# Patient Record
Sex: MALE | Race: WHITE | NOT HISPANIC OR LATINO | Employment: OTHER | URBAN - NONMETROPOLITAN AREA
[De-identification: names, ages, dates, MRNs, and addresses within clinical notes are randomized per-mention and may not be internally consistent; named-entity substitution may affect disease eponyms.]

---

## 2019-06-19 ENCOUNTER — TRANSCRIBE ORDERS (OUTPATIENT)
Dept: PODIATRY | Facility: CLINIC | Age: 59
End: 2019-06-19

## 2019-06-19 DIAGNOSIS — G62.9 NEUROPATHY: ICD-10-CM

## 2019-06-19 DIAGNOSIS — E11.9 ENCOUNTER FOR DIABETIC FOOT EXAM (HCC): Primary | ICD-10-CM

## 2019-06-19 DIAGNOSIS — R73.9 HYPERGLYCEMIA: ICD-10-CM

## 2019-07-15 ENCOUNTER — OFFICE VISIT (OUTPATIENT)
Dept: PODIATRY | Facility: CLINIC | Age: 59
End: 2019-07-15

## 2019-07-15 VITALS — BODY MASS INDEX: 40.09 KG/M2 | OXYGEN SATURATION: 98 % | HEART RATE: 102 BPM | WEIGHT: 280 LBS | HEIGHT: 70 IN

## 2019-07-15 DIAGNOSIS — E11.42 DIABETIC POLYNEUROPATHY ASSOCIATED WITH TYPE 2 DIABETES MELLITUS (HCC): Primary | ICD-10-CM

## 2019-07-15 DIAGNOSIS — M20.41 HAMMER TOES OF BOTH FEET: ICD-10-CM

## 2019-07-15 DIAGNOSIS — M20.42 HAMMER TOES OF BOTH FEET: ICD-10-CM

## 2019-07-15 DIAGNOSIS — M79.672 PAIN IN BOTH FEET: ICD-10-CM

## 2019-07-15 DIAGNOSIS — M79.671 PAIN IN BOTH FEET: ICD-10-CM

## 2019-07-15 PROCEDURE — 99203 OFFICE O/P NEW LOW 30 MIN: CPT | Performed by: PODIATRIST

## 2019-07-15 RX ORDER — GLIPIZIDE 10 MG/1
10 TABLET ORAL
COMMUNITY

## 2019-07-15 RX ORDER — GABAPENTIN 300 MG/1
300 CAPSULE ORAL 3 TIMES DAILY
Qty: 90 CAPSULE | Refills: 0 | Status: SHIPPED | OUTPATIENT
Start: 2019-07-15

## 2019-07-15 RX ORDER — LISINOPRIL 10 MG/1
10 TABLET ORAL DAILY
COMMUNITY

## 2019-07-15 NOTE — PROGRESS NOTES
Tucker Knox  1960  59 y.o. male   BS: 186 per patient    Patient presents today for a routine diabetic nail exam.     07/15/2019  Chief Complaint   Patient presents with   • Left Foot - diabetic foot care   • Right Foot - diabetic foot care           History of Present Illness    Tucker Knox is a 59 y.o. male with history of diabetes who presents for bilateral foot pain.  He describes pain is chronic and worsening.  He describes the pain as tingling burning and stinging.  Pain is present independent of weightbearing status and activity.  He states that multiple years ago he did take gabapentin which seemed to help somewhat with his pain.  He admits to being a poorly controlled diabetic and does not currently have a family practitioner.  He states that he gets his medications for his diabetes through the urgent care.  Ysabel diabetic foot ulcerations or infections.        Past Medical History:   Diagnosis Date   • Hypertension          History reviewed. No pertinent surgical history.      History reviewed. No pertinent family history.      Social History     Socioeconomic History   • Marital status:      Spouse name: Not on file   • Number of children: Not on file   • Years of education: Not on file   • Highest education level: Not on file   Tobacco Use   • Smoking status: Current Every Day Smoker   • Smokeless tobacco: Current User   Substance and Sexual Activity   • Alcohol use: No     Frequency: Never   • Drug use: No   • Sexual activity: Defer         Current Outpatient Medications   Medication Sig Dispense Refill   • Dulaglutide (TRULICITY) 0.75 MG/0.5ML solution pen-injector Inject  under the skin into the appropriate area as directed.     • glipiZIDE (GLUCOTROL) 10 MG tablet Take 10 mg by mouth 2 (Two) Times a Day Before Meals.     • insulin detemir (LEVEMIR) 100 UNIT/ML injection Inject  under the skin into the appropriate area as directed Daily.     • lisinopril  "(PRINIVIL,ZESTRIL) 10 MG tablet Take 10 mg by mouth Daily.     • metFORMIN (GLUCOPHAGE) 1000 MG tablet Take 1,000 mg by mouth 2 (Two) Times a Day.  0   • gabapentin (NEURONTIN) 300 MG capsule Take 1 capsule by mouth 3 (Three) Times a Day. 90 capsule 0     No current facility-administered medications for this visit.          OBJECTIVE    Pulse 102   Ht 177.8 cm (70\")   Wt 127 kg (280 lb)   SpO2 98%   BMI 40.18 kg/m²       Review of Systems   Constitutional: Positive for fatigue.   Eyes: Positive for visual disturbance.   Respiratory: Positive for cough and shortness of breath.    Endocrine: Positive for heat intolerance.   Genitourinary: Positive for difficulty urinating.   Musculoskeletal: Positive for back pain.        Joint pain     Neurological: Positive for dizziness, numbness and headaches.   Psychiatric/Behavioral: Positive for confusion. The patient is nervous/anxious.         Depression          Physical Exam    Tucker had a diabetic foot exam performed today.   During the foot exam he had a monofilament test performed.         Constitutional: he appears well-developed and well-nourished.   HEENT: Normocephalic. Atraumatic  CV: No tenderness. RRR  Resp: Non-labored respiration. No wheezes.   Psychiatric: he has a normal mood and affect. his   behavior is normal.      Lower Extremity Exam:  Vascular: DP/PT pulses palpable 1+.   Negative hair growth.   Trace perimalleolar edema  Neuro: Protective sensation diminished to lesser toes, b/l.  DTRs intact  Integument: No open wounds or lesions.  Skin quality normal  No masses  Webspaces c/d/i  Musculoskeletal: LE muscle strength 5/5.   Gait Normal  Mild, flexible hammertoe deformity toes 2 through 5 bilateral.  Nails 1-5 b/l thickened  Ankle ROM is in normal limits, b/l              ASSESSMENT AND PLAN    Tucker was seen today for diabetic foot care and diabetic foot care.    Diagnoses and all orders for this visit:    Diabetic polyneuropathy associated with " type 2 diabetes mellitus (CMS/[facility])  -     Ambulatory Referral to Family Practice    Pain in both feet    Hammer toes of both feet    Other orders  -     gabapentin (NEURONTIN) 300 MG capsule; Take 1 capsule by mouth 3 (Three) Times a Day.      -Comprehensive DM foot exam performed. Patient educated on importance of tight glucose control and daily foot checks.   -Patient educated on padding techniques for hammertoes.  Proper extra depth diabetic shoe gear.  Limit barefoot walking.  -Patient's predominant pain seems to be neuropathic in origin.  Had at length discussion advising him to obtain a new family practice doctor for better control of his diabetes and management of his neuropathic pain.  -I did obtain a Manohar report which was appropriate we will trial him on 1 month of Neurontin in the meantime.  Advised him that he will need a family practitioner to manage this in future.  -Follow up 3 months PRN          This document has been electronically signed by Simone Clark DPM on July 20, 2019 1:10 PM     EMR Dragon/Transcription disclaimer:   Much of this encounter note is an electronic transcription/translation of spoken language to printed text. The electronic translation of spoken language may permit erroneous, or at times, nonsensical words or phrases to be inadvertently transcribed; Although I have reviewed the note for such errors, some may still exist.    Simone Clark DPM  7/20/2019  1:10 PM

## 2020-03-24 ENCOUNTER — TELEPHONE (OUTPATIENT)
Dept: PODIATRY | Facility: CLINIC | Age: 60
End: 2020-03-24

## 2020-03-24 RX ORDER — GABAPENTIN 300 MG/1
CAPSULE ORAL
Qty: 270 CAPSULE | OUTPATIENT
Start: 2020-03-24

## 2020-03-24 NOTE — TELEPHONE ENCOUNTER
Dr Clark said No  And I called patient and explained that we have not seen him since July 2019 and that he needed to establish care with a PCP

## 2020-03-24 NOTE — TELEPHONE ENCOUNTER
JANY:    PATIENT WAS LAST SEEN ON 7/15/2019 AND IS REQUESTING A REFILL ON   gabapentin (NEURONTIN) 300 MG capsule   DUE TO COVID19 DOES NOT WANT TO MAKE AN APPOINTMENT AND HE DID NOT WANT TO COME AROUND  A HOSPITAL.  PLEASE ADVISE PATIENT

## 2021-12-25 ENCOUNTER — HOSPITAL ENCOUNTER (INPATIENT)
Facility: HOSPITAL | Age: 61
LOS: 1 days | Discharge: HOME | End: 2021-12-26
Attending: EMERGENCY MEDICINE | Admitting: FAMILY MEDICINE

## 2021-12-25 ENCOUNTER — APPOINTMENT (OUTPATIENT)
Dept: CARDIOLOGY | Facility: HOSPITAL | Age: 61
End: 2021-12-25

## 2021-12-25 ENCOUNTER — APPOINTMENT (OUTPATIENT)
Dept: CT IMAGING | Facility: HOSPITAL | Age: 61
End: 2021-12-25

## 2021-12-25 ENCOUNTER — APPOINTMENT (OUTPATIENT)
Dept: GENERAL RADIOLOGY | Facility: HOSPITAL | Age: 61
End: 2021-12-25

## 2021-12-25 DIAGNOSIS — R07.89 CHEST TIGHTNESS: Primary | ICD-10-CM

## 2021-12-25 DIAGNOSIS — I21.4 NSTEMI (NON-ST ELEVATED MYOCARDIAL INFARCTION): ICD-10-CM

## 2021-12-25 LAB
ALBUMIN SERPL-MCNC: 3.8 G/DL (ref 3.5–5.2)
ALBUMIN/GLOB SERPL: 1 G/DL
ALP SERPL-CCNC: 109 U/L (ref 39–117)
ALT SERPL W P-5'-P-CCNC: 13 U/L (ref 1–41)
ANION GAP SERPL CALCULATED.3IONS-SCNC: 10 MMOL/L (ref 5–15)
ANION GAP SERPL CALCULATED.3IONS-SCNC: 11 MMOL/L (ref 5–15)
AST SERPL-CCNC: 10 U/L (ref 1–40)
BASOPHILS # BLD AUTO: 0.08 10*3/MM3 (ref 0–0.2)
BASOPHILS NFR BLD AUTO: 0.7 % (ref 0–1.5)
BH CV ECHO MEAS - AO MAX PG (FULL): 8.1 MMHG
BH CV ECHO MEAS - AO MAX PG: 11 MMHG
BH CV ECHO MEAS - AO MEAN PG (FULL): 1.5 MMHG
BH CV ECHO MEAS - AO MEAN PG: 3.5 MMHG
BH CV ECHO MEAS - AO ROOT AREA (BSA CORRECTED): 1.2
BH CV ECHO MEAS - AO ROOT AREA: 6.2 CM^2
BH CV ECHO MEAS - AO ROOT DIAM: 2.8 CM
BH CV ECHO MEAS - AO V2 MAX: 166 CM/SEC
BH CV ECHO MEAS - AO V2 MEAN: 76.3 CM/SEC
BH CV ECHO MEAS - AO V2 VTI: 21.2 CM
BH CV ECHO MEAS - AVA(I,A): 2.1 CM^2
BH CV ECHO MEAS - AVA(I,D): 2.1 CM^2
BH CV ECHO MEAS - AVA(V,A): 1.6 CM^2
BH CV ECHO MEAS - AVA(V,D): 1.6 CM^2
BH CV ECHO MEAS - BSA(HAYCOCK): 2.4 M^2
BH CV ECHO MEAS - BSA: 2.3 M^2
BH CV ECHO MEAS - BZI_BMI: 36.9 KILOGRAMS/M^2
BH CV ECHO MEAS - BZI_METRIC_HEIGHT: 175.3 CM
BH CV ECHO MEAS - BZI_METRIC_WEIGHT: 113.4 KG
BH CV ECHO MEAS - EDV(CUBED): 267.1 ML
BH CV ECHO MEAS - EDV(MOD-SP4): 233 ML
BH CV ECHO MEAS - EDV(TEICH): 211.5 ML
BH CV ECHO MEAS - EF(CUBED): 43.6 %
BH CV ECHO MEAS - EF(MOD-SP4): 38.6 %
BH CV ECHO MEAS - EF(TEICH): 35.4 %
BH CV ECHO MEAS - ESV(CUBED): 150.6 ML
BH CV ECHO MEAS - ESV(MOD-SP4): 143 ML
BH CV ECHO MEAS - ESV(TEICH): 136.5 ML
BH CV ECHO MEAS - FS: 17.4 %
BH CV ECHO MEAS - IVS/LVPW: 1
BH CV ECHO MEAS - IVSD: 1.3 CM
BH CV ECHO MEAS - LA DIMENSION: 4.5 CM
BH CV ECHO MEAS - LA/AO: 1.6
BH CV ECHO MEAS - LAT PEAK E' VEL: 6.7 CM/SEC
BH CV ECHO MEAS - LV DIASTOLIC VOL/BSA (35-75): 102.6 ML/M^2
BH CV ECHO MEAS - LV MASS(C)D: 403.3 GRAMS
BH CV ECHO MEAS - LV MASS(C)DI: 177.6 GRAMS/M^2
BH CV ECHO MEAS - LV MAX PG: 3 MMHG
BH CV ECHO MEAS - LV MEAN PG: 2 MMHG
BH CV ECHO MEAS - LV SYSTOLIC VOL/BSA (12-30): 63 ML/M^2
BH CV ECHO MEAS - LV V1 MAX: 86.2 CM/SEC
BH CV ECHO MEAS - LV V1 MEAN: 55.9 CM/SEC
BH CV ECHO MEAS - LV V1 VTI: 14.2 CM
BH CV ECHO MEAS - LVIDD: 6.4 CM
BH CV ECHO MEAS - LVIDS: 5.3 CM
BH CV ECHO MEAS - LVLD AP4: 10 CM
BH CV ECHO MEAS - LVLS AP4: 8.7 CM
BH CV ECHO MEAS - LVOT AREA (M): 3.1 CM^2
BH CV ECHO MEAS - LVOT AREA: 3.1 CM^2
BH CV ECHO MEAS - LVOT DIAM: 2 CM
BH CV ECHO MEAS - LVPWD: 1.3 CM
BH CV ECHO MEAS - MED PEAK E' VEL: 5.33 CM/SEC
BH CV ECHO MEAS - MR MAX PG: 129.8 MMHG
BH CV ECHO MEAS - MR MAX VEL: 568.7 CM/SEC
BH CV ECHO MEAS - MR MEAN PG: 92.5 MMHG
BH CV ECHO MEAS - MR MEAN VEL: 457 CM/SEC
BH CV ECHO MEAS - MR VTI: 173 CM
BH CV ECHO MEAS - MV A MAX VEL: 113 CM/SEC
BH CV ECHO MEAS - MV DEC SLOPE: 563 CM/SEC^2
BH CV ECHO MEAS - MV DEC TIME: 0.16 SEC
BH CV ECHO MEAS - MV E MAX VEL: 89 CM/SEC
BH CV ECHO MEAS - MV E/A: 0.79
BH CV ECHO MEAS - MV P1/2T MAX VEL: 107 CM/SEC
BH CV ECHO MEAS - MV P1/2T: 55.7 MSEC
BH CV ECHO MEAS - MVA P1/2T LCG: 2.1 CM^2
BH CV ECHO MEAS - MVA(P1/2T): 4 CM^2
BH CV ECHO MEAS - RAP SYSTOLE: 5 MMHG
BH CV ECHO MEAS - RVSP: 55.1 MMHG
BH CV ECHO MEAS - SI(AO): 57.3 ML/M^2
BH CV ECHO MEAS - SI(CUBED): 51.3 ML/M^2
BH CV ECHO MEAS - SI(LVOT): 19.6 ML/M^2
BH CV ECHO MEAS - SI(MOD-SP4): 39.6 ML/M^2
BH CV ECHO MEAS - SI(TEICH): 33 ML/M^2
BH CV ECHO MEAS - SV(AO): 130.2 ML
BH CV ECHO MEAS - SV(CUBED): 116.5 ML
BH CV ECHO MEAS - SV(LVOT): 44.6 ML
BH CV ECHO MEAS - SV(MOD-SP4): 90 ML
BH CV ECHO MEAS - SV(TEICH): 75 ML
BH CV ECHO MEAS - TR MAX VEL: 354 CM/SEC
BH CV ECHO MEASUREMENTS AVERAGE E/E' RATIO: 14.8
BILIRUB SERPL-MCNC: 0.5 MG/DL (ref 0–1.2)
BUN SERPL-MCNC: 13 MG/DL (ref 8–23)
BUN SERPL-MCNC: 14 MG/DL (ref 8–23)
BUN/CREAT SERPL: 13.5 (ref 7–25)
BUN/CREAT SERPL: 14.1 (ref 7–25)
CALCIUM SPEC-SCNC: 8.6 MG/DL (ref 8.6–10.5)
CALCIUM SPEC-SCNC: 9 MG/DL (ref 8.6–10.5)
CHLORIDE SERPL-SCNC: 98 MMOL/L (ref 98–107)
CHLORIDE SERPL-SCNC: 98 MMOL/L (ref 98–107)
CO2 SERPL-SCNC: 29 MMOL/L (ref 22–29)
CO2 SERPL-SCNC: 30 MMOL/L (ref 22–29)
CREAT SERPL-MCNC: 0.96 MG/DL (ref 0.76–1.27)
CREAT SERPL-MCNC: 0.99 MG/DL (ref 0.76–1.27)
DEPRECATED RDW RBC AUTO: 43.5 FL (ref 37–54)
EOSINOPHIL # BLD AUTO: 0.1 10*3/MM3 (ref 0–0.4)
EOSINOPHIL NFR BLD AUTO: 0.8 % (ref 0.3–6.2)
ERYTHROCYTE [DISTWIDTH] IN BLOOD BY AUTOMATED COUNT: 13.4 % (ref 12.3–15.4)
GFR SERPL CREATININE-BSD FRML MDRD: 77 ML/MIN/1.73
GFR SERPL CREATININE-BSD FRML MDRD: 80 ML/MIN/1.73
GLOBULIN UR ELPH-MCNC: 3.9 GM/DL
GLUCOSE BLDC GLUCOMTR-MCNC: 172 MG/DL (ref 70–130)
GLUCOSE BLDC GLUCOMTR-MCNC: 353 MG/DL (ref 70–130)
GLUCOSE SERPL-MCNC: 307 MG/DL (ref 65–99)
GLUCOSE SERPL-MCNC: 346 MG/DL (ref 65–99)
HBA1C MFR BLD: 9.7 % (ref 4.8–5.6)
HCT VFR BLD AUTO: 43 % (ref 37.5–51)
HGB BLD-MCNC: 13.5 G/DL (ref 13–17.7)
HOLD SPECIMEN: NORMAL
IMM GRANULOCYTES # BLD AUTO: 0.1 10*3/MM3 (ref 0–0.05)
IMM GRANULOCYTES NFR BLD AUTO: 0.8 % (ref 0–0.5)
LEFT ATRIUM VOLUME INDEX: 35.1 ML/M2
LEFT ATRIUM VOLUME: 79.6 CM3
LYMPHOCYTES # BLD AUTO: 2.02 10*3/MM3 (ref 0.7–3.1)
LYMPHOCYTES NFR BLD AUTO: 16.6 % (ref 19.6–45.3)
MAGNESIUM SERPL-MCNC: 1.9 MG/DL (ref 1.6–2.4)
MCH RBC QN AUTO: 28.4 PG (ref 26.6–33)
MCHC RBC AUTO-ENTMCNC: 31.4 G/DL (ref 31.5–35.7)
MCV RBC AUTO: 90.3 FL (ref 79–97)
MONOCYTES # BLD AUTO: 0.73 10*3/MM3 (ref 0.1–0.9)
MONOCYTES NFR BLD AUTO: 6 % (ref 5–12)
NEUTROPHILS NFR BLD AUTO: 75.1 % (ref 42.7–76)
NEUTROPHILS NFR BLD AUTO: 9.12 10*3/MM3 (ref 1.7–7)
NRBC BLD AUTO-RTO: 0 /100 WBC (ref 0–0.2)
NT-PROBNP SERPL-MCNC: 2056 PG/ML (ref 0–900)
PLATELET # BLD AUTO: 315 10*3/MM3 (ref 140–450)
PMV BLD AUTO: 9.9 FL (ref 6–12)
POTASSIUM SERPL-SCNC: 4.1 MMOL/L (ref 3.5–5.2)
POTASSIUM SERPL-SCNC: 4.4 MMOL/L (ref 3.5–5.2)
PROT SERPL-MCNC: 7.7 G/DL (ref 6–8.5)
RBC # BLD AUTO: 4.76 10*6/MM3 (ref 4.14–5.8)
SARS-COV-2 RNA PNL SPEC NAA+PROBE: NOT DETECTED
SODIUM SERPL-SCNC: 138 MMOL/L (ref 136–145)
SODIUM SERPL-SCNC: 138 MMOL/L (ref 136–145)
TROPONIN T SERPL-MCNC: 0.08 NG/ML (ref 0–0.03)
TROPONIN T SERPL-MCNC: 0.09 NG/ML (ref 0–0.03)
WBC NRBC COR # BLD: 12.15 10*3/MM3 (ref 3.4–10.8)

## 2021-12-25 PROCEDURE — 93010 ELECTROCARDIOGRAM REPORT: CPT | Performed by: INTERNAL MEDICINE

## 2021-12-25 PROCEDURE — 83880 ASSAY OF NATRIURETIC PEPTIDE: CPT | Performed by: EMERGENCY MEDICINE

## 2021-12-25 PROCEDURE — 93356 MYOCRD STRAIN IMG SPCKL TRCK: CPT

## 2021-12-25 PROCEDURE — 82962 GLUCOSE BLOOD TEST: CPT

## 2021-12-25 PROCEDURE — 80053 COMPREHEN METABOLIC PANEL: CPT | Performed by: FAMILY MEDICINE

## 2021-12-25 PROCEDURE — 93356 MYOCRD STRAIN IMG SPCKL TRCK: CPT | Performed by: INTERNAL MEDICINE

## 2021-12-25 PROCEDURE — 25010000002 ENOXAPARIN PER 10 MG: Performed by: FAMILY MEDICINE

## 2021-12-25 PROCEDURE — 94799 UNLISTED PULMONARY SVC/PX: CPT

## 2021-12-25 PROCEDURE — 87635 SARS-COV-2 COVID-19 AMP PRB: CPT | Performed by: EMERGENCY MEDICINE

## 2021-12-25 PROCEDURE — 93306 TTE W/DOPPLER COMPLETE: CPT

## 2021-12-25 PROCEDURE — 0 IOPAMIDOL PER 1 ML: Performed by: EMERGENCY MEDICINE

## 2021-12-25 PROCEDURE — 84484 ASSAY OF TROPONIN QUANT: CPT | Performed by: EMERGENCY MEDICINE

## 2021-12-25 PROCEDURE — 63710000001 INSULIN LISPRO (HUMAN) PER 5 UNITS: Performed by: FAMILY MEDICINE

## 2021-12-25 PROCEDURE — 83036 HEMOGLOBIN GLYCOSYLATED A1C: CPT | Performed by: FAMILY MEDICINE

## 2021-12-25 PROCEDURE — 93005 ELECTROCARDIOGRAM TRACING: CPT | Performed by: EMERGENCY MEDICINE

## 2021-12-25 PROCEDURE — 99285 EMERGENCY DEPT VISIT HI MDM: CPT

## 2021-12-25 PROCEDURE — 99222 1ST HOSP IP/OBS MODERATE 55: CPT | Performed by: INTERNAL MEDICINE

## 2021-12-25 PROCEDURE — 83735 ASSAY OF MAGNESIUM: CPT | Performed by: FAMILY MEDICINE

## 2021-12-25 PROCEDURE — 63710000001 INSULIN DETEMIR PER 5 UNITS: Performed by: FAMILY MEDICINE

## 2021-12-25 PROCEDURE — 25010000002 FUROSEMIDE PER 20 MG: Performed by: FAMILY MEDICINE

## 2021-12-25 PROCEDURE — 71275 CT ANGIOGRAPHY CHEST: CPT

## 2021-12-25 PROCEDURE — 85025 COMPLETE CBC W/AUTO DIFF WBC: CPT | Performed by: EMERGENCY MEDICINE

## 2021-12-25 PROCEDURE — 93306 TTE W/DOPPLER COMPLETE: CPT | Performed by: INTERNAL MEDICINE

## 2021-12-25 PROCEDURE — 71045 X-RAY EXAM CHEST 1 VIEW: CPT

## 2021-12-25 PROCEDURE — 36415 COLL VENOUS BLD VENIPUNCTURE: CPT

## 2021-12-25 RX ORDER — NITROGLYCERIN 20 MG/100ML
5-200 INJECTION INTRAVENOUS
Status: DISCONTINUED | OUTPATIENT
Start: 2021-12-25 | End: 2021-12-25

## 2021-12-25 RX ORDER — ACETAMINOPHEN 325 MG/1
650 TABLET ORAL EVERY 4 HOURS PRN
Status: DISCONTINUED | OUTPATIENT
Start: 2021-12-25 | End: 2021-12-26 | Stop reason: HOSPADM

## 2021-12-25 RX ORDER — POLYETHYLENE GLYCOL 3350 17 G/17G
17 POWDER, FOR SOLUTION ORAL DAILY PRN
Status: DISCONTINUED | OUTPATIENT
Start: 2021-12-25 | End: 2021-12-26 | Stop reason: HOSPADM

## 2021-12-25 RX ORDER — NICOTINE POLACRILEX 4 MG
15 LOZENGE BUCCAL
Status: DISCONTINUED | OUTPATIENT
Start: 2021-12-25 | End: 2021-12-26 | Stop reason: HOSPADM

## 2021-12-25 RX ORDER — SODIUM CHLORIDE 0.9 % (FLUSH) 0.9 %
10 SYRINGE (ML) INJECTION EVERY 12 HOURS SCHEDULED
Status: DISCONTINUED | OUTPATIENT
Start: 2021-12-25 | End: 2021-12-26 | Stop reason: HOSPADM

## 2021-12-25 RX ORDER — ASPIRIN 81 MG/1
324 TABLET, CHEWABLE ORAL ONCE
Status: DISCONTINUED | OUTPATIENT
Start: 2021-12-25 | End: 2021-12-26

## 2021-12-25 RX ORDER — DEXTROSE MONOHYDRATE 25 G/50ML
25 INJECTION, SOLUTION INTRAVENOUS
Status: DISCONTINUED | OUTPATIENT
Start: 2021-12-25 | End: 2021-12-26 | Stop reason: HOSPADM

## 2021-12-25 RX ORDER — ALUMINA, MAGNESIA, AND SIMETHICONE 2400; 2400; 240 MG/30ML; MG/30ML; MG/30ML
15 SUSPENSION ORAL EVERY 6 HOURS PRN
Status: DISCONTINUED | OUTPATIENT
Start: 2021-12-25 | End: 2021-12-26 | Stop reason: HOSPADM

## 2021-12-25 RX ORDER — LISINOPRIL 10 MG/1
10 TABLET ORAL DAILY
Status: DISCONTINUED | OUTPATIENT
Start: 2021-12-25 | End: 2021-12-26 | Stop reason: HOSPADM

## 2021-12-25 RX ORDER — FUROSEMIDE 10 MG/ML
40 INJECTION INTRAMUSCULAR; INTRAVENOUS
Status: DISCONTINUED | OUTPATIENT
Start: 2021-12-25 | End: 2021-12-26

## 2021-12-25 RX ORDER — BISACODYL 5 MG/1
5 TABLET, DELAYED RELEASE ORAL DAILY PRN
Status: DISCONTINUED | OUTPATIENT
Start: 2021-12-25 | End: 2021-12-26 | Stop reason: HOSPADM

## 2021-12-25 RX ORDER — SODIUM CHLORIDE 0.9 % (FLUSH) 0.9 %
10 SYRINGE (ML) INJECTION AS NEEDED
Status: DISCONTINUED | OUTPATIENT
Start: 2021-12-25 | End: 2021-12-26 | Stop reason: HOSPADM

## 2021-12-25 RX ORDER — ONDANSETRON 2 MG/ML
4 INJECTION INTRAMUSCULAR; INTRAVENOUS EVERY 6 HOURS PRN
Status: DISCONTINUED | OUTPATIENT
Start: 2021-12-25 | End: 2021-12-26 | Stop reason: HOSPADM

## 2021-12-25 RX ORDER — ASPIRIN 81 MG/1
81 TABLET ORAL DAILY
Status: DISCONTINUED | OUTPATIENT
Start: 2021-12-26 | End: 2021-12-26 | Stop reason: HOSPADM

## 2021-12-25 RX ORDER — AMOXICILLIN 250 MG
2 CAPSULE ORAL 2 TIMES DAILY PRN
Status: DISCONTINUED | OUTPATIENT
Start: 2021-12-25 | End: 2021-12-26 | Stop reason: HOSPADM

## 2021-12-25 RX ORDER — BISACODYL 10 MG
10 SUPPOSITORY, RECTAL RECTAL DAILY PRN
Status: DISCONTINUED | OUTPATIENT
Start: 2021-12-25 | End: 2021-12-26 | Stop reason: HOSPADM

## 2021-12-25 RX ORDER — FUROSEMIDE 10 MG/ML
40 INJECTION INTRAMUSCULAR; INTRAVENOUS
Status: DISCONTINUED | OUTPATIENT
Start: 2021-12-25 | End: 2021-12-25

## 2021-12-25 RX ADMIN — IOPAMIDOL 100 ML: 755 INJECTION, SOLUTION INTRAVENOUS at 12:43

## 2021-12-25 RX ADMIN — ENOXAPARIN SODIUM 110 MG: 120 INJECTION SUBCUTANEOUS at 17:08

## 2021-12-25 RX ADMIN — INSULIN DETEMIR 40 UNITS: 100 INJECTION, SOLUTION SUBCUTANEOUS at 22:02

## 2021-12-25 RX ADMIN — INSULIN LISPRO 12 UNITS: 100 INJECTION, SOLUTION INTRAVENOUS; SUBCUTANEOUS at 17:08

## 2021-12-25 RX ADMIN — NITROGLYCERIN 5 MCG/MIN: 20 INJECTION INTRAVENOUS at 11:04

## 2021-12-25 RX ADMIN — SODIUM CHLORIDE, PRESERVATIVE FREE 10 ML: 5 INJECTION INTRAVENOUS at 22:02

## 2021-12-25 RX ADMIN — LISINOPRIL 10 MG: 10 TABLET ORAL at 17:07

## 2021-12-25 RX ADMIN — FUROSEMIDE 40 MG: 10 INJECTION, SOLUTION INTRAVENOUS at 17:08

## 2021-12-26 ENCOUNTER — READMISSION MANAGEMENT (OUTPATIENT)
Dept: CALL CENTER | Facility: HOSPITAL | Age: 61
End: 2021-12-26

## 2021-12-26 VITALS
HEIGHT: 69 IN | HEART RATE: 90 BPM | WEIGHT: 262 LBS | BODY MASS INDEX: 38.8 KG/M2 | OXYGEN SATURATION: 94 % | TEMPERATURE: 97.6 F | DIASTOLIC BLOOD PRESSURE: 77 MMHG | SYSTOLIC BLOOD PRESSURE: 121 MMHG | RESPIRATION RATE: 18 BRPM

## 2021-12-26 PROBLEM — I50.21 SYSTOLIC CHF, ACUTE: Status: ACTIVE | Noted: 2021-12-26

## 2021-12-26 PROBLEM — I27.20 PULMONARY HTN (HCC): Status: ACTIVE | Noted: 2021-12-26

## 2021-12-26 LAB
ANION GAP SERPL CALCULATED.3IONS-SCNC: 8 MMOL/L (ref 5–15)
BASOPHILS # BLD AUTO: 0.04 10*3/MM3 (ref 0–0.2)
BASOPHILS NFR BLD AUTO: 0.4 % (ref 0–1.5)
BUN SERPL-MCNC: 15 MG/DL (ref 8–23)
BUN/CREAT SERPL: 15.8 (ref 7–25)
CALCIUM SPEC-SCNC: 8.5 MG/DL (ref 8.6–10.5)
CHLORIDE SERPL-SCNC: 99 MMOL/L (ref 98–107)
CHOLEST SERPL-MCNC: 174 MG/DL (ref 0–200)
CO2 SERPL-SCNC: 32 MMOL/L (ref 22–29)
CREAT SERPL-MCNC: 0.95 MG/DL (ref 0.76–1.27)
DEPRECATED RDW RBC AUTO: 43.7 FL (ref 37–54)
EOSINOPHIL # BLD AUTO: 0.1 10*3/MM3 (ref 0–0.4)
EOSINOPHIL NFR BLD AUTO: 0.9 % (ref 0.3–6.2)
ERYTHROCYTE [DISTWIDTH] IN BLOOD BY AUTOMATED COUNT: 13.3 % (ref 12.3–15.4)
GFR SERPL CREATININE-BSD FRML MDRD: 81 ML/MIN/1.73
GLUCOSE BLDC GLUCOMTR-MCNC: 199 MG/DL (ref 70–130)
GLUCOSE BLDC GLUCOMTR-MCNC: 338 MG/DL (ref 70–130)
GLUCOSE SERPL-MCNC: 261 MG/DL (ref 65–99)
HCT VFR BLD AUTO: 43.2 % (ref 37.5–51)
HDLC SERPL-MCNC: 36 MG/DL (ref 40–60)
HGB BLD-MCNC: 13.5 G/DL (ref 13–17.7)
IMM GRANULOCYTES # BLD AUTO: 0.06 10*3/MM3 (ref 0–0.05)
IMM GRANULOCYTES NFR BLD AUTO: 0.6 % (ref 0–0.5)
LDLC SERPL CALC-MCNC: 111 MG/DL (ref 0–100)
LDLC/HDLC SERPL: 2.98 {RATIO}
LYMPHOCYTES # BLD AUTO: 1.69 10*3/MM3 (ref 0.7–3.1)
LYMPHOCYTES NFR BLD AUTO: 15.6 % (ref 19.6–45.3)
MAGNESIUM SERPL-MCNC: 2 MG/DL (ref 1.6–2.4)
MCH RBC QN AUTO: 28.2 PG (ref 26.6–33)
MCHC RBC AUTO-ENTMCNC: 31.3 G/DL (ref 31.5–35.7)
MCV RBC AUTO: 90.4 FL (ref 79–97)
MONOCYTES # BLD AUTO: 0.58 10*3/MM3 (ref 0.1–0.9)
MONOCYTES NFR BLD AUTO: 5.4 % (ref 5–12)
NEUTROPHILS NFR BLD AUTO: 77.1 % (ref 42.7–76)
NEUTROPHILS NFR BLD AUTO: 8.37 10*3/MM3 (ref 1.7–7)
NRBC BLD AUTO-RTO: 0 /100 WBC (ref 0–0.2)
PLATELET # BLD AUTO: 322 10*3/MM3 (ref 140–450)
PMV BLD AUTO: 10.4 FL (ref 6–12)
POTASSIUM SERPL-SCNC: 4.2 MMOL/L (ref 3.5–5.2)
RBC # BLD AUTO: 4.78 10*6/MM3 (ref 4.14–5.8)
SODIUM SERPL-SCNC: 139 MMOL/L (ref 136–145)
TRIGL SERPL-MCNC: 153 MG/DL (ref 0–150)
TROPONIN T SERPL-MCNC: 0.09 NG/ML (ref 0–0.03)
VLDLC SERPL-MCNC: 27 MG/DL (ref 5–40)
WBC NRBC COR # BLD: 10.84 10*3/MM3 (ref 3.4–10.8)

## 2021-12-26 PROCEDURE — 25010000002 FUROSEMIDE PER 20 MG: Performed by: FAMILY MEDICINE

## 2021-12-26 PROCEDURE — 63710000001 INSULIN LISPRO (HUMAN) PER 5 UNITS: Performed by: FAMILY MEDICINE

## 2021-12-26 PROCEDURE — 83735 ASSAY OF MAGNESIUM: CPT | Performed by: FAMILY MEDICINE

## 2021-12-26 PROCEDURE — 85025 COMPLETE CBC W/AUTO DIFF WBC: CPT | Performed by: FAMILY MEDICINE

## 2021-12-26 PROCEDURE — 99232 SBSQ HOSP IP/OBS MODERATE 35: CPT | Performed by: INTERNAL MEDICINE

## 2021-12-26 PROCEDURE — 84484 ASSAY OF TROPONIN QUANT: CPT | Performed by: FAMILY MEDICINE

## 2021-12-26 PROCEDURE — 80061 LIPID PANEL: CPT | Performed by: INTERNAL MEDICINE

## 2021-12-26 PROCEDURE — 80048 BASIC METABOLIC PNL TOTAL CA: CPT | Performed by: FAMILY MEDICINE

## 2021-12-26 PROCEDURE — 82962 GLUCOSE BLOOD TEST: CPT

## 2021-12-26 PROCEDURE — 25010000002 ENOXAPARIN PER 10 MG: Performed by: FAMILY MEDICINE

## 2021-12-26 RX ORDER — ATORVASTATIN CALCIUM 10 MG/1
10 TABLET, FILM COATED ORAL NIGHTLY
Status: DISCONTINUED | OUTPATIENT
Start: 2021-12-26 | End: 2021-12-26 | Stop reason: HOSPADM

## 2021-12-26 RX ORDER — FUROSEMIDE 40 MG/1
40 TABLET ORAL DAILY
Status: DISCONTINUED | OUTPATIENT
Start: 2021-12-27 | End: 2021-12-26 | Stop reason: HOSPADM

## 2021-12-26 RX ORDER — CLOPIDOGREL BISULFATE 75 MG/1
300 TABLET ORAL ONCE
Status: COMPLETED | OUTPATIENT
Start: 2021-12-26 | End: 2021-12-26

## 2021-12-26 RX ORDER — CLOPIDOGREL BISULFATE 75 MG/1
75 TABLET ORAL DAILY
Status: DISCONTINUED | OUTPATIENT
Start: 2021-12-27 | End: 2021-12-26 | Stop reason: HOSPADM

## 2021-12-26 RX ORDER — ASPIRIN 81 MG/1
81 TABLET ORAL DAILY
Qty: 100 TABLET | Refills: 2 | Status: SHIPPED | OUTPATIENT
Start: 2021-12-27

## 2021-12-26 RX ORDER — FUROSEMIDE 40 MG/1
40 TABLET ORAL DAILY
Qty: 30 TABLET | Refills: 2 | Status: SHIPPED | OUTPATIENT
Start: 2021-12-27

## 2021-12-26 RX ORDER — CLOPIDOGREL BISULFATE 75 MG/1
75 TABLET ORAL DAILY
Qty: 30 TABLET | Refills: 2 | Status: ON HOLD | OUTPATIENT
Start: 2021-12-27 | End: 2022-04-14 | Stop reason: SDUPTHER

## 2021-12-26 RX ORDER — ATORVASTATIN CALCIUM 10 MG/1
10 TABLET, FILM COATED ORAL NIGHTLY
Qty: 30 TABLET | Refills: 2 | Status: SHIPPED | OUTPATIENT
Start: 2021-12-26

## 2021-12-26 RX ADMIN — ENOXAPARIN SODIUM 120 MG: 120 INJECTION SUBCUTANEOUS at 09:11

## 2021-12-26 RX ADMIN — FUROSEMIDE 40 MG: 10 INJECTION, SOLUTION INTRAVENOUS at 09:12

## 2021-12-26 RX ADMIN — ASPIRIN 81 MG: 81 TABLET, COATED ORAL at 09:12

## 2021-12-26 RX ADMIN — CLOPIDOGREL 300 MG: 75 TABLET, FILM COATED ORAL at 12:18

## 2021-12-26 RX ADMIN — SODIUM CHLORIDE, PRESERVATIVE FREE 10 ML: 5 INJECTION INTRAVENOUS at 09:12

## 2021-12-26 RX ADMIN — INSULIN LISPRO 10 UNITS: 100 INJECTION, SOLUTION INTRAVENOUS; SUBCUTANEOUS at 12:19

## 2021-12-26 RX ADMIN — ACETAMINOPHEN 650 MG: 325 TABLET ORAL at 02:27

## 2021-12-26 RX ADMIN — LISINOPRIL 10 MG: 10 TABLET ORAL at 09:12

## 2021-12-26 RX ADMIN — METOPROLOL TARTRATE 25 MG: 25 TABLET, FILM COATED ORAL at 12:18

## 2021-12-26 RX ADMIN — ACETAMINOPHEN 650 MG: 325 TABLET ORAL at 09:12

## 2021-12-26 RX ADMIN — INSULIN LISPRO 3 UNITS: 100 INJECTION, SOLUTION INTRAVENOUS; SUBCUTANEOUS at 09:11

## 2021-12-27 LAB
QT INTERVAL: 426 MS
QTC INTERVAL: 509 MS

## 2021-12-27 NOTE — OUTREACH NOTE
Prep Survey      Responses   [facility] patient discharged from? [place]   Is LACE score < 7 ? Yes   Emergency Room discharge w/ pulse ox? No   Eligibility Readm Mgmt   Discharge diagnosis Acute pulmonary edema, acute systolic CHF   Does the patient have one of the following disease processes/diagnoses(primary or secondary)? CHF   Does the patient have Home health ordered? No   Is there a DME ordered? No   Prep survey completed? Yes          Diana Valdez RN

## 2022-01-05 ENCOUNTER — READMISSION MANAGEMENT (OUTPATIENT)
Dept: CALL CENTER | Facility: HOSPITAL | Age: 62
End: 2022-01-05

## 2022-01-05 NOTE — OUTREACH NOTE
"CHF Week 2 Survey      Responses   [facility] patient discharged from? [place]   Does the patient have one of the following disease processes/diagnoses(primary or secondary)? CHF   Week 2 attempt successful? Yes   Call start time 1409   Call end time 1413   Discharge diagnosis Acute pulmonary edema, acute systolic CHF   Meds reviewed with patient/caregiver? Yes   Is the patient having any side effects they believe may be caused by any medication additions or changes? No   Does the patient have all medications ordered at discharge? Yes   Is the patient taking all medications as directed (includes completed medication regime)? Yes   Does the patient have a primary care provider?  Yes   Has the patient kept scheduled appointments due by today? Yes   Comments Saw PCP yesterday   Pulse Ox monitoring Intermittent   Pulse Ox device source Patient   O2 Sat comments 94% on room air    O2 Sat: education provided Sat levels,  When to seek care,  Monitoring frequency   Psychosocial issues? No   What is the patient's perception of their health status since discharge? Improving   Is the patient weighing daily? No   Does the patient have scales? No   Daily weight interventions Education provided on importance of daily weight   Is the patient able to teach back Heart Failure diet management? Yes   Is the patient able to teach back Heart Failure Zones? Yes   Is the patient able to teach back signs and symptoms of worsening condition? (i.e. weight gain, shortness of air, etc.) Yes   If the patient is a current smoker, are they able to teach back resources for cessation? [id number],  Smoking cessation medications   Is the patient/caregiver able to teach back the hierarchy of who to call/visit for symptoms/problems? PCP, Specialist, Home health nurse, Urgent Care, ED, 911 Yes   Additional teach back comments States he is doing \"alright\".  Has followed up with PCP and is going to make appt with cardiologist.     CHF Week 2 call " completed? Yes   Wrap up additional comments Denies quesitons or needs at this time          Krystal Christensen LPN

## 2022-01-13 ENCOUNTER — READMISSION MANAGEMENT (OUTPATIENT)
Dept: CALL CENTER | Facility: HOSPITAL | Age: 62
End: 2022-01-13

## 2022-01-13 NOTE — OUTREACH NOTE
CHF Week 3 Survey      Responses   [facility] patient discharged from? [place]   Does the patient have one of the following disease processes/diagnoses(primary or secondary)? CHF   Week 3 attempt successful? Yes   Call start time 1348   Call end time 1351   Discharge diagnosis Acute pulmonary edema, acute systolic CHF   Is the patient taking all medications as directed (includes completed medication regime)? Yes   Has the patient kept scheduled appointments due by today? Yes   Has home health visited the patient within 72 hours of discharge? N/A   Psychosocial issues? No   What is the patient's perception of their health status since discharge? Improving   Nursing interventions Nurse provided patient education   Is the patient able to teach back signs and symptoms of worsening condition? (i.e. weight gain, shortness of air, etc.) Yes   Is the patient/caregiver able to teach back the hierarchy of who to call/visit for symptoms/problems? PCP, Specialist, Home health nurse, Urgent Care, ED, 911 Yes   CHF Week 3 call completed? Yes   Revoked No further contact(revokes)-requires comment   Is the patient interested in additional calls from an ambulatory ?  NOTE:  applies to high risk patients requiring additional follow-up. No   Graduated/Revoked comments Doing good, no further calls needed.          Lenore Mondragon RN

## 2022-02-18 ENCOUNTER — APPOINTMENT (OUTPATIENT)
Dept: CT IMAGING | Facility: HOSPITAL | Age: 62
End: 2022-02-18

## 2022-02-18 ENCOUNTER — APPOINTMENT (OUTPATIENT)
Dept: GENERAL RADIOLOGY | Facility: HOSPITAL | Age: 62
End: 2022-02-18

## 2022-02-18 ENCOUNTER — HOSPITAL ENCOUNTER (OUTPATIENT)
Facility: HOSPITAL | Age: 62
Setting detail: OBSERVATION
Discharge: LEFT BEFORE DISCHARGE | End: 2022-02-18
Attending: EMERGENCY MEDICINE | Admitting: EMERGENCY MEDICINE

## 2022-02-18 VITALS
TEMPERATURE: 98.4 F | WEIGHT: 260 LBS | BODY MASS INDEX: 38.51 KG/M2 | SYSTOLIC BLOOD PRESSURE: 166 MMHG | RESPIRATION RATE: 22 BRPM | HEART RATE: 86 BPM | OXYGEN SATURATION: 95 % | DIASTOLIC BLOOD PRESSURE: 70 MMHG | HEIGHT: 69 IN

## 2022-02-18 DIAGNOSIS — R06.02 SHORTNESS OF BREATH: ICD-10-CM

## 2022-02-18 DIAGNOSIS — R07.2 PRECORDIAL PAIN: Primary | ICD-10-CM

## 2022-02-18 LAB
ALBUMIN SERPL-MCNC: 3.6 G/DL (ref 3.5–5.2)
ALBUMIN/GLOB SERPL: 1.1 G/DL
ALP SERPL-CCNC: 98 U/L (ref 39–117)
ALT SERPL W P-5'-P-CCNC: 11 U/L (ref 1–41)
AMPHET+METHAMPHET UR QL: POSITIVE
AMPHETAMINES UR QL: POSITIVE
ANION GAP SERPL CALCULATED.3IONS-SCNC: 11 MMOL/L (ref 5–15)
AST SERPL-CCNC: 9 U/L (ref 1–40)
BACTERIA UR QL AUTO: NORMAL /HPF
BARBITURATES UR QL SCN: NEGATIVE
BASOPHILS # BLD AUTO: 0.06 10*3/MM3 (ref 0–0.2)
BASOPHILS NFR BLD AUTO: 0.6 % (ref 0–1.5)
BENZODIAZ UR QL SCN: NEGATIVE
BILIRUB SERPL-MCNC: 0.5 MG/DL (ref 0–1.2)
BILIRUB UR QL STRIP: NEGATIVE
BUN SERPL-MCNC: 12 MG/DL (ref 8–23)
BUN/CREAT SERPL: 11.3 (ref 7–25)
BUPRENORPHINE SERPL-MCNC: NEGATIVE NG/ML
CALCIUM SPEC-SCNC: 8.3 MG/DL (ref 8.6–10.5)
CANNABINOIDS SERPL QL: POSITIVE
CHLORIDE SERPL-SCNC: 100 MMOL/L (ref 98–107)
CLARITY UR: CLEAR
CO2 SERPL-SCNC: 28 MMOL/L (ref 22–29)
COCAINE UR QL: NEGATIVE
COLOR UR: YELLOW
CREAT SERPL-MCNC: 1.06 MG/DL (ref 0.76–1.27)
D DIMER PPP FEU-MCNC: 0.58 MG/L (FEU) (ref 0–0.5)
DEPRECATED RDW RBC AUTO: 45.1 FL (ref 37–54)
EOSINOPHIL # BLD AUTO: 0.1 10*3/MM3 (ref 0–0.4)
EOSINOPHIL NFR BLD AUTO: 1 % (ref 0.3–6.2)
ERYTHROCYTE [DISTWIDTH] IN BLOOD BY AUTOMATED COUNT: 13.9 % (ref 12.3–15.4)
GFR SERPL CREATININE-BSD FRML MDRD: 71 ML/MIN/1.73
GLOBULIN UR ELPH-MCNC: 3.2 GM/DL
GLUCOSE SERPL-MCNC: 369 MG/DL (ref 65–99)
GLUCOSE UR STRIP-MCNC: ABNORMAL MG/DL
HCT VFR BLD AUTO: 40.2 % (ref 37.5–51)
HGB BLD-MCNC: 12.4 G/DL (ref 13–17.7)
HGB UR QL STRIP.AUTO: NEGATIVE
HYALINE CASTS UR QL AUTO: NORMAL /LPF
IMM GRANULOCYTES # BLD AUTO: 0.07 10*3/MM3 (ref 0–0.05)
IMM GRANULOCYTES NFR BLD AUTO: 0.7 % (ref 0–0.5)
KETONES UR QL STRIP: NEGATIVE
LEUKOCYTE ESTERASE UR QL STRIP.AUTO: NEGATIVE
LYMPHOCYTES # BLD AUTO: 1.94 10*3/MM3 (ref 0.7–3.1)
LYMPHOCYTES NFR BLD AUTO: 18.9 % (ref 19.6–45.3)
MCH RBC QN AUTO: 27.6 PG (ref 26.6–33)
MCHC RBC AUTO-ENTMCNC: 30.8 G/DL (ref 31.5–35.7)
MCV RBC AUTO: 89.5 FL (ref 79–97)
METHADONE UR QL SCN: NEGATIVE
MONOCYTES # BLD AUTO: 0.48 10*3/MM3 (ref 0.1–0.9)
MONOCYTES NFR BLD AUTO: 4.7 % (ref 5–12)
NEUTROPHILS NFR BLD AUTO: 7.59 10*3/MM3 (ref 1.7–7)
NEUTROPHILS NFR BLD AUTO: 74.1 % (ref 42.7–76)
NITRITE UR QL STRIP: NEGATIVE
NRBC BLD AUTO-RTO: 0 /100 WBC (ref 0–0.2)
NT-PROBNP SERPL-MCNC: 1401 PG/ML (ref 0–900)
OPIATES UR QL: NEGATIVE
OXYCODONE UR QL SCN: NEGATIVE
PCP UR QL SCN: NEGATIVE
PH UR STRIP.AUTO: 7 [PH] (ref 5–8)
PLATELET # BLD AUTO: 222 10*3/MM3 (ref 140–450)
PMV BLD AUTO: 11.1 FL (ref 6–12)
POTASSIUM SERPL-SCNC: 3.9 MMOL/L (ref 3.5–5.2)
PROPOXYPH UR QL: NEGATIVE
PROT SERPL-MCNC: 6.8 G/DL (ref 6–8.5)
PROT UR QL STRIP: ABNORMAL
RBC # BLD AUTO: 4.49 10*6/MM3 (ref 4.14–5.8)
RBC # UR STRIP: NORMAL /HPF
REF LAB TEST METHOD: NORMAL
SARS-COV-2 RNA PNL SPEC NAA+PROBE: NOT DETECTED
SODIUM SERPL-SCNC: 139 MMOL/L (ref 136–145)
SP GR UR STRIP: 1.01 (ref 1–1.03)
SQUAMOUS #/AREA URNS HPF: NORMAL /HPF
TRICYCLICS UR QL SCN: NEGATIVE
TROPONIN T SERPL-MCNC: 0.02 NG/ML (ref 0–0.03)
TROPONIN T SERPL-MCNC: 0.02 NG/ML (ref 0–0.03)
UROBILINOGEN UR QL STRIP: ABNORMAL
WBC # UR STRIP: NORMAL /HPF
WBC NRBC COR # BLD: 10.24 10*3/MM3 (ref 3.4–10.8)

## 2022-02-18 PROCEDURE — 93010 ELECTROCARDIOGRAM REPORT: CPT | Performed by: INTERNAL MEDICINE

## 2022-02-18 PROCEDURE — 93005 ELECTROCARDIOGRAM TRACING: CPT | Performed by: EMERGENCY MEDICINE

## 2022-02-18 PROCEDURE — 71275 CT ANGIOGRAPHY CHEST: CPT

## 2022-02-18 PROCEDURE — 85025 COMPLETE CBC W/AUTO DIFF WBC: CPT | Performed by: EMERGENCY MEDICINE

## 2022-02-18 PROCEDURE — 84484 ASSAY OF TROPONIN QUANT: CPT | Performed by: EMERGENCY MEDICINE

## 2022-02-18 PROCEDURE — 71045 X-RAY EXAM CHEST 1 VIEW: CPT

## 2022-02-18 PROCEDURE — G0378 HOSPITAL OBSERVATION PER HR: HCPCS

## 2022-02-18 PROCEDURE — 85379 FIBRIN DEGRADATION QUANT: CPT | Performed by: EMERGENCY MEDICINE

## 2022-02-18 PROCEDURE — 99283 EMERGENCY DEPT VISIT LOW MDM: CPT

## 2022-02-18 PROCEDURE — 87635 SARS-COV-2 COVID-19 AMP PRB: CPT | Performed by: EMERGENCY MEDICINE

## 2022-02-18 PROCEDURE — 83880 ASSAY OF NATRIURETIC PEPTIDE: CPT | Performed by: EMERGENCY MEDICINE

## 2022-02-18 PROCEDURE — 80306 DRUG TEST PRSMV INSTRMNT: CPT | Performed by: FAMILY MEDICINE

## 2022-02-18 PROCEDURE — 36415 COLL VENOUS BLD VENIPUNCTURE: CPT

## 2022-02-18 PROCEDURE — 25010000002 FUROSEMIDE PER 20 MG: Performed by: FAMILY MEDICINE

## 2022-02-18 PROCEDURE — 81001 URINALYSIS AUTO W/SCOPE: CPT | Performed by: FAMILY MEDICINE

## 2022-02-18 PROCEDURE — 0 IOPAMIDOL PER 1 ML: Performed by: EMERGENCY MEDICINE

## 2022-02-18 PROCEDURE — 96374 THER/PROPH/DIAG INJ IV PUSH: CPT

## 2022-02-18 PROCEDURE — 80053 COMPREHEN METABOLIC PANEL: CPT | Performed by: EMERGENCY MEDICINE

## 2022-02-18 RX ORDER — SODIUM CHLORIDE 0.9 % (FLUSH) 0.9 %
10 SYRINGE (ML) INJECTION AS NEEDED
Status: DISCONTINUED | OUTPATIENT
Start: 2022-02-18 | End: 2022-02-18 | Stop reason: HOSPADM

## 2022-02-18 RX ORDER — FUROSEMIDE 10 MG/ML
40 INJECTION INTRAMUSCULAR; INTRAVENOUS EVERY 12 HOURS
Status: DISCONTINUED | OUTPATIENT
Start: 2022-02-18 | End: 2022-02-18 | Stop reason: HOSPADM

## 2022-02-18 RX ORDER — LISINOPRIL 10 MG/1
10 TABLET ORAL DAILY
Status: DISCONTINUED | OUTPATIENT
Start: 2022-02-18 | End: 2022-02-18 | Stop reason: HOSPADM

## 2022-02-18 RX ORDER — ATORVASTATIN CALCIUM 10 MG/1
10 TABLET, FILM COATED ORAL NIGHTLY
Status: DISCONTINUED | OUTPATIENT
Start: 2022-02-18 | End: 2022-02-18 | Stop reason: HOSPADM

## 2022-02-18 RX ORDER — DEXTROSE MONOHYDRATE 25 G/50ML
25 INJECTION, SOLUTION INTRAVENOUS
Status: DISCONTINUED | OUTPATIENT
Start: 2022-02-18 | End: 2022-02-18 | Stop reason: HOSPADM

## 2022-02-18 RX ORDER — ASPIRIN 81 MG/1
324 TABLET, CHEWABLE ORAL ONCE
Status: COMPLETED | OUTPATIENT
Start: 2022-02-18 | End: 2022-02-18

## 2022-02-18 RX ORDER — NITROGLYCERIN 0.4 MG/1
0.4 TABLET SUBLINGUAL
Status: DISCONTINUED | OUTPATIENT
Start: 2022-02-18 | End: 2022-02-18 | Stop reason: HOSPADM

## 2022-02-18 RX ORDER — GABAPENTIN 300 MG/1
300 CAPSULE ORAL 3 TIMES DAILY
Status: DISCONTINUED | OUTPATIENT
Start: 2022-02-18 | End: 2022-02-18 | Stop reason: HOSPADM

## 2022-02-18 RX ORDER — FUROSEMIDE 10 MG/ML
40 INJECTION INTRAMUSCULAR; INTRAVENOUS ONCE
Status: COMPLETED | OUTPATIENT
Start: 2022-02-18 | End: 2022-02-18

## 2022-02-18 RX ORDER — CLOPIDOGREL BISULFATE 75 MG/1
75 TABLET ORAL DAILY
Status: DISCONTINUED | OUTPATIENT
Start: 2022-02-18 | End: 2022-02-18 | Stop reason: HOSPADM

## 2022-02-18 RX ORDER — FAMOTIDINE 20 MG/1
20 TABLET, FILM COATED ORAL
Status: DISCONTINUED | OUTPATIENT
Start: 2022-02-18 | End: 2022-02-18 | Stop reason: HOSPADM

## 2022-02-18 RX ORDER — SODIUM CHLORIDE 0.9 % (FLUSH) 0.9 %
10 SYRINGE (ML) INJECTION EVERY 12 HOURS SCHEDULED
Status: DISCONTINUED | OUTPATIENT
Start: 2022-02-18 | End: 2022-02-18 | Stop reason: HOSPADM

## 2022-02-18 RX ORDER — ONDANSETRON 2 MG/ML
4 INJECTION INTRAMUSCULAR; INTRAVENOUS EVERY 6 HOURS PRN
Status: DISCONTINUED | OUTPATIENT
Start: 2022-02-18 | End: 2022-02-18 | Stop reason: HOSPADM

## 2022-02-18 RX ORDER — GLIPIZIDE 10 MG/1
10 TABLET ORAL
Status: DISCONTINUED | OUTPATIENT
Start: 2022-02-18 | End: 2022-02-18 | Stop reason: HOSPADM

## 2022-02-18 RX ORDER — NICOTINE 21 MG/24HR
1 PATCH, TRANSDERMAL 24 HOURS TRANSDERMAL
Status: DISCONTINUED | OUTPATIENT
Start: 2022-02-18 | End: 2022-02-18 | Stop reason: HOSPADM

## 2022-02-18 RX ORDER — ASPIRIN 81 MG/1
81 TABLET ORAL DAILY
Status: DISCONTINUED | OUTPATIENT
Start: 2022-02-18 | End: 2022-02-18 | Stop reason: HOSPADM

## 2022-02-18 RX ORDER — NICOTINE POLACRILEX 4 MG
15 LOZENGE BUCCAL
Status: DISCONTINUED | OUTPATIENT
Start: 2022-02-18 | End: 2022-02-18 | Stop reason: HOSPADM

## 2022-02-18 RX ADMIN — NITROGLYCERIN 0.4 MG: 0.4 TABLET SUBLINGUAL at 14:49

## 2022-02-18 RX ADMIN — ASPIRIN 324 MG: 81 TABLET, CHEWABLE ORAL at 14:49

## 2022-02-18 RX ADMIN — NITROGLYCERIN 0.4 MG: 0.4 TABLET SUBLINGUAL at 14:55

## 2022-02-18 RX ADMIN — FUROSEMIDE 40 MG: 10 INJECTION, SOLUTION INTRAVENOUS at 17:12

## 2022-02-18 RX ADMIN — IOPAMIDOL 100 ML: 755 INJECTION, SOLUTION INTRAVENOUS at 15:50

## 2022-02-18 NOTE — H&P
[facility] Medicine Services  HISTORY AND PHYSICAL    Date of Admission: 2/18/2022  Primary Care Physician: System, Provider Not In    Subjective     Chief Complaint: Chest pain.    History of Present Illness  Patient is a 7-year  male presented ER with complaining of shortness of breath/chest pain. Patient main complaint is shortness of breath since [place], off and on, worse with ambulating and laying flat. She states chest pain is very mild, more like chest pressure. Patient shortness has been more constant since last night. Patient is currently not requiring any oxygen. Shortness of breath is worsening with walking and moving, much improved with rest.  Patient denies any radiation.  Patient having fever night sweats chills.  Patient IV nausea vomiting diarrhea.    Labs shows elevated BNP.    Review of Systems   Constitutional: Positive for activity change, appetite change and fatigue. Negative for chills and fever.   HENT: Negative for hearing loss, nosebleeds, tinnitus and trouble swallowing.    Eyes: Negative for visual disturbance.   Respiratory: Positive for cough and shortness of breath. Negative for chest tightness and wheezing.    Cardiovascular: Negative for chest pain, palpitations and leg swelling.   Gastrointestinal: Negative for abdominal distention, abdominal pain, blood in stool, constipation, diarrhea, nausea and vomiting.   Endocrine: Negative for cold intolerance, heat intolerance, polydipsia, polyphagia and polyuria.   Genitourinary: Negative for decreased urine volume, difficulty urinating, dysuria, flank pain, frequency and hematuria.   Musculoskeletal: Positive for arthralgias, gait problem and myalgias. Negative for joint swelling.   Skin: Negative for rash.   Allergic/Immunologic: Negative for immunocompromised state.   Neurological: Positive for weakness. Negative for dizziness, syncope, light-headedness and headaches.   Hematological:  Negative for adenopathy. Does not bruise/bleed easily.   Psychiatric/Behavioral: Negative for confusion and sleep disturbance. The patient is not nervous/anxious.         Otherwise complete ROS reviewed and negative except as mentioned in the HPI.      Past Medical History:   Past Medical History:   Diagnosis Date   • COPD (chronic obstructive pulmonary disease) (HCC)    • Diabetes mellitus (HCC)    • Hypertension        Past Surgical History:  Past Surgical History:   Procedure Laterality Date   • BACK SURGERY     • ELBOW PROCEDURE     • KNEE SURGERY     • LUMBAR DISC SURGERY         Family History: family history includes Alcohol abuse in his brother; COPD in his brother; Cancer in his mother; Heart disease in his father and mother; No Known Problems in his sister.    Social History:  reports that he has been smoking cigarettes. He has a 25.00 pack-year smoking history. He uses smokeless tobacco. He reports current drug use. Drug: Marijuana. He reports that he does not drink alcohol.    Allergies:  Allergies   Allergen Reactions   • Penicillins Swelling     Medications:  Prior to Admission medications    Medication Sig Start Date End Date Taking? Authorizing Provider   aspirin 81 MG EC tablet Take 1 tablet by mouth Daily. 12/27/21   David Cadena DO   atorvastatin (LIPITOR) 10 MG tablet Take 1 tablet by mouth Every Night. 12/26/21   David Cadena DO   clopidogrel (PLAVIX) 75 MG tablet Take 1 tablet by mouth Daily. 12/27/21   David Cadena DO   Dulaglutide (TRULICITY) 0.75 MG/0.5ML solution pen-injector Inject  under the skin into the appropriate area as directed.    Provider, MD Amelia   furosemide (LASIX) 40 MG tablet Take 1 tablet by mouth Daily. 12/27/21   Dvaid Cadena DO   gabapentin (NEURONTIN) 300 MG capsule Take 1 capsule by mouth 3 (Three) Times a Day. 7/15/19   Simone Clark DPM   glipiZIDE (GLUCOTROL) 10 MG tablet Take 10 mg by mouth 2 (Two) Times a Day Before  "Meals.    Amelia Pavon MD   insulin detemir (LEVEMIR) 100 UNIT/ML injection Inject 40 Units under the skin into the appropriate area as directed Daily.    Amelia Pavon MD   lisinopril (PRINIVIL,ZESTRIL) 10 MG tablet Take 10 mg by mouth Daily.    Amelia Pavon MD   metFORMIN (GLUCOPHAGE) 1000 MG tablet Take 1,000 mg by mouth 2 (Two) Times a Day. 6/14/19   Amelia Pavon MD   metoprolol tartrate (LOPRESSOR) 25 MG tablet Take 1 tablet by mouth Every 12 (Twelve) Hours. 12/26/21   David Cadena DO       I have utilized all available immediate resources to obtain, update, and review the patient's current medications.    Objective     Vital Signs: /89   Pulse 86   Temp 98.4 °F (36.9 °C)   Resp 24   Ht 175.3 cm (69\")   Wt 118 kg (260 lb)   SpO2 93%   BMI 38.40 kg/m²   Physical Exam  Vitals and nursing note reviewed.   Constitutional:       Appearance: He is well-developed.   HENT:      Head: Normocephalic and atraumatic.   Eyes:      Conjunctiva/sclera: Conjunctivae normal.      Pupils: Pupils are equal, round, and reactive to light.   Neck:      Vascular: No JVD.   Cardiovascular:      Rate and Rhythm: Normal rate and regular rhythm.      Heart sounds: Normal heart sounds. No murmur heard.  No friction rub. No gallop.    Pulmonary:      Effort: Pulmonary effort is normal. No respiratory distress.      Breath sounds: Normal breath sounds. No wheezing or rales.   Chest:      Chest wall: No tenderness.   Abdominal:      General: Bowel sounds are normal. There is no distension.      Palpations: Abdomen is soft.      Tenderness: There is no abdominal tenderness. There is no guarding or rebound.      Comments: Obesity. BMI 38 . moderate umbilicus hernia protrusion, reducible, nonstrangulated none herniated .   Musculoskeletal:         General: No tenderness or deformity. Normal range of motion.      Cervical back: Neck supple.   Skin:     General: Skin is warm and dry.      " Capillary Refill: Capillary refill takes 2 to 3 seconds.      Findings: No rash.   Neurological:      Mental Status: He is alert and oriented to person, place, and time.      Cranial Nerves: No cranial nerve deficit.      Motor: Weakness present. No abnormal muscle tone.      Deep Tendon Reflexes: Reflexes normal.   Psychiatric:         Behavior: Behavior normal.         Thought Content: Thought content normal.         Judgment: Judgment normal.             Results Reviewed:    Lab Results (last 24 hours)     Procedure Component Value Units Date/Time    COVID-19,[facility] IN-HOUSE,Nasal Swab No Transport Media 3-4 HR TAT - Swab, Nasal Cavity [[id number]]  (Normal) Collected: 02/18/22 1455    Specimen: Swab from Nasal Cavity Updated: 02/18/22 1601     COVID19 Not Detected    Narrative:      Fact sheet for providers: https://www.fda.gov/media/864029/download     Fact sheet for patients: https://www.fda.gov/media/390018/download    Test performed by PCR.    Consider negative results in combination with clinical observations, patient history, and epidemiological information.    Comprehensive Metabolic Panel [[id number]]  (Abnormal) Collected: 02/18/22 1444    Specimen: Blood Updated: 02/18/22 1523     Glucose 369 mg/dL      BUN 12 mg/dL      Creatinine 1.06 mg/dL      Sodium 139 mmol/L      Potassium 3.9 mmol/L      Chloride 100 mmol/L      CO2 28.0 mmol/L      Calcium 8.3 mg/dL      Total Protein 6.8 g/dL      Albumin 3.60 g/dL      ALT (SGPT) 11 U/L      AST (SGOT) 9 U/L      Alkaline Phosphatase 98 U/L      Total Bilirubin 0.5 mg/dL      eGFR Non African Amer 71 mL/min/1.73      Globulin 3.2 gm/dL      A/G Ratio 1.1 g/dL      BUN/Creatinine Ratio 11.3     Anion Gap 11.0 mmol/L     Narrative:      GFR Normal >60  Chronic Kidney Disease <60  Kidney Failure <15      Troponin [[id number]]  (Normal) Collected: 02/18/22 1444    Specimen: Blood Updated: 02/18/22 1521     Troponin T 0.017 ng/mL     Narrative:      Troponin T  Reference Range:  <= 0.03 ng/mL-   Negative for AMI  >0.03 ng/mL-     Abnormal for myocardial necrosis.  Clinicians would have to utilize clinical acumen, EKG, Troponin and serial changes to determine if it is an Acute Myocardial Infarction or myocardial injury due to an underlying chronic condition.       Results may be falsely decreased if patient taking Biotin.      BNP [[id number]]  (Abnormal) Collected: 02/18/22 1444    Specimen: Blood Updated: 02/18/22 1521     proBNP 1,401.0 pg/mL     Narrative:      Among patients with dyspnea, NT-proBNP is highly sensitive for the detection of acute congestive heart failure. In addition NT-proBNP of <300 pg/ml effectively rules out acute congestive heart failure with 99% negative predictive value.    Results may be falsely decreased if patient taking Biotin.      D-dimer, Quantitative [[id number]]  (Abnormal) Collected: 02/18/22 1444    Specimen: Blood Updated: 02/18/22 1503     D-Dimer, Quantitative 0.58 mg/L (FEU)     Narrative:      Reference Range is 0-0.50 mg/L FEU. However, results <0.50 mg/L FEU tends to rule out DVT or PE. Results >0.50 mg/L FEU are not useful in predicting absence or presence of DVT or PE.      CBC & Differential [[id number]]  (Abnormal) Collected: 02/18/22 1444    Specimen: Blood Updated: 02/18/22 1452    Narrative:      The following orders were created for panel order CBC & Differential.  Procedure                               Abnormality         Status                     ---------                               -----------         ------                     CBC Auto Differential[[id number]]        Abnormal            Final result                 Please view results for these tests on the individual orders.    CBC Auto Differential [[id number]]  (Abnormal) Collected: 02/18/22 1444    Specimen: Blood Updated: 02/18/22 1452     WBC 10.24 10*3/mm3      RBC 4.49 10*6/mm3      Hemoglobin 12.4 g/dL      Hematocrit 40.2 %      MCV 89.5 fL      MCH 27.6 pg       MCHC 30.8 g/dL      RDW 13.9 %      RDW-SD 45.1 fl      MPV 11.1 fL      Platelets 222 10*3/mm3      Neutrophil % 74.1 %      Lymphocyte % 18.9 %      Monocyte % 4.7 %      Eosinophil % 1.0 %      Basophil % 0.6 %      Immature Grans % 0.7 %      Neutrophils, Absolute 7.59 10*3/mm3      Lymphocytes, Absolute 1.94 10*3/mm3      Monocytes, Absolute 0.48 10*3/mm3      Eosinophils, Absolute 0.10 10*3/mm3      Basophils, Absolute 0.06 10*3/mm3      Immature Grans, Absolute 0.07 10*3/mm3      nRBC 0.0 /100 WBC            Radiology Data:    Imaging Results (Last 24 Hours)     Procedure Component Value Units Date/Time    CT Angiogram Chest [[id number]] Collected: 02/18/22 1558     Updated: 02/18/22 1605    Narrative:      CT ANGIOGRAM CHEST- 2/18/2022 3:40 PM CST     HISTORY: PE suspected, low/intermediate prob, positive D-dimer;  R07.2-Precordial pain; R06.02-Shortness of breath      COMPARISON: 12/25/2021     DOSE LENGTH PRODUCT: 707 mGy cm. Automated exposure control was also  utilized to decrease patient radiation dose.     TECHNIQUE: Axial images of the chest are obtained following IV contrast.  2-D and maximal intensity projection images are reconstructed and  reviewed     FINDINGS:  There are no pulmonary emboli. No thoracic aortic aneurysm or  dissection. Mild vascular calcification of the thoracic aorta as well as  the coronary arteries. No pericardial effusion. Small bilateral pleural  effusions. Stable borderline mediastinal and right hilar lymph nodes  measuring up to 12 mm. No increasing intrathoracic or axillary  lymphadenopathy compared to 12/25/2021. Mild symmetrical gynecomastia.     Images the upper abdomen demonstrate no adrenal nodules. Accessory  splenule anterior to the spleen. Decompressed stomach.     Bibasilar atelectasis. Smooth interlobular septal thickening within the  lung bases may indicate early edema. No suspicious pulmonary nodules. No  endobronchial lesion. No pneumothorax.     Mild  degenerative change of the thoracic spine.       Impression:      1. No pulmonary emboli.  2. Mild vascular calcification with no thoracic aortic aneurysm or  dissection.  3. Mild cardiomegaly. Small bilateral pleural effusions. Basilar  atelectasis with mild basilar edema.  4. No increasing intrathoracic or axillary lymphadenopathy.  This report was finalized on 02/18/2022 16:02 by Dr. Tayla Gambino MD.    XR Chest 1 View [[id number]] Collected: 02/18/22 1526     Updated: 02/18/22 1530    Narrative:      EXAM: XR CHEST 1 VW-     INDICATION: Chest pain; R07.2-Precordial pain; R06.02-Shortness of  breath     COMPARISON: 12/25/2021     FINDINGS:     Cardiac silhouette is within normal limits. No pleural effusion or  pneumothorax. No focal consolidation. No acute osseous finding.       Impression:         No acute findings.  This report was finalized on 02/18/2022 15:27 by Dr. Reece Richards MD.          I have personally reviewed and interpreted the radiology studies and ECG obtained at time of admission.     Assessment / Plan      Assessment & Plan  Active Hospital Problems    Diagnosis    • Precordial pain    • Systolic CHF, acute (HCC)    • Pulmonary HTN (HCC)    • COPD (chronic obstructive pulmonary disease) (HCC)    • Diabetes mellitus (HCC)    • Hypertension      Plan    Chest pain/shortness of breath/hypertension/hyperlipidemia/CHF.  BNP 1400.  Troponin is negative.  IV Lasix. Patient got regular aspirin in ER. Continue aspirin. Continue Lipitor. Continue Plavix. Continue lisinopril. Continue Lopressor.  Chest x-ray-no acute finding.  CTA of the chest-No pulmonary emboli, Mild vascular calcification with no thoracic aortic aneurysm or dissection, Mild cardiomegaly, Small bilateral pleural effusions, Basilar atelectasis with mild basilar edema, No increasing intrathoracic or axillary lymphadenopathy.  Echocardiogram 12/25/2021-ejection fraction 46 to 50%, mild concentric hypertrophy, diastolic dysfunction grade  1, left ventricle cavity mild to mildly dilated, left atrial volume mildly dilated, moderate pulmonary hypertension.  Stress echo in a.m.    Diabetes/hyperglycemia.  Glucose 369. Hemoglobin A1c. Hold Trulicity. Continue Glucotrol. Hold Metformin due to GI side effects. Continue Levemir. Sliding scale.    Chronic smoker. Also admitted to smoking marijuana on a regular basis. Last time patient smoked marijuana was 2 days ago. Nicotine patch. Discussed patient cutting back and stopping.  UDS.    Chronic pain/neuropathy. Neurontin.    Reflux. Pepcid. Zofran as needed.    Lovenox prophylaxis.    Nutrition. N.p.o. after midnight. Cardiac/consistent carb diet.    Covid-19-negative.    Code Status/Advanced Care Plan: Full code.    The patient's surrogate decision maker is patient's son, Aniceto.      I discussed the patient's findings and my recommendations with: Patient    Estimated length of stay: 1 to 3 days.    Electronically signed by Alhaji Hernandez MD, 02/18/22, 4:39 PM CST.

## 2022-02-18 NOTE — ED PROVIDER NOTES
Emergency Medicine Provider Note    Subjective:    HISTORY OF PRESENT ILLNESS     This is a very pleasant 61 y.o. male with a past medical history of COPD, diabetes, hypertension, chronic tobacco use who presents to the emergency department today with a chief complaint of chest pain and shortness of breath.  Gradual in onset last night.  Constant.  Severe.  Worse with walking and better with rest.  Describes his chest pain as a tightness that does not radiate.  Denies any fevers chills cough.  No nausea vomiting or diarrhea.          History is obtained from the patient.       Review of Systems: All other systems are reviewed and are negative other than noted in the HPI.    Past Medical History:  Past Medical History:   Diagnosis Date   • COPD (chronic obstructive pulmonary disease) (HCC)    • Diabetes mellitus (HCC)    • Hypertension        Allergies:  Allergies   Allergen Reactions   • Penicillins Swelling       Past Surgical History:  Past Surgical History:   Procedure Laterality Date   • BACK SURGERY     • ELBOW PROCEDURE     • KNEE SURGERY     • LUMBAR DISC SURGERY         Family History:  Family History   Problem Relation Age of Onset   • Cancer Mother    • Heart disease Mother    • Heart disease Father    • No Known Problems Sister    • COPD Brother    • Alcohol abuse Brother        Social History:  Social History     Socioeconomic History   • Marital status:    Tobacco Use   • Smoking status: Current Every Day Smoker     Packs/day: 1.00     Years: 25.00     Pack years: 25.00     Types: Cigarettes   • Smokeless tobacco: Current User   Substance and Sexual Activity   • Alcohol use: No   • Drug use: Yes     Types: Marijuana   • Sexual activity: Defer       Home Medications:  Prior to Admission medications    Medication Sig Start Date End Date Taking? Authorizing Provider   aspirin 81 MG EC tablet Take 1 tablet by mouth Daily. 12/27/21   David Cadena, DO   atorvastatin (LIPITOR) 10 MG tablet Take  1 tablet by mouth Every Night. 12/26/21   David Cadena DO   clopidogrel (PLAVIX) 75 MG tablet Take 1 tablet by mouth Daily. 12/27/21   David Cadena DO   Dulaglutide (TRULICITY) 0.75 MG/0.5ML solution pen-injector Inject  under the skin into the appropriate area as directed.    Amelia Pavon MD   furosemide (LASIX) 40 MG tablet Take 1 tablet by mouth Daily. 12/27/21   David Cadena DO   gabapentin (NEURONTIN) 300 MG capsule Take 1 capsule by mouth 3 (Three) Times a Day. 7/15/19   Simone Clark DPM   glipiZIDE (GLUCOTROL) 10 MG tablet Take 10 mg by mouth 2 (Two) Times a Day Before Meals.    Amelia Pavon MD   insulin detemir (LEVEMIR) 100 UNIT/ML injection Inject 40 Units under the skin into the appropriate area as directed Daily.    Amelia Pavon MD   lisinopril (PRINIVIL,ZESTRIL) 10 MG tablet Take 10 mg by mouth Daily.    Amelia Pavon MD   metFORMIN (GLUCOPHAGE) 1000 MG tablet Take 1,000 mg by mouth 2 (Two) Times a Day. 6/14/19   Amelia Pvaon MD   metoprolol tartrate (LOPRESSOR) 25 MG tablet Take 1 tablet by mouth Every 12 (Twelve) Hours. 12/26/21   David Cadena DO         Objective:    PHYSICAL EXAM     Vitals:   Vitals:    02/18/22 1701   BP:    Pulse: 86   Resp:    Temp:    SpO2: 95%     GENERAL: Well appearing, in no acute distress.   HEENT: Moist mucous membranes, oropharynx clear without lesions, exudates, thrush.   EYES: No scleral icterus, conjunctivae clear.   NECK: No cervical lymphadenopathy, no stiffness.  CARDIAC: Normal rate, regular rhythm, no murmurs, 2+ peripheral pulses in all four extremities, normal capillary refill.   PULMONARY: Normal work of breathing on room air, lungs are clear to auscultation bilaterally without wheezes, crackles, rhonchi.  ABDOMINAL: Normal bowel sounds, abdomen is soft, non-tender, non-distended, no hepatomegaly or splenomegaly.   MUSCULOSKELETAL: Normal range of motion, no lower extremity  edema.  NEUROLOGIC: Alert and oriented x 3, EOM grossly intact and moves all four extremities with normal strength.  SKIN: Warm and dry without rashes.   PSYCHIATRIC: Mood and affect are normal.     PROCEDURES     Procedures    LAB AND RADIOLOGY RESULTS     Lab Results (last 24 hours)     ** No results found for the last 24 hours. **          XR Chest 1 View    Result Date: 2/18/2022  Narrative: EXAM: XR CHEST 1 VW-  INDICATION: Chest pain; R07.2-Precordial pain; R06.02-Shortness of breath  COMPARISON: 12/25/2021  FINDINGS:  Cardiac silhouette is within normal limits. No pleural effusion or pneumothorax. No focal consolidation. No acute osseous finding.      Impression:  No acute findings. This report was finalized on 02/18/2022 15:27 by Dr. Reece Richards MD.    CT Angiogram Chest    Result Date: 2/18/2022  Narrative: CT ANGIOGRAM CHEST- 2/18/2022 3:40 PM CST  HISTORY: PE suspected, low/intermediate prob, positive D-dimer; R07.2-Precordial pain; R06.02-Shortness of breath  COMPARISON: 12/25/2021  DOSE LENGTH PRODUCT: 707 mGy cm. Automated exposure control was also utilized to decrease patient radiation dose.  TECHNIQUE: Axial images of the chest are obtained following IV contrast. 2-D and maximal intensity projection images are reconstructed and reviewed  FINDINGS:  There are no pulmonary emboli. No thoracic aortic aneurysm or dissection. Mild vascular calcification of the thoracic aorta as well as the coronary arteries. No pericardial effusion. Small bilateral pleural effusions. Stable borderline mediastinal and right hilar lymph nodes measuring up to 12 mm. No increasing intrathoracic or axillary lymphadenopathy compared to 12/25/2021. Mild symmetrical gynecomastia.  Images the upper abdomen demonstrate no adrenal nodules. Accessory splenule anterior to the spleen. Decompressed stomach.  Bibasilar atelectasis. Smooth interlobular septal thickening within the lung bases may indicate early edema. No suspicious  pulmonary nodules. No endobronchial lesion. No pneumothorax.  Mild degenerative change of the thoracic spine.      Impression: 1. No pulmonary emboli. 2. Mild vascular calcification with no thoracic aortic aneurysm or dissection. 3. Mild cardiomegaly. Small bilateral pleural effusions. Basilar atelectasis with mild basilar edema. 4. No increasing intrathoracic or axillary lymphadenopathy. This report was finalized on 02/18/2022 16:02 by Dr. Tayla Gambino MD.      ED course:    Medications   aspirin chewable tablet 324 mg (324 mg Oral Given 2/18/22 1449)   iopamidol (ISOVUE-370) 76 % injection 100 mL (100 mL Intravenous Given 2/18/22 1550)   furosemide (LASIX) injection 40 mg (40 mg Intravenous Given 2/18/22 1712)          Amount and/or complexity of data reviewed:    • Clinical lab tests ordered and reviewed.  • Tests in the radiology section ordered and reviewed.  • Independent visualization of imaging, tracing, or specimen is remarkable for EKG with no ST elevation or depression concerning for acute ischemia.  • Discuss the patient with another provider: Hospitalist      Risk of significant complications, morbidity, and/or mortality.    •  Presenting problem: high  •  Diagnostic procedures: moderate  •  Management options: high        MEDICAL DECISION MAKING     Patient presents with chest pain and shortness of breath. Upon arrival to the Emergency Department patient is in no acute distress vital signs are reassuring.  IV access is obtained and labs are sent.  Given aspirin and nitro.  Pain resolved with nitroglycerin.Labs show troponins within normal limits although not elevating from 0.017-0.019, Covid testing is negative, BNP elevated, D-dimer elevated, CMP with hyperglycemia, CT angiogram of the chest obtained and shows no pulmonary emboli but does show mild vascular calcification and mild cardiomegaly with small bilateral pleural effusions.  Due to multiple risk factors and concerning history I discussed  the case with the hospitalist.  Patient has been admitted in stable condition for further evaluation and management.        Diagnosis:    Final diagnoses:   Precordial pain   Shortness of breath         ED Disposition:     ED Disposition     ED Disposition Condition Comment    AMA  Level of Care: Telemetry [5]   Diagnosis: Precordial pain [786.51.ICD-9-CM]   Admitting Physician: AYAKA DYER [[id number]]   Attending Physician: AYAKA DYER [[id number]]            No follow-up provider specified.       Medication List      No changes were made to your prescriptions during this visit.              Macho Mckay MD  02/19/22 [id number]

## 2022-02-19 LAB
QT INTERVAL: 380 MS
QTC INTERVAL: 487 MS

## 2022-03-10 ENCOUNTER — TELEPHONE (OUTPATIENT)
Dept: CARDIOLOGY | Facility: CLINIC | Age: 62
End: 2022-03-10

## 2022-03-10 DIAGNOSIS — I21.4 NSTEMI, INITIAL EPISODE OF CARE: Primary | ICD-10-CM

## 2022-03-10 RX ORDER — SODIUM CHLORIDE 9 MG/ML
75 INJECTION, SOLUTION INTRAVENOUS CONTINUOUS
Status: CANCELLED | OUTPATIENT
Start: 2022-03-10

## 2022-03-11 PROBLEM — I21.4 NSTEMI, INITIAL EPISODE OF CARE (HCC): Status: ACTIVE | Noted: 2022-03-11

## 2022-04-13 ENCOUNTER — HOSPITAL ENCOUNTER (OUTPATIENT)
Facility: HOSPITAL | Age: 62
Discharge: HOME | End: 2022-04-14
Attending: INTERNAL MEDICINE | Admitting: INTERNAL MEDICINE

## 2022-04-13 DIAGNOSIS — I21.4 NSTEMI, INITIAL EPISODE OF CARE: ICD-10-CM

## 2022-04-13 LAB
GLUCOSE BLDC GLUCOMTR-MCNC: 187 MG/DL (ref 70–130)
SARS-COV-2 RNA PNL SPEC NAA+PROBE: NOT DETECTED

## 2022-04-13 PROCEDURE — 63710000001 ASPIRIN 81 MG TABLET DELAYED-RELEASE: Performed by: INTERNAL MEDICINE

## 2022-04-13 PROCEDURE — C1725 CATH, TRANSLUMIN NON-LASER: HCPCS | Performed by: INTERNAL MEDICINE

## 2022-04-13 PROCEDURE — 63710000001 CLOPIDOGREL 75 MG TABLET: Performed by: INTERNAL MEDICINE

## 2022-04-13 PROCEDURE — 99152 MOD SED SAME PHYS/QHP 5/>YRS: CPT | Performed by: INTERNAL MEDICINE

## 2022-04-13 PROCEDURE — 25010000002 HEPARIN (PORCINE) 1000-0.9 UT/500ML-% SOLUTION: Performed by: INTERNAL MEDICINE

## 2022-04-13 PROCEDURE — C1887 CATHETER, GUIDING: HCPCS | Performed by: INTERNAL MEDICINE

## 2022-04-13 PROCEDURE — 25010000002 HEPARIN (PORCINE) 2000-0.9 UNIT/L-% SOLUTION: Performed by: INTERNAL MEDICINE

## 2022-04-13 PROCEDURE — 87635 SARS-COV-2 COVID-19 AMP PRB: CPT | Performed by: INTERNAL MEDICINE

## 2022-04-13 PROCEDURE — A9270 NON-COVERED ITEM OR SERVICE: HCPCS | Performed by: INTERNAL MEDICINE

## 2022-04-13 PROCEDURE — 63710000001 INSULIN DETEMIR PER 5 UNITS: Performed by: INTERNAL MEDICINE

## 2022-04-13 PROCEDURE — C1769 GUIDE WIRE: HCPCS | Performed by: INTERNAL MEDICINE

## 2022-04-13 PROCEDURE — 63710000001 GABAPENTIN 300 MG CAPSULE: Performed by: INTERNAL MEDICINE

## 2022-04-13 PROCEDURE — 99153 MOD SED SAME PHYS/QHP EA: CPT | Performed by: INTERNAL MEDICINE

## 2022-04-13 PROCEDURE — C9803 HOPD COVID-19 SPEC COLLECT: HCPCS

## 2022-04-13 PROCEDURE — 25010000002 BIVALIRUDIN TRIFLUOROACETATE 250 MG RECONSTITUTED SOLUTION: Performed by: INTERNAL MEDICINE

## 2022-04-13 PROCEDURE — 93454 CORONARY ARTERY ANGIO S&I: CPT | Performed by: INTERNAL MEDICINE

## 2022-04-13 PROCEDURE — 63710000001 ATORVASTATIN 10 MG TABLET: Performed by: INTERNAL MEDICINE

## 2022-04-13 PROCEDURE — 25010000002 DIPHENHYDRAMINE PER 50 MG: Performed by: INTERNAL MEDICINE

## 2022-04-13 PROCEDURE — 63710000001 METOPROLOL TARTRATE 25 MG TABLET: Performed by: INTERNAL MEDICINE

## 2022-04-13 PROCEDURE — 25010000002 ENOXAPARIN PER 10 MG: Performed by: INTERNAL MEDICINE

## 2022-04-13 PROCEDURE — C1874 STENT, COATED/COV W/DEL SYS: HCPCS | Performed by: INTERNAL MEDICINE

## 2022-04-13 PROCEDURE — C1894 INTRO/SHEATH, NON-LASER: HCPCS | Performed by: INTERNAL MEDICINE

## 2022-04-13 PROCEDURE — 92928 PRQ TCAT PLMT NTRAC ST 1 LES: CPT | Performed by: INTERNAL MEDICINE

## 2022-04-13 PROCEDURE — 63710000001 GLIPIZIDE 10 MG TABLET: Performed by: INTERNAL MEDICINE

## 2022-04-13 PROCEDURE — S0260 H&P FOR SURGERY: HCPCS | Performed by: INTERNAL MEDICINE

## 2022-04-13 PROCEDURE — 82962 GLUCOSE BLOOD TEST: CPT

## 2022-04-13 PROCEDURE — 63710000001 FUROSEMIDE 40 MG TABLET: Performed by: INTERNAL MEDICINE

## 2022-04-13 PROCEDURE — C9600 PERC DRUG-EL COR STENT SING: HCPCS | Performed by: INTERNAL MEDICINE

## 2022-04-13 PROCEDURE — 25010000002 FENTANYL CITRATE (PF) 100 MCG/2ML SOLUTION: Performed by: INTERNAL MEDICINE

## 2022-04-13 PROCEDURE — 0 IOPAMIDOL PER 1 ML: Performed by: INTERNAL MEDICINE

## 2022-04-13 PROCEDURE — 25010000002 MIDAZOLAM PER 1 MG: Performed by: INTERNAL MEDICINE

## 2022-04-13 PROCEDURE — 63710000001 LISINOPRIL 10 MG TABLET: Performed by: INTERNAL MEDICINE

## 2022-04-13 DEVICE — XIENCE SKYPOINT™ EVEROLIMUS ELUTING CORONARY STENT SYSTEM 3.50 MM X 23 MM / RAPID-EXCHANGE
Type: IMPLANTABLE DEVICE | Site: CORONARY | Status: FUNCTIONAL
Brand: XIENCE SKYPOINT™

## 2022-04-13 RX ORDER — ALPRAZOLAM 0.5 MG/1
0.5 TABLET ORAL 3 TIMES DAILY PRN
Status: DISCONTINUED | OUTPATIENT
Start: 2022-04-13 | End: 2022-04-14 | Stop reason: HOSPADM

## 2022-04-13 RX ORDER — GABAPENTIN 300 MG/1
300 CAPSULE ORAL 3 TIMES DAILY
Status: DISCONTINUED | OUTPATIENT
Start: 2022-04-13 | End: 2022-04-14 | Stop reason: HOSPADM

## 2022-04-13 RX ORDER — FUROSEMIDE 40 MG/1
40 TABLET ORAL DAILY
Status: DISCONTINUED | OUTPATIENT
Start: 2022-04-13 | End: 2022-04-14 | Stop reason: HOSPADM

## 2022-04-13 RX ORDER — FENTANYL CITRATE 50 UG/ML
INJECTION, SOLUTION INTRAMUSCULAR; INTRAVENOUS AS NEEDED
Status: DISCONTINUED | OUTPATIENT
Start: 2022-04-13 | End: 2022-04-13 | Stop reason: HOSPADM

## 2022-04-13 RX ORDER — DIPHENHYDRAMINE HYDROCHLORIDE 50 MG/ML
INJECTION INTRAMUSCULAR; INTRAVENOUS AS NEEDED
Status: DISCONTINUED | OUTPATIENT
Start: 2022-04-13 | End: 2022-04-13 | Stop reason: HOSPADM

## 2022-04-13 RX ORDER — ASPIRIN 81 MG/1
81 TABLET ORAL DAILY
Status: DISCONTINUED | OUTPATIENT
Start: 2022-04-13 | End: 2022-04-14 | Stop reason: HOSPADM

## 2022-04-13 RX ORDER — LISINOPRIL 10 MG/1
10 TABLET ORAL DAILY
Status: DISCONTINUED | OUTPATIENT
Start: 2022-04-13 | End: 2022-04-14 | Stop reason: HOSPADM

## 2022-04-13 RX ORDER — ATORVASTATIN CALCIUM 10 MG/1
10 TABLET, FILM COATED ORAL NIGHTLY
Status: DISCONTINUED | OUTPATIENT
Start: 2022-04-13 | End: 2022-04-14 | Stop reason: HOSPADM

## 2022-04-13 RX ORDER — MIDAZOLAM HYDROCHLORIDE 1 MG/ML
INJECTION INTRAMUSCULAR; INTRAVENOUS AS NEEDED
Status: DISCONTINUED | OUTPATIENT
Start: 2022-04-13 | End: 2022-04-13 | Stop reason: HOSPADM

## 2022-04-13 RX ORDER — SODIUM CHLORIDE 9 MG/ML
75 INJECTION, SOLUTION INTRAVENOUS CONTINUOUS
Status: DISCONTINUED | OUTPATIENT
Start: 2022-04-13 | End: 2022-04-14 | Stop reason: HOSPADM

## 2022-04-13 RX ORDER — HEPARIN SODIUM 200 [USP'U]/100ML
INJECTION, SOLUTION INTRAVENOUS AS NEEDED
Status: DISCONTINUED | OUTPATIENT
Start: 2022-04-13 | End: 2022-04-13 | Stop reason: HOSPADM

## 2022-04-13 RX ORDER — GLIPIZIDE 10 MG/1
10 TABLET ORAL
Status: DISCONTINUED | OUTPATIENT
Start: 2022-04-13 | End: 2022-04-14 | Stop reason: HOSPADM

## 2022-04-13 RX ORDER — DIPHENHYDRAMINE HCL 25 MG
25 CAPSULE ORAL EVERY 6 HOURS PRN
Status: DISCONTINUED | OUTPATIENT
Start: 2022-04-13 | End: 2022-04-14 | Stop reason: HOSPADM

## 2022-04-13 RX ORDER — ONDANSETRON 4 MG/1
4 TABLET, FILM COATED ORAL EVERY 6 HOURS PRN
Status: DISCONTINUED | OUTPATIENT
Start: 2022-04-13 | End: 2022-04-14 | Stop reason: HOSPADM

## 2022-04-13 RX ORDER — CLOPIDOGREL BISULFATE 75 MG/1
75 TABLET ORAL DAILY
Status: DISCONTINUED | OUTPATIENT
Start: 2022-04-13 | End: 2022-04-14 | Stop reason: HOSPADM

## 2022-04-13 RX ORDER — CALCIUM CARBONATE 200(500)MG
2 TABLET,CHEWABLE ORAL 2 TIMES DAILY PRN
Status: DISCONTINUED | OUTPATIENT
Start: 2022-04-13 | End: 2022-04-14 | Stop reason: HOSPADM

## 2022-04-13 RX ORDER — CLOPIDOGREL BISULFATE 75 MG/1
TABLET ORAL AS NEEDED
Status: DISCONTINUED | OUTPATIENT
Start: 2022-04-13 | End: 2022-04-13 | Stop reason: HOSPADM

## 2022-04-13 RX ORDER — ONDANSETRON 2 MG/ML
4 INJECTION INTRAMUSCULAR; INTRAVENOUS EVERY 6 HOURS PRN
Status: DISCONTINUED | OUTPATIENT
Start: 2022-04-13 | End: 2022-04-14 | Stop reason: HOSPADM

## 2022-04-13 RX ORDER — ACETAMINOPHEN 325 MG/1
650 TABLET ORAL EVERY 4 HOURS PRN
Status: DISCONTINUED | OUTPATIENT
Start: 2022-04-13 | End: 2022-04-14 | Stop reason: HOSPADM

## 2022-04-13 RX ORDER — GABAPENTIN 300 MG/1
300 CAPSULE ORAL 3 TIMES DAILY
Status: DISCONTINUED | OUTPATIENT
Start: 2022-04-13 | End: 2022-04-13

## 2022-04-13 RX ORDER — TEMAZEPAM 7.5 MG/1
7.5 CAPSULE ORAL NIGHTLY PRN
Status: DISCONTINUED | OUTPATIENT
Start: 2022-04-13 | End: 2022-04-14 | Stop reason: HOSPADM

## 2022-04-13 RX ADMIN — INSULIN DETEMIR 40 UNITS: 100 INJECTION, SOLUTION SUBCUTANEOUS at 22:03

## 2022-04-13 RX ADMIN — GLIPIZIDE 10 MG: 10 TABLET ORAL at 17:36

## 2022-04-13 RX ADMIN — ATORVASTATIN CALCIUM 10 MG: 10 TABLET, FILM COATED ORAL at 22:02

## 2022-04-13 RX ADMIN — METOPROLOL TARTRATE 25 MG: 25 TABLET, FILM COATED ORAL at 22:03

## 2022-04-13 RX ADMIN — ENOXAPARIN SODIUM 40 MG: 100 INJECTION SUBCUTANEOUS at 17:36

## 2022-04-13 RX ADMIN — FUROSEMIDE 40 MG: 40 TABLET ORAL at 17:36

## 2022-04-13 RX ADMIN — SODIUM CHLORIDE 75 ML/HR: 9 INJECTION, SOLUTION INTRAVENOUS at 17:36

## 2022-04-13 RX ADMIN — GABAPENTIN 300 MG: 300 CAPSULE ORAL at 17:36

## 2022-04-13 RX ADMIN — LISINOPRIL 10 MG: 10 TABLET ORAL at 17:36

## 2022-04-13 RX ADMIN — ASPIRIN 81 MG: 81 TABLET ORAL at 17:36

## 2022-04-13 NOTE — PLAN OF CARE
Goal Outcome Evaluation:  Plan of Care Reviewed With: patient        Progress: no change  Outcome Evaluation: Admitted from COU s/p heart cath. Right radial C/D/I, soft, non-tender to palpation. PPP. IVF infusing. Denies pain. NSR on tele. Safety maintained. Will cont to monitor and call MD with any changes.

## 2022-04-13 NOTE — H&P
See my dictated notes from [facility]     LOS: 0 days   Patient Care Team:  Kt Alfredo MD as PCP - General (Family Medicine)    Chief Complaint:      Subjective    Tucker Knox is a 62 y.o. male who is being seen in cardiovascular observation unit  Patient has presented with history of substernal chest pain  Came to the emergency room  Troponin was elevated  He got tired of waiting  He subsequently left the ER  Was seen in [facility]  Has ongoing tobacco use  Shortness of breath on exertion  Has not had recent chest pain  Referred to [facility] cardiology clinic notes      Review of Systems   Constitutional: No chills   Has fatigue   No fever.   HENT: Negative.    Eyes: Negative.    Respiratory: Negative for cough,   No chest wall soreness,   Shortness of breath,   no wheezing, no stridor.    Cardiovascular: As above  Gastrointestinal: Negative for abdominal distention,  No abdominal pain,   No blood in stool,   No constipation,   No diarrhea,   No nausea   No vomiting.   Endocrine: Negative.    Genitourinary: Negative for difficulty urinating, dysuria, flank pain and hematuria.   Musculoskeletal: Negative.    Skin: Negative for rash and wound.   Allergic/Immunologic: Negative.    Neurological: Negative for dizziness, syncope, weakness,   No light-headedness  No  headaches.   Hematological: Does not bruise/bleed easily.   Psychiatric/Behavioral: Negative for agitation or behavioral problems,   No confusion,   the patient is  nervous/anxious.       History:   Past Medical History:   Diagnosis Date   • COPD (chronic obstructive pulmonary disease) (HCC)    • Diabetes mellitus (HCC)    • Hypertension      Past Surgical History:   Procedure Laterality Date   • BACK SURGERY     • ELBOW PROCEDURE     • KNEE SURGERY     • LUMBAR DISC SURGERY       Social History     Socioeconomic History   • Marital status:    Tobacco Use   • Smoking status: Current Every Day Smoker  "    Packs/day: 1.00     Years: 25.00     Pack years: 25.00     Types: Cigarettes   • Smokeless tobacco: Current User   Substance and Sexual Activity   • Alcohol use: No   • Drug use: Yes     Types: Marijuana     Comment: LAST SMOKED LAST NIGHT   • Sexual activity: Defer     Family History   Problem Relation Age of Onset   • Cancer Mother    • Heart disease Mother    • Heart disease Father    • No Known Problems Sister    • COPD Brother    • Alcohol abuse Brother        Labs:  WBC No results found for: WBC   HGB No results found for: HGB   HCT No results found for: HCT   Platelets No results found for: PLT   MCV No results found for: MCV         Invalid input(s): LABALBU, PROT  Lab Results   Component Value Date    TROPONINT 0.019 02/18/2022     PT/INR:  No results found for: PROTIME/No results found for: INR    Imaging Results (Last 72 Hours)     ** No results found for the last 72 hours. **          Objective     Allergies   Allergen Reactions   • Penicillins Swelling       Medication Review: Performed  No current facility-administered medications for this encounter.       Vital Sign Min/Max for last 24 hours  Temp  Min: 97.1 °F (36.2 °C)  Max: 97.1 °F (36.2 °C)   BP  Min: 102/54  Max: 102/54   Pulse  Min: 90  Max: 90   Resp  Min: 18  Max: 18   SpO2  Min: 97 %  Max: 97 %   No data recorded   Weight  Min: 115 kg (253 lb)  Max: 115 kg (253 lb)     Flowsheet Rows    Flowsheet Row First Filed Value   Admission Height 175.3 cm (69\") Documented at 04/13/2022 1433   Admission Weight 115 kg (253 lb) Documented at 04/13/2022 1433          Results for orders placed during the hospital encounter of 12/25/21    Adult Transthoracic Echo Complete W/ Cont if Necessary Per Protocol    Interpretation Summary  · Left ventricular ejection fraction appears to be 46 - 50%. Left ventricular systolic function is mildly decreased.  · Left ventricular wall thickness is consistent with mild concentric hypertrophy.  · Left ventricular " diastolic function is consistent with (grade I) impaired relaxation.  · Abnormal global longitudinal LV strain (GLS) = -11%.  · The left ventricular cavity is mild to moderately dilated.  · Left atrial volume is mildly increased.  · Moderate pulmonary hypertension is present.      Physical Exam:    General Appearance: Awake, alert, in no acute distress  Eyes: Pupils equal and reactive    Ears: Appear intact with no abnormalities noted  Nose: Nares normal, no drainage  Neck: supple, trachea midline, no carotid bruit and no JVD  Back: no kyphosis present,    Lungs: respirations regular, respirations even and respirations unlabored  Heart: normal S1, S2, no significant murmurs   No gallops or rubs  no rub and no click  Abdomen: normal bowel sounds, no tenderness   Skin: no bleeding, bruising or rash  Extremities: no cyanosis  Psychiatric/Behavioral: Negative for agitation, behavioral problems, confusion, the patient does  appear to be nervous/anxious.       Results Review:   I reviewed the patient's new clinical results.  I reviewed the patient's new imaging results and agree with the interpretation.  I reviewed the patient's other test results and agree with the interpretation  I personally viewed and interpreted the patient's EKG/Telemetry data    Discussed with patient  Updated patient regarding any new or relevant abnormalities on review of records or any new findings on physical exam.   Mentioned to patient about purpose of visit and desirable health short and long term goals and objectives.     Reviewed available prior notes, consults, prior visits, laboratory findings, radiology and cardiology relevant reports.   Updated chart as applicable.   I have reviewed the patient's medical history in detail and updated the computerized patient record as relevant.          Assessment/Plan       NSTEMI, initial episode of care (HCC)  Precordial chest pain  Tobacco use  Family history of premature coronary artery  disease    Plan    Recommend cardiac catheterization, selective coronary angiography, left ventriculography and percutaneous coronary intervention with application of arteriotomy hemostatic closure device.    I discussed cardiac catheterization, the procedure, risks (including bleeding, infection, vascular damage [including minor oozing, bruising, bleeding, and up to and including but not limited to the need for vascular surgery, emergency cardiothoracic surgery, contrast reaction, renal failure, respiratory failure, heart attack, stroke, arrhythmia and even death), benefits, and alternatives and the patient has voiced understanding and is willing to proceed.    Adequate pre-hydration and post cardiac catheterization hydration.  Premedications as required and indicated for cardiac catheterization.    No contraindication to drug eluting stent placement if required  Further recommendations pending results of cardiac catheterization        Todd Avendano MD  04/13/22  14:50 CDT    EMR Dragon/Transcription was used to dictate part of this note

## 2022-04-14 VITALS
HEIGHT: 69 IN | OXYGEN SATURATION: 92 % | DIASTOLIC BLOOD PRESSURE: 82 MMHG | WEIGHT: 249.3 LBS | RESPIRATION RATE: 16 BRPM | TEMPERATURE: 98.9 F | BODY MASS INDEX: 36.92 KG/M2 | HEART RATE: 86 BPM | SYSTOLIC BLOOD PRESSURE: 148 MMHG

## 2022-04-14 PROBLEM — I50.22 CHRONIC SYSTOLIC HEART FAILURE (HCC): Status: ACTIVE | Noted: 2022-04-14

## 2022-04-14 LAB
ANION GAP SERPL CALCULATED.3IONS-SCNC: 11 MMOL/L (ref 5–15)
BUN SERPL-MCNC: 20 MG/DL (ref 8–23)
BUN/CREAT SERPL: 16.1 (ref 7–25)
CALCIUM SPEC-SCNC: 8.9 MG/DL (ref 8.6–10.5)
CHLORIDE SERPL-SCNC: 100 MMOL/L (ref 98–107)
CHOLEST SERPL-MCNC: 172 MG/DL (ref 0–200)
CO2 SERPL-SCNC: 31 MMOL/L (ref 22–29)
CREAT SERPL-MCNC: 1.24 MG/DL (ref 0.76–1.27)
DEPRECATED RDW RBC AUTO: 44.6 FL (ref 37–54)
EGFRCR SERPLBLD CKD-EPI 2021: 66.1 ML/MIN/1.73
ERYTHROCYTE [DISTWIDTH] IN BLOOD BY AUTOMATED COUNT: 13.8 % (ref 12.3–15.4)
GLUCOSE SERPL-MCNC: 200 MG/DL (ref 65–99)
HBA1C MFR BLD: 10.4 % (ref 4.8–5.6)
HCT VFR BLD AUTO: 43 % (ref 37.5–51)
HDLC SERPL-MCNC: 35 MG/DL (ref 40–60)
HGB BLD-MCNC: 13.3 G/DL (ref 13–17.7)
LDLC SERPL CALC-MCNC: 102 MG/DL (ref 0–100)
LDLC/HDLC SERPL: 2.75 {RATIO}
MCH RBC QN AUTO: 27.5 PG (ref 26.6–33)
MCHC RBC AUTO-ENTMCNC: 30.9 G/DL (ref 31.5–35.7)
MCV RBC AUTO: 88.8 FL (ref 79–97)
PLATELET # BLD AUTO: 253 10*3/MM3 (ref 140–450)
PMV BLD AUTO: 10.2 FL (ref 6–12)
POTASSIUM SERPL-SCNC: 3.8 MMOL/L (ref 3.5–5.2)
RBC # BLD AUTO: 4.84 10*6/MM3 (ref 4.14–5.8)
SODIUM SERPL-SCNC: 142 MMOL/L (ref 136–145)
TRIGL SERPL-MCNC: 204 MG/DL (ref 0–150)
VLDLC SERPL-MCNC: 35 MG/DL (ref 5–40)
WBC NRBC COR # BLD: 8.38 10*3/MM3 (ref 3.4–10.8)

## 2022-04-14 PROCEDURE — 63710000001 GLIPIZIDE 10 MG TABLET: Performed by: INTERNAL MEDICINE

## 2022-04-14 PROCEDURE — 80048 BASIC METABOLIC PNL TOTAL CA: CPT | Performed by: INTERNAL MEDICINE

## 2022-04-14 PROCEDURE — 63710000001 FUROSEMIDE 40 MG TABLET: Performed by: INTERNAL MEDICINE

## 2022-04-14 PROCEDURE — 63710000001 ASPIRIN 81 MG TABLET DELAYED-RELEASE: Performed by: INTERNAL MEDICINE

## 2022-04-14 PROCEDURE — A9270 NON-COVERED ITEM OR SERVICE: HCPCS | Performed by: INTERNAL MEDICINE

## 2022-04-14 PROCEDURE — 80061 LIPID PANEL: CPT | Performed by: INTERNAL MEDICINE

## 2022-04-14 PROCEDURE — 93005 ELECTROCARDIOGRAM TRACING: CPT | Performed by: INTERNAL MEDICINE

## 2022-04-14 PROCEDURE — 93010 ELECTROCARDIOGRAM REPORT: CPT | Performed by: EMERGENCY MEDICINE

## 2022-04-14 PROCEDURE — 83036 HEMOGLOBIN GLYCOSYLATED A1C: CPT | Performed by: INTERNAL MEDICINE

## 2022-04-14 PROCEDURE — 85027 COMPLETE CBC AUTOMATED: CPT | Performed by: INTERNAL MEDICINE

## 2022-04-14 PROCEDURE — 63710000001 METOPROLOL TARTRATE 25 MG TABLET: Performed by: INTERNAL MEDICINE

## 2022-04-14 PROCEDURE — 63710000001 INSULIN DETEMIR PER 5 UNITS: Performed by: INTERNAL MEDICINE

## 2022-04-14 PROCEDURE — 63710000001 LISINOPRIL 10 MG TABLET: Performed by: INTERNAL MEDICINE

## 2022-04-14 PROCEDURE — 63710000001 GABAPENTIN 300 MG CAPSULE: Performed by: INTERNAL MEDICINE

## 2022-04-14 RX ORDER — NITROGLYCERIN 0.4 MG/1
TABLET SUBLINGUAL
Qty: 25 TABLET | Refills: 3 | Status: SHIPPED | OUTPATIENT
Start: 2022-04-14

## 2022-04-14 RX ORDER — CLOPIDOGREL BISULFATE 75 MG/1
75 TABLET ORAL DAILY
Qty: 90 TABLET | Refills: 3 | Status: SHIPPED | OUTPATIENT
Start: 2022-04-14

## 2022-04-14 RX ADMIN — METOPROLOL TARTRATE 25 MG: 25 TABLET, FILM COATED ORAL at 09:41

## 2022-04-14 RX ADMIN — ASPIRIN 81 MG: 81 TABLET ORAL at 09:41

## 2022-04-14 RX ADMIN — GLIPIZIDE 10 MG: 10 TABLET ORAL at 09:41

## 2022-04-14 RX ADMIN — GABAPENTIN 300 MG: 300 CAPSULE ORAL at 09:41

## 2022-04-14 RX ADMIN — FUROSEMIDE 40 MG: 40 TABLET ORAL at 09:41

## 2022-04-14 RX ADMIN — LISINOPRIL 10 MG: 10 TABLET ORAL at 09:41

## 2022-04-14 RX ADMIN — INSULIN DETEMIR 40 UNITS: 100 INJECTION, SOLUTION SUBCUTANEOUS at 09:41

## 2022-04-14 NOTE — PLAN OF CARE
Goal Outcome Evaluation:         Patient rested peacefully through evening. No complications with radial access site. Maintained NSR. IVF still infusing. No complaints of chest pain.

## 2022-04-15 LAB
QT INTERVAL: 454 MS
QTC INTERVAL: 517 MS

## 2023-08-28 ENCOUNTER — OFFICE VISIT (OUTPATIENT)
Dept: GASTROENTEROLOGY | Facility: CLINIC | Age: 63
End: 2023-08-28
Payer: MEDICARE

## 2023-08-28 VITALS
TEMPERATURE: 97.7 F | BODY MASS INDEX: 35.25 KG/M2 | SYSTOLIC BLOOD PRESSURE: 142 MMHG | DIASTOLIC BLOOD PRESSURE: 78 MMHG | HEART RATE: 80 BPM | WEIGHT: 238 LBS | HEIGHT: 69 IN | OXYGEN SATURATION: 96 %

## 2023-08-28 DIAGNOSIS — K62.5 RECTAL BLEEDING: Primary | ICD-10-CM

## 2023-08-28 DIAGNOSIS — G47.30 SLEEP APNEA, UNSPECIFIED TYPE: ICD-10-CM

## 2023-08-28 DIAGNOSIS — Z80.0 FH: COLON CANCER: ICD-10-CM

## 2023-08-28 PROCEDURE — 3077F SYST BP >= 140 MM HG: CPT | Performed by: NURSE PRACTITIONER

## 2023-08-28 PROCEDURE — 3078F DIAST BP <80 MM HG: CPT | Performed by: NURSE PRACTITIONER

## 2023-08-28 PROCEDURE — 1159F MED LIST DOCD IN RCRD: CPT | Performed by: NURSE PRACTITIONER

## 2023-08-28 PROCEDURE — 1160F RVW MEDS BY RX/DR IN RCRD: CPT | Performed by: NURSE PRACTITIONER

## 2023-08-28 PROCEDURE — 99204 OFFICE O/P NEW MOD 45 MIN: CPT | Performed by: NURSE PRACTITIONER

## 2023-08-28 RX ORDER — LINAGLIPTIN 5 MG/1
1 TABLET, FILM COATED ORAL DAILY
COMMUNITY
Start: 2023-08-15

## 2023-08-28 RX ORDER — SODIUM, POTASSIUM,MAG SULFATES 17.5-3.13G
1 SOLUTION, RECONSTITUTED, ORAL ORAL EVERY 12 HOURS
Qty: 354 ML | Refills: 0 | Status: SHIPPED | OUTPATIENT
Start: 2023-08-28 | End: 2023-08-28

## 2023-08-28 RX ORDER — PRAVASTATIN SODIUM 40 MG
40 TABLET ORAL NIGHTLY
COMMUNITY
Start: 2023-08-10

## 2023-08-28 RX ORDER — DULOXETIN HYDROCHLORIDE 30 MG/1
1 CAPSULE, DELAYED RELEASE ORAL DAILY
COMMUNITY
Start: 2023-08-15

## 2023-08-28 NOTE — PROGRESS NOTES
"Primary Physician: Kt Alfredo MD    Chief Complaint   Patient presents with    Rectal Bleeding     Pt c/o issues passing dark red blood in his stool for the last couple of weeks-states it happens with pretty much every BM-also states \"it just comes out\" when he urinates as well; Pt has never had a colonoscopy        Subjective     Tucker Knox is a 63 y.o. male.    HPI  Rectal Bleeding  Pt complaints of passing dark bloody stools.  He is seeing the bleeding with most BM's.  No rectal pain. No abd pain.  No constipation or bleeding.  He has not had any colonoscopy evaluations.      Family Hx Colon Cancer  Father had colon cancer. Specifics and age unknown but he thinks he was in his 70's.    Past Medical History:   Diagnosis Date    COPD (chronic obstructive pulmonary disease)     Family history of colon cancer     Hyperlipidemia     Hypertension     Type 2 diabetes mellitus        Past Surgical History:   Procedure Laterality Date    BACK SURGERY      CARDIAC CATHETERIZATION Left 4/13/2022    Procedure: Cardiac Catheterization/Vascular Study;  Surgeon: Todd Avendano MD;  Location:  [facility] CATH INVASIVE LOCATION;  Service: Cardiology;  Laterality: Left;    ELBOW PROCEDURE      KNEE SURGERY      LUMBAR DISC SURGERY          Current Outpatient Medications:     aspirin 81 MG EC tablet, Take 1 tablet by mouth Daily., Disp: 100 tablet, Rfl: 2    DULoxetine (CYMBALTA) 30 MG capsule, Take 1 capsule by mouth Daily., Disp: , Rfl:     furosemide (LASIX) 40 MG tablet, Take 1 tablet by mouth Daily., Disp: 30 tablet, Rfl: 2    gabapentin (NEURONTIN) 300 MG capsule, Take 1 capsule by mouth 3 (Three) Times a Day., Disp: 90 capsule, Rfl: 0    glipiZIDE (GLUCOTROL) 10 MG tablet, Take 1 tablet by mouth 2 (Two) Times a Day Before Meals., Disp: , Rfl:     insulin detemir (LEVEMIR) 100 UNIT/ML injection, Inject 40 Units under the skin into the appropriate area as directed Daily., Disp: , Rfl:     metFORMIN " Patient request refill on Hydrocodone 7.5-325mg. She is completely out of medication.     PO 12/18/19    [facility] [id number] "(GLUCOPHAGE) 1000 MG tablet, Take 1 tablet by mouth 2 (Two) Times a Day., Disp: , Rfl: 0    nitroglycerin (NITROSTAT) 0.4 MG SL tablet, 1 under the tongue as needed for angina, may repeat q5mins for up three doses, Disp: 25 tablet, Rfl: 3    pravastatin (PRAVACHOL) 40 MG tablet, Take 1 tablet by mouth Every Night., Disp: , Rfl:     Tradjenta 5 MG tablet tablet, Take 1 tablet by mouth Daily., Disp: , Rfl:     lisinopril (PRINIVIL,ZESTRIL) 10 MG tablet, Take 1 tablet by mouth Daily., Disp: , Rfl:     polyethylene glycol (GoLYTELY) 236 g solution, Take as directed split dose, Disp: 4000 mL, Rfl: 0    Allergies   Allergen Reactions    Penicillins Swelling       Social History     Socioeconomic History    Marital status:    Tobacco Use    Smoking status: Every Day     Packs/day: 1.00     Years: 25.00     Pack years: 25.00     Types: Cigarettes    Smokeless tobacco: Never   Vaping Use    Vaping Use: Some days    Substances: Nicotine   Substance and Sexual Activity    Alcohol use: Not Currently    Drug use: Yes     Types: Marijuana     Comment: LAST SMOKED LAST NIGHT    Sexual activity: Defer       Family History   Problem Relation Age of Onset    Esophageal cancer Mother     Heart disease Mother     Colon cancer Father 80    Heart disease Father     No Known Problems Sister     COPD Brother     Alcohol abuse Brother     Colon polyps Neg Hx     Liver cancer Neg Hx     Rectal cancer Neg Hx     Stomach cancer Neg Hx     Liver disease Neg Hx        Review of Systems   Constitutional:  Negative for unexpected weight change.   Respiratory:  Positive for shortness of breath.         Chronic SOB,   Cardiovascular:  Negative for chest pain.     Objective     /78 (BP Location: Left arm, Patient Position: Sitting, Cuff Size: Adult)   Pulse 80   Temp 97.7 øF (36.5 øC) (Infrared)   Ht 175.3 cm (69\")   Wt 108 kg (238 lb)   SpO2 96%   BMI 35.15 kg/mý     Physical Exam  Vitals reviewed.   Constitutional:       " Appearance: Normal appearance.   Cardiovascular:      Rate and Rhythm: Normal rate and regular rhythm.      Heart sounds: Normal heart sounds.   Pulmonary:      Effort: Pulmonary effort is normal.      Breath sounds: Normal breath sounds.   Abdominal:      General: Abdomen is flat.      Tenderness: There is no abdominal tenderness.      Comments: Hernia noted, soft, reducible   Neurological:      Mental Status: He is alert.       Lab Results - Last 18 Months   Lab Units 04/14/22  0422   GLUCOSE mg/dL 200*   BUN mg/dL 20   CREATININE mg/dL 1.24   SODIUM mmol/L 142   POTASSIUM mmol/L 3.8   CHLORIDE mmol/L 100   CO2 mmol/L 31.0*       Lab Results - Last 18 Months   Lab Units 04/14/22  0422   HEMOGLOBIN g/dL 13.3   HEMATOCRIT % 43.0   MCV fL 88.8   WBC 10*3/mm3 8.38   RDW % 13.8   MPV fL 10.2   PLATELETS 10*3/mm3 253         IMPRESSION/PLAN:    Assessment & Plan      Problem List Items Addressed This Visit          Family History    FH: colon cancer    Overview     Father had colon cancer > 60            Gastrointestinal Abdominal     Rectal bleeding - Primary    Overview     Dark bleeding         Relevant Medications    polyethylene glycol (GoLYTELY) 236 g solution    Other Relevant Orders    Case Request (Completed)       Sleep    Sleep apnea     Colonoscopy per Dr Alphonso Ratliff Prep             ..The risks, benefits, and alternatives of colonoscopy were reviewed with the patient today.  Risks including perforation of the colon possibly requiring surgery or colostomy.  Additional risks include risk of bleeding from biopsies or removal of colon tissue.  There is also the risk of a drug reaction or problems with anesthesia.  This will be discussed with the further by the anesthesia team on the day of the procedure.  Lastly there is a possibility of missing a colon polyp or cancer.  The benefits include the diagnosis and management of disease of the colon and rectum.  Alternatives to colonoscopy include barium enema,  laboratory testing, radiographic evaluation, or no intervention.  The patient verbalizes understanding and agrees.    In accordance with requirements under the Affordable Care Act, [facility] has provided pricing for all hospital services and items on each of its websites. However, a patient's actual cost may differ based on the services the patient receives to meet individual healthcare needs and based on the benefits provided under the patient's insurance coverage.        Donna Garcia, VIV  08/28/23  11:01 CDT    Part of this note may be an electronic transcription/translation of spoken language to printed text.

## 2023-10-09 ENCOUNTER — ANESTHESIA (OUTPATIENT)
Dept: GASTROENTEROLOGY | Facility: HOSPITAL | Age: 63
End: 2023-10-09
Payer: MEDICARE

## 2023-10-09 ENCOUNTER — ANESTHESIA EVENT (OUTPATIENT)
Dept: GASTROENTEROLOGY | Facility: HOSPITAL | Age: 63
End: 2023-10-09
Payer: MEDICARE

## 2023-10-09 ENCOUNTER — HOSPITAL ENCOUNTER (OUTPATIENT)
Facility: HOSPITAL | Age: 63
Setting detail: HOSPITAL OUTPATIENT SURGERY
Discharge: HOME | End: 2023-10-09
Attending: INTERNAL MEDICINE | Admitting: INTERNAL MEDICINE
Payer: MEDICARE

## 2023-10-09 VITALS
SYSTOLIC BLOOD PRESSURE: 137 MMHG | HEIGHT: 69 IN | TEMPERATURE: 96 F | WEIGHT: 246 LBS | RESPIRATION RATE: 20 BRPM | DIASTOLIC BLOOD PRESSURE: 73 MMHG | OXYGEN SATURATION: 94 % | HEART RATE: 83 BPM | BODY MASS INDEX: 36.43 KG/M2

## 2023-10-09 DIAGNOSIS — K62.5 RECTAL BLEEDING: ICD-10-CM

## 2023-10-09 LAB — GLUCOSE BLDC GLUCOMTR-MCNC: 209 MG/DL (ref 70–130)

## 2023-10-09 PROCEDURE — 45381 COLONOSCOPY SUBMUCOUS NJX: CPT | Performed by: INTERNAL MEDICINE

## 2023-10-09 PROCEDURE — 25810000003 SODIUM CHLORIDE 0.9 % SOLUTION: Performed by: ANESTHESIOLOGY

## 2023-10-09 PROCEDURE — 25010000002 PROPOFOL 10 MG/ML EMULSION

## 2023-10-09 PROCEDURE — 82948 REAGENT STRIP/BLOOD GLUCOSE: CPT

## 2023-10-09 PROCEDURE — 88305 TISSUE EXAM BY PATHOLOGIST: CPT | Performed by: INTERNAL MEDICINE

## 2023-10-09 PROCEDURE — 45385 COLONOSCOPY W/LESION REMOVAL: CPT | Performed by: INTERNAL MEDICINE

## 2023-10-09 PROCEDURE — 45380 COLONOSCOPY AND BIOPSY: CPT | Performed by: INTERNAL MEDICINE

## 2023-10-09 DEVICE — DEV CLIP ENDO RESOLUTION360 CONTRL ROT 235CM: Type: IMPLANTABLE DEVICE | Site: COLON | Status: FUNCTIONAL

## 2023-10-09 RX ORDER — LIDOCAINE HYDROCHLORIDE 10 MG/ML
0.5 INJECTION, SOLUTION EPIDURAL; INFILTRATION; INTRACAUDAL; PERINEURAL ONCE AS NEEDED
Status: CANCELLED | OUTPATIENT
Start: 2023-10-09

## 2023-10-09 RX ORDER — LIDOCAINE HYDROCHLORIDE 20 MG/ML
INJECTION, SOLUTION EPIDURAL; INFILTRATION; INTRACAUDAL; PERINEURAL AS NEEDED
Status: DISCONTINUED | OUTPATIENT
Start: 2023-10-09 | End: 2023-10-09 | Stop reason: SURG

## 2023-10-09 RX ORDER — PROPOFOL 10 MG/ML
VIAL (ML) INTRAVENOUS AS NEEDED
Status: DISCONTINUED | OUTPATIENT
Start: 2023-10-09 | End: 2023-10-09 | Stop reason: SURG

## 2023-10-09 RX ORDER — SODIUM CHLORIDE 0.9 % (FLUSH) 0.9 %
10 SYRINGE (ML) INJECTION AS NEEDED
Status: DISCONTINUED | OUTPATIENT
Start: 2023-10-09 | End: 2023-10-09 | Stop reason: HOSPADM

## 2023-10-09 RX ORDER — SODIUM CHLORIDE 9 MG/ML
500 INJECTION, SOLUTION INTRAVENOUS CONTINUOUS PRN
Status: DISCONTINUED | OUTPATIENT
Start: 2023-10-09 | End: 2023-10-09 | Stop reason: HOSPADM

## 2023-10-09 RX ADMIN — PROPOFOL INJECTABLE EMULSION 600 MG: 10 INJECTION, EMULSION INTRAVENOUS at 14:02

## 2023-10-09 RX ADMIN — LIDOCAINE HYDROCHLORIDE 50 MG: 20 INJECTION, SOLUTION EPIDURAL; INFILTRATION; INTRACAUDAL; PERINEURAL at 14:02

## 2023-10-09 RX ADMIN — SODIUM CHLORIDE 500 ML: 9 INJECTION, SOLUTION INTRAVENOUS at 12:48

## 2023-10-09 NOTE — ANESTHESIA PREPROCEDURE EVALUATION
Anesthesia Evaluation     Patient summary reviewed   no history of anesthetic complications:   NPO Solid Status: > 8 hours             Airway   Mallampati: III  TM distance: >3 FB  Dental      Pulmonary    (+) a smoker Current, COPD,sleep apnea  Cardiovascular     (+) hypertension, past MI , cardiac stents (04/22) , CHF , hyperlipidemia  (-) angina    ROS comment: Cardiac cath:  Coronary angiography     No significant disease of left main coronary artery  Proximal left anterior descending coronary artery has 40 to 50% stenosis  Distal left anterior descending coronary artery has 50 to 60% stenosis in a vessel that is 1 mm in diameter  First diagonal branch has approximately 40 to 50% stenosis and hemodynamically not significant  No high-grade disease of the left circumflex coronary artery  Right coronary artery is large and dominant with a 90% stenosis of the mid segment which was intervened upon and likely the culprit vessel causing non-STEMI in the recent past and chest pain.        Neuro/Psych  (-) seizures, TIA, CVA  GI/Hepatic/Renal/Endo    (+) diabetes mellitus using insulin  (-) liver disease, no renal disease    Musculoskeletal     Abdominal    Substance History      OB/GYN          Other                      Anesthesia Plan    ASA 3     MAC       Anesthetic plan, risks, benefits, and alternatives have been provided, discussed and informed consent has been obtained with: patient.    CODE STATUS:

## 2023-10-09 NOTE — H&P
Chief Complaint:   Rectal bleeding    Subjective     HPI:   He has been having intermittent rectal bleeding.  Father had colon cancer over the age of 60.  No prior colorectal evaluations.    Past Medical History:   Past Medical History:   Diagnosis Date    CHF (congestive heart failure)     COPD (chronic obstructive pulmonary disease)     Coronary artery disease     stent x 1    Family history of colon cancer     Hyperlipidemia     Hypertension     Sleep apnea     does not wear machine    Type 2 diabetes mellitus        Past Surgical History:  Past Surgical History:   Procedure Laterality Date    BACK SURGERY      CARDIAC CATHETERIZATION Left 4/13/2022    Procedure: Cardiac Catheterization/Vascular Study;  Surgeon: Todd Avendano MD;  Location:  [facility] CATH INVASIVE LOCATION;  Service: Cardiology;  Laterality: Left;    ELBOW PROCEDURE      KNEE SURGERY      LUMBAR DISC SURGERY          Family History:  Family History   Problem Relation Age of Onset    Esophageal cancer Mother     Heart disease Mother     Colon cancer Father 80    Heart disease Father     No Known Problems Sister     COPD Brother     Alcohol abuse Brother     Colon polyps Neg Hx     Liver cancer Neg Hx     Rectal cancer Neg Hx     Stomach cancer Neg Hx     Liver disease Neg Hx        Social History:   reports that he has been smoking cigarettes. He has a 25.00 pack-year smoking history. He has never used smokeless tobacco. He reports that he does not currently use alcohol. He reports current drug use. Drug: Marijuana.    Medications:   Medications Prior to Admission   Medication Sig Dispense Refill Last Dose    aspirin 81 MG EC tablet Take 1 tablet by mouth Daily. 100 tablet 2 10/8/2023    DULoxetine (CYMBALTA) 30 MG capsule Take 1 capsule by mouth Daily.   10/8/2023    furosemide (LASIX) 40 MG tablet Take 1 tablet by mouth Daily. 30 tablet 2 10/8/2023    gabapentin (NEURONTIN) 300 MG capsule Take 1 capsule by mouth 3 (Three) Times a Day. 90  "capsule 0 10/8/2023    glipiZIDE (GLUCOTROL) 10 MG tablet Take 1 tablet by mouth 2 (Two) Times a Day Before Meals.   10/8/2023    insulin detemir (LEVEMIR) 100 UNIT/ML injection Inject 40 Units under the skin into the appropriate area as directed Daily.   10/8/2023    metFORMIN (GLUCOPHAGE) 1000 MG tablet Take 1 tablet by mouth 2 (Two) Times a Day.  0 10/8/2023    polyethylene glycol (GoLYTELY) 236 g solution Take as directed split dose 4000 mL 0 10/9/2023    pravastatin (PRAVACHOL) 40 MG tablet Take 1 tablet by mouth Every Night.   10/8/2023    Tradjenta 5 MG tablet tablet Take 1 tablet by mouth Daily.   10/8/2023    nitroglycerin (NITROSTAT) 0.4 MG SL tablet 1 under the tongue as needed for angina, may repeat q5mins for up three doses 25 tablet 3 More than a month       Allergies:  Penicillins    ROS:    Resp: No SOA  Cardiovascular: No CP      Objective     /81 (Patient Position: Sitting)   Pulse 92   Temp 96 øF (35.6 øC) (Temporal)   Resp 20   Ht 175.3 cm (69\")   Wt 112 kg (246 lb)   SpO2 95%   BMI 36.33 kg/mý     Physical Exam   Constitutional: Patient is oriented to person, place, and in no distress.  Pulmonary/Chest: No distress.  No audible wheezes  Psychiatric: Mood, memory, affect and judgment appear normal.     Assessment & Plan     Diagnosis:  Rectal bleeding    Anticipated Surgical Procedure:  Colonoscopy    The risks, benefits, and alternatives of colonoscopy were reviewed with the patient today.  Risks including perforation of the colon possibly requiring surgery or colostomy.  Additional risks include risk of bleeding from biopsies or removal of colon tissue.  There is also the risk of a drug reaction or problems with anesthesia.  This will be discussed with the patient further by the anesthesia team on the day of the procedure.  Lastly there is a possibility of missing a colon polyp or cancer.  The benefits include the diagnosis and management of disease of the colon and rectum.  " Alternatives to colonoscopy include barium enema, laboratory testing, radiographic evaluation, or no intervention.  The patient verbalizes understanding and agrees.        Please note that portions of this note were completed with a voice recognition program.

## 2023-10-09 NOTE — ANESTHESIA POSTPROCEDURE EVALUATION
"Patient: Tucker Knox    Procedure Summary       Date: 10/09/23 Room / Location: [facility] ENDOSCOPY 5 / [facility]    Anesthesia Start: 1403 Anesthesia Stop: 1504    Procedure: COLONOSCOPY WITH ANESTHESIA Diagnosis:       Rectal bleeding      (Rectal bleeding [K62.5])    Surgeons: Neda Werner MD Provider: Reece Mao CRNA    Anesthesia Type: MAC ASA Status: 3            Anesthesia Type: MAC    Vitals  Vitals Value Taken Time   BP     Temp     Pulse 81 10/09/23 1504   Resp     SpO2 90 % 10/09/23 1504   Vitals shown include unfiled device data.        Post Anesthesia Care and Evaluation    Patient location during evaluation: PHASE II  Patient participation: complete - patient participated  Level of consciousness: awake and alert  Pain management: adequate    Airway patency: patent  Anesthetic complications: No anesthetic complications  PONV Status: none  Cardiovascular status: acceptable  Respiratory status: acceptable  Hydration status: acceptable    Comments: Blood pressure 169/81, pulse 92, temperature 96 øF (35.6 øC), temperature source Temporal, resp. rate 20, height 175.3 cm (69\"), weight 112 kg (246 lb), SpO2 95%.    No anesthesia care post op    "

## 2023-10-10 LAB
LAB AP CASE REPORT: NORMAL
Lab: NORMAL
PATH REPORT.FINAL DX SPEC: NORMAL
PATH REPORT.GROSS SPEC: NORMAL

## 2023-10-12 ENCOUNTER — OFFICE VISIT (OUTPATIENT)
Dept: GASTROENTEROLOGY | Facility: CLINIC | Age: 63
End: 2023-10-12
Payer: MEDICARE

## 2023-10-12 VITALS
DIASTOLIC BLOOD PRESSURE: 72 MMHG | TEMPERATURE: 96.8 F | OXYGEN SATURATION: 94 % | HEART RATE: 110 BPM | BODY MASS INDEX: 36.29 KG/M2 | SYSTOLIC BLOOD PRESSURE: 142 MMHG | WEIGHT: 245 LBS | HEIGHT: 69 IN

## 2023-10-12 DIAGNOSIS — C18.7 MALIGNANT NEOPLASM OF SIGMOID COLON: Primary | ICD-10-CM

## 2023-10-12 DIAGNOSIS — Z80.0 FH: COLON CANCER: ICD-10-CM

## 2023-10-12 DIAGNOSIS — Z86.010 HISTORY OF ADENOMATOUS POLYP OF COLON: ICD-10-CM

## 2023-10-12 PROBLEM — Z86.0101 HISTORY OF ADENOMATOUS POLYP OF COLON: Status: ACTIVE | Noted: 2023-10-12

## 2023-10-12 PROCEDURE — 1159F MED LIST DOCD IN RCRD: CPT | Performed by: INTERNAL MEDICINE

## 2023-10-12 PROCEDURE — 1160F RVW MEDS BY RX/DR IN RCRD: CPT | Performed by: INTERNAL MEDICINE

## 2023-10-12 PROCEDURE — 3077F SYST BP >= 140 MM HG: CPT | Performed by: INTERNAL MEDICINE

## 2023-10-12 PROCEDURE — 99214 OFFICE O/P EST MOD 30 MIN: CPT | Performed by: INTERNAL MEDICINE

## 2023-10-12 PROCEDURE — 3078F DIAST BP <80 MM HG: CPT | Performed by: INTERNAL MEDICINE

## 2023-10-12 NOTE — PROGRESS NOTES
Primary Physician: Kt Alfredo MD    Chief Complaint   Patient presents with    Follow-up     Pt presents today for path follow up-had colon 10/9/2023 and is here to discuss results       Subjective     Tucker Knox is a 63 y.o. male.    HPI  Sigmoid colon cancer/multiple adenomas-new problem to discuss today.  Circumferential nearly obstructing colon cancer confirmed in sigmoid colon 10/2023 to assess rectal bleeding.  Distal margin marked with ink.  Multiple other adenomas removed.  Will need surgical referral.  Check CEA and CT TAP.  Repeat colonoscopy 6 months after recovery from colon surgery due to piecemeal removal of large adenoma.  He had labs done by Nasima jon at [facility].  They are filed under media.  On 8/17/2023, hemoglobin was 13 and liver function tests were normal.     Family Hx Colon Cancer  Father had colon cancer. Specifics and age unknown but he thinks he was in his 70's.      Past Medical History:   Diagnosis Date    CHF (congestive heart failure)     COPD (chronic obstructive pulmonary disease)     Coronary artery disease     stent x 1    Family history of colon cancer     Hyperlipidemia     Hypertension     Sleep apnea     does not wear machine    Type 2 diabetes mellitus        Past Surgical History:   Procedure Laterality Date    BACK SURGERY      CARDIAC CATHETERIZATION Left 04/13/2022    Procedure: Cardiac Catheterization/Vascular Study;  Surgeon: Todd Avendano MD;  Location:  [facility];  Service: Cardiology;  Laterality: Left;    COLONOSCOPY N/A 10/09/2023    Diverticulosis; One 5mm polyp in ascending colon; One 5mm polyp in transverse colon; One 10mm polyp at 65cm proximal to anus; One 20mm polyp at 65cm proximal to anus-Tattooed; One 20mm polyp at 42cm proximal to anus-Clip (MR conditional) placed-Tattooed; Rule out malignancy-Partially obstructing tumor in recto-sigmoid colon-biopsied-Tattooed; One 10mm polyp in rectum    ELBOW  PROCEDURE      KNEE SURGERY      LUMBAR DISC SURGERY          Current Outpatient Medications:     aspirin 81 MG EC tablet, Take 1 tablet by mouth Daily., Disp: 100 tablet, Rfl: 2    DULoxetine (CYMBALTA) 30 MG capsule, Take 1 capsule by mouth Daily., Disp: , Rfl:     furosemide (LASIX) 40 MG tablet, Take 1 tablet by mouth Daily., Disp: 30 tablet, Rfl: 2    gabapentin (NEURONTIN) 300 MG capsule, Take 1 capsule by mouth 3 (Three) Times a Day., Disp: 90 capsule, Rfl: 0    glipiZIDE (GLUCOTROL) 10 MG tablet, Take 1 tablet by mouth 2 (Two) Times a Day Before Meals., Disp: , Rfl:     insulin detemir (LEVEMIR) 100 UNIT/ML injection, Inject 40 Units under the skin into the appropriate area as directed Daily., Disp: , Rfl:     metFORMIN (GLUCOPHAGE) 1000 MG tablet, Take 1 tablet by mouth 2 (Two) Times a Day., Disp: , Rfl: 0    nitroglycerin (NITROSTAT) 0.4 MG SL tablet, 1 under the tongue as needed for angina, may repeat q5mins for up three doses, Disp: 25 tablet, Rfl: 3    pravastatin (PRAVACHOL) 40 MG tablet, Take 1 tablet by mouth Every Night., Disp: , Rfl:     Tradjenta 5 MG tablet tablet, Take 1 tablet by mouth Daily., Disp: , Rfl:     Allergies   Allergen Reactions    Penicillins Swelling       Social History     Socioeconomic History    Marital status:    Tobacco Use    Smoking status: Every Day     Packs/day: 1.00     Years: 25.00     Additional pack years: 0.00     Total pack years: 25.00     Types: Cigarettes    Smokeless tobacco: Never   Vaping Use    Vaping Use: Some days    Substances: Nicotine   Substance and Sexual Activity    Alcohol use: Not Currently    Drug use: Yes     Types: Marijuana    Sexual activity: Defer       Family History   Problem Relation Age of Onset    Esophageal cancer Mother     Heart disease Mother     Colon cancer Father 80    Heart disease Father     No Known Problems Sister     COPD Brother     Alcohol abuse Brother     Colon polyps Neg Hx     Liver cancer Neg Hx     Rectal  "cancer Neg Hx     Stomach cancer Neg Hx     Liver disease Neg Hx        Review of Systems   Respiratory:  Negative for shortness of breath.    Cardiovascular:  Negative for chest pain.       Objective     /72 (BP Location: Left arm, Patient Position: Sitting, Cuff Size: Adult)   Pulse 110   Temp 96.8 øF (36 øC) (Infrared)   Ht 175.3 cm (69\")   Wt 111 kg (245 lb)   SpO2 94%   BMI 36.18 kg/mý     Physical Exam  Constitutional:       Appearance: He is well-developed.   Pulmonary:      Effort: Pulmonary effort is normal.   Musculoskeletal:         General: Normal range of motion.   Skin:     General: Skin is warm.   Neurological:      Mental Status: He is alert and oriented to person, place, and time.   Psychiatric:         Behavior: Behavior normal.         Lab Results - Last 18 Months   Lab Units 04/14/22  0422   GLUCOSE mg/dL 200*   BUN mg/dL 20   CREATININE mg/dL 1.24   SODIUM mmol/L 142   POTASSIUM mmol/L 3.8   CHLORIDE mmol/L 100   CO2 mmol/L 31.0*       Lab Results - Last 18 Months   Lab Units 04/14/22  0422   HEMOGLOBIN g/dL 13.3   HEMATOCRIT % 43.0   MCV fL 88.8   WBC 10*3/mm3 8.38   RDW % 13.8   MPV fL 10.2   PLATELETS 10*3/mm3 253     Final Diagnosis   Large intestine designated \"ascending colon polyp\":  -Tubular adenoma (2 fragments).  -No evidence of high-grade dysplasia.     2.  Large intestine designated \"transverse colon polyp\":  -Tubular adenoma (2 fragments).  -No evidence of high-grade dysplasia.     3.   Large intestine, designated \"polyp at 65 cm\":  -Tubulovillous and tubular adenoma fragments.  -No evidence of high-grade dysplasia.     4.   Large intestine designated \"polyp at 42 cm\":  -Tubulovillous adenoma, 1.7 cm.  -No evidence of high-grade dysplasia.     5.  Large intestine designated \"rectal sigmoid\", biopsy:  -Invasive adenocarcinoma, moderately differentiated.     6.  Large intestine designated \"rectal polyp\":  -Hyperplastic polyp.      Electronically signed by Kehr, Elizabeth " MD PILO on 10/10/2023 at 1431         EXAM: XR CHEST 1 VW-     INDICATION: Chest pain; R07.2-Precordial pain; R06.02-Shortness of  breath     COMPARISON: 12/25/2021     FINDINGS:     Cardiac silhouette is within normal limits. No pleural effusion or  pneumothorax. No focal consolidation. No acute osseous finding.     IMPRESSION:     No acute findings.  This report was finalized on 02/18/2022 15:27 by Dr. Reece Richards MD.      IMPRESSION/PLAN:    Assessment & Plan      Problem List Items Addressed This Visit          Family History    FH: colon cancer    Overview     Father had colon cancer > 60            Gastrointestinal Abdominal     History of adenomatous polyp of colon    Overview     Multiple large adenomas removed 10/2023.  Colon cancer also found in the sigmoid colon same time.  Will plan repeat colonoscopy 6 months after surgery due to piecemeal removal of polyps.            Hematology and Neoplasia    Malignant neoplasm of sigmoid colon - Primary    Overview     Circumferential nearly obstructing colon cancer confirmed in sigmoid colon 10/2023 to assess rectal bleeding.  Distal margin marked with ink.  Multiple other adenomas removed.  Will need surgical referral.  Check CEA and CT TAP.  Repeat colonoscopy 6 months after recovery from colon surgery due to piecemeal removal of large adenoma.  Normal LFTs/Hb 8/2023--filed under media.         Relevant Orders    CT Abdomen Pelvis With Contrast    CT Chest With Contrast Diagnostic    Ambulatory Referral to General Surgery    CEA       MEDICAL COMPLEXITY must have 2 out of 3   Moderate Complexity Level 4                                                                                                              1 of the following medical problems:                                                                                               []One chronic illness with mild exacerbation                                                                                 []Two or more stable chronic illness                                                                                              [x]One new problem  []One acute illness with systemic symptoms     Complexity of Data  Reviewed (1 out of the 3 following categories)                                             Category 1 tests, documents, historian (must have 3 points)                                                      []Review of prior external records  []Review of results of unique tests  [x]Ordering unique tests   []Assessment requires an independent historian   Category 2 Interpretation of tests     []Independent interpretation of test read by another doc   Category 3 Discuss Management/tests  []Discussion with external physician     Risk of complications and/or morbidity                                                                                           []Prescription Drug Management     [x]Decision for elective endoscopic procedure--referral for elective surgery                  Neda Werner MD  10/12/23  14:27 CDT    Part of this note may be an electronic transcription/translation of spoken language to printed text.

## 2023-10-12 NOTE — LETTER
October 12, 2023       No Recipients    Patient: Tucker Knox   YOB: 1960   Date of Visit: 10/12/2023     Dear Kt Alfredo MD:       Thank you for referring Tucker Knox to me for evaluation. Below are the relevant portions of my assessment and plan of care.    If you have questions, please do not hesitate to call me. I look forward to following Tucker along with you.         Sincerely,        Neda Werner MD        CC:   No Recipients    Neda Werner MD  10/12/23 1430  Sign when Signing Visit  Primary Physician: Kt Alfredo MD    Chief Complaint   Patient presents with    Follow-up     Pt presents today for path follow up-had colon 10/9/2023 and is here to discuss results       Subjective    Tucker Knox is a 63 y.o. male.    HPI  Sigmoid colon cancer/multiple adenomas-new problem to discuss today.  Circumferential nearly obstructing colon cancer confirmed in sigmoid colon 10/2023 to assess rectal bleeding.  Distal margin marked with ink.  Multiple other adenomas removed.  Will need surgical referral.  Check CEA and CT TAP.  Repeat colonoscopy 6 months after recovery from colon surgery due to piecemeal removal of large adenoma.  He had labs done by Nasima webb at [facility].  They are filed under media.  On 8/17/2023, hemoglobin was 13 and liver function tests were normal.     Family Hx Colon Cancer  Father had colon cancer. Specifics and age unknown but he thinks he was in his 70's.      Past Medical History:   Diagnosis Date    CHF (congestive heart failure)     COPD (chronic obstructive pulmonary disease)     Coronary artery disease     stent x 1    Family history of colon cancer     Hyperlipidemia     Hypertension     Sleep apnea     does not wear machine    Type 2 diabetes mellitus        Past Surgical History:   Procedure Laterality Date    BACK SURGERY      CARDIAC CATHETERIZATION Left 04/13/2022    Procedure: Cardiac  Catheterization/Vascular Study;  Surgeon: Todd Avnedano MD;  Location:  [facility];  Service: Cardiology;  Laterality: Left;    COLONOSCOPY N/A 10/09/2023    Diverticulosis; One 5mm polyp in ascending colon; One 5mm polyp in transverse colon; One 10mm polyp at 65cm proximal to anus; One 20mm polyp at 65cm proximal to anus-Tattooed; One 20mm polyp at 42cm proximal to anus-Clip (MR conditional) placed-Tattooed; Rule out malignancy-Partially obstructing tumor in recto-sigmoid colon-biopsied-Tattooed; One 10mm polyp in rectum    ELBOW PROCEDURE      KNEE SURGERY      LUMBAR DISC SURGERY          Current Outpatient Medications:     aspirin 81 MG EC tablet, Take 1 tablet by mouth Daily., Disp: 100 tablet, Rfl: 2    DULoxetine (CYMBALTA) 30 MG capsule, Take 1 capsule by mouth Daily., Disp: , Rfl:     furosemide (LASIX) 40 MG tablet, Take 1 tablet by mouth Daily., Disp: 30 tablet, Rfl: 2    gabapentin (NEURONTIN) 300 MG capsule, Take 1 capsule by mouth 3 (Three) Times a Day., Disp: 90 capsule, Rfl: 0    glipiZIDE (GLUCOTROL) 10 MG tablet, Take 1 tablet by mouth 2 (Two) Times a Day Before Meals., Disp: , Rfl:     insulin detemir (LEVEMIR) 100 UNIT/ML injection, Inject 40 Units under the skin into the appropriate area as directed Daily., Disp: , Rfl:     metFORMIN (GLUCOPHAGE) 1000 MG tablet, Take 1 tablet by mouth 2 (Two) Times a Day., Disp: , Rfl: 0    nitroglycerin (NITROSTAT) 0.4 MG SL tablet, 1 under the tongue as needed for angina, may repeat q5mins for up three doses, Disp: 25 tablet, Rfl: 3    pravastatin (PRAVACHOL) 40 MG tablet, Take 1 tablet by mouth Every Night., Disp: , Rfl:     Tradjenta 5 MG tablet tablet, Take 1 tablet by mouth Daily., Disp: , Rfl:     Allergies   Allergen Reactions    Penicillins Swelling       Social History     Socioeconomic History    Marital status:    Tobacco Use    Smoking status: Every Day     Packs/day: 1.00     Years: 25.00     Additional  "pack years: 0.00     Total pack years: 25.00     Types: Cigarettes    Smokeless tobacco: Never   Vaping Use    Vaping Use: Some days    Substances: Nicotine   Substance and Sexual Activity    Alcohol use: Not Currently    Drug use: Yes     Types: Marijuana    Sexual activity: Defer       Family History   Problem Relation Age of Onset    Esophageal cancer Mother     Heart disease Mother     Colon cancer Father 80    Heart disease Father     No Known Problems Sister     COPD Brother     Alcohol abuse Brother     Colon polyps Neg Hx     Liver cancer Neg Hx     Rectal cancer Neg Hx     Stomach cancer Neg Hx     Liver disease Neg Hx        Review of Systems   Respiratory:  Negative for shortness of breath.    Cardiovascular:  Negative for chest pain.       Objective    /72 (BP Location: Left arm, Patient Position: Sitting, Cuff Size: Adult)   Pulse 110   Temp 96.8 øF (36 øC) (Infrared)   Ht 175.3 cm (69\")   Wt 111 kg (245 lb)   SpO2 94%   BMI 36.18 kg/mý     Physical Exam  Constitutional:       Appearance: He is well-developed.   Pulmonary:      Effort: Pulmonary effort is normal.   Musculoskeletal:         General: Normal range of motion.   Skin:     General: Skin is warm.   Neurological:      Mental Status: He is alert and oriented to person, place, and time.   Psychiatric:         Behavior: Behavior normal.         Lab Results - Last 18 Months   Lab Units 04/14/22  0422   GLUCOSE mg/dL 200*   BUN mg/dL 20   CREATININE mg/dL 1.24   SODIUM mmol/L 142   POTASSIUM mmol/L 3.8   CHLORIDE mmol/L 100   CO2 mmol/L 31.0*       Lab Results - Last 18 Months   Lab Units 04/14/22  0422   HEMOGLOBIN g/dL 13.3   HEMATOCRIT % 43.0   MCV fL 88.8   WBC 10*3/mm3 8.38   RDW % 13.8   MPV fL 10.2   PLATELETS 10*3/mm3 253     Final Diagnosis   Large intestine designated \"ascending colon polyp\":  -Tubular adenoma (2 fragments).  -No evidence of high-grade dysplasia.     2.  Large intestine designated \"transverse " "colon polyp\":  -Tubular adenoma (2 fragments).  -No evidence of high-grade dysplasia.     3.   Large intestine, designated \"polyp at 65 cm\":  -Tubulovillous and tubular adenoma fragments.  -No evidence of high-grade dysplasia.     4.   Large intestine designated \"polyp at 42 cm\":  -Tubulovillous adenoma, 1.7 cm.  -No evidence of high-grade dysplasia.     5.  Large intestine designated \"rectal sigmoid\", biopsy:  -Invasive adenocarcinoma, moderately differentiated.     6.  Large intestine designated \"rectal polyp\":  -Hyperplastic polyp.      Electronically signed by Kehr, Elizabeth L, MD on 10/10/2023 at 1431         EXAM: XR CHEST 1 VW-     INDICATION: Chest pain; R07.2-Precordial pain; R06.02-Shortness of  breath     COMPARISON: 12/25/2021     FINDINGS:     Cardiac silhouette is within normal limits. No pleural effusion or  pneumothorax. No focal consolidation. No acute osseous finding.     IMPRESSION:     No acute findings.  This report was finalized on 02/18/2022 15:27 by Dr. Reece Richards MD.      IMPRESSION/PLAN:    Assessment & Plan     Problem List Items Addressed This Visit          Family History    FH: colon cancer    Overview     Father had colon cancer > 60            Gastrointestinal Abdominal     History of adenomatous polyp of colon    Overview     Multiple large adenomas removed 10/2023.  Colon cancer also found in the sigmoid colon same time.  Will plan repeat colonoscopy 6 months after surgery due to piecemeal removal of polyps.            Hematology and Neoplasia    Malignant neoplasm of sigmoid colon - Primary    Overview     Circumferential nearly obstructing colon cancer confirmed in sigmoid colon 10/2023 to assess rectal bleeding.  Distal margin marked with ink.  Multiple other adenomas removed.  Will need surgical referral.  Check CEA and CT TAP.  Repeat colonoscopy 6 months after recovery from colon surgery due to piecemeal removal of large adenoma.  Normal LFTs/Hb 8/2023--filed under " media.         Relevant Orders    CT Abdomen Pelvis With Contrast    CT Chest With Contrast Diagnostic    Ambulatory Referral to General Surgery    CEA       MEDICAL COMPLEXITY must have 2 out of 3   Moderate Complexity Level 4                                                                                                              1 of the following medical problems:                                                                                               []One chronic illness with mild exacerbation                                                                                []Two or more stable chronic illness                                                                                              [x]One new problem  []One acute illness with systemic symptoms     Complexity of Data  Reviewed (1 out of the 3 following categories)                                             Category 1 tests, documents, historian (must have 3 points)                                                      []Review of prior external records  []Review of results of unique tests  [x]Ordering unique tests   []Assessment requires an independent historian   Category 2 Interpretation of tests     []Independent interpretation of test read by another doc   Category 3 Discuss Management/tests  []Discussion with external physician     Risk of complications and/or morbidity                                                                                           []Prescription Drug Management     [x]Decision for elective endoscopic procedure--referral for elective surgery                  Neda Werner MD  10/12/23  14:27 CDT    Part of this note may be an electronic transcription/translation of spoken language to printed text.

## 2023-10-17 DIAGNOSIS — C18.7 MALIGNANT NEOPLASM OF SIGMOID COLON: ICD-10-CM

## 2023-10-25 ENCOUNTER — HOSPITAL ENCOUNTER (OUTPATIENT)
Dept: CT IMAGING | Facility: HOSPITAL | Age: 63
Discharge: HOME | End: 2023-10-25
Admitting: INTERNAL MEDICINE
Payer: MEDICARE

## 2023-10-25 ENCOUNTER — OFFICE VISIT (OUTPATIENT)
Dept: SURGERY | Facility: CLINIC | Age: 63
End: 2023-10-25
Payer: MEDICARE

## 2023-10-25 VITALS
OXYGEN SATURATION: 93 % | DIASTOLIC BLOOD PRESSURE: 92 MMHG | SYSTOLIC BLOOD PRESSURE: 157 MMHG | HEIGHT: 69 IN | RESPIRATION RATE: 12 BRPM | BODY MASS INDEX: 35.7 KG/M2 | HEART RATE: 90 BPM | WEIGHT: 241 LBS

## 2023-10-25 DIAGNOSIS — Z80.0 FAMILY HISTORY OF COLON CANCER: ICD-10-CM

## 2023-10-25 DIAGNOSIS — F17.210 NICOTINE DEPENDENCE, CIGARETTES, UNCOMPLICATED: ICD-10-CM

## 2023-10-25 DIAGNOSIS — C18.7 MALIGNANT NEOPLASM OF SIGMOID COLON: ICD-10-CM

## 2023-10-25 DIAGNOSIS — C18.9 COLON ADENOCARCINOMA: Primary | ICD-10-CM

## 2023-10-25 DIAGNOSIS — E66.09 CLASS 2 OBESITY DUE TO EXCESS CALORIES WITH BODY MASS INDEX (BMI) OF 35.0 TO 35.9 IN ADULT, UNSPECIFIED WHETHER SERIOUS COMORBIDITY PRESENT: ICD-10-CM

## 2023-10-25 PROCEDURE — 82565 ASSAY OF CREATININE: CPT

## 2023-10-25 PROCEDURE — 74177 CT ABD & PELVIS W/CONTRAST: CPT

## 2023-10-25 PROCEDURE — 71260 CT THORAX DX C+: CPT

## 2023-10-25 PROCEDURE — 25510000001 IOPAMIDOL 61 % SOLUTION: Performed by: INTERNAL MEDICINE

## 2023-10-25 RX ADMIN — IOPAMIDOL 100 ML: 612 INJECTION, SOLUTION INTRAVENOUS at 15:51

## 2023-10-25 NOTE — PROGRESS NOTES
Office New Patient History and Physical:     Referring Provider: Neda Werner MD    Chief Complaint   Patient presents with    colon mass     Mr. Knox is here for a consult for a colon mass.        Subjective .     History of present illness:  Tucker Knox is a 63 y.o. male who presented with blood per rectum x 1 month and underwent a screening colonoscopy. 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 No abdominal pain, nausea, nor vomiting. Normal bowel movements, and he denies constipation. Family history of a father with colon cancer at age 70.     He has never had abdominal surgery. He is a current every day smoker at 1 PPD. He is on 81 mg ASA. His BMI is 35.     Review of Systems   Constitutional:  Negative for activity change, appetite change, fatigue and fever.   HENT:  Negative for dental problem, ear discharge and ear pain.    Eyes:  Negative for discharge.   Respiratory:  Negative for apnea, shortness of breath and wheezing.    Cardiovascular:  Negative for chest pain and palpitations.   Gastrointestinal:  Positive for anal bleeding and blood in stool. Negative for abdominal distention and abdominal pain.   Endocrine: Negative for cold intolerance and heat intolerance.   Genitourinary:  Negative for difficulty urinating.   Musculoskeletal:  Negative for arthralgias, back pain and gait problem.   Neurological:  Negative for dizziness, tremors and seizures.   Hematological:  Negative for adenopathy.   Psychiatric/Behavioral:  Negative for agitation, behavioral problems and confusion.          History  Past Medical History:   Diagnosis Date    CHF (congestive heart failure)     COPD (chronic obstructive pulmonary disease)     Coronary artery disease     stent x 1    Family history of colon cancer     Hyperlipidemia     Hypertension     Sleep apnea     does not wear machine    Type 2 diabetes mellitus    ,   Past Surgical History:   Procedure Laterality Date    BACK SURGERY       CARDIAC CATHETERIZATION Left 04/13/2022    Procedure: Cardiac Catheterization/Vascular Study;  Surgeon: Todd Avendano MD;  Location:  [facility];  Service: Cardiology;  Laterality: Left;    COLONOSCOPY N/A 10/09/2023    Diverticulosis; One 5mm polyp in ascending colon; One 5mm polyp in transverse colon; One 10mm polyp at 65cm proximal to anus; One 20mm polyp at 65cm proximal to anus-Tattooed; One 20mm polyp at 42cm proximal to anus-Clip (MR conditional) placed-Tattooed; Rule out malignancy-Partially obstructing tumor in recto-sigmoid colon-biopsied-Tattooed; One 10mm polyp in rectum    ELBOW PROCEDURE      KNEE SURGERY      LUMBAR DISC SURGERY     ,   Family History   Problem Relation Age of Onset    Esophageal cancer Mother     Heart disease Mother     Colon cancer Father 80    Heart disease Father     No Known Problems Sister     COPD Brother     Alcohol abuse Brother     Colon polyps Neg Hx     Liver cancer Neg Hx     Rectal cancer Neg Hx     Stomach cancer Neg Hx     Liver disease Neg Hx    ,   Social History     Tobacco Use    Smoking status: Every Day     Packs/day: 1.00     Years: 25.00     Additional pack years: 0.00     Total pack years: 25.00     Types: Cigarettes    Smokeless tobacco: Never   Vaping Use    Vaping Use: Some days    Substances: Nicotine   Substance Use Topics    Alcohol use: Not Currently    Drug use: Yes     Types: Marijuana   , (Not in a hospital admission)   and Allergies:  Penicillins    Current Outpatient Medications:     aspirin 81 MG EC tablet, Take 1 tablet by mouth Daily., Disp: 100 tablet, Rfl: 2    DULoxetine (CYMBALTA) 30 MG capsule, Take 1 capsule by mouth Daily., Disp: , Rfl:     furosemide (LASIX) 40 MG tablet, Take 1 tablet by mouth Daily., Disp: 30 tablet, Rfl: 2    gabapentin (NEURONTIN) 300 MG capsule, Take 1 capsule by mouth 3 (Three) Times a Day., Disp: 90 capsule, Rfl: 0    glipiZIDE (GLUCOTROL) 10 MG tablet, Take 1 tablet by mouth 2 (Two) Times  "a Day Before Meals., Disp: , Rfl:     insulin detemir (LEVEMIR) 100 UNIT/ML injection, Inject 40 Units under the skin into the appropriate area as directed Daily., Disp: , Rfl:     metFORMIN (GLUCOPHAGE) 1000 MG tablet, Take 1 tablet by mouth 2 (Two) Times a Day., Disp: , Rfl: 0    nitroglycerin (NITROSTAT) 0.4 MG SL tablet, 1 under the tongue as needed for angina, may repeat q5mins for up three doses, Disp: 25 tablet, Rfl: 3    pravastatin (PRAVACHOL) 40 MG tablet, Take 1 tablet by mouth Every Night., Disp: , Rfl:     Tradjenta 5 MG tablet tablet, Take 1 tablet by mouth Daily., Disp: , Rfl:     Objective     Vital Signs   /92 (BP Location: Left arm, Patient Position: Sitting, Cuff Size: Adult)   Pulse 90   Resp 12   Ht 175.3 cm (69\")   Wt 109 kg (241 lb)   SpO2 93%   BMI 35.59 kg/m²      Physical Exam:  General appearance - alert, well appearing, and in no distress  Mental status - alert, oriented to person, place, and time  Eyes - pupils equal and reactive, extraocular eye movements intact  Neck - supple, no significant adenopathy  Chest - clear to auscultation, no wheezes, rales or rhonchi, symmetric air entry  Heart - normal rate and regular rhythm  Abdomen - soft, nontender, nondistended, no masses or organomegaly  Neurological - alert, oriented, normal speech, no focal findings or movement disorder noted  Musculoskeletal - no joint tenderness, deformity or swelling    Physical Exam  Abdominal:               Results Review:     The following data was reviewed by: Oma Velasquez MD on 10/25/2023:    Tissue Pathology Exam (10/09/2023 14:09)   Large intestine designated \"ascending colon polyp\":  -Tubular adenoma (2 fragments).  -No evidence of high-grade dysplasia.     2.  Large intestine designated \"transverse colon polyp\":  -Tubular adenoma (2 fragments).  -No evidence of high-grade dysplasia.     3.   Large intestine, designated \"polyp at 65 cm\":  -Tubulovillous and tubular adenoma " "fragments.  -No evidence of high-grade dysplasia.     4.   Large intestine designated \"polyp at 42 cm\":  -Tubulovillous adenoma, 1.7 cm.  -No evidence of high-grade dysplasia.     5.  Large intestine designated \"rectal sigmoid\", biopsy:  -Invasive adenocarcinoma, moderately differentiated.     6.  Large intestine designated \"rectal polyp\":  -Hyperplastic polyp.  CEA (10/14/2023 00:00)   Minor elevation   COLONOSCOPY (10/09/2023 14:05)   An infiltrative partially obstructing large mass was found in the recto-sigmoid colon. The mass was  circumferential. The mass measured seven cm in length. No bleeding was present. This was  biopsied with a cold forceps for histology. Area was tattooed with an injection of Spot (carbon black).    Assessment & Plan       Diagnoses and all orders for this visit:    1. Colon adenocarcinoma (Primary)  -     Ambulatory Referral to Genetic Counseling/Testing  -     Case Request; Standing  -     XR Chest 1 View; Future  -     ECG 12 Lead; Future  -     sodium chloride 0.9 % flush 10 mL  -     sodium chloride 0.9 % flush 10 mL  -     sodium chloride 0.9 % infusion 40 mL  -     ceFAZolin (ANCEF) 2 g in sodium chloride 0.9 % 100 mL IVPB  -     metroNIDAZOLE (FLAGYL) IVPB 500 mg  -     acetaminophen (TYLENOL) tablet 650 mg  -     Gabapentin (NEURONTIN) 50 mg/mL solution 250 mg  -     heparin (porcine) 5000 UNIT/ML injection 5,000 Units  -     Comprehensive Metabolic Panel; Future  -     CBC & Differential; Future  -     Case Request    2. Family history of colon cancer    3. Class 2 obesity due to excess calories with body mass index (BMI) of 35.0 to 35.9 in adult, unspecified whether serious comorbidity present    4. Nicotine dependence, cigarettes, uncomplicated    Other orders  -     Inpatient Admission; Standing  -     Follow Anesthesia Guidelines / Protocol; Future  -     Follow Anesthesia Guidelines / Protocol; Standing  -     Verify / Perform Chlorhexidine Skin Prep; Standing  -     " Verify / Perform Chlorhexidine Skin Prep if Indicated (If Not Already Completed); Standing  -     Obtain Informed Consent; Future  -     Provide NPO Instructions to Patient; Future  -     Chlorhexidine Skin Prep; Future  -     Notify Physician - Standard; Standing  -     Instructions on Coughing, Deep Breathing & Incentive Spirometry; Standing  -     Insert Peripheral IV x2; Standing  -     Saline Lock & Maintain IV Access; Standing         Tucker Knox is a 63 y.o. male with biopsy proven rectosigmoid adenocarcinoma.  I have recommended laparoscopic robotic-assisted sigmoid colectomy with primary anastomosis, flexible sigmoidoscopy and possible splenic flexure mobilization. We had a long discussion on the risks and benefits of surgery including (1) bleeding (2) infection including wound infection, abscess, and most significantly, leak. The patient understands that a leak could mean antibiotics and bowel rest or that a leak could necessitate a take back to the OR with ostomy due to possible sepsis. The patient understands that the risk of leak is anywhere from 1-10%. We discussed the ways we decrease the risk of leak including bowel prep, no tension in the OR and ensuring adequate blood supply in the OR with ICG dye. (3) damage to the surrounding structures including ureters, bladder, small intestine, and nerves. (4) pulmonary complications (5) cardiac complications. CT scans later today for metastatic work up. The patient is scheduled for a laparoscopic robotic-assisted sigmoid colectomy with intra-operative flexible sigmoidoscopy, possible splenic flexure mobilization, possible ostomy if there is a leak on the leak test. I have ordered pre-op work up to include CBC, CMP, CXR and EKG. We will call to schedule after we see the CT results. He does have a family and personal history of colon cancer and I have referred him to genetics.     This is a life threatening problem.I have reviewed the colonoscopy,  pathology report, and CEA. I have referred him to genetics and ordered CBC, CMP, CXR and EKG. He is at increased risk of perioperative complications 2/2 his elevated BMI and current nicotine dependence as well as his active cancer status.     Class 2 Severe Obesity (BMI >=35 and <=39.9). Obesity-related health conditions include the following: hypertension, diabetes mellitus, and dyslipidemias. Obesity is unchanged. BMI is is above average; BMI management plan is completed. We discussed portion control and increasing exercise.      Oma Velasquez MD  10/26/23  20:09 CDT

## 2023-10-26 PROBLEM — C18.9 COLON ADENOCARCINOMA: Status: ACTIVE | Noted: 2023-10-26

## 2023-10-26 LAB — CREAT BLDA-MCNC: NORMAL MG/DL

## 2023-10-26 RX ORDER — DOCUSATE SODIUM 100 MG/1
400 CAPSULE, LIQUID FILLED ORAL ONCE
Qty: 4 CAPSULE | Refills: 0 | Status: SHIPPED | OUTPATIENT
Start: 2023-10-26 | End: 2023-10-26

## 2023-10-26 RX ORDER — SODIUM CHLORIDE 0.9 % (FLUSH) 0.9 %
10 SYRINGE (ML) INJECTION EVERY 12 HOURS SCHEDULED
OUTPATIENT
Start: 2023-10-26

## 2023-10-26 RX ORDER — ERYTHROMYCIN 500 MG/1
500 TABLET, COATED ORAL 2 TIMES DAILY
Qty: 2 TABLET | Refills: 0 | Status: SHIPPED | OUTPATIENT
Start: 2023-10-26 | End: 2023-10-27

## 2023-10-26 RX ORDER — HEPARIN SODIUM 5000 [USP'U]/ML
5000 INJECTION, SOLUTION INTRAVENOUS; SUBCUTANEOUS ONCE
OUTPATIENT
Start: 2023-10-26 | End: 2023-10-26

## 2023-10-26 RX ORDER — GABAPENTIN 250 MG/5ML
250 SOLUTION ORAL ONCE
OUTPATIENT
Start: 2023-10-26 | End: 2023-10-26

## 2023-10-26 RX ORDER — SODIUM CHLORIDE 0.9 % (FLUSH) 0.9 %
10 SYRINGE (ML) INJECTION AS NEEDED
OUTPATIENT
Start: 2023-10-26

## 2023-10-26 RX ORDER — ACETAMINOPHEN 325 MG/1
650 TABLET ORAL ONCE
OUTPATIENT
Start: 2023-10-26 | End: 2023-10-26

## 2023-10-26 RX ORDER — POLYETHYLENE GLYCOL 3350 17 G/17G
116 POWDER, FOR SOLUTION ORAL ONCE
Qty: 116 G | Refills: 0 | Status: SHIPPED | OUTPATIENT
Start: 2023-10-26 | End: 2023-10-26

## 2023-10-26 RX ORDER — METRONIDAZOLE 500 MG/100ML
500 INJECTION, SOLUTION INTRAVENOUS ONCE
OUTPATIENT
Start: 2023-10-26 | End: 2023-10-26

## 2023-10-26 RX ORDER — METRONIDAZOLE 500 MG/1
500 TABLET ORAL SEE ADMIN INSTRUCTIONS
Qty: 4 TABLET | Refills: 0 | Status: SHIPPED | OUTPATIENT
Start: 2023-10-26 | End: 2023-10-27

## 2023-10-26 RX ORDER — SODIUM CHLORIDE 9 MG/ML
40 INJECTION, SOLUTION INTRAVENOUS AS NEEDED
OUTPATIENT
Start: 2023-10-26

## 2023-10-27 NOTE — PATIENT INSTRUCTIONS
"BMI for Adults  What is BMI?  Body mass index (BMI) is a number that is calculated from a person's weight and height. BMI can help estimate how much of a person's weight is composed of fat. BMI does not measure body fat directly. Rather, it is an alternative to procedures that directly measure body fat, which can be difficult and expensive.  BMI can help identify people who may be at higher risk for certain medical problems.  What are BMI measurements used for?  BMI is used as a screening tool to identify possible weight problems. It helps determine whether a person is obese, overweight, a healthy weight, or underweight.  BMI is useful for:  Identifying a weight problem that may be related to a medical condition or may increase the risk for medical problems.  Promoting changes, such as changes in diet and exercise, to help reach a healthy weight. BMI screening can be repeated to see if these changes are working.  How is BMI calculated?  BMI involves measuring your weight in relation to your height. Both height and weight are measured, and the BMI is calculated from those numbers. This can be done either in English ([place] or metric measurements. Note that charts and online BMI calculators are available to help you find your BMI quickly and easily without having to do these calculations yourself.  To calculate your BMI in English ([place] measurements:    Measure your weight in pounds (lb).  Multiply the number of pounds by 703.  For example, for a person who weighs 180 lb, multiply that number by 703, which equals 126,540.  Measure your height in inches. Then multiply that number by itself to get a measurement called \"inches squared.\"  For example, for a person who is 70 inches tall, the \"inches squared\" measurement is 70 inches x 70 inches, which equals 4,900 inches squared.  Divide the total from step 2 (number of lb x 703) by the total from step 3 (inches squared): 126,540 ÷ 4,900 = 25.8. This is your BMI.    To " "calculate your BMI in metric measurements:  Measure your weight in kilograms (kg).  Measure your height in meters (m). Then multiply that number by itself to get a measurement called \"meters squared.\"  For example, for a person who is 1.75 m tall, the \"meters squared\" measurement is 1.75 m x 1.75 m, which is equal to 3.1 meters squared.  Divide the number of kilograms (your weight) by the meters squared number. In this example: 70 ÷ 3.1 = 22.6. This is your BMI.  What do the results mean?  BMI charts are used to identify whether you are underweight, normal weight, overweight, or obese. The following guidelines will be used:  Underweight: BMI less than 18.5.  Normal weight: BMI between 18.5 and 24.9.  Overweight: BMI between 25 and 29.9.  Obese: BMI of 30 or above.  Keep these notes in mind:  Weight includes both fat and muscle, so someone with a muscular build, such as an athlete, may have a BMI that is higher than 24.9. In cases like these, BMI is not an accurate measure of body fat.  To determine if excess body fat is the cause of a BMI of 25 or higher, further assessments may need to be done by a health care provider.  BMI is usually interpreted in the same way for men and women.  Where to find more information  For more information about BMI, including tools to quickly calculate your BMI, go to these websites:  Centers for Disease Control and Prevention: www.cdc.gov  American Heart Association: www.heart.org  National Heart, Lung, and Blood [place]: www.nhlbi.nih.gov  Summary  Body mass index (BMI) is a number that is calculated from a person's weight and height.  BMI may help estimate how much of a person's weight is composed of fat. BMI can help identify those who may be at higher risk for certain medical problems.  BMI can be measured using English measurements or metric measurements.  BMI charts are used to identify whether you are underweight, normal weight, overweight, or obese.  This information is not " intended to replace advice given to you by your health care provider. Make sure you discuss any questions you have with your health care provider.  Document Revised: 09/09/2020 Document Reviewed: 07/17/2020  [employer] Patient Education © 2021 [employer] Smoking Tobacco Information, Adult  Smoking tobacco can be harmful to your health. Tobacco contains a poisonous (toxic), colorless chemical called nicotine. Nicotine is addictive. It changes the brain and can make it hard to stop smoking. Tobacco also has other toxic chemicals that can hurt your body and raise your risk of many cancers.  How can smoking tobacco affect me?  Smoking tobacco puts you at risk for:  Cancer. Smoking is most commonly associated with lung cancer, but can also lead to cancer in other parts of the body.  Chronic obstructive pulmonary disease (COPD). This is a long-term lung condition that makes it hard to breathe. It also gets worse over time.  High blood pressure (hypertension), heart disease, stroke, or heart attack.  Lung infections, such as pneumonia.  Cataracts. This is when the lenses in the eyes become clouded.  Digestive problems. This may include peptic ulcers, heartburn, and gastroesophageal reflux disease (GERD).  Oral health problems, such as gum disease and tooth loss.  Loss of taste and smell.  Smoking can affect your appearance by causing:  Wrinkles.  Yellow or stained teeth, fingers, and fingernails.  Smoking tobacco can also affect your social life, because:  It may be challenging to find places to smoke when away from home. Many workplaces, restaurants, hotels, and public places are tobacco-free.  Smoking is expensive. This is due to the cost of tobacco and the long-term costs of treating health problems from smoking.  Secondhand smoke may affect those around you. Secondhand smoke can cause lung cancer, breathing problems, and heart disease. Children of smokers have a higher risk for:  Sudden infant death syndrome  (SIDS).  Ear infections.  Lung infections.  If you currently smoke tobacco, quitting now can help you:  Lead a longer and healthier life.  Look, smell, breathe, and feel better over time.  Save money.  Protect others from the harms of secondhand smoke.  What actions can I take to prevent health problems?  Quit smoking    Do not start smoking. Quit if you already do.  Make a plan to quit smoking and commit to it. Look for programs to help you and ask your health care provider for recommendations and ideas.  Set a date and write down all the reasons you want to quit.  Let your friends and family know you are quitting so they can help and support you. Consider finding friends who also want to quit. It can be easier to quit with someone else, so that you can support each other.  Talk with your health care provider about using nicotine replacement medicines to help you quit, such as gum, lozenges, patches, sprays, or pills.  Do not replace cigarette smoking with electronic cigarettes, which are commonly called e-cigarettes. The safety of e-cigarettes is not known, and some may contain harmful chemicals.  If you try to quit but return to smoking, stay positive. It is common to slip up when you first quit, so take it one day at a time.  Be prepared for cravings. When you feel the urge to smoke, chew gum or suck on hard candy.    Lifestyle  Stay busy and take care of your body.  Drink enough fluid to keep your urine pale yellow.  Get plenty of exercise and eat a healthy diet. This can help prevent weight gain after quitting.  Monitor your eating habits. Quitting smoking can cause you to have a larger appetite than when you smoke.  Find ways to relax. Go out with friends or family to a movie or a restaurant where people do not smoke.  Ask your health care provider about having regular tests (screenings) to check for cancer. This may include blood tests, imaging tests, and other tests.  Find ways to manage your stress, such  as meditation, yoga, or exercise.  Where to find support  To get support to quit smoking, consider:  Asking your health care provider for more information and resources.  Taking classes to learn more about quitting smoking.  Looking for local organizations that offer resources about quitting smoking.  Joining a support group for people who want to quit smoking in your local community.  Calling the smokefree.gov counselor helpline: 1-800-Quit-Now (1-727.738.8527)  Where to find more information  You may find more information about quitting smoking from:  HelpGuide.org: www.helpguide.org  Smokefree.gov: smokefree.gov  American Lung Association: www.lung.org  Contact a health care provider if you:  Have problems breathing.  Notice that your lips, nose, or fingers turn blue.  Have chest pain.  Are coughing up blood.  Feel faint or you pass out.  Have other health changes that cause you to worry.  Summary  Smoking tobacco can negatively affect your health, the health of those around you, your finances, and your social life.  Do not start smoking. Quit if you already do. If you need help quitting, ask your health care provider.  Think about joining a support group for people who want to quit smoking in your local community. There are many effective programs that will help you to quit this behavior.  This information is not intended to replace advice given to you by your health care provider. Make sure you discuss any questions you have with your health care provider.  Document Revised: 09/11/2020 Document Reviewed: 01/02/2018  [employer] Patient Education © 2021 [employer]

## 2023-11-06 ENCOUNTER — TELEPHONE (OUTPATIENT)
Dept: GENETICS | Facility: HOSPITAL | Age: 63
End: 2023-11-06
Payer: MEDICARE

## 2023-11-07 ENCOUNTER — CLINICAL SUPPORT (OUTPATIENT)
Dept: GENETICS | Facility: HOSPITAL | Age: 63
End: 2023-11-07

## 2023-11-07 DIAGNOSIS — Z80.3 FAMILY HISTORY OF BREAST CANCER: ICD-10-CM

## 2023-11-07 DIAGNOSIS — C18.7 MALIGNANT NEOPLASM OF SIGMOID COLON: ICD-10-CM

## 2023-11-07 DIAGNOSIS — Z13.79 GENETIC TESTING: Primary | ICD-10-CM

## 2023-11-07 NOTE — PROGRESS NOTES
Tucker Knox a 63-year-old male, was seen for genetic counseling due to a personal history of colon cancer. Genetic counseling was performed via telephone. Mr. Knox confirmed his full name, date of birth, and that he was physically located in the [facility] at the time of the appointment. Mr. Knox was recently diagnosed with colon cancer of the sigmoid colon after a colonoscopy in October of 2023. Multiple large adenomas were also found on colonoscopy and a repeat colonoscopy is planned 6 months after surgery to remove more polyps. He is planning surgery with Dr. Velasquez to remove the colon cancer. He is currently waiting on a CT scan for a metastatic work up. There is no record of MMR/IHC testing in the chart. Mr. Knox was interested in discussing his risk of a hereditary cancer syndrome. Mr. Knox declined genetic testing. He will     FAMILY HISTORY:   Father:   Prostate cancer, 60s  Mother:   Esophageal cancer, 40s-50s     Breast cancer, 40s    We do not have medical records regarding the diagnoses in Mr. Knox's family.  RISK ASSESSMENT:  Mr. Knox's personal history of colon cancer and family history of cancer raised the question of a hereditary cancer syndrome. NCCN criteria for genetic testing for BRCA1/2 states that any individual with a close blood relative diagnosed with breast cancer diagnosed below the age of 50 may consider genetic testing. With Mr. Knox's mother's diagnosis in her 40s, he would clearly meet this criteria. Mr. Knox was interested in discussing genetic testing. We discussed the standard approach to genetic testing is through comprehensive multigene panel testing that would evaluate a greater number of genes associated with hereditary cancer. These risk assessments are based on the family history information provided at the time of the appointment and could change in the future should new information be obtained.    GENETIC COUNSELING: We reviewed the  family history information in detail.  Cases of cancer follow three general patterns: sporadic, familial, and hereditary.  While most cancer is sporadic, some cases appear to occur in family clusters.  These cases are said to be familial and account for 10-20% of cancer cases. Familial cases may be due to a combination of shared genes and environmental factors among family members. In even fewer families, the cancer is said to be inherited, and the genes responsible for the cancer are known.      The pedigree patterns observed in sporadic versus hereditary cancer families were reviewed.  Family histories typical of hereditary cancer syndromes usually include multiple first- and second-degree relatives diagnosed with cancer types that define a syndrome.  These cases tend to be diagnosed at younger-than-expected ages and can be bilateral or multifocal.  The cancer in these families follows an autosomal dominant inheritance pattern, which indicates the likely presence of a mutation in a cancer susceptibility gene.  Children and siblings of an individual believed to carry this mutation have a 50% chance of inheriting that mutation, thereby inheriting the increased risk to develop cancer. These mutations can be passed down from the maternal or the paternal lineage.    We discussed hereditary breast cancer. Hereditary breast cancer accounts for 5-10% of all cases of breast cancer.  A significant proportion of hereditary breast cancer can be attributed to mutations in the BRCA1 and BRCA2 genes.  Mutations in these genes confer an increased risk for breast cancer, ovarian cancer, male breast cancer, prostate cancer, and pancreatic cancer.  Women with a BRCA1 or BRCA2 mutation have up to an 87% lifetime risk of breast cancer and up to a 60% risk of ovarian cancer.  There are other clinically significant breast cancer related genes in addition to BRCA1/2.      Hereditary colon cancer accounts for 5-10% of all cases of colon  cancer. The most common hereditary condition associated with an increased risk for colon cancer is Lisa syndrome.  The lifetime risk for colon cancer for individuals with Lisa syndrome is up to 80% if there is no intervention (i.e. removal of polyps detected on colonoscopy).  Routine screening colonoscopy has been shown to reduce the incidence and mortality of colon cancer in Lisa syndrome families by as much as 65%.  Other risks include cancer of the endometrium/uterus, ovary, stomach, urinary tract, small intestines, biliary tract, and brain.  MLH1 and MSH2 carry these higher risks, while MSH6 and PMS2 carry significantly lower risks.     There are also other, less common, hereditary cancer syndromes. Some of these conditions have well defined cancer risks and established management guidelines.  Other genes that can be tested for have been more recently described, and there may be less data regarding the risks and therefore may not have established management guidelines. We discussed these limitations at length. Genetic testing is typically performed through a multigene panel, evaluating the Lisa syndrome genes as well as other genes associated with hereditary risk for colon cancer and other cancers.    GENETIC TESTING:  The risks, benefits, and limitations of genetic testing and implications for clinical management following testing were reviewed.  DNA test results can influence decisions regarding screening, prevention, and surgical management.  Genetic testing can have significant psychological implications for both individuals and families.  Also discussed was the possibility of employment and insurance discrimination based on genetic test results and the laws in place to prevent this ([place]).      We discussed panel testing, which would involve testing 36 genes associated with increased cancer risk. The implications of a positive or negative test result were discussed. We discussed the possibility that, in  some cases, genetic test results may be ambiguous due to the identification of a genetic variant of uncertain significance (VUS). These variants may or may not be associated with an increased cancer risk. With multigene panel testing, it is not uncommon for a VUS to be identified. ?If a VUS is identified, testing family members is typically not recommended and screening recommendations are made based on the family history. ?The laboratories that perform genetic testing work to reclassify the VUS and send out an amended report if and when a VUS is reclassified. ?The majority of variant findings are ultimately reclassified to a negative result. Given Mr. Knox's personal history, a negative test result does not eliminate all cancer risk to relatives, although the risk may not be as high as it would with positive genetic testing.      PLAN: Mr. Knox declined genetic testing.?If he has any questions or decides he would like to pursue genetic testing, he is welcome to call me at [phone].   ?   Bonita Watts, MS, [facility] [place]    Licensed Certified Genetic Counselor     Cc: aJi Velasquez MD

## 2023-11-14 ENCOUNTER — HOSPITAL ENCOUNTER (OUTPATIENT)
Dept: GENERAL RADIOLOGY | Facility: HOSPITAL | Age: 63
Discharge: HOME | End: 2023-11-14
Payer: MEDICARE

## 2023-11-14 ENCOUNTER — PRE-ADMISSION TESTING (OUTPATIENT)
Dept: PREADMISSION TESTING | Facility: HOSPITAL | Age: 63
End: 2023-11-14
Payer: MEDICARE

## 2023-11-14 VITALS
OXYGEN SATURATION: 96 % | WEIGHT: 240.3 LBS | HEIGHT: 70 IN | RESPIRATION RATE: 20 BRPM | BODY MASS INDEX: 34.4 KG/M2 | HEART RATE: 102 BPM | SYSTOLIC BLOOD PRESSURE: 157 MMHG | DIASTOLIC BLOOD PRESSURE: 76 MMHG

## 2023-11-14 DIAGNOSIS — C18.9 COLON ADENOCARCINOMA: ICD-10-CM

## 2023-11-14 LAB
ALBUMIN SERPL-MCNC: 3.9 G/DL (ref 3.5–5.2)
ALBUMIN/GLOB SERPL: 1.3 G/DL
ALP SERPL-CCNC: 110 U/L (ref 39–117)
ALT SERPL W P-5'-P-CCNC: 8 U/L (ref 1–41)
ANION GAP SERPL CALCULATED.3IONS-SCNC: 9 MMOL/L (ref 5–15)
AST SERPL-CCNC: 9 U/L (ref 1–40)
BASOPHILS # BLD AUTO: 0.07 10*3/MM3 (ref 0–0.2)
BASOPHILS NFR BLD AUTO: 0.6 % (ref 0–1.5)
BILIRUB SERPL-MCNC: <0.2 MG/DL (ref 0–1.2)
BUN SERPL-MCNC: 18 MG/DL (ref 8–23)
BUN/CREAT SERPL: 13.4 (ref 7–25)
CALCIUM SPEC-SCNC: 8.7 MG/DL (ref 8.6–10.5)
CHLORIDE SERPL-SCNC: 102 MMOL/L (ref 98–107)
CO2 SERPL-SCNC: 30 MMOL/L (ref 22–29)
CREAT SERPL-MCNC: 1.34 MG/DL (ref 0.76–1.27)
DEPRECATED RDW RBC AUTO: 44.5 FL (ref 37–54)
EGFRCR SERPLBLD CKD-EPI 2021: 59.9 ML/MIN/1.73
EOSINOPHIL # BLD AUTO: 0.21 10*3/MM3 (ref 0–0.4)
EOSINOPHIL NFR BLD AUTO: 1.9 % (ref 0.3–6.2)
ERYTHROCYTE [DISTWIDTH] IN BLOOD BY AUTOMATED COUNT: 14.1 % (ref 12.3–15.4)
GLOBULIN UR ELPH-MCNC: 3 GM/DL
GLUCOSE SERPL-MCNC: 255 MG/DL (ref 65–99)
HCT VFR BLD AUTO: 40.1 % (ref 37.5–51)
HGB BLD-MCNC: 12 G/DL (ref 13–17.7)
IMM GRANULOCYTES # BLD AUTO: 0.05 10*3/MM3 (ref 0–0.05)
IMM GRANULOCYTES NFR BLD AUTO: 0.5 % (ref 0–0.5)
LYMPHOCYTES # BLD AUTO: 2.55 10*3/MM3 (ref 0.7–3.1)
LYMPHOCYTES NFR BLD AUTO: 23.5 % (ref 19.6–45.3)
MCH RBC QN AUTO: 26.1 PG (ref 26.6–33)
MCHC RBC AUTO-ENTMCNC: 29.9 G/DL (ref 31.5–35.7)
MCV RBC AUTO: 87.2 FL (ref 79–97)
MONOCYTES # BLD AUTO: 0.67 10*3/MM3 (ref 0.1–0.9)
MONOCYTES NFR BLD AUTO: 6.2 % (ref 5–12)
NEUTROPHILS NFR BLD AUTO: 67.3 % (ref 42.7–76)
NEUTROPHILS NFR BLD AUTO: 7.32 10*3/MM3 (ref 1.7–7)
NRBC BLD AUTO-RTO: 0 /100 WBC (ref 0–0.2)
PLATELET # BLD AUTO: 295 10*3/MM3 (ref 140–450)
PMV BLD AUTO: 10.2 FL (ref 6–12)
POTASSIUM SERPL-SCNC: 4.1 MMOL/L (ref 3.5–5.2)
PROT SERPL-MCNC: 6.9 G/DL (ref 6–8.5)
RBC # BLD AUTO: 4.6 10*6/MM3 (ref 4.14–5.8)
SODIUM SERPL-SCNC: 141 MMOL/L (ref 136–145)
WBC NRBC COR # BLD: 10.87 10*3/MM3 (ref 3.4–10.8)

## 2023-11-14 PROCEDURE — 71045 X-RAY EXAM CHEST 1 VIEW: CPT

## 2023-11-14 PROCEDURE — 80053 COMPREHEN METABOLIC PANEL: CPT

## 2023-11-14 PROCEDURE — 36415 COLL VENOUS BLD VENIPUNCTURE: CPT

## 2023-11-14 PROCEDURE — 93005 ELECTROCARDIOGRAM TRACING: CPT

## 2023-11-14 PROCEDURE — 85025 COMPLETE CBC W/AUTO DIFF WBC: CPT

## 2023-11-14 NOTE — DISCHARGE INSTRUCTIONS
Before you come to the hospital        Arrival time: AS DIRECTED BY OFFICE     YOU MAY TAKE THE FOLLOWING MEDICATION(S) THE MORNING OF SURGERY WITH A SIP OF WATER: Gabapentin.  The day prior to surgery please take the following at the specific times per Dr Velasquez: Colace 10mg - 4 tablets at 1pm,  Flagyl 500mg 2 tablets at 7pm and 11pm, E-Mycin 500mg at 1pm and 7pm and Miralax 116grams at 3pm.           ALL OTHER HOME MEDICATION CHECK WITH YOUR PHYSICIAN (especially if   you are taking diabetes medicines or blood thinners)    Do not take any Erectile Dysfunction medications (EX: CIALIS, VIAGRA) 24 hours prior to surgery.      If you were given and instructed to use a germ- killing soap, use as directed the night before surgery and again the morning of surgery or as directed by your surgeon. (Use one-half of the bottle with each shower.)   See attached information for How to Use Chlorhexidine for Bathing if applicable.            Eating and drinking restrictions prior to scheduled arrival time    2 Hours before arrival time STOP   Drinking Clear liquids (water, black coffee-NO CREAM,  apple juice-no pulp)    Clear Liquids    Water and flavored water                                                                      Clear Fruit juices, such as cranberry juice and apple juice.  Black coffee (NO cream of any kind, including powdered).  Plain tea  Clear bouillon or broth.  Flavored gelatin.  Soda.  Gatorade or Powerade.    8 Hours before arrival time STOP   All food, full liquids, and dairy products  Full liquid examples  Juices that have pulp.  Frozen ice pops that contain fruit pieces.  Coffee with creamer  Milk.  Yogurt.    (It is extremely important that you follow these guidelines to prevent delay or cancelation of your procedure)                       MANAGING PAIN AFTER SURGERY    We know you are probably wondering what your pain will be like after surgery.  Following surgery it is unrealistic to expect you  will not have pain.   Pain is how our bodies let us know that something is wrong or cautions us to be careful.  That said, our goal is to make your pain tolerable.    Methods we may use to treat your pain include (oral or IV medications, PCAs, epidurals, nerve blocks, etc.)   While some procedures require IV pain medications for a short time after surgery, transitioning to pain medications by mouth allows for better management of pain.   Your nurse will encourage you to take oral pain medications whenever possible.  IV medications work almost immediately, but only last a short while.  Taking medications by mouth allows for a more constant level of medication in your blood stream for a longer period of time.      Once your pain is out of control it is harder to get back under control.  It is important you are aware when your next dose of pain medication is due.  If you are admitted, your nurse may write the time of your next dose on the white board in your room to help you remember.      We are interested in your pain and encourage you to inform us about aggravating factors during your visit.   Many times a simple repositioning every few hours can make a big difference.    If your physician says it is okay, do not let your pain prevent you from getting out of bed. Be sure to call your nurse for assistance prior to getting up so you do not fall.      Before surgery, please decide your tolerable pain goal.  These faces help describe the pain ratings we use on a 0-10 scale.   Be prepared to tell us your goal and whether or not you take pain or anxiety medications at home.          Preparing for Surgery  Preparing for surgery is an important part of your care. It can make things go more smoothly and help you avoid complications. The steps leading up to surgery may vary among hospitals. Follow all instructions given to you by your health care providers. Ask questions if you do not understand something. Talk about any  concerns that you have.  Here are some questions to consider asking before your surgery:  If my surgery is not an emergency (is elective), when would be the best time to have the surgery?  What arrangements do I need to make for work, home, or school?  What will my recovery be like? How long will it be before I can return to normal activities?  Will I need to prepare my home? Will I need to arrange care for me or my children?  Should I expect to have pain after surgery? What are my pain management options? Are there nonmedical options that I can try for pain?  Tell a health care provider about:  Any allergies you have.  All medicines you are taking, including vitamins, herbs, eye drops, creams, and over-the-counter medicines.  Any problems you or family members have had with anesthetic medicines.  Any blood disorders you have.  Any surgeries you have had.  Any medical conditions you have.  Whether you are pregnant or may be pregnant.  What are the risks?  The risks and complications of surgery depend on the specific procedure that you have. Discuss all the risks with your health care providers before your surgery. Ask about common surgical complications, which may include:  Infection.  Bleeding or a need for blood replacement (transfusion).  Allergic reactions to medicines.  Damage to surrounding nerves, tissues, or structures.  A blood clot.  Scarring.  Failure of the surgery to correct the problem.  Follow these instructions before the procedure:  Several days or weeks before your procedure  You may have a physical exam by your primary health care provider to make sure it is safe for you to have surgery.  You may have testing. This may include a chest X-ray, blood and urine tests, electrocardiogram (ECG), or other testing.  Ask your health care provider about:  Changing or stopping your regular medicines. This is especially important if you are taking diabetes medicines or blood thinners.  Taking medicines such as  aspirin and ibuprofen. These medicines can thin your blood. Do not take these medicines unless your health care provider tells you to take them.  Taking over-the-counter medicines, vitamins, herbs, and supplements.  Do not use any products that contain nicotine or tobacco, such as cigarettes and e-cigarettes. If you need help quitting, ask your health care provider.  Avoid alcohol.  Ask your health care provider if there are exercises you can do to prepare for surgery.  Eat a healthy diet.   Plan to have someone 18 years of age or older to take you home from the hospital. We will need to verify your ride on the morning of surgery if you are being discharged home on the same day. Tell your ride to be expecting a call from the hospital prior to your procedure.   Plan to have a responsible adult care for you for at least 24 hours after you leave the hospital or clinic. This is important.  The day before your procedure  You may be given antibiotic medicine to take by mouth to help prevent infection. Take it as told by your health care provider.  You may be asked to shower with a germ-killing soap.  Follow instructions from your health care provider about eating and drinking restrictions. This includes gum, mints and hard candy.  Pack comfortable clothes according to your procedure.   The day of your procedure  You may need to take another shower with a germ-killing soap before you leave home in the morning.  With a small sip of water, take only the medicines that you are told to take.  Remove all jewelry including rings.   Leave anything you consider valuable at home except hearing aids if needed.  You do not need to bring your home medications into the hospital.   Do not wear any makeup, nail polish, powder, deodorant, lotion, hair accessories, or anything on your skin or body except your clothes.  If you will be staying in the hospital, bring a case to hold your glasses, contacts, or dentures. You may also want to  bring your robe and non-skid footwear.       (Do not use denture adhesives since you will be asked to remove them during  surgery).   If you wear oxygen at home, bring it with you the day of surgery.  If instructed by your health care provider, bring your sleep apnea device with you on the day of your surgery (if this applies to you).  You may want to leave your suitcase and sleep apnea device in the car until after surgery.   Arrive at the hospital as scheduled.  Bring a friend or family member with you who can help to answer questions and be present while you meet with your health care provider.  At the hospital  When you arrive at the hospital:  Go to registration located at the main entrance of the hospital. You will be registered and given a beeper and a sticker sheet. Take the stickers to the Outpatient nurses desk and place in the black tray. This is to notify staff that you have arrived. Then return to the lobby to wait.   When your beeper lights up and vibrates proceed through the double doors, under the stairs, and a member of the Outpatient Surgery staff will escort you to your preoperative room.  You may have to wear compression sleeves. These help to prevent blood clots and reduce swelling in your legs.  An IV may be inserted into one of your veins.              In the operating room, you may be given one or more of the following:        A medicine to help you relax (sedative).        A medicine to numb the area (local anesthetic).        A medicine to make you fall asleep (general anesthetic).        A medicine that is injected into an area of your body to numb everything below the                      injection site (regional anesthetic).  You may be given an antibiotic through your IV to help prevent infection.  Your surgical site will be marked or identified.    Contact a health care provider if you:  Develop a fever of more than 100.4°F (38°C) or other feelings of illness during the 48 hours before  your surgery.  Have symptoms that get worse.  Have questions or concerns about your surgery.  Summary  Preparing for surgery can make the procedure go more smoothly and lower your risk of complications.  Before surgery, make a list of questions and concerns to discuss with your surgeon. Ask about the risks and possible complications.  In the days or weeks before your surgery, follow all instructions from your health care provider. You may need to stop smoking, avoid alcohol, follow eating restrictions, and change or stop your regular medicines.  Contact your surgeon if you develop a fever or other signs of illness during the few days before your surgery.  This information is not intended to replace advice given to you by your health care provider. Make sure you discuss any questions you have with your health care provider.  Document Revised: 12/21/2018 Document Reviewed: 10/23/2018  [employer] Patient Education © 2021 [employer]         How to Use Chlorhexidine Before Surgery  Chlorhexidine gluconate (CHG) is a germ-killing (antiseptic) solution that is used to clean the skin. It can get rid of the bacteria that normally live on the skin and can keep them away for about 24 hours. To clean your skin with CHG, you may be given:  A CHG solution to use in the shower or as part of a sponge bath.  A prepackaged cloth that contains CHG.  Cleaning your skin with CHG may help lower the risk for infection:  While you are staying in the intensive care unit of the hospital.  If you have a vascular access, such as a central line, to provide short-term or long-term access to your veins.  If you have a catheter to drain urine from your bladder.  If you are on a ventilator. A ventilator is a machine that helps you breathe by moving air in and out of your lungs.  After surgery.  What are the risks?  Risks of using CHG include:  A skin reaction.  Hearing loss, if CHG gets in your ears and you have a perforated eardrum.  Eye injury,  if CHG gets in your eyes and is not rinsed out.  The CHG product catching fire.  Make sure that you avoid smoking and flames after applying CHG to your skin.  Do not use CHG:  If you have a chlorhexidine allergy or have previously reacted to chlorhexidine.  On babies younger than 2 months of age.  How to use CHG solution  Use CHG only as told by your health care provider, and follow the instructions on the label.  Use the full amount of CHG as directed. Usually, this is one bottle.  During a shower    Follow these steps when using CHG solution during a shower (unless your health care provider gives you different instructions):  Start the shower.  Use your normal soap and shampoo to wash your face and hair.  Turn off the shower or move out of the shower stream.  Pour the CHG onto a clean washcloth. Do not use any type of brush or rough-edged sponge.  Starting at your neck, lather your body down to your toes. Make sure you follow these instructions:  If you will be having surgery, pay special attention to the part of your body where you will be having surgery. Scrub this area for at least 1 minute.  Do not use CHG on your head or face. If the solution gets into your ears or eyes, rinse them well with water.  Avoid your genital area.  Avoid any areas of skin that have broken skin, cuts, or scrapes.  Scrub your back and under your arms. Make sure to wash skin folds.  Let the lather sit on your skin for 1-2 minutes or as long as told by your health care provider.  Thoroughly rinse your entire body in the shower. Make sure that all body creases and crevices are rinsed well.  Dry off with a clean towel. Do not put any substances on your body afterward--such as powder, lotion, or perfume--unless you are told to do so by your health care provider. Only use lotions that are recommended by the .  Put on clean clothes or pajamas.  If it is the night before your surgery, sleep in clean sheets.     During a sponge  bath  Follow these steps when using CHG solution during a sponge bath (unless your health care provider gives you different instructions):  Use your normal soap and shampoo to wash your face and hair.  Pour the CHG onto a clean washcloth.  Starting at your neck, lather your body down to your toes. Make sure you follow these instructions:  If you will be having surgery, pay special attention to the part of your body where you will be having surgery. Scrub this area for at least 1 minute.  Do not use CHG on your head or face. If the solution gets into your ears or eyes, rinse them well with water.  Avoid your genital area.  Avoid any areas of skin that have broken skin, cuts, or scrapes.  Scrub your back and under your arms. Make sure to wash skin folds.  Let the lather sit on your skin for 1-2 minutes or as long as told by your health care provider.  Using a different clean, wet washcloth, thoroughly rinse your entire body. Make sure that all body creases and crevices are rinsed well.  Dry off with a clean towel. Do not put any substances on your body afterward--such as powder, lotion, or perfume--unless you are told to do so by your health care provider. Only use lotions that are recommended by the .  Put on clean clothes or pajamas.  If it is the night before your surgery, sleep in clean sheets.  How to use CHG prepackaged cloths  Only use CHG cloths as told by your health care provider, and follow the instructions on the label.  Use the CHG cloth on clean, dry skin.  Do not use the CHG cloth on your head or face unless your health care provider tells you to.  When washing with the CHG cloth:  Avoid your genital area.  Avoid any areas of skin that have broken skin, cuts, or scrapes.  Before surgery    Follow these steps when using a CHG cloth to clean before surgery (unless your health care provider gives you different instructions):  Using the CHG cloth, vigorously scrub the part of your body where you  will be having surgery. Scrub using a back-and-forth motion for 3 minutes. The area on your body should be completely wet with CHG when you are done scrubbing.  Do not rinse. Discard the cloth and let the area air-dry. Do not put any substances on the area afterward, such as powder, lotion, or perfume.  Put on clean clothes or pajamas.  If it is the night before your surgery, sleep in clean sheets.     For general bathing  Follow these steps when using CHG cloths for general bathing (unless your health care provider gives you different instructions).  Use a separate CHG cloth for each area of your body. Make sure you wash between any folds of skin and between your fingers and toes. Wash your body in the following order, switching to a new cloth after each step:  The front of your neck, shoulders, and chest.  Both of your arms, under your arms, and your hands.  Your stomach and groin area, avoiding the genitals.  Your right leg and foot.  Your left leg and foot.  The back of your neck, your back, and your buttocks.  Do not rinse. Discard the cloth and let the area air-dry. Do not put any substances on your body afterward--such as powder, lotion, or perfume--unless you are told to do so by your health care provider. Only use lotions that are recommended by the .  Put on clean clothes or pajamas.  Contact a health care provider if:  Your skin gets irritated after scrubbing.  You have questions about using your solution or cloth.  You swallow any chlorhexidine. Call your local poison control center (1-660.434.5346 in the [place]  Get help right away if:  Your eyes itch badly, or they become very red or swollen.  Your skin itches badly and is red or swollen.  Your hearing changes.  You have trouble seeing.  You have swelling or tingling in your mouth or throat.  You have trouble breathing.  These symptoms may represent a serious problem that is an emergency. Do not wait to see if the symptoms will go away. Get  medical help right away. Call your local emergency services (911 in the [place] Do not drive yourself to the hospital.  Summary  Chlorhexidine gluconate (CHG) is a germ-killing (antiseptic) solution that is used to clean the skin. Cleaning your skin with CHG may help to lower your risk for infection.  You may be given CHG to use for bathing. It may be in a bottle or in a prepackaged cloth to use on your skin. Carefully follow your health care provider's instructions and the instructions on the product label.  Do not use CHG if you have a chlorhexidine allergy.  Contact your health care provider if your skin gets irritated after scrubbing.  This information is not intended to replace advice given to you by your health care provider. Make sure you discuss any questions you have with your health care provider.  Document Revised: 04/17/2023 Document Reviewed: 02/28/2022  [employer] Patient Education © 2023 [employer]

## 2023-11-20 LAB
QT INTERVAL: 448 MS
QTC INTERVAL: 539 MS

## 2023-11-28 ENCOUNTER — TELEPHONE (OUTPATIENT)
Dept: SURGERY | Facility: CLINIC | Age: 63
End: 2023-11-28
Payer: MEDICARE

## 2023-11-28 NOTE — TELEPHONE ENCOUNTER
Caller: FARHANA NORTON    Relationship to patient: SELF    Best call back number: [phone] (home)       Patient is needing: A CALL FROM  NURSE. I HAVE QUESTIONS ABOUT UPCOMING SURGERY 12.5.23

## 2023-11-29 ENCOUNTER — TELEPHONE (OUTPATIENT)
Dept: SURGERY | Facility: CLINIC | Age: 63
End: 2023-11-29
Payer: MEDICARE

## 2023-11-29 NOTE — TELEPHONE ENCOUNTER
Pt returned call, PLEASE CALL PT [phone]      Attempted to Warm Transfer but, unable to reach the Practice:

## 2023-11-30 ENCOUNTER — TELEPHONE (OUTPATIENT)
Dept: SURGERY | Facility: CLINIC | Age: 63
End: 2023-11-30
Payer: MEDICARE

## 2023-11-30 NOTE — TELEPHONE ENCOUNTER
"Patient called and addressed questions he was having. Patient states that he is very nervous about the surgery, especially about the bleeding which he states he is still experiencing. I explained to him that the bleeding is more than likely from the cancer in his colon and as Dr. Velasquez will be removing this, the bleeding should improve significantly. I did explain that he could have some bleeding post-op, but that again, the bleeding like he is experiencing now should improve.   He also was wanting to know when he was having the surgery and details of that, but he states he got that information already from other staff, when he called previously.   He did also wonder if he would have to do any sort of \"rehab\" and I explained to him that he would have post-op appointments with us in office as well as some restrictions at home, but there would be no physical therapy or other type of therapy that we require.    He also was concerned about having some sort of patch or something to help with nicotine, as he is a current smoker. I let him know that I would let Dr. Velasquez know this information and that we will take care of his needs while he is in the hospital.   "

## 2023-12-05 ENCOUNTER — ANESTHESIA EVENT (OUTPATIENT)
Dept: PERIOP | Facility: HOSPITAL | Age: 63
End: 2023-12-05
Payer: MEDICARE

## 2023-12-05 ENCOUNTER — ANESTHESIA (OUTPATIENT)
Dept: PERIOP | Facility: HOSPITAL | Age: 63
End: 2023-12-05
Payer: MEDICARE

## 2023-12-05 ENCOUNTER — HOSPITAL ENCOUNTER (INPATIENT)
Facility: HOSPITAL | Age: 63
LOS: 5 days | Discharge: HOME | DRG: 329 | End: 2023-12-10
Attending: STUDENT IN AN ORGANIZED HEALTH CARE EDUCATION/TRAINING PROGRAM | Admitting: STUDENT IN AN ORGANIZED HEALTH CARE EDUCATION/TRAINING PROGRAM
Payer: MEDICARE

## 2023-12-05 DIAGNOSIS — I50.21 SYSTOLIC CHF, ACUTE: ICD-10-CM

## 2023-12-05 DIAGNOSIS — I10 PRIMARY HYPERTENSION: ICD-10-CM

## 2023-12-05 DIAGNOSIS — I27.20 PULMONARY HTN: ICD-10-CM

## 2023-12-05 DIAGNOSIS — C18.9 COLON ADENOCARCINOMA: ICD-10-CM

## 2023-12-05 DIAGNOSIS — C18.7 MALIGNANT NEOPLASM OF SIGMOID COLON: ICD-10-CM

## 2023-12-05 DIAGNOSIS — G47.30 SLEEP APNEA, UNSPECIFIED TYPE: Primary | ICD-10-CM

## 2023-12-05 DIAGNOSIS — R07.89 CHEST DISCOMFORT: ICD-10-CM

## 2023-12-05 DIAGNOSIS — I50.42 CHRONIC COMBINED SYSTOLIC (CONGESTIVE) AND DIASTOLIC (CONGESTIVE) HEART FAILURE: ICD-10-CM

## 2023-12-05 DIAGNOSIS — E11.69 TYPE 2 DIABETES MELLITUS WITH OTHER SPECIFIED COMPLICATION, UNSPECIFIED WHETHER LONG TERM INSULIN USE: ICD-10-CM

## 2023-12-05 DIAGNOSIS — I21.4 NSTEMI, INITIAL EPISODE OF CARE: ICD-10-CM

## 2023-12-05 DIAGNOSIS — J44.9 CHRONIC OBSTRUCTIVE PULMONARY DISEASE, UNSPECIFIED COPD TYPE: ICD-10-CM

## 2023-12-05 LAB
GLUCOSE BLDC GLUCOMTR-MCNC: 199 MG/DL (ref 70–130)
GLUCOSE BLDC GLUCOMTR-MCNC: 294 MG/DL (ref 70–130)
GLUCOSE BLDC GLUCOMTR-MCNC: 376 MG/DL (ref 70–130)

## 2023-12-05 PROCEDURE — 88305 TISSUE EXAM BY PATHOLOGIST: CPT | Performed by: STUDENT IN AN ORGANIZED HEALTH CARE EDUCATION/TRAINING PROGRAM

## 2023-12-05 PROCEDURE — 25010000002 FENTANYL CITRATE (PF) 100 MCG/2ML SOLUTION: Performed by: NURSE ANESTHETIST, CERTIFIED REGISTERED

## 2023-12-05 PROCEDURE — P9041 ALBUMIN (HUMAN),5%, 50ML: HCPCS | Performed by: NURSE ANESTHETIST, CERTIFIED REGISTERED

## 2023-12-05 PROCEDURE — 25810000003 LACTATED RINGERS PER 1000 ML: Performed by: STUDENT IN AN ORGANIZED HEALTH CARE EDUCATION/TRAINING PROGRAM

## 2023-12-05 PROCEDURE — 0WQF0ZZ REPAIR ABDOMINAL WALL, OPEN APPROACH: ICD-10-PCS | Performed by: STUDENT IN AN ORGANIZED HEALTH CARE EDUCATION/TRAINING PROGRAM

## 2023-12-05 PROCEDURE — 25010000002 PROPOFOL 10 MG/ML EMULSION: Performed by: NURSE ANESTHETIST, CERTIFIED REGISTERED

## 2023-12-05 PROCEDURE — 25010000002 MORPHINE SULFATE (PF) 2 MG/ML SOLUTION 1 ML CARTRIDGE: Performed by: STUDENT IN AN ORGANIZED HEALTH CARE EDUCATION/TRAINING PROGRAM

## 2023-12-05 PROCEDURE — 25810000003 SODIUM CHLORIDE 0.9 % SOLUTION 250 ML FLEX CONT: Performed by: NURSE ANESTHETIST, CERTIFIED REGISTERED

## 2023-12-05 PROCEDURE — 88307 TISSUE EXAM BY PATHOLOGIST: CPT | Performed by: STUDENT IN AN ORGANIZED HEALTH CARE EDUCATION/TRAINING PROGRAM

## 2023-12-05 PROCEDURE — 0HB7XZZ EXCISION OF ABDOMEN SKIN, EXTERNAL APPROACH: ICD-10-PCS | Performed by: STUDENT IN AN ORGANIZED HEALTH CARE EDUCATION/TRAINING PROGRAM

## 2023-12-05 PROCEDURE — 0DBN4ZZ EXCISION OF SIGMOID COLON, PERCUTANEOUS ENDOSCOPIC APPROACH: ICD-10-PCS | Performed by: STUDENT IN AN ORGANIZED HEALTH CARE EDUCATION/TRAINING PROGRAM

## 2023-12-05 PROCEDURE — 25010000002 LIDOCAINE 1 % SOLUTION 20 ML VIAL: Performed by: STUDENT IN AN ORGANIZED HEALTH CARE EDUCATION/TRAINING PROGRAM

## 2023-12-05 PROCEDURE — 25010000002 DEXAMETHASONE PER 1 MG: Performed by: NURSE ANESTHETIST, CERTIFIED REGISTERED

## 2023-12-05 PROCEDURE — 25010000002 PHENYLEPHRINE 10 MG/ML SOLUTION 1 ML VIAL: Performed by: NURSE ANESTHETIST, CERTIFIED REGISTERED

## 2023-12-05 PROCEDURE — 44207 L COLECTOMY/COLOPROCTOSTOMY: CPT | Performed by: STUDENT IN AN ORGANIZED HEALTH CARE EDUCATION/TRAINING PROGRAM

## 2023-12-05 PROCEDURE — S0260 H&P FOR SURGERY: HCPCS | Performed by: STUDENT IN AN ORGANIZED HEALTH CARE EDUCATION/TRAINING PROGRAM

## 2023-12-05 PROCEDURE — 25010000002 CEFAZOLIN PER 500 MG: Performed by: NURSE ANESTHETIST, CERTIFIED REGISTERED

## 2023-12-05 PROCEDURE — 88331 PATH CONSLTJ SURG 1 BLK 1SPC: CPT | Performed by: PATHOLOGY

## 2023-12-05 PROCEDURE — 25010000002 DEXAMETHASONE PER 1 MG: Performed by: STUDENT IN AN ORGANIZED HEALTH CARE EDUCATION/TRAINING PROGRAM

## 2023-12-05 PROCEDURE — 25010000002 VASOPRESSIN 20 UNIT/ML SOLUTION: Performed by: NURSE ANESTHETIST, CERTIFIED REGISTERED

## 2023-12-05 PROCEDURE — 25010000002 FUROSEMIDE PER 20 MG

## 2023-12-05 PROCEDURE — 25010000002 BUPIVACAINE 0.5 % SOLUTION 50 ML VIAL: Performed by: STUDENT IN AN ORGANIZED HEALTH CARE EDUCATION/TRAINING PROGRAM

## 2023-12-05 PROCEDURE — 63710000001 INSULIN LISPRO (HUMAN) PER 5 UNITS: Performed by: STUDENT IN AN ORGANIZED HEALTH CARE EDUCATION/TRAINING PROGRAM

## 2023-12-05 PROCEDURE — 25010000002 ALBUMIN HUMAN 5% PER 50 ML: Performed by: NURSE ANESTHETIST, CERTIFIED REGISTERED

## 2023-12-05 PROCEDURE — 25010000002 HEPARIN (PORCINE) PER 1000 UNITS: Performed by: STUDENT IN AN ORGANIZED HEALTH CARE EDUCATION/TRAINING PROGRAM

## 2023-12-05 PROCEDURE — 25010000002 METRONIDAZOLE 500 MG/100ML SOLUTION: Performed by: STUDENT IN AN ORGANIZED HEALTH CARE EDUCATION/TRAINING PROGRAM

## 2023-12-05 PROCEDURE — 82948 REAGENT STRIP/BLOOD GLUCOSE: CPT

## 2023-12-05 PROCEDURE — 88309 TISSUE EXAM BY PATHOLOGIST: CPT | Performed by: STUDENT IN AN ORGANIZED HEALTH CARE EDUCATION/TRAINING PROGRAM

## 2023-12-05 PROCEDURE — 25010000002 INDOCYANINE GREEN 25 MG RECONSTITUTED SOLUTION

## 2023-12-05 PROCEDURE — 8E0W4CZ ROBOTIC ASSISTED PROCEDURE OF TRUNK REGION, PERCUTANEOUS ENDOSCOPIC APPROACH: ICD-10-PCS | Performed by: STUDENT IN AN ORGANIZED HEALTH CARE EDUCATION/TRAINING PROGRAM

## 2023-12-05 PROCEDURE — 0DBP4ZZ EXCISION OF RECTUM, PERCUTANEOUS ENDOSCOPIC APPROACH: ICD-10-PCS | Performed by: STUDENT IN AN ORGANIZED HEALTH CARE EDUCATION/TRAINING PROGRAM

## 2023-12-05 PROCEDURE — 25010000002 BUPIVACAINE (PF) 0.25 % SOLUTION 30 ML VIAL: Performed by: STUDENT IN AN ORGANIZED HEALTH CARE EDUCATION/TRAINING PROGRAM

## 2023-12-05 PROCEDURE — 0DJD8ZZ INSPECTION OF LOWER INTESTINAL TRACT, VIA NATURAL OR ARTIFICIAL OPENING ENDOSCOPIC: ICD-10-PCS | Performed by: STUDENT IN AN ORGANIZED HEALTH CARE EDUCATION/TRAINING PROGRAM

## 2023-12-05 DEVICE — SUREFORM 45 RELOAD BLUE
Type: IMPLANTABLE DEVICE | Site: ABDOMEN | Status: FUNCTIONAL
Brand: SUREFORM

## 2023-12-05 DEVICE — LARGE LIGATION CLIPS 6 CLIPS/CART
Type: IMPLANTABLE DEVICE | Site: ABDOMEN | Status: FUNCTIONAL
Brand: VAS-Q-CLIP

## 2023-12-05 DEVICE — CIRCULAR MECH XL SEAL 25MM: Type: IMPLANTABLE DEVICE | Site: ABDOMEN | Status: FUNCTIONAL

## 2023-12-05 DEVICE — ABSORBABLE WOUND CLOSURE DEVICE
Type: IMPLANTABLE DEVICE | Site: ABDOMEN | Status: FUNCTIONAL
Brand: V-LOC 90

## 2023-12-05 RX ORDER — GABAPENTIN 250 MG/5ML
250 SOLUTION ORAL ONCE
Status: DISCONTINUED | OUTPATIENT
Start: 2023-12-05 | End: 2023-12-05 | Stop reason: HOSPADM

## 2023-12-05 RX ORDER — ACETAMINOPHEN 500 MG
1000 TABLET ORAL EVERY 8 HOURS
Status: DISCONTINUED | OUTPATIENT
Start: 2023-12-05 | End: 2023-12-05 | Stop reason: SDUPTHER

## 2023-12-05 RX ORDER — SODIUM CHLORIDE 0.9 % (FLUSH) 0.9 %
10 SYRINGE (ML) INJECTION AS NEEDED
Status: DISCONTINUED | OUTPATIENT
Start: 2023-12-05 | End: 2023-12-05 | Stop reason: HOSPADM

## 2023-12-05 RX ORDER — ACETAMINOPHEN 325 MG/1
650 TABLET ORAL ONCE
Status: COMPLETED | OUTPATIENT
Start: 2023-12-05 | End: 2023-12-05

## 2023-12-05 RX ORDER — SODIUM CHLORIDE, SODIUM LACTATE, POTASSIUM CHLORIDE, CALCIUM CHLORIDE 600; 310; 30; 20 MG/100ML; MG/100ML; MG/100ML; MG/100ML
1000 INJECTION, SOLUTION INTRAVENOUS CONTINUOUS
Status: DISCONTINUED | OUTPATIENT
Start: 2023-12-05 | End: 2023-12-05

## 2023-12-05 RX ORDER — LIDOCAINE HYDROCHLORIDE 10 MG/ML
0.5 INJECTION, SOLUTION EPIDURAL; INFILTRATION; INTRACAUDAL; PERINEURAL ONCE AS NEEDED
Status: DISCONTINUED | OUTPATIENT
Start: 2023-12-05 | End: 2023-12-05 | Stop reason: HOSPADM

## 2023-12-05 RX ORDER — SODIUM CHLORIDE 9 MG/ML
40 INJECTION, SOLUTION INTRAVENOUS AS NEEDED
Status: DISCONTINUED | OUTPATIENT
Start: 2023-12-05 | End: 2023-12-05 | Stop reason: HOSPADM

## 2023-12-05 RX ORDER — DEXAMETHASONE SODIUM PHOSPHATE 4 MG/ML
INJECTION, SOLUTION INTRA-ARTICULAR; INTRALESIONAL; INTRAMUSCULAR; INTRAVENOUS; SOFT TISSUE AS NEEDED
Status: DISCONTINUED | OUTPATIENT
Start: 2023-12-05 | End: 2023-12-05 | Stop reason: SURG

## 2023-12-05 RX ORDER — CEFAZOLIN SODIUM 1 G/3ML
INJECTION, POWDER, FOR SOLUTION INTRAMUSCULAR; INTRAVENOUS AS NEEDED
Status: DISCONTINUED | OUTPATIENT
Start: 2023-12-05 | End: 2023-12-05 | Stop reason: SURG

## 2023-12-05 RX ORDER — NICOTINE POLACRILEX 4 MG
15 LOZENGE BUCCAL
Status: DISCONTINUED | OUTPATIENT
Start: 2023-12-05 | End: 2023-12-10 | Stop reason: HOSPADM

## 2023-12-05 RX ORDER — HYDROMORPHONE HYDROCHLORIDE 1 MG/ML
0.5 INJECTION, SOLUTION INTRAMUSCULAR; INTRAVENOUS; SUBCUTANEOUS
Status: DISCONTINUED | OUTPATIENT
Start: 2023-12-05 | End: 2023-12-05 | Stop reason: HOSPADM

## 2023-12-05 RX ORDER — SODIUM CHLORIDE 0.9 % (FLUSH) 0.9 %
10 SYRINGE (ML) INJECTION EVERY 12 HOURS SCHEDULED
Status: DISCONTINUED | OUTPATIENT
Start: 2023-12-05 | End: 2023-12-05 | Stop reason: HOSPADM

## 2023-12-05 RX ORDER — INDOCYANINE GREEN AND WATER 25 MG
KIT INJECTION AS NEEDED
Status: DISCONTINUED | OUTPATIENT
Start: 2023-12-05 | End: 2023-12-05 | Stop reason: SURG

## 2023-12-05 RX ORDER — OXYCODONE HYDROCHLORIDE 5 MG/1
5 TABLET ORAL EVERY 6 HOURS PRN
Status: DISCONTINUED | OUTPATIENT
Start: 2023-12-05 | End: 2023-12-10 | Stop reason: HOSPADM

## 2023-12-05 RX ORDER — DROPERIDOL 2.5 MG/ML
0.62 INJECTION, SOLUTION INTRAMUSCULAR; INTRAVENOUS ONCE AS NEEDED
Status: DISCONTINUED | OUTPATIENT
Start: 2023-12-05 | End: 2023-12-05 | Stop reason: HOSPADM

## 2023-12-05 RX ORDER — SODIUM CHLORIDE 0.9 % (FLUSH) 0.9 %
3-10 SYRINGE (ML) INJECTION AS NEEDED
Status: DISCONTINUED | OUTPATIENT
Start: 2023-12-05 | End: 2023-12-05 | Stop reason: HOSPADM

## 2023-12-05 RX ORDER — DULOXETIN HYDROCHLORIDE 30 MG/1
30 CAPSULE, DELAYED RELEASE ORAL DAILY
Status: DISCONTINUED | OUTPATIENT
Start: 2023-12-06 | End: 2023-12-10 | Stop reason: HOSPADM

## 2023-12-05 RX ORDER — HYDROMORPHONE HYDROCHLORIDE 1 MG/ML
0.5 INJECTION, SOLUTION INTRAMUSCULAR; INTRAVENOUS; SUBCUTANEOUS
Status: DISCONTINUED | OUTPATIENT
Start: 2023-12-05 | End: 2023-12-08

## 2023-12-05 RX ORDER — VECURONIUM BROMIDE 1 MG/ML
INJECTION, POWDER, LYOPHILIZED, FOR SOLUTION INTRAVENOUS AS NEEDED
Status: DISCONTINUED | OUTPATIENT
Start: 2023-12-05 | End: 2023-12-05 | Stop reason: SURG

## 2023-12-05 RX ORDER — GABAPENTIN 400 MG/1
800 CAPSULE ORAL 2 TIMES DAILY
Status: DISCONTINUED | OUTPATIENT
Start: 2023-12-05 | End: 2023-12-10 | Stop reason: HOSPADM

## 2023-12-05 RX ORDER — ONDANSETRON 4 MG/1
4 TABLET, FILM COATED ORAL EVERY 6 HOURS PRN
Status: DISCONTINUED | OUTPATIENT
Start: 2023-12-05 | End: 2023-12-10 | Stop reason: HOSPADM

## 2023-12-05 RX ORDER — SUCCINYLCHOLINE/SOD CL,ISO/PF 200MG/10ML
SYRINGE (ML) INTRAVENOUS AS NEEDED
Status: DISCONTINUED | OUTPATIENT
Start: 2023-12-05 | End: 2023-12-05 | Stop reason: SURG

## 2023-12-05 RX ORDER — SODIUM CHLORIDE 0.9 % (FLUSH) 0.9 %
3 SYRINGE (ML) INJECTION AS NEEDED
Status: DISCONTINUED | OUTPATIENT
Start: 2023-12-05 | End: 2023-12-05 | Stop reason: HOSPADM

## 2023-12-05 RX ORDER — ONDANSETRON 2 MG/ML
4 INJECTION INTRAMUSCULAR; INTRAVENOUS
Status: DISCONTINUED | OUTPATIENT
Start: 2023-12-05 | End: 2023-12-05 | Stop reason: HOSPADM

## 2023-12-05 RX ORDER — INSULIN LISPRO 100 [IU]/ML
3-14 INJECTION, SOLUTION INTRAVENOUS; SUBCUTANEOUS
Status: DISCONTINUED | OUTPATIENT
Start: 2023-12-05 | End: 2023-12-10 | Stop reason: HOSPADM

## 2023-12-05 RX ORDER — METRONIDAZOLE 500 MG/100ML
500 INJECTION, SOLUTION INTRAVENOUS ONCE
Status: COMPLETED | OUTPATIENT
Start: 2023-12-05 | End: 2023-12-05

## 2023-12-05 RX ORDER — HEPARIN SODIUM 5000 [USP'U]/ML
5000 INJECTION, SOLUTION INTRAVENOUS; SUBCUTANEOUS EVERY 8 HOURS SCHEDULED
Status: DISCONTINUED | OUTPATIENT
Start: 2023-12-06 | End: 2023-12-10 | Stop reason: HOSPADM

## 2023-12-05 RX ORDER — FUROSEMIDE 10 MG/ML
INJECTION INTRAMUSCULAR; INTRAVENOUS AS NEEDED
Status: DISCONTINUED | OUTPATIENT
Start: 2023-12-05 | End: 2023-12-05 | Stop reason: SURG

## 2023-12-05 RX ORDER — MAGNESIUM HYDROXIDE 1200 MG/15ML
LIQUID ORAL AS NEEDED
Status: DISCONTINUED | OUTPATIENT
Start: 2023-12-05 | End: 2023-12-05 | Stop reason: HOSPADM

## 2023-12-05 RX ORDER — HEPARIN SODIUM 5000 [USP'U]/ML
5000 INJECTION, SOLUTION INTRAVENOUS; SUBCUTANEOUS ONCE
Status: COMPLETED | OUTPATIENT
Start: 2023-12-05 | End: 2023-12-05

## 2023-12-05 RX ORDER — OXYCODONE AND ACETAMINOPHEN 10; 325 MG/1; MG/1
1 TABLET ORAL ONCE AS NEEDED
Status: DISCONTINUED | OUTPATIENT
Start: 2023-12-05 | End: 2023-12-05 | Stop reason: HOSPADM

## 2023-12-05 RX ORDER — MIDAZOLAM HYDROCHLORIDE 1 MG/ML
1 INJECTION INTRAMUSCULAR; INTRAVENOUS
Status: DISCONTINUED | OUTPATIENT
Start: 2023-12-05 | End: 2023-12-05 | Stop reason: HOSPADM

## 2023-12-05 RX ORDER — IBUPROFEN 600 MG/1
600 TABLET ORAL ONCE AS NEEDED
Status: DISCONTINUED | OUTPATIENT
Start: 2023-12-05 | End: 2023-12-05 | Stop reason: HOSPADM

## 2023-12-05 RX ORDER — ONDANSETRON 2 MG/ML
4 INJECTION INTRAMUSCULAR; INTRAVENOUS EVERY 6 HOURS PRN
Status: DISCONTINUED | OUTPATIENT
Start: 2023-12-05 | End: 2023-12-10 | Stop reason: HOSPADM

## 2023-12-05 RX ORDER — LABETALOL HYDROCHLORIDE 5 MG/ML
5 INJECTION, SOLUTION INTRAVENOUS
Status: DISCONTINUED | OUTPATIENT
Start: 2023-12-05 | End: 2023-12-05 | Stop reason: HOSPADM

## 2023-12-05 RX ORDER — LIDOCAINE HYDROCHLORIDE 20 MG/ML
INJECTION, SOLUTION EPIDURAL; INFILTRATION; INTRACAUDAL; PERINEURAL AS NEEDED
Status: DISCONTINUED | OUTPATIENT
Start: 2023-12-05 | End: 2023-12-05 | Stop reason: SURG

## 2023-12-05 RX ORDER — PROPOFOL 10 MG/ML
VIAL (ML) INTRAVENOUS AS NEEDED
Status: DISCONTINUED | OUTPATIENT
Start: 2023-12-05 | End: 2023-12-05 | Stop reason: SURG

## 2023-12-05 RX ORDER — LIDOCAINE 50 MG/G
2 PATCH TOPICAL
Status: DISCONTINUED | OUTPATIENT
Start: 2023-12-06 | End: 2023-12-10 | Stop reason: HOSPADM

## 2023-12-05 RX ORDER — FAMOTIDINE 20 MG/1
20 TABLET, FILM COATED ORAL 2 TIMES DAILY
Status: DISCONTINUED | OUTPATIENT
Start: 2023-12-05 | End: 2023-12-10 | Stop reason: HOSPADM

## 2023-12-05 RX ORDER — ACETAMINOPHEN 500 MG
1000 TABLET ORAL ONCE
Status: DISCONTINUED | OUTPATIENT
Start: 2023-12-05 | End: 2023-12-05 | Stop reason: HOSPADM

## 2023-12-05 RX ORDER — SODIUM CHLORIDE, SODIUM LACTATE, POTASSIUM CHLORIDE, CALCIUM CHLORIDE 600; 310; 30; 20 MG/100ML; MG/100ML; MG/100ML; MG/100ML
100 INJECTION, SOLUTION INTRAVENOUS CONTINUOUS
Status: DISCONTINUED | OUTPATIENT
Start: 2023-12-05 | End: 2023-12-05

## 2023-12-05 RX ORDER — ALBUMIN, HUMAN INJ 5% 5 %
SOLUTION INTRAVENOUS CONTINUOUS PRN
Status: DISCONTINUED | OUTPATIENT
Start: 2023-12-05 | End: 2023-12-05 | Stop reason: SURG

## 2023-12-05 RX ORDER — FENTANYL CITRATE 50 UG/ML
25 INJECTION, SOLUTION INTRAMUSCULAR; INTRAVENOUS
Status: DISCONTINUED | OUTPATIENT
Start: 2023-12-05 | End: 2023-12-05 | Stop reason: HOSPADM

## 2023-12-05 RX ORDER — SODIUM CHLORIDE 0.9 % (FLUSH) 0.9 %
3 SYRINGE (ML) INJECTION EVERY 12 HOURS SCHEDULED
Status: DISCONTINUED | OUTPATIENT
Start: 2023-12-05 | End: 2023-12-05 | Stop reason: HOSPADM

## 2023-12-05 RX ORDER — NALOXONE HCL 0.4 MG/ML
0.4 VIAL (ML) INJECTION
Status: DISCONTINUED | OUTPATIENT
Start: 2023-12-05 | End: 2023-12-10 | Stop reason: HOSPADM

## 2023-12-05 RX ORDER — FENTANYL CITRATE 50 UG/ML
INJECTION, SOLUTION INTRAMUSCULAR; INTRAVENOUS AS NEEDED
Status: DISCONTINUED | OUTPATIENT
Start: 2023-12-05 | End: 2023-12-05 | Stop reason: SURG

## 2023-12-05 RX ORDER — SODIUM CHLORIDE, SODIUM LACTATE, POTASSIUM CHLORIDE, CALCIUM CHLORIDE 600; 310; 30; 20 MG/100ML; MG/100ML; MG/100ML; MG/100ML
20 INJECTION, SOLUTION INTRAVENOUS CONTINUOUS
Status: DISCONTINUED | OUTPATIENT
Start: 2023-12-05 | End: 2023-12-08

## 2023-12-05 RX ORDER — FLUMAZENIL 0.1 MG/ML
0.2 INJECTION INTRAVENOUS AS NEEDED
Status: DISCONTINUED | OUTPATIENT
Start: 2023-12-05 | End: 2023-12-05 | Stop reason: HOSPADM

## 2023-12-05 RX ORDER — DEXTROSE MONOHYDRATE 25 G/50ML
25 INJECTION, SOLUTION INTRAVENOUS
Status: DISCONTINUED | OUTPATIENT
Start: 2023-12-05 | End: 2023-12-10 | Stop reason: HOSPADM

## 2023-12-05 RX ORDER — ACETAMINOPHEN 500 MG
1000 TABLET ORAL EVERY 6 HOURS
Status: DISPENSED | OUTPATIENT
Start: 2023-12-05 | End: 2023-12-07

## 2023-12-05 RX ORDER — BUPIVACAINE HCL/0.9 % NACL/PF 0.125 %
PLASTIC BAG, INJECTION (ML) EPIDURAL AS NEEDED
Status: DISCONTINUED | OUTPATIENT
Start: 2023-12-05 | End: 2023-12-05 | Stop reason: SURG

## 2023-12-05 RX ORDER — TRAMADOL HYDROCHLORIDE 50 MG/1
50 TABLET ORAL EVERY 6 HOURS PRN
Status: DISCONTINUED | OUTPATIENT
Start: 2023-12-05 | End: 2023-12-10 | Stop reason: HOSPADM

## 2023-12-05 RX ORDER — NALOXONE HCL 0.4 MG/ML
0.04 VIAL (ML) INJECTION AS NEEDED
Status: DISCONTINUED | OUTPATIENT
Start: 2023-12-05 | End: 2023-12-05 | Stop reason: HOSPADM

## 2023-12-05 RX ORDER — IBUPROFEN 600 MG/1
1 TABLET ORAL
Status: DISCONTINUED | OUTPATIENT
Start: 2023-12-05 | End: 2023-12-10 | Stop reason: HOSPADM

## 2023-12-05 RX ORDER — ALBUTEROL SULFATE 90 UG/1
AEROSOL, METERED RESPIRATORY (INHALATION) AS NEEDED
Status: DISCONTINUED | OUTPATIENT
Start: 2023-12-05 | End: 2023-12-05 | Stop reason: SURG

## 2023-12-05 RX ADMIN — CEFAZOLIN 2 G: 330 INJECTION, POWDER, FOR SOLUTION INTRAMUSCULAR; INTRAVENOUS at 16:11

## 2023-12-05 RX ADMIN — VECURONIUM BROMIDE 2 MG: 1 INJECTION, POWDER, LYOPHILIZED, FOR SOLUTION INTRAVENOUS at 14:29

## 2023-12-05 RX ADMIN — PROPOFOL 200 MG: 10 INJECTION, EMULSION INTRAVENOUS at 11:55

## 2023-12-05 RX ADMIN — FENTANYL CITRATE 50 MCG: 50 INJECTION, SOLUTION INTRAMUSCULAR; INTRAVENOUS at 16:02

## 2023-12-05 RX ADMIN — INSULIN LISPRO 12 UNITS: 100 INJECTION, SOLUTION INTRAVENOUS; SUBCUTANEOUS at 21:45

## 2023-12-05 RX ADMIN — FENTANYL CITRATE 100 MCG: 50 INJECTION, SOLUTION INTRAMUSCULAR; INTRAVENOUS at 13:15

## 2023-12-05 RX ADMIN — SODIUM CHLORIDE, POTASSIUM CHLORIDE, SODIUM LACTATE AND CALCIUM CHLORIDE: 600; 310; 30; 20 INJECTION, SOLUTION INTRAVENOUS at 15:01

## 2023-12-05 RX ADMIN — FAMOTIDINE 20 MG: 20 TABLET, FILM COATED ORAL at 21:32

## 2023-12-05 RX ADMIN — VECURONIUM BROMIDE 2 MG: 1 INJECTION, POWDER, LYOPHILIZED, FOR SOLUTION INTRAVENOUS at 15:17

## 2023-12-05 RX ADMIN — LIDOCAINE HYDROCHLORIDE 100 MG: 20 INJECTION, SOLUTION EPIDURAL; INFILTRATION; INTRACAUDAL; PERINEURAL at 11:54

## 2023-12-05 RX ADMIN — ALBUMIN HUMAN: 0.05 INJECTION, SOLUTION INTRAVENOUS at 11:58

## 2023-12-05 RX ADMIN — FENTANYL CITRATE 100 MCG: 50 INJECTION, SOLUTION INTRAMUSCULAR; INTRAVENOUS at 11:54

## 2023-12-05 RX ADMIN — FUROSEMIDE 20 MG: 10 INJECTION, SOLUTION INTRAVENOUS at 16:53

## 2023-12-05 RX ADMIN — Medication 150 MCG: at 12:05

## 2023-12-05 RX ADMIN — VECURONIUM BROMIDE 2 MG: 1 INJECTION, POWDER, LYOPHILIZED, FOR SOLUTION INTRAVENOUS at 13:16

## 2023-12-05 RX ADMIN — METRONIDAZOLE 500 MG: 500 INJECTION, SOLUTION INTRAVENOUS at 11:15

## 2023-12-05 RX ADMIN — Medication 150 MCG: at 12:09

## 2023-12-05 RX ADMIN — ACETAMINOPHEN 650 MG: 325 TABLET, FILM COATED ORAL at 09:10

## 2023-12-05 RX ADMIN — HEPARIN SODIUM 5000 UNITS: 5000 INJECTION INTRAVENOUS; SUBCUTANEOUS at 10:55

## 2023-12-05 RX ADMIN — Medication 100 MCG: at 16:23

## 2023-12-05 RX ADMIN — ALBUTEROL SULFATE 6 PUFF: 108 INHALANT RESPIRATORY (INHALATION) at 13:37

## 2023-12-05 RX ADMIN — INDOCYANINE GREEN 3.75 MG: KIT INTRAVENOUS at 15:51

## 2023-12-05 RX ADMIN — GABAPENTIN 800 MG: 400 CAPSULE ORAL at 21:32

## 2023-12-05 RX ADMIN — VECURONIUM BROMIDE 1 MG: 1 INJECTION, POWDER, LYOPHILIZED, FOR SOLUTION INTRAVENOUS at 11:55

## 2023-12-05 RX ADMIN — SODIUM CHLORIDE, POTASSIUM CHLORIDE, SODIUM LACTATE AND CALCIUM CHLORIDE 1000 ML: 600; 310; 30; 20 INJECTION, SOLUTION INTRAVENOUS at 08:48

## 2023-12-05 RX ADMIN — DEXAMETHASONE SODIUM PHOSPHATE 4 MG: 4 INJECTION, SOLUTION INTRA-ARTICULAR; INTRALESIONAL; INTRAMUSCULAR; INTRAVENOUS; SOFT TISSUE at 12:11

## 2023-12-05 RX ADMIN — VECURONIUM BROMIDE 2 MG: 1 INJECTION, POWDER, LYOPHILIZED, FOR SOLUTION INTRAVENOUS at 13:53

## 2023-12-05 RX ADMIN — VECURONIUM BROMIDE 2 MG: 1 INJECTION, POWDER, LYOPHILIZED, FOR SOLUTION INTRAVENOUS at 16:33

## 2023-12-05 RX ADMIN — SODIUM CHLORIDE, POTASSIUM CHLORIDE, SODIUM LACTATE AND CALCIUM CHLORIDE 100 ML/HR: 600; 310; 30; 20 INJECTION, SOLUTION INTRAVENOUS at 21:32

## 2023-12-05 RX ADMIN — Medication 120 MG: at 11:56

## 2023-12-05 RX ADMIN — VECURONIUM BROMIDE 9 MG: 1 INJECTION, POWDER, LYOPHILIZED, FOR SOLUTION INTRAVENOUS at 12:03

## 2023-12-05 RX ADMIN — FENTANYL CITRATE 50 MCG: 50 INJECTION, SOLUTION INTRAMUSCULAR; INTRAVENOUS at 16:33

## 2023-12-05 RX ADMIN — ACETAMINOPHEN 1000 MG: 500 TABLET, FILM COATED ORAL at 21:32

## 2023-12-05 RX ADMIN — CEFAZOLIN 2 G: 330 INJECTION, POWDER, FOR SOLUTION INTRAMUSCULAR; INTRAVENOUS at 12:12

## 2023-12-05 RX ADMIN — PHENYLEPHRINE HYDROCHLORIDE 1 MCG/KG/MIN: 10 INJECTION INTRAVENOUS at 12:06

## 2023-12-05 RX ADMIN — ALBUMIN HUMAN: 0.05 INJECTION, SOLUTION INTRAVENOUS at 12:17

## 2023-12-05 RX ADMIN — SODIUM CHLORIDE, POTASSIUM CHLORIDE, SODIUM LACTATE AND CALCIUM CHLORIDE: 600; 310; 30; 20 INJECTION, SOLUTION INTRAVENOUS at 13:20

## 2023-12-05 NOTE — OP NOTE
Laparoscopic Robotic-Assisted Low Anterior Resection with Intraoperative flexible sigmoidoscopy:     Patient: Tucker Knox  MRN: [id number]    YOB: 1960  Age: 63 y.o.  Sex: male  Unit: [address] Room/Bed: 373/1 Location: [facility]    Admitting Physician: RANI VELASQUEZ    Primary Care Physician: Kt Alfredo MD             INDICATIONS: Tucker Knox is a 63 y.o. male with biopsy proven rectosigmoid adenocarcinoma.  I have recommended laparoscopic robotic-assisted sigmoid colectomy with primary anastomosis, flexible sigmoidoscopy and possible splenic flexure mobilization. We had a long discussion on the risks and benefits of surgery including (1) bleeding (2) infection including wound infection, abscess, and most significantly, leak. The patient understands that a leak could mean antibiotics and bowel rest or that a leak could necessitate a take back to the OR with ostomy due to possible sepsis. The patient understands that the risk of leak is anywhere from 1-10%. We discussed the ways we decrease the risk of leak including bowel prep, no tension in the OR and ensuring adequate blood supply in the OR with ICG dye. (3) damage to the surrounding structures including ureters, bladder, small intestine, and nerves. (4) pulmonary complications (5) cardiac complications. CT scans later today for metastatic work up. The patient is scheduled for a laparoscopic robotic-assisted sigmoid colectomy with intra-operative flexible sigmoidoscopy, possible splenic flexure mobilization, possible ostomy if there is a leak on the leak test.      DATE OF OPERATION: 12/5/2023     Surgeon(s) and Role:     * Rani Velasquez MD - Primary    ANESTHESIA: General     PREOPERATIVE DIAGNOSIS: Colon adenocarcinoma [C18.9]    POSTOPERATIVE DIAGNOSIS: Same    PROCEDURES PERFORMED:    (1) Laparoscopic, robotic-assisted low anterior resection with colorectal anastomosis   (2) Intraoperative flexible  sigmoidoscopy   (3) Primary umbilical hernia repair with skin excision     PROCEDURE DETAILS:     After informed consent was obtained, the patient was brought to the operating room. The patient was given a dose of Ancef and Flagyl for preoperative prophylaxis. The patient was also given a dose of subcutaneous heparin for DVT prophylaxis. Once under general endotracheal anesthesia, the abdomen was prepped with ChloraPrep and sterilely draped. A time-out was performed per hospital policy to confirm the correct patient, location and procedure.          A scalpel was used to make an 8 mm incision in the left upper quadrant and a Veress was inserted. The abdomen was insufflated to 15 mmHg. An airseal trocar was then inserted into the peritoneal cavity. A camera was introduced. Three 8 mm robotic trocars were placed in an oblique line from the upper mildine to the right lower quadrant, avoiding placing a trocar in the midline under direct visualization. In the right lower quadrant, a 3.5 cm incision was made and the gel port mini was placed. A 12 mm robotic trocar was placed through the gel port mini. I explored the abdomen and found no evidence of metastatic disease. There was a pice of fat attacked to the colonic epiploica that appeared different and this was removed and sent for frozen; it was benign. The tumor was noted to be at the rectosigmoid junction requiring an LAR to get margins. The sigmoid colon was retracted anteriorly and the vascular pedicle was identified. The peritoneum was incised at the level of the sacral promontory and dissection progressed to divided the mesentery of the sigmoid form the retroperitoneum. The left ureter and the gonadal vessels were identified and protected along their course. I carried this dissection laterally until the white line of Toldt was reached. The SIOMARA was identified and isolated. It was clipped with a large hemolock clip and divided with the vessel sealer. I completed the  posterior dissection fo the rectum in the presacral space with the vessel sealer until the levator ani muscles. I then performed the lateral dissection - the lateral attachments were taken down. The peritoneal reflection anteriorly was opened and dissection was carried distally. The mesentery of the rectum was divided with the vessel sealer to 2 cm inferior to the tumor. The rectum was divided with a 45 mm blue load. I then divided the sigmoid with another blue load. The robot was undocked. I went to bedside and removed the specimen from the gel port. I took the specimen down to pathology myself. The specimen was opened and there was noted to be 2 cm of a distal gross margin from the tumor to the staple line. There was a 4.5 cm proximal margin grossly. I returned to the operating room and elected to remove another 4 cm of sigmoid for a better margin. This was removed - the mesentery divided with the vessel sealer and the colon divided with the cut setting of the vessel sealer. This was sent for permanent pathology - with the staple line being the distal marker. I then had anesthesia administer 1.5 mL of ICG followed by a 10 cc flush. The rectum had a more than adequate blood supply. The distal sigmoid did not have a good vascular supply. An additional 2 cm of colon was removed with the vessel sealer on cut mode. Spy was turned back on and the distal serosa and mucosa was well perfused. The specimens were removed and sent for permanent pathology. The anvil was placed through the gelport.  I then placed the anvil in the distal open colon and sewed it into place with a 3-0 V-Loc.  At this point, I went to bedside and used the EEA sizers, passing them sequentially into the rectum up to 29 mm. The 28 mm EEA was then inserted and I performed an end-to-end anastomosis. Prior to firing the stapler, the anvil was secured to the pin, and the mesentery was rechecked and noted to be straight with no twisting. The anastomosis  was noted to be tension free. I then performed an intraoperative flexible sigmoidoscopy which showed an intact staple line, no bleeding from the staple line, and there were no bubbles on the leak test (negative leak test). The anastomosis was > 8 cm from the anal verge with a negative leak test so I elected to not perform a diverting ostomy. All robotic instruments were removed from the abdomen.  The abdomen was noted to be hemostatic, and it was then desufflated. All trocars were removed.      There was a large umbilical hernia sac that I was concerned could cause a bowel obstruction post op. I therefore proceeded with a primary umbilical hernia repair. There was a significant amount of excess skin. I made an elliptical incision at the inferior aspect of the umbilicus and excised the excess skin. I dissected the fascia free on all sides. The defect was closed with 0-ethibond interrupted sutures. I then tacked the umbilicus down with 2-0 vicryl. The incision was closed with interrupted 3-0 vicryl dermal sutures followed by a running 4-0 monocryl subcuticular. It was dressed with skin glue and a pressure dressing.     The peritoneum of the extraction site was closed with an 0-Vicryl. The fascia was then closed with an 0-PDS.  The incisions were closed with 4-0 Monocryl subcuticular sutures. All the counts were correct x2. Skin glue was placed on the port sites. The patient tolerated the procedure well, was extubated, and was transferred to PACU in good condition.          Findings: rectosigmoid mass - 2 cm distal margin confirmed with pathologist and > 5 cm proximal margin. ICG with good blood flow. Leak test negative.   Estimated Blood Loss: 50 mL   Complications: none apparent             Specimens: Sigmoid Colon     Colon Resection  Operation performed with curative intent Yes   Tumor Location (select all that apply) Rectosigmoid junction   Extent of colon and vascular resection  (select all that apply) Sigmoid  resection - inferior mesenteric             Disposition: PACU - hemodynamically stable.           Condition: stable    Oma Velasquez MD  11/01/2023

## 2023-12-05 NOTE — H&P
Oma Velasquez MD - General Surgery History and Physical     Referring Provider: Oma Velasquez MD    Patient Care Team:  Kt Alfredo MD as PCP - General (Family Medicine)  Oma Velasquez MD as Consulting Physician (General Surgery)    Chief complaint colon resection     Subjective .     History of present illness:  Tucker Knox is a 63 y.o. male who presented with blood per rectum x 1 month and underwent a screening colonoscopy. 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 No abdominal pain, nausea, nor vomiting. Normal bowel movements, and he denies constipation. Family history of a father with colon cancer at age 70.      He has never had abdominal surgery. He is a current every day smoker at 1 PPD. He is on 81 mg ASA. His BMI is 35.     Review of Systems    Review of Systems   Constitutional:  Negative for activity change, appetite change, fatigue and fever.   HENT:  Negative for dental problem, ear discharge and ear pain.    Eyes:  Negative for discharge.   Respiratory:  Negative for apnea, shortness of breath and wheezing.    Cardiovascular:  Negative for chest pain and palpitations.   Gastrointestinal:  Positive for anal bleeding and blood in stool. Negative for abdominal distention and abdominal pain.   Endocrine: Negative for cold intolerance and heat intolerance.   Genitourinary:  Negative for difficulty urinating.   Musculoskeletal:  Negative for arthralgias, back pain and gait problem.   Neurological:  Negative for dizziness, tremors and seizures.   Hematological:  Negative for adenopathy.   Psychiatric/Behavioral:  Negative for agitation, behavioral problems and confusion.      History  Past Medical History:   Diagnosis Date    Cancer     CHF (congestive heart failure)     COPD (chronic obstructive pulmonary disease)     Coronary artery disease     stent x 1    Family history of colon cancer     Hyperlipidemia     Hypertension     Sleep apnea      does not wear machine, supposed to wear cpap with oxygen and does not wear it    Type 2 diabetes mellitus    ,   Past Surgical History:   Procedure Laterality Date    BACK SURGERY      CARDIAC CATHETERIZATION Left 04/13/2022    Procedure: Cardiac Catheterization/Vascular Study;  Surgeon: Todd Avendano MD;  Location:  [facility];  Service: Cardiology;  Laterality: Left;    CARDIAC CATHETERIZATION      with stent x1    COLONOSCOPY N/A 10/09/2023    Diverticulosis; One 5mm polyp in ascending colon; One 5mm polyp in transverse colon; One 10mm polyp at 65cm proximal to anus; One 20mm polyp at 65cm proximal to anus-Tattooed; One 20mm polyp at 42cm proximal to anus-Clip (MR conditional) placed-Tattooed; Rule out malignancy-Partially obstructing tumor in recto-sigmoid colon-biopsied-Tattooed; One 10mm polyp in rectum    ELBOW PROCEDURE      KNEE SURGERY      LUMBAR DISC SURGERY     ,   Family History   Problem Relation Age of Onset    Esophageal cancer Mother     Heart disease Mother     Colon cancer Father 80    Heart disease Father     No Known Problems Sister     COPD Brother     Alcohol abuse Brother     Colon polyps Neg Hx     Liver cancer Neg Hx     Rectal cancer Neg Hx     Stomach cancer Neg Hx     Liver disease Neg Hx    ,   Social History     Tobacco Use    Smoking status: Every Day     Packs/day: 1.00     Years: 25.00     Additional pack years: 0.00     Total pack years: 25.00     Types: Cigarettes    Smokeless tobacco: Never   Vaping Use    Vaping Use: Some days    Substances: Nicotine, Flavoring    Devices: Disposable   Substance Use Topics    Alcohol use: Not Currently    Drug use: Yes     Types: Marijuana   ,   Medications Prior to Admission   Medication Sig Dispense Refill Last Dose    DULoxetine (CYMBALTA) 30 MG capsule Take 1 capsule by mouth Daily.   12/4/2023 at 0800    furosemide (LASIX) 40 MG tablet Take 1 tablet by mouth Daily. 30 tablet 2 12/4/2023 at 0800    gabapentin  (NEURONTIN) 300 MG capsule Take 1 capsule by mouth 3 (Three) Times a Day. (Patient taking differently: Take 800 mg by mouth 2 (Two) Times a Day.) 90 capsule 0 12/5/2023 at 0500    glipiZIDE (GLUCOTROL) 10 MG tablet Take 1 tablet by mouth 2 (Two) Times a Day Before Meals.   12/2/2023 at 1700    insulin detemir (LEVEMIR) 100 UNIT/ML injection Inject 40 Units under the skin into the appropriate area as directed Daily.   12/2/2023 at 1700    metFORMIN (GLUCOPHAGE) 1000 MG tablet Take 1 tablet by mouth 2 (Two) Times a Day.  0 12/2/2023 at 1700    nitroglycerin (NITROSTAT) 0.4 MG SL tablet 1 under the tongue as needed for angina, may repeat q5mins for up three doses 25 tablet 3     pravastatin (PRAVACHOL) 40 MG tablet Take 1 tablet by mouth Every Night.   12/2/2023 at 1700    aspirin 81 MG EC tablet Take 1 tablet by mouth Daily. 100 tablet 2 12/3/2023 at 1700    Tradjenta 5 MG tablet tablet Take 1 tablet by mouth Daily.   12/2/2023 at 1700    and Allergies:  Penicillins    Current Facility-Administered Medications:     acetaminophen (TYLENOL) tablet 1,000 mg, 1,000 mg, Oral, Once, Alberto Perez MD    ceFAZolin 2000 mg IVPB in 100 mL NS (MBP), 2 g, Intravenous, Once **AND** metroNIDAZOLE (FLAGYL) IVPB 500 mg, 500 mg, Intravenous, Once, Oma Velasquez MD    gabapentin (NEURONTIN) 50 mg/mL solution 250 mg, 250 mg, Oral, Once, Oma Velasquez MD    lactated ringers infusion 1,000 mL, 1,000 mL, Intravenous, Continuous, Oma Velasquez MD, Last Rate: 25 mL/hr at 12/05/23 0848, 1,000 mL at 12/05/23 0848    lactated ringers infusion 1,000 mL, 1,000 mL, Intravenous, Continuous, Oma Velasquez MD, Last Rate: 25 mL/hr at 12/05/23 0848, 1,000 mL at 12/05/23 0848    lactated ringers infusion, 100 mL/hr, Intravenous, Continuous, Alberto Perez MD    lidocaine PF 1% (XYLOCAINE) injection 0.5 mL, 0.5 mL, Intradermal, Once PRN, Oma Velasquez MD    midazolam (VERSED) injection 1 mg, 1 mg, Intravenous, Q10  Min Ana VELOZ Jeremy, MD    sodium chloride 0.9 % flush 10 mL, 10 mL, Intravenous, Q12H, Oma Velasquez MD    sodium chloride 0.9 % flush 10 mL, 10 mL, Intravenous, PRN, Oma Velasquez MD    sodium chloride 0.9 % flush 10 mL, 10 mL, Intravenous, Ron VELOZ Kristen N, MD    sodium chloride 0.9 % flush 3 mL, 3 mL, Intravenous, Ron VELOZ Kristen N, MD    sodium chloride 0.9 % flush 3 mL, 3 mL, Intravenous, Q12H, Alberto Perez MD    sodium chloride 0.9 % flush 3-10 mL, 3-10 mL, Intravenous, Ana VELOZ Jeremy, MD    sodium chloride 0.9 % infusion 40 mL, 40 mL, Intravenous, Ron VELOZ Kristen N, MD    sodium chloride 0.9 % infusion 40 mL, 40 mL, Intravenous, Ana VELOZ Jeremy, MD    Objective     Vital Signs   Temp:  [96.6 °F (35.9 °C)] 96.6 °F (35.9 °C)  Heart Rate:  [81-91] 81  Resp:  [18-20] 18  BP: (157)/(80) 157/80    Physical Exam:  General appearance - alert, well appearing, and in no distress  Mental status - alert, oriented to person, place, and time  Eyes - pupils equal and reactive, extraocular eye movements intact  Neck - supple, no significant adenopathy  Chest - clear to auscultation, no wheezes, rales or rhonchi, symmetric air entry  Heart - normal rate and regular rhythm  Abdomen - soft, nontender, nondistended, no masses or organomegaly  Neurological - alert, oriented, normal speech, no focal findings or movement disorder noted  Musculoskeletal - no joint tenderness, deformity or swelling    Results Review:     Lab Results (last 24 hours)       Procedure Component Value Units Date/Time    POC Glucose Once [[id number]]  (Abnormal) Collected: 12/05/23 0830    Specimen: Blood Updated: 12/05/23 0841     Glucose 199 mg/dL      Comment: : [id number] Ron LaurenMeter ID: [id number]             Imaging Results (Last 24 Hours)       ** No results found for the last 24 hours. **              Assessment & Plan       Tucker Kendall Abilio is a 63 y.o. male with biopsy proven  rectosigmoid adenocarcinoma.  I have recommended laparoscopic robotic-assisted sigmoid colectomy with primary anastomosis, flexible sigmoidoscopy and possible splenic flexure mobilization. We had a long discussion on the risks and benefits of surgery including (1) bleeding (2) infection including wound infection, abscess, and most significantly, leak. The patient understands that a leak could mean antibiotics and bowel rest or that a leak could necessitate a take back to the OR with ostomy due to possible sepsis. The patient understands that the risk of leak is anywhere from 1-10%. We discussed the ways we decrease the risk of leak including bowel prep, no tension in the OR and ensuring adequate blood supply in the OR with ICG dye. (3) damage to the surrounding structures including ureters, bladder, small intestine, and nerves. (4) pulmonary complications (5) cardiac complications. CT scans later today for metastatic work up. The patient is scheduled for a laparoscopic robotic-assisted sigmoid colectomy with intra-operative flexible sigmoidoscopy, possible splenic flexure mobilization, possible ostomy if there is a leak on the leak test.       Oma Velasquez MD  12/05/23  11:13 CST

## 2023-12-05 NOTE — ANESTHESIA PREPROCEDURE EVALUATION
Anesthesia Evaluation     Patient summary reviewed   no history of anesthetic complications:   NPO Solid Status: > 8 hours             Airway   Mallampati: III  TM distance: >3 FB  Dental    (+) edentulous    Pulmonary    (+) a smoker Current, COPD,sleep apnea  Cardiovascular   Exercise tolerance: good (4-7 METS)    (+) hypertension, past MI , CAD, cardiac stents (04/22) , CHF , hyperlipidemia  (-) angina    ROS comment: Cardiac cath:  Coronary angiography     No significant disease of left main coronary artery  Proximal left anterior descending coronary artery has 40 to 50% stenosis  Distal left anterior descending coronary artery has 50 to 60% stenosis in a vessel that is 1 mm in diameter  First diagonal branch has approximately 40 to 50% stenosis and hemodynamically not significant  No high-grade disease of the left circumflex coronary artery  Right coronary artery is large and dominant with a 90% stenosis of the mid segment which was intervened upon and likely the culprit vessel causing non-STEMI in the recent past and chest pain.        Neuro/Psych  (-) seizures, TIA, CVA  GI/Hepatic/Renal/Endo    (+) diabetes mellitus using insulin  (-) liver disease, no renal disease    Musculoskeletal     Abdominal   (+) obese   Substance History      OB/GYN          Other                          Anesthesia Plan    ASA 3     general     intravenous induction     Anesthetic plan, risks, benefits, and alternatives have been provided, discussed and informed consent has been obtained with: patient.      CODE STATUS:

## 2023-12-05 NOTE — ANESTHESIA POSTPROCEDURE EVALUATION
"Patient: Tucker Knox    Procedure Summary       Date: 12/05/23 Room / Location:  [facility] OR 06 /  [facility]    Anesthesia Start: 1150 Anesthesia Stop: 1728    Procedure: COLON RESECTION LAPAROSCOPIC SIGMOID WITH DAVINCI ROBOT, INTRA-OPERATIVE FLEXIBLE SIGMOIDOSCOPY, POSSIBLE SPLENIC FLEXURE MOBILIZATION, OPEN UMBILICAL HERNIA REPAIR (Abdomen) Diagnosis:       Colon adenocarcinoma      (Colon adenocarcinoma [C18.9])    Surgeons: Oma Velasquez MD Provider: Simone Santoyo CRNA    Anesthesia Type: general ASA Status: 3            Anesthesia Type: general    Vitals  Vitals Value Taken Time   /67 12/05/23 1727   Temp     Pulse 79 12/05/23 1728   Resp     SpO2 98 % 12/05/23 1728   Vitals shown include unfiled device data.        Post Anesthesia Care and Evaluation    Patient location during evaluation: PACU  Patient participation: complete - patient participated  Level of consciousness: awake and alert  Pain management: adequate    Airway patency: patent  Anesthetic complications: No anesthetic complications    Cardiovascular status: acceptable  Respiratory status: acceptable  Hydration status: acceptable    Comments: Blood pressure 157/80, pulse 81, temperature 96.6 °F (35.9 °C), temperature source Temporal, resp. rate 18, height 179 cm (70.47\"), weight 110 kg (242 lb 15.2 oz), SpO2 91%.    Pt discharged from PACU based on jono score >8    "

## 2023-12-05 NOTE — BRIEF OP NOTE
COLON RESECTION LAPAROSCOPIC SIGMOID WITH DAVINCI ROBOT  Progress Note    Tucker Knox  12/5/2023    Pre-op Diagnosis:   Colon adenocarcinoma [C18.9]       Post-Op Diagnosis Codes:     * Colon adenocarcinoma [C18.9]    Procedure/CPT® Codes:        Procedure(s):  COLON RESECTION LAPAROSCOPIC SIGMOID WITH DAVINCI ROBOT, INTRA-OPERATIVE FLEXIBLE SIGMOIDOSCOPY, POSSIBLE SPLENIC FLEXURE MOBILIZATION, OPEN UMBILICAL HERNIA REPAIR              Surgeon(s):  Oma Velasquez MD    Anesthesia: General    Staff:   Circulator: Nikki Chung RN; Lawrence Marquez RN; Tiffany Nolen RN  Scrub Person: Pricilla Costa; Wen Albright; Jeny Gann; Cristian Saenz  Assistant: Candy Mason CST; Rohan Garcia; Millie Dey PA-C  Assistant: Candy Mason, CST; Rohan Garcia; Millie Dey PA-C      Estimated Blood Loss:  50 mL     Urine Voided: 205 mL    Specimens:                Specimens       ID Source Type Tests Collected By Collected At Frozen?    A Mesentery Tissue TISSUE PATHOLOGY EXAM   Oma Velasquez MD 12/5/23 1251 Yes    Description: Mesenteric implant    B Large Intestine, Sigmoid Colon Tissue TISSUE PATHOLOGY EXAM   Oma Velasquez MD 12/5/23 1516     Description: retrosigmoid    Comment: Dr Velasquez took specimen to pathology    C Large Intestine, Sigmoid Colon Tissue TISSUE PATHOLOGY EXAM   Oma Velasquez MD 12/5/23 1540     Description: additional proximal margin, staple lines marked distal    This specimen was not marked as sent.    D Large Intestine, Sigmoid Colon Tissue TISSUE PATHOLOGY EXAM   Oma Velasquez MD 12/5/23 1636     Description: anastomotic donuts    This specimen was not marked as sent.                  Drains:   Urethral Catheter Double-lumen 16 Fr. (Active)       Findings: True rectosigmoid lesion - specimen evaluated with pathology > 5 cm proximal margin, > 2 cm distal margin   2.2 cm incarcerated umbilical hernia  with skin removed and primary repair     Complications: none apparent     Assistant: Candy Mason, CST; Rohan Garcia; Millie Dey PA-C  was responsible for performing the following activities: Retraction, Suction, and Irrigation and their skilled assistance was necessary for the success of this case.    Oma Velasquez MD     Date: 12/5/2023  Time: 17:21 CST

## 2023-12-05 NOTE — ANESTHESIA PROCEDURE NOTES
Airway  Urgency: elective    Date/Time: 12/5/2023 11:57 AM    General Information and Staff    Patient location during procedure: OR  CRNA/CAA: Simone Santoyo CRNA  SRNA: Artie Ramos SRNA  Indications and Patient Condition  Indications for airway management: airway protection    Preoxygenated: yes  MILS maintained throughout  Mask difficulty assessment: 1 - vent by mask    Final Airway Details  Final airway type: endotracheal airway      Successful airway: ETT  Cuffed: yes   Successful intubation technique: direct laryngoscopy  Facilitating devices/methods: intubating stylet and cricoid pressure  Endotracheal tube insertion site: oral  Blade: Lianet  Blade size: 4  ETT size (mm): 7.5  Cormack-Lehane Classification: grade IIa - partial view of glottis  Placement verified by: chest auscultation and capnometry   Cuff volume (mL): 5  Measured from: lips  ETT/EBT  to lips (cm): 22  Number of attempts at approach: 1  Assessment: lips, teeth, and gum same as pre-op and atraumatic intubation

## 2023-12-05 NOTE — NURSING NOTE
Pt states had no results from bowel prep, Dr Velasquez notified and orders received for enema x1, enema x1 given.

## 2023-12-06 LAB
ALBUMIN SERPL-MCNC: 3.7 G/DL (ref 3.5–5.2)
ALBUMIN/GLOB SERPL: 1.3 G/DL
ALP SERPL-CCNC: 81 U/L (ref 39–117)
ALT SERPL W P-5'-P-CCNC: 6 U/L (ref 1–41)
ANION GAP SERPL CALCULATED.3IONS-SCNC: 10 MMOL/L (ref 5–15)
AST SERPL-CCNC: 9 U/L (ref 1–40)
BASOPHILS # BLD AUTO: 0.03 10*3/MM3 (ref 0–0.2)
BASOPHILS NFR BLD AUTO: 0.2 % (ref 0–1.5)
BILIRUB SERPL-MCNC: 0.2 MG/DL (ref 0–1.2)
BUN SERPL-MCNC: 21 MG/DL (ref 8–23)
BUN/CREAT SERPL: 14.6 (ref 7–25)
CALCIUM SPEC-SCNC: 8.2 MG/DL (ref 8.6–10.5)
CHLORIDE SERPL-SCNC: 100 MMOL/L (ref 98–107)
CO2 SERPL-SCNC: 28 MMOL/L (ref 22–29)
CREAT SERPL-MCNC: 1.44 MG/DL (ref 0.76–1.27)
DEPRECATED RDW RBC AUTO: 45.1 FL (ref 37–54)
EGFRCR SERPLBLD CKD-EPI 2021: 54.9 ML/MIN/1.73
EOSINOPHIL # BLD AUTO: 0 10*3/MM3 (ref 0–0.4)
EOSINOPHIL NFR BLD AUTO: 0 % (ref 0.3–6.2)
ERYTHROCYTE [DISTWIDTH] IN BLOOD BY AUTOMATED COUNT: 14 % (ref 12.3–15.4)
GLOBULIN UR ELPH-MCNC: 2.9 GM/DL
GLUCOSE BLDC GLUCOMTR-MCNC: 250 MG/DL (ref 70–130)
GLUCOSE BLDC GLUCOMTR-MCNC: 254 MG/DL (ref 70–130)
GLUCOSE BLDC GLUCOMTR-MCNC: 255 MG/DL (ref 70–130)
GLUCOSE BLDC GLUCOMTR-MCNC: 309 MG/DL (ref 70–130)
GLUCOSE SERPL-MCNC: 347 MG/DL (ref 65–99)
HCT VFR BLD AUTO: 38.3 % (ref 37.5–51)
HGB BLD-MCNC: 11 G/DL (ref 13–17.7)
IMM GRANULOCYTES # BLD AUTO: 0.06 10*3/MM3 (ref 0–0.05)
IMM GRANULOCYTES NFR BLD AUTO: 0.4 % (ref 0–0.5)
LYMPHOCYTES # BLD AUTO: 0.44 10*3/MM3 (ref 0.7–3.1)
LYMPHOCYTES NFR BLD AUTO: 3 % (ref 19.6–45.3)
MAGNESIUM SERPL-MCNC: 1.9 MG/DL (ref 1.6–2.4)
MCH RBC QN AUTO: 25.5 PG (ref 26.6–33)
MCHC RBC AUTO-ENTMCNC: 28.7 G/DL (ref 31.5–35.7)
MCV RBC AUTO: 88.7 FL (ref 79–97)
MONOCYTES # BLD AUTO: 0.63 10*3/MM3 (ref 0.1–0.9)
MONOCYTES NFR BLD AUTO: 4.4 % (ref 5–12)
NEUTROPHILS NFR BLD AUTO: 13.3 10*3/MM3 (ref 1.7–7)
NEUTROPHILS NFR BLD AUTO: 92 % (ref 42.7–76)
NRBC BLD AUTO-RTO: 0 /100 WBC (ref 0–0.2)
PHOSPHATE SERPL-MCNC: 4.8 MG/DL (ref 2.5–4.5)
PLATELET # BLD AUTO: 249 10*3/MM3 (ref 140–450)
PMV BLD AUTO: 10.5 FL (ref 6–12)
POTASSIUM SERPL-SCNC: 4.7 MMOL/L (ref 3.5–5.2)
PROT SERPL-MCNC: 6.6 G/DL (ref 6–8.5)
RBC # BLD AUTO: 4.32 10*6/MM3 (ref 4.14–5.8)
SODIUM SERPL-SCNC: 138 MMOL/L (ref 136–145)
WBC NRBC COR # BLD AUTO: 14.46 10*3/MM3 (ref 3.4–10.8)

## 2023-12-06 PROCEDURE — 83735 ASSAY OF MAGNESIUM: CPT | Performed by: STUDENT IN AN ORGANIZED HEALTH CARE EDUCATION/TRAINING PROGRAM

## 2023-12-06 PROCEDURE — 82948 REAGENT STRIP/BLOOD GLUCOSE: CPT

## 2023-12-06 PROCEDURE — 84100 ASSAY OF PHOSPHORUS: CPT | Performed by: STUDENT IN AN ORGANIZED HEALTH CARE EDUCATION/TRAINING PROGRAM

## 2023-12-06 PROCEDURE — 25010000002 HEPARIN (PORCINE) PER 1000 UNITS: Performed by: STUDENT IN AN ORGANIZED HEALTH CARE EDUCATION/TRAINING PROGRAM

## 2023-12-06 PROCEDURE — 99024 POSTOP FOLLOW-UP VISIT: CPT

## 2023-12-06 PROCEDURE — 85025 COMPLETE CBC W/AUTO DIFF WBC: CPT | Performed by: STUDENT IN AN ORGANIZED HEALTH CARE EDUCATION/TRAINING PROGRAM

## 2023-12-06 PROCEDURE — 80053 COMPREHEN METABOLIC PANEL: CPT | Performed by: STUDENT IN AN ORGANIZED HEALTH CARE EDUCATION/TRAINING PROGRAM

## 2023-12-06 PROCEDURE — 63710000001 INSULIN LISPRO (HUMAN) PER 5 UNITS: Performed by: STUDENT IN AN ORGANIZED HEALTH CARE EDUCATION/TRAINING PROGRAM

## 2023-12-06 PROCEDURE — 25810000003 LACTATED RINGERS PER 1000 ML: Performed by: STUDENT IN AN ORGANIZED HEALTH CARE EDUCATION/TRAINING PROGRAM

## 2023-12-06 RX ORDER — NICOTINE 21 MG/24HR
1 PATCH, TRANSDERMAL 24 HOURS TRANSDERMAL
Status: DISCONTINUED | OUTPATIENT
Start: 2023-12-06 | End: 2023-12-06

## 2023-12-06 RX ORDER — DOCUSATE SODIUM 100 MG/1
100 CAPSULE, LIQUID FILLED ORAL 2 TIMES DAILY
Status: DISCONTINUED | OUTPATIENT
Start: 2023-12-06 | End: 2023-12-10 | Stop reason: HOSPADM

## 2023-12-06 RX ORDER — POLYETHYLENE GLYCOL 3350 17 G/17G
17 POWDER, FOR SOLUTION ORAL DAILY
Status: DISCONTINUED | OUTPATIENT
Start: 2023-12-06 | End: 2023-12-10 | Stop reason: HOSPADM

## 2023-12-06 RX ORDER — POLYETHYLENE GLYCOL 3350 17 G/17G
17 POWDER, FOR SOLUTION ORAL DAILY
COMMUNITY

## 2023-12-06 RX ORDER — ECHINACEA PURPUREA EXTRACT 125 MG
2 TABLET ORAL AS NEEDED
Status: DISCONTINUED | OUTPATIENT
Start: 2023-12-06 | End: 2023-12-10 | Stop reason: HOSPADM

## 2023-12-06 RX ORDER — GABAPENTIN 600 MG/1
600 TABLET ORAL 2 TIMES DAILY
COMMUNITY

## 2023-12-06 RX ORDER — NICOTINE 21 MG/24HR
1 PATCH, TRANSDERMAL 24 HOURS TRANSDERMAL
Status: DISCONTINUED | OUTPATIENT
Start: 2023-12-06 | End: 2023-12-10 | Stop reason: HOSPADM

## 2023-12-06 RX ADMIN — ACETAMINOPHEN 1000 MG: 500 TABLET, FILM COATED ORAL at 22:57

## 2023-12-06 RX ADMIN — GABAPENTIN 800 MG: 400 CAPSULE ORAL at 08:14

## 2023-12-06 RX ADMIN — OXYCODONE HYDROCHLORIDE 5 MG: 5 TABLET ORAL at 14:24

## 2023-12-06 RX ADMIN — ACETAMINOPHEN 1000 MG: 500 TABLET, FILM COATED ORAL at 08:14

## 2023-12-06 RX ADMIN — NICOTINE 1 PATCH: 21 PATCH, EXTENDED RELEASE TRANSDERMAL at 15:51

## 2023-12-06 RX ADMIN — ACETAMINOPHEN 1000 MG: 500 TABLET, FILM COATED ORAL at 03:40

## 2023-12-06 RX ADMIN — POLYETHYLENE GLYCOL 3350 17 G: 17 POWDER, FOR SOLUTION ORAL at 11:19

## 2023-12-06 RX ADMIN — ACETAMINOPHEN 1000 MG: 500 TABLET, FILM COATED ORAL at 14:20

## 2023-12-06 RX ADMIN — HEPARIN SODIUM 5000 UNITS: 5000 INJECTION INTRAVENOUS; SUBCUTANEOUS at 05:27

## 2023-12-06 RX ADMIN — INSULIN LISPRO 10 UNITS: 100 INJECTION, SOLUTION INTRAVENOUS; SUBCUTANEOUS at 08:14

## 2023-12-06 RX ADMIN — NICOTINE 1 PATCH: 14 PATCH, EXTENDED RELEASE TRANSDERMAL at 09:27

## 2023-12-06 RX ADMIN — FAMOTIDINE 20 MG: 20 TABLET, FILM COATED ORAL at 22:57

## 2023-12-06 RX ADMIN — DOCUSATE SODIUM 100 MG: 100 CAPSULE, LIQUID FILLED ORAL at 11:19

## 2023-12-06 RX ADMIN — LIDOCAINE 2 PATCH: 700 PATCH TOPICAL at 08:15

## 2023-12-06 RX ADMIN — INSULIN LISPRO 8 UNITS: 100 INJECTION, SOLUTION INTRAVENOUS; SUBCUTANEOUS at 22:57

## 2023-12-06 RX ADMIN — HEPARIN SODIUM 5000 UNITS: 5000 INJECTION INTRAVENOUS; SUBCUTANEOUS at 14:20

## 2023-12-06 RX ADMIN — DOCUSATE SODIUM 100 MG: 100 CAPSULE, LIQUID FILLED ORAL at 22:59

## 2023-12-06 RX ADMIN — OXYCODONE HYDROCHLORIDE 5 MG: 5 TABLET ORAL at 05:27

## 2023-12-06 RX ADMIN — INSULIN LISPRO 8 UNITS: 100 INJECTION, SOLUTION INTRAVENOUS; SUBCUTANEOUS at 11:19

## 2023-12-06 RX ADMIN — OXYCODONE HYDROCHLORIDE 5 MG: 5 TABLET ORAL at 23:22

## 2023-12-06 RX ADMIN — INSULIN LISPRO 8 UNITS: 100 INJECTION, SOLUTION INTRAVENOUS; SUBCUTANEOUS at 17:50

## 2023-12-06 RX ADMIN — GABAPENTIN 800 MG: 400 CAPSULE ORAL at 22:57

## 2023-12-06 RX ADMIN — HEPARIN SODIUM 5000 UNITS: 5000 INJECTION INTRAVENOUS; SUBCUTANEOUS at 22:57

## 2023-12-06 RX ADMIN — SODIUM CHLORIDE, POTASSIUM CHLORIDE, SODIUM LACTATE AND CALCIUM CHLORIDE 100 ML/HR: 600; 310; 30; 20 INJECTION, SOLUTION INTRAVENOUS at 08:14

## 2023-12-06 RX ADMIN — DULOXETINE HYDROCHLORIDE 30 MG: 30 CAPSULE, DELAYED RELEASE ORAL at 08:14

## 2023-12-06 RX ADMIN — FAMOTIDINE 20 MG: 20 TABLET, FILM COATED ORAL at 08:14

## 2023-12-06 NOTE — PLAN OF CARE
Goal Outcome Evaluation:           Progress: no change  Outcome Evaluation: Admit from PACU after colon resection; VSS

## 2023-12-06 NOTE — PLAN OF CARE
Goal Outcome Evaluation:  Plan of Care Reviewed With: patient        Progress: improving  Outcome Evaluation: A&OX4, VSS. Lap x4 CDI, 2x2 incision CDI, ABD binder in place. Liquid diet to be increased for supper to GI soft/low irritant. Virgen removed, voiding normally. On room air and , SOB with exertion. IVF, prn pain med given with relief. Assist x1 to ambulate, ambulated x2 in hallway. Bowel regimen started, bowel sounds hypoactive. SCD's and Heparin for VTE prevention. Call light in reach, safety maintained and continue to monitor.

## 2023-12-06 NOTE — PLAN OF CARE
Problem: Adult Inpatient Plan of Care  Goal: Plan of Care Review  Outcome: Ongoing, Progressing  Flowsheets (Taken 12/6/2023 5299)  Progress: improving  Plan of Care Reviewed With: patient  Outcome Evaluation: Pt admit from PACU this shift post- colon resection. C/o pain x1, prn roxicodone given. Ambulated in tolliver x2 assist. 3L O2. Virgen in place w/ good output through the night. Resting well. Bowel sounds hypoactive. Abd binder in place w/ umbilical dressing c/d/i. SCDs and heparin for VTE. Scheduled tylenol. Safety maintained and VSS.

## 2023-12-06 NOTE — CASE MANAGEMENT/SOCIAL WORK
Continued Stay Note   Josiah     Patient Name: Tucker Knox  MRN: [id number]  Today's Date: 12/6/2023    Admit Date: 12/5/2023        Discharge Plan       Row Name 12/06/23 1527       Plan    Plan Comments Attempted to screen pt but was unable. Will try again tomorrow.                   Discharge Codes    No documentation.                       ABNER White

## 2023-12-06 NOTE — PROGRESS NOTES
General Surgery  Progress Note     LOS: 1 day   Patient Care Team:  Kt Alfredo MD as PCP - General (Family Medicine)  Oma Velasquez MD as Consulting Physician (General Surgery)      Subjective     Interval History:     Postop sigmoid colon resection day 1  Labs look good  Tolerating clear diet without nausea or vomiting  Up and walking early this a.m.  No flatus or bowel movement    Objective     Vital Signs  Temp:  [97 °F (36.1 °C)-98.1 °F (36.7 °C)] 98.1 °F (36.7 °C)  Heart Rate:  [78-97] 85  Resp:  [14-16] 16  BP: (120-154)/(64-95) 132/64    Physical Exam:  General appearance - alert, well appearing, and in no distress and oriented to person, place, and time  Mental status - alert, oriented to person, place, and time, normal mood, behavior, speech, dress, motor activity, and thought processes  Eyes - sclera anicteric  Neck - supple, no significant adenopathy  Chest - no tachypnea, retractions or cyanosis  Heart - normal rate and regular rhythm  Abdomen - soft, mild tenderness with palpation, slightly distended, no masses or organomegaly; abdominal binder in place  Neurological - alert, oriented, normal speech, no focal findings or movement disorder noted  Skin - normal coloration and turgor, no rashes, no suspicious skin lesions noted      Results Review:    Lab Results (last 24 hours)       Procedure Component Value Units Date/Time    Tissue Pathology Exam [[id number]] Collected: 12/05/23 1251    Specimen: Tissue from Mesentery; Tissue from Large Intestine, Sigmoid Colon; Tissue from Large Intestine, Sigmoid Colon; Tissue from Large Intestine, Sigmoid Colon Updated: 12/06/23 1119     Case Report --     Surgical Pathology Report                         Case: [id number]                                  Authorizing Provider:  Oma Velasquez MD    Collected:           12/05/2023 12:51 PM          Ordering Location:     [facility] OR  Received:            12/05/2023 12:58 PM       "    Pathologist:           Radha Rowley MD                                                        Intraop:               Radha Rowley MD                                                        Specimen:    Mesentery, Mesenteric implant                                                               Synoptic Checklist --     Intraoperative Consultation --       Specimen: \"Mesenteric implant\"  FROZEN SECTION DIAGNOSIS: Consistent with a nodular area of fat necrosis with numerous macrophages.  Reported to \"Kwabena\" in OR 6 on 12/5/2023 at 13:11 CST by Radha Rowley MD.      POC Glucose Once [[id number]]  (Abnormal) Collected: 12/06/23 1056    Specimen: Blood Updated: 12/06/23 1107     Glucose 255 mg/dL      Comment: : [id number] Ron PeerlystyleeMeter ID: [id number]       POC Glucose Once [[id number]]  (Abnormal) Collected: 12/06/23 0728    Specimen: Blood Updated: 12/06/23 0740     Glucose 309 mg/dL      Comment: : [id number] The One World Doll ProjectyleeMeter ID: [id number]       Comprehensive Metabolic Panel [[id number]]  (Abnormal) Collected: 12/06/23 0440    Specimen: Blood Updated: 12/06/23 0534     Glucose 347 mg/dL      BUN 21 mg/dL      Creatinine 1.44 mg/dL      Sodium 138 mmol/L      Potassium 4.7 mmol/L      Chloride 100 mmol/L      CO2 28.0 mmol/L      Calcium 8.2 mg/dL      Total Protein 6.6 g/dL      Albumin 3.7 g/dL      ALT (SGPT) 6 U/L      AST (SGOT) 9 U/L      Alkaline Phosphatase 81 U/L      Total Bilirubin 0.2 mg/dL      Globulin 2.9 gm/dL      A/G Ratio 1.3 g/dL      BUN/Creatinine Ratio 14.6     Anion Gap 10.0 mmol/L      eGFR 54.9 mL/min/1.73     Narrative:      GFR Normal >60  Chronic Kidney Disease <60  Kidney Failure <15      Phosphorus [[id number]]  (Abnormal) Collected: 12/06/23 0440    Specimen: Blood Updated: 12/06/23 0534     Phosphorus 4.8 mg/dL     Magnesium [[id number]]  (Normal) Collected: 12/06/23 0440    Specimen: Blood Updated: 12/06/23 0534     Magnesium 1.9 mg/dL     CBC & " Differential [[id number]]  (Abnormal) Collected: 12/06/23 0440    Specimen: Blood Updated: 12/06/23 0510    Narrative:      The following orders were created for panel order CBC & Differential.  Procedure                               Abnormality         Status                     ---------                               -----------         ------                     CBC Auto Differential[[id number]]        Abnormal            Final result                 Please view results for these tests on the individual orders.    CBC Auto Differential [[id number]]  (Abnormal) Collected: 12/06/23 0440    Specimen: Blood Updated: 12/06/23 0510     WBC 14.46 10*3/mm3      RBC 4.32 10*6/mm3      Hemoglobin 11.0 g/dL      Hematocrit 38.3 %      MCV 88.7 fL      MCH 25.5 pg      MCHC 28.7 g/dL      RDW 14.0 %      RDW-SD 45.1 fl      MPV 10.5 fL      Platelets 249 10*3/mm3      Neutrophil % 92.0 %      Lymphocyte % 3.0 %      Monocyte % 4.4 %      Eosinophil % 0.0 %      Basophil % 0.2 %      Immature Grans % 0.4 %      Neutrophils, Absolute 13.30 10*3/mm3      Lymphocytes, Absolute 0.44 10*3/mm3      Monocytes, Absolute 0.63 10*3/mm3      Eosinophils, Absolute 0.00 10*3/mm3      Basophils, Absolute 0.03 10*3/mm3      Immature Grans, Absolute 0.06 10*3/mm3      nRBC 0.0 /100 WBC     POC Glucose Once [[id number]]  (Abnormal) Collected: 12/05/23 2140    Specimen: Blood Updated: 12/05/23 2201     Glucose 376 mg/dL      Comment: : [id number] Luther GermanDiallo ID: [id number]       POC Glucose Once [[id number]]  (Abnormal) Collected: 12/05/23 1729    Specimen: Blood Updated: 12/05/23 1741     Glucose 294 mg/dL      Comment: : [id number] Deatamiko ChakrabortyDiallo ID: [id number]             Imaging Results (Last 24 Hours)       ** No results found for the last 24 hours. **              Assessment & Plan       Mr. Knox is postop day 1 post sigmoid colon resection.  He reports feeling somewhat bloated but with little pain.  I have placed orders  to discontinue his Virgen.  He is tolerating a clear liquid diet without nausea or vomiting.  Orders have been placed to advance to GI soft diet for dinner as long as patient is not experiencing any nausea or vomiting.  Orders have also been placed to start patient on bowel regimen of Colace twice daily and MiraLAX daily.  I have also instructed the patient to continue to get up and walk today, asking him to make 5-6 trips minimum up and down the tolliver.       Millie Dey PA-C  12/06/23  15:15 CST

## 2023-12-07 ENCOUNTER — APPOINTMENT (OUTPATIENT)
Dept: GENERAL RADIOLOGY | Facility: HOSPITAL | Age: 63
DRG: 329 | End: 2023-12-07
Payer: MEDICARE

## 2023-12-07 ENCOUNTER — APPOINTMENT (OUTPATIENT)
Dept: CARDIOLOGY | Facility: HOSPITAL | Age: 63
DRG: 329 | End: 2023-12-07
Payer: MEDICARE

## 2023-12-07 PROBLEM — I50.42 CHRONIC COMBINED SYSTOLIC (CONGESTIVE) AND DIASTOLIC (CONGESTIVE) HEART FAILURE: Status: ACTIVE | Noted: 2022-04-14

## 2023-12-07 LAB
ALBUMIN SERPL-MCNC: 3.6 G/DL (ref 3.5–5.2)
ALBUMIN/GLOB SERPL: 1.2 G/DL
ALP SERPL-CCNC: 76 U/L (ref 39–117)
ALT SERPL W P-5'-P-CCNC: 5 U/L (ref 1–41)
ANION GAP SERPL CALCULATED.3IONS-SCNC: 6 MMOL/L (ref 5–15)
ANION GAP SERPL CALCULATED.3IONS-SCNC: 9 MMOL/L (ref 5–15)
AST SERPL-CCNC: 9 U/L (ref 1–40)
BASOPHILS # BLD AUTO: 0.04 10*3/MM3 (ref 0–0.2)
BASOPHILS # BLD AUTO: 0.05 10*3/MM3 (ref 0–0.2)
BASOPHILS NFR BLD AUTO: 0.2 % (ref 0–1.5)
BASOPHILS NFR BLD AUTO: 0.3 % (ref 0–1.5)
BH CV ECHO MEAS - AO MAX PG: 40.4 MMHG
BH CV ECHO MEAS - AO MEAN PG: 13.5 MMHG
BH CV ECHO MEAS - AO ROOT DIAM: 3.3 CM
BH CV ECHO MEAS - AO V2 MAX: 318 CM/SEC
BH CV ECHO MEAS - AO V2 VTI: 45 CM
BH CV ECHO MEAS - AVA(I,D): 0.87 CM2
BH CV ECHO MEAS - EDV(CUBED): 456.5 ML
BH CV ECHO MEAS - EDV(MOD-SP4): 175 ML
BH CV ECHO MEAS - EF(MOD-SP4): 53.1 %
BH CV ECHO MEAS - ESV(CUBED): 218.2 ML
BH CV ECHO MEAS - ESV(MOD-SP4): 82 ML
BH CV ECHO MEAS - FS: 21.8 %
BH CV ECHO MEAS - IVS/LVPW: 1.07 CM
BH CV ECHO MEAS - IVSD: 1.01 CM
BH CV ECHO MEAS - LA DIMENSION: 4.1 CM
BH CV ECHO MEAS - LAT PEAK E' VEL: 7.9 CM/SEC
BH CV ECHO MEAS - LV MASS(C)D: 366.9 GRAMS
BH CV ECHO MEAS - LV MAX PG: 1.33 MMHG
BH CV ECHO MEAS - LV MEAN PG: 1 MMHG
BH CV ECHO MEAS - LV V1 MAX: 57.6 CM/SEC
BH CV ECHO MEAS - LV V1 VTI: 12.5 CM
BH CV ECHO MEAS - LVIDD: 7.7 CM
BH CV ECHO MEAS - LVIDS: 6 CM
BH CV ECHO MEAS - LVOT AREA: 3.1 CM2
BH CV ECHO MEAS - LVOT DIAM: 2 CM
BH CV ECHO MEAS - LVPWD: 0.94 CM
BH CV ECHO MEAS - MED PEAK E' VEL: 5.9 CM/SEC
BH CV ECHO MEAS - MR MAX PG: 131.3 MMHG
BH CV ECHO MEAS - MR MAX VEL: 573 CM/SEC
BH CV ECHO MEAS - MR MEAN PG: 94 MMHG
BH CV ECHO MEAS - MR MEAN VEL: 460 CM/SEC
BH CV ECHO MEAS - MR VTI: 188 CM
BH CV ECHO MEAS - MV A MAX VEL: 95.6 CM/SEC
BH CV ECHO MEAS - MV DEC SLOPE: 762.5 CM/SEC2
BH CV ECHO MEAS - MV DEC TIME: 0.13 SEC
BH CV ECHO MEAS - MV E MAX VEL: 112 CM/SEC
BH CV ECHO MEAS - MV E/A: 1.17
BH CV ECHO MEAS - MV P1/2T: 52.2 MSEC
BH CV ECHO MEAS - MVA(P1/2T): 4.2 CM2
BH CV ECHO MEAS - PA V2 MAX: 121.1 CM/SEC
BH CV ECHO MEAS - RAP SYSTOLE: 5 MMHG
BH CV ECHO MEAS - RVSP: 70.6 MMHG
BH CV ECHO MEAS - SV(LVOT): 39.3 ML
BH CV ECHO MEAS - SV(MOD-SP4): 93 ML
BH CV ECHO MEAS - TR MAX PG: 65.6 MMHG
BH CV ECHO MEAS - TR MAX VEL: 405 CM/SEC
BH CV ECHO MEASUREMENTS AVERAGE E/E' RATIO: 16.23
BH CV XLRA - TDI S': 9.4 CM/SEC
BILIRUB SERPL-MCNC: 0.2 MG/DL (ref 0–1.2)
BUN SERPL-MCNC: 24 MG/DL (ref 8–23)
BUN SERPL-MCNC: 24 MG/DL (ref 8–23)
BUN/CREAT SERPL: 18.6 (ref 7–25)
BUN/CREAT SERPL: 21.4 (ref 7–25)
CALCIUM SPEC-SCNC: 8.5 MG/DL (ref 8.6–10.5)
CALCIUM SPEC-SCNC: 8.7 MG/DL (ref 8.6–10.5)
CHLORIDE SERPL-SCNC: 100 MMOL/L (ref 98–107)
CHLORIDE SERPL-SCNC: 100 MMOL/L (ref 98–107)
CO2 SERPL-SCNC: 33 MMOL/L (ref 22–29)
CO2 SERPL-SCNC: 33 MMOL/L (ref 22–29)
CREAT SERPL-MCNC: 1.12 MG/DL (ref 0.76–1.27)
CREAT SERPL-MCNC: 1.29 MG/DL (ref 0.76–1.27)
DEPRECATED RDW RBC AUTO: 46.2 FL (ref 37–54)
DEPRECATED RDW RBC AUTO: 47.3 FL (ref 37–54)
EGFRCR SERPLBLD CKD-EPI 2021: 62.7 ML/MIN/1.73
EGFRCR SERPLBLD CKD-EPI 2021: 74.3 ML/MIN/1.73
EOSINOPHIL # BLD AUTO: 0.02 10*3/MM3 (ref 0–0.4)
EOSINOPHIL # BLD AUTO: 0.03 10*3/MM3 (ref 0–0.4)
EOSINOPHIL NFR BLD AUTO: 0.1 % (ref 0.3–6.2)
EOSINOPHIL NFR BLD AUTO: 0.2 % (ref 0.3–6.2)
ERYTHROCYTE [DISTWIDTH] IN BLOOD BY AUTOMATED COUNT: 14.2 % (ref 12.3–15.4)
ERYTHROCYTE [DISTWIDTH] IN BLOOD BY AUTOMATED COUNT: 14.4 % (ref 12.3–15.4)
GEN 5 2HR TROPONIN T REFLEX: 63 NG/L
GLOBULIN UR ELPH-MCNC: 3.1 GM/DL
GLUCOSE BLDC GLUCOMTR-MCNC: 160 MG/DL (ref 70–130)
GLUCOSE BLDC GLUCOMTR-MCNC: 186 MG/DL (ref 70–130)
GLUCOSE BLDC GLUCOMTR-MCNC: 187 MG/DL (ref 70–130)
GLUCOSE BLDC GLUCOMTR-MCNC: 187 MG/DL (ref 70–130)
GLUCOSE BLDC GLUCOMTR-MCNC: 221 MG/DL (ref 70–130)
GLUCOSE SERPL-MCNC: 203 MG/DL (ref 65–99)
GLUCOSE SERPL-MCNC: 230 MG/DL (ref 65–99)
HCT VFR BLD AUTO: 38.6 % (ref 37.5–51)
HCT VFR BLD AUTO: 38.9 % (ref 37.5–51)
HGB BLD-MCNC: 11.1 G/DL (ref 13–17.7)
HGB BLD-MCNC: 11.2 G/DL (ref 13–17.7)
IMM GRANULOCYTES # BLD AUTO: 0.1 10*3/MM3 (ref 0–0.05)
IMM GRANULOCYTES # BLD AUTO: 0.11 10*3/MM3 (ref 0–0.05)
IMM GRANULOCYTES NFR BLD AUTO: 0.6 % (ref 0–0.5)
IMM GRANULOCYTES NFR BLD AUTO: 0.7 % (ref 0–0.5)
LEFT ATRIUM VOLUME INDEX: 53.5 ML/M2
LYMPHOCYTES # BLD AUTO: 0.99 10*3/MM3 (ref 0.7–3.1)
LYMPHOCYTES # BLD AUTO: 1.49 10*3/MM3 (ref 0.7–3.1)
LYMPHOCYTES NFR BLD AUTO: 5.9 % (ref 19.6–45.3)
LYMPHOCYTES NFR BLD AUTO: 9.1 % (ref 19.6–45.3)
MAGNESIUM SERPL-MCNC: 2 MG/DL (ref 1.6–2.4)
MCH RBC QN AUTO: 25.9 PG (ref 26.6–33)
MCH RBC QN AUTO: 26.1 PG (ref 26.6–33)
MCHC RBC AUTO-ENTMCNC: 28.5 G/DL (ref 31.5–35.7)
MCHC RBC AUTO-ENTMCNC: 29 G/DL (ref 31.5–35.7)
MCV RBC AUTO: 89.4 FL (ref 79–97)
MCV RBC AUTO: 91.5 FL (ref 79–97)
MONOCYTES # BLD AUTO: 0.97 10*3/MM3 (ref 0.1–0.9)
MONOCYTES # BLD AUTO: 1.16 10*3/MM3 (ref 0.1–0.9)
MONOCYTES NFR BLD AUTO: 5.9 % (ref 5–12)
MONOCYTES NFR BLD AUTO: 6.9 % (ref 5–12)
NEUTROPHILS NFR BLD AUTO: 13.74 10*3/MM3 (ref 1.7–7)
NEUTROPHILS NFR BLD AUTO: 14.45 10*3/MM3 (ref 1.7–7)
NEUTROPHILS NFR BLD AUTO: 83.8 % (ref 42.7–76)
NEUTROPHILS NFR BLD AUTO: 86.3 % (ref 42.7–76)
NRBC BLD AUTO-RTO: 0 /100 WBC (ref 0–0.2)
NRBC BLD AUTO-RTO: 0 /100 WBC (ref 0–0.2)
NT-PROBNP SERPL-MCNC: 1037 PG/ML (ref 0–900)
PHOSPHATE SERPL-MCNC: 3.3 MG/DL (ref 2.5–4.5)
PLATELET # BLD AUTO: 243 10*3/MM3 (ref 140–450)
PLATELET # BLD AUTO: 257 10*3/MM3 (ref 140–450)
PMV BLD AUTO: 10.3 FL (ref 6–12)
PMV BLD AUTO: 9.9 FL (ref 6–12)
POTASSIUM SERPL-SCNC: 4.2 MMOL/L (ref 3.5–5.2)
POTASSIUM SERPL-SCNC: 4.2 MMOL/L (ref 3.5–5.2)
PROT SERPL-MCNC: 6.7 G/DL (ref 6–8.5)
QT INTERVAL: 408 MS
QTC INTERVAL: 504 MS
RBC # BLD AUTO: 4.25 10*6/MM3 (ref 4.14–5.8)
RBC # BLD AUTO: 4.32 10*6/MM3 (ref 4.14–5.8)
SODIUM SERPL-SCNC: 139 MMOL/L (ref 136–145)
SODIUM SERPL-SCNC: 142 MMOL/L (ref 136–145)
TROPONIN T DELTA: 3 NG/L
TROPONIN T SERPL HS-MCNC: 60 NG/L
WBC NRBC COR # BLD AUTO: 16.39 10*3/MM3 (ref 3.4–10.8)
WBC NRBC COR # BLD AUTO: 16.76 10*3/MM3 (ref 3.4–10.8)

## 2023-12-07 PROCEDURE — 74018 RADEX ABDOMEN 1 VIEW: CPT

## 2023-12-07 PROCEDURE — 93005 ELECTROCARDIOGRAM TRACING: CPT | Performed by: STUDENT IN AN ORGANIZED HEALTH CARE EDUCATION/TRAINING PROGRAM

## 2023-12-07 PROCEDURE — 25010000002 FUROSEMIDE PER 20 MG: Performed by: INTERNAL MEDICINE

## 2023-12-07 PROCEDURE — 63710000001 INSULIN LISPRO (HUMAN) PER 5 UNITS: Performed by: STUDENT IN AN ORGANIZED HEALTH CARE EDUCATION/TRAINING PROGRAM

## 2023-12-07 PROCEDURE — 84484 ASSAY OF TROPONIN QUANT: CPT | Performed by: STUDENT IN AN ORGANIZED HEALTH CARE EDUCATION/TRAINING PROGRAM

## 2023-12-07 PROCEDURE — 85025 COMPLETE CBC W/AUTO DIFF WBC: CPT | Performed by: STUDENT IN AN ORGANIZED HEALTH CARE EDUCATION/TRAINING PROGRAM

## 2023-12-07 PROCEDURE — 93306 TTE W/DOPPLER COMPLETE: CPT | Performed by: INTERNAL MEDICINE

## 2023-12-07 PROCEDURE — 93306 TTE W/DOPPLER COMPLETE: CPT

## 2023-12-07 PROCEDURE — 99222 1ST HOSP IP/OBS MODERATE 55: CPT | Performed by: INTERNAL MEDICINE

## 2023-12-07 PROCEDURE — 71045 X-RAY EXAM CHEST 1 VIEW: CPT

## 2023-12-07 PROCEDURE — 83735 ASSAY OF MAGNESIUM: CPT | Performed by: STUDENT IN AN ORGANIZED HEALTH CARE EDUCATION/TRAINING PROGRAM

## 2023-12-07 PROCEDURE — 99024 POSTOP FOLLOW-UP VISIT: CPT | Performed by: STUDENT IN AN ORGANIZED HEALTH CARE EDUCATION/TRAINING PROGRAM

## 2023-12-07 PROCEDURE — 83880 ASSAY OF NATRIURETIC PEPTIDE: CPT | Performed by: INTERNAL MEDICINE

## 2023-12-07 PROCEDURE — 25010000002 ONDANSETRON PER 1 MG: Performed by: STUDENT IN AN ORGANIZED HEALTH CARE EDUCATION/TRAINING PROGRAM

## 2023-12-07 PROCEDURE — 25010000002 HEPARIN (PORCINE) PER 1000 UNITS: Performed by: STUDENT IN AN ORGANIZED HEALTH CARE EDUCATION/TRAINING PROGRAM

## 2023-12-07 PROCEDURE — 25510000001 PERFLUTREN PROTEIN A MICROSPH SUSPENSION: Performed by: STUDENT IN AN ORGANIZED HEALTH CARE EDUCATION/TRAINING PROGRAM

## 2023-12-07 PROCEDURE — 25010000002 HYDROMORPHONE PER 4 MG: Performed by: STUDENT IN AN ORGANIZED HEALTH CARE EDUCATION/TRAINING PROGRAM

## 2023-12-07 PROCEDURE — 93010 ELECTROCARDIOGRAM REPORT: CPT | Performed by: INTERNAL MEDICINE

## 2023-12-07 PROCEDURE — 80053 COMPREHEN METABOLIC PANEL: CPT | Performed by: STUDENT IN AN ORGANIZED HEALTH CARE EDUCATION/TRAINING PROGRAM

## 2023-12-07 PROCEDURE — 82948 REAGENT STRIP/BLOOD GLUCOSE: CPT

## 2023-12-07 PROCEDURE — 84100 ASSAY OF PHOSPHORUS: CPT | Performed by: STUDENT IN AN ORGANIZED HEALTH CARE EDUCATION/TRAINING PROGRAM

## 2023-12-07 PROCEDURE — 25810000003 LACTATED RINGERS PER 1000 ML: Performed by: STUDENT IN AN ORGANIZED HEALTH CARE EDUCATION/TRAINING PROGRAM

## 2023-12-07 RX ORDER — FUROSEMIDE 40 MG/1
40 TABLET ORAL DAILY
Status: DISCONTINUED | OUTPATIENT
Start: 2023-12-08 | End: 2023-12-10 | Stop reason: HOSPADM

## 2023-12-07 RX ORDER — FUROSEMIDE 10 MG/ML
40 INJECTION INTRAMUSCULAR; INTRAVENOUS ONCE
Status: COMPLETED | OUTPATIENT
Start: 2023-12-07 | End: 2023-12-07

## 2023-12-07 RX ORDER — FUROSEMIDE 10 MG/ML
40 INJECTION INTRAMUSCULAR; INTRAVENOUS ONCE
Status: DISCONTINUED | OUTPATIENT
Start: 2023-12-07 | End: 2023-12-07

## 2023-12-07 RX ADMIN — SODIUM CHLORIDE, POTASSIUM CHLORIDE, SODIUM LACTATE AND CALCIUM CHLORIDE 100 ML/HR: 600; 310; 30; 20 INJECTION, SOLUTION INTRAVENOUS at 07:09

## 2023-12-07 RX ADMIN — ONDANSETRON 4 MG: 2 INJECTION INTRAMUSCULAR; INTRAVENOUS at 22:22

## 2023-12-07 RX ADMIN — HEPARIN SODIUM 5000 UNITS: 5000 INJECTION INTRAVENOUS; SUBCUTANEOUS at 15:25

## 2023-12-07 RX ADMIN — INSULIN LISPRO 3 UNITS: 100 INJECTION, SOLUTION INTRAVENOUS; SUBCUTANEOUS at 13:07

## 2023-12-07 RX ADMIN — HYDROMORPHONE HYDROCHLORIDE 0.5 MG: 1 INJECTION, SOLUTION INTRAMUSCULAR; INTRAVENOUS; SUBCUTANEOUS at 22:53

## 2023-12-07 RX ADMIN — INSULIN LISPRO 3 UNITS: 100 INJECTION, SOLUTION INTRAVENOUS; SUBCUTANEOUS at 22:10

## 2023-12-07 RX ADMIN — HUMAN ALBUMIN MICROSPHERES AND PERFLUTREN 0.44 MG: 10; .22 INJECTION, SOLUTION INTRAVENOUS at 08:38

## 2023-12-07 RX ADMIN — INSULIN LISPRO 3 UNITS: 100 INJECTION, SOLUTION INTRAVENOUS; SUBCUTANEOUS at 07:19

## 2023-12-07 RX ADMIN — HEPARIN SODIUM 5000 UNITS: 5000 INJECTION INTRAVENOUS; SUBCUTANEOUS at 07:09

## 2023-12-07 RX ADMIN — OXYCODONE HYDROCHLORIDE 5 MG: 5 TABLET ORAL at 15:32

## 2023-12-07 RX ADMIN — LIDOCAINE 2 PATCH: 700 PATCH TOPICAL at 08:50

## 2023-12-07 RX ADMIN — FUROSEMIDE 40 MG: 10 INJECTION, SOLUTION INTRAVENOUS at 09:23

## 2023-12-07 RX ADMIN — HEPARIN SODIUM 5000 UNITS: 5000 INJECTION INTRAVENOUS; SUBCUTANEOUS at 22:10

## 2023-12-07 RX ADMIN — INSULIN LISPRO 5 UNITS: 100 INJECTION, SOLUTION INTRAVENOUS; SUBCUTANEOUS at 17:41

## 2023-12-07 RX ADMIN — NICOTINE 1 PATCH: 21 PATCH, EXTENDED RELEASE TRANSDERMAL at 08:52

## 2023-12-07 NOTE — PLAN OF CARE
Goal Outcome Evaluation:  Plan of Care Reviewed With: patient        Progress: no change  Outcome Evaluation: IVF were stopped. NG placed and to LIWS. Pain medication given once. Chest pain and abdominal pain is better per pt. Pt is wanting the NG tube out today and I explained that the MD makes that decision. NG teaching was reinforced. Safety maintained.

## 2023-12-07 NOTE — CONSULTS
Referring Provider: Oma Velasquez MD    Reason for Consultation: Elevated troponin    No chief complaint on file.      Subjective .     History of present illness:  Tucker Knox is a 63 y.o. yo male with history of adenocarcinoma of the recto sigmoid junction, status post laparoscopic robotic assisted colon resection and splenic flexure mobilization and open umbilical herniorrhaphy yesterday, 12/5/2023 by Dr. Oma Velasquez who presents today for an elevated troponin level of 60.  The patient is known to have coronary artery disease, having undergone placement of a drug-eluting stent to the right coronary artery on 4/13/2022 by Dr. Todd Avendano.  At that time it appears that he had rather diffuse coronary artery disease with mild to moderate lesions in the proximal LAD and diagonal branch as well.  A previous echocardiogram in 2021 revealed a reduced left ventricular ejection fraction of 46 to 50% and evidence of moderate pulmonary artery hypertension.    This morning, the patient noted some vague chest discomfort and dyspnea.  It is rather difficult to obtain a complete description of his symptoms.  It is noted however that his dyspnea persists but his chest pain has almost completely resolved.  Electrocardiogram this morning shows no acute changes.  He does have right bundle branch block and left anterior fascicular block which has been present on previous EKGs dating back to 12/25/202       No chief complaint on file.  .    History  Past Medical History:   Diagnosis Date    Cancer     CHF (congestive heart failure)     COPD (chronic obstructive pulmonary disease)     Coronary artery disease     stent x 1    Family history of colon cancer     Hyperlipidemia     Hypertension     Sleep apnea     does not wear machine, supposed to wear cpap with oxygen and does not wear it    Type 2 diabetes mellitus    ,   Past Surgical History:   Procedure Laterality Date    BACK SURGERY      CARDIAC CATHETERIZATION  Left 04/13/2022    Procedure: Cardiac Catheterization/Vascular Study;  Surgeon: Todd Avendano MD;  Location:  [facility] CATH INVASIVE LOCATION;  Service: Cardiology;  Laterality: Left;    CARDIAC CATHETERIZATION      with stent x1    COLON RESECTION N/A 12/5/2023    Procedure: COLON RESECTION LAPAROSCOPIC SIGMOID WITH DAVINCI ROBOT, INTRA-OPERATIVE FLEXIBLE SIGMOIDOSCOPY, SPLENIC FLEXURE MOBILIZATION, OPEN UMBILICAL HERNIA REPAIR;  Surgeon: Oma Velasquez MD;  Location:  [facility] OR;  Service: Robotics - DaVinci;  Laterality: N/A;    COLONOSCOPY N/A 10/09/2023    Diverticulosis; One 5mm polyp in ascending colon; One 5mm polyp in transverse colon; One 10mm polyp at 65cm proximal to anus; One 20mm polyp at 65cm proximal to anus-Tattooed; One 20mm polyp at 42cm proximal to anus-Clip (MR conditional) placed-Tattooed; Rule out malignancy-Partially obstructing tumor in recto-sigmoid colon-biopsied-Tattooed; One 10mm polyp in rectum    ELBOW PROCEDURE      KNEE SURGERY      LUMBAR DISC SURGERY     ,   Family History   Problem Relation Age of Onset    Esophageal cancer Mother     Heart disease Mother     Colon cancer Father 80    Heart disease Father     No Known Problems Sister     COPD Brother     Alcohol abuse Brother     Colon polyps Neg Hx     Liver cancer Neg Hx     Rectal cancer Neg Hx     Stomach cancer Neg Hx     Liver disease Neg Hx    ,   Social History     Tobacco Use    Smoking status: Every Day     Packs/day: 1.00     Years: 25.00     Additional pack years: 0.00     Total pack years: 25.00     Types: Cigarettes    Smokeless tobacco: Never   Vaping Use    Vaping Use: Some days    Substances: Nicotine, Flavoring    Devices: Disposable   Substance Use Topics    Alcohol use: Not Currently    Drug use: Yes     Types: Marijuana   ,     Medications  Current Facility-Administered Medications   Medication Dose Route Frequency Provider Last Rate Last Admin    acetaminophen (TYLENOL) tablet 1,000 mg  1,000 mg Oral Q6H  Oma Velasquez MD   1,000 mg at 12/06/23 2257    dextrose (D50W) (25 g/50 mL) IV injection 25 g  25 g Intravenous Q15 Min PRN Oma Velasquez MD        dextrose (GLUTOSE) oral gel 15 g  15 g Oral Q15 Min PRN Oma Velasquez MD        docusate sodium (COLACE) capsule 100 mg  100 mg Oral BID Millie Dey PA-C   100 mg at 12/06/23 2259    DULoxetine (CYMBALTA) DR capsule 30 mg  30 mg Oral Daily Oma Velasquez MD   30 mg at 12/06/23 0814    famotidine (PEPCID) tablet 20 mg  20 mg Oral BID Oma Velasquez MD   20 mg at 12/06/23 2257    gabapentin (NEURONTIN) capsule 800 mg  800 mg Oral BID Oma Velasquez MD   800 mg at 12/06/23 2257    glucagon (GLUCAGEN) injection 1 mg  1 mg Intramuscular Q15 Min PRN Oma Velasquez MD        heparin (porcine) 5000 UNIT/ML injection 5,000 Units  5,000 Units Subcutaneous Q8H Oma Velasquez MD   5,000 Units at 12/07/23 0709    HYDROmorphone (DILAUDID) injection 0.5 mg  0.5 mg Intravenous Q2H PRN Oma Velasquez MD        And    naloxone (NARCAN) injection 0.4 mg  0.4 mg Intravenous Q5 Min PRN Oma Velasquez MD        Insulin Lispro (humaLOG) injection 3-14 Units  3-14 Units Subcutaneous 4x Daily AC & at Bedtime Oma Velasquez MD   3 Units at 12/07/23 0719    lactated ringers infusion  100 mL/hr Intravenous Continuous Oma Velasquez  mL/hr at 12/07/23 0709 100 mL/hr at 12/07/23 0709    lidocaine (LIDODERM) 5 % 2 patch  2 patch Transdermal Q24H Oma Velasquez MD   2 patch at 12/06/23 0815    nicotine (NICODERM CQ) 21 MG/24HR patch 1 patch  1 patch Transdermal Q24H Oma Velasquez MD   1 patch at 12/06/23 1551    ondansetron (ZOFRAN) tablet 4 mg  4 mg Oral Q6H PRN Oma Velasquez MD        Or    ondansetron (ZOFRAN) injection 4 mg  4 mg Intravenous Q6H PRN Oma Velasquez MD        oxyCODONE (ROXICODONE) immediate release tablet 5 mg  5 mg Oral Q6H PRN Oma Velasquez MD   5 mg at 12/06/23  2322    polyethylene glycol (MIRALAX) packet 17 g  17 g Oral Daily Millie Dey PA-C   17 g at 12/06/23 1119    sodium chloride nasal spray 2 spray  2 spray Each Nare PRN Oma Velasquez MD        traMADol (ULTRAM) tablet 50 mg  50 mg Oral Q6H PRN Oma Velasquez MD           Allergies:  Penicillins    Review of Systems  CONSTITUTIONAL: Negative  HEENT: Negative  RESPIRATORY: See HPI  CARDIOVASCULAR: See HPI  GASTROINTESTINAL: Patient presented with rectal bleeding  GENITOURINARY: No dysuria or urinary hesitancy  MUSCULOSKELETAL: No unusual arthralgias  NEUROLOGICAL: Essentially negative  PSYCHIATRIC: Negative  SKIN: No rash  ENDOCRINE: No heat or cold intolerance  HEMATOLOGIC: No easy bruisability or adenopathy  LYMPHATIC: No adenopathy  ALLERGIC AND IMMUNOLOGIC: Negative    Objective     Physical Exam:  Patient Vitals for the past 24 hrs:   BP Temp Temp src Pulse Resp SpO2   12/07/23 0806 141/66 98.6 °F (37 °C) -- 95 16 94 %   12/07/23 0413 132/65 97.8 °F (36.6 °C) Oral 90 16 91 %   12/06/23 2300 160/75 97.6 °F (36.4 °C) Oral 88 16 92 %   12/06/23 1959 143/80 98.3 °F (36.8 °C) Oral 93 16 93 %   12/06/23 1227 132/64 98.1 °F (36.7 °C) Oral 85 16 95 %   12/06/23 0927 -- -- -- -- -- 95 %     General -a 63-year-old male who has a BMI of over 34 who is somewhat groggy but is able to respond to 2 questions.  Eyes -no scleral icterus  Oropharynx -grossly negative  Lymphatics -grossly negative  Respiratory -coarse breath sounds with left basilar rales  Cardiac -regular rhythm with normal S1 and S2.  No audible murmurs or gallops sounds.  Abdomen -postoperative  Extremities -trace pretibial edema.  Pedal pulses intact  Musculoskeletal -grossly negative  Skin-no obvious rash  Neurological examination -no obvious focal abnormalities.    Results Review:   I reviewed the patient's new clinical results.  Lab Results (last 24 hours)       Procedure Component Value Units Date/Time    Phosphorus [[id number]]  (Normal)  Collected: 12/07/23 0653    Specimen: Blood Updated: 12/07/23 0723     Phosphorus 3.3 mg/dL     Comprehensive Metabolic Panel [[id number]]  (Abnormal) Collected: 12/07/23 0653    Specimen: Blood Updated: 12/07/23 0723     Glucose 203 mg/dL      BUN 24 mg/dL      Creatinine 1.29 mg/dL      Sodium 139 mmol/L      Potassium 4.2 mmol/L      Chloride 100 mmol/L      CO2 33.0 mmol/L      Calcium 8.5 mg/dL      Total Protein 6.7 g/dL      Albumin 3.6 g/dL      ALT (SGPT) 5 U/L      AST (SGOT) 9 U/L      Alkaline Phosphatase 76 U/L      Total Bilirubin 0.2 mg/dL      Globulin 3.1 gm/dL      A/G Ratio 1.2 g/dL      BUN/Creatinine Ratio 18.6     Anion Gap 6.0 mmol/L      eGFR 62.7 mL/min/1.73     Narrative:      GFR Normal >60  Chronic Kidney Disease <60  Kidney Failure <15      Magnesium [[id number]]  (Normal) Collected: 12/07/23 0653    Specimen: Blood Updated: 12/07/23 0723     Magnesium 2.0 mg/dL     POC Glucose Once [[id number]]  (Abnormal) Collected: 12/07/23 0711    Specimen: Blood Updated: 12/07/23 0723     Glucose 187 mg/dL      Comment: : [id number] Micheal (Lewis) JessicaMeter ID: [id number]       High Sensitivity Troponin T [[id number]]  (Abnormal) Collected: 12/07/23 0653    Specimen: Blood Updated: 12/07/23 0723     HS Troponin T 60 ng/L     Narrative:      High Sensitive Troponin T Reference Range:  <14.0 ng/L- Negative Female for AMI  <22.0 ng/L- Negative Male for AMI  >=14 - Abnormal Female indicating possible myocardial injury.  >=22 - Abnormal Male indicating possible myocardial injury.   Clinicians would have to utilize clinical acumen, EKG, Troponin, and serial changes to determine if it is an Acute Myocardial Infarction or myocardial injury due to an underlying chronic condition.         CBC & Differential [[id number]]  (Abnormal) Collected: 12/07/23 0653    Specimen: Blood Updated: 12/07/23 0704    Narrative:      The following orders were created for panel order CBC & Differential.  Procedure                                Abnormality         Status                     ---------                               -----------         ------                     CBC Auto Differential[[id number]]        Abnormal            Final result                 Please view results for these tests on the individual orders.    CBC Auto Differential [[id number]]  (Abnormal) Collected: 12/07/23 0653    Specimen: Blood Updated: 12/07/23 0704     WBC 16.39 10*3/mm3      RBC 4.25 10*6/mm3      Hemoglobin 11.1 g/dL      Hematocrit 38.9 %      MCV 91.5 fL      MCH 26.1 pg      MCHC 28.5 g/dL      RDW 14.4 %      RDW-SD 47.3 fl      MPV 10.3 fL      Platelets 257 10*3/mm3      Neutrophil % 83.8 %      Lymphocyte % 9.1 %      Monocyte % 5.9 %      Eosinophil % 0.2 %      Basophil % 0.3 %      Immature Grans % 0.7 %      Neutrophils, Absolute 13.74 10*3/mm3      Lymphocytes, Absolute 1.49 10*3/mm3      Monocytes, Absolute 0.97 10*3/mm3      Eosinophils, Absolute 0.03 10*3/mm3      Basophils, Absolute 0.05 10*3/mm3      Immature Grans, Absolute 0.11 10*3/mm3      nRBC 0.0 /100 WBC     POC Glucose Once [[id number]]  (Abnormal) Collected: 12/07/23 0614    Specimen: Blood Updated: 12/07/23 0626     Glucose 160 mg/dL      Comment: : [id number] Yusef EthanMeter ID: [id number]       POC Glucose Once [[id number]]  (Abnormal) Collected: 12/06/23 2104    Specimen: Blood Updated: 12/06/23 2115     Glucose 254 mg/dL      Comment: : [id number] Yusef EthanMeter ID: [id number]       POC Glucose Once [[id number]]  (Abnormal) Collected: 12/06/23 1710    Specimen: Blood Updated: 12/06/23 1721     Glucose 250 mg/dL      Comment: : [id number] Prashant BurlesonghMeter ID: [id number]       Tissue Pathology Exam [[id number]] Collected: 12/05/23 1251    Specimen: Tissue from Mesentery; Tissue from Large Intestine, Sigmoid Colon; Tissue from Large Intestine, Sigmoid Colon; Tissue from Large Intestine, Sigmoid Colon Updated: 12/06/23 1119     Case Report --     Surgical  "Pathology Report                         Case: [id number]                                  Authorizing Provider:  Oma Velasquez MD    Collected:           12/05/2023 12:51 PM          Ordering Location:     [facility] OR  Received:            12/05/2023 12:58 PM          Pathologist:           Radha Rowley MD                                                        Intraop:               Radha Rowley MD                                                        Specimen:    Mesentery, Mesenteric implant                                                               Synoptic Checklist --     Intraoperative Consultation --       Specimen: \"Mesenteric implant\"  FROZEN SECTION DIAGNOSIS: Consistent with a nodular area of fat necrosis with numerous macrophages.  Reported to \"Kwabena\" in OR 6 on 12/5/2023 at 13:11 CST by Radha Rowley MD.      POC Glucose Once [[id number]]  (Abnormal) Collected: 12/06/23 1056    Specimen: Blood Updated: 12/06/23 1107     Glucose 255 mg/dL      Comment: : [id number] Ron TenishaeMeter ID: [id number]             Imaging Results (Last 24 Hours)       Procedure Component Value Units Date/Time    XR Abdomen KUB [[id number]] Collected: 12/07/23 0719     Updated: 12/07/23 0724    Narrative:      EXAM: XR ABDOMEN KUB-      DATE: 12/7/2023 6:45 AM     HISTORY: abdominal tightness; C18.9-Malignant neoplasm of colon,  unspecified status post recent colon resection, bloating, tolerating  clear liquids according to surgical progress note 12/6/2023.     COMPARISON: 10/25/2023.     TECHNIQUE:  Supine view of the abdomen. 1 images.     FINDINGS:    Limited evaluation for free air on supine imaging. Distended loops of  small bowel and colon.     Pelvic phlebolith. Degenerative changes of the spine. No acute bony  finding.          Impression:      Distended loops of small bowel and colon, which in the  appropriate clinical setting would be supportive of ileus.     This report " "was signed and finalized on 12/7/2023 7:21 AM by Dr Olga Ford MD.       XR Chest 1 View [[id number]] Collected: 12/07/23 0710     Updated: 12/07/23 0716    Narrative:      EXAM: XR CHEST 1 VW- 12/7/2023 6:50 AM     HISTORY: chest tightness; C18.9-Malignant neoplasm of colon, unspecified        COMPARISON: 11/14/2023.     TECHNIQUE: Single frontal radiograph of the chest was obtained.     FINDINGS:      Support Devices: None.     Cardiac and Mediastinal Silhouettes: Normal.     Lungs/Pleura: Diffusely increased bilateral interstitial opacities with  curly B lines in the right lung base. No sizable pleural effusion. No  visible pneumothorax.     Osseous structures: No acute osseous finding.     Other: None.       Impression:         Diffusely increased bilateral interstitial opacities, favor pulmonary  edema.           This report was signed and finalized on 12/7/2023 7:13 AM by Agustin Bravo.             No results found for: \"ECHOEFEST\"      Colon adenocarcinoma      ASSESSMENT/PLAN:    1.  Elevated troponin.  It is felt likely that the patient has had a non-STEMI in the perioperative period.  It is likely to be a type II MI due to the stress of having undergone a surgical procedure with general anesthesia in the face of having known coronary artery disease.  Await serial troponins to see how things are trending.  It is likely that the patient will require an ischemic study or invasive cardiac evaluation fairly soon.    As the patient has no acute EKG changes and his bifascicular block is chronic, I believe we can take a course of continued close observation at present.  I am hesitant to anticoagulate this patient so soon after surgery so we will hold off on this for now.  If the patient has an upward trending troponin of significance and ongoing symptoms, it may be prudent to move him to critical care.    2.  Dyspnea.  The patient is receiving lactated Ringer's at 100 cc an hour.  He has laboratory evidence " of prerenal azotemia with a BUN of 24 and a creatinine of approximately 1.3.  He is probably third spacing some fluid in the face of recent surgery and it is difficult to tell if he is dyspnea is due to atelectasis or some degree of volume overload in the face of left ventricular systolic dysfunction.  I have ordered a BNP level to help us sort things out.    3.  Known left ventricular systolic dysfunction and previous evidence of elevated pulmonary artery pressures.  A repeat echocardiogram has been ordered and the patient is currently undergoing the study.  Will review this as soon as it is available.    Thank you for asking us to see this patient.      Jose Quiñonez MD  12/07/23  08:28 CST

## 2023-12-07 NOTE — CASE MANAGEMENT/SOCIAL WORK
Discharge Planning Assessment  [facility]     Patient Name: Tucker Knox  MRN: [id number]  Today's Date: 12/7/2023    Admit Date: 12/5/2023        Discharge Needs Assessment       Row Name 12/07/23 0924       Living Environment    People in Home alone    Current Living Arrangements home    Potentially Unsafe Housing Conditions none    In the past 12 months has the electric, gas, oil, or water company threatened to shut off services in your home? No    Primary Care Provided by self    Provides Primary Care For no one    Family Caregiver if Needed friend(s)    Family Caregiver Names Raul Mondragon - next door neighbor/friend    Quality of Family Relationships helpful;involved    Able to Return to Prior Arrangements yes       Resource/Environmental Concerns    Resource/Environmental Concerns none    Transportation Concerns none       Food Insecurity    Within the past 12 months, you worried that your food would run out before you got the money to buy more. Never true    Within the past 12 months, the food you bought just didn't last and you didn't have money to get more. Never true       Transition Planning    Patient/Family Anticipates Transition to home    Transportation Anticipated family or friend will provide;car, drives self       Discharge Needs Assessment    Readmission Within the Last 30 Days no previous admission in last 30 days    Equipment Currently Used at Home cane, straight;cpap    Concerns to be Addressed discharge planning;denies needs/concerns at this time    Anticipated Changes Related to Illness none    Current Discharge Risk lives alone    Discharge Coordination/Progress Pt lives alone, states he is independent and drives.  His friend (Raul) lives next door in a camper and helps him as needed.  Says he has a PCP and RX coverage and is anticipating discharging home when better.  Currently denies needs. He does have a son listed in emergency contacts but did not mention anything about him  during questioning.                   Discharge Plan    No documentation.                 Continued Care and Services - Admitted Since 12/5/2023    Coordination has not been started for this encounter.          Demographic Summary    No documentation.                  Functional Status    No documentation.                  Psychosocial    No documentation.                  Abuse/Neglect    No documentation.                  Legal    No documentation.                  Substance Abuse    No documentation.                  Patient Forms    No documentation.                     Vernell Wyatt RN

## 2023-12-07 NOTE — PROGRESS NOTES
[facility] Medicine Consult  Consults    Date of Admission: 12/5/2023  Date of Consult: 12/07/23    Primary Care Physician: Kt Alfredo MD  Referring Physician: Dr. Oma Velasquez  Chief Complaint/Reason for Consultation: Elevated troponin    Subjective   History of Present Illness  This 63-year-old male was admitted by Dr. Velasquez and underwent a colon resection secondary to rectal adenocarcinoma.  The patient complained of abdominal distention and fullness in his lower chest with nausea.  He denies pain radiation he also denies diaphoresis.  Distention of his abdomen created mild shortness of breath.  This was reported to Dr. Velasquez who ordered an EKG and cardiac markers.  EKG revealed right bundle branch block with left anterior fascicular block with no acute ischemic changes noted.  Initial troponin was elevated at 60.  BNP was 1037.  Repeat BMP 1 hour after the initial value was flat at 63.  White blood cell count today 16,400.  Creatinine is at baseline at 1.3.  Glucose 203.  Echocardiogram was ordered this a.m. and has been completed.  Echo shows ejection fraction 41-45% with markedly elevated RV pressures at greater than 55 mmHg.  This is consistent with the patient's previous history of systolic heart failure, COPD and severe pulm hypertension.  Elevated troponin likely represents myocardial injury rather than infarction.  Cardiology has seen and evaluated the patient and has restarted Lasix.    Review of Systems   Constitutional: Negative.    HENT: Negative.     Eyes: Negative.    Respiratory:  Positive for chest tightness and shortness of breath.    Cardiovascular:  Negative for chest pain.   Gastrointestinal:  Positive for abdominal distention.   Endocrine: Negative.    Genitourinary: Negative.    Musculoskeletal: Negative.    Skin:  Positive for wound (postop).   Allergic/Immunologic: Negative.    Neurological: Negative.    Hematological:  Negative.    Psychiatric/Behavioral: Negative.        Otherwise complete ROS is negative except as mentioned above.    Past Medical History:   Past Medical History:   Diagnosis Date    Cancer     CHF (congestive heart failure)     COPD (chronic obstructive pulmonary disease)     Coronary artery disease     stent x 1    Family history of colon cancer     Hyperlipidemia     Hypertension     Sleep apnea     does not wear machine, supposed to wear cpap with oxygen and does not wear it    Type 2 diabetes mellitus      Past Surgical History:  Past Surgical History:   Procedure Laterality Date    BACK SURGERY      CARDIAC CATHETERIZATION Left 04/13/2022    Procedure: Cardiac Catheterization/Vascular Study;  Surgeon: Todd Avendano MD;  Location:  [facility] CATH INVASIVE LOCATION;  Service: Cardiology;  Laterality: Left;    CARDIAC CATHETERIZATION      with stent x1    COLON RESECTION N/A 12/5/2023    Procedure: COLON RESECTION LAPAROSCOPIC SIGMOID WITH DAVINCI ROBOT, INTRA-OPERATIVE FLEXIBLE SIGMOIDOSCOPY, SPLENIC FLEXURE MOBILIZATION, OPEN UMBILICAL HERNIA REPAIR;  Surgeon: Oma Velasquez MD;  Location:  [facility] OR;  Service: Robotics - DaVinci;  Laterality: N/A;    COLONOSCOPY N/A 10/09/2023    Diverticulosis; One 5mm polyp in ascending colon; One 5mm polyp in transverse colon; One 10mm polyp at 65cm proximal to anus; One 20mm polyp at 65cm proximal to anus-Tattooed; One 20mm polyp at 42cm proximal to anus-Clip (MR conditional) placed-Tattooed; Rule out malignancy-Partially obstructing tumor in recto-sigmoid colon-biopsied-Tattooed; One 10mm polyp in rectum    ELBOW PROCEDURE      KNEE SURGERY      LUMBAR DISC SURGERY       Social History:  reports that he has been smoking cigarettes. He has a 25.00 pack-year smoking history. He has never used smokeless tobacco. He reports that he does not currently use alcohol. He reports current drug use. Drug: Marijuana.    Family History: family history includes Alcohol abuse in his  brother; COPD in his brother; Colon cancer (age of onset: 80) in his father; Esophageal cancer in his mother; Heart disease in his father and mother; No Known Problems in his sister.     Allergies:   Allergies   Allergen Reactions    Penicillins Unknown - Low Severity     Pt states happened when child does not know      Medications: Scheduled Meds:acetaminophen, 1,000 mg, Oral, Q6H  docusate sodium, 100 mg, Oral, BID  DULoxetine, 30 mg, Oral, Daily  famotidine, 20 mg, Oral, BID  [START ON 12/8/2023] furosemide, 40 mg, Oral, Daily  gabapentin, 800 mg, Oral, BID  heparin (porcine), 5,000 Units, Subcutaneous, Q8H  insulin lispro, 3-14 Units, Subcutaneous, 4x Daily AC & at Bedtime  lidocaine, 2 patch, Transdermal, Q24H  nicotine, 1 patch, Transdermal, Q24H  polyethylene glycol, 17 g, Oral, Daily      Continuous Infusions:lactated ringers, 20 mL/hr, Last Rate: Stopped (12/07/23 0902)      PRN Meds:.  dextrose    dextrose    glucagon (human recombinant)    HYDROmorphone **AND** naloxone    ondansetron **OR** ondansetron    oxyCODONE    sodium chloride    traMADol    I have utilized all available immediate resources to obtain, update, or review the patient's current medications (including all prescriptions, over-the-counter products, herbals, cannabis/cannabidiol products, and vitamin/mineral/dietary (nutritional) supplements).     Objective   Objective    Physical Exam:   Temp:  [97.6 °F (36.4 °C)-98.6 °F (37 °C)] 98.6 °F (37 °C)  Heart Rate:  [] 100  Resp:  [16] 16  BP: (132-160)/(63-80) 140/63  Physical Exam  Constitutional:       General: He is not in acute distress.     Appearance: Normal appearance. He is normal weight.   HENT:      Head: Normocephalic and atraumatic.      Right Ear: External ear normal.      Left Ear: External ear normal.      Nose: Nose normal.      Comments: NG tube in place and secured appropriately.     Mouth/Throat:      Mouth: Mucous membranes are moist.      Pharynx: Oropharynx is  clear.   Eyes:      General: No scleral icterus.     Extraocular Movements: Extraocular movements intact.      Conjunctiva/sclera: Conjunctivae normal.      Pupils: Pupils are equal, round, and reactive to light.   Cardiovascular:      Rate and Rhythm: Regular rhythm. Tachycardia present.      Pulses: Normal pulses.      Heart sounds: Normal heart sounds. No murmur heard.  Pulmonary:      Effort: Pulmonary effort is normal. No respiratory distress.      Breath sounds: Normal breath sounds.   Abdominal:      General: Abdomen is protuberant. Bowel sounds are absent.      Palpations: Abdomen is soft.      Tenderness: There is abdominal tenderness (Incisional).   Musculoskeletal:         General: Normal range of motion.      Right lower leg: No edema.      Left lower leg: No edema.   Skin:     General: Skin is warm and dry.      Coloration: Skin is not pale.   Neurological:      General: No focal deficit present.      Mental Status: He is alert and oriented to person, place, and time. Mental status is at baseline.      Cranial Nerves: No cranial nerve deficit.   Psychiatric:         Mood and Affect: Mood normal.         Judgment: Judgment normal.       Results Reviewed:  I have personally reviewed current lab, radiology, and data and agree with results.  Lab Results (last 24 hours)       Procedure Component Value Units Date/Time    High Sensitivity Troponin T 2Hr [[id number]]  (Abnormal) Collected: 12/07/23 0847    Specimen: Blood Updated: 12/07/23 0943     HS Troponin T 63 ng/L      Troponin T Delta 3 ng/L     Narrative:      High Sensitive Troponin T Reference Range:  <14.0 ng/L- Negative Female for AMI  <22.0 ng/L- Negative Male for AMI  >=14 - Abnormal Female indicating possible myocardial injury.  >=22 - Abnormal Male indicating possible myocardial injury.   Clinicians would have to utilize clinical acumen, EKG, Troponin, and serial changes to determine if it is an Acute Myocardial Infarction or myocardial injury  due to an underlying chronic condition.         BNP [[id number]]  (Abnormal) Collected: 12/07/23 0653    Specimen: Blood Updated: 12/07/23 0849     proBNP 1,037.0 pg/mL     Narrative:      This assay is used as an aid in the diagnosis of individuals suspected of having heart failure. It can be used as an aid in the diagnosis of acute decompensated heart failure (ADHF) in patients presenting with signs and symptoms of ADHF to the emergency department (ED). In addition, NT-proBNP of <300 pg/mL indicates ADHF is not likely.    Age Range Result Interpretation  NT-proBNP Concentration (pg/mL:      <50             Positive            >450                   Gray                 300-450                    Negative             <300    50-75           Positive            >900                  Gray                300-900                  Negative            <300      >75             Positive            >1800                  Gray                300-1800                  Negative            <300    Phosphorus [[id number]]  (Normal) Collected: 12/07/23 0653    Specimen: Blood Updated: 12/07/23 0723     Phosphorus 3.3 mg/dL     Comprehensive Metabolic Panel [[id number]]  (Abnormal) Collected: 12/07/23 0653    Specimen: Blood Updated: 12/07/23 0723     Glucose 203 mg/dL      BUN 24 mg/dL      Creatinine 1.29 mg/dL      Sodium 139 mmol/L      Potassium 4.2 mmol/L      Chloride 100 mmol/L      CO2 33.0 mmol/L      Calcium 8.5 mg/dL      Total Protein 6.7 g/dL      Albumin 3.6 g/dL      ALT (SGPT) 5 U/L      AST (SGOT) 9 U/L      Alkaline Phosphatase 76 U/L      Total Bilirubin 0.2 mg/dL      Globulin 3.1 gm/dL      A/G Ratio 1.2 g/dL      BUN/Creatinine Ratio 18.6     Anion Gap 6.0 mmol/L      eGFR 62.7 mL/min/1.73     Narrative:      GFR Normal >60  Chronic Kidney Disease <60  Kidney Failure <15      Magnesium [[id number]]  (Normal) Collected: 12/07/23 0653    Specimen: Blood Updated: 12/07/23 0723     Magnesium 2.0 mg/dL     POC  Glucose Once [[id number]]  (Abnormal) Collected: 12/07/23 0711    Specimen: Blood Updated: 12/07/23 0723     Glucose 187 mg/dL      Comment: : [id number] Linares) JessicaMeter ID: [id number]       High Sensitivity Troponin T [[id number]]  (Abnormal) Collected: 12/07/23 0653    Specimen: Blood Updated: 12/07/23 0723     HS Troponin T 60 ng/L     Narrative:      High Sensitive Troponin T Reference Range:  <14.0 ng/L- Negative Female for AMI  <22.0 ng/L- Negative Male for AMI  >=14 - Abnormal Female indicating possible myocardial injury.  >=22 - Abnormal Male indicating possible myocardial injury.   Clinicians would have to utilize clinical acumen, EKG, Troponin, and serial changes to determine if it is an Acute Myocardial Infarction or myocardial injury due to an underlying chronic condition.         CBC & Differential [[id number]]  (Abnormal) Collected: 12/07/23 0653    Specimen: Blood Updated: 12/07/23 0704    Narrative:      The following orders were created for panel order CBC & Differential.  Procedure                               Abnormality         Status                     ---------                               -----------         ------                     CBC Auto Differential[[id number]]        Abnormal            Final result                 Please view results for these tests on the individual orders.    CBC Auto Differential [[id number]]  (Abnormal) Collected: 12/07/23 0653    Specimen: Blood Updated: 12/07/23 0704     WBC 16.39 10*3/mm3      RBC 4.25 10*6/mm3      Hemoglobin 11.1 g/dL      Hematocrit 38.9 %      MCV 91.5 fL      MCH 26.1 pg      MCHC 28.5 g/dL      RDW 14.4 %      RDW-SD 47.3 fl      MPV 10.3 fL      Platelets 257 10*3/mm3      Neutrophil % 83.8 %      Lymphocyte % 9.1 %      Monocyte % 5.9 %      Eosinophil % 0.2 %      Basophil % 0.3 %      Immature Grans % 0.7 %      Neutrophils, Absolute 13.74 10*3/mm3      Lymphocytes, Absolute 1.49 10*3/mm3      Monocytes, Absolute 0.97  10*3/mm3      Eosinophils, Absolute 0.03 10*3/mm3      Basophils, Absolute 0.05 10*3/mm3      Immature Grans, Absolute 0.11 10*3/mm3      nRBC 0.0 /100 WBC     POC Glucose Once [[id number]]  (Abnormal) Collected: 12/07/23 0614    Specimen: Blood Updated: 12/07/23 0626     Glucose 160 mg/dL      Comment: : [id number] Stone EthanMeter ID: [id number]       POC Glucose Once [[id number]]  (Abnormal) Collected: 12/06/23 2104    Specimen: Blood Updated: 12/06/23 2115     Glucose 254 mg/dL      Comment: : [id number] Stone EthanMeter ID: [id number]       POC Glucose Once [[id number]]  (Abnormal) Collected: 12/06/23 1710    Specimen: Blood Updated: 12/06/23 1721     Glucose 250 mg/dL      Comment: : [id number] Prashant HaleighMeter ID: [id number]             Imaging Results (Last 24 Hours)       Procedure Component Value Units Date/Time    XR Abdomen KUB [[id number]] Collected: 12/07/23 0719     Updated: 12/07/23 0724    Narrative:      EXAM: XR ABDOMEN KUB-      DATE: 12/7/2023 6:45 AM     HISTORY: abdominal tightness; C18.9-Malignant neoplasm of colon,  unspecified status post recent colon resection, bloating, tolerating  clear liquids according to surgical progress note 12/6/2023.     COMPARISON: 10/25/2023.     TECHNIQUE:  Supine view of the abdomen. 1 images.     FINDINGS:    Limited evaluation for free air on supine imaging. Distended loops of  small bowel and colon.     Pelvic phlebolith. Degenerative changes of the spine. No acute bony  finding.          Impression:      Distended loops of small bowel and colon, which in the  appropriate clinical setting would be supportive of ileus.     This report was signed and finalized on 12/7/2023 7:21 AM by Dr Olga Ford MD.       XR Chest 1 View [[id number]] Collected: 12/07/23 0710     Updated: 12/07/23 0716    Narrative:      EXAM: XR CHEST 1 VW- 12/7/2023 6:50 AM     HISTORY: chest tightness; C18.9-Malignant neoplasm of colon, unspecified        COMPARISON:  11/14/2023.     TECHNIQUE: Single frontal radiograph of the chest was obtained.     FINDINGS:      Support Devices: None.     Cardiac and Mediastinal Silhouettes: Normal.     Lungs/Pleura: Diffusely increased bilateral interstitial opacities with  curly B lines in the right lung base. No sizable pleural effusion. No  visible pneumothorax.     Osseous structures: No acute osseous finding.     Other: None.       Impression:         Diffusely increased bilateral interstitial opacities, favor pulmonary  edema.           This report was signed and finalized on 12/7/2023 7:13 AM by Agustin Bravo.               Assessment / Plan   Assessment:   Active Hospital Problems    Diagnosis     **Colon adenocarcinoma     Sleep apnea     Chronic combined systolic (congestive) and diastolic (congestive) heart failure     Pulmonary HTN     COPD (chronic obstructive pulmonary disease)     Diabetes mellitus         Treatment Plan  Repeat cardiac markers 3 hours after initial markers  Agree with sliding scale insulin  Agree with restarting diuretics  Defer resumption of aspirin to surgery    Medical Decision Making  Number and Complexity of problems:   1) adenocarcinoma of the colon status postresection, acute, high complexity  2) pulm hypertension, chronic, high complexity  3) COPD, chronic, moderate complexity  4) diabetes mellitus, chronic, moderate complexity  5) chronic systolic and diastolic heart failure, chronic, moderate complexity    Differential Diagnosis: Pulmonary edema    Conditions and Status        Condition is unchanged.     [facility] Data  External documents reviewed: Care Everywhere documentation  Cardiac tracing (EKG, telemetry) interpretation: See HPI  Radiology interpretation: See HPI  Labs reviewed: See HPI  Any tests that were considered but not ordered: None     Decision rules/scores evaluated (example [id number], Wells, etc): None     Discussed with: The patient     Care Planning  Shared decision making: The  patient, general surgery and cardiology  Code status and discussions: Full code    Disposition  Social Determinants of Health that impact treatment or disposition: None noted  I expect the patient to be discharged by the primary service.     I confirmed that the patient's Advance Care Plan is present, code status is documented, or surrogate decision maker is listed in the patient's medical record.     The patient's surrogate decision maker is his son, Aniceto.     Patient seen and examined by me on 12/7/2023 at 1100.    Electronically signed by David Cadena DO, 12/07/23, 12:02 CST.

## 2023-12-07 NOTE — PLAN OF CARE
Goal Outcome Evaluation:   Patient resting overnight. This AM. Patient removed O2 and O2 sat dropped to 80's. I restarted O2 and patient did not recover quickly. Charge nurse and Dr. Velasquez notified. Stat EKG, Troponin, NGT, CXR ABD XR, Echo ordered. Report given to oncoming nurse. Safety maintained.

## 2023-12-07 NOTE — PROGRESS NOTES
Oma Velasquez MD - General Surgery  Progress Note     LOS: 2 days   Patient Care Team:  Kt Alfredo MD as PCP - General (Family Medicine)  Oma Velasquez MD as Consulting Physician (General Surgery)      Subjective     Interval History:     Patient was doing well overnight. Passing flatus and BM. However this AM he developed a headache, abdominal tightness and chest tightness. His O2 requirement increased to 5L NC. I evaluated him. Mild abdominal distention. Comfortable on exam. No fevers. Not tachycardic. Blood pressure stable.      Objective     Vital Signs  Temp:  [97.6 °F (36.4 °C)-98.6 °F (37 °C)] 98.6 °F (37 °C)  Heart Rate:  [85-95] 95  Resp:  [16] 16  BP: (132-160)/(64-80) 141/66    Physical Exam:  General appearance - alert, well appearing, and in no distress  Mental status - alert, oriented to person, place, and time  Eyes - pupils equal and reactive, extraocular eye movements intact  Neck - supple, no significant adenopathy  Chest - no tachypnea, retractions or cyanosis, on 5L NC   Heart - normal rate and regular rhythm  Abdomen - soft, nontender, mild distention, no masses or organomegaly  Neurological - alert, oriented, normal speech, no focal findings or movement disorder noted  Musculoskeletal - no joint tenderness, deformity       Results Review:    Lab Results (last 24 hours)       Procedure Component Value Units Date/Time    BNP [[id number]] Collected: 12/07/23 0653    Specimen: Blood Updated: 12/07/23 0834    Phosphorus [[id number]]  (Normal) Collected: 12/07/23 0653    Specimen: Blood Updated: 12/07/23 0723     Phosphorus 3.3 mg/dL     Comprehensive Metabolic Panel [[id number]]  (Abnormal) Collected: 12/07/23 0653    Specimen: Blood Updated: 12/07/23 0723     Glucose 203 mg/dL      BUN 24 mg/dL      Creatinine 1.29 mg/dL      Sodium 139 mmol/L      Potassium 4.2 mmol/L      Chloride 100 mmol/L      CO2 33.0 mmol/L      Calcium 8.5 mg/dL      Total Protein 6.7 g/dL       Albumin 3.6 g/dL      ALT (SGPT) 5 U/L      AST (SGOT) 9 U/L      Alkaline Phosphatase 76 U/L      Total Bilirubin 0.2 mg/dL      Globulin 3.1 gm/dL      A/G Ratio 1.2 g/dL      BUN/Creatinine Ratio 18.6     Anion Gap 6.0 mmol/L      eGFR 62.7 mL/min/1.73     Narrative:      GFR Normal >60  Chronic Kidney Disease <60  Kidney Failure <15      Magnesium [[id number]]  (Normal) Collected: 12/07/23 0653    Specimen: Blood Updated: 12/07/23 0723     Magnesium 2.0 mg/dL     POC Glucose Once [[id number]]  (Abnormal) Collected: 12/07/23 0711    Specimen: Blood Updated: 12/07/23 0723     Glucose 187 mg/dL      Comment: : [id number] Burton (Lewis) JessicaMeter ID: [id number]       High Sensitivity Troponin T [[id number]]  (Abnormal) Collected: 12/07/23 0653    Specimen: Blood Updated: 12/07/23 0723     HS Troponin T 60 ng/L     Narrative:      High Sensitive Troponin T Reference Range:  <14.0 ng/L- Negative Female for AMI  <22.0 ng/L- Negative Male for AMI  >=14 - Abnormal Female indicating possible myocardial injury.  >=22 - Abnormal Male indicating possible myocardial injury.   Clinicians would have to utilize clinical acumen, EKG, Troponin, and serial changes to determine if it is an Acute Myocardial Infarction or myocardial injury due to an underlying chronic condition.         CBC & Differential [[id number]]  (Abnormal) Collected: 12/07/23 0653    Specimen: Blood Updated: 12/07/23 0704    Narrative:      The following orders were created for panel order CBC & Differential.  Procedure                               Abnormality         Status                     ---------                               -----------         ------                     CBC Auto Differential[[id number]]        Abnormal            Final result                 Please view results for these tests on the individual orders.    CBC Auto Differential [[id number]]  (Abnormal) Collected: 12/07/23 0653    Specimen: Blood Updated: 12/07/23 0704     WBC 16.39  10*3/mm3      RBC 4.25 10*6/mm3      Hemoglobin 11.1 g/dL      Hematocrit 38.9 %      MCV 91.5 fL      MCH 26.1 pg      MCHC 28.5 g/dL      RDW 14.4 %      RDW-SD 47.3 fl      MPV 10.3 fL      Platelets 257 10*3/mm3      Neutrophil % 83.8 %      Lymphocyte % 9.1 %      Monocyte % 5.9 %      Eosinophil % 0.2 %      Basophil % 0.3 %      Immature Grans % 0.7 %      Neutrophils, Absolute 13.74 10*3/mm3      Lymphocytes, Absolute 1.49 10*3/mm3      Monocytes, Absolute 0.97 10*3/mm3      Eosinophils, Absolute 0.03 10*3/mm3      Basophils, Absolute 0.05 10*3/mm3      Immature Grans, Absolute 0.11 10*3/mm3      nRBC 0.0 /100 WBC     POC Glucose Once [[id number]]  (Abnormal) Collected: 12/07/23 0614    Specimen: Blood Updated: 12/07/23 0626     Glucose 160 mg/dL      Comment: : [id number] [employer] EthanMeter ID: [id number]       POC Glucose Once [[id number]]  (Abnormal) Collected: 12/06/23 2104    Specimen: Blood Updated: 12/06/23 2115     Glucose 254 mg/dL      Comment: : [id number] Stone EthanMeter ID: [id number]       POC Glucose Once [[id number]]  (Abnormal) Collected: 12/06/23 1710    Specimen: Blood Updated: 12/06/23 1721     Glucose 250 mg/dL      Comment: : [id number] Prashant eCircleeighMeter ID: [id number]       Tissue Pathology Exam [[id number]] Collected: 12/05/23 1251    Specimen: Tissue from Mesentery; Tissue from Large Intestine, Sigmoid Colon; Tissue from Large Intestine, Sigmoid Colon; Tissue from Large Intestine, Sigmoid Colon Updated: 12/06/23 1119     Case Report --     Surgical Pathology Report                         Case: [id number]                                  Authorizing Provider:  Oma Velasquez MD    Collected:           12/05/2023 12:51 PM          Ordering Location:     [facility] OR  Received:            12/05/2023 12:58 PM          Pathologist:           Radha Rowley MD                                                        Intraop:               Radha Rowley  "MD                                                        Specimen:    Mesentery, Mesenteric implant                                                               Synoptic Checklist --     Intraoperative Consultation --       Specimen: \"Mesenteric implant\"  FROZEN SECTION DIAGNOSIS: Consistent with a nodular area of fat necrosis with numerous macrophages.  Reported to \"Kwabena\" in OR 6 on 12/5/2023 at 13:11 CST by Radha Rowley MD.      POC Glucose Once [[id number]]  (Abnormal) Collected: 12/06/23 1056    Specimen: Blood Updated: 12/06/23 1107     Glucose 255 mg/dL      Comment: : [id number] Mcbride ID: [id number]             Imaging Results (Last 24 Hours)       Procedure Component Value Units Date/Time    XR Abdomen KUB [[id number]] Collected: 12/07/23 0719     Updated: 12/07/23 0724    Narrative:      EXAM: XR ABDOMEN KUB-      DATE: 12/7/2023 6:45 AM     HISTORY: abdominal tightness; C18.9-Malignant neoplasm of colon,  unspecified status post recent colon resection, bloating, tolerating  clear liquids according to surgical progress note 12/6/2023.     COMPARISON: 10/25/2023.     TECHNIQUE:  Supine view of the abdomen. 1 images.     FINDINGS:    Limited evaluation for free air on supine imaging. Distended loops of  small bowel and colon.     Pelvic phlebolith. Degenerative changes of the spine. No acute bony  finding.          Impression:      Distended loops of small bowel and colon, which in the  appropriate clinical setting would be supportive of ileus.     This report was signed and finalized on 12/7/2023 7:21 AM by Dr Olga Ford MD.       XR Chest 1 View [[id number]] Collected: 12/07/23 0710     Updated: 12/07/23 0716    Narrative:      EXAM: XR CHEST 1 VW- 12/7/2023 6:50 AM     HISTORY: chest tightness; C18.9-Malignant neoplasm of colon, unspecified        COMPARISON: 11/14/2023.     TECHNIQUE: Single frontal radiograph of the chest was obtained.     FINDINGS:      Support Devices: " None.     Cardiac and Mediastinal Silhouettes: Normal.     Lungs/Pleura: Diffusely increased bilateral interstitial opacities with  curly B lines in the right lung base. No sizable pleural effusion. No  visible pneumothorax.     Osseous structures: No acute osseous finding.     Other: None.       Impression:         Diffusely increased bilateral interstitial opacities, favor pulmonary  edema.           This report was signed and finalized on 12/7/2023 7:13 AM by Agustin Bravo.                 Assessment & Plan     Rectosigmoid adenocarcinoma   Obesity BMI 34   COPD   Nicotine dependence, cigarettes     Mr. Knox is a 63 year old male POD 2 s/p low anterior resection. His O2 requirement increased this AM. EKG with new PVCs. Troponin elevated. Cardiology and Hospitalist consulted. CXR with vascular congestion - IVF stopped. AXR with ileus - NGT placed to prevent aspiration. WBC count 16. Will monitor as afebrile. Will closely watch today. Stat echo ordered.       Oma Velasquez MD  12/07/23  08:37 CST

## 2023-12-07 NOTE — CONSULTS
Patient Care Team:  Kt Alfredo MD as PCP - General (Family Medicine)  Oma Velasquez MD as Consulting Physician (General Surgery)  Oma Velasquez MD  REASON FOR REFERRAL: elevated troponin   Chief complaint: chest and abdominal fullness     Subjective     Patient is a 63 y.o. male underwent colon resection for adenocarcinoma per Dr. Velasquez on 12/5/2023.    Cardiology is consulted for an elevated troponin.  The patient states around 6 AM this morning he noticed an abdominal and chest fullness.  He denies any radiation of the discomfort, diaphoresis.  He does have a little bit of nausea.  He does report associated shortness of breath, coughing and wheezing.  He tells me he takes aspirin 81 mg daily and Lasix 40 mg daily at home but has not had these in approximately 4 days.  The patient states his symptoms started this morning after eating a popsicle.  Symptoms remain constant but are now mild and improved with placement of an NG tube.  He does report his symptoms are somewhat similar to his previous non-STEMI but also somewhat different.    Troponin was drawn and elevated at 60.  Repeat troponin is pending.  EKG reveals normal sinus rhythm with PVCs and a right bundle branch block.  I do not appreciate new ischemic changes.  Overall he is normotensive.  He is oxygenating well on 1.5 L nasal cannula.  Chest x-ray suggests pulmonary edema.  KUB suggests possible ileus.  Echo has been ordered and is pending.    The patient follows with Dr. Avendano in [facility] for his coronary artery disease and chronic systolic CHF.  He had an echocardiogram in December 2021 revealing an LVEF of 46 to 50%.  He had a non-STEMI in April 2022 and received drug-eluting stent placement to the RCA for this.    On telemetry he is in normal sinus rhythm with heart rates in 70s to 90s.    Review of Systems   Review of Systems   Constitutional:  Negative for diaphoresis, fatigue, fever and unexpected weight  change.   HENT:  Negative for nosebleeds.    Respiratory:  Positive for shortness of breath and wheezing. Negative for apnea, cough and chest tightness.    Cardiovascular:  Positive for chest pain. Negative for palpitations and leg swelling.   Gastrointestinal:  Positive for abdominal distention. Negative for nausea and vomiting.   Genitourinary:  Negative for hematuria.   Musculoskeletal:  Negative for gait problem.   Skin:  Negative for color change.   Neurological:  Positive for headaches. Negative for dizziness, syncope, weakness and light-headedness.       History  Past Medical History:   Diagnosis Date    Cancer     CHF (congestive heart failure)     COPD (chronic obstructive pulmonary disease)     Coronary artery disease     stent x 1    Family history of colon cancer     Hyperlipidemia     Hypertension     Sleep apnea     does not wear machine, supposed to wear cpap with oxygen and does not wear it    Type 2 diabetes mellitus      Past Surgical History:   Procedure Laterality Date    BACK SURGERY      CARDIAC CATHETERIZATION Left 04/13/2022    Procedure: Cardiac Catheterization/Vascular Study;  Surgeon: Todd Avendano MD;  Location:  [facility] CATH INVASIVE LOCATION;  Service: Cardiology;  Laterality: Left;    CARDIAC CATHETERIZATION      with stent x1    COLON RESECTION N/A 12/5/2023    Procedure: COLON RESECTION LAPAROSCOPIC SIGMOID WITH DAVINCI ROBOT, INTRA-OPERATIVE FLEXIBLE SIGMOIDOSCOPY, SPLENIC FLEXURE MOBILIZATION, OPEN UMBILICAL HERNIA REPAIR;  Surgeon: Oma Velasquez MD;  Location:  [facility] OR;  Service: Robotics - DaVinci;  Laterality: N/A;    COLONOSCOPY N/A 10/09/2023    Diverticulosis; One 5mm polyp in ascending colon; One 5mm polyp in transverse colon; One 10mm polyp at 65cm proximal to anus; One 20mm polyp at 65cm proximal to anus-Tattooed; One 20mm polyp at 42cm proximal to anus-Clip (MR conditional) placed-Tattooed; Rule out malignancy-Partially obstructing tumor in recto-sigmoid  colon-biopsied-Tattooed; One 10mm polyp in rectum    ELBOW PROCEDURE      KNEE SURGERY      LUMBAR DISC SURGERY       Family History   Problem Relation Age of Onset    Esophageal cancer Mother     Heart disease Mother     Colon cancer Father 80    Heart disease Father     No Known Problems Sister     COPD Brother     Alcohol abuse Brother     Colon polyps Neg Hx     Liver cancer Neg Hx     Rectal cancer Neg Hx     Stomach cancer Neg Hx     Liver disease Neg Hx      Social History     Tobacco Use    Smoking status: Every Day     Packs/day: 1.00     Years: 25.00     Additional pack years: 0.00     Total pack years: 25.00     Types: Cigarettes    Smokeless tobacco: Never   Vaping Use    Vaping Use: Some days    Substances: Nicotine, Flavoring    Devices: Disposable   Substance Use Topics    Alcohol use: Not Currently    Drug use: Yes     Types: Marijuana     Medications Prior to Admission   Medication Sig Dispense Refill Last Dose    aspirin 81 MG EC tablet Take 1 tablet by mouth Daily. 100 tablet 2     DULoxetine (CYMBALTA) 30 MG capsule Take 1 capsule by mouth Daily.   12/4/2023 at 0800    furosemide (LASIX) 40 MG tablet Take 1 tablet by mouth Daily. 30 tablet 2 12/4/2023 at 0800    gabapentin (NEURONTIN) 600 MG tablet Take 1 tablet by mouth 2 (Two) Times a Day. Patient states he takes 800 mg bid   Patient Taking Differently    glipiZIDE (GLUCOTROL) 10 MG tablet Take 1 tablet by mouth 2 (Two) Times a Day Before Meals.   12/2/2023 at 1700    insulin detemir (LEVEMIR) 100 UNIT/ML injection Inject 20 Units under the skin into the appropriate area as directed 2 (Two) Times a Day. Pt states he uses 40 units bid   Patient Taking Differently at 1700    metFORMIN (GLUCOPHAGE) 1000 MG tablet Take 1 tablet by mouth 2 (Two) Times a Day.  0 12/2/2023 at 1700    nitroglycerin (NITROSTAT) 0.4 MG SL tablet 1 under the tongue as needed for angina, may repeat q5mins for up three doses 25 tablet 3     polyethylene glycol (MIRALAX) 17  GM/SCOOP powder Take 17 g by mouth Daily.       pravastatin (PRAVACHOL) 40 MG tablet Take 1 tablet by mouth Every Night.   12/2/2023 at 1700    Tradjenta 5 MG tablet tablet Take 1 tablet by mouth Daily.          Current Facility-Administered Medications:     acetaminophen (TYLENOL) tablet 1,000 mg, 1,000 mg, Oral, Q6H, Oma Velasquez MD, 1,000 mg at 12/06/23 2257    dextrose (D50W) (25 g/50 mL) IV injection 25 g, 25 g, Intravenous, Q15 Min PRN, Oma Velasquez MD    dextrose (GLUTOSE) oral gel 15 g, 15 g, Oral, Q15 Min PRN, Oma Velasquez MD    docusate sodium (COLACE) capsule 100 mg, 100 mg, Oral, BID, Millie Dey PA-C, 100 mg at 12/06/23 2259    DULoxetine (CYMBALTA) DR capsule 30 mg, 30 mg, Oral, Daily, Oma Velasquez MD, 30 mg at 12/06/23 0814    famotidine (PEPCID) tablet 20 mg, 20 mg, Oral, BID, Oma Velasquez MD, 20 mg at 12/06/23 2257    furosemide (LASIX) injection 40 mg, 40 mg, Intravenous, Once, Shantel Carpenter APRN    [START ON 12/8/2023] furosemide (LASIX) tablet 40 mg, 40 mg, Oral, Daily, Shantel Carpenter APRN    gabapentin (NEURONTIN) capsule 800 mg, 800 mg, Oral, BID, Oma Velasquez MD, 800 mg at 12/06/23 2257    glucagon (GLUCAGEN) injection 1 mg, 1 mg, Intramuscular, Q15 Min PRN, Oma Velasquez MD    heparin (porcine) 5000 UNIT/ML injection 5,000 Units, 5,000 Units, Subcutaneous, Q8H, Oma Velasquez MD, 5,000 Units at 12/07/23 0709    HYDROmorphone (DILAUDID) injection 0.5 mg, 0.5 mg, Intravenous, Q2H PRN **AND** naloxone (NARCAN) injection 0.4 mg, 0.4 mg, Intravenous, Q5 Min PRN, Oma Velasquez MD    Insulin Lispro (humaLOG) injection 3-14 Units, 3-14 Units, Subcutaneous, 4x Daily AC & at Bedtime, Oma Velasquez MD, 3 Units at 12/07/23 0719    lactated ringers infusion, 100 mL/hr, Intravenous, Continuous, Oma Velasquez MD, Last Rate: 100 mL/hr at 12/07/23 0709, 100 mL/hr at 12/07/23 0709    lidocaine (LIDODERM) 5 % 2 patch, 2  patch, Transdermal, Q24H, Oma Velasquez MD, 2 patch at 12/07/23 0850    nicotine (NICODERM CQ) 21 MG/24HR patch 1 patch, 1 patch, Transdermal, Q24H, Oma Velasquez MD, 1 patch at 12/06/23 1551    ondansetron (ZOFRAN) tablet 4 mg, 4 mg, Oral, Q6H PRN **OR** ondansetron (ZOFRAN) injection 4 mg, 4 mg, Intravenous, Q6H PRN, Oma Velasquez MD    oxyCODONE (ROXICODONE) immediate release tablet 5 mg, 5 mg, Oral, Q6H PRN, Oma Velasquez MD, 5 mg at 12/06/23 2322    polyethylene glycol (MIRALAX) packet 17 g, 17 g, Oral, Daily, Yissel, Millie, PA-C, 17 g at 12/06/23 1119    sodium chloride nasal spray 2 spray, 2 spray, Each Nare, PRN, Oma Velasquez MD    traMADol (ULTRAM) tablet 50 mg, 50 mg, Oral, Q6H PRN, Oma Velasquez MD  Allergies:  Penicillins    Objective     Vital Signs  Temp:  [97.6 °F (36.4 °C)-98.6 °F (37 °C)] 98.6 °F (37 °C)  Heart Rate:  [85-95] 95  Resp:  [16] 16  BP: (132-160)/(64-80) 141/66    Physical Exam:   Vitals and nursing note reviewed.   Constitutional:       General: Not in acute distress.     Appearance: Well-developed and not in distress. Acutely ill-appearing. Not diaphoretic.   Neck:      Vascular: No JVD.   Pulmonary:      Effort: Pulmonary effort is normal. No respiratory distress.      Breath sounds: Rhonchi present. Rales present.   Cardiovascular:      Normal rate. Regular rhythm.      Murmurs: There is no murmur.   Edema:     Peripheral edema absent.   Abdominal:      Comments: Abd binder and NG tube in place   Skin:     General: Skin is warm and dry.   Neurological:      Mental Status: Alert and oriented to person, place, and time.       Results Review:     Lab Results (last 72 hours)       Procedure Component Value Units Date/Time    BNP [[id number]]  (Abnormal) Collected: 12/07/23 0653    Specimen: Blood Updated: 12/07/23 0849     proBNP 1,037.0 pg/mL     Narrative:      This assay is used as an aid in the diagnosis of individuals suspected of having  heart failure. It can be used as an aid in the diagnosis of acute decompensated heart failure (ADHF) in patients presenting with signs and symptoms of ADHF to the emergency department (ED). In addition, NT-proBNP of <300 pg/mL indicates ADHF is not likely.    Age Range Result Interpretation  NT-proBNP Concentration (pg/mL:      <50             Positive            >450                   Gray                 300-450                    Negative             <300    50-75           Positive            >900                  Gray                300-900                  Negative            <300      >75             Positive            >1800                  Gray                300-1800                  Negative            <300    Phosphorus [[id number]]  (Normal) Collected: 12/07/23 0653    Specimen: Blood Updated: 12/07/23 0723     Phosphorus 3.3 mg/dL     Comprehensive Metabolic Panel [[id number]]  (Abnormal) Collected: 12/07/23 0653    Specimen: Blood Updated: 12/07/23 0723     Glucose 203 mg/dL      BUN 24 mg/dL      Creatinine 1.29 mg/dL      Sodium 139 mmol/L      Potassium 4.2 mmol/L      Chloride 100 mmol/L      CO2 33.0 mmol/L      Calcium 8.5 mg/dL      Total Protein 6.7 g/dL      Albumin 3.6 g/dL      ALT (SGPT) 5 U/L      AST (SGOT) 9 U/L      Alkaline Phosphatase 76 U/L      Total Bilirubin 0.2 mg/dL      Globulin 3.1 gm/dL      A/G Ratio 1.2 g/dL      BUN/Creatinine Ratio 18.6     Anion Gap 6.0 mmol/L      eGFR 62.7 mL/min/1.73     Narrative:      GFR Normal >60  Chronic Kidney Disease <60  Kidney Failure <15      Magnesium [[id number]]  (Normal) Collected: 12/07/23 0653    Specimen: Blood Updated: 12/07/23 0723     Magnesium 2.0 mg/dL     POC Glucose Once [[id number]]  (Abnormal) Collected: 12/07/23 0711    Specimen: Blood Updated: 12/07/23 0723     Glucose 187 mg/dL      Comment: : [id number] Micheal Linares) JessicaMeter ID: [id number]       High Sensitivity Troponin T [[id number]]  (Abnormal) Collected:  12/07/23 0653    Specimen: Blood Updated: 12/07/23 0723     HS Troponin T 60 ng/L     Narrative:      High Sensitive Troponin T Reference Range:  <14.0 ng/L- Negative Female for AMI  <22.0 ng/L- Negative Male for AMI  >=14 - Abnormal Female indicating possible myocardial injury.  >=22 - Abnormal Male indicating possible myocardial injury.   Clinicians would have to utilize clinical acumen, EKG, Troponin, and serial changes to determine if it is an Acute Myocardial Infarction or myocardial injury due to an underlying chronic condition.         CBC & Differential [[id number]]  (Abnormal) Collected: 12/07/23 0653    Specimen: Blood Updated: 12/07/23 0704    Narrative:      The following orders were created for panel order CBC & Differential.  Procedure                               Abnormality         Status                     ---------                               -----------         ------                     CBC Auto Differential[[id number]]        Abnormal            Final result                 Please view results for these tests on the individual orders.    CBC Auto Differential [[id number]]  (Abnormal) Collected: 12/07/23 0653    Specimen: Blood Updated: 12/07/23 0704     WBC 16.39 10*3/mm3      RBC 4.25 10*6/mm3      Hemoglobin 11.1 g/dL      Hematocrit 38.9 %      MCV 91.5 fL      MCH 26.1 pg      MCHC 28.5 g/dL      RDW 14.4 %      RDW-SD 47.3 fl      MPV 10.3 fL      Platelets 257 10*3/mm3      Neutrophil % 83.8 %      Lymphocyte % 9.1 %      Monocyte % 5.9 %      Eosinophil % 0.2 %      Basophil % 0.3 %      Immature Grans % 0.7 %      Neutrophils, Absolute 13.74 10*3/mm3      Lymphocytes, Absolute 1.49 10*3/mm3      Monocytes, Absolute 0.97 10*3/mm3      Eosinophils, Absolute 0.03 10*3/mm3      Basophils, Absolute 0.05 10*3/mm3      Immature Grans, Absolute 0.11 10*3/mm3      nRBC 0.0 /100 WBC     POC Glucose Once [[id number]]  (Abnormal) Collected: 12/07/23 0614    Specimen: Blood Updated: 12/07/23 0626      "Glucose 160 mg/dL      Comment: : [id number] Yusef EthanMeter ID: [id number]       POC Glucose Once [[id number]]  (Abnormal) Collected: 12/06/23 2104    Specimen: Blood Updated: 12/06/23 2115     Glucose 254 mg/dL      Comment: : [id number] Yusef EthanMeter ID: [id number]       POC Glucose Once [[id number]]  (Abnormal) Collected: 12/06/23 1710    Specimen: Blood Updated: 12/06/23 1721     Glucose 250 mg/dL      Comment: : [id number] Prashant BurlesonghMeter ID: [id number]       Tissue Pathology Exam [[id number]] Collected: 12/05/23 1251    Specimen: Tissue from Mesentery; Tissue from Large Intestine, Sigmoid Colon; Tissue from Large Intestine, Sigmoid Colon; Tissue from Large Intestine, Sigmoid Colon Updated: 12/06/23 1119     Case Report --     Surgical Pathology Report                         Case: [id number]                                  Authorizing Provider:  Oma Velasquez MD    Collected:           12/05/2023 12:51 PM          Ordering Location:     [facility] OR  Received:            12/05/2023 12:58 PM          Pathologist:           Radha Rowley MD                                                        Intraop:               Radha Rowley MD                                                        Specimen:    Mesentery, Mesenteric implant                                                               Synoptic Checklist --     Intraoperative Consultation --       Specimen: \"Mesenteric implant\"  FROZEN SECTION DIAGNOSIS: Consistent with a nodular area of fat necrosis with numerous macrophages.  Reported to \"K[place]ena\" in OR 6 on 12/5/2023 at 13:11 CST by Radha Rowley MD.      POC Glucose Once [[id number]]  (Abnormal) Collected: 12/06/23 1056    Specimen: Blood Updated: 12/06/23 1107     Glucose 255 mg/dL      Comment: : [id number] Ron SteveneMeter ID: [id number]       POC Glucose Once [[id number]]  (Abnormal) Collected: 12/06/23 0728    Specimen: Blood Updated: " 12/06/23 0740     Glucose 309 mg/dL      Comment: : [id number] Ron Mcbride ID: [id number]       Comprehensive Metabolic Panel [[id number]]  (Abnormal) Collected: 12/06/23 0440    Specimen: Blood Updated: 12/06/23 0534     Glucose 347 mg/dL      BUN 21 mg/dL      Creatinine 1.44 mg/dL      Sodium 138 mmol/L      Potassium 4.7 mmol/L      Chloride 100 mmol/L      CO2 28.0 mmol/L      Calcium 8.2 mg/dL      Total Protein 6.6 g/dL      Albumin 3.7 g/dL      ALT (SGPT) 6 U/L      AST (SGOT) 9 U/L      Alkaline Phosphatase 81 U/L      Total Bilirubin 0.2 mg/dL      Globulin 2.9 gm/dL      A/G Ratio 1.3 g/dL      BUN/Creatinine Ratio 14.6     Anion Gap 10.0 mmol/L      eGFR 54.9 mL/min/1.73     Narrative:      GFR Normal >60  Chronic Kidney Disease <60  Kidney Failure <15      Phosphorus [[id number]]  (Abnormal) Collected: 12/06/23 0440    Specimen: Blood Updated: 12/06/23 0534     Phosphorus 4.8 mg/dL     Magnesium [[id number]]  (Normal) Collected: 12/06/23 0440    Specimen: Blood Updated: 12/06/23 0534     Magnesium 1.9 mg/dL     CBC & Differential [[id number]]  (Abnormal) Collected: 12/06/23 0440    Specimen: Blood Updated: 12/06/23 0510    Narrative:      The following orders were created for panel order CBC & Differential.  Procedure                               Abnormality         Status                     ---------                               -----------         ------                     CBC Auto Differential[[id number]]        Abnormal            Final result                 Please view results for these tests on the individual orders.    CBC Auto Differential [[id number]]  (Abnormal) Collected: 12/06/23 0440    Specimen: Blood Updated: 12/06/23 0510     WBC 14.46 10*3/mm3      RBC 4.32 10*6/mm3      Hemoglobin 11.0 g/dL      Hematocrit 38.3 %      MCV 88.7 fL      MCH 25.5 pg      MCHC 28.7 g/dL      RDW 14.0 %      RDW-SD 45.1 fl      MPV 10.5 fL      Platelets 249 10*3/mm3      Neutrophil % 92.0 %       Lymphocyte % 3.0 %      Monocyte % 4.4 %      Eosinophil % 0.0 %      Basophil % 0.2 %      Immature Grans % 0.4 %      Neutrophils, Absolute 13.30 10*3/mm3      Lymphocytes, Absolute 0.44 10*3/mm3      Monocytes, Absolute 0.63 10*3/mm3      Eosinophils, Absolute 0.00 10*3/mm3      Basophils, Absolute 0.03 10*3/mm3      Immature Grans, Absolute 0.06 10*3/mm3      nRBC 0.0 /100 WBC     POC Glucose Once [[id number]]  (Abnormal) Collected: 12/05/23 2140    Specimen: Blood Updated: 12/05/23 2201     Glucose 376 mg/dL      Comment: : [id number] Luther GermanMeter ID: [id number]       POC Glucose Once [[id number]]  (Abnormal) Collected: 12/05/23 1729    Specimen: Blood Updated: 12/05/23 1741     Glucose 294 mg/dL      Comment: : [id number] Dea ChakrabortyMeter ID: [id number]       POC Glucose Once [[id number]]  (Abnormal) Collected: 12/05/23 0830    Specimen: Blood Updated: 12/05/23 0841     Glucose 199 mg/dL      Comment: : [id number] Ron AlfreditoenMeter ID: [id number]               Assessment & Plan     Colon adenocarcinoma status post sigmoid colon resection per Dr. Velasquez on 12/5/2023      2.  Chest discomfort, shortness of breath, abdominal fullness: Based on assessment and workup thus far I suspect this is related to combination of his distended bowel loops (?possible ileus) and pulmonary edema.  He takes Lasix 40 mg daily at home and states he has been without this since Saturday.      -Resume Lasix.  I will give Lasix 40 mg IV x 1 dose today, reassess renal function and response to diuresis in a.m., with plans to resume his usual Lasix 40 mg p.o. daily tomorrow  -Strict intake and output measurement and documentation, daily weights  -NG tube in place.  Postop treatment per Dr. Velasquez    3.  Elevated troponin: Thus far we have 1 high-sensitivity troponin elevated at 60.  I do not appreciate new ischemic EKG changes.  He describes his chest discomfort as a constant fullness since approximately 6 AM.   He states this was worsened by eating a popsicle and improved by placement of NG tube.  At this point I suspect this is representative of myocardial injury following sigmoid colon resection and not acute coronary syndrome.  We are awaiting his second troponin and echocardiogram as ordered by Dr. Velasquez     -Given his history of drug-eluting stent placement to the RCA for non-STEMI in April 2022, would recommend resuming aspirin 81 mg once daily as soon as possible.  He states he has been without this since Saturday.    4.  Coronary artery disease: As noted above, thus far his elevated troponin is not suspected to be representative of acute coronary syndrome.  Resume aspirin soon as possible.  Resume statin at discharge.      5.  Acute on chronic systolic congestive heart failure: LVEF was 46 to 50% by December 2021 echo.  Resuming Lasix as outlined above; repeat echo pending     6.  Tobacco abuse  7.  Diabetes mellitus type 2  8.  COPD: may also be contributing to dyspnea   9.  Sleep apnea      I discussed the patient's findings and my recommendations with patient and nursing staff.     Electronically signed by VIV Salguero, 12/07/23, 8:52 AM [place].

## 2023-12-08 ENCOUNTER — APPOINTMENT (OUTPATIENT)
Dept: GENERAL RADIOLOGY | Facility: HOSPITAL | Age: 63
DRG: 329 | End: 2023-12-08
Payer: MEDICARE

## 2023-12-08 LAB
ALBUMIN SERPL-MCNC: 3.8 G/DL (ref 3.5–5.2)
ALBUMIN/GLOB SERPL: 1.1 G/DL
ALP SERPL-CCNC: 77 U/L (ref 39–117)
ALT SERPL W P-5'-P-CCNC: 5 U/L (ref 1–41)
ANION GAP SERPL CALCULATED.3IONS-SCNC: 10 MMOL/L (ref 5–15)
AST SERPL-CCNC: 9 U/L (ref 1–40)
BASOPHILS # BLD AUTO: 0.05 10*3/MM3 (ref 0–0.2)
BASOPHILS NFR BLD AUTO: 0.4 % (ref 0–1.5)
BILIRUB SERPL-MCNC: 0.4 MG/DL (ref 0–1.2)
BUN SERPL-MCNC: 25 MG/DL (ref 8–23)
BUN/CREAT SERPL: 21.4 (ref 7–25)
CALCIUM SPEC-SCNC: 8.8 MG/DL (ref 8.6–10.5)
CHLORIDE SERPL-SCNC: 97 MMOL/L (ref 98–107)
CO2 SERPL-SCNC: 36 MMOL/L (ref 22–29)
CREAT SERPL-MCNC: 1.17 MG/DL (ref 0.76–1.27)
CYTO UR: NORMAL
DEPRECATED RDW RBC AUTO: 46 FL (ref 37–54)
EGFRCR SERPLBLD CKD-EPI 2021: 70.5 ML/MIN/1.73
EOSINOPHIL # BLD AUTO: 0.02 10*3/MM3 (ref 0–0.4)
EOSINOPHIL NFR BLD AUTO: 0.2 % (ref 0.3–6.2)
ERYTHROCYTE [DISTWIDTH] IN BLOOD BY AUTOMATED COUNT: 14.3 % (ref 12.3–15.4)
GLOBULIN UR ELPH-MCNC: 3.5 GM/DL
GLUCOSE SERPL-MCNC: 213 MG/DL (ref 65–99)
HCT VFR BLD AUTO: 38.6 % (ref 37.5–51)
HGB BLD-MCNC: 11.3 G/DL (ref 13–17.7)
IMM GRANULOCYTES # BLD AUTO: 0.07 10*3/MM3 (ref 0–0.05)
IMM GRANULOCYTES NFR BLD AUTO: 0.6 % (ref 0–0.5)
LAB AP CASE REPORT: NORMAL
LAB AP SYNOPTIC CHECKLIST: NORMAL
LYMPHOCYTES # BLD AUTO: 1.22 10*3/MM3 (ref 0.7–3.1)
LYMPHOCYTES NFR BLD AUTO: 10.8 % (ref 19.6–45.3)
Lab: NORMAL
Lab: NORMAL
MAGNESIUM SERPL-MCNC: 2 MG/DL (ref 1.6–2.4)
MCH RBC QN AUTO: 26.1 PG (ref 26.6–33)
MCHC RBC AUTO-ENTMCNC: 29.3 G/DL (ref 31.5–35.7)
MCV RBC AUTO: 89.1 FL (ref 79–97)
MONOCYTES # BLD AUTO: 0.74 10*3/MM3 (ref 0.1–0.9)
MONOCYTES NFR BLD AUTO: 6.5 % (ref 5–12)
NEUTROPHILS NFR BLD AUTO: 81.5 % (ref 42.7–76)
NEUTROPHILS NFR BLD AUTO: 9.22 10*3/MM3 (ref 1.7–7)
NRBC BLD AUTO-RTO: 0 /100 WBC (ref 0–0.2)
PATH REPORT.FINAL DX SPEC: NORMAL
PATH REPORT.GROSS SPEC: NORMAL
PHOSPHATE SERPL-MCNC: 2.4 MG/DL (ref 2.5–4.5)
PLATELET # BLD AUTO: 264 10*3/MM3 (ref 140–450)
PMV BLD AUTO: 10.5 FL (ref 6–12)
POTASSIUM SERPL-SCNC: 4.2 MMOL/L (ref 3.5–5.2)
PROT SERPL-MCNC: 7.3 G/DL (ref 6–8.5)
RBC # BLD AUTO: 4.33 10*6/MM3 (ref 4.14–5.8)
SODIUM SERPL-SCNC: 143 MMOL/L (ref 136–145)
WBC NRBC COR # BLD AUTO: 11.32 10*3/MM3 (ref 3.4–10.8)

## 2023-12-08 PROCEDURE — 99024 POSTOP FOLLOW-UP VISIT: CPT | Performed by: STUDENT IN AN ORGANIZED HEALTH CARE EDUCATION/TRAINING PROGRAM

## 2023-12-08 PROCEDURE — 74018 RADEX ABDOMEN 1 VIEW: CPT

## 2023-12-08 PROCEDURE — 25010000002 METOCLOPRAMIDE PER 10 MG: Performed by: STUDENT IN AN ORGANIZED HEALTH CARE EDUCATION/TRAINING PROGRAM

## 2023-12-08 PROCEDURE — 84100 ASSAY OF PHOSPHORUS: CPT | Performed by: STUDENT IN AN ORGANIZED HEALTH CARE EDUCATION/TRAINING PROGRAM

## 2023-12-08 PROCEDURE — 93005 ELECTROCARDIOGRAM TRACING: CPT | Performed by: INTERNAL MEDICINE

## 2023-12-08 PROCEDURE — 83735 ASSAY OF MAGNESIUM: CPT | Performed by: STUDENT IN AN ORGANIZED HEALTH CARE EDUCATION/TRAINING PROGRAM

## 2023-12-08 PROCEDURE — 25010000002 HEPARIN (PORCINE) PER 1000 UNITS: Performed by: STUDENT IN AN ORGANIZED HEALTH CARE EDUCATION/TRAINING PROGRAM

## 2023-12-08 PROCEDURE — 85025 COMPLETE CBC W/AUTO DIFF WBC: CPT | Performed by: STUDENT IN AN ORGANIZED HEALTH CARE EDUCATION/TRAINING PROGRAM

## 2023-12-08 PROCEDURE — 99232 SBSQ HOSP IP/OBS MODERATE 35: CPT | Performed by: NURSE PRACTITIONER

## 2023-12-08 PROCEDURE — 93010 ELECTROCARDIOGRAM REPORT: CPT | Performed by: HOSPITALIST

## 2023-12-08 PROCEDURE — 80053 COMPREHEN METABOLIC PANEL: CPT | Performed by: STUDENT IN AN ORGANIZED HEALTH CARE EDUCATION/TRAINING PROGRAM

## 2023-12-08 RX ORDER — ASPIRIN 81 MG/1
81 TABLET ORAL DAILY
Status: DISCONTINUED | OUTPATIENT
Start: 2023-12-08 | End: 2023-12-10 | Stop reason: HOSPADM

## 2023-12-08 RX ORDER — ATORVASTATIN CALCIUM 40 MG/1
40 TABLET, FILM COATED ORAL NIGHTLY
Status: DISCONTINUED | OUTPATIENT
Start: 2023-12-08 | End: 2023-12-10 | Stop reason: HOSPADM

## 2023-12-08 RX ORDER — METOPROLOL SUCCINATE 25 MG/1
25 TABLET, EXTENDED RELEASE ORAL
Status: DISCONTINUED | OUTPATIENT
Start: 2023-12-08 | End: 2023-12-10 | Stop reason: HOSPADM

## 2023-12-08 RX ORDER — METOCLOPRAMIDE HYDROCHLORIDE 5 MG/ML
10 INJECTION INTRAMUSCULAR; INTRAVENOUS EVERY 6 HOURS
Status: DISCONTINUED | OUTPATIENT
Start: 2023-12-08 | End: 2023-12-09

## 2023-12-08 RX ORDER — LOSARTAN POTASSIUM 50 MG/1
25 TABLET ORAL
Status: DISCONTINUED | OUTPATIENT
Start: 2023-12-08 | End: 2023-12-10 | Stop reason: HOSPADM

## 2023-12-08 RX ADMIN — METOPROLOL SUCCINATE 25 MG: 25 TABLET, EXTENDED RELEASE ORAL at 14:38

## 2023-12-08 RX ADMIN — GABAPENTIN 800 MG: 400 CAPSULE ORAL at 09:13

## 2023-12-08 RX ADMIN — LOSARTAN POTASSIUM 25 MG: 50 TABLET, FILM COATED ORAL at 14:39

## 2023-12-08 RX ADMIN — LIDOCAINE 2 PATCH: 700 PATCH TOPICAL at 09:14

## 2023-12-08 RX ADMIN — HEPARIN SODIUM 5000 UNITS: 5000 INJECTION INTRAVENOUS; SUBCUTANEOUS at 20:50

## 2023-12-08 RX ADMIN — GABAPENTIN 800 MG: 400 CAPSULE ORAL at 20:50

## 2023-12-08 RX ADMIN — DOCUSATE SODIUM 100 MG: 100 CAPSULE, LIQUID FILLED ORAL at 09:14

## 2023-12-08 RX ADMIN — DOCUSATE SODIUM 100 MG: 100 CAPSULE, LIQUID FILLED ORAL at 20:50

## 2023-12-08 RX ADMIN — FUROSEMIDE 40 MG: 40 TABLET ORAL at 09:14

## 2023-12-08 RX ADMIN — METOCLOPRAMIDE HYDROCHLORIDE 10 MG: 5 INJECTION INTRAMUSCULAR; INTRAVENOUS at 09:14

## 2023-12-08 RX ADMIN — METOCLOPRAMIDE HYDROCHLORIDE 10 MG: 5 INJECTION INTRAMUSCULAR; INTRAVENOUS at 14:39

## 2023-12-08 RX ADMIN — FAMOTIDINE 20 MG: 20 TABLET, FILM COATED ORAL at 20:50

## 2023-12-08 RX ADMIN — NICOTINE 1 PATCH: 21 PATCH, EXTENDED RELEASE TRANSDERMAL at 09:14

## 2023-12-08 RX ADMIN — FAMOTIDINE 20 MG: 20 TABLET, FILM COATED ORAL at 09:14

## 2023-12-08 RX ADMIN — DULOXETINE HYDROCHLORIDE 30 MG: 30 CAPSULE, DELAYED RELEASE ORAL at 09:14

## 2023-12-08 RX ADMIN — ATORVASTATIN CALCIUM 40 MG: 40 TABLET ORAL at 20:50

## 2023-12-08 RX ADMIN — METOCLOPRAMIDE HYDROCHLORIDE 10 MG: 5 INJECTION INTRAMUSCULAR; INTRAVENOUS at 20:50

## 2023-12-08 NOTE — PROGRESS NOTES
[facility] -  Progress Note     LOS: 3 days   Patient Care Team:  Kt Alfredo MD as PCP - General (Family Medicine)  Oma Velasquez MD as Consulting Physician (General Surgery)    Chief Complaint: elevated troponin    Subjective     Interval History:   2D echo completed yesterday revealed a mildly reduced EF of 41-45%. Troponins ultimately flat trended at 60 and 63. NG tube has been removed and he is taking oral medications today. He denies chest pain and dyspnea.         Review of Systems:   Review of Systems   Constitutional:  Negative for chills, diaphoresis, fatigue and unexpected weight change.   HENT:  Negative for nosebleeds.    Respiratory:  Negative for cough, chest tightness, shortness of breath and wheezing.    Cardiovascular:  Negative for chest pain, palpitations and leg swelling.   Gastrointestinal:  Negative for anal bleeding, blood in stool, diarrhea and nausea.   Neurological:  Negative for dizziness, syncope and light-headedness.   All other systems reviewed and are negative.      Objective     Vital Sign Min/Max for last 24 hours  Temp  Min: 97.5 °F (36.4 °C)  Max: 98.6 °F (37 °C)   BP  Min: 109/52  Max: 151/76   Pulse  Min: 90  Max: 105   Resp  Min: 16  Max: 18   SpO2  Min: 87 %  Max: 98 %   Flow (L/min)  Min: 1.5  Max: 4   Weight  Min: 113 kg (248 lb 14.4 oz)  Max: 113 kg (248 lb 14.4 oz)         12/08/23  0308   Weight: 113 kg (248 lb 14.4 oz)         Intake/Output Summary (Last 24 hours) at 12/8/2023 1302  Last data filed at 12/8/2023 1108  Gross per 24 hour   Intake 780 ml   Output 1925 ml   Net -1145 ml         Physical Exam:  Vitals reviewed.   Constitutional:       General: Not in acute distress.     Appearance: Healthy appearance. Well-developed. Not diaphoretic.   Eyes:      General: No scleral icterus.     Conjunctiva/sclera: Conjunctivae normal.      Pupils: Pupils are equal, round, and reactive to light.   HENT:      Head: Normocephalic.     Mouth/Throat:      Pharynx: No oropharyngeal exudate.   Neck:      Vascular: No JVR.   Pulmonary:      Effort: Pulmonary effort is normal. No respiratory distress.      Breath sounds: Normal breath sounds. No wheezing. No rhonchi. No rales.   Chest:      Chest wall: Not tender to palpatation.   Cardiovascular:      Normal rate. Regular rhythm.   Pulses:     Intact distal pulses.   Edema:     Peripheral edema absent.   Abdominal:      General: Bowel sounds are normal. There is no distension.      Palpations: Abdomen is soft.      Tenderness: There is no abdominal tenderness.   Musculoskeletal: Normal range of motion.      Cervical back: Normal range of motion and neck supple. Skin:     General: Skin is warm and dry.      Coloration: Skin is not pale.      Findings: No erythema or rash.   Neurological:      Mental Status: Alert, oriented to person, place, and time and oriented to person, place and time.      Deep Tendon Reflexes: Reflexes are normal and symmetric.   Psychiatric:         Behavior: Behavior normal.          Results Review:   Lab Results (last 72 hours)       Procedure Component Value Units Date/Time    Phosphorus [[id number]]  (Abnormal) Collected: 12/08/23 0526    Specimen: Blood Updated: 12/08/23 0634     Phosphorus 2.4 mg/dL     Comprehensive Metabolic Panel [[id number]]  (Abnormal) Collected: 12/08/23 0526    Specimen: Blood Updated: 12/08/23 0629     Glucose 213 mg/dL      BUN 25 mg/dL      Creatinine 1.17 mg/dL      Sodium 143 mmol/L      Potassium 4.2 mmol/L      Chloride 97 mmol/L      CO2 36.0 mmol/L      Calcium 8.8 mg/dL      Total Protein 7.3 g/dL      Albumin 3.8 g/dL      ALT (SGPT) 5 U/L      AST (SGOT) 9 U/L      Alkaline Phosphatase 77 U/L      Total Bilirubin 0.4 mg/dL      Globulin 3.5 gm/dL      A/G Ratio 1.1 g/dL      BUN/Creatinine Ratio 21.4     Anion Gap 10.0 mmol/L      eGFR 70.5 mL/min/1.73     Narrative:      GFR Normal >60  Chronic Kidney Disease <60  Kidney Failure <15       Magnesium [[id number]]  (Normal) Collected: 12/08/23 0526    Specimen: Blood Updated: 12/08/23 0629     Magnesium 2.0 mg/dL     CBC & Differential [[id number]]  (Abnormal) Collected: 12/08/23 0526    Specimen: Blood Updated: 12/08/23 0605    Narrative:      The following orders were created for panel order CBC & Differential.  Procedure                               Abnormality         Status                     ---------                               -----------         ------                     CBC Auto Differential[[id number]]        Abnormal            Final result                 Please view results for these tests on the individual orders.    CBC Auto Differential [[id number]]  (Abnormal) Collected: 12/08/23 0526    Specimen: Blood Updated: 12/08/23 0605     WBC 11.32 10*3/mm3      RBC 4.33 10*6/mm3      Hemoglobin 11.3 g/dL      Hematocrit 38.6 %      MCV 89.1 fL      MCH 26.1 pg      MCHC 29.3 g/dL      RDW 14.3 %      RDW-SD 46.0 fl      MPV 10.5 fL      Platelets 264 10*3/mm3      Neutrophil % 81.5 %      Lymphocyte % 10.8 %      Monocyte % 6.5 %      Eosinophil % 0.2 %      Basophil % 0.4 %      Immature Grans % 0.6 %      Neutrophils, Absolute 9.22 10*3/mm3      Lymphocytes, Absolute 1.22 10*3/mm3      Monocytes, Absolute 0.74 10*3/mm3      Eosinophils, Absolute 0.02 10*3/mm3      Basophils, Absolute 0.05 10*3/mm3      Immature Grans, Absolute 0.07 10*3/mm3      nRBC 0.0 /100 WBC     POC Glucose Once [[id number]]  (Abnormal) Collected: 12/07/23 2148    Specimen: Blood Updated: 12/07/23 2159     Glucose 187 mg/dL      Comment: : [id number] Tripp BishopahMeter ID: [id number]       POC Glucose Once [[id number]]  (Abnormal) Collected: 12/07/23 1726    Specimen: Blood Updated: 12/07/23 1736     Glucose 221 mg/dL      Comment: : [id number] Gibson LawlerraMeter ID: [id number]       Basic Metabolic Panel [[id number]]  (Abnormal) Collected: 12/07/23 1414    Specimen: Blood Updated: 12/07/23 1436     Glucose 230  mg/dL      BUN 24 mg/dL      Creatinine 1.12 mg/dL      Sodium 142 mmol/L      Potassium 4.2 mmol/L      Chloride 100 mmol/L      CO2 33.0 mmol/L      Calcium 8.7 mg/dL      BUN/Creatinine Ratio 21.4     Anion Gap 9.0 mmol/L      eGFR 74.3 mL/min/1.73     Narrative:      GFR Normal >60  Chronic Kidney Disease <60  Kidney Failure <15      CBC & Differential [[id number]]  (Abnormal) Collected: 12/07/23 1414    Specimen: Blood Updated: 12/07/23 1436    Narrative:      The following orders were created for panel order CBC & Differential.  Procedure                               Abnormality         Status                     ---------                               -----------         ------                     CBC Auto Differential[[id number]]        Abnormal            Final result                 Please view results for these tests on the individual orders.    CBC Auto Differential [[id number]]  (Abnormal) Collected: 12/07/23 1414    Specimen: Blood Updated: 12/07/23 1436     WBC 16.76 10*3/mm3      RBC 4.32 10*6/mm3      Hemoglobin 11.2 g/dL      Hematocrit 38.6 %      MCV 89.4 fL      MCH 25.9 pg      MCHC 29.0 g/dL      RDW 14.2 %      RDW-SD 46.2 fl      MPV 9.9 fL      Platelets 243 10*3/mm3      Neutrophil % 86.3 %      Lymphocyte % 5.9 %      Monocyte % 6.9 %      Eosinophil % 0.1 %      Basophil % 0.2 %      Immature Grans % 0.6 %      Neutrophils, Absolute 14.45 10*3/mm3      Lymphocytes, Absolute 0.99 10*3/mm3      Monocytes, Absolute 1.16 10*3/mm3      Eosinophils, Absolute 0.02 10*3/mm3      Basophils, Absolute 0.04 10*3/mm3      Immature Grans, Absolute 0.10 10*3/mm3      nRBC 0.0 /100 WBC     POC Glucose Once [[id number]]  (Abnormal) Collected: 12/07/23 1216    Specimen: Blood Updated: 12/07/23 1227     Glucose 186 mg/dL      Comment: : [id number] Prashant AdanngaanneMeter ID: [id number]       High Sensitivity Troponin T 2Hr [[id number]]  (Abnormal) Collected: 12/07/23 0847    Specimen: Blood Updated: 12/07/23  0943     HS Troponin T 63 ng/L      Troponin T Delta 3 ng/L     Narrative:      High Sensitive Troponin T Reference Range:  <14.0 ng/L- Negative Female for AMI  <22.0 ng/L- Negative Male for AMI  >=14 - Abnormal Female indicating possible myocardial injury.  >=22 - Abnormal Male indicating possible myocardial injury.   Clinicians would have to utilize clinical acumen, EKG, Troponin, and serial changes to determine if it is an Acute Myocardial Infarction or myocardial injury due to an underlying chronic condition.         BNP [[id number]]  (Abnormal) Collected: 12/07/23 0653    Specimen: Blood Updated: 12/07/23 0849     proBNP 1,037.0 pg/mL     Narrative:      This assay is used as an aid in the diagnosis of individuals suspected of having heart failure. It can be used as an aid in the diagnosis of acute decompensated heart failure (ADHF) in patients presenting with signs and symptoms of ADHF to the emergency department (ED). In addition, NT-proBNP of <300 pg/mL indicates ADHF is not likely.    Age Range Result Interpretation  NT-proBNP Concentration (pg/mL:      <50             Positive            >450                   Gray                 300-450                    Negative             <300    50-75           Positive            >900                  Gray                300-900                  Negative            <300      >75             Positive            >1800                  Gray                300-1800                  Negative            <300    Phosphorus [[id number]]  (Normal) Collected: 12/07/23 0653    Specimen: Blood Updated: 12/07/23 0723     Phosphorus 3.3 mg/dL     Comprehensive Metabolic Panel [[id number]]  (Abnormal) Collected: 12/07/23 0653    Specimen: Blood Updated: 12/07/23 0723     Glucose 203 mg/dL      BUN 24 mg/dL      Creatinine 1.29 mg/dL      Sodium 139 mmol/L      Potassium 4.2 mmol/L      Chloride 100 mmol/L      CO2 33.0 mmol/L      Calcium 8.5 mg/dL      Total Protein 6.7 g/dL       Albumin 3.6 g/dL      ALT (SGPT) 5 U/L      AST (SGOT) 9 U/L      Alkaline Phosphatase 76 U/L      Total Bilirubin 0.2 mg/dL      Globulin 3.1 gm/dL      A/G Ratio 1.2 g/dL      BUN/Creatinine Ratio 18.6     Anion Gap 6.0 mmol/L      eGFR 62.7 mL/min/1.73     Narrative:      GFR Normal >60  Chronic Kidney Disease <60  Kidney Failure <15      Magnesium [[id number]]  (Normal) Collected: 12/07/23 0653    Specimen: Blood Updated: 12/07/23 0723     Magnesium 2.0 mg/dL     POC Glucose Once [[id number]]  (Abnormal) Collected: 12/07/23 0711    Specimen: Blood Updated: 12/07/23 0723     Glucose 187 mg/dL      Comment: : [id number] Burton (Lewis) JessicaMeter ID: [id number]       High Sensitivity Troponin T [[id number]]  (Abnormal) Collected: 12/07/23 0653    Specimen: Blood Updated: 12/07/23 0723     HS Troponin T 60 ng/L     Narrative:      High Sensitive Troponin T Reference Range:  <14.0 ng/L- Negative Female for AMI  <22.0 ng/L- Negative Male for AMI  >=14 - Abnormal Female indicating possible myocardial injury.  >=22 - Abnormal Male indicating possible myocardial injury.   Clinicians would have to utilize clinical acumen, EKG, Troponin, and serial changes to determine if it is an Acute Myocardial Infarction or myocardial injury due to an underlying chronic condition.         CBC & Differential [[id number]]  (Abnormal) Collected: 12/07/23 0653    Specimen: Blood Updated: 12/07/23 0704    Narrative:      The following orders were created for panel order CBC & Differential.  Procedure                               Abnormality         Status                     ---------                               -----------         ------                     CBC Auto Differential[[id number]]        Abnormal            Final result                 Please view results for these tests on the individual orders.    CBC Auto Differential [[id number]]  (Abnormal) Collected: 12/07/23 0653    Specimen: Blood Updated: 12/07/23 0704     WBC 16.39  10*3/mm3      RBC 4.25 10*6/mm3      Hemoglobin 11.1 g/dL      Hematocrit 38.9 %      MCV 91.5 fL      MCH 26.1 pg      MCHC 28.5 g/dL      RDW 14.4 %      RDW-SD 47.3 fl      MPV 10.3 fL      Platelets 257 10*3/mm3      Neutrophil % 83.8 %      Lymphocyte % 9.1 %      Monocyte % 5.9 %      Eosinophil % 0.2 %      Basophil % 0.3 %      Immature Grans % 0.7 %      Neutrophils, Absolute 13.74 10*3/mm3      Lymphocytes, Absolute 1.49 10*3/mm3      Monocytes, Absolute 0.97 10*3/mm3      Eosinophils, Absolute 0.03 10*3/mm3      Basophils, Absolute 0.05 10*3/mm3      Immature Grans, Absolute 0.11 10*3/mm3      nRBC 0.0 /100 WBC     POC Glucose Once [[id number]]  (Abnormal) Collected: 12/07/23 0614    Specimen: Blood Updated: 12/07/23 0626     Glucose 160 mg/dL      Comment: : [id number] [employer] EthanMeter ID: [id number]       POC Glucose Once [[id number]]  (Abnormal) Collected: 12/06/23 2104    Specimen: Blood Updated: 12/06/23 2115     Glucose 254 mg/dL      Comment: : [id number] Stone EthanMeter ID: [id number]       POC Glucose Once [[id number]]  (Abnormal) Collected: 12/06/23 1710    Specimen: Blood Updated: 12/06/23 1721     Glucose 250 mg/dL      Comment: : [id number] Prashant LÃ¡nzanoseighMeter ID: [id number]       Tissue Pathology Exam [[id number]] Collected: 12/05/23 1251    Specimen: Tissue from Mesentery; Tissue from Large Intestine, Sigmoid Colon; Tissue from Large Intestine, Sigmoid Colon; Tissue from Large Intestine, Sigmoid Colon Updated: 12/06/23 1119     Case Report --     Surgical Pathology Report                         Case: [id number]                                  Authorizing Provider:  Oma Velasquez MD    Collected:           12/05/2023 12:51 PM          Ordering Location:     [facility] OR  Received:            12/05/2023 12:58 PM          Pathologist:           Radha Rowley MD                                                        Intraop:               Radha Rowley  "MD                                                        Specimen:    Mesentery, Mesenteric implant                                                               Synoptic Checklist --     Intraoperative Consultation --       Specimen: \"Mesenteric implant\"  FROZEN SECTION DIAGNOSIS: Consistent with a nodular area of fat necrosis with numerous macrophages.  Reported to \"Kwabena\" in OR 6 on 12/5/2023 at 13:11 CST by Radha Rowley MD.      POC Glucose Once [[id number]]  (Abnormal) Collected: 12/06/23 1056    Specimen: Blood Updated: 12/06/23 1107     Glucose 255 mg/dL      Comment: : [id number] Ron Personal Web SystemsyleeMeter ID: [id number]       POC Glucose Once [[id number]]  (Abnormal) Collected: 12/06/23 0728    Specimen: Blood Updated: 12/06/23 0740     Glucose 309 mg/dL      Comment: : [id number] Ron Personal Web SystemsyleeMeter ID: [id number]       Comprehensive Metabolic Panel [[id number]]  (Abnormal) Collected: 12/06/23 0440    Specimen: Blood Updated: 12/06/23 0534     Glucose 347 mg/dL      BUN 21 mg/dL      Creatinine 1.44 mg/dL      Sodium 138 mmol/L      Potassium 4.7 mmol/L      Chloride 100 mmol/L      CO2 28.0 mmol/L      Calcium 8.2 mg/dL      Total Protein 6.6 g/dL      Albumin 3.7 g/dL      ALT (SGPT) 6 U/L      AST (SGOT) 9 U/L      Alkaline Phosphatase 81 U/L      Total Bilirubin 0.2 mg/dL      Globulin 2.9 gm/dL      A/G Ratio 1.3 g/dL      BUN/Creatinine Ratio 14.6     Anion Gap 10.0 mmol/L      eGFR 54.9 mL/min/1.73     Narrative:      GFR Normal >60  Chronic Kidney Disease <60  Kidney Failure <15      Phosphorus [[id number]]  (Abnormal) Collected: 12/06/23 0440    Specimen: Blood Updated: 12/06/23 0534     Phosphorus 4.8 mg/dL     Magnesium [[id number]]  (Normal) Collected: 12/06/23 0440    Specimen: Blood Updated: 12/06/23 0534     Magnesium 1.9 mg/dL     CBC & Differential [[id number]]  (Abnormal) Collected: 12/06/23 0440    Specimen: Blood Updated: 12/06/23 0510    Narrative:      The following orders were " created for panel order CBC & Differential.  Procedure                               Abnormality         Status                     ---------                               -----------         ------                     CBC Auto Differential[[id number]]        Abnormal            Final result                 Please view results for these tests on the individual orders.    CBC Auto Differential [[id number]]  (Abnormal) Collected: 12/06/23 0440    Specimen: Blood Updated: 12/06/23 0510     WBC 14.46 10*3/mm3      RBC 4.32 10*6/mm3      Hemoglobin 11.0 g/dL      Hematocrit 38.3 %      MCV 88.7 fL      MCH 25.5 pg      MCHC 28.7 g/dL      RDW 14.0 %      RDW-SD 45.1 fl      MPV 10.5 fL      Platelets 249 10*3/mm3      Neutrophil % 92.0 %      Lymphocyte % 3.0 %      Monocyte % 4.4 %      Eosinophil % 0.0 %      Basophil % 0.2 %      Immature Grans % 0.4 %      Neutrophils, Absolute 13.30 10*3/mm3      Lymphocytes, Absolute 0.44 10*3/mm3      Monocytes, Absolute 0.63 10*3/mm3      Eosinophils, Absolute 0.00 10*3/mm3      Basophils, Absolute 0.03 10*3/mm3      Immature Grans, Absolute 0.06 10*3/mm3      nRBC 0.0 /100 WBC     POC Glucose Once [[id number]]  (Abnormal) Collected: 12/05/23 2140    Specimen: Blood Updated: 12/05/23 2201     Glucose 376 mg/dL      Comment: : [id number] Luther GermanMeter ID: [id number]       POC Glucose Once [[id number]]  (Abnormal) Collected: 12/05/23 1729    Specimen: Blood Updated: 12/05/23 1741     Glucose 294 mg/dL      Comment: : [id number] Dea ChakrabortyMeter ID: [id number]                Results for orders placed during the hospital encounter of 12/05/23    Adult Transthoracic Echo Complete W/ Cont if Necessary Per Protocol    Interpretation Summary    Left ventricular ejection fraction appears to be 41 - 45%.    The left ventricular cavity is moderately dilated.    Left ventricular wall thickness is consistent with mild concentric hypertrophy.    The following left ventricular wall  segments are hypokinetic: mid anterolateral, apical lateral, basal inferolateral, mid inferolateral, apical inferior, mid inferior and apex hypokinetic.    Left ventricular diastolic function is consistent with (grade II w/high LAP) pseudonormalization.    The right ventricular cavity is borderline dilated.    The right atrial cavity is moderately  dilated.    Mild aortic valve stenosis is present.    Moderate tricuspid valve regurgitation is present.    Estimated right ventricular systolic pressure from tricuspid regurgitation is markedly elevated (>55 mmHg).          Medication Review: yes  Current Facility-Administered Medications   Medication Dose Route Frequency Provider Last Rate Last Admin    dextrose (D50W) (25 g/50 mL) IV injection 25 g  25 g Intravenous Q15 Min PRN Oma Velasquez MD        dextrose (GLUTOSE) oral gel 15 g  15 g Oral Q15 Min PRN Oma Velasquez MD        docusate sodium (COLACE) capsule 100 mg  100 mg Oral BID Millie Dey PA-C   100 mg at 12/08/23 0914    DULoxetine (CYMBALTA) DR capsule 30 mg  30 mg Oral Daily Oma Velasquez MD   30 mg at 12/08/23 0914    famotidine (PEPCID) tablet 20 mg  20 mg Oral BID Oma Velasquez MD   20 mg at 12/08/23 0914    furosemide (LASIX) tablet 40 mg  40 mg Oral Daily Shantel Carpenter APRN   40 mg at 12/08/23 0914    gabapentin (NEURONTIN) capsule 800 mg  800 mg Oral BID Oma Velasquez MD   800 mg at 12/08/23 0913    glucagon (GLUCAGEN) injection 1 mg  1 mg Intramuscular Q15 Min PRN Oma Velasquez MD        heparin (porcine) 5000 UNIT/ML injection 5,000 Units  5,000 Units Subcutaneous Q8H Oma Velasquez MD   5,000 Units at 12/07/23 2210    HYDROmorphone (DILAUDID) injection 0.5 mg  0.5 mg Intravenous Q2H PRN Oma Velasquez MD   0.5 mg at 12/07/23 2253    And    naloxone (NARCAN) injection 0.4 mg  0.4 mg Intravenous Q5 Min PRN Oam Velasquez MD        Insulin Lispro (humaLOG) injection 3-14 Units  3-14  Units Subcutaneous 4x Daily AC & at Bedtime Oma Velasquez MD   3 Units at 12/07/23 2210    lactated ringers infusion  20 mL/hr Intravenous Continuous Hogancamp, Jose Eason MD   Stopped at 12/07/23 0902    lidocaine (LIDODERM) 5 % 2 patch  2 patch Transdermal Q24H Oma Velasquez MD   2 patch at 12/08/23 0914    metoclopramide (REGLAN) injection 10 mg  10 mg Intravenous Q6H Oma Velasquez MD   10 mg at 12/08/23 0914    nicotine (NICODERM CQ) 21 MG/24HR patch 1 patch  1 patch Transdermal Q24H Oma Velasquez MD   1 patch at 12/08/23 0914    ondansetron (ZOFRAN) tablet 4 mg  4 mg Oral Q6H PRN Oma Velasquez MD        Or    ondansetron (ZOFRAN) injection 4 mg  4 mg Intravenous Q6H PRN Oma Velasquez MD   4 mg at 12/07/23 2222    oxyCODONE (ROXICODONE) immediate release tablet 5 mg  5 mg Oral Q6H PRN Oma Velasquez MD   5 mg at 12/07/23 1532    polyethylene glycol (MIRALAX) packet 17 g  17 g Oral Daily Millie Dey PA-C   17 g at 12/06/23 1119    sodium chloride nasal spray 2 spray  2 spray Each Nare PRN Oma Velasquez MD        traMADol (ULTRAM) tablet 50 mg  50 mg Oral Q6H PRN Oma Velasquez MD             Assessment & Plan       Colon adenocarcinoma    Diabetes mellitus    Pulmonary HTN    COPD (chronic obstructive pulmonary disease)    Chronic combined systolic (congestive) and diastolic (congestive) heart failure    Sleep apnea    Plan:   Elevated troponin: likely secondary to colon resection. Denies ischemic symptoms and no evidence of ischemia per EKG. He has known CAD and his EF has slightly dropped from previous, currently 41-45% from 46-50% in 12/2021 therefore would recommend outpatient ischemic evaluation.     CAD: previous stenting to the distal RCA in 4/2022. Recommend resuming antiplatelet therapy, either ASA or plavix, ASAP to prevent stent thrombosis. Will restart statin therapy as well.     Chronic combined CHF: diuresed well with IV lasix  yesterday. Will start guideline directed medical therapy for patient's with reduced EF- start Toprol 25mg daily and losartan 25mg daily. Continue Lasix 40mg daily.     Colon adenocarcinoma: s/p colon resection on 12/5.     Cardiology will sign off at this time. He will nee a follow up with Dr. Avendano at his clinic in [facility] in 4 weeks after discharge. Further recommendations per Dr. Quiñonez.     Electronically signed by VIV Leon, 12/08/23, 1:02 PM CST.    Time spent: 45 minutes

## 2023-12-08 NOTE — CASE MANAGEMENT/SOCIAL WORK
Continued Stay Note  BRUNO Rahman     Patient Name: Tucker Knox  MRN: [id number]  Today's Date: 12/8/2023    Admit Date: 12/5/2023    Plan: Home   Discharge Plan       Row Name 12/08/23 1356       Plan    Plan Home    Patient/Family in Agreement with Plan yes    Plan Comments Pt has been up independently and has declined therapy. He will go home at d/c. He has denied needs.    Final Discharge Disposition Code 01 - home or self-care                   Discharge Codes    No documentation.                       ABNER White

## 2023-12-08 NOTE — PROGRESS NOTES
Oma Velasquez MD - General Surgery  Progress Note     LOS: 3 days   Patient Care Team:  Kt Alfredo MD as PCP - General (Family Medicine)  Oma Velasquez MD as Consulting Physician (General Surgery)      Subjective     Interval History:     Patient pulled out his NGT overnight. AXR stable. He denies nausea and vomiting. He was spitting up some clear fluid. He is hungry. Passing flatus. Had a BM yesterday.     Objective     Vital Signs  Temp:  [97.5 °F (36.4 °C)-98.6 °F (37 °C)] 97.5 °F (36.4 °C)  Heart Rate:  [] 90  Resp:  [16-18] 16  BP: (109-151)/(47-94) 151/76    Physical Exam:  General appearance - alert, well appearing, and in no distress  Mental status - alert, oriented to person, place, and time  Eyes - pupils equal and reactive, extraocular eye movements intact  Neck - supple, no significant adenopathy  Chest - no tachypnea, retractions or cyanosis, on 3L NC   Heart - normal rate and regular rhythm  Abdomen - soft, nontender, mild distention, no masses or organomegaly  Neurological - alert, oriented, normal speech, no focal findings or movement disorder noted  Musculoskeletal - no joint tenderness, deformity       Results Review:    Lab Results (last 24 hours)       Procedure Component Value Units Date/Time    Phosphorus [[id number]]  (Abnormal) Collected: 12/08/23 0526    Specimen: Blood Updated: 12/08/23 0634     Phosphorus 2.4 mg/dL     Comprehensive Metabolic Panel [[id number]]  (Abnormal) Collected: 12/08/23 0526    Specimen: Blood Updated: 12/08/23 0629     Glucose 213 mg/dL      BUN 25 mg/dL      Creatinine 1.17 mg/dL      Sodium 143 mmol/L      Potassium 4.2 mmol/L      Chloride 97 mmol/L      CO2 36.0 mmol/L      Calcium 8.8 mg/dL      Total Protein 7.3 g/dL      Albumin 3.8 g/dL      ALT (SGPT) 5 U/L      AST (SGOT) 9 U/L      Alkaline Phosphatase 77 U/L      Total Bilirubin 0.4 mg/dL      Globulin 3.5 gm/dL      A/G Ratio 1.1 g/dL      BUN/Creatinine Ratio 21.4      Anion Gap 10.0 mmol/L      eGFR 70.5 mL/min/1.73     Narrative:      GFR Normal >60  Chronic Kidney Disease <60  Kidney Failure <15      Magnesium [[id number]]  (Normal) Collected: 12/08/23 0526    Specimen: Blood Updated: 12/08/23 0629     Magnesium 2.0 mg/dL     CBC & Differential [[id number]]  (Abnormal) Collected: 12/08/23 0526    Specimen: Blood Updated: 12/08/23 0605    Narrative:      The following orders were created for panel order CBC & Differential.  Procedure                               Abnormality         Status                     ---------                               -----------         ------                     CBC Auto Differential[[id number]]        Abnormal            Final result                 Please view results for these tests on the individual orders.    CBC Auto Differential [[id number]]  (Abnormal) Collected: 12/08/23 0526    Specimen: Blood Updated: 12/08/23 0605     WBC 11.32 10*3/mm3      RBC 4.33 10*6/mm3      Hemoglobin 11.3 g/dL      Hematocrit 38.6 %      MCV 89.1 fL      MCH 26.1 pg      MCHC 29.3 g/dL      RDW 14.3 %      RDW-SD 46.0 fl      MPV 10.5 fL      Platelets 264 10*3/mm3      Neutrophil % 81.5 %      Lymphocyte % 10.8 %      Monocyte % 6.5 %      Eosinophil % 0.2 %      Basophil % 0.4 %      Immature Grans % 0.6 %      Neutrophils, Absolute 9.22 10*3/mm3      Lymphocytes, Absolute 1.22 10*3/mm3      Monocytes, Absolute 0.74 10*3/mm3      Eosinophils, Absolute 0.02 10*3/mm3      Basophils, Absolute 0.05 10*3/mm3      Immature Grans, Absolute 0.07 10*3/mm3      nRBC 0.0 /100 WBC     POC Glucose Once [[id number]]  (Abnormal) Collected: 12/07/23 2148    Specimen: Blood Updated: 12/07/23 2159     Glucose 187 mg/dL      Comment: : [id number] Tripp Cowan ID: [id number]       POC Glucose Once [[id number]]  (Abnormal) Collected: 12/07/23 1726    Specimen: Blood Updated: 12/07/23 1736     Glucose 221 mg/dL      Comment: : [id number] Gibson Kaiser ID:  [id number]       Basic Metabolic Panel [[id number]]  (Abnormal) Collected: 12/07/23 1414    Specimen: Blood Updated: 12/07/23 1436     Glucose 230 mg/dL      BUN 24 mg/dL      Creatinine 1.12 mg/dL      Sodium 142 mmol/L      Potassium 4.2 mmol/L      Chloride 100 mmol/L      CO2 33.0 mmol/L      Calcium 8.7 mg/dL      BUN/Creatinine Ratio 21.4     Anion Gap 9.0 mmol/L      eGFR 74.3 mL/min/1.73     Narrative:      GFR Normal >60  Chronic Kidney Disease <60  Kidney Failure <15      CBC & Differential [[id number]]  (Abnormal) Collected: 12/07/23 1414    Specimen: Blood Updated: 12/07/23 1436    Narrative:      The following orders were created for panel order CBC & Differential.  Procedure                               Abnormality         Status                     ---------                               -----------         ------                     CBC Auto Differential[[id number]]        Abnormal            Final result                 Please view results for these tests on the individual orders.    CBC Auto Differential [[id number]]  (Abnormal) Collected: 12/07/23 1414    Specimen: Blood Updated: 12/07/23 1436     WBC 16.76 10*3/mm3      RBC 4.32 10*6/mm3      Hemoglobin 11.2 g/dL      Hematocrit 38.6 %      MCV 89.4 fL      MCH 25.9 pg      MCHC 29.0 g/dL      RDW 14.2 %      RDW-SD 46.2 fl      MPV 9.9 fL      Platelets 243 10*3/mm3      Neutrophil % 86.3 %      Lymphocyte % 5.9 %      Monocyte % 6.9 %      Eosinophil % 0.1 %      Basophil % 0.2 %      Immature Grans % 0.6 %      Neutrophils, Absolute 14.45 10*3/mm3      Lymphocytes, Absolute 0.99 10*3/mm3      Monocytes, Absolute 1.16 10*3/mm3      Eosinophils, Absolute 0.02 10*3/mm3      Basophils, Absolute 0.04 10*3/mm3      Immature Grans, Absolute 0.10 10*3/mm3      nRBC 0.0 /100 WBC     POC Glucose Once [[id number]]  (Abnormal) Collected: 12/07/23 1216    Specimen: Blood Updated: 12/07/23 1227     Glucose 186 mg/dL      Comment: : [id number] Prashant  [employer] ID: [id number]             Imaging Results (Last 24 Hours)       Procedure Component Value Units Date/Time    XR Abdomen KUB [[id number]] Collected: 12/08/23 0640     Updated: 12/08/23 0645    Narrative:      XR ABDOMEN KUB- 12/8/2023 3:25 AM     HISTORY: recheck abdominal bloating; C18.9-Malignant neoplasm of colon,  unspecified       TECHNIQUE:Single AP view of the abdomen.     COMPARISON: 12/7/2023     FINDINGS:     Limited exam due to exclusion of portions of the abdomen.     Enteric tube is no longer visualized.     Persistent distended loops of small and large bowel, likely similar to  the prior study.     No visualized free air.     No acute osseous abnormality.       Impression:         Similar distention of small and large bowel loops.     Enteric tube no longer visualized.                 This report was signed and finalized on 12/8/2023 6:42 AM by Agustin Bravo.       XR Abdomen KUB [[id number]] Collected: 12/07/23 1528     Updated: 12/07/23 1532    Narrative:      EXAMINATION: XR ABDOMEN KUB-  12/7/2023 3:28 PM     HISTORY: NG tube placement.     FINDINGS: KB radiograph is compared to prior exam of earlier the same  day. An NG tube has been successfully advanced into the proximal  stomach. The proximal sideport is at the gastroesophageal juncture and  further advancement would be recommended. There is moderate gaseous  distention of the colon.       Impression:      1.. NG tube is successful advanced into the proximal stomach. Further  advancement will be suggested with the proximal sideport at the GE  junction.     This report was signed and finalized on 12/7/2023 3:29 PM by Dr. Antonio Prather MD.                 Assessment & Plan     Rectosigmoid adenocarcinoma   Obesity BMI 34   COPD   Nicotine dependence, cigarettes     Mr. Knox is a 63 year old male POD 3 s/p low anterior resection. Wean supplemental O2 to a saturation of 88-90% due to his chronic lung disease. AXR stable. Exam  improved. Advance to clears. Encourage Ambulating. IVF off. Aggressive IS.     Oma Velasquez MD  12/08/23  12:24 CST

## 2023-12-08 NOTE — PLAN OF CARE
"Goal Outcome Evaluation:  Plan of Care Reviewed With: patient        Progress: declining  Outcome Evaluation: Patient remains alert and oriented x 4. I had phone call discussions with patient family, son - Aniceto, and sister- Oxana, per patient request. Patient was educated about the importance of his NG to his treatment plan multiple times by myself and other nurses, also one time with pt sister on speaker phone. Patient called son exclaiming that he was dying, thus son called the unit. I spoke with son to explain the situation that led to NG placement and purpose of NG. Patient son was agreeable and understanding of education. By 0015 patient had pulled NG tube. Patient continues to demand ice chips despite education about being NPO. Patient also educated on the importance of ambulation. Patient stated \"I made it to the bathroom, ain't that enough? I will walk when I want to\". Vitals are stable at this time. Awaiting for am Xray.       UPDATE:    Patient was agreeable to morning walk. Patient was upset that staff did not know what time the surgeons would be rounding and began to curse while stating that he has already had his surgery, he shouldn't have to wait because we are just wasting his time. Patient then began to request water and again, patient was educated on Strict NPO orders. Patient then again became frustrated with staff. Patient ambulated with standby assistance but by the time we returned to his room, patient began dry heaving. PRN medication given.  "

## 2023-12-08 NOTE — PLAN OF CARE
Goal Outcome Evaluation:      Physical Therapy: Patient reports he is up ambulating on his own and declined need for PT services, will sign off.

## 2023-12-08 NOTE — NURSING NOTE
After walking this morning patient started to dry heave and vomit. I went to get zofran for patient upon returning to room patient was demanding water. I educated patient about his NPO status and why it is ordered. Patient then became belligerent and kicked the trash can. With this, I left the room and had security called to bedside to speak with the patient.

## 2023-12-08 NOTE — PROGRESS NOTES
[facility]  INPATIENT PROGRESS NOTE    Patient Name: Tucker Knox  Date of Admission: 12/5/2023  Today's Date: 12/08/23  Length of Stay: 3  Primary Care Physician: Kt Alfredo MD    Subjective   Chief Complaint: Consultation regarding elevated troponin  HPI     The patient is doing well today.  He denies chest discomfort.  He denies shortness of breath.  Cardiology has seen and evaluated the patient.  Troponin trend has been flat consistent with myocardial injury likely secondary to to surgical stress.  Echocardiogram reveals an ejection fraction of 41-45% which is slightly lower than December 2021.  Outpatient ischemic evaluation has been recommended.  GDMT for heart failure has been initiated by cardiology with Toprol, losartan and Lasix.  The patient has had reasonable diuresis with 1400 cc out since admission with 5 urinations unmeasured.  The patient should follow-up with cardiology and approximately 1 month after discharge for further medication adjustment and possible medication additions.  Diet has been advanced to clears per general surgery.    Review of Systems   All pertinent negatives and positives are as above. All other systems have been reviewed and are negative unless otherwise stated.     Objective    Temp:  [97.5 °F (36.4 °C)-98.6 °F (37 °C)] 97.5 °F (36.4 °C)  Heart Rate:  [] 90  Resp:  [16-18] 16  BP: (109-151)/(47-94) 151/76  Physical Exam  Constitutional:       General: He is not in acute distress.     Appearance: Normal appearance. He is normal weight.   HENT:      Head: Normocephalic and atraumatic.      Right Ear: External ear normal.      Left Ear: External ear normal.      Nose: Nose normal.      Mouth: Mucous membranes are moist.   Eyes:      General: No scleral icterus.     Conjunctiva/sclera: Conjunctivae normal.   Cardiovascular:      Rate and Rhythm: Regular rhythm.      Pulses: Normal pulses.      Heart  "sounds: Normal heart sounds. No murmur heard.  Pulmonary:      Effort: Pulmonary effort is normal. No respiratory distress.      Breath sounds: Normal breath sounds.   Abdominal:      General: Abdomen is protuberant. Bowel sounds are present.      Palpations: Abdomen is soft.      Tenderness: There is abdominal tenderness (Incisional).   Musculoskeletal:         General: Normal range of motion.      Right lower leg: No edema.      Left lower leg: No edema.   Skin:     General: Skin is warm and dry.      Coloration: Skin is not pale.   Neurological:      General: No focal deficit present.   Psychiatric:         Mood and Affect: Mood normal.         Judgment: Judgment normal.     Results Review:  I have reviewed the labs, radiology results, and diagnostic studies.    Laboratory Data:   Results from last 7 days   Lab Units 12/08/23  0526 12/07/23  1414 12/07/23  0653   WBC 10*3/mm3 11.32* 16.76* 16.39*   HEMOGLOBIN g/dL 11.3* 11.2* 11.1*   HEMATOCRIT % 38.6 38.6 38.9   PLATELETS 10*3/mm3 264 243 257        Results from last 7 days   Lab Units 12/08/23  0526 12/07/23  1414 12/07/23  0653 12/06/23  0440   SODIUM mmol/L 143 142 139 138   POTASSIUM mmol/L 4.2 4.2 4.2 4.7   CHLORIDE mmol/L 97* 100 100 100   CO2 mmol/L 36.0* 33.0* 33.0* 28.0   BUN mg/dL 25* 24* 24* 21   CREATININE mg/dL 1.17 1.12 1.29* 1.44*   CALCIUM mg/dL 8.8 8.7 8.5* 8.2*   BILIRUBIN mg/dL 0.4  --  0.2 0.2   ALK PHOS U/L 77  --  76 81   ALT (SGPT) U/L 5  --  5 6   AST (SGOT) U/L 9  --  9 9   GLUCOSE mg/dL 213* 230* 203* 347*       Culture Data:   No results found for: \"BLOODCX\", \"URINECX\", \"WOUNDCX\", \"MRSACX\", \"RESPCX\", \"STOOLCX\"    Radiology Data:   Imaging Results (Last 24 Hours)       Procedure Component Value Units Date/Time    XR Abdomen KUB [[id number]] Collected: 12/08/23 0640     Updated: 12/08/23 0645    Narrative:      XR ABDOMEN KUB- 12/8/2023 3:25 AM     HISTORY: recheck abdominal bloating; C18.9-Malignant neoplasm of colon,  unspecified     "   TECHNIQUE:Single AP view of the abdomen.     COMPARISON: 12/7/2023     FINDINGS:     Limited exam due to exclusion of portions of the abdomen.     Enteric tube is no longer visualized.     Persistent distended loops of small and large bowel, likely similar to  the prior study.     No visualized free air.     No acute osseous abnormality.       Impression:         Similar distention of small and large bowel loops.     Enteric tube no longer visualized.                 This report was signed and finalized on 12/8/2023 6:42 AM by Agustin Bravo.       XR Abdomen KUB [[id number]] Collected: 12/07/23 1528     Updated: 12/07/23 1532    Narrative:      EXAMINATION: XR ABDOMEN KUB-  12/7/2023 3:28 PM     HISTORY: NG tube placement.     FINDINGS: KB radiograph is compared to prior exam of earlier the same  day. An NG tube has been successfully advanced into the proximal  stomach. The proximal sideport is at the gastroesophageal juncture and  further advancement would be recommended. There is moderate gaseous  distention of the colon.       Impression:      1.. NG tube is successful advanced into the proximal stomach. Further  advancement will be suggested with the proximal sideport at the GE  junction.     This report was signed and finalized on 12/7/2023 3:29 PM by Dr. Antonio Prather MD.               I have reviewed the patient's current medications.     Assessment/Plan   Assessment  Active Hospital Problems    Diagnosis     **Colon adenocarcinoma     Sleep apnea     Chronic combined systolic (congestive) and diastolic (congestive) heart failure     Pulmonary HTN     COPD (chronic obstructive pulmonary disease)     Diabetes mellitus        Treatment Plan  Agree with initiation of GDMT  Excellent diuresis achieved  Hospitalist service will sign off    Medical Decision Making  Number and Complexity of problems:   1) adenocarcinoma of the colon status postresection, acute, high complexity  2) pulm hypertension, chronic,  high complexity  3) COPD, chronic, moderate complexity  4) diabetes mellitus, chronic, moderate complexity  5) chronic systolic and diastolic heart failure, chronic, moderate complexity     Differential Diagnosis: Pulmonary edema     Conditions and Status        Condition is unchanged.     [facility] Data  External documents reviewed: Care Everywhere documentation  Cardiac tracing (EKG, telemetry) interpretation: See HPI  Radiology interpretation: See HPI  Labs reviewed: See HPI  Any tests that were considered but not ordered: None     Decision rules/scores evaluated (example [id number], [place], etc): None     Discussed with: The patient     Care Planning  Shared decision making: The patient, general surgery and cardiology  Code status and discussions: Full code     Disposition  Social Determinants of Health that impact treatment or disposition: None noted  I expect the patient to be discharged by the primary service.     Electronically signed by David Cadena DO, 12/08/23, 15:15 CST.

## 2023-12-09 LAB
ALBUMIN SERPL-MCNC: 3.3 G/DL (ref 3.5–5.2)
ALBUMIN/GLOB SERPL: 1.1 G/DL
ALP SERPL-CCNC: 66 U/L (ref 39–117)
ALT SERPL W P-5'-P-CCNC: 5 U/L (ref 1–41)
ANION GAP SERPL CALCULATED.3IONS-SCNC: 6 MMOL/L (ref 5–15)
AST SERPL-CCNC: 6 U/L (ref 1–40)
BASOPHILS # BLD AUTO: 0.04 10*3/MM3 (ref 0–0.2)
BASOPHILS NFR BLD AUTO: 0.4 % (ref 0–1.5)
BILIRUB SERPL-MCNC: 0.3 MG/DL (ref 0–1.2)
BUN SERPL-MCNC: 20 MG/DL (ref 8–23)
BUN/CREAT SERPL: 19.6 (ref 7–25)
CALCIUM SPEC-SCNC: 8.6 MG/DL (ref 8.6–10.5)
CHLORIDE SERPL-SCNC: 96 MMOL/L (ref 98–107)
CO2 SERPL-SCNC: 36 MMOL/L (ref 22–29)
CREAT SERPL-MCNC: 1.02 MG/DL (ref 0.76–1.27)
DEPRECATED RDW RBC AUTO: 45.7 FL (ref 37–54)
EGFRCR SERPLBLD CKD-EPI 2021: 83.1 ML/MIN/1.73
EOSINOPHIL # BLD AUTO: 0.12 10*3/MM3 (ref 0–0.4)
EOSINOPHIL NFR BLD AUTO: 1.3 % (ref 0.3–6.2)
ERYTHROCYTE [DISTWIDTH] IN BLOOD BY AUTOMATED COUNT: 14.1 % (ref 12.3–15.4)
GLOBULIN UR ELPH-MCNC: 3 GM/DL
GLUCOSE BLDC GLUCOMTR-MCNC: 268 MG/DL (ref 70–130)
GLUCOSE SERPL-MCNC: 288 MG/DL (ref 65–99)
HCT VFR BLD AUTO: 35.5 % (ref 37.5–51)
HGB BLD-MCNC: 10.4 G/DL (ref 13–17.7)
IMM GRANULOCYTES # BLD AUTO: 0.05 10*3/MM3 (ref 0–0.05)
IMM GRANULOCYTES NFR BLD AUTO: 0.5 % (ref 0–0.5)
LYMPHOCYTES # BLD AUTO: 1.46 10*3/MM3 (ref 0.7–3.1)
LYMPHOCYTES NFR BLD AUTO: 15.6 % (ref 19.6–45.3)
MAGNESIUM SERPL-MCNC: 2 MG/DL (ref 1.6–2.4)
MCH RBC QN AUTO: 26.1 PG (ref 26.6–33)
MCHC RBC AUTO-ENTMCNC: 29.3 G/DL (ref 31.5–35.7)
MCV RBC AUTO: 89.2 FL (ref 79–97)
MONOCYTES # BLD AUTO: 0.82 10*3/MM3 (ref 0.1–0.9)
MONOCYTES NFR BLD AUTO: 8.8 % (ref 5–12)
NEUTROPHILS NFR BLD AUTO: 6.84 10*3/MM3 (ref 1.7–7)
NEUTROPHILS NFR BLD AUTO: 73.4 % (ref 42.7–76)
NRBC BLD AUTO-RTO: 0 /100 WBC (ref 0–0.2)
PHOSPHATE SERPL-MCNC: 2.9 MG/DL (ref 2.5–4.5)
PLATELET # BLD AUTO: 250 10*3/MM3 (ref 140–450)
PMV BLD AUTO: 10.1 FL (ref 6–12)
POTASSIUM SERPL-SCNC: 4.1 MMOL/L (ref 3.5–5.2)
PROT SERPL-MCNC: 6.3 G/DL (ref 6–8.5)
QT INTERVAL: 430 MS
QTC INTERVAL: 528 MS
RBC # BLD AUTO: 3.98 10*6/MM3 (ref 4.14–5.8)
SODIUM SERPL-SCNC: 138 MMOL/L (ref 136–145)
WBC NRBC COR # BLD AUTO: 9.33 10*3/MM3 (ref 3.4–10.8)

## 2023-12-09 PROCEDURE — 84100 ASSAY OF PHOSPHORUS: CPT | Performed by: STUDENT IN AN ORGANIZED HEALTH CARE EDUCATION/TRAINING PROGRAM

## 2023-12-09 PROCEDURE — 85025 COMPLETE CBC W/AUTO DIFF WBC: CPT | Performed by: STUDENT IN AN ORGANIZED HEALTH CARE EDUCATION/TRAINING PROGRAM

## 2023-12-09 PROCEDURE — 63710000001 INSULIN LISPRO (HUMAN) PER 5 UNITS: Performed by: STUDENT IN AN ORGANIZED HEALTH CARE EDUCATION/TRAINING PROGRAM

## 2023-12-09 PROCEDURE — 94660 CPAP INITIATION&MGMT: CPT

## 2023-12-09 PROCEDURE — 83735 ASSAY OF MAGNESIUM: CPT | Performed by: STUDENT IN AN ORGANIZED HEALTH CARE EDUCATION/TRAINING PROGRAM

## 2023-12-09 PROCEDURE — 25010000002 HEPARIN (PORCINE) PER 1000 UNITS: Performed by: STUDENT IN AN ORGANIZED HEALTH CARE EDUCATION/TRAINING PROGRAM

## 2023-12-09 PROCEDURE — 25010000002 METOCLOPRAMIDE PER 10 MG: Performed by: INTERNAL MEDICINE

## 2023-12-09 PROCEDURE — 80053 COMPREHEN METABOLIC PANEL: CPT | Performed by: STUDENT IN AN ORGANIZED HEALTH CARE EDUCATION/TRAINING PROGRAM

## 2023-12-09 PROCEDURE — 94799 UNLISTED PULMONARY SVC/PX: CPT

## 2023-12-09 PROCEDURE — 82948 REAGENT STRIP/BLOOD GLUCOSE: CPT

## 2023-12-09 RX ORDER — METOCLOPRAMIDE HYDROCHLORIDE 5 MG/ML
10 INJECTION INTRAMUSCULAR; INTRAVENOUS EVERY 6 HOURS
Status: DISCONTINUED | OUTPATIENT
Start: 2023-12-09 | End: 2023-12-10 | Stop reason: HOSPADM

## 2023-12-09 RX ADMIN — INSULIN LISPRO 8 UNITS: 100 INJECTION, SOLUTION INTRAVENOUS; SUBCUTANEOUS at 20:14

## 2023-12-09 RX ADMIN — ASPIRIN 81 MG: 81 TABLET, COATED ORAL at 08:15

## 2023-12-09 RX ADMIN — METOCLOPRAMIDE HYDROCHLORIDE 10 MG: 5 INJECTION INTRAMUSCULAR; INTRAVENOUS at 04:56

## 2023-12-09 RX ADMIN — ATORVASTATIN CALCIUM 40 MG: 40 TABLET ORAL at 20:04

## 2023-12-09 RX ADMIN — FUROSEMIDE 40 MG: 40 TABLET ORAL at 08:15

## 2023-12-09 RX ADMIN — GABAPENTIN 800 MG: 400 CAPSULE ORAL at 20:05

## 2023-12-09 RX ADMIN — METOCLOPRAMIDE HYDROCHLORIDE 10 MG: 5 INJECTION INTRAMUSCULAR; INTRAVENOUS at 20:05

## 2023-12-09 RX ADMIN — METOPROLOL SUCCINATE 25 MG: 25 TABLET, EXTENDED RELEASE ORAL at 08:15

## 2023-12-09 RX ADMIN — FAMOTIDINE 20 MG: 20 TABLET, FILM COATED ORAL at 20:04

## 2023-12-09 RX ADMIN — DOCUSATE SODIUM 100 MG: 100 CAPSULE, LIQUID FILLED ORAL at 08:15

## 2023-12-09 RX ADMIN — FAMOTIDINE 20 MG: 20 TABLET, FILM COATED ORAL at 08:15

## 2023-12-09 RX ADMIN — DOCUSATE SODIUM 100 MG: 100 CAPSULE, LIQUID FILLED ORAL at 20:04

## 2023-12-09 RX ADMIN — GABAPENTIN 800 MG: 400 CAPSULE ORAL at 08:15

## 2023-12-09 RX ADMIN — DULOXETINE HYDROCHLORIDE 30 MG: 30 CAPSULE, DELAYED RELEASE ORAL at 08:15

## 2023-12-09 RX ADMIN — NICOTINE 1 PATCH: 21 PATCH, EXTENDED RELEASE TRANSDERMAL at 08:16

## 2023-12-09 RX ADMIN — HEPARIN SODIUM 5000 UNITS: 5000 INJECTION INTRAVENOUS; SUBCUTANEOUS at 04:56

## 2023-12-09 RX ADMIN — HEPARIN SODIUM 5000 UNITS: 5000 INJECTION INTRAVENOUS; SUBCUTANEOUS at 20:05

## 2023-12-09 RX ADMIN — LOSARTAN POTASSIUM 25 MG: 50 TABLET, FILM COATED ORAL at 08:15

## 2023-12-09 NOTE — PROGRESS NOTES
"Patient Name:  Tucker Knox  YOB: 1960  [id number]    Surgery Progress Note    Date of visit: 12/9/2023    Subjective   Subjective:   Overnight, patient noted to desat 60 to 80% when he sleeps.  He is refusing to wear CPAP despite having 1 at home [does not wear it at home].  Satting very well in the upper 90s on 5 L nasal cannula.  Otherwise he is passing flatus and tolerating diet without issues.  Admits to having adequate abdominal pain controlled.       Objective     Objective:     /80 (BP Location: Right arm, Patient Position: Lying)   Pulse 120   Temp 98.6 °F (37 °C)   Resp 20   Ht 179 cm (70.47\")   Wt 110 kg (242 lb 4.8 oz)   SpO2 92%   BMI 34.30 kg/m²     Intake/Output Summary (Last 24 hours) at 12/9/2023 0824  Last data filed at 12/9/2023 0816  Gross per 24 hour   Intake 1980 ml   Output 2775 ml   Net -795 ml       CV:  Rhythm regular and rate regular   Pulm: Symmetrical chest rise without respiratory distress, nasal cannula oxygen to 1 L/min  Abd:  Soft, appropriately tender to palpation, incisions clean dry and intact, obese abdomen  Ext:  No cyanosis, clubbing, edema    Recent labs that are back at this time have been reviewed.   Lab Results (last 48 hours)       Procedure Component Value Units Date/Time    Comprehensive Metabolic Panel [[id number]]  (Abnormal) Collected: 12/09/23 0321    Specimen: Blood Updated: 12/09/23 0353     Glucose 288 mg/dL      BUN 20 mg/dL      Creatinine 1.02 mg/dL      Sodium 138 mmol/L      Potassium 4.1 mmol/L      Chloride 96 mmol/L      CO2 36.0 mmol/L      Calcium 8.6 mg/dL      Total Protein 6.3 g/dL      Albumin 3.3 g/dL      ALT (SGPT) 5 U/L      AST (SGOT) 6 U/L      Alkaline Phosphatase 66 U/L      Total Bilirubin 0.3 mg/dL      Globulin 3.0 gm/dL      A/G Ratio 1.1 g/dL      BUN/Creatinine Ratio 19.6     Anion Gap 6.0 mmol/L      eGFR 83.1 mL/min/1.73     Narrative:      GFR Normal >60  Chronic Kidney Disease <60  Kidney Failure " <15      Phosphorus [[id number]]  (Normal) Collected: 12/09/23 0321    Specimen: Blood Updated: 12/09/23 0353     Phosphorus 2.9 mg/dL     Magnesium [[id number]]  (Normal) Collected: 12/09/23 0321    Specimen: Blood Updated: 12/09/23 0353     Magnesium 2.0 mg/dL     CBC & Differential [[id number]]  (Abnormal) Collected: 12/09/23 0321    Specimen: Blood Updated: 12/09/23 0335    Narrative:      The following orders were created for panel order CBC & Differential.  Procedure                               Abnormality         Status                     ---------                               -----------         ------                     CBC Auto Differential[[id number]]        Abnormal            Final result                 Please view results for these tests on the individual orders.    CBC Auto Differential [[id number]]  (Abnormal) Collected: 12/09/23 0321    Specimen: Blood Updated: 12/09/23 0335     WBC 9.33 10*3/mm3      RBC 3.98 10*6/mm3      Hemoglobin 10.4 g/dL      Hematocrit 35.5 %      MCV 89.2 fL      MCH 26.1 pg      MCHC 29.3 g/dL      RDW 14.1 %      RDW-SD 45.7 fl      MPV 10.1 fL      Platelets 250 10*3/mm3      Neutrophil % 73.4 %      Lymphocyte % 15.6 %      Monocyte % 8.8 %      Eosinophil % 1.3 %      Basophil % 0.4 %      Immature Grans % 0.5 %      Neutrophils, Absolute 6.84 10*3/mm3      Lymphocytes, Absolute 1.46 10*3/mm3      Monocytes, Absolute 0.82 10*3/mm3      Eosinophils, Absolute 0.12 10*3/mm3      Basophils, Absolute 0.04 10*3/mm3      Immature Grans, Absolute 0.05 10*3/mm3      nRBC 0.0 /100 WBC     Tissue Pathology Exam [[id number]] Collected: 12/05/23 1251    Specimen: Tissue from Mesentery; Tissue from Large Intestine, Sigmoid Colon; Tissue from Large Intestine, Sigmoid Colon; Tissue from Large Intestine, Sigmoid Colon Updated: 12/08/23 1841     Note to Patients --     This report may contain a detailed description of human tissue sent by a health care provider to the laboratory for  "pathologic evaluation. The content of this report is essential for diagnosis and may provide important critical findings. This information may be unfamiliar to patients to review without a medical professional present. It is advised that the patient review this report in the presence of a health care provider who can answer questions and explain the results.       Case Report --     Surgical Pathology Report                         Case: [id number]                                  Authorizing Provider:  Oma Velasquez MD    Collected:           12/05/2023 12:51 PM          Ordering Location:     [facility] OR  Received:            12/05/2023 12:58 PM          Pathologist:           Radha Rowley MD                                                        Intraop:               Radha Rowley MD                                                        Specimens:   1) - Mesentery, Mesenteric implant                                                                  2) - Large Intestine, Sigmoid Colon, retrosigmoid                                                   3) - Large Intestine, Sigmoid Colon, additional proximal margin, staple lines marked                distal                                                                                              4) - Large Intestine, Sigmoid Colon, anastomotic donuts                                     Final Diagnosis --     1.  \"Mesenteric implant\" (frozen section control):  A.  Nodular focus of fat necrosis.  B.  No histologic evidence of malignancy.    2.  Large intestine, rectosigmoid colon, low anterior resection:  A.  Moderately differentiated adenocarcinoma with mucinous features (7.5 cm).  B.  The tumor invades through the muscularis propria into the subjacent fibroadipose tissue.  C.  The proximal, distal and radial mesorectal margins are viable and are negative for malignancy.  D.  Hyperplastic polyp (0.5 cm).  E.  One of 26 pericolic lymph " "nodes is positive for metastatic carcinoma.  F.  One of 26 pericolic lymph nodes is positive for isolated tumor cells.  G.  No extranodal extension of tumor identified.    3.  \"Additional proximal margin, sigmoid colon\":  A.  Segments (2) of viable large intestinal tissue.  B.  Diverticulosis.  C.  Focus of encapsulated fat necrosis.  D.  One of 1 pericolic lymph node is negative for metastatic carcinoma.  D.  No histologic evidence of malignancy.  E.  The proximal and distal surgical margins are negative for malignancy.    4.  \"Anastomotic donuts\":  A.  Viable large intestinal tissue.  B.  No histologic evidence of malignancy.    AJCC stage: pT3 pN1a       Synoptic Checklist --     COLON AND RECTUM: Resection, Including Transanal Disk Excision of Rectal Neoplasms  COLON AND RECTUM: RESECTION, INCLUDING TRANSANAL DISK EXCISION OF RECTAL NEOPLASMS - All Specimens  8th Edition - Protocol posted: 6/22/2022    SPECIMEN     Procedure:    Low anterior resection      Macroscopic Evaluation of Mesorectum:    Complete     TUMOR     Tumor Site:    Rectosigmoid      Histologic Type:    Adenocarcinoma        Histologic Type Comment:    Also has mucinous differentiation accounting for approximately 20% of the tumor      Histologic Grade:    G2, moderately differentiated      Tumor Size:    Greatest dimension (Centimeters): 7.5 cm     Tumor Extent:    Invades through muscularis propria into the pericolonic or perirectal tissue      Macroscopic Tumor Perforation:    Not identified      Lymphovascular Invasion:    Not identified      Perineural Invasion:    Not identified      Treatment Effect:    No known presurgical therapy     MARGINS     Margin Status for Invasive Carcinoma:    All margins negative for invasive carcinoma        Closest Margin(s) to Invasive Carcinoma:    Distal        Distance from Invasive Carcinoma to Closest Margin:    2.2 mm       Distance from Invasive Carcinoma to Radial (Circumferential) Margin:    2.6 " "mm     Margin Status for Non-Invasive Tumor:    All margins negative for high-grade dysplasia / intramucosal carcinoma and low-grade dysplasia     REGIONAL LYMPH NODES     Regional Lymph Node Status:           :    Tumor present in regional lymph node(s)          Number of Lymph Nodes with Tumor:    1        Number of Lymph Nodes Examined:    27      Tumor Deposits:    Not identified     PATHOLOGIC STAGE CLASSIFICATION (pTNM, AJCC 8th Edition)     Reporting of pT, pN, and (when applicable) pM categories is based on information available to the pathologist at the time the report is issued. As per the AJCC (Chapter 1, 8th Ed.) it is the managing physician’s responsibility to establish the final pathologic stage based upon all pertinent information, including but potentially not limited to this pathology report.     pT Category:    pT3      pN Category:    pN1a     ADDITIONAL FINDINGS     Additional Findings:    Hyperplastic polyp        Comment(s):    Approximately 20% of the tumor exhibits mucinous differentiation.        Intraoperative Consultation --       Specimen: \"Mesenteric implant\"  FROZEN SECTION DIAGNOSIS: Consistent with a nodular area of fat necrosis with numerous macrophages.  Reported to \"[place]\" in OR 6 on 12/5/2023 at 13:11 CST by Radha Rowley MD.       Gross Description --     1. Mesentery.  Received fresh for frozen section labeled with the patient name, date of birth, and designated mesenteric implant.  The specimen consists of a well-circumscribed, intact yellow-brown nodule measuring 0.6 x 0.5 x 0.5 cm.  The external surface is smooth with loosely adherent lobulated fatty tissue.  The cut surface shows fat necrosis and is otherwise unremarkable.  The nodule is totally submitted for frozen section.  The frozen section remnant is submitted in block 1A.    2. Large Intestine, Sigmoid Colon.    3. Large Intestine, Sigmoid Colon.     4. Large Intestine, Sigmoid Colon.           Microscopic " Description --     Microscopic examination was performed on all specimens.      Phosphorus [[id number]]  (Abnormal) Collected: 12/08/23 0526    Specimen: Blood Updated: 12/08/23 0634     Phosphorus 2.4 mg/dL     Comprehensive Metabolic Panel [[id number]]  (Abnormal) Collected: 12/08/23 0526    Specimen: Blood Updated: 12/08/23 0629     Glucose 213 mg/dL      BUN 25 mg/dL      Creatinine 1.17 mg/dL      Sodium 143 mmol/L      Potassium 4.2 mmol/L      Chloride 97 mmol/L      CO2 36.0 mmol/L      Calcium 8.8 mg/dL      Total Protein 7.3 g/dL      Albumin 3.8 g/dL      ALT (SGPT) 5 U/L      AST (SGOT) 9 U/L      Alkaline Phosphatase 77 U/L      Total Bilirubin 0.4 mg/dL      Globulin 3.5 gm/dL      A/G Ratio 1.1 g/dL      BUN/Creatinine Ratio 21.4     Anion Gap 10.0 mmol/L      eGFR 70.5 mL/min/1.73     Narrative:      GFR Normal >60  Chronic Kidney Disease <60  Kidney Failure <15      Magnesium [[id number]]  (Normal) Collected: 12/08/23 0526    Specimen: Blood Updated: 12/08/23 0629     Magnesium 2.0 mg/dL     CBC & Differential [[id number]]  (Abnormal) Collected: 12/08/23 0526    Specimen: Blood Updated: 12/08/23 0605    Narrative:      The following orders were created for panel order CBC & Differential.  Procedure                               Abnormality         Status                     ---------                               -----------         ------                     CBC Auto Differential[[id number]]        Abnormal            Final result                 Please view results for these tests on the individual orders.    CBC Auto Differential [[id number]]  (Abnormal) Collected: 12/08/23 0526    Specimen: Blood Updated: 12/08/23 0605     WBC 11.32 10*3/mm3      RBC 4.33 10*6/mm3      Hemoglobin 11.3 g/dL      Hematocrit 38.6 %      MCV 89.1 fL      MCH 26.1 pg      MCHC 29.3 g/dL      RDW 14.3 %      RDW-SD 46.0 fl      MPV 10.5 fL      Platelets 264 10*3/mm3      Neutrophil % 81.5 %      Lymphocyte % 10.8 %       Monocyte % 6.5 %      Eosinophil % 0.2 %      Basophil % 0.4 %      Immature Grans % 0.6 %      Neutrophils, Absolute 9.22 10*3/mm3      Lymphocytes, Absolute 1.22 10*3/mm3      Monocytes, Absolute 0.74 10*3/mm3      Eosinophils, Absolute 0.02 10*3/mm3      Basophils, Absolute 0.05 10*3/mm3      Immature Grans, Absolute 0.07 10*3/mm3      nRBC 0.0 /100 WBC     POC Glucose Once [[id number]]  (Abnormal) Collected: 12/07/23 2148    Specimen: Blood Updated: 12/07/23 2159     Glucose 187 mg/dL      Comment: : [id number] Almaguer SarahMeter ID: [id number]       POC Glucose Once [[id number]]  (Abnormal) Collected: 12/07/23 1726    Specimen: Blood Updated: 12/07/23 1736     Glucose 221 mg/dL      Comment: : [id number] Arreaga CasandraMeter ID: [id number]       Basic Metabolic Panel [[id number]]  (Abnormal) Collected: 12/07/23 1414    Specimen: Blood Updated: 12/07/23 1436     Glucose 230 mg/dL      BUN 24 mg/dL      Creatinine 1.12 mg/dL      Sodium 142 mmol/L      Potassium 4.2 mmol/L      Chloride 100 mmol/L      CO2 33.0 mmol/L      Calcium 8.7 mg/dL      BUN/Creatinine Ratio 21.4     Anion Gap 9.0 mmol/L      eGFR 74.3 mL/min/1.73     Narrative:      GFR Normal >60  Chronic Kidney Disease <60  Kidney Failure <15      CBC & Differential [[id number]]  (Abnormal) Collected: 12/07/23 1414    Specimen: Blood Updated: 12/07/23 1436    Narrative:      The following orders were created for panel order CBC & Differential.  Procedure                               Abnormality         Status                     ---------                               -----------         ------                     CBC Auto Differential[[id number]]        Abnormal            Final result                 Please view results for these tests on the individual orders.    CBC Auto Differential [[id number]]  (Abnormal) Collected: 12/07/23 1414    Specimen: Blood Updated: 12/07/23 1436     WBC 16.76 10*3/mm3      RBC 4.32 10*6/mm3      Hemoglobin 11.2 g/dL       Hematocrit 38.6 %      MCV 89.4 fL      MCH 25.9 pg      MCHC 29.0 g/dL      RDW 14.2 %      RDW-SD 46.2 fl      MPV 9.9 fL      Platelets 243 10*3/mm3      Neutrophil % 86.3 %      Lymphocyte % 5.9 %      Monocyte % 6.9 %      Eosinophil % 0.1 %      Basophil % 0.2 %      Immature Grans % 0.6 %      Neutrophils, Absolute 14.45 10*3/mm3      Lymphocytes, Absolute 0.99 10*3/mm3      Monocytes, Absolute 1.16 10*3/mm3      Eosinophils, Absolute 0.02 10*3/mm3      Basophils, Absolute 0.04 10*3/mm3      Immature Grans, Absolute 0.10 10*3/mm3      nRBC 0.0 /100 WBC     POC Glucose Once [[id number]]  (Abnormal) Collected: 12/07/23 1216    Specimen: Blood Updated: 12/07/23 1227     Glucose 186 mg/dL      Comment: : [id number] Prashant HaleighMeter ID: [id number]       High Sensitivity Troponin T 2Hr [[id number]]  (Abnormal) Collected: 12/07/23 0847    Specimen: Blood Updated: 12/07/23 0943     HS Troponin T 63 ng/L      Troponin T Delta 3 ng/L     Narrative:      High Sensitive Troponin T Reference Range:  <14.0 ng/L- Negative Female for AMI  <22.0 ng/L- Negative Male for AMI  >=14 - Abnormal Female indicating possible myocardial injury.  >=22 - Abnormal Male indicating possible myocardial injury.   Clinicians would have to utilize clinical acumen, EKG, Troponin, and serial changes to determine if it is an Acute Myocardial Infarction or myocardial injury due to an underlying chronic condition.         BNP [[id number]]  (Abnormal) Collected: 12/07/23 0653    Specimen: Blood Updated: 12/07/23 0849     proBNP 1,037.0 pg/mL     Narrative:      This assay is used as an aid in the diagnosis of individuals suspected of having heart failure. It can be used as an aid in the diagnosis of acute decompensated heart failure (ADHF) in patients presenting with signs and symptoms of ADHF to the emergency department (ED). In addition, NT-proBNP of <300 pg/mL indicates ADHF is not likely.    Age Range Result Interpretation  NT-proBNP  Concentration (pg/mL:      <50             Positive            >450                   Gray                 300-450                    Negative             <300    50-75           Positive            >900                  Gray                300-900                  Negative            <300      >75             Positive            >1800                  Gray                300-1800                  Negative            <300                 Assessment & Plan     Assessment/ Plan:    Problem List Items Addressed This Visit          Hematology and Neoplasia    * (Principal) Colon adenocarcinoma    Relevant Orders    Tissue Pathology Exam (Completed)        Active Hospital Problems    Diagnosis  POA    **Colon adenocarcinoma [C18.9]  Yes    Sleep apnea [G47.30]  Yes    Chronic combined systolic (congestive) and diastolic (congestive) heart failure [I50.42]  Yes    Pulmonary HTN [I27.20]  Yes    COPD (chronic obstructive pulmonary disease) [J44.9]  Yes    Diabetes mellitus [E11.9]  Yes      Resolved Hospital Problems   No resolved problems to display.      61-year-old man status post robotic low anterior resection for rectosigmoid junction adenocarcinoma on 2023, clinically stable, satting when he sleeps due to his heavy smoking history and subsequent chronic lung disease as well as chronic noncompliance with wearing a CPAP.  He denies having any shortness of air or chest pain.    Advancing diet to GI soft  Discussed with the patient that he must be compliant with wearing the CPAP otherwise he is delaying his discharge due to his oxygen requirement from a respiratory standpoint.    Patient noted his understanding and stated that he will be compliant with wearing his CPAP tonight  Wean oxygen to room air during the day and CPAP at night  Continue ambulation and aggressive I-S  Discharge plannin/10/2023 versus 2023 pending his ability to be off oxygen during the day and using CPAP at night    Lilian STOKES  Milton Yates MD  12/9/2023  08:24 CST

## 2023-12-09 NOTE — PLAN OF CARE
Goal Outcome Evaluation:  Plan of Care Reviewed With: patient        Progress: improving  Outcome Evaluation: Voiding without difficulty. Patient reports having a BM this shift. O2 @ 4L/NC, desats to 60-80% with sleep. Patient has a CPAP at home but states he does not wear it. Hospital CPAP applied however patient is refusing to wear it. O2 saturation 95-97% on 4L when awake. Denies need for PRN pain meds. Denies nausea. Reglan IV scheduled. This RN found a sandwich bag on the patients bedside table with only the crust of the sandwich remaining in the bag.

## 2023-12-10 ENCOUNTER — READMISSION MANAGEMENT (OUTPATIENT)
Dept: CALL CENTER | Facility: HOSPITAL | Age: 63
End: 2023-12-10
Payer: MEDICARE

## 2023-12-10 VITALS
RESPIRATION RATE: 16 BRPM | OXYGEN SATURATION: 94 % | BODY MASS INDEX: 34.69 KG/M2 | HEIGHT: 70 IN | SYSTOLIC BLOOD PRESSURE: 139 MMHG | WEIGHT: 242.3 LBS | DIASTOLIC BLOOD PRESSURE: 80 MMHG | HEART RATE: 75 BPM | TEMPERATURE: 98.2 F

## 2023-12-10 LAB
ALBUMIN SERPL-MCNC: 3.4 G/DL (ref 3.5–5.2)
ALBUMIN/GLOB SERPL: 1.1 G/DL
ALP SERPL-CCNC: 75 U/L (ref 39–117)
ALT SERPL W P-5'-P-CCNC: 5 U/L (ref 1–41)
ANION GAP SERPL CALCULATED.3IONS-SCNC: 7 MMOL/L (ref 5–15)
AST SERPL-CCNC: 8 U/L (ref 1–40)
BASOPHILS # BLD AUTO: 0.06 10*3/MM3 (ref 0–0.2)
BASOPHILS NFR BLD AUTO: 0.6 % (ref 0–1.5)
BILIRUB SERPL-MCNC: 0.3 MG/DL (ref 0–1.2)
BUN SERPL-MCNC: 21 MG/DL (ref 8–23)
BUN/CREAT SERPL: 19.1 (ref 7–25)
CALCIUM SPEC-SCNC: 8.7 MG/DL (ref 8.6–10.5)
CHLORIDE SERPL-SCNC: 96 MMOL/L (ref 98–107)
CO2 SERPL-SCNC: 37 MMOL/L (ref 22–29)
CREAT SERPL-MCNC: 1.1 MG/DL (ref 0.76–1.27)
DEPRECATED RDW RBC AUTO: 46.3 FL (ref 37–54)
EGFRCR SERPLBLD CKD-EPI 2021: 75.9 ML/MIN/1.73
EOSINOPHIL # BLD AUTO: 0.21 10*3/MM3 (ref 0–0.4)
EOSINOPHIL NFR BLD AUTO: 2 % (ref 0.3–6.2)
ERYTHROCYTE [DISTWIDTH] IN BLOOD BY AUTOMATED COUNT: 14 % (ref 12.3–15.4)
GLOBULIN UR ELPH-MCNC: 3.1 GM/DL
GLUCOSE BLDC GLUCOMTR-MCNC: 261 MG/DL (ref 70–130)
GLUCOSE SERPL-MCNC: 271 MG/DL (ref 65–99)
HCT VFR BLD AUTO: 37.9 % (ref 37.5–51)
HGB BLD-MCNC: 11 G/DL (ref 13–17.7)
IMM GRANULOCYTES # BLD AUTO: 0.1 10*3/MM3 (ref 0–0.05)
IMM GRANULOCYTES NFR BLD AUTO: 1 % (ref 0–0.5)
LYMPHOCYTES # BLD AUTO: 1.64 10*3/MM3 (ref 0.7–3.1)
LYMPHOCYTES NFR BLD AUTO: 15.8 % (ref 19.6–45.3)
MAGNESIUM SERPL-MCNC: 1.9 MG/DL (ref 1.6–2.4)
MCH RBC QN AUTO: 26.3 PG (ref 26.6–33)
MCHC RBC AUTO-ENTMCNC: 29 G/DL (ref 31.5–35.7)
MCV RBC AUTO: 90.5 FL (ref 79–97)
MONOCYTES # BLD AUTO: 0.77 10*3/MM3 (ref 0.1–0.9)
MONOCYTES NFR BLD AUTO: 7.4 % (ref 5–12)
NEUTROPHILS NFR BLD AUTO: 7.57 10*3/MM3 (ref 1.7–7)
NEUTROPHILS NFR BLD AUTO: 73.2 % (ref 42.7–76)
NRBC BLD AUTO-RTO: 0 /100 WBC (ref 0–0.2)
PHOSPHATE SERPL-MCNC: 3.7 MG/DL (ref 2.5–4.5)
PLATELET # BLD AUTO: 274 10*3/MM3 (ref 140–450)
PMV BLD AUTO: 10 FL (ref 6–12)
POTASSIUM SERPL-SCNC: 4.3 MMOL/L (ref 3.5–5.2)
PROT SERPL-MCNC: 6.5 G/DL (ref 6–8.5)
RBC # BLD AUTO: 4.19 10*6/MM3 (ref 4.14–5.8)
SODIUM SERPL-SCNC: 140 MMOL/L (ref 136–145)
WBC NRBC COR # BLD AUTO: 10.35 10*3/MM3 (ref 3.4–10.8)

## 2023-12-10 PROCEDURE — 84100 ASSAY OF PHOSPHORUS: CPT | Performed by: STUDENT IN AN ORGANIZED HEALTH CARE EDUCATION/TRAINING PROGRAM

## 2023-12-10 PROCEDURE — 63710000001 INSULIN LISPRO (HUMAN) PER 5 UNITS: Performed by: STUDENT IN AN ORGANIZED HEALTH CARE EDUCATION/TRAINING PROGRAM

## 2023-12-10 PROCEDURE — 94799 UNLISTED PULMONARY SVC/PX: CPT

## 2023-12-10 PROCEDURE — 83735 ASSAY OF MAGNESIUM: CPT | Performed by: STUDENT IN AN ORGANIZED HEALTH CARE EDUCATION/TRAINING PROGRAM

## 2023-12-10 PROCEDURE — 82948 REAGENT STRIP/BLOOD GLUCOSE: CPT

## 2023-12-10 PROCEDURE — 25010000002 HEPARIN (PORCINE) PER 1000 UNITS: Performed by: STUDENT IN AN ORGANIZED HEALTH CARE EDUCATION/TRAINING PROGRAM

## 2023-12-10 PROCEDURE — 80053 COMPREHEN METABOLIC PANEL: CPT | Performed by: STUDENT IN AN ORGANIZED HEALTH CARE EDUCATION/TRAINING PROGRAM

## 2023-12-10 PROCEDURE — 85025 COMPLETE CBC W/AUTO DIFF WBC: CPT | Performed by: STUDENT IN AN ORGANIZED HEALTH CARE EDUCATION/TRAINING PROGRAM

## 2023-12-10 PROCEDURE — 25010000002 METOCLOPRAMIDE PER 10 MG: Performed by: INTERNAL MEDICINE

## 2023-12-10 PROCEDURE — 94660 CPAP INITIATION&MGMT: CPT

## 2023-12-10 RX ORDER — OXYCODONE HYDROCHLORIDE 5 MG/1
5 TABLET ORAL EVERY 6 HOURS PRN
Qty: 12 TABLET | Refills: 0 | Status: SHIPPED | OUTPATIENT
Start: 2023-12-10 | End: 2023-12-13

## 2023-12-10 RX ORDER — METOPROLOL SUCCINATE 25 MG/1
25 TABLET, EXTENDED RELEASE ORAL
Qty: 30 TABLET | Refills: 0 | Status: SHIPPED | OUTPATIENT
Start: 2023-12-10

## 2023-12-10 RX ORDER — LOSARTAN POTASSIUM 25 MG/1
25 TABLET ORAL
Qty: 30 TABLET | Refills: 0 | Status: SHIPPED | OUTPATIENT
Start: 2023-12-10

## 2023-12-10 RX ORDER — ENOXAPARIN SODIUM 100 MG/ML
40 INJECTION SUBCUTANEOUS
Qty: 10 ML | Refills: 0 | Status: SHIPPED | OUTPATIENT
Start: 2023-12-10

## 2023-12-10 RX ADMIN — GABAPENTIN 800 MG: 400 CAPSULE ORAL at 09:04

## 2023-12-10 RX ADMIN — DULOXETINE HYDROCHLORIDE 30 MG: 30 CAPSULE, DELAYED RELEASE ORAL at 09:04

## 2023-12-10 RX ADMIN — METOCLOPRAMIDE HYDROCHLORIDE 10 MG: 5 INJECTION INTRAMUSCULAR; INTRAVENOUS at 05:22

## 2023-12-10 RX ADMIN — INSULIN LISPRO 8 UNITS: 100 INJECTION, SOLUTION INTRAVENOUS; SUBCUTANEOUS at 09:03

## 2023-12-10 RX ADMIN — LOSARTAN POTASSIUM 25 MG: 50 TABLET, FILM COATED ORAL at 09:04

## 2023-12-10 RX ADMIN — FUROSEMIDE 40 MG: 40 TABLET ORAL at 09:04

## 2023-12-10 RX ADMIN — DOCUSATE SODIUM 100 MG: 100 CAPSULE, LIQUID FILLED ORAL at 09:04

## 2023-12-10 RX ADMIN — LIDOCAINE 2 PATCH: 700 PATCH TOPICAL at 09:05

## 2023-12-10 RX ADMIN — METOPROLOL SUCCINATE 25 MG: 25 TABLET, EXTENDED RELEASE ORAL at 09:04

## 2023-12-10 RX ADMIN — HEPARIN SODIUM 5000 UNITS: 5000 INJECTION INTRAVENOUS; SUBCUTANEOUS at 05:22

## 2023-12-10 RX ADMIN — FAMOTIDINE 20 MG: 20 TABLET, FILM COATED ORAL at 09:04

## 2023-12-10 RX ADMIN — ASPIRIN 81 MG: 81 TABLET, COATED ORAL at 09:04

## 2023-12-10 RX ADMIN — NICOTINE 1 PATCH: 21 PATCH, EXTENDED RELEASE TRANSDERMAL at 09:04

## 2023-12-10 RX ADMIN — METOCLOPRAMIDE HYDROCHLORIDE 10 MG: 5 INJECTION INTRAMUSCULAR; INTRAVENOUS at 09:03

## 2023-12-10 NOTE — OUTREACH NOTE
Prep Survey      Flowsheet Row Responses   [facility] patient discharged from? [place]   Is LACE score < 7 ? No   Eligibility Readm Mgmt   Discharge diagnosis COLON RESECTION   Does the patient have one of the following disease processes/diagnoses(primary or secondary)? General Surgery   Does the patient have Home health ordered? No   Is there a DME ordered? No   Prep survey completed? Yes            WINSTON LEON - Registered Nurse

## 2023-12-10 NOTE — DISCHARGE SUMMARY
Discharge Summary    Patient Name:  Tucker Knox  YOB: 1960  [id number]    DATE OF ADMISSION: 12/5/2023    DATE OF DISCHARGE: 12/10/2023     FINAL DIAGNOSES:   Patient Active Problem List   Diagnosis    Diabetes mellitus    Hypertension    Chest discomfort    Systolic CHF, acute    Pulmonary HTN    COPD (chronic obstructive pulmonary disease)    Precordial pain    NSTEMI, initial episode of care    Chronic combined systolic (congestive) and diastolic (congestive) heart failure    Malignant neoplasm of sigmoid colon    FH: colon cancer    Sleep apnea    History of adenomatous polyp of colon    Colon adenocarcinoma       CONSULTING SERVICES:   Cardiology  Hospitalist     PROCEDURES PERFORMED:   1.  12/5/2023: Laparoscopic robotic assisted low anterior resection    HISTORY:  63 y.o. male who presented with blood per rectum x 1 month and underwent a screening colonoscopy. 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 No abdominal pain, nausea, nor vomiting. Normal bowel movements, and he denies constipation. Family history of a father with colon cancer at age 70.      He has never had abdominal surgery. He is a current every day smoker at 1 PPD. He is on 81 mg ASA. His BMI is 35.     HOSPITAL COURSE: On 12/5/2023, he underwent a robotic low anterior resection for biopsy-proven adenocarcinoma of the rectosigmoid junction.  He tolerated procedure well and was admitted to the floor.  On postop day 2, he complained of worsening shortness of air and chest pain.  Cardiology and hospital medicine was called for assistance with workup.      Cardiac workup was negative.  He was deemed to have an NSTEMI type II due to demand ischemia but had resolved with time.  Cardiac medications were started to help assist in setting of his known history of CHF.  Diet was advanced as tolerated as he had return of bowel function.  He was weaned off of oxygen during the day but steadfastly refused to  take the CPAP at night.  He was deemed to be stable for discharge home and benefited from his hospital stay.     PHYSICAL EXAM:  GEN: Sitting in bed in NAD  Pulm: Symmetrical chest rise w/o respiratory distress  CV: RRR  GI: Incisions C/D/I, appropriately TTP    DISCHARGE MEDICATIONS:   1. All previous home medications.   2. OTC Tylenol 325mg PO q6hr PRN and OTC Ibuprofen 200mg PO q6hr PRN [with meals]  3. Oxycodone 5mg PO  Q6h  PRN pain  4.  Metoprolol succinate 25 mg every 24 hours  5.  Cozaar 25 mg p.o. daily      DISCHARGE INSTRUCTIONS:  Okay to shower  Avoid lifting greater than 10 pounds for 4 weeks to avoid the risk developing incisional hernia  Call if you have fever of 101.5 Fahrenheit, worsening pain despite current pain regimen  Please wear your CPAP at night  Diet as tolerated  No driving while taking opioids  Pain control: Over-the-counter Tylenol 3 25 mg p.o. every 6 hours as needed, over-the-counter ibuprofen 200 mg p.o. every 6 hours as needed with meals, prescription for oxycodone 5 mg p.o. every 6 hours as needed breakthrough pain  Follow-up with your cardiologist within a week  Follow-up in clinic in 2 weeks    Lilian Rose Jr, MD  12/10/2023  08:36 CST

## 2023-12-10 NOTE — PLAN OF CARE
"Goal Outcome Evaluation:           Progress: no change  Outcome Evaluation: Patient oriented x4 with VSS. Notified by monitor room patient had \"9 PVC's in a row.\" Patient was resting comfortably at that time. Encouraged patient to wear cpap but patient politely refused.         "

## 2023-12-10 NOTE — DISCHARGE INSTRUCTIONS
Okay to shower  Avoid lifting greater than 10 pounds for 4 weeks to avoid the risk developing incisional hernia  Call if you have fever of 101.5 Fahrenheit, worsening pain despite current pain regimen  Please wear your CPAP at night  Diet as tolerated  No driving while taking opioids  Pain control: Over-the-counter Tylenol 3 25 mg p.o. every 6 hours as needed, over-the-counter ibuprofen 200 mg p.o. every 6 hours as needed with meals, prescription for oxycodone 5 mg p.o. every 6 hours as needed breakthrough pain

## 2023-12-11 NOTE — PAYOR COMM NOTE
"REF:  [id number]     [facility]  FAX  [phone]      Farhana Norton (63 y.o. Male)       Date of Birth   1960    Social Security Number       Address   [address] [place] [id number]    Home Phone   [phone]    MRN   [id number]       Anglican   Other    Marital Status                               Admission Date   12/5/23    Admission Type   Elective    Admitting Provider   Oma Velasquez MD    Attending Provider       Department, Room/Bed   [facility] 3C, 373/1       Discharge Date   12/10/2023    Discharge Disposition   Home or Self Care    Discharge Destination                                 Attending Provider: (none)   Allergies: Penicillins    Isolation: None   Infection: None   Code Status: Prior    Ht: 179 cm (70.47\")   Wt: 110 kg (242 lb 4.8 oz)    Admission Cmt: None   Principal Problem: Colon adenocarcinoma [C18.9]                   Active Insurance as of 12/5/2023       Primary Coverage       Payor Plan Insurance Group Employer/Plan Group    [facility] MEDICARE REPLACEMENT [employer] MED ADV SNP HMO [id number]       Payor Plan Address Payor Plan Phone Number Payor Plan Fax Number Effective Dates    [address] [address]   8/1/2023 - None Entered    [facility] [id number]         Subscriber Name Subscriber Birth Date Member ID       FARHANA NORTON 1960 [id number]                     Emergency Contacts        (Rel.) Home Phone Work Phone Mobile Phone    alycia norton (Son) [phone] -- --    jose deng (Sister) [phone] -- --                 Discharge Summary        Lilian Rose Jr., MD at 12/10/23 0836          Discharge Summary    Patient Name:  Farhana Norton  YOB: 1960  [id number]    DATE OF ADMISSION: 12/5/2023    DATE OF DISCHARGE: 12/10/2023     FINAL DIAGNOSES:   Patient Active Problem List   Diagnosis    Diabetes mellitus    Hypertension    Chest discomfort    " Systolic CHF, acute    Pulmonary HTN    COPD (chronic obstructive pulmonary disease)    Precordial pain    NSTEMI, initial episode of care    Chronic combined systolic (congestive) and diastolic (congestive) heart failure    Malignant neoplasm of sigmoid colon    FH: colon cancer    Sleep apnea    History of adenomatous polyp of colon    Colon adenocarcinoma       CONSULTING SERVICES:   Cardiology  Hospitalist     PROCEDURES PERFORMED:   1.  12/5/2023: Laparoscopic robotic assisted low anterior resection    HISTORY:  63 y.o. male who presented with blood per rectum x 1 month and underwent a screening colonoscopy. 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 No abdominal pain, nausea, nor vomiting. Normal bowel movements, and he denies constipation. Family history of a father with colon cancer at age 70.      He has never had abdominal surgery. He is a current every day smoker at 1 PPD. He is on 81 mg ASA. His BMI is 35.     HOSPITAL COURSE: On 12/5/2023, he underwent a robotic low anterior resection for biopsy-proven adenocarcinoma of the rectosigmoid junction.  He tolerated procedure well and was admitted to the floor.  On postop day 2, he complained of worsening shortness of air and chest pain.  Cardiology and hospital medicine was called for assistance with workup.      Cardiac workup was negative.  He was deemed to have an NSTEMI type II due to demand ischemia but had resolved with time.  Cardiac medications were started to help assist in setting of his known history of CHF.  Diet was advanced as tolerated as he had return of bowel function.  He was weaned off of oxygen during the day but steadfastly refused to take the CPAP at night.  He was deemed to be stable for discharge home and benefited from his hospital stay.     PHYSICAL EXAM:  GEN: Sitting in bed in NAD  Pulm: Symmetrical chest rise w/o respiratory distress  CV: RRR  GI: Incisions C/D/I, appropriately TTP    DISCHARGE  MEDICATIONS:   1. All previous home medications.   2. OTC Tylenol 325mg PO q6hr PRN and OTC Ibuprofen 200mg PO q6hr PRN [with meals]  3. Oxycodone 5mg PO  Q6h  PRN pain  4.  Metoprolol succinate 25 mg every 24 hours  5.  Cozaar 25 mg p.o. daily      DISCHARGE INSTRUCTIONS:  Okay to shower  Avoid lifting greater than 10 pounds for 4 weeks to avoid the risk developing incisional hernia  Call if you have fever of 101.5 Fahrenheit, worsening pain despite current pain regimen  Please wear your CPAP at night  Diet as tolerated  No driving while taking opioids  Pain control: Over-the-counter Tylenol 3 25 mg p.o. every 6 hours as needed, over-the-counter ibuprofen 200 mg p.o. every 6 hours as needed with meals, prescription for oxycodone 5 mg p.o. every 6 hours as needed breakthrough pain  Follow-up with your cardiologist within a week  Follow-up in clinic in 2 weeks    Rosendo Rose Jr, MD  12/10/2023  08:36 CST      Electronically signed by Rosendo Rose Jr., MD at 12/10/23 0839       Discharge Order (From admission, onward)       Start     Ordered    12/10/23 0827  Discharge patient  Once        Expected Discharge Date: 12/10/23   Expected Discharge Time: Midday   Discharge Disposition: Home or Self Care   Physician of Record for Attribution - Please select from Treatment Team: ROSENDO ROSE JR [[id number]]   Review needed by CMO to determine Physician of Record: No      Question Answer Comment   Physician of Record for Attribution - Please select from Treatment Team ROSENDO ROSE JR    Review needed by CMO to determine Physician of Record No        12/10/23 0832

## 2023-12-14 ENCOUNTER — READMISSION MANAGEMENT (OUTPATIENT)
Dept: CALL CENTER | Facility: HOSPITAL | Age: 63
End: 2023-12-14
Payer: MEDICARE

## 2023-12-14 NOTE — OUTREACH NOTE
General Surgery Week 1 Survey      Flowsheet Row Responses   [facility] patient discharged from? [facility]   Does the patient have one of the following disease processes/diagnoses(primary or secondary)? General Surgery   Week 1 attempt successful? Yes   Call start time 1019   Call end time 1020   Discharge diagnosis COLON RESECTION   Meds reviewed with patient/caregiver? Yes   Is the patient having any side effects they believe may be caused by any medication additions or changes? No   Does the patient have all medications related to this admission filled (includes all antibiotics, pain medications, etc.) Yes   Is the patient taking all medications as directed (includes completed medication regime)? Yes   Does the patient have a follow up appointment scheduled with their surgeon? Yes  [12/18/23]   Has the patient kept scheduled appointments due by today? N/A   Has home health visited the patient within 72 hours of discharge? N/A   Psychosocial issues? No   Did the patient receive a copy of their discharge instructions? Yes   Nursing interventions Reviewed instructions with patient   What is the patient's perception of their health status since discharge? Improving   Nursing interventions Nurse provided patient education   Is the patient /caregiver able to teach back basic post-op care? Take showers only when approved by MD-sponge bathe until then, No tub bath, swimming, or hot tub until instructed by MD, Drive as instructed by MD in discharge instructions, Keep incision areas clean,dry and protected, Lifting as instructed by MD in discharge instructions, Continue use of incentive spirometry at least 1 week post discharge   Is the patient/caregiver able to teach back signs and symptoms of incisional infection? Increased redness, swelling or pain at the incisonal site, Increased drainage or bleeding, Incisional warmth, Pus or odor from incision, Fever   Is the patient/caregiver able to teach back steps to recovery  at home? Set small, achievable goals for return to baseline health, Rest and rebuild strength, gradually increase activity, Make a list of questions for surgeon's appointment, Eat a well-balance diet, Practice good oral hygiene   If the patient is a current smoker, are they able to teach back resources for cessation? [id number]   Is the patient/caregiver able to teach back the hierarchy of who to call/visit for symptoms/problems? PCP, Specialist, Home health nurse, Urgent Care, ED, 911 Yes   Week 1 call completed? Yes   Call end time 1020            Kamla H - Registered Nurse

## 2023-12-21 ENCOUNTER — OFFICE VISIT (OUTPATIENT)
Dept: SURGERY | Facility: CLINIC | Age: 63
End: 2023-12-21
Payer: MEDICARE

## 2023-12-21 VITALS
BODY MASS INDEX: 34.8 KG/M2 | OXYGEN SATURATION: 92 % | WEIGHT: 235 LBS | DIASTOLIC BLOOD PRESSURE: 83 MMHG | HEIGHT: 69 IN | SYSTOLIC BLOOD PRESSURE: 152 MMHG | HEART RATE: 88 BPM

## 2023-12-21 DIAGNOSIS — Z90.49 S/P PARTIAL RESECTION OF COLON: ICD-10-CM

## 2023-12-21 DIAGNOSIS — C18.9 COLON ADENOCARCINOMA: Primary | ICD-10-CM

## 2023-12-21 PROCEDURE — 3077F SYST BP >= 140 MM HG: CPT

## 2023-12-21 PROCEDURE — 3079F DIAST BP 80-89 MM HG: CPT

## 2023-12-21 PROCEDURE — 1160F RVW MEDS BY RX/DR IN RCRD: CPT

## 2023-12-21 PROCEDURE — 99024 POSTOP FOLLOW-UP VISIT: CPT

## 2023-12-21 PROCEDURE — 1159F MED LIST DOCD IN RCRD: CPT

## 2023-12-21 NOTE — PROGRESS NOTES
"Patient: Tucker Knox    YOB: 1960    Date: 12/21/2023    Primary Care Provider: Kt Alfredo MD    Vital Signs:   Vitals:    12/21/23 1255   BP: 152/83   BP Location: Right arm   Patient Position: Sitting   Cuff Size: Adult   Pulse: 88   SpO2: 92%   Weight: 107 kg (235 lb)   Height: 175.3 cm (69\")       Overall doing well s/p sigmoid colon resection on 12/5/23 by Dr. Velasquez. No fevers. Tolerating diet. Energy improving. Pain improving. Bowels moving ok. Wounds healing well. No significant abdominal tenderness on exam.    Results Review:   Pathology and operative findings reviewed with patient.    Tissue Pathology Exam (12/05/2023 12:51)   Final Diagnosis   1.  \"Mesenteric implant\" (frozen section control):  A.  Nodular focus of fat necrosis.  B.  No histologic evidence of malignancy.     2.  Large intestine, rectosigmoid colon, low anterior resection:  A.  Moderately differentiated adenocarcinoma with mucinous features (7.5 cm).  B.  The tumor invades through the muscularis propria into the subjacent fibroadipose tissue.  C.  The proximal, distal and radial mesorectal margins are viable and are negative for malignancy.  D.  Hyperplastic polyp (0.5 cm).  E.  One of 26 pericolic lymph nodes is positive for metastatic carcinoma.  F.  One of 26 pericolic lymph nodes is positive for isolated tumor cells.  G.  No extranodal extension of tumor identified.     3.  \"Additional proximal margin, sigmoid colon\":  A.  Segments (2) of viable large intestinal tissue.  B.  Diverticulosis.  C.  Focus of encapsulated fat necrosis.  D.  One of 1 pericolic lymph node is negative for metastatic carcinoma.  D.  No histologic evidence of malignancy.  E.  The proximal and distal surgical margins are negative for malignancy.     4.  \"Anastomotic donuts\":  A.  Viable large intestinal tissue.  B.  No histologic evidence of malignancy.     Assessment / Plan:    Diagnoses and all orders for this " visit:    1. Colon adenocarcinoma (Primary)  -     Ambulatory Referral to Hematology / Oncology    2. S/P partial resection of colon    Mr. Knox is a 62 y/o male who is 3 weeks s/p sigmoid colon resection. He is overall doing well. He still reports some soreness. I reviewed his pathology with him which does show one of his lymph nodes being positive. I have referred him to oncology for this. He will follow up with Dr. Velasquez in January for his 1 month post op appointment. I instructed him that he is still to not lift >25 lbs until he is 1 month post op. He is understanding and agreeable to the plan.     FOLLOW UP:     Return in about 2 weeks (around 1/4/2024) for 1 month post-op colon .      Electronically signed by Millie Dey PA-C  12/21/23  15:10 CST

## 2023-12-28 PROBLEM — C18.7 ADENOCARCINOMA OF SIGMOID COLON (HCC): Status: ACTIVE | Noted: 2023-12-28

## 2023-12-28 PROBLEM — C18.9 ADENOCARCINOMA OF COLON (HCC): Status: ACTIVE | Noted: 2023-12-28

## 2024-01-01 ENCOUNTER — HOSPITAL ENCOUNTER (OUTPATIENT)
Facility: HOSPITAL | Age: 64
Setting detail: OBSERVATION
End: 2024-10-19
Attending: EMERGENCY MEDICINE | Admitting: FAMILY MEDICINE
Payer: MEDICARE

## 2024-01-01 ENCOUNTER — APPOINTMENT (OUTPATIENT)
Dept: GENERAL RADIOLOGY | Facility: HOSPITAL | Age: 64
End: 2024-01-01
Payer: MEDICARE

## 2024-01-01 ENCOUNTER — HOSPITAL ENCOUNTER (INPATIENT)
Facility: HOSPITAL | Age: 64
LOS: 4 days | Discharge: NURSING, REHAB OR LONG-TERM CARE | End: 2024-10-16
Attending: FAMILY MEDICINE | Admitting: FAMILY MEDICINE
Payer: MEDICARE

## 2024-01-01 ENCOUNTER — APPOINTMENT (OUTPATIENT)
Dept: ULTRASOUND IMAGING | Facility: HOSPITAL | Age: 64
End: 2024-01-01
Payer: MEDICARE

## 2024-01-01 ENCOUNTER — APPOINTMENT (OUTPATIENT)
Dept: CT IMAGING | Facility: HOSPITAL | Age: 64
End: 2024-01-01
Payer: MEDICARE

## 2024-01-01 ENCOUNTER — TELEPHONE (OUTPATIENT)
Dept: CARDIAC SURGERY | Facility: CLINIC | Age: 64
End: 2024-01-01
Payer: MEDICARE

## 2024-01-01 VITALS
TEMPERATURE: 93 F | WEIGHT: 300.9 LBS | OXYGEN SATURATION: 93 % | HEIGHT: 69 IN | BODY MASS INDEX: 44.57 KG/M2 | DIASTOLIC BLOOD PRESSURE: 73 MMHG | RESPIRATION RATE: 15 BRPM | SYSTOLIC BLOOD PRESSURE: 102 MMHG | HEART RATE: 94 BPM

## 2024-01-01 VITALS
OXYGEN SATURATION: 93 % | HEART RATE: 84 BPM | DIASTOLIC BLOOD PRESSURE: 80 MMHG | HEIGHT: 69 IN | TEMPERATURE: 97.7 F | WEIGHT: 291.67 LBS | SYSTOLIC BLOOD PRESSURE: 110 MMHG | RESPIRATION RATE: 18 BRPM | BODY MASS INDEX: 43.2 KG/M2

## 2024-01-01 DIAGNOSIS — J90 PLEURAL EFFUSION: ICD-10-CM

## 2024-01-01 DIAGNOSIS — J90 RECURRENT RIGHT PLEURAL EFFUSION: ICD-10-CM

## 2024-01-01 DIAGNOSIS — R07.9 CHEST PAIN, UNSPECIFIED TYPE: ICD-10-CM

## 2024-01-01 DIAGNOSIS — J96.22 ACUTE ON CHRONIC RESPIRATORY FAILURE WITH HYPOXIA AND HYPERCAPNIA: ICD-10-CM

## 2024-01-01 DIAGNOSIS — I50.9 ACUTE ON CHRONIC CONGESTIVE HEART FAILURE, UNSPECIFIED HEART FAILURE TYPE: ICD-10-CM

## 2024-01-01 DIAGNOSIS — J96.21 ACUTE ON CHRONIC RESPIRATORY FAILURE WITH HYPOXIA AND HYPERCAPNIA: ICD-10-CM

## 2024-01-01 DIAGNOSIS — J96.91 RESPIRATORY FAILURE WITH HYPOXIA AND HYPERCAPNIA, UNSPECIFIED CHRONICITY: Primary | ICD-10-CM

## 2024-01-01 DIAGNOSIS — J44.1 COPD EXACERBATION: ICD-10-CM

## 2024-01-01 DIAGNOSIS — N18.9 CHRONIC KIDNEY DISEASE, UNSPECIFIED CKD STAGE: ICD-10-CM

## 2024-01-01 DIAGNOSIS — J96.92 RESPIRATORY FAILURE WITH HYPOXIA AND HYPERCAPNIA, UNSPECIFIED CHRONICITY: Primary | ICD-10-CM

## 2024-01-01 DIAGNOSIS — I50.9 CONGESTIVE HEART FAILURE, UNSPECIFIED HF CHRONICITY, UNSPECIFIED HEART FAILURE TYPE: Primary | ICD-10-CM

## 2024-01-01 DIAGNOSIS — Z74.09 IMPAIRED MOBILITY: ICD-10-CM

## 2024-01-01 LAB
1,25(OH)2D SERPL-MCNC: 15.7 PG/ML (ref 24.8–81.5)
25(OH)D3 SERPL-MCNC: <6 NG/ML (ref 30–100)
ABSOLUTE LUNG FLUID CONTENT: 36 % (ref 20–35)
ALBUMIN SERPL-MCNC: 3.3 G/DL (ref 3.5–5.2)
ALBUMIN SERPL-MCNC: 3.4 G/DL (ref 3.5–5.2)
ALBUMIN SERPL-MCNC: 4 G/DL (ref 3.5–5.2)
ALBUMIN/GLOB SERPL: 1.4 G/DL
ALP SERPL-CCNC: 103 U/L (ref 39–117)
ALP SERPL-CCNC: 80 U/L (ref 39–117)
ALP SERPL-CCNC: 90 U/L (ref 39–117)
ALT SERPL W P-5'-P-CCNC: 12 U/L (ref 1–41)
ALT SERPL W P-5'-P-CCNC: 13 U/L (ref 1–41)
ALT SERPL W P-5'-P-CCNC: 22 U/L (ref 1–41)
ANION GAP SERPL CALCULATED.3IONS-SCNC: 6 MMOL/L (ref 5–15)
ANION GAP SERPL CALCULATED.3IONS-SCNC: 6 MMOL/L (ref 5–15)
ANION GAP SERPL CALCULATED.3IONS-SCNC: 8 MMOL/L (ref 5–15)
ANION GAP SERPL CALCULATED.3IONS-SCNC: 9 MMOL/L (ref 5–15)
ARTERIAL PATENCY WRIST A: POSITIVE
AST SERPL-CCNC: 14 U/L (ref 1–40)
AST SERPL-CCNC: 15 U/L (ref 1–40)
AST SERPL-CCNC: 8 U/L (ref 1–40)
ATMOSPHERIC PRESS: 750 MMHG
ATMOSPHERIC PRESS: 751 MMHG
ATMOSPHERIC PRESS: 755 MMHG
ATMOSPHERIC PRESS: 756 MMHG
ATMOSPHERIC PRESS: 756 MMHG
ATMOSPHERIC PRESS: 765 MMHG
B PARAPERT DNA SPEC QL NAA+PROBE: NOT DETECTED
B PERT DNA SPEC QL NAA+PROBE: NOT DETECTED
BACTERIA UR QL AUTO: NORMAL /HPF
BASE EXCESS BLDA CALC-SCNC: 12.3 MMOL/L (ref 0–2)
BASE EXCESS BLDA CALC-SCNC: 5.7 MMOL/L (ref 0–2)
BASE EXCESS BLDA CALC-SCNC: 6.3 MMOL/L (ref 0–2)
BASE EXCESS BLDA CALC-SCNC: 6.5 MMOL/L (ref 0–2)
BASE EXCESS BLDA CALC-SCNC: 6.7 MMOL/L (ref 0–2)
BASE EXCESS BLDA CALC-SCNC: 6.9 MMOL/L (ref 0–2)
BASOPHILS # BLD AUTO: 0.04 10*3/MM3 (ref 0–0.2)
BASOPHILS # BLD AUTO: 0.06 10*3/MM3 (ref 0–0.2)
BASOPHILS NFR BLD AUTO: 0.5 % (ref 0–1.5)
BASOPHILS NFR BLD AUTO: 0.6 % (ref 0–1.5)
BDY SITE: ABNORMAL
BILIRUB SERPL-MCNC: 0.3 MG/DL (ref 0–1.2)
BILIRUB SERPL-MCNC: 0.5 MG/DL (ref 0–1.2)
BILIRUB SERPL-MCNC: 0.6 MG/DL (ref 0–1.2)
BILIRUB UR QL STRIP: NEGATIVE
BODY TEMPERATURE: 37
BUN SERPL-MCNC: 45 MG/DL (ref 8–23)
BUN SERPL-MCNC: 51 MG/DL (ref 8–23)
BUN SERPL-MCNC: 61 MG/DL (ref 8–23)
BUN SERPL-MCNC: 61 MG/DL (ref 8–23)
BUN SERPL-MCNC: 69 MG/DL (ref 8–23)
BUN SERPL-MCNC: 73 MG/DL (ref 8–23)
BUN/CREAT SERPL: 25.2 (ref 7–25)
BUN/CREAT SERPL: 27.1 (ref 7–25)
BUN/CREAT SERPL: 28.2 (ref 7–25)
BUN/CREAT SERPL: 28.8 (ref 7–25)
BUN/CREAT SERPL: 30 (ref 7–25)
BUN/CREAT SERPL: 39.2 (ref 7–25)
C PNEUM DNA NPH QL NAA+NON-PROBE: NOT DETECTED
CALCIUM SPEC-SCNC: 8.3 MG/DL (ref 8.6–10.5)
CALCIUM SPEC-SCNC: 8.3 MG/DL (ref 8.6–10.5)
CALCIUM SPEC-SCNC: 8.4 MG/DL (ref 8.6–10.5)
CALCIUM SPEC-SCNC: 8.5 MG/DL (ref 8.6–10.5)
CALCIUM SPEC-SCNC: 8.7 MG/DL (ref 8.6–10.5)
CALCIUM SPEC-SCNC: 9 MG/DL (ref 8.6–10.5)
CHLORIDE SERPL-SCNC: 94 MMOL/L (ref 98–107)
CHLORIDE SERPL-SCNC: 94 MMOL/L (ref 98–107)
CHLORIDE SERPL-SCNC: 95 MMOL/L (ref 98–107)
CHLORIDE SERPL-SCNC: 95 MMOL/L (ref 98–107)
CHLORIDE SERPL-SCNC: 96 MMOL/L (ref 98–107)
CHLORIDE SERPL-SCNC: 98 MMOL/L (ref 98–107)
CLARITY UR: CLEAR
CO2 SERPL-SCNC: 30 MMOL/L (ref 22–29)
CO2 SERPL-SCNC: 32 MMOL/L (ref 22–29)
CO2 SERPL-SCNC: 34 MMOL/L (ref 22–29)
CO2 SERPL-SCNC: 35 MMOL/L (ref 22–29)
CO2 SERPL-SCNC: 35 MMOL/L (ref 22–29)
CO2 SERPL-SCNC: 37 MMOL/L (ref 22–29)
COHGB MFR BLD: 1.3 % (ref 0–5)
COHGB MFR BLD: 1.3 % (ref 0–5)
COHGB MFR BLD: 1.8 % (ref 0–5)
COLOR UR: ABNORMAL
CREAT SERPL-MCNC: 1.3 MG/DL (ref 0.76–1.27)
CREAT SERPL-MCNC: 1.56 MG/DL (ref 0.76–1.27)
CREAT SERPL-MCNC: 2.16 MG/DL (ref 0.76–1.27)
CREAT SERPL-MCNC: 2.42 MG/DL (ref 0.76–1.27)
CREAT SERPL-MCNC: 2.43 MG/DL (ref 0.76–1.27)
CREAT SERPL-MCNC: 2.55 MG/DL (ref 0.76–1.27)
CREAT UR-MCNC: 59.5 MG/DL
D DIMER PPP FEU-MCNC: 1.03 MCGFEU/ML (ref 0–0.63)
D-LACTATE SERPL-SCNC: 1.3 MMOL/L (ref 0.5–2)
DEPRECATED RDW RBC AUTO: 53.1 FL (ref 37–54)
DEPRECATED RDW RBC AUTO: 53.2 FL (ref 37–54)
DEPRECATED RDW RBC AUTO: 54.7 FL (ref 37–54)
DEPRECATED RDW RBC AUTO: 55.4 FL (ref 37–54)
EGFRCR SERPLBLD CKD-EPI 2021: 27.5 ML/MIN/1.73
EGFRCR SERPLBLD CKD-EPI 2021: 29.1 ML/MIN/1.73
EGFRCR SERPLBLD CKD-EPI 2021: 29.3 ML/MIN/1.73
EGFRCR SERPLBLD CKD-EPI 2021: 33.6 ML/MIN/1.73
EGFRCR SERPLBLD CKD-EPI 2021: 49.6 ML/MIN/1.73
EGFRCR SERPLBLD CKD-EPI 2021: 61.7 ML/MIN/1.73
EOSINOPHIL # BLD AUTO: 0.06 10*3/MM3 (ref 0–0.4)
EOSINOPHIL # BLD AUTO: 0.1 10*3/MM3 (ref 0–0.4)
EOSINOPHIL NFR BLD AUTO: 0.7 % (ref 0.3–6.2)
EOSINOPHIL NFR BLD AUTO: 1.1 % (ref 0.3–6.2)
EPAP: 7
EPAP: 8
EPAP: 8
ERYTHROCYTE [DISTWIDTH] IN BLOOD BY AUTOMATED COUNT: 15.2 % (ref 12.3–15.4)
ERYTHROCYTE [DISTWIDTH] IN BLOOD BY AUTOMATED COUNT: 15.4 % (ref 12.3–15.4)
ERYTHROCYTE [DISTWIDTH] IN BLOOD BY AUTOMATED COUNT: 15.4 % (ref 12.3–15.4)
ERYTHROCYTE [DISTWIDTH] IN BLOOD BY AUTOMATED COUNT: 15.9 % (ref 12.3–15.4)
FLUAV SUBTYP SPEC NAA+PROBE: NOT DETECTED
FLUBV RNA ISLT QL NAA+PROBE: NOT DETECTED
GAS FLOW AIRWAY: 3 LPM
GAS FLOW AIRWAY: 4 LPM
GAS FLOW AIRWAY: 6 LPM
GEN 5 2HR TROPONIN T REFLEX: 130 NG/L
GEN 5 2HR TROPONIN T REFLEX: 91 NG/L
GLOBULIN UR ELPH-MCNC: 2.4 GM/DL
GLOBULIN UR ELPH-MCNC: 2.4 GM/DL
GLOBULIN UR ELPH-MCNC: 2.8 GM/DL
GLUCOSE BLDC GLUCOMTR-MCNC: 119 MG/DL (ref 70–130)
GLUCOSE BLDC GLUCOMTR-MCNC: 126 MG/DL (ref 70–130)
GLUCOSE BLDC GLUCOMTR-MCNC: 131 MG/DL (ref 70–130)
GLUCOSE BLDC GLUCOMTR-MCNC: 160 MG/DL (ref 70–130)
GLUCOSE BLDC GLUCOMTR-MCNC: 164 MG/DL (ref 70–130)
GLUCOSE BLDC GLUCOMTR-MCNC: 168 MG/DL (ref 70–130)
GLUCOSE BLDC GLUCOMTR-MCNC: 194 MG/DL (ref 70–130)
GLUCOSE BLDC GLUCOMTR-MCNC: 197 MG/DL (ref 70–130)
GLUCOSE BLDC GLUCOMTR-MCNC: 197 MG/DL (ref 70–130)
GLUCOSE BLDC GLUCOMTR-MCNC: 202 MG/DL (ref 70–130)
GLUCOSE BLDC GLUCOMTR-MCNC: 250 MG/DL (ref 70–130)
GLUCOSE BLDC GLUCOMTR-MCNC: 337 MG/DL (ref 70–130)
GLUCOSE BLDC GLUCOMTR-MCNC: 358 MG/DL (ref 70–130)
GLUCOSE BLDC GLUCOMTR-MCNC: 366 MG/DL (ref 70–130)
GLUCOSE BLDC GLUCOMTR-MCNC: 400 MG/DL (ref 70–130)
GLUCOSE BLDC GLUCOMTR-MCNC: 61 MG/DL (ref 70–130)
GLUCOSE BLDC GLUCOMTR-MCNC: 62 MG/DL (ref 70–130)
GLUCOSE BLDC GLUCOMTR-MCNC: 69 MG/DL (ref 70–130)
GLUCOSE BLDC GLUCOMTR-MCNC: 74 MG/DL (ref 70–130)
GLUCOSE BLDC GLUCOMTR-MCNC: 74 MG/DL (ref 70–130)
GLUCOSE SERPL-MCNC: 118 MG/DL (ref 65–99)
GLUCOSE SERPL-MCNC: 202 MG/DL (ref 65–99)
GLUCOSE SERPL-MCNC: 206 MG/DL (ref 65–99)
GLUCOSE SERPL-MCNC: 257 MG/DL (ref 65–99)
GLUCOSE SERPL-MCNC: 57 MG/DL (ref 65–99)
GLUCOSE SERPL-MCNC: 99 MG/DL (ref 65–99)
GLUCOSE UR STRIP-MCNC: ABNORMAL MG/DL
HADV DNA SPEC NAA+PROBE: NOT DETECTED
HCO3 BLDA-SCNC: 33.9 MMOL/L (ref 20–26)
HCO3 BLDA-SCNC: 34.5 MMOL/L (ref 20–26)
HCO3 BLDA-SCNC: 34.9 MMOL/L (ref 20–26)
HCO3 BLDA-SCNC: 35.7 MMOL/L (ref 20–26)
HCO3 BLDA-SCNC: 36 MMOL/L (ref 20–26)
HCO3 BLDA-SCNC: 38.6 MMOL/L (ref 20–26)
HCOV 229E RNA SPEC QL NAA+PROBE: NOT DETECTED
HCOV HKU1 RNA SPEC QL NAA+PROBE: NOT DETECTED
HCOV NL63 RNA SPEC QL NAA+PROBE: NOT DETECTED
HCOV OC43 RNA SPEC QL NAA+PROBE: NOT DETECTED
HCT VFR BLD AUTO: 35.9 % (ref 37.5–51)
HCT VFR BLD AUTO: 36.7 % (ref 37.5–51)
HCT VFR BLD AUTO: 38.5 % (ref 37.5–51)
HCT VFR BLD AUTO: 41 % (ref 37.5–51)
HCT VFR BLD CALC: 32.7 % (ref 38–51)
HCT VFR BLD CALC: 35.3 % (ref 38–51)
HCT VFR BLD CALC: 35.5 % (ref 38–51)
HGB BLD-MCNC: 10.3 G/DL (ref 13–17.7)
HGB BLD-MCNC: 10.3 G/DL (ref 13–17.7)
HGB BLD-MCNC: 11.1 G/DL (ref 13–17.7)
HGB BLD-MCNC: 11.5 G/DL (ref 13–17.7)
HGB BLDA-MCNC: 10.7 G/DL (ref 14–18)
HGB BLDA-MCNC: 11.5 G/DL (ref 14–18)
HGB BLDA-MCNC: 11.6 G/DL (ref 14–18)
HGB UR QL STRIP.AUTO: NEGATIVE
HMPV RNA NPH QL NAA+NON-PROBE: NOT DETECTED
HOLD SPECIMEN: NORMAL
HPIV1 RNA ISLT QL NAA+PROBE: NOT DETECTED
HPIV2 RNA SPEC QL NAA+PROBE: NOT DETECTED
HPIV3 RNA NPH QL NAA+PROBE: NOT DETECTED
HPIV4 P GENE NPH QL NAA+PROBE: NOT DETECTED
HYALINE CASTS UR QL AUTO: NORMAL /LPF
IMM GRANULOCYTES # BLD AUTO: 0.06 10*3/MM3 (ref 0–0.05)
IMM GRANULOCYTES # BLD AUTO: 0.06 10*3/MM3 (ref 0–0.05)
IMM GRANULOCYTES NFR BLD AUTO: 0.6 % (ref 0–0.5)
IMM GRANULOCYTES NFR BLD AUTO: 0.7 % (ref 0–0.5)
INHALED O2 CONCENTRATION: 40 %
INHALED O2 CONCENTRATION: 44 %
INHALED O2 CONCENTRATION: 50 %
INHALED O2 CONCENTRATION: 70 %
IPAP: 14
IPAP: 20
IPAP: 20
KETONES UR QL STRIP: ABNORMAL
LEUKOCYTE ESTERASE UR QL STRIP.AUTO: ABNORMAL
LYMPHOCYTES # BLD AUTO: 0.56 10*3/MM3 (ref 0.7–3.1)
LYMPHOCYTES # BLD AUTO: 0.95 10*3/MM3 (ref 0.7–3.1)
LYMPHOCYTES NFR BLD AUTO: 10.2 % (ref 19.6–45.3)
LYMPHOCYTES NFR BLD AUTO: 6.7 % (ref 19.6–45.3)
Lab: ABNORMAL
M PNEUMO IGG SER IA-ACNC: NOT DETECTED
MAGNESIUM SERPL-MCNC: 2.4 MG/DL (ref 1.6–2.4)
MAGNESIUM SERPL-MCNC: 2.6 MG/DL (ref 1.6–2.4)
MCH RBC QN AUTO: 27 PG (ref 26.6–33)
MCH RBC QN AUTO: 27.2 PG (ref 26.6–33)
MCH RBC QN AUTO: 27.3 PG (ref 26.6–33)
MCH RBC QN AUTO: 27.5 PG (ref 26.6–33)
MCHC RBC AUTO-ENTMCNC: 28 G/DL (ref 31.5–35.7)
MCHC RBC AUTO-ENTMCNC: 28.1 G/DL (ref 31.5–35.7)
MCHC RBC AUTO-ENTMCNC: 28.7 G/DL (ref 31.5–35.7)
MCHC RBC AUTO-ENTMCNC: 28.8 G/DL (ref 31.5–35.7)
MCV RBC AUTO: 94.8 FL (ref 79–97)
MCV RBC AUTO: 95 FL (ref 79–97)
MCV RBC AUTO: 96.3 FL (ref 79–97)
MCV RBC AUTO: 98.1 FL (ref 79–97)
METHGB BLD QL: 0 % (ref 0–3)
METHGB BLD QL: 0.4 % (ref 0–3)
METHGB BLD QL: 0.6 % (ref 0–3)
MODALITY: ABNORMAL
MONOCYTES # BLD AUTO: 0.67 10*3/MM3 (ref 0.1–0.9)
MONOCYTES # BLD AUTO: 0.8 10*3/MM3 (ref 0.1–0.9)
MONOCYTES NFR BLD AUTO: 8 % (ref 5–12)
MONOCYTES NFR BLD AUTO: 8.6 % (ref 5–12)
NEUTROPHILS NFR BLD AUTO: 6.97 10*3/MM3 (ref 1.7–7)
NEUTROPHILS NFR BLD AUTO: 7.32 10*3/MM3 (ref 1.7–7)
NEUTROPHILS NFR BLD AUTO: 78.9 % (ref 42.7–76)
NEUTROPHILS NFR BLD AUTO: 83.4 % (ref 42.7–76)
NITRITE UR QL STRIP: NEGATIVE
NOTIFIED BY: ABNORMAL
NOTIFIED WHO: ABNORMAL
NOTIFIED WHO: ABNORMAL
NRBC BLD AUTO-RTO: 0 /100 WBC (ref 0–0.2)
NRBC BLD AUTO-RTO: 0 /100 WBC (ref 0–0.2)
NT-PROBNP SERPL-MCNC: 9439 PG/ML (ref 0–900)
NT-PROBNP SERPL-MCNC: ABNORMAL PG/ML (ref 0–900)
OXYHGB MFR BLDV: 89.7 % (ref 94–99)
OXYHGB MFR BLDV: 91.4 % (ref 94–99)
OXYHGB MFR BLDV: 95 % (ref 94–99)
PCO2 BLDA: 58.6 MM HG (ref 35–45)
PCO2 BLDA: 59.7 MM HG (ref 35–45)
PCO2 BLDA: 69 MM HG (ref 35–45)
PCO2 BLDA: 77 MM HG (ref 35–45)
PCO2 BLDA: 78.1 MM HG (ref 35–45)
PCO2 BLDA: 79.4 MM HG (ref 35–45)
PCO2 TEMP ADJ BLD: 58.6 MM HG (ref 35–45)
PCO2 TEMP ADJ BLD: 59.7 MM HG (ref 35–45)
PCO2 TEMP ADJ BLD: 69 MM HG (ref 35–45)
PCO2 TEMP ADJ BLD: 77 MM HG (ref 35–45)
PCO2 TEMP ADJ BLD: 78.1 MM HG (ref 35–45)
PCO2 TEMP ADJ BLD: 79.4 MM HG (ref 35–45)
PH BLDA: 7.26 PH UNITS (ref 7.35–7.45)
PH BLDA: 7.26 PH UNITS (ref 7.35–7.45)
PH BLDA: 7.27 PH UNITS (ref 7.35–7.45)
PH BLDA: 7.31 PH UNITS (ref 7.35–7.45)
PH BLDA: 7.36 PH UNITS (ref 7.35–7.45)
PH BLDA: 7.43 PH UNITS (ref 7.35–7.45)
PH UR STRIP.AUTO: <=5 [PH] (ref 5–8)
PH, TEMP CORRECTED: 7.26 PH UNITS (ref 7.35–7.45)
PH, TEMP CORRECTED: 7.26 PH UNITS (ref 7.35–7.45)
PH, TEMP CORRECTED: 7.27 PH UNITS (ref 7.35–7.45)
PH, TEMP CORRECTED: 7.31 PH UNITS (ref 7.35–7.45)
PH, TEMP CORRECTED: 7.36 PH UNITS (ref 7.35–7.45)
PH, TEMP CORRECTED: 7.43 PH UNITS (ref 7.35–7.45)
PHOSPHATE SERPL-MCNC: 6.4 MG/DL (ref 2.5–4.5)
PLATELET # BLD AUTO: 204 10*3/MM3 (ref 140–450)
PLATELET # BLD AUTO: 239 10*3/MM3 (ref 140–450)
PLATELET # BLD AUTO: 251 10*3/MM3 (ref 140–450)
PLATELET # BLD AUTO: 286 10*3/MM3 (ref 140–450)
PMV BLD AUTO: 10.3 FL (ref 6–12)
PMV BLD AUTO: 10.6 FL (ref 6–12)
PMV BLD AUTO: 10.6 FL (ref 6–12)
PMV BLD AUTO: 10.8 FL (ref 6–12)
PO2 BLD: 176 MM[HG] (ref 0–500)
PO2 BLD: 222 MM[HG] (ref 0–500)
PO2 BLDA: 111 MM HG (ref 83–108)
PO2 BLDA: 60.6 MM HG (ref 83–108)
PO2 BLDA: 70.2 MM HG (ref 83–108)
PO2 BLDA: 70.2 MM HG (ref 83–108)
PO2 BLDA: 83.5 MM HG (ref 83–108)
PO2 BLDA: 93.7 MM HG (ref 83–108)
PO2 TEMP ADJ BLD: 111 MM HG (ref 83–108)
PO2 TEMP ADJ BLD: 60.6 MM HG (ref 83–108)
PO2 TEMP ADJ BLD: 70.2 MM HG (ref 83–108)
PO2 TEMP ADJ BLD: 70.2 MM HG (ref 83–108)
PO2 TEMP ADJ BLD: 83.5 MM HG (ref 83–108)
PO2 TEMP ADJ BLD: 93.7 MM HG (ref 83–108)
POTASSIUM BLDA-SCNC: 4.9 MMOL/L (ref 3.5–5.2)
POTASSIUM BLDA-SCNC: 5.2 MMOL/L (ref 3.5–5.2)
POTASSIUM BLDA-SCNC: 5.5 MMOL/L (ref 3.5–5.2)
POTASSIUM SERPL-SCNC: 5.2 MMOL/L (ref 3.5–5.2)
POTASSIUM SERPL-SCNC: 5.2 MMOL/L (ref 3.5–5.2)
POTASSIUM SERPL-SCNC: 5.4 MMOL/L (ref 3.5–5.2)
POTASSIUM SERPL-SCNC: 5.6 MMOL/L (ref 3.5–5.2)
POTASSIUM SERPL-SCNC: 5.6 MMOL/L (ref 3.5–5.2)
POTASSIUM SERPL-SCNC: 5.9 MMOL/L (ref 3.5–5.2)
PROCALCITONIN SERPL-MCNC: 0.21 NG/ML (ref 0–0.25)
PROT ?TM UR-MCNC: 8.8 MG/DL
PROT SERPL-MCNC: 5.7 G/DL (ref 6–8.5)
PROT SERPL-MCNC: 5.8 G/DL (ref 6–8.5)
PROT SERPL-MCNC: 6.8 G/DL (ref 6–8.5)
PROT UR QL STRIP: ABNORMAL
PTH-INTACT SERPL-MCNC: 106.2 PG/ML (ref 15–65)
QT INTERVAL: 376 MS
QT INTERVAL: 416 MS
QT INTERVAL: 426 MS
QT INTERVAL: 448 MS
QTC INTERVAL: 485 MS
QTC INTERVAL: 497 MS
QTC INTERVAL: 506 MS
QTC INTERVAL: 509 MS
RBC # BLD AUTO: 3.78 10*6/MM3 (ref 4.14–5.8)
RBC # BLD AUTO: 3.81 10*6/MM3 (ref 4.14–5.8)
RBC # BLD AUTO: 4.06 10*6/MM3 (ref 4.14–5.8)
RBC # BLD AUTO: 4.18 10*6/MM3 (ref 4.14–5.8)
RBC # UR STRIP: NORMAL /HPF
REF LAB TEST METHOD: NORMAL
RHINOVIRUS RNA SPEC NAA+PROBE: NOT DETECTED
RSV RNA NPH QL NAA+NON-PROBE: NOT DETECTED
SAO2 % BLDCOA: 91.7 % (ref 94–99)
SAO2 % BLDCOA: 92 % (ref 94–99)
SAO2 % BLDCOA: 94.5 % (ref 94–99)
SAO2 % BLDCOA: 95.5 % (ref 94–99)
SAO2 % BLDCOA: 96.8 % (ref 94–99)
SAO2 % BLDCOA: 97.9 % (ref 94–99)
SARS-COV-2 RNA NPH QL NAA+NON-PROBE: NOT DETECTED
SET MECH RESP RATE: 10
SET MECH RESP RATE: 12
SET MECH RESP RATE: 22
SODIUM BLDA-SCNC: 140 MMOL/L (ref 136–145)
SODIUM BLDA-SCNC: 141 MMOL/L (ref 136–145)
SODIUM BLDA-SCNC: 141 MMOL/L (ref 136–145)
SODIUM SERPL-SCNC: 134 MMOL/L (ref 136–145)
SODIUM SERPL-SCNC: 135 MMOL/L (ref 136–145)
SODIUM SERPL-SCNC: 135 MMOL/L (ref 136–145)
SODIUM SERPL-SCNC: 137 MMOL/L (ref 136–145)
SODIUM SERPL-SCNC: 139 MMOL/L (ref 136–145)
SODIUM SERPL-SCNC: 142 MMOL/L (ref 136–145)
SODIUM UR-SCNC: 52 MMOL/L
SP GR UR STRIP: 1.02 (ref 1–1.03)
SQUAMOUS #/AREA URNS HPF: NORMAL /HPF
TROPONIN T DELTA: -13 NG/L
TROPONIN T DELTA: 9 NG/L
TROPONIN T SERPL HS-MCNC: 143 NG/L
TROPONIN T SERPL HS-MCNC: 82 NG/L
UROBILINOGEN UR QL STRIP: ABNORMAL
UUN 24H UR-MCNC: 452 MG/DL
VENTILATOR MODE: ABNORMAL
WBC # UR STRIP: NORMAL /HPF
WBC NRBC COR # BLD AUTO: 6.68 10*3/MM3 (ref 3.4–10.8)
WBC NRBC COR # BLD AUTO: 8.04 10*3/MM3 (ref 3.4–10.8)
WBC NRBC COR # BLD AUTO: 8.36 10*3/MM3 (ref 3.4–10.8)
WBC NRBC COR # BLD AUTO: 9.29 10*3/MM3 (ref 3.4–10.8)
WHOLE BLOOD HOLD COAG: NORMAL
WHOLE BLOOD HOLD COAG: NORMAL
WHOLE BLOOD HOLD SPECIMEN: NORMAL
WHOLE BLOOD HOLD SPECIMEN: NORMAL

## 2024-01-01 PROCEDURE — 80048 BASIC METABOLIC PNL TOTAL CA: CPT

## 2024-01-01 PROCEDURE — 94799 UNLISTED PULMONARY SVC/PX: CPT

## 2024-01-01 PROCEDURE — 84100 ASSAY OF PHOSPHORUS: CPT | Performed by: INTERNAL MEDICINE

## 2024-01-01 PROCEDURE — 80053 COMPREHEN METABOLIC PANEL: CPT | Performed by: NURSE PRACTITIONER

## 2024-01-01 PROCEDURE — 99285 EMERGENCY DEPT VISIT HI MDM: CPT

## 2024-01-01 PROCEDURE — 82570 ASSAY OF URINE CREATININE: CPT | Performed by: INTERNAL MEDICINE

## 2024-01-01 PROCEDURE — 93010 ELECTROCARDIOGRAM REPORT: CPT | Performed by: INTERNAL MEDICINE

## 2024-01-01 PROCEDURE — 63710000001 INSULIN LISPRO (HUMAN) PER 5 UNITS

## 2024-01-01 PROCEDURE — 93005 ELECTROCARDIOGRAM TRACING: CPT

## 2024-01-01 PROCEDURE — 94640 AIRWAY INHALATION TREATMENT: CPT

## 2024-01-01 PROCEDURE — 25010000002 ENOXAPARIN PER 10 MG

## 2024-01-01 PROCEDURE — 96376 TX/PRO/DX INJ SAME DRUG ADON: CPT

## 2024-01-01 PROCEDURE — 36600 WITHDRAWAL OF ARTERIAL BLOOD: CPT

## 2024-01-01 PROCEDURE — 97162 PT EVAL MOD COMPLEX 30 MIN: CPT | Performed by: PHYSICAL THERAPIST

## 2024-01-01 PROCEDURE — 25010000002 BUMETANIDE PER 0.5 MG

## 2024-01-01 PROCEDURE — 25810000003 LACTATED RINGERS SOLUTION: Performed by: INTERNAL MEDICINE

## 2024-01-01 PROCEDURE — 82948 REAGENT STRIP/BLOOD GLUCOSE: CPT

## 2024-01-01 PROCEDURE — 99221 1ST HOSP IP/OBS SF/LOW 40: CPT

## 2024-01-01 PROCEDURE — 63710000001 INSULIN GLARGINE PER 5 UNITS

## 2024-01-01 PROCEDURE — 25010000002 MORPHINE PER 10 MG: Performed by: FAMILY MEDICINE

## 2024-01-01 PROCEDURE — 83880 ASSAY OF NATRIURETIC PEPTIDE: CPT | Performed by: FAMILY MEDICINE

## 2024-01-01 PROCEDURE — 80053 COMPREHEN METABOLIC PANEL: CPT | Performed by: FAMILY MEDICINE

## 2024-01-01 PROCEDURE — 94726 PLETHYSMOGRAPHY LUNG VOLUMES: CPT | Performed by: HOSPITALIST

## 2024-01-01 PROCEDURE — 84484 ASSAY OF TROPONIN QUANT: CPT | Performed by: EMERGENCY MEDICINE

## 2024-01-01 PROCEDURE — 82375 ASSAY CARBOXYHB QUANT: CPT

## 2024-01-01 PROCEDURE — 85027 COMPLETE CBC AUTOMATED: CPT

## 2024-01-01 PROCEDURE — 99232 SBSQ HOSP IP/OBS MODERATE 35: CPT

## 2024-01-01 PROCEDURE — 83880 ASSAY OF NATRIURETIC PEPTIDE: CPT | Performed by: EMERGENCY MEDICINE

## 2024-01-01 PROCEDURE — 99223 1ST HOSP IP/OBS HIGH 75: CPT

## 2024-01-01 PROCEDURE — 94660 CPAP INITIATION&MGMT: CPT

## 2024-01-01 PROCEDURE — 85025 COMPLETE CBC W/AUTO DIFF WBC: CPT | Performed by: EMERGENCY MEDICINE

## 2024-01-01 PROCEDURE — 83735 ASSAY OF MAGNESIUM: CPT | Performed by: FAMILY MEDICINE

## 2024-01-01 PROCEDURE — 96374 THER/PROPH/DIAG INJ IV PUSH: CPT

## 2024-01-01 PROCEDURE — 71045 X-RAY EXAM CHEST 1 VIEW: CPT

## 2024-01-01 PROCEDURE — 99233 SBSQ HOSP IP/OBS HIGH 50: CPT

## 2024-01-01 PROCEDURE — 0202U NFCT DS 22 TRGT SARS-COV-2: CPT | Performed by: EMERGENCY MEDICINE

## 2024-01-01 PROCEDURE — G0378 HOSPITAL OBSERVATION PER HR: HCPCS

## 2024-01-01 PROCEDURE — 80053 COMPREHEN METABOLIC PANEL: CPT | Performed by: EMERGENCY MEDICINE

## 2024-01-01 PROCEDURE — 82652 VIT D 1 25-DIHYDROXY: CPT | Performed by: FAMILY MEDICINE

## 2024-01-01 PROCEDURE — 93005 ELECTROCARDIOGRAM TRACING: CPT | Performed by: EMERGENCY MEDICINE

## 2024-01-01 PROCEDURE — 96375 TX/PRO/DX INJ NEW DRUG ADDON: CPT

## 2024-01-01 PROCEDURE — 83050 HGB METHEMOGLOBIN QUAN: CPT

## 2024-01-01 PROCEDURE — 97166 OT EVAL MOD COMPLEX 45 MIN: CPT

## 2024-01-01 PROCEDURE — 85379 FIBRIN DEGRADATION QUANT: CPT | Performed by: EMERGENCY MEDICINE

## 2024-01-01 PROCEDURE — 25010000002 METHYLPREDNISOLONE PER 125 MG: Performed by: EMERGENCY MEDICINE

## 2024-01-01 PROCEDURE — 82803 BLOOD GASES ANY COMBINATION: CPT

## 2024-01-01 PROCEDURE — 94761 N-INVAS EAR/PLS OXIMETRY MLT: CPT

## 2024-01-01 PROCEDURE — 82805 BLOOD GASES W/O2 SATURATION: CPT

## 2024-01-01 PROCEDURE — 25010000002 FUROSEMIDE PER 20 MG: Performed by: FAMILY MEDICINE

## 2024-01-01 PROCEDURE — 84145 PROCALCITONIN (PCT): CPT | Performed by: EMERGENCY MEDICINE

## 2024-01-01 PROCEDURE — 83605 ASSAY OF LACTIC ACID: CPT | Performed by: EMERGENCY MEDICINE

## 2024-01-01 PROCEDURE — 80048 BASIC METABOLIC PNL TOTAL CA: CPT | Performed by: INTERNAL MEDICINE

## 2024-01-01 PROCEDURE — 81001 URINALYSIS AUTO W/SCOPE: CPT | Performed by: INTERNAL MEDICINE

## 2024-01-01 PROCEDURE — 93005 ELECTROCARDIOGRAM TRACING: CPT | Performed by: FAMILY MEDICINE

## 2024-01-01 PROCEDURE — 94664 DEMO&/EVAL PT USE INHALER: CPT

## 2024-01-01 PROCEDURE — 84156 ASSAY OF PROTEIN URINE: CPT | Performed by: INTERNAL MEDICINE

## 2024-01-01 PROCEDURE — 84484 ASSAY OF TROPONIN QUANT: CPT | Performed by: FAMILY MEDICINE

## 2024-01-01 PROCEDURE — 85027 COMPLETE CBC AUTOMATED: CPT | Performed by: INTERNAL MEDICINE

## 2024-01-01 PROCEDURE — 85025 COMPLETE CBC W/AUTO DIFF WBC: CPT | Performed by: FAMILY MEDICINE

## 2024-01-01 PROCEDURE — 84300 ASSAY OF URINE SODIUM: CPT | Performed by: INTERNAL MEDICINE

## 2024-01-01 PROCEDURE — 25010000002 FUROSEMIDE PER 20 MG: Performed by: EMERGENCY MEDICINE

## 2024-01-01 PROCEDURE — 84540 ASSAY OF URINE/UREA-N: CPT | Performed by: INTERNAL MEDICINE

## 2024-01-01 PROCEDURE — 63710000001 INSULIN GLARGINE PER 5 UNITS: Performed by: FAMILY MEDICINE

## 2024-01-01 PROCEDURE — 82306 VITAMIN D 25 HYDROXY: CPT | Performed by: INTERNAL MEDICINE

## 2024-01-01 PROCEDURE — 25010000002 LORAZEPAM PER 2 MG: Performed by: FAMILY MEDICINE

## 2024-01-01 PROCEDURE — 36415 COLL VENOUS BLD VENIPUNCTURE: CPT

## 2024-01-01 PROCEDURE — 83970 ASSAY OF PARATHORMONE: CPT | Performed by: INTERNAL MEDICINE

## 2024-01-01 PROCEDURE — 76775 US EXAM ABDO BACK WALL LIM: CPT

## 2024-01-01 PROCEDURE — 83735 ASSAY OF MAGNESIUM: CPT | Performed by: INTERNAL MEDICINE

## 2024-01-01 PROCEDURE — 87040 BLOOD CULTURE FOR BACTERIA: CPT | Performed by: EMERGENCY MEDICINE

## 2024-01-01 RX ORDER — LORAZEPAM 2 MG/ML
1 INJECTION INTRAMUSCULAR
Status: DISCONTINUED | OUTPATIENT
Start: 2024-01-01 | End: 2024-01-01 | Stop reason: HOSPADM

## 2024-01-01 RX ORDER — IPRATROPIUM BROMIDE AND ALBUTEROL SULFATE 2.5; .5 MG/3ML; MG/3ML
3 SOLUTION RESPIRATORY (INHALATION)
Status: DISCONTINUED | OUTPATIENT
Start: 2024-01-01 | End: 2024-01-01 | Stop reason: HOSPADM

## 2024-01-01 RX ORDER — IPRATROPIUM BROMIDE AND ALBUTEROL SULFATE 2.5; .5 MG/3ML; MG/3ML
3 SOLUTION RESPIRATORY (INHALATION) EVERY 4 HOURS PRN
Status: DISCONTINUED | OUTPATIENT
Start: 2024-01-01 | End: 2024-01-01 | Stop reason: HOSPADM

## 2024-01-01 RX ORDER — ALBUTEROL SULFATE 0.83 MG/ML
2.5 SOLUTION RESPIRATORY (INHALATION) EVERY 6 HOURS PRN
COMMUNITY

## 2024-01-01 RX ORDER — LORAZEPAM 0.5 MG/1
0.5 TABLET ORAL
Qty: 6 TABLET | Refills: 0 | Status: SHIPPED | OUTPATIENT
Start: 2024-01-01 | End: 2024-10-22

## 2024-01-01 RX ORDER — FUROSEMIDE 10 MG/ML
20 INJECTION INTRAMUSCULAR; INTRAVENOUS EVERY 6 HOURS PRN
Status: DISCONTINUED | OUTPATIENT
Start: 2024-01-01 | End: 2024-01-01 | Stop reason: HOSPADM

## 2024-01-01 RX ORDER — CALCIUM CARBONATE 500 MG/1
2 TABLET, CHEWABLE ORAL 3 TIMES DAILY PRN
Status: DISCONTINUED | OUTPATIENT
Start: 2024-01-01 | End: 2024-01-01 | Stop reason: HOSPADM

## 2024-01-01 RX ORDER — FUROSEMIDE 10 MG/ML
40 INJECTION INTRAMUSCULAR; INTRAVENOUS ONCE
Status: COMPLETED | OUTPATIENT
Start: 2024-01-01 | End: 2024-01-01

## 2024-01-01 RX ORDER — LORAZEPAM 2 MG/ML
0.5 INJECTION INTRAMUSCULAR
Status: DISCONTINUED | OUTPATIENT
Start: 2024-01-01 | End: 2024-01-01 | Stop reason: HOSPADM

## 2024-01-01 RX ORDER — ACETAMINOPHEN 650 MG/1
650 SUPPOSITORY RECTAL EVERY 4 HOURS PRN
Status: DISCONTINUED | OUTPATIENT
Start: 2024-01-01 | End: 2024-01-01 | Stop reason: HOSPADM

## 2024-01-01 RX ORDER — GABAPENTIN 600 MG/1
600 TABLET ORAL 2 TIMES DAILY
COMMUNITY

## 2024-01-01 RX ORDER — DULOXETIN HYDROCHLORIDE 60 MG/1
60 CAPSULE, DELAYED RELEASE ORAL DAILY
COMMUNITY

## 2024-01-01 RX ORDER — HYDROCODONE BITARTRATE AND ACETAMINOPHEN 7.5; 325 MG/1; MG/1
1 TABLET ORAL EVERY 4 HOURS PRN
Qty: 12 TABLET | Refills: 0 | Status: SHIPPED | OUTPATIENT
Start: 2024-01-01 | End: 2024-10-20

## 2024-01-01 RX ORDER — HYDROCODONE BITARTRATE AND ACETAMINOPHEN 5; 325 MG/1; MG/1
1 TABLET ORAL EVERY 4 HOURS PRN
Status: DISCONTINUED | OUTPATIENT
Start: 2024-01-01 | End: 2024-01-01 | Stop reason: HOSPADM

## 2024-01-01 RX ORDER — LORAZEPAM 2 MG/ML
2 INJECTION INTRAMUSCULAR
Status: DISCONTINUED | OUTPATIENT
Start: 2024-01-01 | End: 2024-01-01 | Stop reason: HOSPADM

## 2024-01-01 RX ORDER — SCOLOPAMINE TRANSDERMAL SYSTEM 1 MG/1
1 PATCH, EXTENDED RELEASE TRANSDERMAL
Status: DISCONTINUED | OUTPATIENT
Start: 2024-01-01 | End: 2024-01-01 | Stop reason: HOSPADM

## 2024-01-01 RX ORDER — IPRATROPIUM BROMIDE AND ALBUTEROL SULFATE 2.5; .5 MG/3ML; MG/3ML
3 SOLUTION RESPIRATORY (INHALATION) ONCE
Status: COMPLETED | OUTPATIENT
Start: 2024-01-01 | End: 2024-01-01

## 2024-01-01 RX ORDER — AMOXICILLIN 250 MG
2 CAPSULE ORAL 2 TIMES DAILY PRN
Status: DISCONTINUED | OUTPATIENT
Start: 2024-01-01 | End: 2024-01-01 | Stop reason: HOSPADM

## 2024-01-01 RX ORDER — ACETAMINOPHEN 160 MG/5ML
650 SOLUTION ORAL EVERY 4 HOURS PRN
Status: DISCONTINUED | OUTPATIENT
Start: 2024-01-01 | End: 2024-01-01 | Stop reason: HOSPADM

## 2024-01-01 RX ORDER — ALBUTEROL SULFATE 1.25 MG/3ML
1.25 SOLUTION RESPIRATORY (INHALATION) EVERY 6 HOURS PRN
Status: DISCONTINUED | OUTPATIENT
Start: 2024-01-01 | End: 2024-01-01

## 2024-01-01 RX ORDER — BISACODYL 10 MG
10 SUPPOSITORY, RECTAL RECTAL DAILY PRN
Status: DISCONTINUED | OUTPATIENT
Start: 2024-01-01 | End: 2024-01-01 | Stop reason: HOSPADM

## 2024-01-01 RX ORDER — DIPHENOXYLATE HCL/ATROPINE 2.5-.025MG
1 TABLET ORAL
Status: DISCONTINUED | OUTPATIENT
Start: 2024-01-01 | End: 2024-01-01 | Stop reason: HOSPADM

## 2024-01-01 RX ORDER — LORAZEPAM 2 MG/ML
2 CONCENTRATE ORAL
Status: DISCONTINUED | OUTPATIENT
Start: 2024-01-01 | End: 2024-01-01 | Stop reason: HOSPADM

## 2024-01-01 RX ORDER — METHYLPREDNISOLONE SODIUM SUCCINATE 125 MG/2ML
125 INJECTION, POWDER, LYOPHILIZED, FOR SOLUTION INTRAMUSCULAR; INTRAVENOUS ONCE
Status: COMPLETED | OUTPATIENT
Start: 2024-01-01 | End: 2024-01-01

## 2024-01-01 RX ORDER — LORAZEPAM 0.5 MG/1
0.5 TABLET ORAL
Status: DISCONTINUED | OUTPATIENT
Start: 2024-01-01 | End: 2024-01-01 | Stop reason: HOSPADM

## 2024-01-01 RX ORDER — POLYETHYLENE GLYCOL 3350 17 G/17G
17 POWDER, FOR SOLUTION ORAL DAILY PRN
Status: DISCONTINUED | OUTPATIENT
Start: 2024-01-01 | End: 2024-01-01 | Stop reason: HOSPADM

## 2024-01-01 RX ORDER — PRAVASTATIN SODIUM 20 MG
40 TABLET ORAL NIGHTLY
Status: DISCONTINUED | OUTPATIENT
Start: 2024-01-01 | End: 2024-01-01 | Stop reason: HOSPADM

## 2024-01-01 RX ORDER — BUMETANIDE 0.25 MG/ML
1 INJECTION INTRAMUSCULAR; INTRAVENOUS EVERY 12 HOURS
Status: DISCONTINUED | OUTPATIENT
Start: 2024-01-01 | End: 2024-01-01 | Stop reason: HOSPADM

## 2024-01-01 RX ORDER — IPRATROPIUM BROMIDE AND ALBUTEROL SULFATE 2.5; .5 MG/3ML; MG/3ML
3 SOLUTION RESPIRATORY (INHALATION)
Start: 2024-01-01

## 2024-01-01 RX ORDER — LORAZEPAM 2 MG/ML
1 CONCENTRATE ORAL
Status: DISCONTINUED | OUTPATIENT
Start: 2024-01-01 | End: 2024-01-01 | Stop reason: HOSPADM

## 2024-01-01 RX ORDER — LORAZEPAM 2 MG/ML
0.5 CONCENTRATE ORAL
Status: DISCONTINUED | OUTPATIENT
Start: 2024-01-01 | End: 2024-01-01 | Stop reason: HOSPADM

## 2024-01-01 RX ORDER — SCOLOPAMINE TRANSDERMAL SYSTEM 1 MG/1
1 PATCH, EXTENDED RELEASE TRANSDERMAL
Start: 2024-01-01

## 2024-01-01 RX ORDER — LORAZEPAM 1 MG/1
2 TABLET ORAL
Status: DISCONTINUED | OUTPATIENT
Start: 2024-01-01 | End: 2024-01-01 | Stop reason: HOSPADM

## 2024-01-01 RX ORDER — INSULIN LISPRO 100 [IU]/ML
2-7 INJECTION, SOLUTION INTRAVENOUS; SUBCUTANEOUS
Status: DISCONTINUED | OUTPATIENT
Start: 2024-01-01 | End: 2024-01-01 | Stop reason: HOSPADM

## 2024-01-01 RX ORDER — SODIUM CHLORIDE 0.9 % (FLUSH) 0.9 %
10 SYRINGE (ML) INJECTION AS NEEDED
Status: DISCONTINUED | OUTPATIENT
Start: 2024-01-01 | End: 2024-01-01 | Stop reason: HOSPADM

## 2024-01-01 RX ORDER — HYDROCODONE BITARTRATE AND ACETAMINOPHEN 10; 325 MG/1; MG/1
1 TABLET ORAL EVERY 4 HOURS PRN
Status: DISCONTINUED | OUTPATIENT
Start: 2024-01-01 | End: 2024-01-01 | Stop reason: HOSPADM

## 2024-01-01 RX ORDER — QUETIAPINE FUMARATE 25 MG/1
12.5 TABLET, FILM COATED ORAL NIGHTLY PRN
Status: DISCONTINUED | OUTPATIENT
Start: 2024-01-01 | End: 2024-01-01 | Stop reason: HOSPADM

## 2024-01-01 RX ORDER — ENOXAPARIN SODIUM 100 MG/ML
40 INJECTION SUBCUTANEOUS EVERY 12 HOURS SCHEDULED
Status: DISCONTINUED | OUTPATIENT
Start: 2024-01-01 | End: 2024-01-01

## 2024-01-01 RX ORDER — ACETAMINOPHEN 325 MG/1
650 TABLET ORAL EVERY 4 HOURS PRN
Status: DISCONTINUED | OUTPATIENT
Start: 2024-01-01 | End: 2024-01-01 | Stop reason: HOSPADM

## 2024-01-01 RX ORDER — BISACODYL 5 MG/1
5 TABLET, DELAYED RELEASE ORAL DAILY PRN
Status: DISCONTINUED | OUTPATIENT
Start: 2024-01-01 | End: 2024-01-01 | Stop reason: HOSPADM

## 2024-01-01 RX ORDER — ATROPINE SULFATE 10 MG/ML
2 SOLUTION/ DROPS OPHTHALMIC 2 TIMES DAILY PRN
Status: DISCONTINUED | OUTPATIENT
Start: 2024-01-01 | End: 2024-01-01 | Stop reason: HOSPADM

## 2024-01-01 RX ORDER — MORPHINE SULFATE 20 MG/ML
10 SOLUTION ORAL
Status: DISCONTINUED | OUTPATIENT
Start: 2024-01-01 | End: 2024-01-01 | Stop reason: HOSPADM

## 2024-01-01 RX ORDER — ALBUTEROL SULFATE 0.83 MG/ML
2.5 SOLUTION RESPIRATORY (INHALATION)
Status: COMPLETED | OUTPATIENT
Start: 2024-01-01 | End: 2024-01-01

## 2024-01-01 RX ORDER — HYDROCODONE BITARTRATE AND ACETAMINOPHEN 7.5; 325 MG/1; MG/1
1 TABLET ORAL EVERY 4 HOURS PRN
Status: DISCONTINUED | OUTPATIENT
Start: 2024-01-01 | End: 2024-01-01 | Stop reason: HOSPADM

## 2024-01-01 RX ORDER — CALCIUM CARBONATE 500 MG/1
2 TABLET, CHEWABLE ORAL 3 TIMES DAILY PRN
Start: 2024-01-01

## 2024-01-01 RX ORDER — LORAZEPAM 1 MG/1
1 TABLET ORAL
Status: DISCONTINUED | OUTPATIENT
Start: 2024-01-01 | End: 2024-01-01 | Stop reason: HOSPADM

## 2024-01-01 RX ORDER — NITROGLYCERIN 0.4 MG/1
0.4 TABLET SUBLINGUAL
Status: DISCONTINUED | OUTPATIENT
Start: 2024-01-01 | End: 2024-01-01 | Stop reason: HOSPADM

## 2024-01-01 RX ORDER — BISACODYL 10 MG
10 SUPPOSITORY, RECTAL RECTAL DAILY PRN
Start: 2024-01-01

## 2024-01-01 RX ORDER — ACETAMINOPHEN 325 MG/1
650 TABLET ORAL EVERY 6 HOURS PRN
Status: DISCONTINUED | OUTPATIENT
Start: 2024-01-01 | End: 2024-01-01 | Stop reason: HOSPADM

## 2024-01-01 RX ORDER — ASPIRIN 81 MG/1
81 TABLET ORAL DAILY
Status: DISCONTINUED | OUTPATIENT
Start: 2024-01-01 | End: 2024-01-01 | Stop reason: HOSPADM

## 2024-01-01 RX ORDER — DEXTROSE MONOHYDRATE 25 G/50ML
25 INJECTION, SOLUTION INTRAVENOUS
Status: DISCONTINUED | OUTPATIENT
Start: 2024-01-01 | End: 2024-01-01 | Stop reason: HOSPADM

## 2024-01-01 RX ORDER — IBUPROFEN 600 MG/1
1 TABLET ORAL
Status: DISCONTINUED | OUTPATIENT
Start: 2024-01-01 | End: 2024-01-01 | Stop reason: HOSPADM

## 2024-01-01 RX ORDER — FUROSEMIDE 10 MG/ML
20 INJECTION INTRAMUSCULAR; INTRAVENOUS ONCE
Status: COMPLETED | OUTPATIENT
Start: 2024-01-01 | End: 2024-01-01

## 2024-01-01 RX ORDER — METOPROLOL SUCCINATE 25 MG/1
25 TABLET, EXTENDED RELEASE ORAL
Status: DISCONTINUED | OUTPATIENT
Start: 2024-01-01 | End: 2024-01-01 | Stop reason: HOSPADM

## 2024-01-01 RX ORDER — QUETIAPINE FUMARATE 25 MG/1
25 TABLET, FILM COATED ORAL NIGHTLY
Status: DISCONTINUED | OUTPATIENT
Start: 2024-01-01 | End: 2024-01-01 | Stop reason: HOSPADM

## 2024-01-01 RX ORDER — GABAPENTIN 300 MG/1
600 CAPSULE ORAL EVERY 12 HOURS SCHEDULED
Status: DISCONTINUED | OUTPATIENT
Start: 2024-01-01 | End: 2024-01-01 | Stop reason: HOSPADM

## 2024-01-01 RX ORDER — DULOXETIN HYDROCHLORIDE 30 MG/1
30 CAPSULE, DELAYED RELEASE ORAL DAILY
Status: DISCONTINUED | OUTPATIENT
Start: 2024-01-01 | End: 2024-01-01 | Stop reason: HOSPADM

## 2024-01-01 RX ORDER — IPRATROPIUM BROMIDE AND ALBUTEROL SULFATE 2.5; .5 MG/3ML; MG/3ML
3 SOLUTION RESPIRATORY (INHALATION) EVERY 4 HOURS PRN
Start: 2024-01-01

## 2024-01-01 RX ORDER — NICOTINE POLACRILEX 4 MG
15 LOZENGE BUCCAL
Status: DISCONTINUED | OUTPATIENT
Start: 2024-01-01 | End: 2024-01-01 | Stop reason: HOSPADM

## 2024-01-01 RX ORDER — ASPIRIN 81 MG/1
324 TABLET, CHEWABLE ORAL ONCE
Status: DISCONTINUED | OUTPATIENT
Start: 2024-01-01 | End: 2024-01-01 | Stop reason: HOSPADM

## 2024-01-01 RX ORDER — ATROPINE SULFATE 10 MG/ML
2 SOLUTION/ DROPS OPHTHALMIC 2 TIMES DAILY PRN
Start: 2024-01-01

## 2024-01-01 RX ADMIN — PRAVASTATIN SODIUM 40 MG: 20 TABLET ORAL at 19:39

## 2024-01-01 RX ADMIN — QUETIAPINE FUMARATE 25 MG: 25 TABLET, FILM COATED ORAL at 19:40

## 2024-01-01 RX ADMIN — INSULIN GLARGINE 5 UNITS: 100 INJECTION, SOLUTION SUBCUTANEOUS at 09:03

## 2024-01-01 RX ADMIN — METOPROLOL SUCCINATE 25 MG: 25 TABLET, EXTENDED RELEASE ORAL at 17:08

## 2024-01-01 RX ADMIN — IPRATROPIUM BROMIDE AND ALBUTEROL SULFATE 3 ML: .5; 3 SOLUTION RESPIRATORY (INHALATION) at 19:52

## 2024-01-01 RX ADMIN — Medication 10 ML: at 07:31

## 2024-01-01 RX ADMIN — ACETAMINOPHEN 650 MG: 325 TABLET ORAL at 08:51

## 2024-01-01 RX ADMIN — DULOXETINE HYDROCHLORIDE 30 MG: 30 CAPSULE, DELAYED RELEASE ORAL at 09:42

## 2024-01-01 RX ADMIN — ACETAMINOPHEN 650 MG: 325 TABLET ORAL at 06:09

## 2024-01-01 RX ADMIN — METOPROLOL SUCCINATE 25 MG: 25 TABLET, EXTENDED RELEASE ORAL at 08:51

## 2024-01-01 RX ADMIN — INSULIN LISPRO 3 UNITS: 100 INJECTION, SOLUTION INTRAVENOUS; SUBCUTANEOUS at 17:40

## 2024-01-01 RX ADMIN — METOPROLOL SUCCINATE 25 MG: 25 TABLET, EXTENDED RELEASE ORAL at 09:03

## 2024-01-01 RX ADMIN — INSULIN GLARGINE 14 UNITS: 100 INJECTION, SOLUTION SUBCUTANEOUS at 21:06

## 2024-01-01 RX ADMIN — INSULIN GLARGINE 14 UNITS: 100 INJECTION, SOLUTION SUBCUTANEOUS at 09:42

## 2024-01-01 RX ADMIN — GABAPENTIN 600 MG: 300 CAPSULE ORAL at 19:39

## 2024-01-01 RX ADMIN — GABAPENTIN 600 MG: 300 CAPSULE ORAL at 09:03

## 2024-01-01 RX ADMIN — BUMETANIDE 1 MG: 0.25 INJECTION INTRAMUSCULAR; INTRAVENOUS at 12:25

## 2024-01-01 RX ADMIN — SODIUM ZIRCONIUM CYCLOSILICATE 10 G: 10 POWDER, FOR SUSPENSION ORAL at 17:40

## 2024-01-01 RX ADMIN — GABAPENTIN 600 MG: 300 CAPSULE ORAL at 09:42

## 2024-01-01 RX ADMIN — ZINC OXIDE 1 APPLICATION: 200 OINTMENT TOPICAL at 12:31

## 2024-01-01 RX ADMIN — QUETIAPINE FUMARATE 25 MG: 25 TABLET, FILM COATED ORAL at 20:35

## 2024-01-01 RX ADMIN — INSULIN LISPRO 2 UNITS: 100 INJECTION, SOLUTION INTRAVENOUS; SUBCUTANEOUS at 20:34

## 2024-01-01 RX ADMIN — ZINC OXIDE 1 APPLICATION: 200 OINTMENT TOPICAL at 21:39

## 2024-01-01 RX ADMIN — DULOXETINE HYDROCHLORIDE 30 MG: 30 CAPSULE, DELAYED RELEASE ORAL at 09:30

## 2024-01-01 RX ADMIN — ALBUTEROL SULFATE 1.25 MG: 1.25 SOLUTION RESPIRATORY (INHALATION) at 06:55

## 2024-01-01 RX ADMIN — BUMETANIDE 1 MG: 0.25 INJECTION INTRAMUSCULAR; INTRAVENOUS at 23:54

## 2024-01-01 RX ADMIN — ENOXAPARIN SODIUM 40 MG: 100 INJECTION SUBCUTANEOUS at 09:29

## 2024-01-01 RX ADMIN — ENOXAPARIN SODIUM 40 MG: 100 INJECTION SUBCUTANEOUS at 09:42

## 2024-01-01 RX ADMIN — ASPIRIN 81 MG: 81 TABLET, COATED ORAL at 09:29

## 2024-01-01 RX ADMIN — INSULIN LISPRO 6 UNITS: 100 INJECTION, SOLUTION INTRAVENOUS; SUBCUTANEOUS at 17:12

## 2024-01-01 RX ADMIN — INSULIN LISPRO 5 UNITS: 100 INJECTION, SOLUTION INTRAVENOUS; SUBCUTANEOUS at 09:30

## 2024-01-01 RX ADMIN — MORPHINE SULFATE 4 MG: 4 INJECTION, SOLUTION INTRAMUSCULAR; INTRAVENOUS at 00:27

## 2024-01-01 RX ADMIN — LORAZEPAM 1 MG: 2 INJECTION INTRAMUSCULAR; INTRAVENOUS at 05:27

## 2024-01-01 RX ADMIN — ALBUTEROL SULFATE 2.5 MG: 2.5 SOLUTION RESPIRATORY (INHALATION) at 05:37

## 2024-01-01 RX ADMIN — METOPROLOL SUCCINATE 25 MG: 25 TABLET, EXTENDED RELEASE ORAL at 09:30

## 2024-01-01 RX ADMIN — LORAZEPAM 0.5 MG: 2 INJECTION INTRAMUSCULAR; INTRAVENOUS at 16:49

## 2024-01-01 RX ADMIN — IPRATROPIUM BROMIDE AND ALBUTEROL SULFATE 3 ML: .5; 3 SOLUTION RESPIRATORY (INHALATION) at 09:55

## 2024-01-01 RX ADMIN — BUMETANIDE 1 MG: 0.25 INJECTION INTRAMUSCULAR; INTRAVENOUS at 15:55

## 2024-01-01 RX ADMIN — MORPHINE SULFATE 4 MG: 4 INJECTION, SOLUTION INTRAMUSCULAR; INTRAVENOUS at 20:32

## 2024-01-01 RX ADMIN — PRAVASTATIN SODIUM 40 MG: 20 TABLET ORAL at 21:06

## 2024-01-01 RX ADMIN — DULOXETINE HYDROCHLORIDE 30 MG: 30 CAPSULE, DELAYED RELEASE ORAL at 09:03

## 2024-01-01 RX ADMIN — METHYLPREDNISOLONE SODIUM SUCCINATE 125 MG: 125 INJECTION, POWDER, FOR SOLUTION INTRAMUSCULAR; INTRAVENOUS at 10:51

## 2024-01-01 RX ADMIN — ALBUTEROL SULFATE 2.5 MG: 2.5 SOLUTION RESPIRATORY (INHALATION) at 05:38

## 2024-01-01 RX ADMIN — ENOXAPARIN SODIUM 40 MG: 100 INJECTION SUBCUTANEOUS at 15:56

## 2024-01-01 RX ADMIN — SODIUM ZIRCONIUM CYCLOSILICATE 10 G: 10 POWDER, FOR SUSPENSION ORAL at 17:08

## 2024-01-01 RX ADMIN — IPRATROPIUM BROMIDE AND ALBUTEROL SULFATE 3 ML: 2.5; .5 SOLUTION RESPIRATORY (INHALATION) at 08:06

## 2024-01-01 RX ADMIN — SODIUM CHLORIDE, POTASSIUM CHLORIDE, SODIUM LACTATE AND CALCIUM CHLORIDE 500 ML: 600; 310; 30; 20 INJECTION, SOLUTION INTRAVENOUS at 05:49

## 2024-01-01 RX ADMIN — ENOXAPARIN SODIUM 40 MG: 100 INJECTION SUBCUTANEOUS at 21:06

## 2024-01-01 RX ADMIN — IPRATROPIUM BROMIDE AND ALBUTEROL SULFATE 3 ML: .5; 3 SOLUTION RESPIRATORY (INHALATION) at 10:39

## 2024-01-01 RX ADMIN — ENOXAPARIN SODIUM 40 MG: 100 INJECTION SUBCUTANEOUS at 08:52

## 2024-01-01 RX ADMIN — DULOXETINE HYDROCHLORIDE 30 MG: 30 CAPSULE, DELAYED RELEASE ORAL at 17:08

## 2024-01-01 RX ADMIN — ENOXAPARIN SODIUM 40 MG: 100 INJECTION SUBCUTANEOUS at 21:38

## 2024-01-01 RX ADMIN — MORPHINE SULFATE 4 MG: 4 INJECTION, SOLUTION INTRAMUSCULAR; INTRAVENOUS at 16:17

## 2024-01-01 RX ADMIN — ZINC OXIDE 1 APPLICATION: 200 OINTMENT TOPICAL at 17:41

## 2024-01-01 RX ADMIN — MORPHINE SULFATE 4 MG: 4 INJECTION, SOLUTION INTRAMUSCULAR; INTRAVENOUS at 15:04

## 2024-01-01 RX ADMIN — INSULIN GLARGINE 14 UNITS: 100 INJECTION, SOLUTION SUBCUTANEOUS at 09:30

## 2024-01-01 RX ADMIN — INSULIN LISPRO 4 UNITS: 100 INJECTION, SOLUTION INTRAVENOUS; SUBCUTANEOUS at 12:31

## 2024-01-01 RX ADMIN — INSULIN LISPRO 2 UNITS: 100 INJECTION, SOLUTION INTRAVENOUS; SUBCUTANEOUS at 21:38

## 2024-01-01 RX ADMIN — SODIUM ZIRCONIUM CYCLOSILICATE 10 G: 10 POWDER, FOR SUSPENSION ORAL at 15:47

## 2024-01-01 RX ADMIN — ACETAMINOPHEN 650 MG: 325 TABLET ORAL at 11:50

## 2024-01-01 RX ADMIN — MORPHINE SULFATE 4 MG: 4 INJECTION, SOLUTION INTRAMUSCULAR; INTRAVENOUS at 19:03

## 2024-01-01 RX ADMIN — ZINC OXIDE 1 APPLICATION: 200 OINTMENT TOPICAL at 20:34

## 2024-01-01 RX ADMIN — ASPIRIN 81 MG: 81 TABLET, COATED ORAL at 09:03

## 2024-01-01 RX ADMIN — GABAPENTIN 600 MG: 300 CAPSULE ORAL at 21:37

## 2024-01-01 RX ADMIN — SODIUM ZIRCONIUM CYCLOSILICATE 10 G: 10 POWDER, FOR SUSPENSION ORAL at 14:04

## 2024-01-01 RX ADMIN — INSULIN GLARGINE 5 UNITS: 100 INJECTION, SOLUTION SUBCUTANEOUS at 20:35

## 2024-01-01 RX ADMIN — IPRATROPIUM BROMIDE AND ALBUTEROL SULFATE 3 ML: .5; 3 SOLUTION RESPIRATORY (INHALATION) at 14:12

## 2024-01-01 RX ADMIN — ENOXAPARIN SODIUM 40 MG: 100 INJECTION SUBCUTANEOUS at 19:40

## 2024-01-01 RX ADMIN — INSULIN LISPRO 2 UNITS: 100 INJECTION, SOLUTION INTRAVENOUS; SUBCUTANEOUS at 08:51

## 2024-01-01 RX ADMIN — ASPIRIN 81 MG: 81 TABLET, COATED ORAL at 09:42

## 2024-01-01 RX ADMIN — ZINC OXIDE 1 APPLICATION: 200 OINTMENT TOPICAL at 11:50

## 2024-01-01 RX ADMIN — PRAVASTATIN SODIUM 40 MG: 20 TABLET ORAL at 21:39

## 2024-01-01 RX ADMIN — IPRATROPIUM BROMIDE AND ALBUTEROL SULFATE 3 ML: .5; 3 SOLUTION RESPIRATORY (INHALATION) at 05:53

## 2024-01-01 RX ADMIN — ASPIRIN 81 MG: 81 TABLET, COATED ORAL at 17:08

## 2024-01-01 RX ADMIN — QUETIAPINE FUMARATE 25 MG: 25 TABLET, FILM COATED ORAL at 21:07

## 2024-01-01 RX ADMIN — PRAVASTATIN SODIUM 40 MG: 20 TABLET ORAL at 20:35

## 2024-01-01 RX ADMIN — INSULIN GLARGINE 14 UNITS: 100 INJECTION, SOLUTION SUBCUTANEOUS at 19:44

## 2024-01-01 RX ADMIN — MORPHINE SULFATE 4 MG: 4 INJECTION, SOLUTION INTRAMUSCULAR; INTRAVENOUS at 05:28

## 2024-01-01 RX ADMIN — INSULIN LISPRO 6 UNITS: 100 INJECTION, SOLUTION INTRAVENOUS; SUBCUTANEOUS at 19:45

## 2024-01-01 RX ADMIN — INSULIN LISPRO 2 UNITS: 100 INJECTION, SOLUTION INTRAVENOUS; SUBCUTANEOUS at 21:06

## 2024-01-01 RX ADMIN — INSULIN LISPRO 2 UNITS: 100 INJECTION, SOLUTION INTRAVENOUS; SUBCUTANEOUS at 11:50

## 2024-01-01 RX ADMIN — METOPROLOL SUCCINATE 25 MG: 25 TABLET, EXTENDED RELEASE ORAL at 09:42

## 2024-01-01 RX ADMIN — NITROGLYCERIN 0.5 INCH: 20 OINTMENT TOPICAL at 06:13

## 2024-01-01 RX ADMIN — QUETIAPINE FUMARATE 25 MG: 25 TABLET, FILM COATED ORAL at 21:37

## 2024-01-01 RX ADMIN — DULOXETINE HYDROCHLORIDE 30 MG: 30 CAPSULE, DELAYED RELEASE ORAL at 08:51

## 2024-01-01 RX ADMIN — GABAPENTIN 600 MG: 300 CAPSULE ORAL at 09:29

## 2024-01-01 RX ADMIN — INSULIN LISPRO 2 UNITS: 100 INJECTION, SOLUTION INTRAVENOUS; SUBCUTANEOUS at 12:25

## 2024-01-01 RX ADMIN — GABAPENTIN 600 MG: 300 CAPSULE ORAL at 20:35

## 2024-01-01 RX ADMIN — ZINC OXIDE 1 APPLICATION: 200 OINTMENT TOPICAL at 09:04

## 2024-01-01 RX ADMIN — GABAPENTIN 600 MG: 300 CAPSULE ORAL at 21:06

## 2024-01-01 RX ADMIN — MORPHINE SULFATE 4 MG: 4 INJECTION, SOLUTION INTRAMUSCULAR; INTRAVENOUS at 16:49

## 2024-01-01 RX ADMIN — FUROSEMIDE 40 MG: 10 INJECTION, SOLUTION INTRAVENOUS at 15:34

## 2024-01-01 RX ADMIN — INSULIN GLARGINE 14 UNITS: 100 INJECTION, SOLUTION SUBCUTANEOUS at 21:38

## 2024-01-01 RX ADMIN — GABAPENTIN 600 MG: 300 CAPSULE ORAL at 08:51

## 2024-01-01 RX ADMIN — INSULIN GLARGINE 14 UNITS: 100 INJECTION, SOLUTION SUBCUTANEOUS at 08:52

## 2024-01-01 RX ADMIN — ASPIRIN 81 MG: 81 TABLET, COATED ORAL at 08:51

## 2024-01-01 RX ADMIN — LORAZEPAM 1 MG: 2 INJECTION INTRAMUSCULAR; INTRAVENOUS at 15:41

## 2024-01-01 RX ADMIN — FUROSEMIDE 20 MG: 10 INJECTION, SOLUTION INTRAVENOUS at 06:13

## 2024-01-01 RX ADMIN — ZINC OXIDE 1 APPLICATION: 200 OINTMENT TOPICAL at 09:31

## 2024-01-02 ENCOUNTER — TELEPHONE (OUTPATIENT)
Dept: HEMATOLOGY | Age: 64
End: 2024-01-02

## 2024-01-02 NOTE — TELEPHONE ENCOUNTER
Called patient to remind them of their appointment on 1/03/2024 but was unable to leave vm due to mailbox being full or not set up or memory is full.

## 2024-01-03 ENCOUNTER — CLINICAL DOCUMENTATION (OUTPATIENT)
Dept: HEMATOLOGY | Age: 64
End: 2024-01-03

## 2024-01-03 NOTE — PROGRESS NOTES
Phoned Mr Leonard after missed NEW PATIENT appointment today (referred by Dr Miladis Arriola for resected colon adenocarcinoma).  Mr Leonard reports that he called \"someone\" to \"cancel his appointments until February when ne insurance kicks in\".     Attempted to make tentative appointment at his convenience, however, he declined and insisted that he will call clinic when he is ready to make appointment.    Phoned Dr Arriola office and spoke with Julia with status update of referred patient.

## 2024-01-08 ENCOUNTER — OFFICE VISIT (OUTPATIENT)
Dept: SURGERY | Facility: CLINIC | Age: 64
End: 2024-01-08
Payer: MEDICARE

## 2024-01-08 VITALS
HEIGHT: 69 IN | WEIGHT: 235 LBS | OXYGEN SATURATION: 95 % | HEART RATE: 94 BPM | BODY MASS INDEX: 34.8 KG/M2 | SYSTOLIC BLOOD PRESSURE: 187 MMHG | DIASTOLIC BLOOD PRESSURE: 78 MMHG

## 2024-01-08 DIAGNOSIS — Z80.0 FAMILY HISTORY OF COLON CANCER: ICD-10-CM

## 2024-01-08 DIAGNOSIS — E66.09 CLASS 2 OBESITY DUE TO EXCESS CALORIES WITH BODY MASS INDEX (BMI) OF 35.0 TO 35.9 IN ADULT, UNSPECIFIED WHETHER SERIOUS COMORBIDITY PRESENT: ICD-10-CM

## 2024-01-08 DIAGNOSIS — F17.210 NICOTINE DEPENDENCE, CIGARETTES, UNCOMPLICATED: ICD-10-CM

## 2024-01-08 DIAGNOSIS — C18.9 COLON ADENOCARCINOMA: Primary | ICD-10-CM

## 2024-01-08 DIAGNOSIS — Z90.49 S/P PARTIAL RESECTION OF COLON: ICD-10-CM

## 2024-01-08 PROCEDURE — 3078F DIAST BP <80 MM HG: CPT | Performed by: STUDENT IN AN ORGANIZED HEALTH CARE EDUCATION/TRAINING PROGRAM

## 2024-01-08 PROCEDURE — 1160F RVW MEDS BY RX/DR IN RCRD: CPT | Performed by: STUDENT IN AN ORGANIZED HEALTH CARE EDUCATION/TRAINING PROGRAM

## 2024-01-08 PROCEDURE — 1159F MED LIST DOCD IN RCRD: CPT | Performed by: STUDENT IN AN ORGANIZED HEALTH CARE EDUCATION/TRAINING PROGRAM

## 2024-01-08 PROCEDURE — 99024 POSTOP FOLLOW-UP VISIT: CPT | Performed by: STUDENT IN AN ORGANIZED HEALTH CARE EDUCATION/TRAINING PROGRAM

## 2024-01-08 PROCEDURE — 3077F SYST BP >= 140 MM HG: CPT | Performed by: STUDENT IN AN ORGANIZED HEALTH CARE EDUCATION/TRAINING PROGRAM

## 2024-01-08 NOTE — PROGRESS NOTES
"Patient: Tucker Knox    YOB: 1960    Date: 01/08/2024    Primary Care Provider: Kt Alfredo MD    Vital Signs:   Vitals:    01/08/24 1124   BP: (!) 187/78   BP Location: Left arm   Patient Position: Sitting   Cuff Size: Adult   Pulse: 94   SpO2: 95%   Weight: 107 kg (235 lb)   Height: 175.3 cm (69\")       Overall doing well s/p LAR. No fevers. Tolerating diet. Energy improving. Pain improving. Bowels moving ok. Wounds healing well. No significant abdominal tenderness on exam. Incisions well healed.     Results Review:   Pathology and operative findings reviewed with patient.    Tissue Pathology Exam (12/05/2023 12:51)   1.  \"Mesenteric implant\" (frozen section control):  A.  Nodular focus of fat necrosis.  B.  No histologic evidence of malignancy.     2.  Large intestine, rectosigmoid colon, low anterior resection:  A.  Moderately differentiated adenocarcinoma with mucinous features (7.5 cm).  B.  The tumor invades through the muscularis propria into the subjacent fibroadipose tissue.  C.  The proximal, distal and radial mesorectal margins are viable and are negative for malignancy.  D.  Hyperplastic polyp (0.5 cm).  E.  One of 26 pericolic lymph nodes is positive for metastatic carcinoma.  F.  One of 26 pericolic lymph nodes is positive for isolated tumor cells.  G.  No extranodal extension of tumor identified.     3.  \"Additional proximal margin, sigmoid colon\":  A.  Segments (2) of viable large intestinal tissue.  B.  Diverticulosis.  C.  Focus of encapsulated fat necrosis.  D.  One of 1 pericolic lymph node is negative for metastatic carcinoma.  D.  No histologic evidence of malignancy.  E.  The proximal and distal surgical margins are negative for malignancy.     4.  \"Anastomotic donuts\":  A.  Viable large intestinal tissue.  B.  No histologic evidence of malignancy.     AJCC stage: pT3 pN1a    Assessment / Plan:    Diagnoses and all orders for this visit:    1. Colon " adenocarcinoma (Primary)    2. S/P partial resection of colon    3. Family history of colon cancer    4. Class 2 obesity due to excess calories with body mass index (BMI) of 35.0 to 35.9 in adult, unspecified whether serious comorbidity present    5. Nicotine dependence, cigarettes, uncomplicated      Pathology again reviewed. He did have one lymph node positive, pT3N1a. He needs an oncology referral. Unfortunately, none of the oncologists in an hour radius take his insurance. On February 1st his insurance changes to [place]. We will get him in with oncology then. Follow up with Millie Dey PA-C in 2 months.     Electronically signed by Oma Velasquez MD  01/08/24  11:38 CST

## 2024-01-08 NOTE — PATIENT INSTRUCTIONS
"BMI for Adults  What is BMI?  Body mass index (BMI) is a number that is calculated from a person's weight and height. BMI can help estimate how much of a person's weight is composed of fat. BMI does not measure body fat directly. Rather, it is an alternative to procedures that directly measure body fat, which can be difficult and expensive.  BMI can help identify people who may be at higher risk for certain medical problems.  What are BMI measurements used for?  BMI is used as a screening tool to identify possible weight problems. It helps determine whether a person is obese, overweight, a healthy weight, or underweight.  BMI is useful for:  Identifying a weight problem that may be related to a medical condition or may increase the risk for medical problems.  Promoting changes, such as changes in diet and exercise, to help reach a healthy weight. BMI screening can be repeated to see if these changes are working.  How is BMI calculated?  BMI involves measuring your weight in relation to your height. Both height and weight are measured, and the BMI is calculated from those numbers. This can be done either in English (U.S.) or metric measurements. Note that charts and online BMI calculators are available to help you find your BMI quickly and easily without having to do these calculations yourself.  To calculate your BMI in English (U.S.) measurements:    Measure your weight in pounds (lb).  Multiply the number of pounds by 703.  For example, for a person who weighs 180 lb, multiply that number by 703, which equals 126,540.  Measure your height in inches. Then multiply that number by itself to get a measurement called \"inches squared.\"  For example, for a person who is 70 inches tall, the \"inches squared\" measurement is 70 inches x 70 inches, which equals 4,900 inches squared.  Divide the total from step 2 (number of lb x 703) by the total from step 3 (inches squared): 126,540 ÷ 4,900 = 25.8. This is your BMI.    To " "calculate your BMI in metric measurements:  Measure your weight in kilograms (kg).  Measure your height in meters (m). Then multiply that number by itself to get a measurement called \"meters squared.\"  For example, for a person who is 1.75 m tall, the \"meters squared\" measurement is 1.75 m x 1.75 m, which is equal to 3.1 meters squared.  Divide the number of kilograms (your weight) by the meters squared number. In this example: 70 ÷ 3.1 = 22.6. This is your BMI.  What do the results mean?  BMI charts are used to identify whether you are underweight, normal weight, overweight, or obese. The following guidelines will be used:  Underweight: BMI less than 18.5.  Normal weight: BMI between 18.5 and 24.9.  Overweight: BMI between 25 and 29.9.  Obese: BMI of 30 or above.  Keep these notes in mind:  Weight includes both fat and muscle, so someone with a muscular build, such as an athlete, may have a BMI that is higher than 24.9. In cases like these, BMI is not an accurate measure of body fat.  To determine if excess body fat is the cause of a BMI of 25 or higher, further assessments may need to be done by a health care provider.  BMI is usually interpreted in the same way for men and women.  Where to find more information  For more information about BMI, including tools to quickly calculate your BMI, go to these websites:  Centers for Disease Control and Prevention: www.cdc.gov  American Heart Association: www.heart.org  National Heart, Lung, and Blood [place]: www.nhlbi.nih.gov  Summary  Body mass index (BMI) is a number that is calculated from a person's weight and height.  BMI may help estimate how much of a person's weight is composed of fat. BMI can help identify those who may be at higher risk for certain medical problems.  BMI can be measured using English measurements or metric measurements.  BMI charts are used to identify whether you are underweight, normal weight, overweight, or obese.  This information is not " intended to replace advice given to you by your health care provider. Make sure you discuss any questions you have with your health care provider.  Document Revised: 09/09/2020 Document Reviewed: 07/17/2020  [employer] Patient Education © 2021 [employer] Smoking Tobacco Information, Adult  Smoking tobacco can be harmful to your health. Tobacco contains a poisonous (toxic), colorless chemical called nicotine. Nicotine is addictive. It changes the brain and can make it hard to stop smoking. Tobacco also has other toxic chemicals that can hurt your body and raise your risk of many cancers.  How can smoking tobacco affect me?  Smoking tobacco puts you at risk for:  Cancer. Smoking is most commonly associated with lung cancer, but can also lead to cancer in other parts of the body.  Chronic obstructive pulmonary disease (COPD). This is a long-term lung condition that makes it hard to breathe. It also gets worse over time.  High blood pressure (hypertension), heart disease, stroke, or heart attack.  Lung infections, such as pneumonia.  Cataracts. This is when the lenses in the eyes become clouded.  Digestive problems. This may include peptic ulcers, heartburn, and gastroesophageal reflux disease (GERD).  Oral health problems, such as gum disease and tooth loss.  Loss of taste and smell.  Smoking can affect your appearance by causing:  Wrinkles.  Yellow or stained teeth, fingers, and fingernails.  Smoking tobacco can also affect your social life, because:  It may be challenging to find places to smoke when away from home. Many workplaces, restaurants, hotels, and public places are tobacco-free.  Smoking is expensive. This is due to the cost of tobacco and the long-term costs of treating health problems from smoking.  Secondhand smoke may affect those around you. Secondhand smoke can cause lung cancer, breathing problems, and heart disease. Children of smokers have a higher risk for:  Sudden infant death syndrome  (SIDS).  Ear infections.  Lung infections.  If you currently smoke tobacco, quitting now can help you:  Lead a longer and healthier life.  Look, smell, breathe, and feel better over time.  Save money.  Protect others from the harms of secondhand smoke.  What actions can I take to prevent health problems?  Quit smoking    Do not start smoking. Quit if you already do.  Make a plan to quit smoking and commit to it. Look for programs to help you and ask your health care provider for recommendations and ideas.  Set a date and write down all the reasons you want to quit.  Let your friends and family know you are quitting so they can help and support you. Consider finding friends who also want to quit. It can be easier to quit with someone else, so that you can support each other.  Talk with your health care provider about using nicotine replacement medicines to help you quit, such as gum, lozenges, patches, sprays, or pills.  Do not replace cigarette smoking with electronic cigarettes, which are commonly called e-cigarettes. The safety of e-cigarettes is not known, and some may contain harmful chemicals.  If you try to quit but return to smoking, stay positive. It is common to slip up when you first quit, so take it one day at a time.  Be prepared for cravings. When you feel the urge to smoke, chew gum or suck on hard candy.    Lifestyle  Stay busy and take care of your body.  Drink enough fluid to keep your urine pale yellow.  Get plenty of exercise and eat a healthy diet. This can help prevent weight gain after quitting.  Monitor your eating habits. Quitting smoking can cause you to have a larger appetite than when you smoke.  Find ways to relax. Go out with friends or family to a movie or a restaurant where people do not smoke.  Ask your health care provider about having regular tests (screenings) to check for cancer. This may include blood tests, imaging tests, and other tests.  Find ways to manage your stress, such  as meditation, yoga, or exercise.  Where to find support  To get support to quit smoking, consider:  Asking your health care provider for more information and resources.  Taking classes to learn more about quitting smoking.  Looking for local organizations that offer resources about quitting smoking.  Joining a support group for people who want to quit smoking in your local community.  Calling the smokefree.gov counselor helpline: 1-800-Quit-Now (1-182.418.4482)  Where to find more information  You may find more information about quitting smoking from:  HelpGuide.org: www.helpguide.org  Smokefree.gov: smokefree.gov  American Lung Association: www.lung.org  Contact a health care provider if you:  Have problems breathing.  Notice that your lips, nose, or fingers turn blue.  Have chest pain.  Are coughing up blood.  Feel faint or you pass out.  Have other health changes that cause you to worry.  Summary  Smoking tobacco can negatively affect your health, the health of those around you, your finances, and your social life.  Do not start smoking. Quit if you already do. If you need help quitting, ask your health care provider.  Think about joining a support group for people who want to quit smoking in your local community. There are many effective programs that will help you to quit this behavior.  This information is not intended to replace advice given to you by your health care provider. Make sure you discuss any questions you have with your health care provider.  Document Revised: 09/11/2020 Document Reviewed: 01/02/2018  [employer] Patient Education © 2021 [employer]

## 2024-01-15 ENCOUNTER — TELEPHONE (OUTPATIENT)
Dept: SURGERY | Facility: CLINIC | Age: 64
End: 2024-01-15
Payer: MEDICARE

## 2024-01-15 NOTE — TELEPHONE ENCOUNTER
I called and spoke with the patient regarding his insurance issues. He informed me that he is switching to [facility] and that will be taking effect on February 1st, 2024. I let him know that we will place a referral for him to be seen by our oncology team after the 1st of February. He voiced understanding of this.

## 2024-02-12 ENCOUNTER — PATIENT OUTREACH (OUTPATIENT)
Dept: CASE MANAGEMENT | Facility: OTHER | Age: 64
End: 2024-02-12
Payer: MEDICARE

## 2024-02-13 ENCOUNTER — PATIENT OUTREACH (OUTPATIENT)
Dept: CASE MANAGEMENT | Facility: OTHER | Age: 64
End: 2024-02-13
Payer: MEDICARE

## 2024-02-14 ENCOUNTER — LAB (OUTPATIENT)
Dept: LAB | Facility: HOSPITAL | Age: 64
End: 2024-02-14
Payer: MEDICARE

## 2024-02-14 ENCOUNTER — CONSULT (OUTPATIENT)
Dept: ONCOLOGY | Facility: CLINIC | Age: 64
End: 2024-02-14
Payer: MEDICARE

## 2024-02-14 VITALS
RESPIRATION RATE: 18 BRPM | BODY MASS INDEX: 36.01 KG/M2 | TEMPERATURE: 96.5 F | SYSTOLIC BLOOD PRESSURE: 142 MMHG | DIASTOLIC BLOOD PRESSURE: 88 MMHG | HEART RATE: 104 BPM | WEIGHT: 243.1 LBS | HEIGHT: 69 IN

## 2024-02-14 DIAGNOSIS — C18.9 COLON ADENOCARCINOMA: Primary | ICD-10-CM

## 2024-02-14 DIAGNOSIS — C18.9 COLON ADENOCARCINOMA: ICD-10-CM

## 2024-02-14 LAB
ALBUMIN SERPL-MCNC: 4 G/DL (ref 3.5–5.2)
ALBUMIN/GLOB SERPL: 1.2 G/DL
ALP SERPL-CCNC: 99 U/L (ref 39–117)
ALT SERPL W P-5'-P-CCNC: 7 U/L (ref 1–41)
ANION GAP SERPL CALCULATED.3IONS-SCNC: 9 MMOL/L (ref 5–15)
AST SERPL-CCNC: 9 U/L (ref 1–40)
BASOPHILS # BLD AUTO: 0.06 10*3/MM3 (ref 0–0.2)
BASOPHILS NFR BLD AUTO: 0.7 % (ref 0–1.5)
BILIRUB SERPL-MCNC: 0.4 MG/DL (ref 0–1.2)
BUN SERPL-MCNC: 21 MG/DL (ref 8–23)
BUN/CREAT SERPL: 19.8 (ref 7–25)
CALCIUM SPEC-SCNC: 9.2 MG/DL (ref 8.6–10.5)
CHLORIDE SERPL-SCNC: 105 MMOL/L (ref 98–107)
CO2 SERPL-SCNC: 29 MMOL/L (ref 22–29)
CREAT SERPL-MCNC: 1.06 MG/DL (ref 0.76–1.27)
DEPRECATED RDW RBC AUTO: 50.4 FL (ref 37–54)
EGFRCR SERPLBLD CKD-EPI 2021: 79.4 ML/MIN/1.73
EOSINOPHIL # BLD AUTO: 0.13 10*3/MM3 (ref 0–0.4)
EOSINOPHIL NFR BLD AUTO: 1.5 % (ref 0.3–6.2)
ERYTHROCYTE [DISTWIDTH] IN BLOOD BY AUTOMATED COUNT: 15.6 % (ref 12.3–15.4)
FERRITIN SERPL-MCNC: 83.81 NG/ML (ref 30–400)
GLOBULIN UR ELPH-MCNC: 3.3 GM/DL
GLUCOSE SERPL-MCNC: 227 MG/DL (ref 65–99)
HCT VFR BLD AUTO: 41.3 % (ref 37.5–51)
HGB BLD-MCNC: 11.9 G/DL (ref 13–17.7)
IMM GRANULOCYTES # BLD AUTO: 0.04 10*3/MM3 (ref 0–0.05)
IMM GRANULOCYTES NFR BLD AUTO: 0.5 % (ref 0–0.5)
IRON 24H UR-MRATE: 43 MCG/DL (ref 59–158)
IRON SATN MFR SERPL: 9 % (ref 20–50)
LYMPHOCYTES # BLD AUTO: 1.27 10*3/MM3 (ref 0.7–3.1)
LYMPHOCYTES NFR BLD AUTO: 14.6 % (ref 19.6–45.3)
MCH RBC QN AUTO: 25.9 PG (ref 26.6–33)
MCHC RBC AUTO-ENTMCNC: 28.8 G/DL (ref 31.5–35.7)
MCV RBC AUTO: 90 FL (ref 79–97)
MONOCYTES # BLD AUTO: 0.63 10*3/MM3 (ref 0.1–0.9)
MONOCYTES NFR BLD AUTO: 7.2 % (ref 5–12)
NEUTROPHILS NFR BLD AUTO: 6.59 10*3/MM3 (ref 1.7–7)
NEUTROPHILS NFR BLD AUTO: 75.5 % (ref 42.7–76)
NRBC BLD AUTO-RTO: 0 /100 WBC (ref 0–0.2)
PLATELET # BLD AUTO: 254 10*3/MM3 (ref 140–450)
PMV BLD AUTO: 10.1 FL (ref 6–12)
POTASSIUM SERPL-SCNC: 4.5 MMOL/L (ref 3.5–5.2)
PROT SERPL-MCNC: 7.3 G/DL (ref 6–8.5)
RBC # BLD AUTO: 4.59 10*6/MM3 (ref 4.14–5.8)
SODIUM SERPL-SCNC: 143 MMOL/L (ref 136–145)
TIBC SERPL-MCNC: 495 MCG/DL (ref 298–536)
TRANSFERRIN SERPL-MCNC: 332 MG/DL (ref 200–360)
WBC NRBC COR # BLD AUTO: 8.72 10*3/MM3 (ref 3.4–10.8)

## 2024-02-14 PROCEDURE — 80053 COMPREHEN METABOLIC PANEL: CPT

## 2024-02-14 PROCEDURE — 82728 ASSAY OF FERRITIN: CPT

## 2024-02-14 PROCEDURE — 82746 ASSAY OF FOLIC ACID SERUM: CPT

## 2024-02-14 PROCEDURE — 85025 COMPLETE CBC W/AUTO DIFF WBC: CPT

## 2024-02-14 PROCEDURE — 82378 CARCINOEMBRYONIC ANTIGEN: CPT

## 2024-02-14 PROCEDURE — 83540 ASSAY OF IRON: CPT

## 2024-02-14 PROCEDURE — 84466 ASSAY OF TRANSFERRIN: CPT

## 2024-02-14 PROCEDURE — 82607 VITAMIN B-12: CPT

## 2024-02-14 PROCEDURE — 36415 COLL VENOUS BLD VENIPUNCTURE: CPT

## 2024-02-14 RX ORDER — SODIUM CHLORIDE 0.9 % (FLUSH) 0.9 %
10 SYRINGE (ML) INJECTION AS NEEDED
OUTPATIENT
Start: 2024-02-19

## 2024-02-14 RX ORDER — HEPARIN SODIUM (PORCINE) LOCK FLUSH IV SOLN 100 UNIT/ML 100 UNIT/ML
500 SOLUTION INTRAVENOUS AS NEEDED
OUTPATIENT
Start: 2024-02-19

## 2024-02-14 RX ORDER — ONDANSETRON HYDROCHLORIDE 8 MG/1
8 TABLET, FILM COATED ORAL EVERY 8 HOURS PRN
Qty: 30 TABLET | Refills: 0 | Status: SHIPPED | OUTPATIENT
Start: 2024-02-14

## 2024-02-15 ENCOUNTER — TELEPHONE (OUTPATIENT)
Dept: ONCOLOGY | Facility: CLINIC | Age: 64
End: 2024-02-15
Payer: MEDICARE

## 2024-02-15 LAB
CEA SERPL-MCNC: 2.88 NG/ML
FOLATE SERPL-MCNC: 11.7 NG/ML (ref 4.78–24.2)
VIT B12 BLD-MCNC: 330 PG/ML (ref 211–946)

## 2024-02-21 ENCOUNTER — PATIENT OUTREACH (OUTPATIENT)
Dept: CASE MANAGEMENT | Facility: OTHER | Age: 64
End: 2024-02-21
Payer: MEDICARE

## 2024-02-21 NOTE — OUTREACH NOTE
AMBULATORY CASE MANAGEMENT NOTE    Name and Relationship of Patient/Support Person: Tucker Knox - Self    Patient Outreach    HRCM follow up. AC informed that he will receive a financial form for assistance in the mail from [facility]. [facility] informed of [facility] policy to assist with electric bills. Patient denied any additional needs at this time.         Che SMITH  Ambulatory Case Management    2/21/2024, 13:36 CST

## 2024-02-26 LAB
CYTO UR: NORMAL
LAB AP CASE REPORT: NORMAL
LAB AP SYNOPTIC CHECKLIST: NORMAL
Lab: NORMAL
Lab: NORMAL
PATH REPORT.ADDENDUM SPEC: NORMAL
PATH REPORT.FINAL DX SPEC: NORMAL
PATH REPORT.GROSS SPEC: NORMAL

## 2024-02-29 ENCOUNTER — CLINICAL DOCUMENTATION (OUTPATIENT)
Dept: HEMATOLOGY | Age: 64
End: 2024-02-29

## 2024-03-07 ENCOUNTER — CLINICAL DOCUMENTATION (OUTPATIENT)
Dept: HEMATOLOGY | Age: 64
End: 2024-03-07

## 2024-03-07 NOTE — PROGRESS NOTES
Patient was re-referred on 2/22/24 to office by Dr Colvin for opinion after resection of colon cancer , however, unable to reach patient to schedule appointment.  Left message at provided # [phone] to make appointment at his convenience only to discuss treatment guidelines as recommended by both Dr Arriola (surgeon who originally referred patient post resection performed 12/5/23) and Dr Colvin, PCP.      Status update provided to offices of Dr Arriola and Dr Colvin.        _______________________________________________________________    Medical History obtained for New Patient visit scheduled 1/3/2024 - patient declined appointment.     Patient was referred to gastroenterology by Dr Bob Colvin, PCP on 8/21/23 and evaluated on 8/28/23 with complaints of rectal bleeding x several weeks and no history of a colonoscopy. Family history includes his father with colon cancer, specifics unknown.  Underwent colonoscopy by Dr Rola Glass that revealed a circumferential nearly obstructing colon cancer in sigmoid colon and was referred for surgical intervention.        COLONOSCOPY 10-9-2023 at [facility] by Dr Rola Glass  Final Diagnosis  1. Large intestine designated \"ascending colon polyp\":  -Tubular adenoma (2 fragments).  -No evidence of high-grade dysplasia.  2. Large intestine designated \"transverse colon polyp\":  -Tubular adenoma (2 fragments).  -No evidence of high-grade dysplasia.  3. Large intestine, designated \"polyp at 65 cm\":  -Tubulovillous and tubular adenoma fragments.  -No evidence of high-grade dysplasia.  4. Large intestine designated \"polyp at 42 cm\":  -Tubulovillous adenoma, 1.7 cm.  -No evidence of high-grade dysplasia.  5. Large intestine designated \"rectal sigmoid\", biopsy:  -Invasive adenocarcinoma, moderately differentiated.  6. Large intestine designated \"rectal polyp\":  -Hyperplastic polyp.           LAPAROSCOPIC ROBOTIC ASSISTED LOW ANTERIOR RESECTION on 12-5-23 at [facility] by Dr Redding

## 2024-04-01 ENCOUNTER — TELEPHONE (OUTPATIENT)
Dept: HEMATOLOGY | Age: 64
End: 2024-04-01

## 2024-04-01 NOTE — TELEPHONE ENCOUNTER
Called pt to confirm appointment for 4/3/24 at 9:45 AM. No answer, no voicemail setup and no other contact numbers.

## 2024-04-30 ENCOUNTER — TELEPHONE (OUTPATIENT)
Dept: HEMATOLOGY | Age: 64
End: 2024-04-30

## 2024-04-30 ENCOUNTER — CLINICAL DOCUMENTATION (OUTPATIENT)
Dept: HEMATOLOGY | Age: 64
End: 2024-04-30

## 2024-04-30 NOTE — PROGRESS NOTES
Spoke with patient's son, Mejia (number found as patient contact on health records from [facility] stay  3/16/24 - 4/14/23).  He reports that since discharge from [facility], he was later admitted to hospital in [place] following a stroke, and has now recovered without complications.     I explained reason for calling to reschedule new patient appointment to discuss treatment recommendations for resected colon cancer.   Patient was originally referred to Dr Cm by Dr Miladis Arriola (New Patient appt 1/3/24) but patient failed to make appointment.      Mejia reports that his father declines \"preventative\" chemo but provided an updated phone number to discuss with him directly, [phone].    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     Patient was referred to gastroenterology by Dr Bob Colvin, PCP on 8/21/23 and evaluated on 8/28/23 with complaints of rectal bleeding x several weeks and no history of a colonoscopy. Family history includes his father with colon cancer, specifics unknown.  Underwent colonoscopy by Dr Rola Glass that revealed a circumferential nearly obstructing colon cancer in sigmoid colon and was referred for surgical intervention.        COLONOSCOPY 10-9-2023 at [facility] by Dr Rola Glass  Final Diagnosis  1. Large intestine designated \"ascending colon polyp\":  -Tubular adenoma (2 fragments).  -No evidence of high-grade dysplasia.  2. Large intestine designated \"transverse colon polyp\":  -Tubular adenoma (2 fragments).  -No evidence of high-grade dysplasia.  3. Large intestine, designated \"polyp at 65 cm\":  -Tubulovillous and tubular adenoma fragments.  -No evidence of high-grade dysplasia.  4. Large intestine designated \"polyp at 42

## 2024-08-02 ENCOUNTER — TELEPHONE (OUTPATIENT)
Dept: GASTROENTEROLOGY | Facility: CLINIC | Age: 64
End: 2024-08-02
Payer: MEDICARE

## 2024-09-09 ENCOUNTER — APPOINTMENT (OUTPATIENT)
Dept: GENERAL RADIOLOGY | Facility: HOSPITAL | Age: 64
DRG: 286 | End: 2024-09-09
Payer: MEDICARE

## 2024-09-09 ENCOUNTER — APPOINTMENT (OUTPATIENT)
Dept: CT IMAGING | Facility: HOSPITAL | Age: 64
DRG: 286 | End: 2024-09-09
Payer: MEDICARE

## 2024-09-09 ENCOUNTER — HOSPITAL ENCOUNTER (INPATIENT)
Facility: HOSPITAL | Age: 64
LOS: 7 days | Discharge: HOME | DRG: 286 | End: 2024-09-18
Attending: FAMILY MEDICINE | Admitting: STUDENT IN AN ORGANIZED HEALTH CARE EDUCATION/TRAINING PROGRAM
Payer: MEDICARE

## 2024-09-09 DIAGNOSIS — Z99.81 OXYGEN DEPENDENT: ICD-10-CM

## 2024-09-09 DIAGNOSIS — R73.9 HYPERGLYCEMIA: ICD-10-CM

## 2024-09-09 DIAGNOSIS — J90 PLEURAL EFFUSION: ICD-10-CM

## 2024-09-09 DIAGNOSIS — Z74.09 IMPAIRED MOBILITY: ICD-10-CM

## 2024-09-09 DIAGNOSIS — E11.42 TYPE 2 DIABETES MELLITUS WITH DIABETIC POLYNEUROPATHY, UNSPECIFIED WHETHER LONG TERM INSULIN USE: ICD-10-CM

## 2024-09-09 DIAGNOSIS — I50.23 ACUTE ON CHRONIC SYSTOLIC CHF (CONGESTIVE HEART FAILURE): ICD-10-CM

## 2024-09-09 DIAGNOSIS — I50.9 CONGESTIVE HEART FAILURE, UNSPECIFIED HF CHRONICITY, UNSPECIFIED HEART FAILURE TYPE: Primary | ICD-10-CM

## 2024-09-09 DIAGNOSIS — R79.89 ELEVATED TROPONIN: ICD-10-CM

## 2024-09-09 DIAGNOSIS — E11.65 TYPE 2 DIABETES MELLITUS WITH HYPERGLYCEMIA, WITHOUT LONG-TERM CURRENT USE OF INSULIN: ICD-10-CM

## 2024-09-09 DIAGNOSIS — J96.10 CHRONIC RESPIRATORY FAILURE, UNSPECIFIED WHETHER WITH HYPOXIA OR HYPERCAPNIA: ICD-10-CM

## 2024-09-09 LAB
ALBUMIN SERPL-MCNC: 3.6 G/DL (ref 3.5–5.2)
ALBUMIN/GLOB SERPL: 1.4 G/DL
ALP SERPL-CCNC: 98 U/L (ref 39–117)
ALT SERPL W P-5'-P-CCNC: 10 U/L (ref 1–41)
ANION GAP SERPL CALCULATED.3IONS-SCNC: 11 MMOL/L (ref 5–15)
ARTERIAL PATENCY WRIST A: ABNORMAL
AST SERPL-CCNC: 9 U/L (ref 1–40)
ATMOSPHERIC PRESS: 756 MMHG
B PARAPERT DNA SPEC QL NAA+PROBE: NOT DETECTED
B PERT DNA SPEC QL NAA+PROBE: NOT DETECTED
BASE EXCESS BLDA CALC-SCNC: 4.1 MMOL/L (ref 0–2)
BASOPHILS # BLD AUTO: 0.04 10*3/MM3 (ref 0–0.2)
BASOPHILS NFR BLD AUTO: 0.5 % (ref 0–1.5)
BDY SITE: ABNORMAL
BILIRUB SERPL-MCNC: 0.4 MG/DL (ref 0–1.2)
BODY TEMPERATURE: 37
BUN SERPL-MCNC: 41 MG/DL (ref 8–23)
BUN/CREAT SERPL: 27.5 (ref 7–25)
C PNEUM DNA NPH QL NAA+NON-PROBE: NOT DETECTED
CA-I BLD-MCNC: 4.57 MG/DL (ref 4.6–5.4)
CALCIUM SPEC-SCNC: 8.4 MG/DL (ref 8.6–10.5)
CHLORIDE SERPL-SCNC: 100 MMOL/L (ref 98–107)
CO2 SERPL-SCNC: 30 MMOL/L (ref 22–29)
COHGB MFR BLD: 2.2 % (ref 0–5)
CREAT SERPL-MCNC: 1.49 MG/DL (ref 0.76–1.27)
CRP SERPL-MCNC: 2.01 MG/DL (ref 0–0.5)
D DIMER PPP FEU-MCNC: 1.19 MCGFEU/ML (ref 0–0.63)
D-LACTATE SERPL-SCNC: 1.6 MMOL/L (ref 0.5–2)
DEPRECATED RDW RBC AUTO: 52.2 FL (ref 37–54)
EGFRCR SERPLBLD CKD-EPI 2021: 52.4 ML/MIN/1.73
EOSINOPHIL # BLD AUTO: 0.06 10*3/MM3 (ref 0–0.4)
EOSINOPHIL NFR BLD AUTO: 0.7 % (ref 0.3–6.2)
ERYTHROCYTE [DISTWIDTH] IN BLOOD BY AUTOMATED COUNT: 15.1 % (ref 12.3–15.4)
FLUAV SUBTYP SPEC NAA+PROBE: NOT DETECTED
FLUBV RNA ISLT QL NAA+PROBE: NOT DETECTED
GAS FLOW AIRWAY: 3 LPM
GEN 5 2HR TROPONIN T REFLEX: 240 NG/L
GLOBULIN UR ELPH-MCNC: 2.6 GM/DL
GLUCOSE BLDC GLUCOMTR-MCNC: 231 MG/DL (ref 70–130)
GLUCOSE BLDC GLUCOMTR-MCNC: 307 MG/DL (ref 70–130)
GLUCOSE BLDC GLUCOMTR-MCNC: 417 MG/DL (ref 70–130)
GLUCOSE SERPL-MCNC: 405 MG/DL (ref 65–99)
HADV DNA SPEC NAA+PROBE: NOT DETECTED
HCO3 BLDA-SCNC: 30.9 MMOL/L (ref 20–26)
HCOV 229E RNA SPEC QL NAA+PROBE: NOT DETECTED
HCOV HKU1 RNA SPEC QL NAA+PROBE: NOT DETECTED
HCOV NL63 RNA SPEC QL NAA+PROBE: NOT DETECTED
HCOV OC43 RNA SPEC QL NAA+PROBE: NOT DETECTED
HCT VFR BLD AUTO: 38.9 % (ref 37.5–51)
HCT VFR BLD CALC: 35.9 % (ref 38–51)
HGB BLD-MCNC: 11.4 G/DL (ref 13–17.7)
HGB BLDA-MCNC: 11.7 G/DL (ref 14–18)
HMPV RNA NPH QL NAA+NON-PROBE: NOT DETECTED
HPIV1 RNA ISLT QL NAA+PROBE: NOT DETECTED
HPIV2 RNA SPEC QL NAA+PROBE: NOT DETECTED
HPIV3 RNA NPH QL NAA+PROBE: NOT DETECTED
HPIV4 P GENE NPH QL NAA+PROBE: NOT DETECTED
IMM GRANULOCYTES # BLD AUTO: 0.08 10*3/MM3 (ref 0–0.05)
IMM GRANULOCYTES NFR BLD AUTO: 0.9 % (ref 0–0.5)
INR PPP: 1.27 (ref 0.91–1.09)
LYMPHOCYTES # BLD AUTO: 1 10*3/MM3 (ref 0.7–3.1)
LYMPHOCYTES NFR BLD AUTO: 11.7 % (ref 19.6–45.3)
Lab: ABNORMAL
M PNEUMO IGG SER IA-ACNC: NOT DETECTED
MCH RBC QN AUTO: 27.6 PG (ref 26.6–33)
MCHC RBC AUTO-ENTMCNC: 29.3 G/DL (ref 31.5–35.7)
MCV RBC AUTO: 94.2 FL (ref 79–97)
METHGB BLD QL: 0.3 % (ref 0–3)
MODALITY: ABNORMAL
MONOCYTES # BLD AUTO: 0.76 10*3/MM3 (ref 0.1–0.9)
MONOCYTES NFR BLD AUTO: 8.9 % (ref 5–12)
NEUTROPHILS NFR BLD AUTO: 6.64 10*3/MM3 (ref 1.7–7)
NEUTROPHILS NFR BLD AUTO: 77.3 % (ref 42.7–76)
NRBC BLD AUTO-RTO: 0 /100 WBC (ref 0–0.2)
NT-PROBNP SERPL-MCNC: 8707 PG/ML (ref 0–900)
OXYHGB MFR BLDV: 92.8 % (ref 94–99)
PCO2 BLDA: 55.8 MM HG (ref 35–45)
PCO2 TEMP ADJ BLD: 55.8 MM HG (ref 35–45)
PH BLDA: 7.35 PH UNITS (ref 7.35–7.45)
PH, TEMP CORRECTED: 7.35 PH UNITS (ref 7.35–7.45)
PLATELET # BLD AUTO: 202 10*3/MM3 (ref 140–450)
PMV BLD AUTO: 10.8 FL (ref 6–12)
PO2 BLDA: 73.1 MM HG (ref 83–108)
PO2 TEMP ADJ BLD: 73.1 MM HG (ref 83–108)
POTASSIUM BLDA-SCNC: 4.3 MMOL/L (ref 3.5–5.2)
POTASSIUM SERPL-SCNC: 4.8 MMOL/L (ref 3.5–5.2)
PROCALCITONIN SERPL-MCNC: 0.2 NG/ML (ref 0–0.25)
PROT SERPL-MCNC: 6.2 G/DL (ref 6–8.5)
PROTHROMBIN TIME: 16.4 SECONDS (ref 11.8–14.8)
RBC # BLD AUTO: 4.13 10*6/MM3 (ref 4.14–5.8)
RHINOVIRUS RNA SPEC NAA+PROBE: NOT DETECTED
RSV RNA NPH QL NAA+NON-PROBE: NOT DETECTED
SAO2 % BLDCOA: 95.2 % (ref 94–99)
SARS-COV-2 RNA NPH QL NAA+NON-PROBE: NOT DETECTED
SODIUM BLDA-SCNC: 141 MMOL/L (ref 136–145)
SODIUM SERPL-SCNC: 141 MMOL/L (ref 136–145)
TROPONIN T DELTA: -12 NG/L
TROPONIN T SERPL HS-MCNC: 252 NG/L
VENTILATOR MODE: ABNORMAL
WBC NRBC COR # BLD AUTO: 8.58 10*3/MM3 (ref 3.4–10.8)

## 2024-09-09 PROCEDURE — 25010000002 METHYLPREDNISOLONE PER 125 MG: Performed by: NURSE PRACTITIONER

## 2024-09-09 PROCEDURE — 85610 PROTHROMBIN TIME: CPT | Performed by: NURSE PRACTITIONER

## 2024-09-09 PROCEDURE — 25010000002 PIPERACILLIN SOD-TAZOBACTAM PER 1 G: Performed by: NURSE PRACTITIONER

## 2024-09-09 PROCEDURE — 82948 REAGENT STRIP/BLOOD GLUCOSE: CPT

## 2024-09-09 PROCEDURE — 82805 BLOOD GASES W/O2 SATURATION: CPT

## 2024-09-09 PROCEDURE — 83050 HGB METHEMOGLOBIN QUAN: CPT

## 2024-09-09 PROCEDURE — 71275 CT ANGIOGRAPHY CHEST: CPT

## 2024-09-09 PROCEDURE — 82375 ASSAY CARBOXYHB QUANT: CPT

## 2024-09-09 PROCEDURE — 94640 AIRWAY INHALATION TREATMENT: CPT

## 2024-09-09 PROCEDURE — 93005 ELECTROCARDIOGRAM TRACING: CPT

## 2024-09-09 PROCEDURE — 83605 ASSAY OF LACTIC ACID: CPT | Performed by: NURSE PRACTITIONER

## 2024-09-09 PROCEDURE — 0202U NFCT DS 22 TRGT SARS-COV-2: CPT | Performed by: NURSE PRACTITIONER

## 2024-09-09 PROCEDURE — 94799 UNLISTED PULMONARY SVC/PX: CPT

## 2024-09-09 PROCEDURE — G0378 HOSPITAL OBSERVATION PER HR: HCPCS

## 2024-09-09 PROCEDURE — 93010 ELECTROCARDIOGRAM REPORT: CPT | Performed by: HOSPITALIST

## 2024-09-09 PROCEDURE — 63710000001 INSULIN GLARGINE PER 5 UNITS: Performed by: FAMILY MEDICINE

## 2024-09-09 PROCEDURE — 80053 COMPREHEN METABOLIC PANEL: CPT | Performed by: NURSE PRACTITIONER

## 2024-09-09 PROCEDURE — 84145 PROCALCITONIN (PCT): CPT | Performed by: NURSE PRACTITIONER

## 2024-09-09 PROCEDURE — 86140 C-REACTIVE PROTEIN: CPT | Performed by: NURSE PRACTITIONER

## 2024-09-09 PROCEDURE — 71045 X-RAY EXAM CHEST 1 VIEW: CPT

## 2024-09-09 PROCEDURE — 99285 EMERGENCY DEPT VISIT HI MDM: CPT

## 2024-09-09 PROCEDURE — 93005 ELECTROCARDIOGRAM TRACING: CPT | Performed by: FAMILY MEDICINE

## 2024-09-09 PROCEDURE — 85379 FIBRIN DEGRADATION QUANT: CPT | Performed by: NURSE PRACTITIONER

## 2024-09-09 PROCEDURE — 25510000001 IOPAMIDOL PER 1 ML: Performed by: NURSE PRACTITIONER

## 2024-09-09 PROCEDURE — 36415 COLL VENOUS BLD VENIPUNCTURE: CPT

## 2024-09-09 PROCEDURE — 87040 BLOOD CULTURE FOR BACTERIA: CPT | Performed by: NURSE PRACTITIONER

## 2024-09-09 PROCEDURE — 5A09357 ASSISTANCE WITH RESPIRATORY VENTILATION, LESS THAN 24 CONSECUTIVE HOURS, CONTINUOUS POSITIVE AIRWAY PRESSURE: ICD-10-PCS | Performed by: STUDENT IN AN ORGANIZED HEALTH CARE EDUCATION/TRAINING PROGRAM

## 2024-09-09 PROCEDURE — 84484 ASSAY OF TROPONIN QUANT: CPT | Performed by: NURSE PRACTITIONER

## 2024-09-09 PROCEDURE — 36600 WITHDRAWAL OF ARTERIAL BLOOD: CPT

## 2024-09-09 PROCEDURE — 85025 COMPLETE CBC W/AUTO DIFF WBC: CPT | Performed by: NURSE PRACTITIONER

## 2024-09-09 PROCEDURE — 83880 ASSAY OF NATRIURETIC PEPTIDE: CPT | Performed by: NURSE PRACTITIONER

## 2024-09-09 PROCEDURE — 25010000002 FUROSEMIDE PER 20 MG: Performed by: NURSE PRACTITIONER

## 2024-09-09 PROCEDURE — 94660 CPAP INITIATION&MGMT: CPT

## 2024-09-09 PROCEDURE — 63710000001 INSULIN REGULAR HUMAN PER 5 UNITS: Performed by: NURSE PRACTITIONER

## 2024-09-09 PROCEDURE — 25010000002 CEFEPIME PER 500 MG: Performed by: FAMILY MEDICINE

## 2024-09-09 PROCEDURE — 25810000003 SODIUM CHLORIDE 0.9 % SOLUTION 500 ML FLEX CONT: Performed by: NURSE PRACTITIONER

## 2024-09-09 PROCEDURE — 25010000002 VANCOMYCIN 1 G RECONSTITUTED SOLUTION 1 EACH VIAL: Performed by: NURSE PRACTITIONER

## 2024-09-09 PROCEDURE — 63710000001 INSULIN LISPRO (HUMAN) PER 5 UNITS: Performed by: FAMILY MEDICINE

## 2024-09-09 RX ORDER — SODIUM CHLORIDE 0.9 % (FLUSH) 0.9 %
10 SYRINGE (ML) INJECTION AS NEEDED
Status: DISCONTINUED | OUTPATIENT
Start: 2024-09-09 | End: 2024-09-18 | Stop reason: HOSPADM

## 2024-09-09 RX ORDER — IPRATROPIUM BROMIDE AND ALBUTEROL SULFATE 2.5; .5 MG/3ML; MG/3ML
3 SOLUTION RESPIRATORY (INHALATION)
Status: DISCONTINUED | OUTPATIENT
Start: 2024-09-10 | End: 2024-09-18 | Stop reason: HOSPADM

## 2024-09-09 RX ORDER — METHYLPREDNISOLONE SODIUM SUCCINATE 40 MG/ML
40 INJECTION, POWDER, LYOPHILIZED, FOR SOLUTION INTRAMUSCULAR; INTRAVENOUS EVERY 12 HOURS
Status: DISCONTINUED | OUTPATIENT
Start: 2024-09-10 | End: 2024-09-15

## 2024-09-09 RX ORDER — IPRATROPIUM BROMIDE AND ALBUTEROL SULFATE 2.5; .5 MG/3ML; MG/3ML
3 SOLUTION RESPIRATORY (INHALATION) ONCE
Status: COMPLETED | OUTPATIENT
Start: 2024-09-09 | End: 2024-09-09

## 2024-09-09 RX ORDER — FAMOTIDINE 20 MG/1
20 TABLET, FILM COATED ORAL
Status: DISCONTINUED | OUTPATIENT
Start: 2024-09-10 | End: 2024-09-18 | Stop reason: HOSPADM

## 2024-09-09 RX ORDER — IPRATROPIUM BROMIDE AND ALBUTEROL SULFATE 2.5; .5 MG/3ML; MG/3ML
3 SOLUTION RESPIRATORY (INHALATION)
Status: DISCONTINUED | OUTPATIENT
Start: 2024-09-09 | End: 2024-09-09

## 2024-09-09 RX ORDER — GLIPIZIDE 10 MG/1
10 TABLET ORAL
Status: DISCONTINUED | OUTPATIENT
Start: 2024-09-10 | End: 2024-09-18 | Stop reason: HOSPADM

## 2024-09-09 RX ORDER — VANCOMYCIN/0.9 % SOD CHLORIDE 1.5G/250ML
1500 PLASTIC BAG, INJECTION (ML) INTRAVENOUS EVERY 24 HOURS
Status: DISCONTINUED | OUTPATIENT
Start: 2024-09-10 | End: 2024-09-10

## 2024-09-09 RX ORDER — BUDESONIDE AND FORMOTEROL FUMARATE DIHYDRATE 160; 4.5 UG/1; UG/1
2 AEROSOL RESPIRATORY (INHALATION)
Status: DISCONTINUED | OUTPATIENT
Start: 2024-09-09 | End: 2024-09-18 | Stop reason: HOSPADM

## 2024-09-09 RX ORDER — PRAVASTATIN SODIUM 40 MG
40 TABLET ORAL NIGHTLY
Status: DISCONTINUED | OUTPATIENT
Start: 2024-09-09 | End: 2024-09-13

## 2024-09-09 RX ORDER — DEXTROSE MONOHYDRATE 25 G/50ML
25 INJECTION, SOLUTION INTRAVENOUS
Status: DISCONTINUED | OUTPATIENT
Start: 2024-09-09 | End: 2024-09-18 | Stop reason: HOSPADM

## 2024-09-09 RX ORDER — ASPIRIN 81 MG/1
81 TABLET ORAL DAILY
Status: DISCONTINUED | OUTPATIENT
Start: 2024-09-10 | End: 2024-09-18 | Stop reason: HOSPADM

## 2024-09-09 RX ORDER — INSULIN LISPRO 100 [IU]/ML
10 INJECTION, SOLUTION INTRAVENOUS; SUBCUTANEOUS ONCE
Status: COMPLETED | OUTPATIENT
Start: 2024-09-09 | End: 2024-09-09

## 2024-09-09 RX ORDER — FUROSEMIDE 10 MG/ML
80 INJECTION INTRAMUSCULAR; INTRAVENOUS ONCE
Status: COMPLETED | OUTPATIENT
Start: 2024-09-09 | End: 2024-09-09

## 2024-09-09 RX ORDER — METOPROLOL SUCCINATE 25 MG/1
25 TABLET, EXTENDED RELEASE ORAL
Status: DISCONTINUED | OUTPATIENT
Start: 2024-09-10 | End: 2024-09-18 | Stop reason: HOSPADM

## 2024-09-09 RX ORDER — IOPAMIDOL 755 MG/ML
100 INJECTION, SOLUTION INTRAVASCULAR
Status: DISCONTINUED | OUTPATIENT
Start: 2024-09-09 | End: 2024-09-09

## 2024-09-09 RX ORDER — BISACODYL 10 MG
10 SUPPOSITORY, RECTAL RECTAL DAILY PRN
Status: DISCONTINUED | OUTPATIENT
Start: 2024-09-09 | End: 2024-09-18 | Stop reason: HOSPADM

## 2024-09-09 RX ORDER — INSULIN LISPRO 100 [IU]/ML
3-14 INJECTION, SOLUTION INTRAVENOUS; SUBCUTANEOUS
Status: DISCONTINUED | OUTPATIENT
Start: 2024-09-09 | End: 2024-09-18 | Stop reason: HOSPADM

## 2024-09-09 RX ORDER — METHYLPREDNISOLONE SODIUM SUCCINATE 125 MG/2ML
125 INJECTION, POWDER, LYOPHILIZED, FOR SOLUTION INTRAMUSCULAR; INTRAVENOUS ONCE
Status: COMPLETED | OUTPATIENT
Start: 2024-09-09 | End: 2024-09-09

## 2024-09-09 RX ORDER — ONDANSETRON 2 MG/ML
4 INJECTION INTRAMUSCULAR; INTRAVENOUS EVERY 6 HOURS PRN
Status: DISCONTINUED | OUTPATIENT
Start: 2024-09-09 | End: 2024-09-18 | Stop reason: HOSPADM

## 2024-09-09 RX ORDER — FUROSEMIDE 10 MG/ML
40 INJECTION INTRAMUSCULAR; INTRAVENOUS
Status: DISCONTINUED | OUTPATIENT
Start: 2024-09-10 | End: 2024-09-11

## 2024-09-09 RX ORDER — BISACODYL 5 MG/1
5 TABLET, DELAYED RELEASE ORAL DAILY PRN
Status: DISCONTINUED | OUTPATIENT
Start: 2024-09-09 | End: 2024-09-18 | Stop reason: HOSPADM

## 2024-09-09 RX ORDER — NICOTINE POLACRILEX 4 MG
15 LOZENGE BUCCAL
Status: DISCONTINUED | OUTPATIENT
Start: 2024-09-09 | End: 2024-09-18 | Stop reason: HOSPADM

## 2024-09-09 RX ORDER — ENOXAPARIN SODIUM 100 MG/ML
40 INJECTION SUBCUTANEOUS
Status: DISCONTINUED | OUTPATIENT
Start: 2024-09-10 | End: 2024-09-10

## 2024-09-09 RX ORDER — NITROGLYCERIN 0.4 MG/1
0.4 TABLET SUBLINGUAL
Status: DISCONTINUED | OUTPATIENT
Start: 2024-09-09 | End: 2024-09-18 | Stop reason: HOSPADM

## 2024-09-09 RX ORDER — IBUPROFEN 600 MG/1
1 TABLET ORAL
Status: DISCONTINUED | OUTPATIENT
Start: 2024-09-09 | End: 2024-09-18 | Stop reason: HOSPADM

## 2024-09-09 RX ORDER — POLYETHYLENE GLYCOL 3350 17 G/17G
17 POWDER, FOR SOLUTION ORAL DAILY PRN
Status: DISCONTINUED | OUTPATIENT
Start: 2024-09-09 | End: 2024-09-18 | Stop reason: HOSPADM

## 2024-09-09 RX ORDER — DULOXETIN HYDROCHLORIDE 30 MG/1
30 CAPSULE, DELAYED RELEASE ORAL DAILY
Status: DISCONTINUED | OUTPATIENT
Start: 2024-09-10 | End: 2024-09-18 | Stop reason: HOSPADM

## 2024-09-09 RX ORDER — LOSARTAN POTASSIUM 50 MG/1
25 TABLET ORAL
Status: DISCONTINUED | OUTPATIENT
Start: 2024-09-10 | End: 2024-09-11

## 2024-09-09 RX ORDER — AMOXICILLIN 250 MG
2 CAPSULE ORAL 2 TIMES DAILY PRN
Status: DISCONTINUED | OUTPATIENT
Start: 2024-09-09 | End: 2024-09-18 | Stop reason: HOSPADM

## 2024-09-09 RX ORDER — GABAPENTIN 300 MG/1
600 CAPSULE ORAL NIGHTLY
Status: DISCONTINUED | OUTPATIENT
Start: 2024-09-09 | End: 2024-09-18 | Stop reason: HOSPADM

## 2024-09-09 RX ORDER — SODIUM CHLORIDE 0.9 % (FLUSH) 0.9 %
10 SYRINGE (ML) INJECTION EVERY 12 HOURS SCHEDULED
Status: DISCONTINUED | OUTPATIENT
Start: 2024-09-09 | End: 2024-09-18 | Stop reason: HOSPADM

## 2024-09-09 RX ORDER — SODIUM CHLORIDE 9 MG/ML
40 INJECTION, SOLUTION INTRAVENOUS AS NEEDED
Status: DISCONTINUED | OUTPATIENT
Start: 2024-09-09 | End: 2024-09-18 | Stop reason: HOSPADM

## 2024-09-09 RX ADMIN — VANCOMYCIN HYDROCHLORIDE 2250 MG: 1 INJECTION, POWDER, LYOPHILIZED, FOR SOLUTION INTRAVENOUS at 17:51

## 2024-09-09 RX ADMIN — INSULIN HUMAN 14 UNITS: 100 INJECTION, SOLUTION PARENTERAL at 17:55

## 2024-09-09 RX ADMIN — IPRATROPIUM BROMIDE AND ALBUTEROL SULFATE 3 ML: .5; 3 SOLUTION RESPIRATORY (INHALATION) at 15:16

## 2024-09-09 RX ADMIN — Medication 10 ML: at 22:53

## 2024-09-09 RX ADMIN — INSULIN LISPRO 5 UNITS: 100 INJECTION, SOLUTION INTRAVENOUS; SUBCUTANEOUS at 22:45

## 2024-09-09 RX ADMIN — INSULIN LISPRO 10 UNITS: 100 INJECTION, SOLUTION INTRAVENOUS; SUBCUTANEOUS at 19:39

## 2024-09-09 RX ADMIN — IPRATROPIUM BROMIDE AND ALBUTEROL SULFATE 3 ML: .5; 3 SOLUTION RESPIRATORY (INHALATION) at 23:23

## 2024-09-09 RX ADMIN — PIPERACILLIN AND TAZOBACTAM 4.5 G: 4; .5 INJECTION, POWDER, FOR SOLUTION INTRAVENOUS at 16:35

## 2024-09-09 RX ADMIN — METHYLPREDNISOLONE SODIUM SUCCINATE 125 MG: 125 INJECTION, POWDER, FOR SOLUTION INTRAMUSCULAR; INTRAVENOUS at 15:30

## 2024-09-09 RX ADMIN — FUROSEMIDE 80 MG: 10 INJECTION, SOLUTION INTRAVENOUS at 17:51

## 2024-09-09 RX ADMIN — IOPAMIDOL 100 ML: 755 INJECTION, SOLUTION INTRAVENOUS at 16:55

## 2024-09-09 RX ADMIN — CEFEPIME 2000 MG: 2 INJECTION, POWDER, FOR SOLUTION INTRAVENOUS at 22:48

## 2024-09-09 RX ADMIN — INSULIN GLARGINE 10 UNITS: 100 INJECTION, SOLUTION SUBCUTANEOUS at 22:46

## 2024-09-09 RX ADMIN — GABAPENTIN 600 MG: 300 CAPSULE ORAL at 22:44

## 2024-09-10 ENCOUNTER — APPOINTMENT (OUTPATIENT)
Dept: CARDIOLOGY | Facility: HOSPITAL | Age: 64
DRG: 286 | End: 2024-09-10
Payer: MEDICARE

## 2024-09-10 LAB
ALBUMIN SERPL-MCNC: 3.7 G/DL (ref 3.5–5.2)
ALBUMIN/GLOB SERPL: 1.3 G/DL
ALP SERPL-CCNC: 94 U/L (ref 39–117)
ALT SERPL W P-5'-P-CCNC: 11 U/L (ref 1–41)
ANION GAP SERPL CALCULATED.3IONS-SCNC: 11 MMOL/L (ref 5–15)
AST SERPL-CCNC: 11 U/L (ref 1–40)
BASOPHILS # BLD AUTO: 0.01 10*3/MM3 (ref 0–0.2)
BASOPHILS NFR BLD AUTO: 0.1 % (ref 0–1.5)
BH CV ECHO MEAS - AO MAX PG: 20.6 MMHG
BH CV ECHO MEAS - AO MEAN PG: 8.5 MMHG
BH CV ECHO MEAS - AO ROOT DIAM: 3 CM
BH CV ECHO MEAS - AO V2 MAX: 227 CM/SEC
BH CV ECHO MEAS - AO V2 VTI: 38.1 CM
BH CV ECHO MEAS - AVA(I,D): 1.61 CM2
BH CV ECHO MEAS - EDV(CUBED): 232.6 ML
BH CV ECHO MEAS - EDV(MOD-SP4): 185 ML
BH CV ECHO MEAS - EF(MOD-SP4): 31.9 %
BH CV ECHO MEAS - ESV(CUBED): 98 ML
BH CV ECHO MEAS - ESV(MOD-SP4): 126 ML
BH CV ECHO MEAS - FS: 25 %
BH CV ECHO MEAS - IVS/LVPW: 1.05 CM
BH CV ECHO MEAS - IVSD: 1.37 CM
BH CV ECHO MEAS - LA DIMENSION: 4.3 CM
BH CV ECHO MEAS - LAT PEAK E' VEL: 7.8 CM/SEC
BH CV ECHO MEAS - LV DIASTOLIC VOL/BSA (35-75): 78.2 CM2
BH CV ECHO MEAS - LV MASS(C)D: 377.9 GRAMS
BH CV ECHO MEAS - LV MAX PG: 3.3 MMHG
BH CV ECHO MEAS - LV MEAN PG: 2 MMHG
BH CV ECHO MEAS - LV SYSTOLIC VOL/BSA (12-30): 53.3 CM2
BH CV ECHO MEAS - LV V1 MAX: 91.4 CM/SEC
BH CV ECHO MEAS - LV V1 VTI: 14.7 CM
BH CV ECHO MEAS - LVIDD: 6.2 CM
BH CV ECHO MEAS - LVIDS: 4.6 CM
BH CV ECHO MEAS - LVOT AREA: 4.2 CM2
BH CV ECHO MEAS - LVOT DIAM: 2.3 CM
BH CV ECHO MEAS - LVPWD: 1.3 CM
BH CV ECHO MEAS - MED PEAK E' VEL: 5.2 CM/SEC
BH CV ECHO MEAS - MR MAX PG: 99.2 MMHG
BH CV ECHO MEAS - MR MAX VEL: 498 CM/SEC
BH CV ECHO MEAS - MR MEAN PG: 60 MMHG
BH CV ECHO MEAS - MR MEAN VEL: 346 CM/SEC
BH CV ECHO MEAS - MR VTI: 131 CM
BH CV ECHO MEAS - MV A MAX VEL: 77.1 CM/SEC
BH CV ECHO MEAS - MV DEC TIME: 0.15 SEC
BH CV ECHO MEAS - MV E MAX VEL: 133 CM/SEC
BH CV ECHO MEAS - MV E/A: 1.73
BH CV ECHO MEAS - RAP SYSTOLE: 3 MMHG
BH CV ECHO MEAS - RVSP: 47.1 MMHG
BH CV ECHO MEAS - SV(LVOT): 61.1 ML
BH CV ECHO MEAS - SV(MOD-SP4): 59 ML
BH CV ECHO MEAS - SVI(LVOT): 25.8 ML/M2
BH CV ECHO MEAS - SVI(MOD-SP4): 24.9 ML/M2
BH CV ECHO MEAS - TR MAX PG: 44.1 MMHG
BH CV ECHO MEAS - TR MAX VEL: 332 CM/SEC
BH CV ECHO MEASUREMENTS AVERAGE E/E' RATIO: 20.46
BILIRUB SERPL-MCNC: 0.3 MG/DL (ref 0–1.2)
BUN SERPL-MCNC: 40 MG/DL (ref 8–23)
BUN/CREAT SERPL: 28 (ref 7–25)
CALCIUM SPEC-SCNC: 8.7 MG/DL (ref 8.6–10.5)
CHLORIDE SERPL-SCNC: 102 MMOL/L (ref 98–107)
CHOLEST SERPL-MCNC: 134 MG/DL (ref 0–200)
CO2 SERPL-SCNC: 28 MMOL/L (ref 22–29)
CREAT SERPL-MCNC: 1.43 MG/DL (ref 0.76–1.27)
DEPRECATED RDW RBC AUTO: 51.8 FL (ref 37–54)
EGFRCR SERPLBLD CKD-EPI 2021: 55.1 ML/MIN/1.73
EOSINOPHIL # BLD AUTO: 0 10*3/MM3 (ref 0–0.4)
EOSINOPHIL NFR BLD AUTO: 0 % (ref 0.3–6.2)
ERYTHROCYTE [DISTWIDTH] IN BLOOD BY AUTOMATED COUNT: 15.1 % (ref 12.3–15.4)
GLOBULIN UR ELPH-MCNC: 2.8 GM/DL
GLUCOSE BLDC GLUCOMTR-MCNC: 114 MG/DL (ref 70–130)
GLUCOSE BLDC GLUCOMTR-MCNC: 248 MG/DL (ref 70–130)
GLUCOSE BLDC GLUCOMTR-MCNC: 332 MG/DL (ref 70–130)
GLUCOSE BLDC GLUCOMTR-MCNC: 347 MG/DL (ref 70–130)
GLUCOSE BLDC GLUCOMTR-MCNC: 98 MG/DL (ref 70–130)
GLUCOSE SERPL-MCNC: 278 MG/DL (ref 65–99)
HBA1C MFR BLD: 9.9 % (ref 4.8–5.6)
HCT VFR BLD AUTO: 37.8 % (ref 37.5–51)
HDLC SERPL-MCNC: 49 MG/DL (ref 40–60)
HGB BLD-MCNC: 11.1 G/DL (ref 13–17.7)
IMM GRANULOCYTES # BLD AUTO: 0.03 10*3/MM3 (ref 0–0.05)
IMM GRANULOCYTES NFR BLD AUTO: 0.4 % (ref 0–0.5)
L PNEUMO1 AG UR QL IA: NEGATIVE
LDLC SERPL CALC-MCNC: 75 MG/DL (ref 0–100)
LDLC/HDLC SERPL: 1.55 {RATIO}
LYMPHOCYTES # BLD AUTO: 0.38 10*3/MM3 (ref 0.7–3.1)
LYMPHOCYTES NFR BLD AUTO: 4.8 % (ref 19.6–45.3)
MCH RBC QN AUTO: 27.3 PG (ref 26.6–33)
MCHC RBC AUTO-ENTMCNC: 29.4 G/DL (ref 31.5–35.7)
MCV RBC AUTO: 93.1 FL (ref 79–97)
MONOCYTES # BLD AUTO: 0.13 10*3/MM3 (ref 0.1–0.9)
MONOCYTES NFR BLD AUTO: 1.6 % (ref 5–12)
MRSA DNA SPEC QL NAA+PROBE: NORMAL
NEUTROPHILS NFR BLD AUTO: 7.44 10*3/MM3 (ref 1.7–7)
NEUTROPHILS NFR BLD AUTO: 93.1 % (ref 42.7–76)
PLATELET # BLD AUTO: 182 10*3/MM3 (ref 140–450)
PMV BLD AUTO: 11.3 FL (ref 6–12)
POLYCHROMASIA BLD QL SMEAR: NORMAL
POTASSIUM SERPL-SCNC: 4.7 MMOL/L (ref 3.5–5.2)
PROT SERPL-MCNC: 6.5 G/DL (ref 6–8.5)
QT INTERVAL: 434 MS
QTC INTERVAL: 536 MS
RBC # BLD AUTO: 4.06 10*6/MM3 (ref 4.14–5.8)
S PNEUM AG SPEC QL LA: NEGATIVE
SMALL PLATELETS BLD QL SMEAR: ADEQUATE
SODIUM SERPL-SCNC: 141 MMOL/L (ref 136–145)
TRIGL SERPL-MCNC: 45 MG/DL (ref 0–150)
TSH SERPL DL<=0.05 MIU/L-ACNC: 0.95 UIU/ML (ref 0.27–4.2)
VLDLC SERPL-MCNC: 10 MG/DL (ref 5–40)
WBC MORPH BLD: NORMAL
WBC NRBC COR # BLD AUTO: 7.99 10*3/MM3 (ref 3.4–10.8)

## 2024-09-10 PROCEDURE — 94799 UNLISTED PULMONARY SVC/PX: CPT

## 2024-09-10 PROCEDURE — 80053 COMPREHEN METABOLIC PANEL: CPT | Performed by: FAMILY MEDICINE

## 2024-09-10 PROCEDURE — 93306 TTE W/DOPPLER COMPLETE: CPT

## 2024-09-10 PROCEDURE — 87641 MR-STAPH DNA AMP PROBE: CPT | Performed by: FAMILY MEDICINE

## 2024-09-10 PROCEDURE — 94664 DEMO&/EVAL PT USE INHALER: CPT

## 2024-09-10 PROCEDURE — 85025 COMPLETE CBC W/AUTO DIFF WBC: CPT | Performed by: FAMILY MEDICINE

## 2024-09-10 PROCEDURE — 25010000002 METHYLPREDNISOLONE PER 40 MG: Performed by: FAMILY MEDICINE

## 2024-09-10 PROCEDURE — 93306 TTE W/DOPPLER COMPLETE: CPT | Performed by: INTERNAL MEDICINE

## 2024-09-10 PROCEDURE — 87899 AGENT NOS ASSAY W/OPTIC: CPT | Performed by: FAMILY MEDICINE

## 2024-09-10 PROCEDURE — G0378 HOSPITAL OBSERVATION PER HR: HCPCS

## 2024-09-10 PROCEDURE — 25010000002 ENOXAPARIN PER 10 MG: Performed by: FAMILY MEDICINE

## 2024-09-10 PROCEDURE — 87449 NOS EACH ORGANISM AG IA: CPT | Performed by: FAMILY MEDICINE

## 2024-09-10 PROCEDURE — 25510000001 PERFLUTREN 6.52 MG/ML SUSPENSION 2 ML VIAL: Performed by: FAMILY MEDICINE

## 2024-09-10 PROCEDURE — 80061 LIPID PANEL: CPT | Performed by: FAMILY MEDICINE

## 2024-09-10 PROCEDURE — 94660 CPAP INITIATION&MGMT: CPT

## 2024-09-10 PROCEDURE — 25010000002 FUROSEMIDE PER 20 MG: Performed by: FAMILY MEDICINE

## 2024-09-10 PROCEDURE — 82948 REAGENT STRIP/BLOOD GLUCOSE: CPT

## 2024-09-10 PROCEDURE — 85007 BL SMEAR W/DIFF WBC COUNT: CPT | Performed by: FAMILY MEDICINE

## 2024-09-10 PROCEDURE — 83036 HEMOGLOBIN GLYCOSYLATED A1C: CPT | Performed by: FAMILY MEDICINE

## 2024-09-10 PROCEDURE — 84443 ASSAY THYROID STIM HORMONE: CPT | Performed by: FAMILY MEDICINE

## 2024-09-10 PROCEDURE — 63710000001 INSULIN GLARGINE PER 5 UNITS: Performed by: FAMILY MEDICINE

## 2024-09-10 PROCEDURE — 25010000002 CEFEPIME PER 500 MG: Performed by: FAMILY MEDICINE

## 2024-09-10 PROCEDURE — 63710000001 INSULIN LISPRO (HUMAN) PER 5 UNITS: Performed by: FAMILY MEDICINE

## 2024-09-10 RX ORDER — DOXYCYCLINE 100 MG/1
100 CAPSULE ORAL 2 TIMES DAILY
COMMUNITY
Start: 2024-09-05 | End: 2024-09-18 | Stop reason: HOSPADM

## 2024-09-10 RX ORDER — NITROGLYCERIN 0.4 MG/1
0.4 TABLET SUBLINGUAL
Status: ON HOLD | COMMUNITY

## 2024-09-10 RX ORDER — DAPAGLIFLOZIN 10 MG/1
1 TABLET, FILM COATED ORAL DAILY
Status: ON HOLD | COMMUNITY
End: 2024-09-18

## 2024-09-10 RX ORDER — CEFDINIR 300 MG/1
300 CAPSULE ORAL 2 TIMES DAILY
COMMUNITY
Start: 2024-09-05 | End: 2024-09-18 | Stop reason: HOSPADM

## 2024-09-10 RX ORDER — ALBUTEROL SULFATE 90 UG/1
2 INHALANT RESPIRATORY (INHALATION) EVERY 4 HOURS PRN
Status: ON HOLD | COMMUNITY
End: 2024-09-18

## 2024-09-10 RX ORDER — NICOTINE 21 MG/24HR
1 PATCH, TRANSDERMAL 24 HOURS TRANSDERMAL
Status: DISCONTINUED | OUTPATIENT
Start: 2024-09-10 | End: 2024-09-18 | Stop reason: HOSPADM

## 2024-09-10 RX ORDER — SACUBITRIL AND VALSARTAN 24; 26 MG/1; MG/1
1 TABLET, FILM COATED ORAL 2 TIMES DAILY
Status: ON HOLD | COMMUNITY
End: 2024-09-18

## 2024-09-10 RX ORDER — ENOXAPARIN SODIUM 100 MG/ML
40 INJECTION SUBCUTANEOUS EVERY 12 HOURS SCHEDULED
Status: DISCONTINUED | OUTPATIENT
Start: 2024-09-10 | End: 2024-09-11

## 2024-09-10 RX ADMIN — DULOXETINE HYDROCHLORIDE 30 MG: 30 CAPSULE, DELAYED RELEASE ORAL at 09:55

## 2024-09-10 RX ADMIN — METOPROLOL SUCCINATE 25 MG: 25 TABLET, EXTENDED RELEASE ORAL at 09:55

## 2024-09-10 RX ADMIN — IPRATROPIUM BROMIDE AND ALBUTEROL SULFATE 3 ML: .5; 3 SOLUTION RESPIRATORY (INHALATION) at 14:13

## 2024-09-10 RX ADMIN — FAMOTIDINE 20 MG: 20 TABLET, FILM COATED ORAL at 16:03

## 2024-09-10 RX ADMIN — BUDESONIDE AND FORMOTEROL FUMARATE DIHYDRATE 2 PUFF: 160; 4.5 AEROSOL RESPIRATORY (INHALATION) at 08:42

## 2024-09-10 RX ADMIN — INSULIN GLARGINE 10 UNITS: 100 INJECTION, SOLUTION SUBCUTANEOUS at 09:55

## 2024-09-10 RX ADMIN — IPRATROPIUM BROMIDE AND ALBUTEROL SULFATE 3 ML: .5; 3 SOLUTION RESPIRATORY (INHALATION) at 19:00

## 2024-09-10 RX ADMIN — PRAVASTATIN SODIUM 40 MG: 20 TABLET ORAL at 00:49

## 2024-09-10 RX ADMIN — BUDESONIDE AND FORMOTEROL FUMARATE DIHYDRATE 2 PUFF: 160; 4.5 AEROSOL RESPIRATORY (INHALATION) at 19:00

## 2024-09-10 RX ADMIN — ASPIRIN 81 MG: 81 TABLET, COATED ORAL at 09:55

## 2024-09-10 RX ADMIN — CEFEPIME 2000 MG: 2 INJECTION, POWDER, FOR SOLUTION INTRAVENOUS at 06:14

## 2024-09-10 RX ADMIN — INSULIN LISPRO 10 UNITS: 100 INJECTION, SOLUTION INTRAVENOUS; SUBCUTANEOUS at 11:03

## 2024-09-10 RX ADMIN — METHYLPREDNISOLONE SODIUM SUCCINATE 40 MG: 40 INJECTION, POWDER, FOR SOLUTION INTRAMUSCULAR; INTRAVENOUS at 03:37

## 2024-09-10 RX ADMIN — IPRATROPIUM BROMIDE AND ALBUTEROL SULFATE 3 ML: .5; 3 SOLUTION RESPIRATORY (INHALATION) at 06:47

## 2024-09-10 RX ADMIN — IPRATROPIUM BROMIDE AND ALBUTEROL SULFATE 3 ML: .5; 3 SOLUTION RESPIRATORY (INHALATION) at 11:19

## 2024-09-10 RX ADMIN — METHYLPREDNISOLONE SODIUM SUCCINATE 40 MG: 40 INJECTION, POWDER, FOR SOLUTION INTRAMUSCULAR; INTRAVENOUS at 16:03

## 2024-09-10 RX ADMIN — PRAVASTATIN SODIUM 40 MG: 20 TABLET ORAL at 22:58

## 2024-09-10 RX ADMIN — FUROSEMIDE 40 MG: 10 INJECTION, SOLUTION INTRAVENOUS at 16:13

## 2024-09-10 RX ADMIN — ENOXAPARIN SODIUM 40 MG: 100 INJECTION SUBCUTANEOUS at 21:22

## 2024-09-10 RX ADMIN — GABAPENTIN 600 MG: 300 CAPSULE ORAL at 21:21

## 2024-09-10 RX ADMIN — FUROSEMIDE 40 MG: 10 INJECTION, SOLUTION INTRAVENOUS at 06:28

## 2024-09-10 RX ADMIN — FAMOTIDINE 20 MG: 20 TABLET, FILM COATED ORAL at 09:55

## 2024-09-10 RX ADMIN — LOSARTAN POTASSIUM 25 MG: 50 TABLET, FILM COATED ORAL at 09:55

## 2024-09-10 RX ADMIN — GLIPIZIDE 10 MG: 10 TABLET ORAL at 09:55

## 2024-09-10 RX ADMIN — ENOXAPARIN SODIUM 40 MG: 100 INJECTION SUBCUTANEOUS at 09:56

## 2024-09-10 RX ADMIN — INSULIN GLARGINE 10 UNITS: 100 INJECTION, SOLUTION SUBCUTANEOUS at 21:21

## 2024-09-10 RX ADMIN — PERFLUTREN 10 ML: 6.52 INJECTION, SUSPENSION INTRAVENOUS at 15:35

## 2024-09-10 RX ADMIN — INSULIN LISPRO 10 UNITS: 100 INJECTION, SOLUTION INTRAVENOUS; SUBCUTANEOUS at 09:55

## 2024-09-10 RX ADMIN — NICOTINE 1 PATCH: 21 PATCH, EXTENDED RELEASE TRANSDERMAL at 16:03

## 2024-09-10 RX ADMIN — CEFEPIME 2000 MG: 2 INJECTION, POWDER, FOR SOLUTION INTRAVENOUS at 22:57

## 2024-09-10 RX ADMIN — CEFEPIME 2000 MG: 2 INJECTION, POWDER, FOR SOLUTION INTRAVENOUS at 16:03

## 2024-09-10 RX ADMIN — LINAGLIPTIN 5 MG: 5 TABLET, FILM COATED ORAL at 09:55

## 2024-09-11 LAB
ANION GAP SERPL CALCULATED.3IONS-SCNC: 13 MMOL/L (ref 5–15)
BUN SERPL-MCNC: 52 MG/DL (ref 8–23)
BUN/CREAT SERPL: 31.7 (ref 7–25)
CALCIUM SPEC-SCNC: 9.1 MG/DL (ref 8.6–10.5)
CHLORIDE SERPL-SCNC: 98 MMOL/L (ref 98–107)
CO2 SERPL-SCNC: 31 MMOL/L (ref 22–29)
CREAT SERPL-MCNC: 1.64 MG/DL (ref 0.76–1.27)
EGFRCR SERPLBLD CKD-EPI 2021: 46.7 ML/MIN/1.73
GLUCOSE BLDC GLUCOMTR-MCNC: 122 MG/DL (ref 70–130)
GLUCOSE BLDC GLUCOMTR-MCNC: 220 MG/DL (ref 70–130)
GLUCOSE BLDC GLUCOMTR-MCNC: 234 MG/DL (ref 70–130)
GLUCOSE BLDC GLUCOMTR-MCNC: 255 MG/DL (ref 70–130)
GLUCOSE SERPL-MCNC: 232 MG/DL (ref 65–99)
POTASSIUM SERPL-SCNC: 4.5 MMOL/L (ref 3.5–5.2)
SODIUM SERPL-SCNC: 142 MMOL/L (ref 136–145)

## 2024-09-11 PROCEDURE — 25010000002 METHYLPREDNISOLONE PER 40 MG: Performed by: FAMILY MEDICINE

## 2024-09-11 PROCEDURE — 25010000002 FUROSEMIDE PER 20 MG: Performed by: NURSE PRACTITIONER

## 2024-09-11 PROCEDURE — 94799 UNLISTED PULMONARY SVC/PX: CPT

## 2024-09-11 PROCEDURE — 25010000002 CEFEPIME PER 500 MG: Performed by: FAMILY MEDICINE

## 2024-09-11 PROCEDURE — 99223 1ST HOSP IP/OBS HIGH 75: CPT | Performed by: INTERNAL MEDICINE

## 2024-09-11 PROCEDURE — 63710000001 INSULIN LISPRO (HUMAN) PER 5 UNITS: Performed by: FAMILY MEDICINE

## 2024-09-11 PROCEDURE — 63710000001 INSULIN GLARGINE PER 5 UNITS: Performed by: FAMILY MEDICINE

## 2024-09-11 PROCEDURE — 82948 REAGENT STRIP/BLOOD GLUCOSE: CPT

## 2024-09-11 PROCEDURE — 94760 N-INVAS EAR/PLS OXIMETRY 1: CPT

## 2024-09-11 PROCEDURE — 25010000002 FUROSEMIDE PER 20 MG: Performed by: FAMILY MEDICINE

## 2024-09-11 PROCEDURE — 80048 BASIC METABOLIC PNL TOTAL CA: CPT | Performed by: NURSE PRACTITIONER

## 2024-09-11 RX ORDER — ENOXAPARIN SODIUM 100 MG/ML
40 INJECTION SUBCUTANEOUS
Status: DISCONTINUED | OUTPATIENT
Start: 2024-09-12 | End: 2024-09-18 | Stop reason: HOSPADM

## 2024-09-11 RX ADMIN — NICOTINE 1 PATCH: 21 PATCH, EXTENDED RELEASE TRANSDERMAL at 10:40

## 2024-09-11 RX ADMIN — LINAGLIPTIN 5 MG: 5 TABLET, FILM COATED ORAL at 10:40

## 2024-09-11 RX ADMIN — Medication 10 ML: at 21:10

## 2024-09-11 RX ADMIN — FUROSEMIDE 5 MG/HR: 10 INJECTION, SOLUTION INTRAMUSCULAR; INTRAVENOUS at 18:58

## 2024-09-11 RX ADMIN — FAMOTIDINE 20 MG: 20 TABLET, FILM COATED ORAL at 10:41

## 2024-09-11 RX ADMIN — CEFEPIME 2000 MG: 2 INJECTION, POWDER, FOR SOLUTION INTRAVENOUS at 18:57

## 2024-09-11 RX ADMIN — LOSARTAN POTASSIUM 25 MG: 50 TABLET, FILM COATED ORAL at 10:40

## 2024-09-11 RX ADMIN — METHYLPREDNISOLONE SODIUM SUCCINATE 40 MG: 40 INJECTION, POWDER, FOR SOLUTION INTRAMUSCULAR; INTRAVENOUS at 03:25

## 2024-09-11 RX ADMIN — METOPROLOL SUCCINATE 25 MG: 25 TABLET, EXTENDED RELEASE ORAL at 10:40

## 2024-09-11 RX ADMIN — CEFEPIME 2000 MG: 2 INJECTION, POWDER, FOR SOLUTION INTRAVENOUS at 23:13

## 2024-09-11 RX ADMIN — IPRATROPIUM BROMIDE AND ALBUTEROL SULFATE 3 ML: .5; 3 SOLUTION RESPIRATORY (INHALATION) at 07:13

## 2024-09-11 RX ADMIN — INSULIN GLARGINE 10 UNITS: 100 INJECTION, SOLUTION SUBCUTANEOUS at 10:38

## 2024-09-11 RX ADMIN — DULOXETINE HYDROCHLORIDE 30 MG: 30 CAPSULE, DELAYED RELEASE ORAL at 10:40

## 2024-09-11 RX ADMIN — FUROSEMIDE 40 MG: 10 INJECTION, SOLUTION INTRAVENOUS at 10:40

## 2024-09-11 RX ADMIN — FAMOTIDINE 20 MG: 20 TABLET, FILM COATED ORAL at 18:57

## 2024-09-11 RX ADMIN — GABAPENTIN 600 MG: 300 CAPSULE ORAL at 21:09

## 2024-09-11 RX ADMIN — ASPIRIN 81 MG: 81 TABLET, COATED ORAL at 10:44

## 2024-09-11 RX ADMIN — GLIPIZIDE 10 MG: 10 TABLET ORAL at 10:40

## 2024-09-11 RX ADMIN — BUDESONIDE AND FORMOTEROL FUMARATE DIHYDRATE 2 PUFF: 160; 4.5 AEROSOL RESPIRATORY (INHALATION) at 07:20

## 2024-09-11 RX ADMIN — Medication 10 ML: at 10:41

## 2024-09-11 RX ADMIN — BUDESONIDE AND FORMOTEROL FUMARATE DIHYDRATE 2 PUFF: 160; 4.5 AEROSOL RESPIRATORY (INHALATION) at 18:45

## 2024-09-11 RX ADMIN — INSULIN LISPRO 5 UNITS: 100 INJECTION, SOLUTION INTRAVENOUS; SUBCUTANEOUS at 21:12

## 2024-09-11 RX ADMIN — IPRATROPIUM BROMIDE AND ALBUTEROL SULFATE 3 ML: .5; 3 SOLUTION RESPIRATORY (INHALATION) at 10:21

## 2024-09-11 RX ADMIN — PRAVASTATIN SODIUM 40 MG: 20 TABLET ORAL at 21:09

## 2024-09-11 RX ADMIN — INSULIN GLARGINE 10 UNITS: 100 INJECTION, SOLUTION SUBCUTANEOUS at 21:09

## 2024-09-11 RX ADMIN — IPRATROPIUM BROMIDE AND ALBUTEROL SULFATE 3 ML: .5; 3 SOLUTION RESPIRATORY (INHALATION) at 18:45

## 2024-09-11 RX ADMIN — CEFEPIME 2000 MG: 2 INJECTION, POWDER, FOR SOLUTION INTRAVENOUS at 10:37

## 2024-09-11 RX ADMIN — IPRATROPIUM BROMIDE AND ALBUTEROL SULFATE 3 ML: .5; 3 SOLUTION RESPIRATORY (INHALATION) at 14:15

## 2024-09-11 RX ADMIN — EMPAGLIFLOZIN 10 MG: 10 TABLET, FILM COATED ORAL at 18:57

## 2024-09-12 PROBLEM — I50.23 ACUTE ON CHRONIC HFREF (HEART FAILURE WITH REDUCED EJECTION FRACTION): Status: RESOLVED | Noted: 2024-09-12 | Resolved: 2024-09-12

## 2024-09-12 PROBLEM — I50.42 CHRONIC COMBINED SYSTOLIC (CONGESTIVE) AND DIASTOLIC (CONGESTIVE) HEART FAILURE: Status: RESOLVED | Noted: 2022-04-14 | Resolved: 2024-09-12

## 2024-09-12 PROBLEM — I50.9 CHF (CONGESTIVE HEART FAILURE): Status: RESOLVED | Noted: 2024-09-09 | Resolved: 2024-09-12

## 2024-09-12 PROBLEM — I50.23 ACUTE ON CHRONIC HFREF (HEART FAILURE WITH REDUCED EJECTION FRACTION): Status: ACTIVE | Noted: 2024-09-12

## 2024-09-12 PROBLEM — R07.2 PRECORDIAL PAIN: Status: RESOLVED | Noted: 2022-02-18 | Resolved: 2024-09-12

## 2024-09-12 PROBLEM — I50.23 ACUTE ON CHRONIC SYSTOLIC CHF (CONGESTIVE HEART FAILURE): Status: ACTIVE | Noted: 2024-09-09

## 2024-09-12 LAB
ANION GAP SERPL CALCULATED.3IONS-SCNC: 11 MMOL/L (ref 5–15)
ANION GAP SERPL CALCULATED.3IONS-SCNC: 11 MMOL/L (ref 5–15)
ANION GAP SERPL CALCULATED.3IONS-SCNC: 9 MMOL/L (ref 5–15)
BUN SERPL-MCNC: 53 MG/DL (ref 8–23)
BUN SERPL-MCNC: 58 MG/DL (ref 8–23)
BUN SERPL-MCNC: 60 MG/DL (ref 8–23)
BUN/CREAT SERPL: 31 (ref 7–25)
BUN/CREAT SERPL: 33.7 (ref 7–25)
BUN/CREAT SERPL: 35.3 (ref 7–25)
CALCIUM SPEC-SCNC: 9 MG/DL (ref 8.6–10.5)
CALCIUM SPEC-SCNC: 9.1 MG/DL (ref 8.6–10.5)
CALCIUM SPEC-SCNC: 9.1 MG/DL (ref 8.6–10.5)
CHLORIDE SERPL-SCNC: 100 MMOL/L (ref 98–107)
CO2 SERPL-SCNC: 30 MMOL/L (ref 22–29)
CO2 SERPL-SCNC: 31 MMOL/L (ref 22–29)
CO2 SERPL-SCNC: 33 MMOL/L (ref 22–29)
CREAT SERPL-MCNC: 1.7 MG/DL (ref 0.76–1.27)
CREAT SERPL-MCNC: 1.71 MG/DL (ref 0.76–1.27)
CREAT SERPL-MCNC: 1.72 MG/DL (ref 0.76–1.27)
DEPRECATED RDW RBC AUTO: 53 FL (ref 37–54)
EGFRCR SERPLBLD CKD-EPI 2021: 44.1 ML/MIN/1.73
EGFRCR SERPLBLD CKD-EPI 2021: 44.4 ML/MIN/1.73
EGFRCR SERPLBLD CKD-EPI 2021: 44.7 ML/MIN/1.73
ERYTHROCYTE [DISTWIDTH] IN BLOOD BY AUTOMATED COUNT: 15.4 % (ref 12.3–15.4)
GLUCOSE BLDC GLUCOMTR-MCNC: 130 MG/DL (ref 70–130)
GLUCOSE BLDC GLUCOMTR-MCNC: 208 MG/DL (ref 70–130)
GLUCOSE BLDC GLUCOMTR-MCNC: 262 MG/DL (ref 70–130)
GLUCOSE BLDC GLUCOMTR-MCNC: 99 MG/DL (ref 70–130)
GLUCOSE SERPL-MCNC: 109 MG/DL (ref 65–99)
GLUCOSE SERPL-MCNC: 252 MG/DL (ref 65–99)
GLUCOSE SERPL-MCNC: 99 MG/DL (ref 65–99)
HCT VFR BLD AUTO: 42.1 % (ref 37.5–51)
HGB BLD-MCNC: 12 G/DL (ref 13–17.7)
MCH RBC QN AUTO: 27 PG (ref 26.6–33)
MCHC RBC AUTO-ENTMCNC: 28.5 G/DL (ref 31.5–35.7)
MCV RBC AUTO: 94.6 FL (ref 79–97)
PLATELET # BLD AUTO: 216 10*3/MM3 (ref 140–450)
PMV BLD AUTO: 10.5 FL (ref 6–12)
POTASSIUM SERPL-SCNC: 4.2 MMOL/L (ref 3.5–5.2)
POTASSIUM SERPL-SCNC: 4.4 MMOL/L (ref 3.5–5.2)
POTASSIUM SERPL-SCNC: 4.6 MMOL/L (ref 3.5–5.2)
RBC # BLD AUTO: 4.45 10*6/MM3 (ref 4.14–5.8)
SODIUM SERPL-SCNC: 141 MMOL/L (ref 136–145)
SODIUM SERPL-SCNC: 142 MMOL/L (ref 136–145)
SODIUM SERPL-SCNC: 142 MMOL/L (ref 136–145)
WBC NRBC COR # BLD AUTO: 10.03 10*3/MM3 (ref 3.4–10.8)

## 2024-09-12 PROCEDURE — 25510000001 IOPAMIDOL 61 % SOLUTION: Performed by: INTERNAL MEDICINE

## 2024-09-12 PROCEDURE — 93458 L HRT ARTERY/VENTRICLE ANGIO: CPT | Performed by: INTERNAL MEDICINE

## 2024-09-12 PROCEDURE — 25010000002 METHYLPREDNISOLONE PER 40 MG: Performed by: FAMILY MEDICINE

## 2024-09-12 PROCEDURE — 85027 COMPLETE CBC AUTOMATED: CPT | Performed by: FAMILY MEDICINE

## 2024-09-12 PROCEDURE — 63710000001 INSULIN LISPRO (HUMAN) PER 5 UNITS: Performed by: INTERNAL MEDICINE

## 2024-09-12 PROCEDURE — 25010000002 ENOXAPARIN PER 10 MG: Performed by: INTERNAL MEDICINE

## 2024-09-12 PROCEDURE — 94761 N-INVAS EAR/PLS OXIMETRY MLT: CPT

## 2024-09-12 PROCEDURE — 25010000002 MIDAZOLAM HCL (PF) 5 MG/5ML SOLUTION: Performed by: INTERNAL MEDICINE

## 2024-09-12 PROCEDURE — 25010000002 HEPARIN (PORCINE) 1000-0.9 UT/500ML-% SOLUTION: Performed by: INTERNAL MEDICINE

## 2024-09-12 PROCEDURE — C1769 GUIDE WIRE: HCPCS | Performed by: INTERNAL MEDICINE

## 2024-09-12 PROCEDURE — 25010000002 CEFEPIME PER 500 MG: Performed by: FAMILY MEDICINE

## 2024-09-12 PROCEDURE — 4A023N7 MEASUREMENT OF CARDIAC SAMPLING AND PRESSURE, LEFT HEART, PERCUTANEOUS APPROACH: ICD-10-PCS | Performed by: INTERNAL MEDICINE

## 2024-09-12 PROCEDURE — C1894 INTRO/SHEATH, NON-LASER: HCPCS | Performed by: INTERNAL MEDICINE

## 2024-09-12 PROCEDURE — 99152 MOD SED SAME PHYS/QHP 5/>YRS: CPT | Performed by: INTERNAL MEDICINE

## 2024-09-12 PROCEDURE — 94799 UNLISTED PULMONARY SVC/PX: CPT

## 2024-09-12 PROCEDURE — 25010000002 HEPARIN (PORCINE) 2000-0.9 UNIT/L-% SOLUTION: Performed by: INTERNAL MEDICINE

## 2024-09-12 PROCEDURE — 25010000002 FUROSEMIDE PER 20 MG: Performed by: INTERNAL MEDICINE

## 2024-09-12 PROCEDURE — 94760 N-INVAS EAR/PLS OXIMETRY 1: CPT

## 2024-09-12 PROCEDURE — 25010000002 CEFEPIME PER 500 MG: Performed by: INTERNAL MEDICINE

## 2024-09-12 PROCEDURE — 82948 REAGENT STRIP/BLOOD GLUCOSE: CPT

## 2024-09-12 PROCEDURE — 25010000002 FENTANYL CITRATE (PF) 50 MCG/ML SOLUTION: Performed by: INTERNAL MEDICINE

## 2024-09-12 PROCEDURE — 63710000001 INSULIN GLARGINE PER 5 UNITS: Performed by: INTERNAL MEDICINE

## 2024-09-12 PROCEDURE — 25010000002 METHYLPREDNISOLONE PER 40 MG: Performed by: INTERNAL MEDICINE

## 2024-09-12 PROCEDURE — B2111ZZ FLUOROSCOPY OF MULTIPLE CORONARY ARTERIES USING LOW OSMOLAR CONTRAST: ICD-10-PCS | Performed by: INTERNAL MEDICINE

## 2024-09-12 PROCEDURE — 80048 BASIC METABOLIC PNL TOTAL CA: CPT | Performed by: NURSE PRACTITIONER

## 2024-09-12 RX ORDER — ACETAMINOPHEN 325 MG/1
650 TABLET ORAL EVERY 6 HOURS PRN
Status: DISCONTINUED | OUTPATIENT
Start: 2024-09-12 | End: 2024-09-18 | Stop reason: HOSPADM

## 2024-09-12 RX ORDER — MIDAZOLAM HYDROCHLORIDE 5 MG/5ML
INJECTION, SOLUTION INTRAMUSCULAR; INTRAVENOUS
Status: DISCONTINUED | OUTPATIENT
Start: 2024-09-12 | End: 2024-09-12 | Stop reason: HOSPADM

## 2024-09-12 RX ORDER — IOPAMIDOL 612 MG/ML
INJECTION, SOLUTION INTRAVASCULAR
Status: DISCONTINUED | OUTPATIENT
Start: 2024-09-12 | End: 2024-09-12 | Stop reason: HOSPADM

## 2024-09-12 RX ORDER — HEPARIN SODIUM 200 [USP'U]/100ML
INJECTION, SOLUTION INTRAVENOUS
Status: DISCONTINUED | OUTPATIENT
Start: 2024-09-12 | End: 2024-09-12 | Stop reason: HOSPADM

## 2024-09-12 RX ORDER — FENTANYL CITRATE 50 UG/ML
INJECTION, SOLUTION INTRAMUSCULAR; INTRAVENOUS
Status: DISCONTINUED | OUTPATIENT
Start: 2024-09-12 | End: 2024-09-12 | Stop reason: HOSPADM

## 2024-09-12 RX ORDER — LIDOCAINE HYDROCHLORIDE 20 MG/ML
INJECTION, SOLUTION INFILTRATION; PERINEURAL
Status: DISCONTINUED | OUTPATIENT
Start: 2024-09-12 | End: 2024-09-12 | Stop reason: HOSPADM

## 2024-09-12 RX ADMIN — CEFEPIME 2000 MG: 2 INJECTION, POWDER, FOR SOLUTION INTRAVENOUS at 15:15

## 2024-09-12 RX ADMIN — EMPAGLIFLOZIN 10 MG: 10 TABLET, FILM COATED ORAL at 12:50

## 2024-09-12 RX ADMIN — CEFEPIME 2000 MG: 2 INJECTION, POWDER, FOR SOLUTION INTRAVENOUS at 23:24

## 2024-09-12 RX ADMIN — CEFEPIME 2000 MG: 2 INJECTION, POWDER, FOR SOLUTION INTRAVENOUS at 06:09

## 2024-09-12 RX ADMIN — IPRATROPIUM BROMIDE AND ALBUTEROL SULFATE 3 ML: .5; 3 SOLUTION RESPIRATORY (INHALATION) at 19:13

## 2024-09-12 RX ADMIN — GABAPENTIN 600 MG: 300 CAPSULE ORAL at 21:43

## 2024-09-12 RX ADMIN — Medication 10 ML: at 21:42

## 2024-09-12 RX ADMIN — ACETAMINOPHEN 650 MG: 325 TABLET ORAL at 00:38

## 2024-09-12 RX ADMIN — IPRATROPIUM BROMIDE AND ALBUTEROL SULFATE 3 ML: .5; 3 SOLUTION RESPIRATORY (INHALATION) at 06:37

## 2024-09-12 RX ADMIN — METHYLPREDNISOLONE SODIUM SUCCINATE 40 MG: 40 INJECTION, POWDER, FOR SOLUTION INTRAMUSCULAR; INTRAVENOUS at 15:15

## 2024-09-12 RX ADMIN — ASPIRIN 81 MG: 81 TABLET, COATED ORAL at 08:37

## 2024-09-12 RX ADMIN — DULOXETINE HYDROCHLORIDE 30 MG: 30 CAPSULE, DELAYED RELEASE ORAL at 12:50

## 2024-09-12 RX ADMIN — LINAGLIPTIN 5 MG: 5 TABLET, FILM COATED ORAL at 12:50

## 2024-09-12 RX ADMIN — Medication 5 MG: at 00:38

## 2024-09-12 RX ADMIN — INSULIN GLARGINE 10 UNITS: 100 INJECTION, SOLUTION SUBCUTANEOUS at 21:59

## 2024-09-12 RX ADMIN — SACUBITRIL AND VALSARTAN 1 TABLET: 24; 26 TABLET, FILM COATED ORAL at 21:43

## 2024-09-12 RX ADMIN — ENOXAPARIN SODIUM 40 MG: 100 INJECTION SUBCUTANEOUS at 15:15

## 2024-09-12 RX ADMIN — FUROSEMIDE 5 MG/HR: 10 INJECTION, SOLUTION INTRAMUSCULAR; INTRAVENOUS at 23:54

## 2024-09-12 RX ADMIN — BUDESONIDE AND FORMOTEROL FUMARATE DIHYDRATE 2 PUFF: 160; 4.5 AEROSOL RESPIRATORY (INHALATION) at 19:13

## 2024-09-12 RX ADMIN — INSULIN LISPRO 8 UNITS: 100 INJECTION, SOLUTION INTRAVENOUS; SUBCUTANEOUS at 21:59

## 2024-09-12 RX ADMIN — INSULIN GLARGINE 10 UNITS: 100 INJECTION, SOLUTION SUBCUTANEOUS at 13:18

## 2024-09-12 RX ADMIN — METOPROLOL SUCCINATE 25 MG: 25 TABLET, EXTENDED RELEASE ORAL at 12:50

## 2024-09-12 RX ADMIN — METHYLPREDNISOLONE SODIUM SUCCINATE 40 MG: 40 INJECTION, POWDER, FOR SOLUTION INTRAMUSCULAR; INTRAVENOUS at 03:24

## 2024-09-12 RX ADMIN — Medication 10 ML: at 15:21

## 2024-09-12 RX ADMIN — SACUBITRIL AND VALSARTAN 1 TABLET: 24; 26 TABLET, FILM COATED ORAL at 12:50

## 2024-09-12 RX ADMIN — GLIPIZIDE 10 MG: 10 TABLET ORAL at 12:50

## 2024-09-12 RX ADMIN — NICOTINE 1 PATCH: 21 PATCH, EXTENDED RELEASE TRANSDERMAL at 12:49

## 2024-09-12 RX ADMIN — FAMOTIDINE 20 MG: 20 TABLET, FILM COATED ORAL at 12:50

## 2024-09-12 RX ADMIN — BUDESONIDE AND FORMOTEROL FUMARATE DIHYDRATE 2 PUFF: 160; 4.5 AEROSOL RESPIRATORY (INHALATION) at 06:38

## 2024-09-12 RX ADMIN — PRAVASTATIN SODIUM 40 MG: 20 TABLET ORAL at 21:43

## 2024-09-12 RX ADMIN — IPRATROPIUM BROMIDE AND ALBUTEROL SULFATE 3 ML: .5; 3 SOLUTION RESPIRATORY (INHALATION) at 14:20

## 2024-09-13 ENCOUNTER — APPOINTMENT (OUTPATIENT)
Dept: ULTRASOUND IMAGING | Facility: HOSPITAL | Age: 64
DRG: 286 | End: 2024-09-13
Payer: MEDICARE

## 2024-09-13 ENCOUNTER — TELEPHONE (OUTPATIENT)
Dept: CARDIAC SURGERY | Facility: CLINIC | Age: 64
End: 2024-09-13
Payer: MEDICARE

## 2024-09-13 LAB
ANION GAP SERPL CALCULATED.3IONS-SCNC: 8 MMOL/L (ref 5–15)
BUN SERPL-MCNC: 60 MG/DL (ref 8–23)
BUN/CREAT SERPL: 33.9 (ref 7–25)
CALCIUM SPEC-SCNC: 8.8 MG/DL (ref 8.6–10.5)
CHLORIDE SERPL-SCNC: 101 MMOL/L (ref 98–107)
CO2 SERPL-SCNC: 33 MMOL/L (ref 22–29)
CREAT SERPL-MCNC: 1.77 MG/DL (ref 0.76–1.27)
DEPRECATED RDW RBC AUTO: 52.5 FL (ref 37–54)
EGFRCR SERPLBLD CKD-EPI 2021: 42.6 ML/MIN/1.73
ERYTHROCYTE [DISTWIDTH] IN BLOOD BY AUTOMATED COUNT: 15.1 % (ref 12.3–15.4)
GLUCOSE BLDC GLUCOMTR-MCNC: 104 MG/DL (ref 70–130)
GLUCOSE BLDC GLUCOMTR-MCNC: 110 MG/DL (ref 70–130)
GLUCOSE BLDC GLUCOMTR-MCNC: 224 MG/DL (ref 70–130)
GLUCOSE BLDC GLUCOMTR-MCNC: 266 MG/DL (ref 70–130)
GLUCOSE BLDC GLUCOMTR-MCNC: 55 MG/DL (ref 70–130)
GLUCOSE BLDC GLUCOMTR-MCNC: 62 MG/DL (ref 70–130)
GLUCOSE BLDC GLUCOMTR-MCNC: 70 MG/DL (ref 70–130)
GLUCOSE SERPL-MCNC: 120 MG/DL (ref 65–99)
HCT VFR BLD AUTO: 41.4 % (ref 37.5–51)
HGB BLD-MCNC: 11.7 G/DL (ref 13–17.7)
MAGNESIUM SERPL-MCNC: 2.1 MG/DL (ref 1.6–2.4)
MCH RBC QN AUTO: 26.8 PG (ref 26.6–33)
MCHC RBC AUTO-ENTMCNC: 28.3 G/DL (ref 31.5–35.7)
MCV RBC AUTO: 95 FL (ref 79–97)
PLATELET # BLD AUTO: 184 10*3/MM3 (ref 140–450)
PMV BLD AUTO: 10.5 FL (ref 6–12)
POTASSIUM SERPL-SCNC: 4.3 MMOL/L (ref 3.5–5.2)
RBC # BLD AUTO: 4.36 10*6/MM3 (ref 4.14–5.8)
SODIUM SERPL-SCNC: 142 MMOL/L (ref 136–145)
WBC NRBC COR # BLD AUTO: 7.2 10*3/MM3 (ref 3.4–10.8)

## 2024-09-13 PROCEDURE — 25010000002 ENOXAPARIN PER 10 MG: Performed by: INTERNAL MEDICINE

## 2024-09-13 PROCEDURE — 99232 SBSQ HOSP IP/OBS MODERATE 35: CPT | Performed by: INTERNAL MEDICINE

## 2024-09-13 PROCEDURE — 93880 EXTRACRANIAL BILAT STUDY: CPT | Performed by: SURGERY

## 2024-09-13 PROCEDURE — 83735 ASSAY OF MAGNESIUM: CPT | Performed by: NURSE PRACTITIONER

## 2024-09-13 PROCEDURE — 63710000001 INSULIN GLARGINE PER 5 UNITS: Performed by: INTERNAL MEDICINE

## 2024-09-13 PROCEDURE — 25010000002 METHYLPREDNISOLONE PER 40 MG: Performed by: INTERNAL MEDICINE

## 2024-09-13 PROCEDURE — 85027 COMPLETE CBC AUTOMATED: CPT | Performed by: INTERNAL MEDICINE

## 2024-09-13 PROCEDURE — 82948 REAGENT STRIP/BLOOD GLUCOSE: CPT

## 2024-09-13 PROCEDURE — 93970 EXTREMITY STUDY: CPT

## 2024-09-13 PROCEDURE — 94761 N-INVAS EAR/PLS OXIMETRY MLT: CPT

## 2024-09-13 PROCEDURE — 63710000001 INSULIN LISPRO (HUMAN) PER 5 UNITS: Performed by: INTERNAL MEDICINE

## 2024-09-13 PROCEDURE — 93880 EXTRACRANIAL BILAT STUDY: CPT

## 2024-09-13 PROCEDURE — 94799 UNLISTED PULMONARY SVC/PX: CPT

## 2024-09-13 PROCEDURE — 25010000002 CEFEPIME PER 500 MG: Performed by: INTERNAL MEDICINE

## 2024-09-13 PROCEDURE — 80048 BASIC METABOLIC PNL TOTAL CA: CPT | Performed by: FAMILY MEDICINE

## 2024-09-13 PROCEDURE — 99223 1ST HOSP IP/OBS HIGH 75: CPT | Performed by: SURGERY

## 2024-09-13 PROCEDURE — 93970 EXTREMITY STUDY: CPT | Performed by: SURGERY

## 2024-09-13 RX ORDER — HYDROCODONE BITARTRATE AND ACETAMINOPHEN 7.5; 325 MG/1; MG/1
1 TABLET ORAL ONCE AS NEEDED
Status: COMPLETED | OUTPATIENT
Start: 2024-09-13 | End: 2024-09-13

## 2024-09-13 RX ORDER — ATORVASTATIN CALCIUM 40 MG/1
40 TABLET, FILM COATED ORAL NIGHTLY
Status: DISCONTINUED | OUTPATIENT
Start: 2024-09-13 | End: 2024-09-18 | Stop reason: HOSPADM

## 2024-09-13 RX ORDER — AZITHROMYCIN 250 MG/1
500 TABLET, FILM COATED ORAL
Status: COMPLETED | OUTPATIENT
Start: 2024-09-13 | End: 2024-09-15

## 2024-09-13 RX ORDER — METOLAZONE 5 MG/1
5 TABLET ORAL ONCE
Status: COMPLETED | OUTPATIENT
Start: 2024-09-13 | End: 2024-09-13

## 2024-09-13 RX ADMIN — CEFEPIME 2000 MG: 2 INJECTION, POWDER, FOR SOLUTION INTRAVENOUS at 06:02

## 2024-09-13 RX ADMIN — BUDESONIDE AND FORMOTEROL FUMARATE DIHYDRATE 2 PUFF: 160; 4.5 AEROSOL RESPIRATORY (INHALATION) at 06:27

## 2024-09-13 RX ADMIN — METOLAZONE 5 MG: 5 TABLET ORAL at 13:45

## 2024-09-13 RX ADMIN — DULOXETINE HYDROCHLORIDE 30 MG: 30 CAPSULE, DELAYED RELEASE ORAL at 09:03

## 2024-09-13 RX ADMIN — ASPIRIN 81 MG: 81 TABLET, COATED ORAL at 09:17

## 2024-09-13 RX ADMIN — SACUBITRIL AND VALSARTAN 1 TABLET: 24; 26 TABLET, FILM COATED ORAL at 21:28

## 2024-09-13 RX ADMIN — EMPAGLIFLOZIN 10 MG: 10 TABLET, FILM COATED ORAL at 09:03

## 2024-09-13 RX ADMIN — ENOXAPARIN SODIUM 40 MG: 100 INJECTION SUBCUTANEOUS at 09:02

## 2024-09-13 RX ADMIN — SACUBITRIL AND VALSARTAN 1 TABLET: 24; 26 TABLET, FILM COATED ORAL at 09:02

## 2024-09-13 RX ADMIN — FAMOTIDINE 20 MG: 20 TABLET, FILM COATED ORAL at 09:02

## 2024-09-13 RX ADMIN — NICOTINE 1 PATCH: 21 PATCH, EXTENDED RELEASE TRANSDERMAL at 09:25

## 2024-09-13 RX ADMIN — GLIPIZIDE 10 MG: 10 TABLET ORAL at 09:03

## 2024-09-13 RX ADMIN — INSULIN LISPRO 8 UNITS: 100 INJECTION, SOLUTION INTRAVENOUS; SUBCUTANEOUS at 13:45

## 2024-09-13 RX ADMIN — HYDROCODONE BITARTRATE AND ACETAMINOPHEN 1 TABLET: 7.5; 325 TABLET ORAL at 02:49

## 2024-09-13 RX ADMIN — INSULIN GLARGINE 10 UNITS: 100 INJECTION, SOLUTION SUBCUTANEOUS at 21:28

## 2024-09-13 RX ADMIN — METOPROLOL SUCCINATE 25 MG: 25 TABLET, EXTENDED RELEASE ORAL at 09:03

## 2024-09-13 RX ADMIN — GABAPENTIN 600 MG: 300 CAPSULE ORAL at 21:28

## 2024-09-13 RX ADMIN — IPRATROPIUM BROMIDE AND ALBUTEROL SULFATE 3 ML: .5; 3 SOLUTION RESPIRATORY (INHALATION) at 19:59

## 2024-09-13 RX ADMIN — BUDESONIDE AND FORMOTEROL FUMARATE DIHYDRATE 2 PUFF: 160; 4.5 AEROSOL RESPIRATORY (INHALATION) at 19:59

## 2024-09-13 RX ADMIN — IPRATROPIUM BROMIDE AND ALBUTEROL SULFATE 3 ML: .5; 3 SOLUTION RESPIRATORY (INHALATION) at 14:13

## 2024-09-13 RX ADMIN — LINAGLIPTIN 5 MG: 5 TABLET, FILM COATED ORAL at 09:02

## 2024-09-13 RX ADMIN — GLIPIZIDE 10 MG: 10 TABLET ORAL at 17:00

## 2024-09-13 RX ADMIN — INSULIN GLARGINE 10 UNITS: 100 INJECTION, SOLUTION SUBCUTANEOUS at 09:01

## 2024-09-13 RX ADMIN — ATORVASTATIN CALCIUM 40 MG: 40 TABLET ORAL at 21:28

## 2024-09-13 RX ADMIN — IPRATROPIUM BROMIDE AND ALBUTEROL SULFATE 3 ML: .5; 3 SOLUTION RESPIRATORY (INHALATION) at 06:27

## 2024-09-13 RX ADMIN — AZITHROMYCIN 500 MG: 250 TABLET, FILM COATED ORAL at 13:45

## 2024-09-13 RX ADMIN — METHYLPREDNISOLONE SODIUM SUCCINATE 40 MG: 40 INJECTION, POWDER, FOR SOLUTION INTRAMUSCULAR; INTRAVENOUS at 02:49

## 2024-09-13 RX ADMIN — DEXTROSE 15 G: 15 GEL ORAL at 22:59

## 2024-09-13 RX ADMIN — Medication 10 ML: at 09:04

## 2024-09-13 RX ADMIN — Medication 10 ML: at 21:29

## 2024-09-13 RX ADMIN — INSULIN LISPRO 5 UNITS: 100 INJECTION, SOLUTION INTRAVENOUS; SUBCUTANEOUS at 09:01

## 2024-09-13 RX ADMIN — IPRATROPIUM BROMIDE AND ALBUTEROL SULFATE 3 ML: .5; 3 SOLUTION RESPIRATORY (INHALATION) at 10:17

## 2024-09-13 RX ADMIN — FAMOTIDINE 20 MG: 20 TABLET, FILM COATED ORAL at 17:00

## 2024-09-13 RX ADMIN — METHYLPREDNISOLONE SODIUM SUCCINATE 40 MG: 40 INJECTION, POWDER, FOR SOLUTION INTRAMUSCULAR; INTRAVENOUS at 17:00

## 2024-09-13 NOTE — TELEPHONE ENCOUNTER
Patient is currently hospitalized but will need a 1 month follow-up with Dr. Merchant with repeat hemoglobin A1c after discharge.  He will also need a cardiac MRI for viability scheduled as an outpatient prior to this appointment as well.

## 2024-09-14 LAB
ANION GAP SERPL CALCULATED.3IONS-SCNC: 11 MMOL/L (ref 5–15)
BACTERIA SPEC AEROBE CULT: NORMAL
BACTERIA SPEC AEROBE CULT: NORMAL
BUN SERPL-MCNC: 57 MG/DL (ref 8–23)
BUN/CREAT SERPL: 34.1 (ref 7–25)
CALCIUM SPEC-SCNC: 9.4 MG/DL (ref 8.6–10.5)
CHLORIDE SERPL-SCNC: 98 MMOL/L (ref 98–107)
CO2 SERPL-SCNC: 36 MMOL/L (ref 22–29)
CREAT SERPL-MCNC: 1.67 MG/DL (ref 0.76–1.27)
DEPRECATED RDW RBC AUTO: 52.5 FL (ref 37–54)
EGFRCR SERPLBLD CKD-EPI 2021: 45.7 ML/MIN/1.73
ERYTHROCYTE [DISTWIDTH] IN BLOOD BY AUTOMATED COUNT: 15 % (ref 12.3–15.4)
GLUCOSE BLDC GLUCOMTR-MCNC: 109 MG/DL (ref 70–130)
GLUCOSE BLDC GLUCOMTR-MCNC: 109 MG/DL (ref 70–130)
GLUCOSE BLDC GLUCOMTR-MCNC: 114 MG/DL (ref 70–130)
GLUCOSE BLDC GLUCOMTR-MCNC: 116 MG/DL (ref 70–130)
GLUCOSE BLDC GLUCOMTR-MCNC: 123 MG/DL (ref 70–130)
GLUCOSE BLDC GLUCOMTR-MCNC: 132 MG/DL (ref 70–130)
GLUCOSE BLDC GLUCOMTR-MCNC: 171 MG/DL (ref 70–130)
GLUCOSE BLDC GLUCOMTR-MCNC: 175 MG/DL (ref 70–130)
GLUCOSE BLDC GLUCOMTR-MCNC: 220 MG/DL (ref 70–130)
GLUCOSE BLDC GLUCOMTR-MCNC: 58 MG/DL (ref 70–130)
GLUCOSE BLDC GLUCOMTR-MCNC: 60 MG/DL (ref 70–130)
GLUCOSE SERPL-MCNC: 40 MG/DL (ref 65–99)
HCT VFR BLD AUTO: 41.9 % (ref 37.5–51)
HGB BLD-MCNC: 11.8 G/DL (ref 13–17.7)
MCH RBC QN AUTO: 26.7 PG (ref 26.6–33)
MCHC RBC AUTO-ENTMCNC: 28.2 G/DL (ref 31.5–35.7)
MCV RBC AUTO: 94.8 FL (ref 79–97)
PLATELET # BLD AUTO: 214 10*3/MM3 (ref 140–450)
PMV BLD AUTO: 10.6 FL (ref 6–12)
POTASSIUM SERPL-SCNC: 4.2 MMOL/L (ref 3.5–5.2)
RBC # BLD AUTO: 4.42 10*6/MM3 (ref 4.14–5.8)
SODIUM SERPL-SCNC: 145 MMOL/L (ref 136–145)
WBC NRBC COR # BLD AUTO: 11.14 10*3/MM3 (ref 3.4–10.8)

## 2024-09-14 PROCEDURE — 99232 SBSQ HOSP IP/OBS MODERATE 35: CPT | Performed by: INTERNAL MEDICINE

## 2024-09-14 PROCEDURE — 94761 N-INVAS EAR/PLS OXIMETRY MLT: CPT

## 2024-09-14 PROCEDURE — 82948 REAGENT STRIP/BLOOD GLUCOSE: CPT

## 2024-09-14 PROCEDURE — 63710000001 INSULIN GLARGINE PER 5 UNITS: Performed by: INTERNAL MEDICINE

## 2024-09-14 PROCEDURE — 94799 UNLISTED PULMONARY SVC/PX: CPT

## 2024-09-14 PROCEDURE — 63710000001 INSULIN LISPRO (HUMAN) PER 5 UNITS: Performed by: INTERNAL MEDICINE

## 2024-09-14 PROCEDURE — 94760 N-INVAS EAR/PLS OXIMETRY 1: CPT

## 2024-09-14 PROCEDURE — 85027 COMPLETE CBC AUTOMATED: CPT | Performed by: INTERNAL MEDICINE

## 2024-09-14 PROCEDURE — 80048 BASIC METABOLIC PNL TOTAL CA: CPT | Performed by: FAMILY MEDICINE

## 2024-09-14 PROCEDURE — 25010000002 METHYLPREDNISOLONE PER 40 MG: Performed by: INTERNAL MEDICINE

## 2024-09-14 PROCEDURE — 94664 DEMO&/EVAL PT USE INHALER: CPT

## 2024-09-14 PROCEDURE — 25010000002 ENOXAPARIN PER 10 MG: Performed by: INTERNAL MEDICINE

## 2024-09-14 RX ORDER — BACITRACIN ZINC, NEOMYCIN SULFATE AND POLYMYXIN B SULFATE 400; 5; 5000 [IU]/G; MG/G; [IU]/G
1 OINTMENT TOPICAL DAILY
Status: DISCONTINUED | OUTPATIENT
Start: 2024-09-14 | End: 2024-09-18 | Stop reason: HOSPADM

## 2024-09-14 RX ORDER — LIDOCAINE 4 G/G
2 PATCH TOPICAL
Status: DISCONTINUED | OUTPATIENT
Start: 2024-09-14 | End: 2024-09-18 | Stop reason: HOSPADM

## 2024-09-14 RX ORDER — FUROSEMIDE 20 MG
40 TABLET ORAL
Status: DISCONTINUED | OUTPATIENT
Start: 2024-09-14 | End: 2024-09-18 | Stop reason: HOSPADM

## 2024-09-14 RX ORDER — HYDROCODONE BITARTRATE AND ACETAMINOPHEN 5; 325 MG/1; MG/1
1 TABLET ORAL EVERY 4 HOURS PRN
Status: DISCONTINUED | OUTPATIENT
Start: 2024-09-14 | End: 2024-09-17

## 2024-09-14 RX ADMIN — FAMOTIDINE 20 MG: 20 TABLET, FILM COATED ORAL at 08:43

## 2024-09-14 RX ADMIN — DULOXETINE HYDROCHLORIDE 30 MG: 30 CAPSULE, DELAYED RELEASE ORAL at 10:37

## 2024-09-14 RX ADMIN — AZITHROMYCIN 500 MG: 250 TABLET, FILM COATED ORAL at 10:37

## 2024-09-14 RX ADMIN — ASPIRIN 81 MG: 81 TABLET, COATED ORAL at 10:39

## 2024-09-14 RX ADMIN — DICLOFENAC SODIUM 4 G: 10 GEL TOPICAL at 17:09

## 2024-09-14 RX ADMIN — INSULIN LISPRO 3 UNITS: 100 INJECTION, SOLUTION INTRAVENOUS; SUBCUTANEOUS at 17:09

## 2024-09-14 RX ADMIN — HYDROCODONE BITARTRATE AND ACETAMINOPHEN 1 TABLET: 5; 325 TABLET ORAL at 22:11

## 2024-09-14 RX ADMIN — IPRATROPIUM BROMIDE AND ALBUTEROL SULFATE 3 ML: .5; 3 SOLUTION RESPIRATORY (INHALATION) at 15:40

## 2024-09-14 RX ADMIN — METHYLPREDNISOLONE SODIUM SUCCINATE 40 MG: 40 INJECTION, POWDER, FOR SOLUTION INTRAMUSCULAR; INTRAVENOUS at 15:32

## 2024-09-14 RX ADMIN — DEXTROSE MONOHYDRATE 25 G: 25 INJECTION, SOLUTION INTRAVENOUS at 05:45

## 2024-09-14 RX ADMIN — BUDESONIDE AND FORMOTEROL FUMARATE DIHYDRATE 2 PUFF: 160; 4.5 AEROSOL RESPIRATORY (INHALATION) at 20:34

## 2024-09-14 RX ADMIN — NICOTINE 1 PATCH: 21 PATCH, EXTENDED RELEASE TRANSDERMAL at 10:38

## 2024-09-14 RX ADMIN — Medication 10 ML: at 22:10

## 2024-09-14 RX ADMIN — DICLOFENAC SODIUM 4 G: 10 GEL TOPICAL at 22:09

## 2024-09-14 RX ADMIN — GLIPIZIDE 10 MG: 10 TABLET ORAL at 08:43

## 2024-09-14 RX ADMIN — INSULIN LISPRO 3 UNITS: 100 INJECTION, SOLUTION INTRAVENOUS; SUBCUTANEOUS at 13:58

## 2024-09-14 RX ADMIN — FAMOTIDINE 20 MG: 20 TABLET, FILM COATED ORAL at 17:09

## 2024-09-14 RX ADMIN — GABAPENTIN 600 MG: 300 CAPSULE ORAL at 22:09

## 2024-09-14 RX ADMIN — DEXTROSE 15 G: 15 GEL ORAL at 05:26

## 2024-09-14 RX ADMIN — ENOXAPARIN SODIUM 40 MG: 100 INJECTION SUBCUTANEOUS at 10:39

## 2024-09-14 RX ADMIN — SACUBITRIL AND VALSARTAN 1 TABLET: 24; 26 TABLET, FILM COATED ORAL at 11:09

## 2024-09-14 RX ADMIN — DEXTROSE 15 G: 15 GEL ORAL at 05:01

## 2024-09-14 RX ADMIN — SACUBITRIL AND VALSARTAN 1 TABLET: 24; 26 TABLET, FILM COATED ORAL at 23:00

## 2024-09-14 RX ADMIN — GLIPIZIDE 10 MG: 10 TABLET ORAL at 17:09

## 2024-09-14 RX ADMIN — Medication 10 ML: at 10:37

## 2024-09-14 RX ADMIN — METHYLPREDNISOLONE SODIUM SUCCINATE 40 MG: 40 INJECTION, POWDER, FOR SOLUTION INTRAMUSCULAR; INTRAVENOUS at 02:46

## 2024-09-14 RX ADMIN — METOPROLOL SUCCINATE 25 MG: 25 TABLET, EXTENDED RELEASE ORAL at 11:09

## 2024-09-14 RX ADMIN — EMPAGLIFLOZIN 10 MG: 10 TABLET, FILM COATED ORAL at 10:38

## 2024-09-14 RX ADMIN — LIDOCAINE 2 PATCH: 4 PATCH TOPICAL at 15:32

## 2024-09-14 RX ADMIN — LINAGLIPTIN 5 MG: 5 TABLET, FILM COATED ORAL at 10:39

## 2024-09-14 RX ADMIN — IPRATROPIUM BROMIDE AND ALBUTEROL SULFATE 3 ML: .5; 3 SOLUTION RESPIRATORY (INHALATION) at 20:34

## 2024-09-14 RX ADMIN — BUDESONIDE AND FORMOTEROL FUMARATE DIHYDRATE 2 PUFF: 160; 4.5 AEROSOL RESPIRATORY (INHALATION) at 07:25

## 2024-09-14 RX ADMIN — INSULIN GLARGINE 10 UNITS: 100 INJECTION, SOLUTION SUBCUTANEOUS at 11:33

## 2024-09-14 RX ADMIN — IPRATROPIUM BROMIDE AND ALBUTEROL SULFATE 3 ML: .5; 3 SOLUTION RESPIRATORY (INHALATION) at 07:19

## 2024-09-14 RX ADMIN — IPRATROPIUM BROMIDE AND ALBUTEROL SULFATE 3 ML: .5; 3 SOLUTION RESPIRATORY (INHALATION) at 11:28

## 2024-09-14 RX ADMIN — ATORVASTATIN CALCIUM 40 MG: 40 TABLET ORAL at 22:09

## 2024-09-14 RX ADMIN — FUROSEMIDE 40 MG: 20 TABLET ORAL at 19:13

## 2024-09-15 LAB
ANION GAP SERPL CALCULATED.3IONS-SCNC: 9 MMOL/L (ref 5–15)
BUN SERPL-MCNC: 58 MG/DL (ref 8–23)
BUN/CREAT SERPL: 37.7 (ref 7–25)
CALCIUM SPEC-SCNC: 9.6 MG/DL (ref 8.6–10.5)
CHLORIDE SERPL-SCNC: 98 MMOL/L (ref 98–107)
CO2 SERPL-SCNC: 37 MMOL/L (ref 22–29)
CREAT SERPL-MCNC: 1.54 MG/DL (ref 0.76–1.27)
DEPRECATED RDW RBC AUTO: 52.4 FL (ref 37–54)
EGFRCR SERPLBLD CKD-EPI 2021: 50.4 ML/MIN/1.73
ERYTHROCYTE [DISTWIDTH] IN BLOOD BY AUTOMATED COUNT: 15.3 % (ref 12.3–15.4)
GLUCOSE BLDC GLUCOMTR-MCNC: 134 MG/DL (ref 70–130)
GLUCOSE BLDC GLUCOMTR-MCNC: 140 MG/DL (ref 70–130)
GLUCOSE BLDC GLUCOMTR-MCNC: 191 MG/DL (ref 70–130)
GLUCOSE BLDC GLUCOMTR-MCNC: 234 MG/DL (ref 70–130)
GLUCOSE BLDC GLUCOMTR-MCNC: 259 MG/DL (ref 70–130)
GLUCOSE SERPL-MCNC: 179 MG/DL (ref 65–99)
HCT VFR BLD AUTO: 39.8 % (ref 37.5–51)
HGB BLD-MCNC: 11.6 G/DL (ref 13–17.7)
MCH RBC QN AUTO: 27.6 PG (ref 26.6–33)
MCHC RBC AUTO-ENTMCNC: 29.1 G/DL (ref 31.5–35.7)
MCV RBC AUTO: 94.5 FL (ref 79–97)
PLATELET # BLD AUTO: 185 10*3/MM3 (ref 140–450)
PMV BLD AUTO: 11.1 FL (ref 6–12)
POTASSIUM SERPL-SCNC: 5 MMOL/L (ref 3.5–5.2)
RBC # BLD AUTO: 4.21 10*6/MM3 (ref 4.14–5.8)
SODIUM SERPL-SCNC: 144 MMOL/L (ref 136–145)
WBC NRBC COR # BLD AUTO: 9.83 10*3/MM3 (ref 3.4–10.8)

## 2024-09-15 PROCEDURE — 63710000001 INSULIN GLARGINE PER 5 UNITS: Performed by: INTERNAL MEDICINE

## 2024-09-15 PROCEDURE — 63710000001 INSULIN LISPRO (HUMAN) PER 5 UNITS: Performed by: INTERNAL MEDICINE

## 2024-09-15 PROCEDURE — 25010000002 METHYLPREDNISOLONE PER 40 MG: Performed by: INTERNAL MEDICINE

## 2024-09-15 PROCEDURE — 25010000002 METHYLPREDNISOLONE PER 40 MG: Performed by: FAMILY MEDICINE

## 2024-09-15 PROCEDURE — 25010000002 ENOXAPARIN PER 10 MG: Performed by: INTERNAL MEDICINE

## 2024-09-15 PROCEDURE — 85027 COMPLETE CBC AUTOMATED: CPT | Performed by: INTERNAL MEDICINE

## 2024-09-15 PROCEDURE — 99232 SBSQ HOSP IP/OBS MODERATE 35: CPT | Performed by: INTERNAL MEDICINE

## 2024-09-15 PROCEDURE — 94799 UNLISTED PULMONARY SVC/PX: CPT

## 2024-09-15 PROCEDURE — 80048 BASIC METABOLIC PNL TOTAL CA: CPT | Performed by: FAMILY MEDICINE

## 2024-09-15 PROCEDURE — 94664 DEMO&/EVAL PT USE INHALER: CPT

## 2024-09-15 PROCEDURE — 94760 N-INVAS EAR/PLS OXIMETRY 1: CPT

## 2024-09-15 PROCEDURE — 82948 REAGENT STRIP/BLOOD GLUCOSE: CPT

## 2024-09-15 RX ORDER — METHYLPREDNISOLONE SODIUM SUCCINATE 40 MG/ML
20 INJECTION, POWDER, LYOPHILIZED, FOR SOLUTION INTRAMUSCULAR; INTRAVENOUS EVERY 12 HOURS
Status: DISCONTINUED | OUTPATIENT
Start: 2024-09-15 | End: 2024-09-17

## 2024-09-15 RX ADMIN — METOPROLOL SUCCINATE 25 MG: 25 TABLET, EXTENDED RELEASE ORAL at 08:26

## 2024-09-15 RX ADMIN — IPRATROPIUM BROMIDE AND ALBUTEROL SULFATE 3 ML: .5; 3 SOLUTION RESPIRATORY (INHALATION) at 19:03

## 2024-09-15 RX ADMIN — GLIPIZIDE 10 MG: 10 TABLET ORAL at 08:26

## 2024-09-15 RX ADMIN — METHYLPREDNISOLONE SODIUM SUCCINATE 40 MG: 40 INJECTION, POWDER, FOR SOLUTION INTRAMUSCULAR; INTRAVENOUS at 03:01

## 2024-09-15 RX ADMIN — FAMOTIDINE 20 MG: 20 TABLET, FILM COATED ORAL at 08:25

## 2024-09-15 RX ADMIN — DICLOFENAC SODIUM 4 G: 10 GEL TOPICAL at 12:01

## 2024-09-15 RX ADMIN — DICLOFENAC SODIUM 4 G: 10 GEL TOPICAL at 20:34

## 2024-09-15 RX ADMIN — IPRATROPIUM BROMIDE AND ALBUTEROL SULFATE 3 ML: .5; 3 SOLUTION RESPIRATORY (INHALATION) at 06:34

## 2024-09-15 RX ADMIN — Medication 10 ML: at 20:35

## 2024-09-15 RX ADMIN — DULOXETINE HYDROCHLORIDE 30 MG: 30 CAPSULE, DELAYED RELEASE ORAL at 08:26

## 2024-09-15 RX ADMIN — NICOTINE 1 PATCH: 21 PATCH, EXTENDED RELEASE TRANSDERMAL at 08:27

## 2024-09-15 RX ADMIN — ATORVASTATIN CALCIUM 40 MG: 40 TABLET ORAL at 20:35

## 2024-09-15 RX ADMIN — FUROSEMIDE 40 MG: 20 TABLET ORAL at 17:19

## 2024-09-15 RX ADMIN — EMPAGLIFLOZIN 10 MG: 10 TABLET, FILM COATED ORAL at 08:26

## 2024-09-15 RX ADMIN — BUDESONIDE AND FORMOTEROL FUMARATE DIHYDRATE 2 PUFF: 160; 4.5 AEROSOL RESPIRATORY (INHALATION) at 19:03

## 2024-09-15 RX ADMIN — INSULIN LISPRO 3 UNITS: 100 INJECTION, SOLUTION INTRAVENOUS; SUBCUTANEOUS at 17:19

## 2024-09-15 RX ADMIN — GLIPIZIDE 10 MG: 10 TABLET ORAL at 17:19

## 2024-09-15 RX ADMIN — GABAPENTIN 600 MG: 300 CAPSULE ORAL at 20:35

## 2024-09-15 RX ADMIN — ENOXAPARIN SODIUM 40 MG: 100 INJECTION SUBCUTANEOUS at 08:25

## 2024-09-15 RX ADMIN — HYDROCODONE BITARTRATE AND ACETAMINOPHEN 1 TABLET: 5; 325 TABLET ORAL at 03:04

## 2024-09-15 RX ADMIN — SACUBITRIL AND VALSARTAN 1 TABLET: 24; 26 TABLET, FILM COATED ORAL at 08:26

## 2024-09-15 RX ADMIN — LINAGLIPTIN 5 MG: 5 TABLET, FILM COATED ORAL at 08:26

## 2024-09-15 RX ADMIN — FUROSEMIDE 40 MG: 20 TABLET ORAL at 08:25

## 2024-09-15 RX ADMIN — ASPIRIN 81 MG: 81 TABLET, COATED ORAL at 08:25

## 2024-09-15 RX ADMIN — INSULIN LISPRO 5 UNITS: 100 INJECTION, SOLUTION INTRAVENOUS; SUBCUTANEOUS at 08:25

## 2024-09-15 RX ADMIN — BUDESONIDE AND FORMOTEROL FUMARATE DIHYDRATE 2 PUFF: 160; 4.5 AEROSOL RESPIRATORY (INHALATION) at 06:38

## 2024-09-15 RX ADMIN — SACUBITRIL AND VALSARTAN 1 TABLET: 24; 26 TABLET, FILM COATED ORAL at 20:35

## 2024-09-15 RX ADMIN — DICLOFENAC SODIUM 4 G: 10 GEL TOPICAL at 08:27

## 2024-09-15 RX ADMIN — AZITHROMYCIN 500 MG: 250 TABLET, FILM COATED ORAL at 08:26

## 2024-09-15 RX ADMIN — Medication 10 ML: at 08:27

## 2024-09-15 RX ADMIN — IPRATROPIUM BROMIDE AND ALBUTEROL SULFATE 3 ML: .5; 3 SOLUTION RESPIRATORY (INHALATION) at 10:29

## 2024-09-15 RX ADMIN — INSULIN LISPRO 8 UNITS: 100 INJECTION, SOLUTION INTRAVENOUS; SUBCUTANEOUS at 12:01

## 2024-09-15 RX ADMIN — DICLOFENAC SODIUM 4 G: 10 GEL TOPICAL at 17:20

## 2024-09-15 RX ADMIN — BACITRACIN ZINC, NEOMYCIN, POLYMYXIN B 1 APPLICATION: 400; 3.5; 5 OINTMENT TOPICAL at 08:27

## 2024-09-15 RX ADMIN — INSULIN GLARGINE 10 UNITS: 100 INJECTION, SOLUTION SUBCUTANEOUS at 20:34

## 2024-09-15 RX ADMIN — METHYLPREDNISOLONE SODIUM SUCCINATE 20 MG: 40 INJECTION, POWDER, FOR SOLUTION INTRAMUSCULAR; INTRAVENOUS at 15:10

## 2024-09-15 RX ADMIN — LIDOCAINE 2 PATCH: 4 PATCH TOPICAL at 08:26

## 2024-09-15 RX ADMIN — FAMOTIDINE 20 MG: 20 TABLET, FILM COATED ORAL at 17:20

## 2024-09-15 RX ADMIN — INSULIN GLARGINE 10 UNITS: 100 INJECTION, SOLUTION SUBCUTANEOUS at 08:25

## 2024-09-15 RX ADMIN — IPRATROPIUM BROMIDE AND ALBUTEROL SULFATE 3 ML: .5; 3 SOLUTION RESPIRATORY (INHALATION) at 14:57

## 2024-09-16 DIAGNOSIS — I25.10 CORONARY ARTERY DISEASE INVOLVING NATIVE CORONARY ARTERY OF NATIVE HEART, UNSPECIFIED WHETHER ANGINA PRESENT: Primary | ICD-10-CM

## 2024-09-16 LAB
ANION GAP SERPL CALCULATED.3IONS-SCNC: 10 MMOL/L (ref 5–15)
BUN SERPL-MCNC: 55 MG/DL (ref 8–23)
BUN/CREAT SERPL: 37.9 (ref 7–25)
CALCIUM SPEC-SCNC: 9.2 MG/DL (ref 8.6–10.5)
CHLORIDE SERPL-SCNC: 95 MMOL/L (ref 98–107)
CO2 SERPL-SCNC: 35 MMOL/L (ref 22–29)
CREAT SERPL-MCNC: 1.45 MG/DL (ref 0.76–1.27)
DEPRECATED RDW RBC AUTO: 51.9 FL (ref 37–54)
EGFRCR SERPLBLD CKD-EPI 2021: 54.1 ML/MIN/1.73
ERYTHROCYTE [DISTWIDTH] IN BLOOD BY AUTOMATED COUNT: 15.2 % (ref 12.3–15.4)
GLUCOSE BLDC GLUCOMTR-MCNC: 100 MG/DL (ref 70–130)
GLUCOSE BLDC GLUCOMTR-MCNC: 157 MG/DL (ref 70–130)
GLUCOSE BLDC GLUCOMTR-MCNC: 169 MG/DL (ref 70–130)
GLUCOSE BLDC GLUCOMTR-MCNC: 181 MG/DL (ref 70–130)
GLUCOSE SERPL-MCNC: 153 MG/DL (ref 65–99)
HCT VFR BLD AUTO: 38.1 % (ref 37.5–51)
HGB BLD-MCNC: 11.3 G/DL (ref 13–17.7)
MCH RBC QN AUTO: 27.6 PG (ref 26.6–33)
MCHC RBC AUTO-ENTMCNC: 29.7 G/DL (ref 31.5–35.7)
MCV RBC AUTO: 92.9 FL (ref 79–97)
PLATELET # BLD AUTO: 167 10*3/MM3 (ref 140–450)
PMV BLD AUTO: 11.2 FL (ref 6–12)
POTASSIUM SERPL-SCNC: 4.5 MMOL/L (ref 3.5–5.2)
RBC # BLD AUTO: 4.1 10*6/MM3 (ref 4.14–5.8)
SODIUM SERPL-SCNC: 140 MMOL/L (ref 136–145)
WBC NRBC COR # BLD AUTO: 9.09 10*3/MM3 (ref 3.4–10.8)

## 2024-09-16 PROCEDURE — 94761 N-INVAS EAR/PLS OXIMETRY MLT: CPT

## 2024-09-16 PROCEDURE — 85027 COMPLETE CBC AUTOMATED: CPT | Performed by: INTERNAL MEDICINE

## 2024-09-16 PROCEDURE — 97161 PT EVAL LOW COMPLEX 20 MIN: CPT

## 2024-09-16 PROCEDURE — 80048 BASIC METABOLIC PNL TOTAL CA: CPT | Performed by: FAMILY MEDICINE

## 2024-09-16 PROCEDURE — 63710000001 INSULIN GLARGINE PER 5 UNITS: Performed by: INTERNAL MEDICINE

## 2024-09-16 PROCEDURE — 63710000001 INSULIN LISPRO (HUMAN) PER 5 UNITS: Performed by: INTERNAL MEDICINE

## 2024-09-16 PROCEDURE — 25010000002 ENOXAPARIN PER 10 MG: Performed by: INTERNAL MEDICINE

## 2024-09-16 PROCEDURE — 82948 REAGENT STRIP/BLOOD GLUCOSE: CPT

## 2024-09-16 PROCEDURE — 25010000002 METHYLPREDNISOLONE PER 40 MG: Performed by: FAMILY MEDICINE

## 2024-09-16 PROCEDURE — 94799 UNLISTED PULMONARY SVC/PX: CPT

## 2024-09-16 PROCEDURE — 94664 DEMO&/EVAL PT USE INHALER: CPT

## 2024-09-16 RX ADMIN — BUDESONIDE AND FORMOTEROL FUMARATE DIHYDRATE 2 PUFF: 160; 4.5 AEROSOL RESPIRATORY (INHALATION) at 06:52

## 2024-09-16 RX ADMIN — ATORVASTATIN CALCIUM 40 MG: 40 TABLET ORAL at 20:13

## 2024-09-16 RX ADMIN — FUROSEMIDE 40 MG: 20 TABLET ORAL at 08:18

## 2024-09-16 RX ADMIN — HYDROCODONE BITARTRATE AND ACETAMINOPHEN 1 TABLET: 5; 325 TABLET ORAL at 08:34

## 2024-09-16 RX ADMIN — DICLOFENAC SODIUM 4 G: 10 GEL TOPICAL at 11:56

## 2024-09-16 RX ADMIN — BUDESONIDE AND FORMOTEROL FUMARATE DIHYDRATE 2 PUFF: 160; 4.5 AEROSOL RESPIRATORY (INHALATION) at 19:36

## 2024-09-16 RX ADMIN — INSULIN GLARGINE 10 UNITS: 100 INJECTION, SOLUTION SUBCUTANEOUS at 20:13

## 2024-09-16 RX ADMIN — NICOTINE 1 PATCH: 21 PATCH, EXTENDED RELEASE TRANSDERMAL at 08:19

## 2024-09-16 RX ADMIN — METHYLPREDNISOLONE SODIUM SUCCINATE 20 MG: 40 INJECTION, POWDER, FOR SOLUTION INTRAMUSCULAR; INTRAVENOUS at 14:36

## 2024-09-16 RX ADMIN — METHYLPREDNISOLONE SODIUM SUCCINATE 20 MG: 40 INJECTION, POWDER, FOR SOLUTION INTRAMUSCULAR; INTRAVENOUS at 02:38

## 2024-09-16 RX ADMIN — IPRATROPIUM BROMIDE AND ALBUTEROL SULFATE 3 ML: .5; 3 SOLUTION RESPIRATORY (INHALATION) at 19:36

## 2024-09-16 RX ADMIN — LINAGLIPTIN 5 MG: 5 TABLET, FILM COATED ORAL at 08:18

## 2024-09-16 RX ADMIN — INSULIN LISPRO 3 UNITS: 100 INJECTION, SOLUTION INTRAVENOUS; SUBCUTANEOUS at 08:36

## 2024-09-16 RX ADMIN — Medication 10 ML: at 08:20

## 2024-09-16 RX ADMIN — SACUBITRIL AND VALSARTAN 1 TABLET: 24; 26 TABLET, FILM COATED ORAL at 08:18

## 2024-09-16 RX ADMIN — DICLOFENAC SODIUM 4 G: 10 GEL TOPICAL at 17:46

## 2024-09-16 RX ADMIN — FUROSEMIDE 40 MG: 20 TABLET ORAL at 17:45

## 2024-09-16 RX ADMIN — INSULIN LISPRO 3 UNITS: 100 INJECTION, SOLUTION INTRAVENOUS; SUBCUTANEOUS at 11:56

## 2024-09-16 RX ADMIN — FAMOTIDINE 20 MG: 20 TABLET, FILM COATED ORAL at 17:45

## 2024-09-16 RX ADMIN — IPRATROPIUM BROMIDE AND ALBUTEROL SULFATE 3 ML: .5; 3 SOLUTION RESPIRATORY (INHALATION) at 14:17

## 2024-09-16 RX ADMIN — Medication 10 ML: at 20:14

## 2024-09-16 RX ADMIN — SACUBITRIL AND VALSARTAN 1 TABLET: 24; 26 TABLET, FILM COATED ORAL at 20:14

## 2024-09-16 RX ADMIN — GLIPIZIDE 10 MG: 10 TABLET ORAL at 08:18

## 2024-09-16 RX ADMIN — HYDROCODONE BITARTRATE AND ACETAMINOPHEN 1 TABLET: 5; 325 TABLET ORAL at 02:38

## 2024-09-16 RX ADMIN — IPRATROPIUM BROMIDE AND ALBUTEROL SULFATE 3 ML: .5; 3 SOLUTION RESPIRATORY (INHALATION) at 10:50

## 2024-09-16 RX ADMIN — GABAPENTIN 600 MG: 300 CAPSULE ORAL at 20:14

## 2024-09-16 RX ADMIN — METOPROLOL SUCCINATE 25 MG: 25 TABLET, EXTENDED RELEASE ORAL at 08:18

## 2024-09-16 RX ADMIN — DICLOFENAC SODIUM 4 G: 10 GEL TOPICAL at 20:13

## 2024-09-16 RX ADMIN — INSULIN GLARGINE 10 UNITS: 100 INJECTION, SOLUTION SUBCUTANEOUS at 08:19

## 2024-09-16 RX ADMIN — ENOXAPARIN SODIUM 40 MG: 100 INJECTION SUBCUTANEOUS at 08:18

## 2024-09-16 RX ADMIN — FAMOTIDINE 20 MG: 20 TABLET, FILM COATED ORAL at 08:18

## 2024-09-16 RX ADMIN — DULOXETINE HYDROCHLORIDE 30 MG: 30 CAPSULE, DELAYED RELEASE ORAL at 08:18

## 2024-09-16 RX ADMIN — IPRATROPIUM BROMIDE AND ALBUTEROL SULFATE 3 ML: .5; 3 SOLUTION RESPIRATORY (INHALATION) at 06:51

## 2024-09-16 RX ADMIN — ASPIRIN 81 MG: 81 TABLET, COATED ORAL at 08:18

## 2024-09-16 RX ADMIN — DICLOFENAC SODIUM 4 G: 10 GEL TOPICAL at 08:19

## 2024-09-16 RX ADMIN — LIDOCAINE 2 PATCH: 4 PATCH TOPICAL at 08:19

## 2024-09-16 RX ADMIN — EMPAGLIFLOZIN 10 MG: 10 TABLET, FILM COATED ORAL at 08:18

## 2024-09-16 RX ADMIN — INSULIN LISPRO 3 UNITS: 100 INJECTION, SOLUTION INTRAVENOUS; SUBCUTANEOUS at 20:13

## 2024-09-16 RX ADMIN — BACITRACIN ZINC, NEOMYCIN, POLYMYXIN B 1 APPLICATION: 400; 3.5; 5 OINTMENT TOPICAL at 08:19

## 2024-09-17 PROBLEM — I49.3 PVC'S (PREMATURE VENTRICULAR CONTRACTIONS): Status: ACTIVE | Noted: 2024-09-17

## 2024-09-17 PROBLEM — I50.23 ACUTE ON CHRONIC SYSTOLIC CHF (CONGESTIVE HEART FAILURE): Status: ACTIVE | Noted: 2021-12-26

## 2024-09-17 LAB
ANION GAP SERPL CALCULATED.3IONS-SCNC: 9 MMOL/L (ref 5–15)
BUN SERPL-MCNC: 52 MG/DL (ref 8–23)
BUN/CREAT SERPL: 35.9 (ref 7–25)
CALCIUM SPEC-SCNC: 9.5 MG/DL (ref 8.6–10.5)
CHLORIDE SERPL-SCNC: 95 MMOL/L (ref 98–107)
CO2 SERPL-SCNC: 39 MMOL/L (ref 22–29)
CREAT SERPL-MCNC: 1.45 MG/DL (ref 0.76–1.27)
DEPRECATED RDW RBC AUTO: 52.8 FL (ref 37–54)
EGFRCR SERPLBLD CKD-EPI 2021: 54.1 ML/MIN/1.73
ERYTHROCYTE [DISTWIDTH] IN BLOOD BY AUTOMATED COUNT: 15.3 % (ref 12.3–15.4)
GLUCOSE BLDC GLUCOMTR-MCNC: 132 MG/DL (ref 70–130)
GLUCOSE BLDC GLUCOMTR-MCNC: 185 MG/DL (ref 70–130)
GLUCOSE BLDC GLUCOMTR-MCNC: 292 MG/DL (ref 70–130)
GLUCOSE BLDC GLUCOMTR-MCNC: 76 MG/DL (ref 70–130)
GLUCOSE SERPL-MCNC: 96 MG/DL (ref 65–99)
HCT VFR BLD AUTO: 42.3 % (ref 37.5–51)
HGB BLD-MCNC: 12.1 G/DL (ref 13–17.7)
MCH RBC QN AUTO: 26.8 PG (ref 26.6–33)
MCHC RBC AUTO-ENTMCNC: 28.6 G/DL (ref 31.5–35.7)
MCV RBC AUTO: 93.8 FL (ref 79–97)
PLATELET # BLD AUTO: 176 10*3/MM3 (ref 140–450)
PMV BLD AUTO: 11.2 FL (ref 6–12)
POTASSIUM SERPL-SCNC: 4.8 MMOL/L (ref 3.5–5.2)
RBC # BLD AUTO: 4.51 10*6/MM3 (ref 4.14–5.8)
SODIUM SERPL-SCNC: 143 MMOL/L (ref 136–145)
WBC NRBC COR # BLD AUTO: 10.31 10*3/MM3 (ref 3.4–10.8)

## 2024-09-17 PROCEDURE — 63710000001 INSULIN LISPRO (HUMAN) PER 5 UNITS: Performed by: INTERNAL MEDICINE

## 2024-09-17 PROCEDURE — 80048 BASIC METABOLIC PNL TOTAL CA: CPT | Performed by: FAMILY MEDICINE

## 2024-09-17 PROCEDURE — 25010000002 METHYLPREDNISOLONE PER 40 MG: Performed by: FAMILY MEDICINE

## 2024-09-17 PROCEDURE — 25010000002 ENOXAPARIN PER 10 MG: Performed by: INTERNAL MEDICINE

## 2024-09-17 PROCEDURE — 94799 UNLISTED PULMONARY SVC/PX: CPT

## 2024-09-17 PROCEDURE — 97116 GAIT TRAINING THERAPY: CPT

## 2024-09-17 PROCEDURE — 85027 COMPLETE CBC AUTOMATED: CPT | Performed by: INTERNAL MEDICINE

## 2024-09-17 PROCEDURE — 97110 THERAPEUTIC EXERCISES: CPT

## 2024-09-17 PROCEDURE — 94664 DEMO&/EVAL PT USE INHALER: CPT

## 2024-09-17 PROCEDURE — 63710000001 INSULIN GLARGINE PER 5 UNITS: Performed by: INTERNAL MEDICINE

## 2024-09-17 PROCEDURE — 82948 REAGENT STRIP/BLOOD GLUCOSE: CPT

## 2024-09-17 RX ORDER — PREDNISONE 10 MG/1
20 TABLET ORAL DAILY
Status: DISCONTINUED | OUTPATIENT
Start: 2024-09-18 | End: 2024-09-17

## 2024-09-17 RX ORDER — PREDNISONE 10 MG/1
20 TABLET ORAL DAILY
Status: DISCONTINUED | OUTPATIENT
Start: 2024-09-18 | End: 2024-09-18 | Stop reason: HOSPADM

## 2024-09-17 RX ORDER — METHYLPREDNISOLONE SODIUM SUCCINATE 40 MG/ML
20 INJECTION, POWDER, LYOPHILIZED, FOR SOLUTION INTRAMUSCULAR; INTRAVENOUS DAILY
Status: DISCONTINUED | OUTPATIENT
Start: 2024-09-18 | End: 2024-09-17

## 2024-09-17 RX ADMIN — GLIPIZIDE 10 MG: 10 TABLET ORAL at 10:16

## 2024-09-17 RX ADMIN — FAMOTIDINE 20 MG: 20 TABLET, FILM COATED ORAL at 10:19

## 2024-09-17 RX ADMIN — IPRATROPIUM BROMIDE AND ALBUTEROL SULFATE 3 ML: .5; 3 SOLUTION RESPIRATORY (INHALATION) at 19:02

## 2024-09-17 RX ADMIN — INSULIN GLARGINE 10 UNITS: 100 INJECTION, SOLUTION SUBCUTANEOUS at 10:15

## 2024-09-17 RX ADMIN — LINAGLIPTIN 5 MG: 5 TABLET, FILM COATED ORAL at 10:19

## 2024-09-17 RX ADMIN — ENOXAPARIN SODIUM 40 MG: 100 INJECTION SUBCUTANEOUS at 10:15

## 2024-09-17 RX ADMIN — FUROSEMIDE 40 MG: 20 TABLET ORAL at 18:24

## 2024-09-17 RX ADMIN — INSULIN LISPRO 8 UNITS: 100 INJECTION, SOLUTION INTRAVENOUS; SUBCUTANEOUS at 21:32

## 2024-09-17 RX ADMIN — SACUBITRIL AND VALSARTAN 1 TABLET: 24; 26 TABLET, FILM COATED ORAL at 21:32

## 2024-09-17 RX ADMIN — EMPAGLIFLOZIN 10 MG: 10 TABLET, FILM COATED ORAL at 10:16

## 2024-09-17 RX ADMIN — METOPROLOL SUCCINATE 25 MG: 25 TABLET, EXTENDED RELEASE ORAL at 10:16

## 2024-09-17 RX ADMIN — DICLOFENAC SODIUM 4 G: 10 GEL TOPICAL at 10:19

## 2024-09-17 RX ADMIN — IPRATROPIUM BROMIDE AND ALBUTEROL SULFATE 3 ML: .5; 3 SOLUTION RESPIRATORY (INHALATION) at 10:57

## 2024-09-17 RX ADMIN — ACETAMINOPHEN 650 MG: 325 TABLET ORAL at 18:29

## 2024-09-17 RX ADMIN — DICLOFENAC SODIUM 4 G: 10 GEL TOPICAL at 18:24

## 2024-09-17 RX ADMIN — METHYLPREDNISOLONE SODIUM SUCCINATE 20 MG: 40 INJECTION, POWDER, FOR SOLUTION INTRAMUSCULAR; INTRAVENOUS at 02:19

## 2024-09-17 RX ADMIN — ACETAMINOPHEN 650 MG: 325 TABLET ORAL at 00:03

## 2024-09-17 RX ADMIN — SACUBITRIL AND VALSARTAN 1 TABLET: 24; 26 TABLET, FILM COATED ORAL at 10:16

## 2024-09-17 RX ADMIN — ASPIRIN 81 MG: 81 TABLET, COATED ORAL at 10:15

## 2024-09-17 RX ADMIN — GABAPENTIN 600 MG: 300 CAPSULE ORAL at 21:32

## 2024-09-17 RX ADMIN — DICLOFENAC SODIUM 4 G: 10 GEL TOPICAL at 21:33

## 2024-09-17 RX ADMIN — BACITRACIN ZINC, NEOMYCIN, POLYMYXIN B 1 APPLICATION: 400; 3.5; 5 OINTMENT TOPICAL at 10:16

## 2024-09-17 RX ADMIN — INSULIN GLARGINE 10 UNITS: 100 INJECTION, SOLUTION SUBCUTANEOUS at 21:32

## 2024-09-17 RX ADMIN — Medication 10 ML: at 21:33

## 2024-09-17 RX ADMIN — LIDOCAINE 2 PATCH: 4 PATCH TOPICAL at 10:27

## 2024-09-17 RX ADMIN — ATORVASTATIN CALCIUM 40 MG: 40 TABLET ORAL at 21:32

## 2024-09-17 RX ADMIN — HYDROCODONE BITARTRATE AND ACETAMINOPHEN 1 TABLET: 5; 325 TABLET ORAL at 10:27

## 2024-09-17 RX ADMIN — BUDESONIDE AND FORMOTEROL FUMARATE DIHYDRATE 2 PUFF: 160; 4.5 AEROSOL RESPIRATORY (INHALATION) at 19:08

## 2024-09-17 RX ADMIN — BUDESONIDE AND FORMOTEROL FUMARATE DIHYDRATE 2 PUFF: 160; 4.5 AEROSOL RESPIRATORY (INHALATION) at 06:17

## 2024-09-17 RX ADMIN — NICOTINE 1 PATCH: 21 PATCH, EXTENDED RELEASE TRANSDERMAL at 10:20

## 2024-09-17 RX ADMIN — FUROSEMIDE 40 MG: 20 TABLET ORAL at 10:15

## 2024-09-17 RX ADMIN — DULOXETINE HYDROCHLORIDE 30 MG: 30 CAPSULE, DELAYED RELEASE ORAL at 10:16

## 2024-09-17 RX ADMIN — Medication 10 ML: at 10:19

## 2024-09-17 RX ADMIN — IPRATROPIUM BROMIDE AND ALBUTEROL SULFATE 3 ML: .5; 3 SOLUTION RESPIRATORY (INHALATION) at 06:17

## 2024-09-17 RX ADMIN — FAMOTIDINE 20 MG: 20 TABLET, FILM COATED ORAL at 18:24

## 2024-09-17 RX ADMIN — IPRATROPIUM BROMIDE AND ALBUTEROL SULFATE 3 ML: .5; 3 SOLUTION RESPIRATORY (INHALATION) at 14:18

## 2024-09-18 ENCOUNTER — READMISSION MANAGEMENT (OUTPATIENT)
Dept: CALL CENTER | Facility: HOSPITAL | Age: 64
End: 2024-09-18
Payer: MEDICARE

## 2024-09-18 VITALS
BODY MASS INDEX: 36.43 KG/M2 | TEMPERATURE: 97.7 F | OXYGEN SATURATION: 95 % | WEIGHT: 246 LBS | HEIGHT: 69 IN | RESPIRATION RATE: 18 BRPM | SYSTOLIC BLOOD PRESSURE: 112 MMHG | DIASTOLIC BLOOD PRESSURE: 64 MMHG | HEART RATE: 92 BPM

## 2024-09-18 PROBLEM — Z95.810 PRESENCE OF EXTERNAL CARDIAC DEFIBRILLATOR: Status: ACTIVE | Noted: 2024-09-18

## 2024-09-18 LAB
ANION GAP SERPL CALCULATED.3IONS-SCNC: 8 MMOL/L (ref 5–15)
ANION GAP SERPL CALCULATED.3IONS-SCNC: 8 MMOL/L (ref 5–15)
BUN SERPL-MCNC: 55 MG/DL (ref 8–23)
BUN SERPL-MCNC: 56 MG/DL (ref 8–23)
BUN/CREAT SERPL: 36.2 (ref 7–25)
BUN/CREAT SERPL: 47.5 (ref 7–25)
CALCIUM SPEC-SCNC: 9 MG/DL (ref 8.6–10.5)
CALCIUM SPEC-SCNC: 9.2 MG/DL (ref 8.6–10.5)
CHLORIDE SERPL-SCNC: 93 MMOL/L (ref 98–107)
CHLORIDE SERPL-SCNC: 96 MMOL/L (ref 98–107)
CO2 SERPL-SCNC: 40 MMOL/L (ref 22–29)
CO2 SERPL-SCNC: 42 MMOL/L (ref 22–29)
CREAT SERPL-MCNC: 1.18 MG/DL (ref 0.76–1.27)
CREAT SERPL-MCNC: 1.52 MG/DL (ref 0.76–1.27)
DEPRECATED RDW RBC AUTO: 54.2 FL (ref 37–54)
EGFRCR SERPLBLD CKD-EPI 2021: 51.2 ML/MIN/1.73
EGFRCR SERPLBLD CKD-EPI 2021: 69.3 ML/MIN/1.73
ERYTHROCYTE [DISTWIDTH] IN BLOOD BY AUTOMATED COUNT: 15.4 % (ref 12.3–15.4)
GLUCOSE BLDC GLUCOMTR-MCNC: 101 MG/DL (ref 70–130)
GLUCOSE BLDC GLUCOMTR-MCNC: 117 MG/DL (ref 70–130)
GLUCOSE SERPL-MCNC: 160 MG/DL (ref 65–99)
GLUCOSE SERPL-MCNC: 73 MG/DL (ref 65–99)
HCT VFR BLD AUTO: 41.4 % (ref 37.5–51)
HGB BLD-MCNC: 11.9 G/DL (ref 13–17.7)
MCH RBC QN AUTO: 27.4 PG (ref 26.6–33)
MCHC RBC AUTO-ENTMCNC: 28.7 G/DL (ref 31.5–35.7)
MCV RBC AUTO: 95.4 FL (ref 79–97)
PLATELET # BLD AUTO: 162 10*3/MM3 (ref 140–450)
PMV BLD AUTO: 11.1 FL (ref 6–12)
POTASSIUM SERPL-SCNC: 4.3 MMOL/L (ref 3.5–5.2)
POTASSIUM SERPL-SCNC: 4.3 MMOL/L (ref 3.5–5.2)
RBC # BLD AUTO: 4.34 10*6/MM3 (ref 4.14–5.8)
SODIUM SERPL-SCNC: 141 MMOL/L (ref 136–145)
SODIUM SERPL-SCNC: 146 MMOL/L (ref 136–145)
WBC NRBC COR # BLD AUTO: 11.49 10*3/MM3 (ref 3.4–10.8)

## 2024-09-18 PROCEDURE — 94761 N-INVAS EAR/PLS OXIMETRY MLT: CPT

## 2024-09-18 PROCEDURE — 94799 UNLISTED PULMONARY SVC/PX: CPT

## 2024-09-18 PROCEDURE — 0 DEXTROSE 5 % SOLUTION: Performed by: STUDENT IN AN ORGANIZED HEALTH CARE EDUCATION/TRAINING PROGRAM

## 2024-09-18 PROCEDURE — 80048 BASIC METABOLIC PNL TOTAL CA: CPT | Performed by: STUDENT IN AN ORGANIZED HEALTH CARE EDUCATION/TRAINING PROGRAM

## 2024-09-18 PROCEDURE — 63710000001 INSULIN GLARGINE PER 5 UNITS: Performed by: INTERNAL MEDICINE

## 2024-09-18 PROCEDURE — 80048 BASIC METABOLIC PNL TOTAL CA: CPT | Performed by: FAMILY MEDICINE

## 2024-09-18 PROCEDURE — 82948 REAGENT STRIP/BLOOD GLUCOSE: CPT

## 2024-09-18 PROCEDURE — 63710000001 PREDNISONE PER 5 MG: Performed by: STUDENT IN AN ORGANIZED HEALTH CARE EDUCATION/TRAINING PROGRAM

## 2024-09-18 PROCEDURE — 85027 COMPLETE CBC AUTOMATED: CPT | Performed by: INTERNAL MEDICINE

## 2024-09-18 RX ORDER — METOPROLOL SUCCINATE 25 MG/1
25 TABLET, EXTENDED RELEASE ORAL
Qty: 30 TABLET | Refills: 0 | Status: ON HOLD | OUTPATIENT
Start: 2024-09-18

## 2024-09-18 RX ORDER — DEXTROSE MONOHYDRATE 50 MG/ML
125 INJECTION, SOLUTION INTRAVENOUS CONTINUOUS
Status: DISCONTINUED | OUTPATIENT
Start: 2024-09-18 | End: 2024-09-18 | Stop reason: HOSPADM

## 2024-09-18 RX ORDER — SACUBITRIL AND VALSARTAN 24; 26 MG/1; MG/1
1 TABLET, FILM COATED ORAL 2 TIMES DAILY
Qty: 60 TABLET | Refills: 0 | Status: ON HOLD | OUTPATIENT
Start: 2024-09-18

## 2024-09-18 RX ORDER — DULOXETIN HYDROCHLORIDE 30 MG/1
30 CAPSULE, DELAYED RELEASE ORAL DAILY
Qty: 30 CAPSULE | Refills: 0 | Status: ON HOLD | OUTPATIENT
Start: 2024-09-18

## 2024-09-18 RX ORDER — ALBUTEROL SULFATE 90 UG/1
2 INHALANT RESPIRATORY (INHALATION) EVERY 4 HOURS PRN
Qty: 8 G | Refills: 0 | Status: ON HOLD | OUTPATIENT
Start: 2024-09-18

## 2024-09-18 RX ORDER — FUROSEMIDE 40 MG
40 TABLET ORAL DAILY
Qty: 30 TABLET | Refills: 0 | Status: ON HOLD | OUTPATIENT
Start: 2024-09-19

## 2024-09-18 RX ORDER — DAPAGLIFLOZIN 10 MG/1
1 TABLET, FILM COATED ORAL DAILY
Qty: 30 TABLET | Refills: 0 | Status: ON HOLD | OUTPATIENT
Start: 2024-09-18

## 2024-09-18 RX ORDER — PRAVASTATIN SODIUM 40 MG
40 TABLET ORAL NIGHTLY
Qty: 30 TABLET | Refills: 0 | Status: ON HOLD | OUTPATIENT
Start: 2024-09-18

## 2024-09-18 RX ORDER — GABAPENTIN 600 MG/1
600 TABLET ORAL 2 TIMES DAILY
Qty: 60 TABLET | Refills: 0 | Status: ON HOLD | OUTPATIENT
Start: 2024-09-18

## 2024-09-18 RX ADMIN — ACETAMINOPHEN 650 MG: 325 TABLET ORAL at 10:55

## 2024-09-18 RX ADMIN — EMPAGLIFLOZIN 10 MG: 10 TABLET, FILM COATED ORAL at 10:50

## 2024-09-18 RX ADMIN — LINAGLIPTIN 5 MG: 5 TABLET, FILM COATED ORAL at 10:50

## 2024-09-18 RX ADMIN — Medication 10 ML: at 11:01

## 2024-09-18 RX ADMIN — SACUBITRIL AND VALSARTAN 1 TABLET: 24; 26 TABLET, FILM COATED ORAL at 10:50

## 2024-09-18 RX ADMIN — ASPIRIN 81 MG: 81 TABLET, COATED ORAL at 10:49

## 2024-09-18 RX ADMIN — BUDESONIDE AND FORMOTEROL FUMARATE DIHYDRATE 2 PUFF: 160; 4.5 AEROSOL RESPIRATORY (INHALATION) at 06:22

## 2024-09-18 RX ADMIN — PREDNISONE 20 MG: 10 TABLET ORAL at 10:51

## 2024-09-18 RX ADMIN — BACITRACIN ZINC, NEOMYCIN, POLYMYXIN B 1 APPLICATION: 400; 3.5; 5 OINTMENT TOPICAL at 10:49

## 2024-09-18 RX ADMIN — METOPROLOL SUCCINATE 25 MG: 25 TABLET, EXTENDED RELEASE ORAL at 10:50

## 2024-09-18 RX ADMIN — GLIPIZIDE 10 MG: 10 TABLET ORAL at 10:52

## 2024-09-18 RX ADMIN — NICOTINE 1 PATCH: 21 PATCH, EXTENDED RELEASE TRANSDERMAL at 10:52

## 2024-09-18 RX ADMIN — INSULIN GLARGINE 10 UNITS: 100 INJECTION, SOLUTION SUBCUTANEOUS at 10:49

## 2024-09-18 RX ADMIN — IPRATROPIUM BROMIDE AND ALBUTEROL SULFATE 3 ML: .5; 3 SOLUTION RESPIRATORY (INHALATION) at 06:22

## 2024-09-18 RX ADMIN — FAMOTIDINE 20 MG: 20 TABLET, FILM COATED ORAL at 10:51

## 2024-09-18 RX ADMIN — DULOXETINE HYDROCHLORIDE 30 MG: 30 CAPSULE, DELAYED RELEASE ORAL at 10:52

## 2024-09-18 RX ADMIN — DICLOFENAC SODIUM 4 G: 10 GEL TOPICAL at 10:49

## 2024-09-18 RX ADMIN — IPRATROPIUM BROMIDE AND ALBUTEROL SULFATE 3 ML: .5; 3 SOLUTION RESPIRATORY (INHALATION) at 10:38

## 2024-09-18 RX ADMIN — DEXTROSE MONOHYDRATE 125 ML/HR: 50 INJECTION, SOLUTION INTRAVENOUS at 10:58

## 2024-09-24 ENCOUNTER — READMISSION MANAGEMENT (OUTPATIENT)
Dept: CALL CENTER | Facility: HOSPITAL | Age: 64
End: 2024-09-24
Payer: MEDICARE

## 2024-09-25 ENCOUNTER — TELEPHONE (OUTPATIENT)
Dept: CARDIAC SURGERY | Facility: CLINIC | Age: 64
End: 2024-09-25
Payer: MEDICARE

## 2024-09-28 ENCOUNTER — APPOINTMENT (OUTPATIENT)
Dept: OTHER | Facility: HOSPITAL | Age: 64
End: 2024-09-28
Payer: MEDICARE

## 2024-09-28 ENCOUNTER — HOSPITAL ENCOUNTER (INPATIENT)
Facility: HOSPITAL | Age: 64
LOS: 6 days | Discharge: NURSING, REHAB OR LONG-TERM CARE | End: 2024-10-04
Attending: INTERNAL MEDICINE | Admitting: INTERNAL MEDICINE
Payer: MEDICARE

## 2024-09-28 ENCOUNTER — APPOINTMENT (OUTPATIENT)
Dept: GENERAL RADIOLOGY | Facility: HOSPITAL | Age: 64
End: 2024-09-28
Payer: MEDICARE

## 2024-09-28 DIAGNOSIS — Z74.09 IMPAIRED MOBILITY: Primary | ICD-10-CM

## 2024-09-28 PROBLEM — I50.9 ACUTE EXACERBATION OF CHF (CONGESTIVE HEART FAILURE): Status: ACTIVE | Noted: 2024-09-28

## 2024-09-28 LAB
ANION GAP SERPL CALCULATED.3IONS-SCNC: 10 MMOL/L (ref 5–15)
BASOPHILS # BLD AUTO: 0.07 10*3/MM3 (ref 0–0.2)
BASOPHILS NFR BLD AUTO: 0.6 % (ref 0–1.5)
BUN SERPL-MCNC: 50 MG/DL (ref 8–23)
BUN/CREAT SERPL: 30.3 (ref 7–25)
CALCIUM SPEC-SCNC: 8.8 MG/DL (ref 8.6–10.5)
CHLORIDE SERPL-SCNC: 98 MMOL/L (ref 98–107)
CO2 SERPL-SCNC: 30 MMOL/L (ref 22–29)
CREAT SERPL-MCNC: 1.65 MG/DL (ref 0.76–1.27)
DEPRECATED RDW RBC AUTO: 52.8 FL (ref 37–54)
EGFRCR SERPLBLD CKD-EPI 2021: 46.4 ML/MIN/1.73
EOSINOPHIL # BLD AUTO: 0.05 10*3/MM3 (ref 0–0.4)
EOSINOPHIL NFR BLD AUTO: 0.4 % (ref 0.3–6.2)
ERYTHROCYTE [DISTWIDTH] IN BLOOD BY AUTOMATED COUNT: 15.3 % (ref 12.3–15.4)
GEN 5 2HR TROPONIN T REFLEX: 548 NG/L
GLUCOSE BLDC GLUCOMTR-MCNC: 255 MG/DL (ref 70–130)
GLUCOSE BLDC GLUCOMTR-MCNC: 269 MG/DL (ref 70–130)
GLUCOSE SERPL-MCNC: 251 MG/DL (ref 65–99)
HCT VFR BLD AUTO: 40.2 % (ref 37.5–51)
HGB BLD-MCNC: 11.7 G/DL (ref 13–17.7)
IMM GRANULOCYTES # BLD AUTO: 0.06 10*3/MM3 (ref 0–0.05)
IMM GRANULOCYTES NFR BLD AUTO: 0.5 % (ref 0–0.5)
LYMPHOCYTES # BLD AUTO: 1.3 10*3/MM3 (ref 0.7–3.1)
LYMPHOCYTES NFR BLD AUTO: 10.4 % (ref 19.6–45.3)
MAGNESIUM SERPL-MCNC: 2 MG/DL (ref 1.6–2.4)
MCH RBC QN AUTO: 27.5 PG (ref 26.6–33)
MCHC RBC AUTO-ENTMCNC: 29.1 G/DL (ref 31.5–35.7)
MCV RBC AUTO: 94.4 FL (ref 79–97)
MONOCYTES # BLD AUTO: 0.78 10*3/MM3 (ref 0.1–0.9)
MONOCYTES NFR BLD AUTO: 6.2 % (ref 5–12)
NEUTROPHILS NFR BLD AUTO: 10.27 10*3/MM3 (ref 1.7–7)
NEUTROPHILS NFR BLD AUTO: 81.9 % (ref 42.7–76)
NRBC BLD AUTO-RTO: 0 /100 WBC (ref 0–0.2)
NT-PROBNP SERPL-MCNC: ABNORMAL PG/ML (ref 0–900)
PHOSPHATE SERPL-MCNC: 5 MG/DL (ref 2.5–4.5)
PLATELET # BLD AUTO: 201 10*3/MM3 (ref 140–450)
PMV BLD AUTO: 11.1 FL (ref 6–12)
POTASSIUM SERPL-SCNC: 4.7 MMOL/L (ref 3.5–5.2)
RBC # BLD AUTO: 4.26 10*6/MM3 (ref 4.14–5.8)
SODIUM SERPL-SCNC: 138 MMOL/L (ref 136–145)
TROPONIN T DELTA: 65 NG/L
TROPONIN T SERPL HS-MCNC: 483 NG/L
WBC NRBC COR # BLD AUTO: 12.53 10*3/MM3 (ref 3.4–10.8)

## 2024-09-28 PROCEDURE — 83880 ASSAY OF NATRIURETIC PEPTIDE: CPT | Performed by: INTERNAL MEDICINE

## 2024-09-28 PROCEDURE — 63710000001 INSULIN GLARGINE PER 5 UNITS: Performed by: INTERNAL MEDICINE

## 2024-09-28 PROCEDURE — 85025 COMPLETE CBC W/AUTO DIFF WBC: CPT | Performed by: INTERNAL MEDICINE

## 2024-09-28 PROCEDURE — 84100 ASSAY OF PHOSPHORUS: CPT | Performed by: INTERNAL MEDICINE

## 2024-09-28 PROCEDURE — 71045 X-RAY EXAM CHEST 1 VIEW: CPT

## 2024-09-28 PROCEDURE — 80048 BASIC METABOLIC PNL TOTAL CA: CPT | Performed by: INTERNAL MEDICINE

## 2024-09-28 PROCEDURE — 63710000001 INSULIN LISPRO (HUMAN) PER 5 UNITS: Performed by: INTERNAL MEDICINE

## 2024-09-28 PROCEDURE — 82948 REAGENT STRIP/BLOOD GLUCOSE: CPT

## 2024-09-28 PROCEDURE — 25010000002 FUROSEMIDE PER 20 MG: Performed by: INTERNAL MEDICINE

## 2024-09-28 PROCEDURE — 84484 ASSAY OF TROPONIN QUANT: CPT | Performed by: INTERNAL MEDICINE

## 2024-09-28 PROCEDURE — 83735 ASSAY OF MAGNESIUM: CPT | Performed by: INTERNAL MEDICINE

## 2024-09-28 RX ORDER — BISACODYL 5 MG/1
5 TABLET, DELAYED RELEASE ORAL DAILY PRN
Status: DISCONTINUED | OUTPATIENT
Start: 2024-09-28 | End: 2024-10-04 | Stop reason: HOSPADM

## 2024-09-28 RX ORDER — BISACODYL 10 MG
10 SUPPOSITORY, RECTAL RECTAL DAILY PRN
Status: DISCONTINUED | OUTPATIENT
Start: 2024-09-28 | End: 2024-10-04 | Stop reason: HOSPADM

## 2024-09-28 RX ORDER — IBUPROFEN 600 MG/1
1 TABLET ORAL
Status: DISCONTINUED | OUTPATIENT
Start: 2024-09-28 | End: 2024-10-04 | Stop reason: HOSPADM

## 2024-09-28 RX ORDER — POLYETHYLENE GLYCOL 3350 17 G/17G
17 POWDER, FOR SOLUTION ORAL DAILY PRN
Status: DISCONTINUED | OUTPATIENT
Start: 2024-09-28 | End: 2024-10-04 | Stop reason: HOSPADM

## 2024-09-28 RX ORDER — ASPIRIN 81 MG/1
81 TABLET ORAL DAILY
Status: DISCONTINUED | OUTPATIENT
Start: 2024-09-28 | End: 2024-10-04 | Stop reason: HOSPADM

## 2024-09-28 RX ORDER — NICOTINE POLACRILEX 4 MG
15 LOZENGE BUCCAL
Status: DISCONTINUED | OUTPATIENT
Start: 2024-09-28 | End: 2024-10-04 | Stop reason: HOSPADM

## 2024-09-28 RX ORDER — CHLORHEXIDINE GLUCONATE 500 MG/1
1 CLOTH TOPICAL ONCE
Status: COMPLETED | OUTPATIENT
Start: 2024-09-28 | End: 2024-09-28

## 2024-09-28 RX ORDER — DULOXETIN HYDROCHLORIDE 30 MG/1
30 CAPSULE, DELAYED RELEASE ORAL DAILY
Status: DISCONTINUED | OUTPATIENT
Start: 2024-09-28 | End: 2024-10-04 | Stop reason: HOSPADM

## 2024-09-28 RX ORDER — DEXTROSE MONOHYDRATE 25 G/50ML
25 INJECTION, SOLUTION INTRAVENOUS
Status: DISCONTINUED | OUTPATIENT
Start: 2024-09-28 | End: 2024-10-04 | Stop reason: HOSPADM

## 2024-09-28 RX ORDER — FUROSEMIDE 10 MG/ML
80 INJECTION INTRAMUSCULAR; INTRAVENOUS EVERY 12 HOURS
Status: DISCONTINUED | OUTPATIENT
Start: 2024-09-28 | End: 2024-09-29

## 2024-09-28 RX ORDER — CHLORHEXIDINE GLUCONATE 500 MG/1
1 CLOTH TOPICAL EVERY 24 HOURS
Status: DISCONTINUED | OUTPATIENT
Start: 2024-09-29 | End: 2024-10-02

## 2024-09-28 RX ORDER — INSULIN LISPRO 100 [IU]/ML
2-9 INJECTION, SOLUTION INTRAVENOUS; SUBCUTANEOUS
Status: DISCONTINUED | OUTPATIENT
Start: 2024-09-28 | End: 2024-10-04 | Stop reason: HOSPADM

## 2024-09-28 RX ORDER — BUMETANIDE 0.25 MG/ML
1 INJECTION INTRAMUSCULAR; INTRAVENOUS ONCE
Status: DISCONTINUED | OUTPATIENT
Start: 2024-09-28 | End: 2024-09-28

## 2024-09-28 RX ORDER — SODIUM CHLORIDE 0.9 % (FLUSH) 0.9 %
10 SYRINGE (ML) INJECTION AS NEEDED
Status: DISCONTINUED | OUTPATIENT
Start: 2024-09-28 | End: 2024-10-04 | Stop reason: HOSPADM

## 2024-09-28 RX ORDER — AMOXICILLIN 250 MG
2 CAPSULE ORAL 2 TIMES DAILY
Status: DISCONTINUED | OUTPATIENT
Start: 2024-09-28 | End: 2024-10-04 | Stop reason: HOSPADM

## 2024-09-28 RX ORDER — PRAVASTATIN SODIUM 20 MG
40 TABLET ORAL NIGHTLY
Status: DISCONTINUED | OUTPATIENT
Start: 2024-09-28 | End: 2024-10-04 | Stop reason: HOSPADM

## 2024-09-28 RX ORDER — GABAPENTIN 300 MG/1
600 CAPSULE ORAL DAILY
Status: DISCONTINUED | OUTPATIENT
Start: 2024-09-28 | End: 2024-10-03

## 2024-09-28 RX ORDER — SODIUM CHLORIDE 0.9 % (FLUSH) 0.9 %
10 SYRINGE (ML) INJECTION EVERY 12 HOURS SCHEDULED
Status: DISCONTINUED | OUTPATIENT
Start: 2024-09-28 | End: 2024-10-04 | Stop reason: HOSPADM

## 2024-09-28 RX ORDER — IPRATROPIUM BROMIDE AND ALBUTEROL SULFATE 2.5; .5 MG/3ML; MG/3ML
3 SOLUTION RESPIRATORY (INHALATION) EVERY 4 HOURS PRN
Status: DISCONTINUED | OUTPATIENT
Start: 2024-09-28 | End: 2024-10-04 | Stop reason: HOSPADM

## 2024-09-28 RX ORDER — SODIUM CHLORIDE 9 MG/ML
40 INJECTION, SOLUTION INTRAVENOUS AS NEEDED
Status: DISCONTINUED | OUTPATIENT
Start: 2024-09-28 | End: 2024-10-04 | Stop reason: HOSPADM

## 2024-09-28 RX ORDER — ENOXAPARIN SODIUM 100 MG/ML
40 INJECTION SUBCUTANEOUS DAILY
Status: DISCONTINUED | OUTPATIENT
Start: 2024-09-28 | End: 2024-10-04 | Stop reason: HOSPADM

## 2024-09-28 RX ADMIN — MUPIROCIN 1 APPLICATION: 20 OINTMENT TOPICAL at 17:34

## 2024-09-28 RX ADMIN — FUROSEMIDE 80 MG: 10 INJECTION, SOLUTION INTRAVENOUS at 17:34

## 2024-09-28 RX ADMIN — INSULIN LISPRO 6 UNITS: 100 INJECTION, SOLUTION INTRAVENOUS; SUBCUTANEOUS at 17:34

## 2024-09-28 RX ADMIN — GABAPENTIN 600 MG: 300 CAPSULE ORAL at 17:35

## 2024-09-28 RX ADMIN — PRAVASTATIN SODIUM 40 MG: 20 TABLET ORAL at 20:08

## 2024-09-28 RX ADMIN — DOCUSATE SODIUM 50 MG AND SENNOSIDES 8.6 MG 2 TABLET: 8.6; 5 TABLET, FILM COATED ORAL at 20:07

## 2024-09-28 RX ADMIN — INSULIN LISPRO 6 UNITS: 100 INJECTION, SOLUTION INTRAVENOUS; SUBCUTANEOUS at 21:41

## 2024-09-28 RX ADMIN — EMPAGLIFLOZIN 10 MG: 10 TABLET, FILM COATED ORAL at 17:35

## 2024-09-28 RX ADMIN — SACUBITRIL AND VALSARTAN 1 TABLET: 24; 26 TABLET, FILM COATED ORAL at 20:08

## 2024-09-28 RX ADMIN — Medication 10 ML: at 21:06

## 2024-09-28 RX ADMIN — ASPIRIN 81 MG: 81 TABLET, COATED ORAL at 17:34

## 2024-09-28 RX ADMIN — CHLORHEXIDINE GLUCONATE 1 APPLICATION: 500 CLOTH TOPICAL at 17:35

## 2024-09-28 RX ADMIN — DULOXETINE HYDROCHLORIDE 30 MG: 30 CAPSULE, DELAYED RELEASE ORAL at 17:35

## 2024-09-28 RX ADMIN — INSULIN GLARGINE 20 UNITS: 100 INJECTION, SOLUTION SUBCUTANEOUS at 20:08

## 2024-09-28 NOTE — H&P
[facility] Intensivist Services  History and Physical Note    Patient Name: Tucker Knox  Date of Admission: 9/28/2024  Today's Date: 09/28/24  Length of Stay: 0  Primary Care Physician: Kt Alfredo MD    Subjective     Chief Complaint: Shortness of breath    HPI:  Mr. Knox is a pleasant 64-year-old male who presented to an outside ED due to shortness of breath.  Past medical history includes combined systolic and diastolic CHF, CAD status post stents, hypertension, hyperlipidemia, COPD, chronic hypoxic respiratory failure on 3 L, tobacco use, diabetes.    History is provided by the patient.  [facility].  He has had 3 admissions this year, 2 at [facility] and 1 at [facility].  He was discharged from [facility] on 9/18/2024.  During that admission he was diuresed with IV Lasix.  He had improvement of his symptoms and he was discharged.  Patient states initially he was doing well up until 2 days ago.  Over the past 2 days he has noted increased dyspnea worse with exertion.  He also has a dry cough which is chronic.  Also notes chills.  Denies any fever, chest pain, nausea, vomiting.  No sick contacts.    Due to his shortness of breath he presented to an outside ED.  At the outside ED his chest x-ray was consistent with volume overload.  He also had an elevated troponin and BNP.  Lactate of 2 and white count of 12.  He was given IV insulin and IV Lasix and transferred to [facility] ICU.    On exam the patient is resting comfortably in bed.  He is breathing comfortably on 3 L satting in the upper 90s.  States he would like to have something to eat and drink when able.  States that he does have some dyspnea which is worse with exertion.    Review of Systems:  All pertinent negatives and positives are as above. All other systems have been reviewed and are negative unless otherwise stated.       Objective      Physical  "Exam:  General: Well appearing, in no respiratory distress  Head: Normocephalic, atraumatic  Eyes: Conjunctiva clear, sclera non-icteric  Teeth/Gums: Poor dentition  Neck: Supple without stridor  Heart: Regular rate and rhythm, no murmur  Lungs: Diminished bilaterally with rales in bilateral bases  Abdomen: Soft, nontender  MSK/extremities: Bilateral lower extremity pitting edema up to above the knee  Neurologic: AOx3, grossly no focal deficits      Results Review:      Results from last 7 days   Lab Units 09/28/24  1808   WBC 10*3/mm3 12.53*   HEMOGLOBIN g/dL 11.7*   HEMATOCRIT % 40.2   PLATELETS 10*3/mm3 201     Visit Vitals  /96   Pulse 101   Temp 98.4 °F (36.9 °C) (Oral)   Resp 17   Ht 175.3 cm (69\")   Wt 117 kg (257 lb 0.9 oz)   SpO2 96%   BMI 37.96 kg/m²       Medications:  aspirin, 81 mg, Oral, Daily  [START ON 9/29/2024] Chlorhexidine Gluconate Cloth, 1 Application, Topical, Q24H  DULoxetine, 30 mg, Oral, Daily  empagliflozin, 10 mg, Oral, Daily  enoxaparin, 40 mg, Subcutaneous, Daily  furosemide, 80 mg, Intravenous, Q12H  gabapentin, 600 mg, Oral, Daily  insulin glargine, 20 Units, Subcutaneous, Nightly  insulin lispro, 2-9 Units, Subcutaneous, 4x Daily AC & at Bedtime  mupirocin, 1 Application, Each Nare, BID  pravastatin, 40 mg, Oral, Nightly  sacubitril-valsartan, 1 tablet, Oral, BID  senna-docusate sodium, 2 tablet, Oral, BID  sodium chloride, 10 mL, Intravenous, Q12H             Assessment/Plan     Problems:  Combined systolic and diastolic CHF with acute exacerbation  Chronic hypoxic respiratory failure on home O2  COPD without exacerbation  Hypertension  Hyperlipidemia  Diabetes  Leukocytosis    Assessment:  Patient is a 64-year-old male admitted to [facility] ICU from an outside ED.  Patient is clinically and hemodynamically stable.  Will start treatment for decompensated heart failure.  Suspect recurrent admissions for CHF exacerbation secondary to noncompliance.  Patient admits to " missing some of his medications.  Will review and restart home meds as indicated.     Plan:  Combined systolic and diastolic CHF with acute exacerbation  -Start Lasix 80 mg IV twice daily  -Strict I's and O's with daily weight  -Continuous telemetry monitoring  -Fluid restriction of 2 L/day  -Restart home Entresto    COPD without acute exacerbation, chronic hypoxic respiratory failure  -Supplemental oxygen as needed and wean as tolerated, goal O2 sat of 88-92%  -No inhalers on his home med list, will provide DuoNeb as needed  -Frequent incentive spirometer  -Tobacco cessation    Hypertension, hyperlipidemia  -Review and restart home meds as indicated    Diabetes  -Restart home long-acting  -Sliding scale  -Accu-Cheks ACHS      Critical Care Set:  Feeding: By mouth diet  Analgesia: As needed ordered  Sedation: N/A  Thromboprophylaxis: DVT Lovenox  HOB: 30+  Ulcer prophylaxis: N/A  Glycemic control: Diabetic, insulin as above  SBT: N/A  Bowel movement: Regimen ordered  Catheter removal: Urinary catheter placed by outside ED, will continue tonight with diuresis can likely discontinue tomorrow  Medication de-escalation: N/A      Disposition  Continue critical care management.      Madyson Morin DO  Pulmonary Critical Care Medicine  9/28/2024  18:44 CDT

## 2024-09-29 ENCOUNTER — READMISSION MANAGEMENT (OUTPATIENT)
Dept: CALL CENTER | Facility: HOSPITAL | Age: 64
End: 2024-09-29
Payer: MEDICARE

## 2024-09-29 LAB
ANION GAP SERPL CALCULATED.3IONS-SCNC: 10 MMOL/L (ref 5–15)
ANION GAP SERPL CALCULATED.3IONS-SCNC: 12 MMOL/L (ref 5–15)
BUN SERPL-MCNC: 54 MG/DL (ref 8–23)
BUN SERPL-MCNC: 55 MG/DL (ref 8–23)
BUN/CREAT SERPL: 30.5 (ref 7–25)
BUN/CREAT SERPL: 31.4 (ref 7–25)
CALCIUM SPEC-SCNC: 8.4 MG/DL (ref 8.6–10.5)
CALCIUM SPEC-SCNC: 8.5 MG/DL (ref 8.6–10.5)
CHLORIDE SERPL-SCNC: 97 MMOL/L (ref 98–107)
CHLORIDE SERPL-SCNC: 98 MMOL/L (ref 98–107)
CO2 SERPL-SCNC: 29 MMOL/L (ref 22–29)
CO2 SERPL-SCNC: 29 MMOL/L (ref 22–29)
CREAT SERPL-MCNC: 1.75 MG/DL (ref 0.76–1.27)
CREAT SERPL-MCNC: 1.77 MG/DL (ref 0.76–1.27)
EGFRCR SERPLBLD CKD-EPI 2021: 42.6 ML/MIN/1.73
EGFRCR SERPLBLD CKD-EPI 2021: 43.2 ML/MIN/1.73
GLUCOSE BLDC GLUCOMTR-MCNC: 125 MG/DL (ref 70–130)
GLUCOSE BLDC GLUCOMTR-MCNC: 191 MG/DL (ref 70–130)
GLUCOSE BLDC GLUCOMTR-MCNC: 198 MG/DL (ref 70–130)
GLUCOSE BLDC GLUCOMTR-MCNC: 275 MG/DL (ref 70–130)
GLUCOSE SERPL-MCNC: 169 MG/DL (ref 65–99)
GLUCOSE SERPL-MCNC: 204 MG/DL (ref 65–99)
POTASSIUM SERPL-SCNC: 5.2 MMOL/L (ref 3.5–5.2)
POTASSIUM SERPL-SCNC: 5.3 MMOL/L (ref 3.5–5.2)
SODIUM SERPL-SCNC: 137 MMOL/L (ref 136–145)
SODIUM SERPL-SCNC: 138 MMOL/L (ref 136–145)

## 2024-09-29 PROCEDURE — 25810000003 SODIUM CHLORIDE 0.9 % SOLUTION: Performed by: INTERNAL MEDICINE

## 2024-09-29 PROCEDURE — 25010000002 ENOXAPARIN PER 10 MG: Performed by: INTERNAL MEDICINE

## 2024-09-29 PROCEDURE — 82948 REAGENT STRIP/BLOOD GLUCOSE: CPT

## 2024-09-29 PROCEDURE — 63710000001 INSULIN GLARGINE PER 5 UNITS: Performed by: INTERNAL MEDICINE

## 2024-09-29 PROCEDURE — 80048 BASIC METABOLIC PNL TOTAL CA: CPT | Performed by: INTERNAL MEDICINE

## 2024-09-29 PROCEDURE — 25010000002 FUROSEMIDE PER 20 MG: Performed by: INTERNAL MEDICINE

## 2024-09-29 PROCEDURE — 63710000001 INSULIN LISPRO (HUMAN) PER 5 UNITS: Performed by: INTERNAL MEDICINE

## 2024-09-29 RX ORDER — SODIUM CHLORIDE 9 MG/ML
75 INJECTION, SOLUTION INTRAVENOUS CONTINUOUS
Status: DISPENSED | OUTPATIENT
Start: 2024-09-29 | End: 2024-09-30

## 2024-09-29 RX ADMIN — INSULIN LISPRO 2 UNITS: 100 INJECTION, SOLUTION INTRAVENOUS; SUBCUTANEOUS at 12:10

## 2024-09-29 RX ADMIN — Medication 10 ML: at 20:09

## 2024-09-29 RX ADMIN — MUPIROCIN 1 APPLICATION: 20 OINTMENT TOPICAL at 09:44

## 2024-09-29 RX ADMIN — MUPIROCIN 1 APPLICATION: 20 OINTMENT TOPICAL at 20:09

## 2024-09-29 RX ADMIN — CHLORHEXIDINE GLUCONATE 1 APPLICATION: 500 CLOTH TOPICAL at 04:12

## 2024-09-29 RX ADMIN — PRAVASTATIN SODIUM 40 MG: 20 TABLET ORAL at 20:09

## 2024-09-29 RX ADMIN — INSULIN LISPRO 2 UNITS: 100 INJECTION, SOLUTION INTRAVENOUS; SUBCUTANEOUS at 17:34

## 2024-09-29 RX ADMIN — INSULIN GLARGINE 20 UNITS: 100 INJECTION, SOLUTION SUBCUTANEOUS at 20:09

## 2024-09-29 RX ADMIN — SACUBITRIL AND VALSARTAN 1 TABLET: 24; 26 TABLET, FILM COATED ORAL at 20:09

## 2024-09-29 RX ADMIN — FUROSEMIDE 80 MG: 10 INJECTION, SOLUTION INTRAVENOUS at 04:54

## 2024-09-29 RX ADMIN — SACUBITRIL AND VALSARTAN 1 TABLET: 24; 26 TABLET, FILM COATED ORAL at 09:43

## 2024-09-29 RX ADMIN — INSULIN LISPRO 6 UNITS: 100 INJECTION, SOLUTION INTRAVENOUS; SUBCUTANEOUS at 20:08

## 2024-09-29 RX ADMIN — Medication 10 MG: at 23:33

## 2024-09-29 RX ADMIN — SODIUM CHLORIDE 75 ML/HR: 9 INJECTION, SOLUTION INTRAVENOUS at 13:52

## 2024-09-29 RX ADMIN — DULOXETINE HYDROCHLORIDE 30 MG: 30 CAPSULE, DELAYED RELEASE ORAL at 09:43

## 2024-09-29 RX ADMIN — GABAPENTIN 600 MG: 300 CAPSULE ORAL at 09:43

## 2024-09-29 RX ADMIN — ASPIRIN 81 MG: 81 TABLET, COATED ORAL at 09:43

## 2024-09-29 RX ADMIN — ENOXAPARIN SODIUM 40 MG: 100 INJECTION SUBCUTANEOUS at 09:15

## 2024-09-29 NOTE — PLAN OF CARE
Goal Outcome Evaluation:   Pt A&Ox4. HR normal sinus with frequent PVCs. Systolic pressure low intermittently, MAPs adequate. 4L nasal cannula. Adequate urine output. Joey NAM notified of elevated troponin and BNP. Safety and comfort maintained.

## 2024-09-29 NOTE — PROGRESS NOTES
[facility] Intensivist Services  INPATIENT PROGRESS NOTE    Patient Name: Tucker Knox  Date of Admission: 9/28/2024  Today's Date: 09/29/24  Length of Stay: 1  Primary Care Physician: Kt Alfredo MD    Subjective   Chief Complaint: Breath  HPI   Mr. Knox is a pleasant 64-year-old male who presented to an outside ED due to shortness of breath.  Past medical history includes combined systolic and diastolic CHF, CAD status post stents, hypertension, hyperlipidemia, COPD, chronic hypoxic respiratory failure on 3 L, tobacco use, diabetes.     History is provided by the patient.  [facility] for [facility]ation.  He has had 3 admissions this year, 2 at [facility] and 1 at [facility].  He was discharged from [facility] on 9/18/2024.  During that admission he was diuresed with IV Lasix.  He had improvement of his symptoms and he was discharged.  Patient states initially he was doing well up until 2 days ago.  Over the past 2 days he has noted increased dyspnea worse with exertion.  He also has a dry cough which is chronic.  Also notes chills.  Denies any fever, chest pain, nausea, vomiting.  No sick contacts.     Due to his shortness of breath he presented to an outside ED.  At the outside ED his chest x-ray was consistent with volume overload.  He also had an elevated troponin and BNP.  Lactate of 2 and white count of 12.  He was given IV insulin and IV Lasix and transferred to [facility] ICU.     On exam the patient is resting comfortably in bed.  He is breathing comfortably on 3 L satting in the upper 90s.  States he would like to have something to eat and drink when able.  States that he does have some dyspnea which is worse with exertion.    Interval history:  9/29/2024 patient was admitted overnight with increasing shortness of breath and chest x-ray is showing some pulmonary edema.  Patient denies any shortness of  breath or chest pain.  Patient was started on Lasix twice daily but has not had any good urine.  I did order stat BMP today and did show worsening of his renal function and patient does look dry.  Lasix was stopped today and I am starting him on normal saline with gentle hydration due to his low ejection fraction.  Patient is at his baseline oxygen.        Review of Systems   All pertinent negatives and positives are as above. All other systems have been reviewed and are negative unless otherwise stated.     Objective    Temp:  [97.5 °F (36.4 °C)-98.4 °F (36.9 °C)] 97.8 °F (36.6 °C)  Heart Rate:  [] 87  Resp:  [16-22] 17  BP: ()/() 95/73  Physical Exam    General: Well appearing, in no respiratory distress  Head: Normocephalic, atraumatic  Eyes: Conjunctiva clear, sclera non-icteric  Teeth/Gums: Poor dentition dry mucous membranes   Neck: Supple without stridor  Heart: Regular rate and rhythm, no murmur  Lungs: Diminished bilaterally  no wheezing no crackles  Abdomen: Soft, nontender  MSK/extremities:  left lower extremity edema much worse than right with bilateral cyanosis of the feet with palpable pulses.  Neurologic: AOx3, grossly no focal deficits       Results Review:    WBC   Date Value Ref Range Status   09/28/2024 12.53 (H) 3.40 - 10.80 10*3/mm3 Final     RBC   Date Value Ref Range Status   09/28/2024 4.26 4.14 - 5.80 10*6/mm3 Final     Hemoglobin   Date Value Ref Range Status   09/28/2024 11.7 (L) 13.0 - 17.7 g/dL Final     Hematocrit   Date Value Ref Range Status   09/28/2024 40.2 37.5 - 51.0 % Final     MCV   Date Value Ref Range Status   09/28/2024 94.4 79.0 - 97.0 fL Final     MCH   Date Value Ref Range Status   09/28/2024 27.5 26.6 - 33.0 pg Final     MCHC   Date Value Ref Range Status   09/28/2024 29.1 (L) 31.5 - 35.7 g/dL Final     RDW   Date Value Ref Range Status   09/28/2024 15.3 12.3 - 15.4 % Final     RDW-SD   Date Value Ref Range Status   09/28/2024 52.8 37.0 - 54.0 fl Final  "    MPV   Date Value Ref Range Status   09/28/2024 11.1 6.0 - 12.0 fL Final     Platelets   Date Value Ref Range Status   09/28/2024 201 140 - 450 10*3/mm3 Final     Neutrophil %   Date Value Ref Range Status   09/28/2024 81.9 (H) 42.7 - 76.0 % Final     Lymphocyte %   Date Value Ref Range Status   09/28/2024 10.4 (L) 19.6 - 45.3 % Final     Monocyte %   Date Value Ref Range Status   09/28/2024 6.2 5.0 - 12.0 % Final     Eosinophil %   Date Value Ref Range Status   09/28/2024 0.4 0.3 - 6.2 % Final     Basophil %   Date Value Ref Range Status   09/28/2024 0.6 0.0 - 1.5 % Final     Immature Grans %   Date Value Ref Range Status   09/28/2024 0.5 0.0 - 0.5 % Final     Neutrophils, Absolute   Date Value Ref Range Status   09/28/2024 10.27 (H) 1.70 - 7.00 10*3/mm3 Final     Lymphocytes, Absolute   Date Value Ref Range Status   09/28/2024 1.30 0.70 - 3.10 10*3/mm3 Final     Monocytes, Absolute   Date Value Ref Range Status   09/28/2024 0.78 0.10 - 0.90 10*3/mm3 Final     Eosinophils, Absolute   Date Value Ref Range Status   09/28/2024 0.05 0.00 - 0.40 10*3/mm3 Final     Basophils, Absolute   Date Value Ref Range Status   09/28/2024 0.07 0.00 - 0.20 10*3/mm3 Final     Immature Grans, Absolute   Date Value Ref Range Status   09/28/2024 0.06 (H) 0.00 - 0.05 10*3/mm3 Final     nRBC   Date Value Ref Range Status   09/28/2024 0.0 0.0 - 0.2 /100 WBC Final       Basic Metabolic Panel    Sodium Sodium   Date Value Ref Range Status   09/29/2024 138 136 - 145 mmol/L Final   09/28/2024 138 136 - 145 mmol/L Final      Potassium Potassium   Date Value Ref Range Status   09/29/2024 5.3 (H) 3.5 - 5.2 mmol/L Final     Comment:     Slight hemolysis detected by analyzer. Result may be falsely elevated.   09/28/2024 4.7 3.5 - 5.2 mmol/L Final      Chloride Chloride   Date Value Ref Range Status   09/29/2024 97 (L) 98 - 107 mmol/L Final   09/28/2024 98 98 - 107 mmol/L Final      Bicarbonate No results found for: \"PLASMABICARB\"   BUN BUN   Date " "Value Ref Range Status   09/29/2024 55 (H) 8 - 23 mg/dL Final   09/28/2024 50 (H) 8 - 23 mg/dL Final      Creatinine Creatinine   Date Value Ref Range Status   09/29/2024 1.75 (H) 0.76 - 1.27 mg/dL Final   09/28/2024 1.65 (H) 0.76 - 1.27 mg/dL Final      Calcium Calcium   Date Value Ref Range Status   09/29/2024 8.5 (L) 8.6 - 10.5 mg/dL Final   09/28/2024 8.8 8.6 - 10.5 mg/dL Final      Glucose      No components found for: \"GLUCOSE.*\"         XR Chest 1 View    Result Date: 9/28/2024  1.. Pulmonary venous hypertension with mild interstitial edema and right-sided effusion.  This report was signed and finalized on 9/28/2024 5:05 PM by Dr. Antonio Prather MD.       Result Review:  I have personally reviewed the results from the time of this admission to 9/29/2024 10:58 CDT and agree with these findings:  [x]  Laboratory list / accordion  []  Microbiology  [x]  Radiology  []  EKG/Telemetry   []  Cardiology/Vascular   []  Pathology  []  Old records  []  Other:  Most notable findings include:       Culture Data:   No results found for: \"BLOODCX\", \"URINECX\", \"WOUNDCX\", \"MRSACX\", \"RESPCX\", \"STOOLCX\"    I have reviewed the patient's current medications.     Assessment/Plan   Assessment  Active Hospital Problems    Diagnosis     **Acute exacerbation of CHF (congestive heart failure)      -Acute on chronic hypoxemic respiratory failure  -Acute pulm edema  -Acute on chronic kidney disease possibly related to dehydration and diuresis  -Hyperkalemia  -COPD with acute exacerbation   - diabetes mellitus  -Acute on chronic systolic congestive heart failure with ejection fraction of 30-35%      Plan:  -Stat BMP was ordered which was showing worsening of his renal function and worsening hyperkalemia  -Lasix was stopped today  -I will start with gentle hydration.  Starting normal saline at 75 mL/h and encourage oral intake.  IV fluid will need to be reassessed within 24 hours to avoid any fluid overload  -Following up with renal " function I will repeat BMP to follow potassium level  -Avoid any nephrotoxic medications  -Strict input output chart  -Patient is on 3 to 4 L of oxygen which is his baseline keep pulse ox above 88%  -Patient has left lower limb swelling I will order venous Doppler ultrasound to rule out DVT  -Sliding scale insulin       Discussed with: Patient and bedside nurse     Disposition  Patient can be transferred to the floor from critical care standpoint    Electronically signed by Mohit Barnes MD on 9/29/2024 at 10:58 CDT

## 2024-09-29 NOTE — PLAN OF CARE
Goal Outcome Evaluation:      Pt on 2-3L NC, no c/o soa. Afebrile. 2L uop. HR NSR 80s, SBP 90-100s. IVF started at 75ml/hr due to concern for worsening kidney function.

## 2024-09-30 ENCOUNTER — APPOINTMENT (OUTPATIENT)
Dept: ULTRASOUND IMAGING | Facility: HOSPITAL | Age: 64
End: 2024-09-30
Payer: MEDICARE

## 2024-09-30 LAB
ANION GAP SERPL CALCULATED.3IONS-SCNC: 8 MMOL/L (ref 5–15)
BASOPHILS # BLD AUTO: 0.05 10*3/MM3 (ref 0–0.2)
BASOPHILS NFR BLD AUTO: 0.5 % (ref 0–1.5)
BUN SERPL-MCNC: 52 MG/DL (ref 8–23)
BUN/CREAT SERPL: 32.5 (ref 7–25)
CALCIUM SPEC-SCNC: 8.3 MG/DL (ref 8.6–10.5)
CHLORIDE SERPL-SCNC: 100 MMOL/L (ref 98–107)
CO2 SERPL-SCNC: 31 MMOL/L (ref 22–29)
CREAT SERPL-MCNC: 1.6 MG/DL (ref 0.76–1.27)
DEPRECATED RDW RBC AUTO: 52.1 FL (ref 37–54)
EGFRCR SERPLBLD CKD-EPI 2021: 48.1 ML/MIN/1.73
EOSINOPHIL # BLD AUTO: 0.07 10*3/MM3 (ref 0–0.4)
EOSINOPHIL NFR BLD AUTO: 0.7 % (ref 0.3–6.2)
ERYTHROCYTE [DISTWIDTH] IN BLOOD BY AUTOMATED COUNT: 15.1 % (ref 12.3–15.4)
GLUCOSE BLDC GLUCOMTR-MCNC: 73 MG/DL (ref 70–130)
GLUCOSE BLDC GLUCOMTR-MCNC: 90 MG/DL (ref 70–130)
GLUCOSE BLDC GLUCOMTR-MCNC: 91 MG/DL (ref 70–130)
GLUCOSE SERPL-MCNC: 103 MG/DL (ref 65–99)
HCT VFR BLD AUTO: 37 % (ref 37.5–51)
HGB BLD-MCNC: 10.3 G/DL (ref 13–17.7)
IMM GRANULOCYTES # BLD AUTO: 0.04 10*3/MM3 (ref 0–0.05)
IMM GRANULOCYTES NFR BLD AUTO: 0.4 % (ref 0–0.5)
LYMPHOCYTES # BLD AUTO: 0.57 10*3/MM3 (ref 0.7–3.1)
LYMPHOCYTES NFR BLD AUTO: 6.1 % (ref 19.6–45.3)
MCH RBC QN AUTO: 26.7 PG (ref 26.6–33)
MCHC RBC AUTO-ENTMCNC: 27.8 G/DL (ref 31.5–35.7)
MCV RBC AUTO: 95.9 FL (ref 79–97)
MONOCYTES # BLD AUTO: 0.65 10*3/MM3 (ref 0.1–0.9)
MONOCYTES NFR BLD AUTO: 7 % (ref 5–12)
NEUTROPHILS NFR BLD AUTO: 7.97 10*3/MM3 (ref 1.7–7)
NEUTROPHILS NFR BLD AUTO: 85.3 % (ref 42.7–76)
NRBC BLD AUTO-RTO: 0 /100 WBC (ref 0–0.2)
PLATELET # BLD AUTO: 181 10*3/MM3 (ref 140–450)
PMV BLD AUTO: 10.2 FL (ref 6–12)
POTASSIUM SERPL-SCNC: 5 MMOL/L (ref 3.5–5.2)
RBC # BLD AUTO: 3.86 10*6/MM3 (ref 4.14–5.8)
SODIUM SERPL-SCNC: 139 MMOL/L (ref 136–145)
WBC NRBC COR # BLD AUTO: 9.35 10*3/MM3 (ref 3.4–10.8)

## 2024-09-30 PROCEDURE — 85025 COMPLETE CBC W/AUTO DIFF WBC: CPT | Performed by: INTERNAL MEDICINE

## 2024-09-30 PROCEDURE — 80048 BASIC METABOLIC PNL TOTAL CA: CPT | Performed by: INTERNAL MEDICINE

## 2024-09-30 PROCEDURE — 25010000002 ENOXAPARIN PER 10 MG: Performed by: INTERNAL MEDICINE

## 2024-09-30 PROCEDURE — 82948 REAGENT STRIP/BLOOD GLUCOSE: CPT

## 2024-09-30 PROCEDURE — 97162 PT EVAL MOD COMPLEX 30 MIN: CPT

## 2024-09-30 PROCEDURE — 93971 EXTREMITY STUDY: CPT

## 2024-09-30 PROCEDURE — 93971 EXTREMITY STUDY: CPT | Performed by: SURGERY

## 2024-09-30 PROCEDURE — 25810000003 SODIUM CHLORIDE 0.9 % SOLUTION: Performed by: INTERNAL MEDICINE

## 2024-09-30 PROCEDURE — 97166 OT EVAL MOD COMPLEX 45 MIN: CPT | Performed by: OCCUPATIONAL THERAPIST

## 2024-09-30 PROCEDURE — 63710000001 INSULIN GLARGINE PER 5 UNITS: Performed by: INTERNAL MEDICINE

## 2024-09-30 RX ADMIN — MUPIROCIN 1 APPLICATION: 20 OINTMENT TOPICAL at 20:04

## 2024-09-30 RX ADMIN — MUPIROCIN 1 APPLICATION: 20 OINTMENT TOPICAL at 08:44

## 2024-09-30 RX ADMIN — SACUBITRIL AND VALSARTAN 1 TABLET: 24; 26 TABLET, FILM COATED ORAL at 08:44

## 2024-09-30 RX ADMIN — GABAPENTIN 600 MG: 300 CAPSULE ORAL at 08:47

## 2024-09-30 RX ADMIN — SACUBITRIL AND VALSARTAN 1 TABLET: 24; 26 TABLET, FILM COATED ORAL at 20:04

## 2024-09-30 RX ADMIN — PRAVASTATIN SODIUM 40 MG: 20 TABLET ORAL at 20:03

## 2024-09-30 RX ADMIN — CHLORHEXIDINE GLUCONATE 1 APPLICATION: 500 CLOTH TOPICAL at 04:15

## 2024-09-30 RX ADMIN — INSULIN GLARGINE 10 UNITS: 100 INJECTION, SOLUTION SUBCUTANEOUS at 20:05

## 2024-09-30 RX ADMIN — ENOXAPARIN SODIUM 40 MG: 100 INJECTION SUBCUTANEOUS at 08:44

## 2024-09-30 RX ADMIN — Medication 10 MG: at 20:04

## 2024-09-30 RX ADMIN — DULOXETINE HYDROCHLORIDE 30 MG: 30 CAPSULE, DELAYED RELEASE ORAL at 08:44

## 2024-09-30 RX ADMIN — SODIUM CHLORIDE 75 ML/HR: 9 INJECTION, SOLUTION INTRAVENOUS at 04:24

## 2024-09-30 RX ADMIN — DOCUSATE SODIUM 50 MG AND SENNOSIDES 8.6 MG 2 TABLET: 8.6; 5 TABLET, FILM COATED ORAL at 09:18

## 2024-09-30 RX ADMIN — ASPIRIN 81 MG: 81 TABLET, COATED ORAL at 08:44

## 2024-09-30 RX ADMIN — Medication 10 ML: at 09:19

## 2024-09-30 NOTE — PLAN OF CARE
Goal Outcome Evaluation:  Plan of Care Reviewed With: patient           Outcome Evaluation: OT eval completed. Pt presents lethargic but oriented x4, sitting up in bed. He reports at baseline being independent with all adls and mobility, residing at home alone with family assisting as needed. He remained on 3L O2/NC throughout session with O2 sats in mis 90s with all activity. He demonstrates impaired balance, strength, activity tolerance and decreased safety awareness. He was able to bring self to sitting at EOB with Min A and vcs. CGA for sit <> stand t/f from EOB and Min Ax2 with use of SC for in room mobility. Decreased balance noted with dynamic standing activity. He was left sitting up in chair at end of session. He would benefit from skilled OT services to address these deficits. Recommend d/c to short term rehab.      Anticipated Discharge Disposition (OT): skilled nursing facility

## 2024-09-30 NOTE — PLAN OF CARE
Problem: Adult Inpatient Plan of Care  Goal: Plan of Care Review  Outcome: Progressing  Flowsheets  Taken 9/30/2024 1744 by Emily Hull RN  Progress: improving  Taken 9/30/2024 1420 by Tayla Gibbs OTR/L, CSRS  Plan of Care Reviewed With: patient  Goal: Patient-Specific Goal (Individualized)  Outcome: Progressing  Goal: Absence of Hospital-Acquired Illness or Injury  Outcome: Progressing  Intervention: Identify and Manage Fall Risk  Recent Flowsheet Documentation  Taken 9/30/2024 1700 by Emily Hull RN  Safety Promotion/Fall Prevention:   safety round/check completed   clutter free environment maintained  Intervention: Prevent Skin Injury  Recent Flowsheet Documentation  Taken 9/30/2024 1700 by Emily Hull RN  Body Position:   turned   right   upper extremity elevated   lower extremity elevated  Goal: Optimal Comfort and Wellbeing  Outcome: Progressing  Goal: Readiness for Transition of Care  Outcome: Progressing     Problem: COPD (Chronic Obstructive Pulmonary Disease) Comorbidity  Goal: Maintenance of COPD Symptom Control  Outcome: Progressing     Problem: Diabetes Comorbidity  Goal: Blood Glucose Level Within Targeted Range  Outcome: Progressing     Problem: Heart Failure Comorbidity  Goal: Maintenance of Heart Failure Symptom Control  Outcome: Progressing     Problem: Obstructive Sleep Apnea Risk or Actual Comorbidity Management  Goal: Unobstructed Breathing During Sleep  Outcome: Progressing     Problem: Fall Injury Risk  Goal: Absence of Fall and Fall-Related Injury  Outcome: Progressing  Intervention: Promote Injury-Free Environment  Recent Flowsheet Documentation  Taken 9/30/2024 1700 by Emily Hull RN  Safety Promotion/Fall Prevention:   safety round/check completed   clutter free environment maintained   Goal Outcome Evaluation:           Progress: improving

## 2024-09-30 NOTE — CASE MANAGEMENT/SOCIAL WORK
Discharge Planning Assessment  [facility]     Patient Name: Tucker Knox  MRN: [id number]  Today's Date: 9/30/2024    Admit Date: 9/28/2024        Discharge Needs Assessment       Row Name 09/30/24 1403       Living Environment    People in Home alone    Current Living Arrangements home    Potentially Unsafe Housing Conditions none    In the past 12 months has the electric, gas, oil, or water company threatened to shut off services in your home? No    Primary Care Provided by self    Provides Primary Care For no one    Family Caregiver if Needed child(jose m), adult;sibling(s)    Quality of Family Relationships helpful;supportive    Able to Return to Prior Arrangements yes       Resource/Environmental Concerns    Resource/Environmental Concerns none    Transportation Concerns none       Transportation Needs    In the past 12 months, has lack of transportation kept you from medical appointments or from getting medications? no    In the past 12 months, has lack of transportation kept you from meetings, work, or from getting things needed for daily living? No       Food Insecurity    Within the past 12 months, you worried that your food would run out before you got the money to buy more. Never true    Within the past 12 months, the food you bought just didn't last and you didn't have money to get more. Never true       Transition Planning    Patient/Family Anticipates Transition to home    Patient/Family Anticipated Services at Transition none    Transportation Anticipated family or friend will provide       Discharge Needs Assessment    Readmission Within the Last 30 Days previous discharge plan unsuccessful    Equipment Currently Used at Home wheelchair;cane, straight;oxygen    Concerns to be Addressed no discharge needs identified    Anticipated Changes Related to Illness none    Equipment Needed After Discharge none    Discharge Coordination/Progress PT is a readmit. PT resides at home alone and has a neighbor  who assists as needed. PT has required DME at home. PT's sister has concerns about PT not being compliant and not really understanding what is going on with him medically. She has asked for living will/health care surrogate paperwork to be filled out and added to PT's chart. SW will notify RN that a pastoral care consult is needed for directives paperwork. PT unaware of any dc needs at this time. SW will follow and assist as needed.                   Discharge Plan    No documentation.                 Continued Care and Services - Admitted Since 9/28/2024    No active coordination exists for this encounter.          Demographic Summary    No documentation.                  Functional Status    No documentation.                  Psychosocial    No documentation.                  Abuse/Neglect    No documentation.                  Legal    No documentation.                  Substance Abuse    No documentation.                  Patient Forms    No documentation.                     KARAN Rios

## 2024-09-30 NOTE — PROGRESS NOTES
[facility]  INPATIENT PROGRESS NOTE    Patient Name: Tucker Knox  Date of Admission: 9/28/2024  Today's Date: 09/30/24  Length of Stay: 2  Primary Care Physician: Kt Alfredo MD    Subjective   Chief Complaint: Breath  HPI   Mr. Knox is a pleasant 64-year-old male who presented to an outside ED due to shortness of breath.  Past medical history includes combined systolic and diastolic CHF, CAD status post stents, hypertension, hyperlipidemia, COPD, chronic hypoxic respiratory failure on 3 L, tobacco use, diabetes.     History is provided by the patient.  He was recently discharged from [facility] after a stay for CHF exacerbation.  He has had 3 admissions this year, 2 at [facility] and 1 at [facility].  He was discharged from [facility] on 9/18/2024.  During that admission he was diuresed with IV Lasix.  He had improvement of his symptoms and he was discharged.  Patient states initially he was doing well up until 2 days ago.  Over the past 2 days he has noted increased dyspnea worse with exertion.  He also has a dry cough which is chronic.  Also notes chills.  Denies any fever, chest pain, nausea, vomiting.  No sick contacts.     Due to his shortness of breath he presented to an outside ED.  At the outside ED his chest x-ray was consistent with volume overload.  He also had an elevated troponin and BNP.  Lactate of 2 and white count of 12.  He was given IV insulin and IV Lasix and transferred to [facility] ICU.     On exam the patient is resting comfortably in bed.  He is breathing comfortably on 3 L satting in the upper 90s.  States he would like to have something to eat and drink when able.  States that he does have some dyspnea which is worse with exertion.    Interval history:  9/29/2024 patient was admitted overnight with increasing shortness of breath and chest x-ray is showing some pulmonary edema.  Patient denies any shortness of  breath or chest pain.  Patient was started on Lasix twice daily but has not had any good urine.  I did order stat BMP today and did show worsening of his renal function and patient does look dry.  Lasix was stopped today and I am starting him on normal saline with gentle hydration due to his low ejection fraction.  Patient is at his baseline oxygen.    9/30/2024 patient is somewhat more awake today he has no complaints other than he was not able to sleep last night.  Patient diuretics were held yesterday and was started on IV fluids and his renal function and potassium has improved today with improvement in his mental status also.        Review of Systems   All pertinent negatives and positives are as above. All other systems have been reviewed and are negative unless otherwise stated.     Objective    Temp:  [98 °F (36.7 °C)-99 °F (37.2 °C)] 98.3 °F (36.8 °C)  Heart Rate:  [80-95] 86  Resp:  [18-26] 18  BP: ()/(54-73) 104/68  Physical Exam    General: Well appearing, in no respiratory distress  Head: Normocephalic, atraumatic  Eyes: Conjunctiva clear, sclera non-icteric  Teeth/Gums: Poor dentition dry mucous membranes   Neck: Supple without stridor  Heart: Regular rate and rhythm, no murmur  Lungs: Diminished bilaterally  no wheezing no crackles  Abdomen: Soft, nontender  MSK/extremities:  left lower extremity edema much worse than right with bilateral cyanosis of the feet with palpable pulses.  Neurologic: AOx3, grossly no focal deficits       Results Review:    WBC   Date Value Ref Range Status   09/30/2024 9.35 3.40 - 10.80 10*3/mm3 Final     RBC   Date Value Ref Range Status   09/30/2024 3.86 (L) 4.14 - 5.80 10*6/mm3 Final     Hemoglobin   Date Value Ref Range Status   09/30/2024 10.3 (L) 13.0 - 17.7 g/dL Final     Hematocrit   Date Value Ref Range Status   09/30/2024 37.0 (L) 37.5 - 51.0 % Final     MCV   Date Value Ref Range Status   09/30/2024 95.9 79.0 - 97.0 fL Final     MCH   Date Value Ref Range  Status   09/30/2024 26.7 26.6 - 33.0 pg Final     MCHC   Date Value Ref Range Status   09/30/2024 27.8 (L) 31.5 - 35.7 g/dL Final     RDW   Date Value Ref Range Status   09/30/2024 15.1 12.3 - 15.4 % Final     RDW-SD   Date Value Ref Range Status   09/30/2024 52.1 37.0 - 54.0 fl Final     MPV   Date Value Ref Range Status   09/30/2024 10.2 6.0 - 12.0 fL Final     Platelets   Date Value Ref Range Status   09/30/2024 181 140 - 450 10*3/mm3 Final     Neutrophil %   Date Value Ref Range Status   09/30/2024 85.3 (H) 42.7 - 76.0 % Final     Lymphocyte %   Date Value Ref Range Status   09/30/2024 6.1 (L) 19.6 - 45.3 % Final     Monocyte %   Date Value Ref Range Status   09/30/2024 7.0 5.0 - 12.0 % Final     Eosinophil %   Date Value Ref Range Status   09/30/2024 0.7 0.3 - 6.2 % Final     Basophil %   Date Value Ref Range Status   09/30/2024 0.5 0.0 - 1.5 % Final     Immature Grans %   Date Value Ref Range Status   09/30/2024 0.4 0.0 - 0.5 % Final     Neutrophils, Absolute   Date Value Ref Range Status   09/30/2024 7.97 (H) 1.70 - 7.00 10*3/mm3 Final     Lymphocytes, Absolute   Date Value Ref Range Status   09/30/2024 0.57 (L) 0.70 - 3.10 10*3/mm3 Final     Monocytes, Absolute   Date Value Ref Range Status   09/30/2024 0.65 0.10 - 0.90 10*3/mm3 Final     Eosinophils, Absolute   Date Value Ref Range Status   09/30/2024 0.07 0.00 - 0.40 10*3/mm3 Final     Basophils, Absolute   Date Value Ref Range Status   09/30/2024 0.05 0.00 - 0.20 10*3/mm3 Final     Immature Grans, Absolute   Date Value Ref Range Status   09/30/2024 0.04 0.00 - 0.05 10*3/mm3 Final     nRBC   Date Value Ref Range Status   09/30/2024 0.0 0.0 - 0.2 /100 WBC Final       Basic Metabolic Panel    Sodium Sodium   Date Value Ref Range Status   09/30/2024 139 136 - 145 mmol/L Final   09/29/2024 137 136 - 145 mmol/L Final   09/29/2024 138 136 - 145 mmol/L Final   09/28/2024 138 136 - 145 mmol/L Final      Potassium Potassium   Date Value Ref Range Status  "  09/30/2024 5.0 3.5 - 5.2 mmol/L Final   09/29/2024 5.2 3.5 - 5.2 mmol/L Final   09/29/2024 5.3 (H) 3.5 - 5.2 mmol/L Final     Comment:     Slight hemolysis detected by analyzer. Result may be falsely elevated.   09/28/2024 4.7 3.5 - 5.2 mmol/L Final      Chloride Chloride   Date Value Ref Range Status   09/30/2024 100 98 - 107 mmol/L Final   09/29/2024 98 98 - 107 mmol/L Final   09/29/2024 97 (L) 98 - 107 mmol/L Final   09/28/2024 98 98 - 107 mmol/L Final      Bicarbonate No results found for: \"PLASMABICARB\"   BUN BUN   Date Value Ref Range Status   09/30/2024 52 (H) 8 - 23 mg/dL Final   09/29/2024 54 (H) 8 - 23 mg/dL Final   09/29/2024 55 (H) 8 - 23 mg/dL Final   09/28/2024 50 (H) 8 - 23 mg/dL Final      Creatinine Creatinine   Date Value Ref Range Status   09/30/2024 1.60 (H) 0.76 - 1.27 mg/dL Final   09/29/2024 1.77 (H) 0.76 - 1.27 mg/dL Final   09/29/2024 1.75 (H) 0.76 - 1.27 mg/dL Final   09/28/2024 1.65 (H) 0.76 - 1.27 mg/dL Final      Calcium Calcium   Date Value Ref Range Status   09/30/2024 8.3 (L) 8.6 - 10.5 mg/dL Final   09/29/2024 8.4 (L) 8.6 - 10.5 mg/dL Final   09/29/2024 8.5 (L) 8.6 - 10.5 mg/dL Final   09/28/2024 8.8 8.6 - 10.5 mg/dL Final      Glucose      No components found for: \"GLUCOSE.*\"         XR Chest 1 View    Result Date: 9/28/2024  1.. Pulmonary venous hypertension with mild interstitial edema and right-sided effusion.  This report was signed and finalized on 9/28/2024 5:05 PM by Dr. Antonio Prather MD.       Result Review:  I have personally reviewed the results from the time of this admission to 9/30/2024 07:39 CDT and agree with these findings:  [x]  Laboratory list / accordion  []  Microbiology  [x]  Radiology  []  EKG/Telemetry   []  Cardiology/Vascular   []  Pathology  []  Old records  []  Other:  Most notable findings include:       Culture Data:   No results found for: \"BLOODCX\", \"URINECX\", \"WOUNDCX\", \"MRSACX\", \"RESPCX\", \"STOOLCX\"    I have reviewed the patient's current " medications.     Assessment/Plan   Assessment  Active Hospital Problems    Diagnosis     **Acute exacerbation of CHF (congestive heart failure)      -Acute on chronic hypoxemic respiratory failure  -Acute pulm edema  -Acute on chronic kidney disease possibly related to dehydration and diuresis  -Hyperkalemia  -COPD with acute exacerbation   - diabetes mellitus  -Acute on chronic systolic congestive heart failure with ejection fraction of 30-35%      Plan:  -Renal function is improving with holding diuretics and starting gentle hydration we will continue IV fluids and holding Lasix today  -Lasix was stopped yesterday we will continue holding  -Continue normal saline at 75 mL/h and encourage oral intake.  This will need to be reassessed especially with his low ejection fraction.  -Avoid any nephrotoxic medications  -Strict input output chart  -Patient is on 3 L of oxygen which is his baseline keep pulse ox above 88%  -Patient has left lower limb swelling I ordered a venous Doppler ultrasound to rule out DVT which is still pending  -Sliding scale insulin       Discussed with: Patient and bedside nurse     Disposition  Patient can be transferred to the floor from critical care standpoint    Electronically signed by Mohit Barnes MD on 9/30/2024 at 07:39 CDT

## 2024-09-30 NOTE — PAYOR COMM NOTE
"Farhana Norton (64 y.o. Male)   [id number]   ICU  9/28  ADMIT        [facility] phone    fax           Date of Birth   1960    Social Security Number       Address   [address] [place] [id number]    Home Phone   [phone]    MRN   [id number]       Adventism   Other    Marital Status                               Admission Date   9/28/24    Admission Type   Urgent    Admitting Provider   Quentin Morin DO    Attending Provider   Mohit Barnes MD    Department, Room/Bed   [facility] INTENSIVE CARE, I004/1       Discharge Date       Discharge Disposition       Discharge Destination                                 Attending Provider: Mohit Barnes MD    Allergies: Penicillins    Isolation: None   Infection: None   Code Status: CPR    Ht: 175.3 cm (69\")   Wt: 120 kg (263 lb 10.7 oz)    Admission Cmt: None   Principal Problem: Acute exacerbation of CHF (congestive heart failure) [I50.9]                   Active Insurance as of 9/28/2024       Primary Coverage       Payor Plan Insurance Group Employer/Plan Group    ANTHEM MEDICARE REPLACEMENT ANTHEM MEDICARE ADVANTAGE [id number]       Payor Plan Address Payor Plan Phone Number Payor Plan Fax Number Effective Dates    [address] [phone]  2/1/2024 - None Entered    [facility] [id number]         Subscriber Name Subscriber Birth Date Member ID       FARHANA NORTON 1960 [id number]                     Emergency Contacts        (Rel.) Home Phone Work Phone Mobile Phone    alycia norton (Son) [phone] -- --    sowmyajose (Sister) [phone] -- --                 History & Physical        Quentin Morin DO at 09/28/24 1844              [facility] Intensivist Services  History and Physical Note    Patient Name: Farhana Norton  Date of Admission: 9/28/2024  Today's Date: 09/28/24  Length of Stay: " 0  Primary Care Physician: Kt Alfredo MD    Subjective     Chief Complaint: Shortness of breath    HPI:  Mr. Knox is a pleasant 64-year-old male who presented to an outside ED due to shortness of breath.  Past medical history includes combined systolic and diastolic CHF, CAD status post stents, hypertension, hyperlipidemia, COPD, chronic hypoxic respiratory failure on 3 L, tobacco use, diabetes.    History is provided by the patient.  He was recently discharged from [facility] after a stay for CHF exacerbation.  He has had 3 admissions this year, 2 at [facility] and 1 at [facility].  He was discharged from [facility] on 9/18/2024.  During that admission he was diuresed with IV Lasix.  He had improvement of his symptoms and he was discharged.  Patient states initially he was doing well up until 2 days ago.  Over the past 2 days he has noted increased dyspnea worse with exertion.  He also has a dry cough which is chronic.  Also notes chills.  Denies any fever, chest pain, nausea, vomiting.  No sick contacts.    Due to his shortness of breath he presented to an outside ED.  At the outside ED his chest x-ray was consistent with volume overload.  He also had an elevated troponin and BNP.  Lactate of 2 and white count of 12.  He was given IV insulin and IV Lasix and transferred to [facility] ICU.    On exam the patient is resting comfortably in bed.  He is breathing comfortably on 3 L satting in the upper 90s.  States he would like to have something to eat and drink when able.  States that he does have some dyspnea which is worse with exertion.    Review of Systems:  All pertinent negatives and positives are as above. All other systems have been reviewed and are negative unless otherwise stated.       Objective      Physical Exam:  General: Well appearing, in no respiratory distress  Head: Normocephalic, atraumatic  Eyes: Conjunctiva clear, sclera non-icteric  Teeth/Gums: Poor dentition  Neck: Supple  "without stridor  Heart: Regular rate and rhythm, no murmur  Lungs: Diminished bilaterally with rales in bilateral bases  Abdomen: Soft, nontender  MSK/extremities: Bilateral lower extremity pitting edema up to above the knee  Neurologic: AOx3, grossly no focal deficits      Results Review:      Results from last 7 days   Lab Units 09/28/24  1808   WBC 10*3/mm3 12.53*   HEMOGLOBIN g/dL 11.7*   HEMATOCRIT % 40.2   PLATELETS 10*3/mm3 201     Visit Vitals  /96   Pulse 101   Temp 98.4 °F (36.9 °C) (Oral)   Resp 17   Ht 175.3 cm (69\")   Wt 117 kg (257 lb 0.9 oz)   SpO2 96%   BMI 37.96 kg/m²       Medications:  aspirin, 81 mg, Oral, Daily  [START ON 9/29/2024] Chlorhexidine Gluconate Cloth, 1 Application, Topical, Q24H  DULoxetine, 30 mg, Oral, Daily  empagliflozin, 10 mg, Oral, Daily  enoxaparin, 40 mg, Subcutaneous, Daily  furosemide, 80 mg, Intravenous, Q12H  gabapentin, 600 mg, Oral, Daily  insulin glargine, 20 Units, Subcutaneous, Nightly  insulin lispro, 2-9 Units, Subcutaneous, 4x Daily AC & at Bedtime  mupirocin, 1 Application, Each Nare, BID  pravastatin, 40 mg, Oral, Nightly  sacubitril-valsartan, 1 tablet, Oral, BID  senna-docusate sodium, 2 tablet, Oral, BID  sodium chloride, 10 mL, Intravenous, Q12H             Assessment/Plan     Problems:  Combined systolic and diastolic CHF with acute exacerbation  Chronic hypoxic respiratory failure on home O2  COPD without exacerbation  Hypertension  Hyperlipidemia  Diabetes  Leukocytosis    Assessment:  Patient is a 64-year-old male admitted to [facility] ICU from an outside ED.  Patient is clinically and hemodynamically stable.  Will start treatment for decompensated heart failure.  Suspect recurrent admissions for CHF exacerbation secondary to noncompliance.  Patient admits to missing some of his medications.  Will review and restart home meds as indicated.     Plan:  Combined systolic and diastolic CHF with acute exacerbation  -Start Lasix 80 mg IV twice " daily  -Strict I's and O's with daily weight  -Continuous telemetry monitoring  -Fluid restriction of 2 L/day  -Restart home Entresto    COPD without acute exacerbation, chronic hypoxic respiratory failure  -Supplemental oxygen as needed and wean as tolerated, goal O2 sat of 88-92%  -No inhalers on his home med list, will provide DuoNeb as needed  -Frequent incentive spirometer  -Tobacco cessation    Hypertension, hyperlipidemia  -Review and restart home meds as indicated    Diabetes  -Restart home long-acting  -Sliding scale  -Accu-Cheks ACHS      Critical Care Set:  Feeding: By mouth diet  Analgesia: As needed ordered  Sedation: N/A  Thromboprophylaxis: DVT Lovenox  HOB: 30+  Ulcer prophylaxis: N/A  Glycemic control: Diabetic, insulin as above  SBT: N/A  Bowel movement: Regimen ordered  Catheter removal: Urinary catheter placed by outside ED, will continue tonight with diuresis can likely discontinue tomorrow  Medication de-escalation: N/A      Disposition  Continue critical care management.      Madyson Morin DO  Pulmonary Critical Care Medicine  9/28/2024  18:44 CDT      Electronically signed by Quentin Morin DO at 09/28/24 2984       Vital Signs (last 2 days)       Date/Time Temp Temp src Pulse Resp BP Patient Position SpO2    09/30/24 0900 -- -- 93 -- 98/60 -- --    09/30/24 0800 98 (36.7) Oral 94 -- 106/73 -- 93    09/30/24 0700 -- -- 86 -- 104/68 -- 91    09/30/24 0600 -- -- 81 -- 93/62 -- 93    09/30/24 0500 -- -- 87 -- 98/62 -- 93    09/30/24 0439 -- -- 85 -- 93/73 -- 92    09/30/24 0430 -- -- 80 -- 73/58 -- 90    09/30/24 0400 98.3 (36.8) Oral 88 18 -- -- 91    09/30/24 0300 -- -- 89 -- 96/67 -- 91    09/30/24 0200 -- -- 84 -- 107/64 -- 90    09/30/24 0121 -- -- 83 -- 99/60 -- 94    09/30/24 0000 98 (36.7) Oral 87 19 86/63 -- --    09/29/24 2313 -- -- 82 -- 100/63 -- 93    09/29/24 2217 -- -- 82 -- 89/65 -- 92    09/29/24 2100 -- -- 86 -- 97/54 -- 95    09/29/24 2000 99 (37.2) Oral 84 26  97/60 -- 94    09/29/24 1900 -- -- 85 -- 85/68 -- 93    09/29/24 1800 -- -- 88 -- 98/71 -- 93    09/29/24 1700 -- -- 86 -- 95/64 -- --    09/29/24 1600 -- -- 86 -- 90/67 -- 90    09/29/24 1500 -- -- 81 -- 87/64 -- 92    09/29/24 1200 -- -- 95 -- 106/58 -- 92    09/29/24 0900 -- -- 88 20 94/63 -- 91    09/29/24 0800 -- -- 88 20 85/60 -- 78    09/29/24 0600 -- -- 87 -- 95/73 -- 97    09/29/24 0530 -- -- 87 -- 96/62 -- 93    09/29/24 0400 97.8 (36.6) Axillary 86 17 105/63 Lying 91    09/29/24 0300 -- -- 83 -- 100/71 -- 92    09/29/24 0200 -- -- 84 -- 90/73 -- 90    09/29/24 0100 -- -- 85 -- 95/71 -- 93    09/29/24 0000 97.5 (36.4) Axillary 88 16 100/72 Lying 96    09/28/24 2300 -- -- 89 -- 115/73 -- 98    09/28/24 2200 -- -- 95 -- 106/81 -- 96    09/28/24 2100 -- -- 95 -- 119/80 -- --    09/28/24 2000 -- -- 96 -- 123/80 -- 97    09/28/24 1900 -- -- 101 -- 113/99 -- 96    09/28/24 1815 -- -- 101 -- 113/96 -- 96    09/28/24 1800 -- -- 105 17 131/101 -- 95    09/28/24 1745 -- -- 100 -- 120/76 -- 95    09/28/24 1730 -- -- 99 -- 108/82 -- 96    09/28/24 1715 -- -- 97 -- 128/104 -- 97    09/28/24 1700 -- -- 98 20 116/104 -- 96    09/28/24 1645 -- -- 98 -- 110/83 -- 93    09/28/24 1630 -- -- 98 -- 106/82 -- 99    09/28/24 1601 98.4 (36.9) Oral 95 22 83/68 Lying 98          Current Facility-Administered Medications   Medication Dose Route Frequency Provider Last Rate Last Admin    aspirin EC tablet 81 mg  81 mg Oral Daily Quentin Morin DO   81 mg at 09/30/24 0844    sennosides-docusate (PERICOLACE) 8.6-50 MG per tablet 2 tablet  2 tablet Oral BID Quentin Morin DO   2 tablet at 09/30/24 0918    And    polyethylene glycol (MIRALAX) packet 17 g  17 g Oral Daily PRN Quentin Morin DO        And    bisacodyl (DULCOLAX) EC tablet 5 mg  5 mg Oral Daily PRN Quentin Morin DO        And    bisacodyl (DULCOLAX) suppository 10 mg  10 mg Rectal Daily PRN Quentin Morin DO         Calcium Replacement - Follow Nurse / BPA Driven Protocol   Does not apply PRN Quentin Morin DO        Chlorhexidine Gluconate Cloth 2 % pads 1 Application  1 Application Topical Q24H Quentin Morin DO   1 Application at 09/30/24 0415    dextrose (D50W) (25 g/50 mL) IV injection 25 g  25 g Intravenous Q15 Min PRN Quentin Morin DO        dextrose (GLUTOSE) oral gel 15 g  15 g Oral Q15 Min PRN Quentin Morin DO        DULoxetine (CYMBALTA) DR capsule 30 mg  30 mg Oral Daily Quentin Morin, DO   30 mg at 09/30/24 0844    Enoxaparin Sodium (LOVENOX) syringe 40 mg  40 mg Subcutaneous Daily Quentin Morin, DO   40 mg at 09/30/24 0844    gabapentin (NEURONTIN) capsule 600 mg  600 mg Oral Daily Quentin Morin, DO   600 mg at 09/30/24 0847    glucagon (GLUCAGEN) injection 1 mg  1 mg Intramuscular Q15 Min PRN Quentin Morin DO        insulin glargine (LANTUS, SEMGLEE) injection 20 Units  20 Units Subcutaneous Nightly Quentin Morin DO   20 Units at 09/29/24 2009    Insulin Lispro (humaLOG) injection 2-9 Units  2-9 Units Subcutaneous 4x Daily AC & at Bedtime Quentin Morin DO   6 Units at 09/29/24 2008    ipratropium-albuterol (DUO-NEB) nebulizer solution 3 mL  3 mL Nebulization Q4H PRN Quentin Morin DO        Magnesium Standard Dose Replacement - Follow Nurse / BPA Driven Protocol   Does not apply PRN Quentin Morin DO        melatonin tablet 10 mg  10 mg Oral Nightly PRN Darrell Valdez APRN   10 mg at 09/29/24 2333    mupirocin (BACTROBAN) 2 % nasal ointment 1 Application  1 Application Each Nare BID Quentin Morin DO   1 Application at 09/30/24 0844    Phosphorus Replacement - Follow Nurse / BPA Driven Protocol   Does not apply PRN Quentin Morin DO        Potassium Replacement - Follow Nurse / BPA Driven Protocol   Does not apply PRQuentin Mendoza  DO Get        pravastatin (PRAVACHOL) tablet 40 mg  40 mg Oral Nightly Quentin Morin DO   40 mg at 09/29/24 2009    sacubitril-valsartan (ENTRESTO) 24-26 MG tablet 1 tablet  1 tablet Oral BID Quentin Morin DO   1 tablet at 09/30/24 0844    sodium chloride 0.9 % flush 10 mL  10 mL Intravenous Q12H Quentin Morin DO   10 mL at 09/30/24 0919    sodium chloride 0.9 % flush 10 mL  10 mL Intravenous PRN Quentin Morin DO        sodium chloride 0.9 % infusion 40 mL  40 mL Intravenous PRN Quentin Morin DO        sodium chloride 0.9 % infusion  75 mL/hr Intravenous Continuous Mohit Barnes MD 75 mL/hr at 09/30/24 0424 75 mL/hr at 09/30/24 0424        Physician Progress Notes (last 48 hours)        Mohit Barnes MD at 09/30/24 0739              [facility] Intensivist Services  INPATIENT PROGRESS NOTE    Patient Name: Tucker Knox  Date of Admission: 9/28/2024  Today's Date: 09/30/24  Length of Stay: 2  Primary Care Physician: Kt Alfredo MD    Subjective   Chief Complaint: Breath  HPI   Mr. Knox is a pleasant 64-year-old male who presented to an outside ED due to shortness of breath.  Past medical history includes combined systolic and diastolic CHF, CAD status post stents, hypertension, hyperlipidemia, COPD, chronic hypoxic respiratory failure on 3 L, tobacco use, diabetes.     History is provided by the patient.  He was recently discharged from [facility] after a stay for CHF exacerbation.  He has had 3 admissions this year, 2 at [facility] and 1 at [facility].  He was discharged from [facility] on 9/18/2024.  During that admission he was diuresed with IV Lasix.  He had improvement of his symptoms and he was discharged.  Patient states initially he was doing well up until 2 days ago.  Over the past 2 days he has noted increased dyspnea worse with exertion.  He also has a dry cough which is  chronic.  Also notes chills.  Denies any fever, chest pain, nausea, vomiting.  No sick contacts.     Due to his shortness of breath he presented to an outside ED.  At the outside ED his chest x-ray was consistent with volume overload.  He also had an elevated troponin and BNP.  Lactate of 2 and white count of 12.  He was given IV insulin and IV Lasix and transferred to [facility] ICU.     On exam the patient is resting comfortably in bed.  He is breathing comfortably on 3 L satting in the upper 90s.  States he would like to have something to eat and drink when able.  States that he does have some dyspnea which is worse with exertion.    Interval history:  9/29/2024 patient was admitted overnight with increasing shortness of breath and chest x-ray is showing some pulmonary edema.  Patient denies any shortness of breath or chest pain.  Patient was started on Lasix twice daily but has not had any good urine.  I did order stat BMP today and did show worsening of his renal function and patient does look dry.  Lasix was stopped today and I am starting him on normal saline with gentle hydration due to his low ejection fraction.  Patient is at his baseline oxygen.    9/30/2024 patient is somewhat more awake today he has no complaints other than he was not able to sleep last night.  Patient diuretics were held yesterday and was started on IV fluids and his renal function and potassium has improved today with improvement in his mental status also.        Review of Systems   All pertinent negatives and positives are as above. All other systems have been reviewed and are negative unless otherwise stated.     Objective    Temp:  [98 °F (36.7 °C)-99 °F (37.2 °C)] 98.3 °F (36.8 °C)  Heart Rate:  [80-95] 86  Resp:  [18-26] 18  BP: ()/(54-73) 104/68  Physical Exam    General: Well appearing, in no respiratory distress  Head: Normocephalic, atraumatic  Eyes: Conjunctiva clear, sclera non-icteric  Teeth/Gums: Poor dentition dry  mucous membranes   Neck: Supple without stridor  Heart: Regular rate and rhythm, no murmur  Lungs: Diminished bilaterally  no wheezing no crackles  Abdomen: Soft, nontender  MSK/extremities:  left lower extremity edema much worse than right with bilateral cyanosis of the feet with palpable pulses.  Neurologic: AOx3, grossly no focal deficits       Results Review:    WBC   Date Value Ref Range Status   09/30/2024 9.35 3.40 - 10.80 10*3/mm3 Final     RBC   Date Value Ref Range Status   09/30/2024 3.86 (L) 4.14 - 5.80 10*6/mm3 Final     Hemoglobin   Date Value Ref Range Status   09/30/2024 10.3 (L) 13.0 - 17.7 g/dL Final     Hematocrit   Date Value Ref Range Status   09/30/2024 37.0 (L) 37.5 - 51.0 % Final     MCV   Date Value Ref Range Status   09/30/2024 95.9 79.0 - 97.0 fL Final     MCH   Date Value Ref Range Status   09/30/2024 26.7 26.6 - 33.0 pg Final     MCHC   Date Value Ref Range Status   09/30/2024 27.8 (L) 31.5 - 35.7 g/dL Final     RDW   Date Value Ref Range Status   09/30/2024 15.1 12.3 - 15.4 % Final     RDW-SD   Date Value Ref Range Status   09/30/2024 52.1 37.0 - 54.0 fl Final     MPV   Date Value Ref Range Status   09/30/2024 10.2 6.0 - 12.0 fL Final     Platelets   Date Value Ref Range Status   09/30/2024 181 140 - 450 10*3/mm3 Final     Neutrophil %   Date Value Ref Range Status   09/30/2024 85.3 (H) 42.7 - 76.0 % Final     Lymphocyte %   Date Value Ref Range Status   09/30/2024 6.1 (L) 19.6 - 45.3 % Final     Monocyte %   Date Value Ref Range Status   09/30/2024 7.0 5.0 - 12.0 % Final     Eosinophil %   Date Value Ref Range Status   09/30/2024 0.7 0.3 - 6.2 % Final     Basophil %   Date Value Ref Range Status   09/30/2024 0.5 0.0 - 1.5 % Final     Immature Grans %   Date Value Ref Range Status   09/30/2024 0.4 0.0 - 0.5 % Final     Neutrophils, Absolute   Date Value Ref Range Status   09/30/2024 7.97 (H) 1.70 - 7.00 10*3/mm3 Final     Lymphocytes, Absolute   Date Value Ref Range Status   09/30/2024  "0.57 (L) 0.70 - 3.10 10*3/mm3 Final     Monocytes, Absolute   Date Value Ref Range Status   09/30/2024 0.65 0.10 - 0.90 10*3/mm3 Final     Eosinophils, Absolute   Date Value Ref Range Status   09/30/2024 0.07 0.00 - 0.40 10*3/mm3 Final     Basophils, Absolute   Date Value Ref Range Status   09/30/2024 0.05 0.00 - 0.20 10*3/mm3 Final     Immature Grans, Absolute   Date Value Ref Range Status   09/30/2024 0.04 0.00 - 0.05 10*3/mm3 Final     nRBC   Date Value Ref Range Status   09/30/2024 0.0 0.0 - 0.2 /100 WBC Final       Basic Metabolic Panel    Sodium Sodium   Date Value Ref Range Status   09/30/2024 139 136 - 145 mmol/L Final   09/29/2024 137 136 - 145 mmol/L Final   09/29/2024 138 136 - 145 mmol/L Final   09/28/2024 138 136 - 145 mmol/L Final      Potassium Potassium   Date Value Ref Range Status   09/30/2024 5.0 3.5 - 5.2 mmol/L Final   09/29/2024 5.2 3.5 - 5.2 mmol/L Final   09/29/2024 5.3 (H) 3.5 - 5.2 mmol/L Final     Comment:     Slight hemolysis detected by analyzer. Result may be falsely elevated.   09/28/2024 4.7 3.5 - 5.2 mmol/L Final      Chloride Chloride   Date Value Ref Range Status   09/30/2024 100 98 - 107 mmol/L Final   09/29/2024 98 98 - 107 mmol/L Final   09/29/2024 97 (L) 98 - 107 mmol/L Final   09/28/2024 98 98 - 107 mmol/L Final      Bicarbonate No results found for: \"PLASMABICARB\"   BUN BUN   Date Value Ref Range Status   09/30/2024 52 (H) 8 - 23 mg/dL Final   09/29/2024 54 (H) 8 - 23 mg/dL Final   09/29/2024 55 (H) 8 - 23 mg/dL Final   09/28/2024 50 (H) 8 - 23 mg/dL Final      Creatinine Creatinine   Date Value Ref Range Status   09/30/2024 1.60 (H) 0.76 - 1.27 mg/dL Final   09/29/2024 1.77 (H) 0.76 - 1.27 mg/dL Final   09/29/2024 1.75 (H) 0.76 - 1.27 mg/dL Final   09/28/2024 1.65 (H) 0.76 - 1.27 mg/dL Final      Calcium Calcium   Date Value Ref Range Status   09/30/2024 8.3 (L) 8.6 - 10.5 mg/dL Final   09/29/2024 8.4 (L) 8.6 - 10.5 mg/dL Final   09/29/2024 8.5 (L) 8.6 - 10.5 mg/dL Final " "  09/28/2024 8.8 8.6 - 10.5 mg/dL Final      Glucose      No components found for: \"GLUCOSE.*\"         XR Chest 1 View    Result Date: 9/28/2024  1.. Pulmonary venous hypertension with mild interstitial edema and right-sided effusion.  This report was signed and finalized on 9/28/2024 5:05 PM by Dr. Antonio Prather MD.       Result Review:  I have personally reviewed the results from the time of this admission to 9/30/2024 07:39 CDT and agree with these findings:  [x]  Laboratory list / accordion  []  Microbiology  [x]  Radiology  []  EKG/Telemetry   []  Cardiology/Vascular   []  Pathology  []  Old records  []  Other:  Most notable findings include:       Culture Data:   No results found for: \"BLOODCX\", \"URINECX\", \"WOUNDCX\", \"MRSACX\", \"RESPCX\", \"STOOLCX\"    I have reviewed the patient's current medications.     Assessment/Plan   Assessment  Active Hospital Problems    Diagnosis     **Acute exacerbation of CHF (congestive heart failure)      -Acute on chronic hypoxemic respiratory failure  -Acute pulm edema  -Acute on chronic kidney disease possibly related to dehydration and diuresis  -Hyperkalemia  -COPD with acute exacerbation   - diabetes mellitus  -Acute on chronic systolic congestive heart failure with ejection fraction of 30-35%      Plan:  -Renal function is improving with holding diuretics and starting gentle hydration we will continue IV fluids and holding Lasix today  -Lasix was stopped yesterday we will continue holding  -Continue normal saline at 75 mL/h and encourage oral intake.  This will need to be reassessed especially with his low ejection fraction.  -Avoid any nephrotoxic medications  -Strict input output chart  -Patient is on 3 L of oxygen which is his baseline keep pulse ox above 88%  -Patient has left lower limb swelling I ordered a venous Doppler ultrasound to rule out DVT which is still pending  -Sliding scale insulin       Discussed with: Patient and bedside nurse     Disposition  Patient " can be transferred to the floor from critical care standpoint    Electronically signed by Mohit Barnes MD on 9/30/2024 at 07:39 CDT    Electronically signed by Mohit Barnes MD at 09/30/24 0741       Mohit Barnes MD at 09/29/24 1057              [facility] Intensivist Services  INPATIENT PROGRESS NOTE    Patient Name: Tucker Knox  Date of Admission: 9/28/2024  Today's Date: 09/29/24  Length of Stay: 1  Primary Care Physician: Kt Alfredo MD    Subjective   Chief Complaint: Breath  HPI   Mr. Knox is a pleasant 64-year-old male who presented to an outside ED due to shortness of breath.  Past medical history includes combined systolic and diastolic CHF, CAD status post stents, hypertension, hyperlipidemia, COPD, chronic hypoxic respiratory failure on 3 L, tobacco use, diabetes.     History is provided by the patient.  He was recently discharged from [facility] after a stay for CHF exacerbation.  He has had 3 admissions this year, 2 at [facility] and 1 at [facility].  He was discharged from [facility] on 9/18/2024.  During that admission he was diuresed with IV Lasix.  He had improvement of his symptoms and he was discharged.  Patient states initially he was doing well up until 2 days ago.  Over the past 2 days he has noted increased dyspnea worse with exertion.  He also has a dry cough which is chronic.  Also notes chills.  Denies any fever, chest pain, nausea, vomiting.  No sick contacts.     Due to his shortness of breath he presented to an outside ED.  At the outside ED his chest x-ray was consistent with volume overload.  He also had an elevated troponin and BNP.  Lactate of 2 and white count of 12.  He was given IV insulin and IV Lasix and transferred to [facility] ICU.     On exam the patient is resting comfortably in bed.  He is breathing comfortably on 3 L satting in the upper 90s.  States he would like to have something to eat and drink when  able.  States that he does have some dyspnea which is worse with exertion.    Interval history:  9/29/2024 patient was admitted overnight with increasing shortness of breath and chest x-ray is showing some pulmonary edema.  Patient denies any shortness of breath or chest pain.  Patient was started on Lasix twice daily but has not had any good urine.  I did order stat BMP today and did show worsening of his renal function and patient does look dry.  Lasix was stopped today and I am starting him on normal saline with gentle hydration due to his low ejection fraction.  Patient is at his baseline oxygen.        Review of Systems   All pertinent negatives and positives are as above. All other systems have been reviewed and are negative unless otherwise stated.     Objective    Temp:  [97.5 °F (36.4 °C)-98.4 °F (36.9 °C)] 97.8 °F (36.6 °C)  Heart Rate:  [] 87  Resp:  [16-22] 17  BP: ()/() 95/73  Physical Exam    General: Well appearing, in no respiratory distress  Head: Normocephalic, atraumatic  Eyes: Conjunctiva clear, sclera non-icteric  Teeth/Gums: Poor dentition dry mucous membranes   Neck: Supple without stridor  Heart: Regular rate and rhythm, no murmur  Lungs: Diminished bilaterally  no wheezing no crackles  Abdomen: Soft, nontender  MSK/extremities:  left lower extremity edema much worse than right with bilateral cyanosis of the feet with palpable pulses.  Neurologic: AOx3, grossly no focal deficits       Results Review:    WBC   Date Value Ref Range Status   09/28/2024 12.53 (H) 3.40 - 10.80 10*3/mm3 Final     RBC   Date Value Ref Range Status   09/28/2024 4.26 4.14 - 5.80 10*6/mm3 Final     Hemoglobin   Date Value Ref Range Status   09/28/2024 11.7 (L) 13.0 - 17.7 g/dL Final     Hematocrit   Date Value Ref Range Status   09/28/2024 40.2 37.5 - 51.0 % Final     MCV   Date Value Ref Range Status   09/28/2024 94.4 79.0 - 97.0 fL Final     MCH   Date Value Ref Range Status   09/28/2024 27.5 26.6 -  33.0 pg Final     MCHC   Date Value Ref Range Status   09/28/2024 29.1 (L) 31.5 - 35.7 g/dL Final     RDW   Date Value Ref Range Status   09/28/2024 15.3 12.3 - 15.4 % Final     RDW-SD   Date Value Ref Range Status   09/28/2024 52.8 37.0 - 54.0 fl Final     MPV   Date Value Ref Range Status   09/28/2024 11.1 6.0 - 12.0 fL Final     Platelets   Date Value Ref Range Status   09/28/2024 201 140 - 450 10*3/mm3 Final     Neutrophil %   Date Value Ref Range Status   09/28/2024 81.9 (H) 42.7 - 76.0 % Final     Lymphocyte %   Date Value Ref Range Status   09/28/2024 10.4 (L) 19.6 - 45.3 % Final     Monocyte %   Date Value Ref Range Status   09/28/2024 6.2 5.0 - 12.0 % Final     Eosinophil %   Date Value Ref Range Status   09/28/2024 0.4 0.3 - 6.2 % Final     Basophil %   Date Value Ref Range Status   09/28/2024 0.6 0.0 - 1.5 % Final     Immature Grans %   Date Value Ref Range Status   09/28/2024 0.5 0.0 - 0.5 % Final     Neutrophils, Absolute   Date Value Ref Range Status   09/28/2024 10.27 (H) 1.70 - 7.00 10*3/mm3 Final     Lymphocytes, Absolute   Date Value Ref Range Status   09/28/2024 1.30 0.70 - 3.10 10*3/mm3 Final     Monocytes, Absolute   Date Value Ref Range Status   09/28/2024 0.78 0.10 - 0.90 10*3/mm3 Final     Eosinophils, Absolute   Date Value Ref Range Status   09/28/2024 0.05 0.00 - 0.40 10*3/mm3 Final     Basophils, Absolute   Date Value Ref Range Status   09/28/2024 0.07 0.00 - 0.20 10*3/mm3 Final     Immature Grans, Absolute   Date Value Ref Range Status   09/28/2024 0.06 (H) 0.00 - 0.05 10*3/mm3 Final     nRBC   Date Value Ref Range Status   09/28/2024 0.0 0.0 - 0.2 /100 WBC Final       Basic Metabolic Panel    Sodium Sodium   Date Value Ref Range Status   09/29/2024 138 136 - 145 mmol/L Final   09/28/2024 138 136 - 145 mmol/L Final      Potassium Potassium   Date Value Ref Range Status   09/29/2024 5.3 (H) 3.5 - 5.2 mmol/L Final     Comment:     Slight hemolysis detected by analyzer. Result may be falsely  "elevated.   09/28/2024 4.7 3.5 - 5.2 mmol/L Final      Chloride Chloride   Date Value Ref Range Status   09/29/2024 97 (L) 98 - 107 mmol/L Final   09/28/2024 98 98 - 107 mmol/L Final      Bicarbonate No results found for: \"PLASMABICARB\"   BUN BUN   Date Value Ref Range Status   09/29/2024 55 (H) 8 - 23 mg/dL Final   09/28/2024 50 (H) 8 - 23 mg/dL Final      Creatinine Creatinine   Date Value Ref Range Status   09/29/2024 1.75 (H) 0.76 - 1.27 mg/dL Final   09/28/2024 1.65 (H) 0.76 - 1.27 mg/dL Final      Calcium Calcium   Date Value Ref Range Status   09/29/2024 8.5 (L) 8.6 - 10.5 mg/dL Final   09/28/2024 8.8 8.6 - 10.5 mg/dL Final      Glucose      No components found for: \"GLUCOSE.*\"         XR Chest 1 View    Result Date: 9/28/2024  1.. Pulmonary venous hypertension with mild interstitial edema and right-sided effusion.  This report was signed and finalized on 9/28/2024 5:05 PM by Dr. Antonio Prather MD.       Result Review:  I have personally reviewed the results from the time of this admission to 9/29/2024 10:58 CDT and agree with these findings:  [x]  Laboratory list / accordion  []  Microbiology  [x]  Radiology  []  EKG/Telemetry   []  Cardiology/Vascular   []  Pathology  []  Old records  []  Other:  Most notable findings include:       Culture Data:   No results found for: \"BLOODCX\", \"URINECX\", \"WOUNDCX\", \"MRSACX\", \"RESPCX\", \"STOOLCX\"    I have reviewed the patient's current medications.     Assessment/Plan   Assessment  Active Hospital Problems    Diagnosis     **Acute exacerbation of CHF (congestive heart failure)      -Acute on chronic hypoxemic respiratory failure  -Acute pulm edema  -Acute on chronic kidney disease possibly related to dehydration and diuresis  -Hyperkalemia  -COPD with acute exacerbation   - diabetes mellitus  -Acute on chronic systolic congestive heart failure with ejection fraction of 30-35%      Plan:  -Stat BMP was ordered which was showing worsening of his renal function and " worsening hyperkalemia  -Lasix was stopped today  -I will start with gentle hydration.  Starting normal saline at 75 mL/h and encourage oral intake.  IV fluid will need to be reassessed within 24 hours to avoid any fluid overload  -Following up with renal function I will repeat BMP to follow potassium level  -Avoid any nephrotoxic medications  -Strict input output chart  -Patient is on 3 to 4 L of oxygen which is his baseline keep pulse ox above 88%  -Patient has left lower limb swelling I will order venous Doppler ultrasound to rule out DVT  -Sliding scale insulin       Discussed with: Patient and bedside nurse     Disposition  Patient can be transferred to the floor from critical care standpoint    Electronically signed by Mohit Barnes MD on 9/29/2024 at 10:58 CDT    Electronically signed by Mohit Barnes MD at 09/29/24 1107

## 2024-09-30 NOTE — PLAN OF CARE
Goal Outcome Evaluation:  Plan of Care Reviewed With: patient        Progress: improving  Outcome Evaluation: A/O. NSR 80-90s. 3L NC. UOP adequate. Melatonin x1 given. Awaiting am labs for creatinine. Floor orders, safety maintained.                 Problem: Adult Inpatient Plan of Care  Goal: Plan of Care Review  Outcome: Progressing  Flowsheets (Taken 9/30/2024 0543)  Progress: improving  Plan of Care Reviewed With: patient  Outcome Evaluation: A/O. NSR 80-90s. 3L NC. UOP adequate. Melatonin x1 given. Awaiting am labs for creatinine. Floor orders, safety maintained.  Goal: Patient-Specific Goal (Individualized)  Outcome: Progressing  Goal: Absence of Hospital-Acquired Illness or Injury  Outcome: Progressing  Intervention: Identify and Manage Fall Risk  Recent Flowsheet Documentation  Taken 9/30/2024 0500 by Cortney Encarnacion RN  Safety Promotion/Fall Prevention: safety round/check completed  Taken 9/30/2024 0400 by Cortney Encarnacion RN  Safety Promotion/Fall Prevention: safety round/check completed  Taken 9/30/2024 0300 by Cortney Encarnacion RN  Safety Promotion/Fall Prevention: safety round/check completed  Taken 9/30/2024 0200 by Cortney Encarnacion RN  Safety Promotion/Fall Prevention: safety round/check completed  Taken 9/30/2024 0100 by Cortney Encarnacion RN  Safety Promotion/Fall Prevention: safety round/check completed  Taken 9/30/2024 0000 by Cortney Encarnacion RN  Safety Promotion/Fall Prevention: safety round/check completed  Taken 9/29/2024 2300 by Cortney Encarnacion RN  Safety Promotion/Fall Prevention: safety round/check completed  Taken 9/29/2024 2200 by Cortney Encarnacion RN  Safety Promotion/Fall Prevention: safety round/check completed  Taken 9/29/2024 2100 by Cortney Encarnacion RN  Safety Promotion/Fall Prevention: safety round/check completed  Taken 9/29/2024 2000 by Cortney Encarnacion RN  Safety Promotion/Fall Prevention: safety round/check completed  Taken 9/29/2024 1900 by Cortney Encarnacion RN  Safety  Promotion/Fall Prevention: safety round/check completed  Intervention: Prevent Skin Injury  Recent Flowsheet Documentation  Taken 9/30/2024 0500 by Cortney Encarnacion RN  Body Position:   turned   left   position changed independently  Taken 9/30/2024 0300 by Cortney Encarnacion RN  Body Position:   turned   supine   position changed independently  Taken 9/30/2024 0100 by Cortney Encarnacion RN  Body Position:   turned   right   position changed independently  Taken 9/29/2024 2300 by Cortney Encarnacion RN  Body Position:   turned   supine   position changed independently  Taken 9/29/2024 2100 by Cortney Encarnacion RN  Body Position:   turned   side-lying   right   position changed independently  Taken 9/29/2024 2000 by Cortney Encarnacion RN  Skin Protection:   adhesive use limited   incontinence pads utilized   silicone foam dressing in place   skin-to-skin areas padded   tubing/devices free from skin contact  Taken 9/29/2024 1900 by Cortney Encarnacion RN  Body Position:   turned   right   position changed independently  Intervention: Prevent and Manage VTE (Venous Thromboembolism) Risk  Recent Flowsheet Documentation  Taken 9/30/2024 0500 by Cortney Encarnacion RN  Activity Management: bedrest  Taken 9/30/2024 0400 by Cortney Encarnacion RN  Activity Management: bedrest  Taken 9/30/2024 0300 by Cortney Encarnacion RN  Activity Management: bedrest  Taken 9/30/2024 0100 by Cortney Encarnacion RN  Activity Management: bedrest  Taken 9/30/2024 0000 by Crotney Encarnacion RN  Activity Management: bedrest  Taken 9/29/2024 2300 by Cortney Encarnacion RN  Activity Management: bedrest  Taken 9/29/2024 2100 by Cortney Encarnacion RN  Activity Management: bedrest  Taken 9/29/2024 2000 by Cortney Encarnacion RN  Activity Management:   up to bedside commode   back to bed  VTE Prevention/Management: (see MAR) other (see comments)  Range of Motion: active ROM (range of motion) encouraged  Taken 9/29/2024 1900 by Cortney Encarnacion RN  Activity Management:  bedrest  Intervention: Prevent Infection  Recent Flowsheet Documentation  Taken 9/30/2024 0400 by Cortney Encarnacion RN  Infection Prevention: hand hygiene promoted  Taken 9/30/2024 0000 by Cortney Encarnacion RN  Infection Prevention: hand hygiene promoted  Taken 9/29/2024 2000 by Cortney Encarnacion RN  Infection Prevention: hand hygiene promoted  Goal: Optimal Comfort and Wellbeing  Outcome: Progressing  Intervention: Provide Person-Centered Care  Recent Flowsheet Documentation  Taken 9/29/2024 2000 by Cortney Encarnacion RN  Trust Relationship/Rapport: care explained  Goal: Readiness for Transition of Care  Outcome: Progressing     Problem: COPD (Chronic Obstructive Pulmonary Disease) Comorbidity  Goal: Maintenance of COPD Symptom Control  Outcome: Progressing  Intervention: Maintain COPD-Symptom Control  Recent Flowsheet Documentation  Taken 9/30/2024 0400 by Cortney Encarnacion RN  Medication Review/Management: medications reviewed  Taken 9/30/2024 0000 by Cortney Encarnacion RN  Medication Review/Management: medications reviewed  Taken 9/29/2024 2000 by Cortney Encarnacion RN  Medication Review/Management: medications reviewed     Problem: Diabetes Comorbidity  Goal: Blood Glucose Level Within Targeted Range  Outcome: Progressing     Problem: Heart Failure Comorbidity  Goal: Maintenance of Heart Failure Symptom Control  Outcome: Progressing  Intervention: Maintain Heart Failure-Management  Recent Flowsheet Documentation  Taken 9/30/2024 0400 by Cortney Encarnacion RN  Medication Review/Management: medications reviewed  Taken 9/30/2024 0000 by Cortney Encarnacion RN  Medication Review/Management: medications reviewed  Taken 9/29/2024 2000 by Cortney Encarnacion RN  Medication Review/Management: medications reviewed     Problem: Obstructive Sleep Apnea Risk or Actual Comorbidity Management  Goal: Unobstructed Breathing During Sleep  Outcome: Progressing     Problem: Fall Injury Risk  Goal: Absence of Fall and Fall-Related  Injury  Outcome: Progressing  Intervention: Identify and Manage Contributors  Recent Flowsheet Documentation  Taken 9/30/2024 0400 by Cortney Encarnacion RN  Medication Review/Management: medications reviewed  Taken 9/30/2024 0000 by Cortney Encarnacion RN  Medication Review/Management: medications reviewed  Taken 9/29/2024 2000 by Cortney Encarnacion RN  Medication Review/Management: medications reviewed  Intervention: Promote Injury-Free Environment  Recent Flowsheet Documentation  Taken 9/30/2024 0500 by Cortney Encarnacion RN  Safety Promotion/Fall Prevention: safety round/check completed  Taken 9/30/2024 0400 by Cortney Encarnacion RN  Safety Promotion/Fall Prevention: safety round/check completed  Taken 9/30/2024 0300 by Cortney Encarnacion RN  Safety Promotion/Fall Prevention: safety round/check completed  Taken 9/30/2024 0200 by Cortney Encarnacion RN  Safety Promotion/Fall Prevention: safety round/check completed  Taken 9/30/2024 0100 by Cortney Encarnacion RN  Safety Promotion/Fall Prevention: safety round/check completed  Taken 9/30/2024 0000 by Cortney Encarnacion RN  Safety Promotion/Fall Prevention: safety round/check completed  Taken 9/29/2024 2300 by Cortney Encarnacion RN  Safety Promotion/Fall Prevention: safety round/check completed  Taken 9/29/2024 2200 by Cortney Encarnacion RN  Safety Promotion/Fall Prevention: safety round/check completed  Taken 9/29/2024 2100 by Cortney Encarnacion RN  Safety Promotion/Fall Prevention: safety round/check completed  Taken 9/29/2024 2000 by Cortney Encarnacion RN  Safety Promotion/Fall Prevention: safety round/check completed  Taken 9/29/2024 1900 by Cortney Encarnacion RN  Safety Promotion/Fall Prevention: safety round/check completed

## 2024-09-30 NOTE — THERAPY EVALUATION
Patient Name: Tucker Knox  : 1960    MRN: [id number]                              Today's Date: 2024       Admit Date: 2024    Visit Dx: No diagnosis found.  Patient Active Problem List   Diagnosis    Diabetes mellitus    Hypertension    Pulmonary HTN    COPD (chronic obstructive pulmonary disease)    NSTEMI, initial episode of care    Malignant neoplasm of sigmoid colon    FH: colon cancer    Sleep apnea    History of adenomatous polyp of colon    Colon adenocarcinoma    Acute on chronic systolic CHF (congestive heart failure)    PVC's (premature ventricular contractions)    Presence of external cardiac defibrillator    Acute exacerbation of CHF (congestive heart failure)     Past Medical History:   Diagnosis Date    Cancer     CHF (congestive heart failure)     COPD (chronic obstructive pulmonary disease)     Coronary artery disease     stent x 1    Family history of colon cancer     Hyperlipidemia     Hypertension     Sleep apnea     does not wear machine, supposed to wear cpap with oxygen and does not wear it    Type 2 diabetes mellitus      Past Surgical History:   Procedure Laterality Date    BACK SURGERY      CARDIAC CATHETERIZATION Left 2022    Procedure: Cardiac Catheterization/Vascular Study;  Surgeon: Todd Avendano MD;  Location:  [facility]ATH [facility];  Service: Cardiology;  Laterality: Left;    CARDIAC CATHETERIZATION      with stent x1    CARDIAC CATHETERIZATION N/A 2024    Procedure: Left Heart Cath;  Surgeon: Ruben Adler MD;  Location:  [facility]ATH [facility];  Service: Cardiology;  Laterality: N/A;    COLON RESECTION N/A 2023    Procedure: COLON RESECTION LAPAROSCOPIC SIGMOID WITH DAVINCI ROBOT, INTRA-OPERATIVE FLEXIBLE SIGMOIDOSCOPY, SPLENIC FLEXURE MOBILIZATION, OPEN UMBILICAL HERNIA REPAIR;  Surgeon: Oma Velasquez MD;  Location:  [facility] OR;  Service: Robotics - DaVinci;  Laterality: N/A;    COLONOSCOPY N/A 10/09/2023     Diverticulosis; One 5mm polyp in ascending colon; One 5mm polyp in transverse colon; One 10mm polyp at 65cm proximal to anus; One 20mm polyp at 65cm proximal to anus-Tattooed; One 20mm polyp at 42cm proximal to anus-Clip (MR conditional) placed-Tattooed; Rule out malignancy-Partially obstructing tumor in recto-sigmoid colon-biopsied-Tattooed; One 10mm polyp in rectum    ELBOW PROCEDURE      KNEE SURGERY      LUMBAR DISC SURGERY        General Information       Row Name 09/30/24 1420          General Information    Patient Profile Reviewed yes  -J     Prior Level of Function independent:;all household mobility;transfer;ADL's;bed mobility  -     Existing Precautions/Restrictions fall;oxygen therapy device and L/min  3L  -     Barriers to Rehab medically complex  -       Row Name 09/30/24 1420          Living Environment    People in Home alone  -       Row Name 09/30/24 1420          Home Main Entrance    Number of Stairs, Main Entrance five;six  -     Stair Railings, Main Entrance railing on right side (ascending)  -       Row Name 09/30/24 1420          Stairs Within Home, Primary    Number of Stairs, Within Home, Primary none  -       Row Name 09/30/24 1420          Cognition    Orientation Status (Cognition) oriented x 4  -       Row Name 09/30/24 1420          Safety Issues, Functional Mobility    Impairments Affecting Function (Mobility) strength;balance;endurance/activity tolerance;pain  -J               User Key  (r) = Recorded By, (t) = Taken By, (c) = Cosigned By      Initials Name Provider Type    Tayla Camp, OTR/L, CSRS Occupational Therapist                     Mobility/ADL's       Row Name 09/30/24 1420          Bed Mobility    Bed Mobility supine-sit  -     Supine-Sit [place] (Bed Mobility) minimum assist (75% patient effort);verbal cues  -     Assistive Device (Bed Mobility) head of bed elevated;bed rails  -       Row Name 09/30/24 1420          Transfers     Transfers sit-stand transfer;stand-sit transfer  -JJ       Row Name 09/30/24 1420          Sit-Stand Transfer    Sit-Stand [place] (Transfers) minimum assist (75% patient effort)  -     Assistive Device (Sit-Stand Transfers) cane, straight  -J       [facility] Name 09/30/24 1420          Stand-Sit Transfer    Stand-Sit [place] (Transfers) minimum assist (75% patient effort)  -     Assistive Device (Stand-Sit Transfers) cane, straight  -JJ       Row Name 09/30/24 1420          Functional Mobility    Functional Mobility- Device cane, straight  -     Functional Mobility- Safety Issues other (see comments)  -[facility] Name 09/30/24 1420          Activities of Daily Living    BADL Assessment/Intervention lower body dressing  -JJ       Row Name 09/30/24 1420          Lower Body Dressing Assessment/Training    [place] Level (Lower Body Dressing) maximum assist (25% patient effort)  -     Position (Lower Body Dressing) edge of bed sitting  -     Comment, (Lower Body Dressing) adjust B socks  -               User Key  (r) = Recorded By, (t) = Taken By, (c) = Cosigned By      Initials Name Provider Type    JTayla Gonzalez, OTR/L, CSRS Occupational Therapist                   Obj/Interventions       [facility] Name 09/30/24 1420          Sensory Assessment (Somatosensory)    Sensory Assessment (Somatosensory) UE sensation intact  -JJ       Row Name 09/30/24 1420          Vision Assessment/Intervention    Visual Impairment/Limitations L  -JJ       Row Name 09/30/24 1420          Range of Motion Comprehensive    General Range of Motion bilateral upper extremity ROM WFL  -JJ       Row Name 09/30/24 1420          Strength Comprehensive (MMT)    Comment, General Manual Muscle Testing (MMT) Assessment B UE strength 4-/5  -JJ       Row Name 09/30/24 1420          Balance    Balance Assessment sitting static balance;sitting dynamic balance;standing static balance;standing dynamic balance  -     Static  Sitting Balance independent  -JJ     Dynamic Sitting Balance supervision  -JJ     Position, Sitting Balance unsupported;sitting edge of bed  -JJ     Static Standing Balance minimal assist  -JJ     Dynamic Standing Balance minimal assist;2-person assist  -JJ     Position/Device Used, Standing Balance supported;cane, straight  -JJ               User Key  (r) = Recorded By, (t) = Taken By, (c) = Cosigned By      Initials Name Provider Type    Tayla Camp, OTR/L, CSRS Occupational Therapist                   Goals/Plan       Row Name 09/30/24 1420          Bathing Goal 1 (OT)    Activity/Device (Bathing Goal 1, OT) bathing skills, all  -JJ     [place] Level/Cues Needed (Bathing Goal 1, OT) minimum assist (75% or more patient effort)  -JJ     Time Frame (Bathing Goal 1, OT) long term goal (LTG);by discharge  -JJ     Progress/Outcomes (Bathing Goal 1, OT) new goal  -       Row Name 09/30/24 1420          Dressing Goal 1 (OT)    Activity/Device (Dressing Goal 1, OT) dressing skills, all  -JJ     [place]/Cues Needed (Dressing Goal 1, OT) minimum assist (75% or more patient effort)  -JJ     Time Frame (Dressing Goal 1, OT) long term goal (LTG);by discharge  -JJ     Progress/Outcome (Dressing Goal 1, OT) new goal  -       Row Name 09/30/24 1420          Toileting Goal 1 (OT)    Activity/Device (Toileting Goal 1, OT) toileting skills, all  -JJ     [place] Level/Cues Needed (Toileting Goal 1, OT) contact guard required  -JJ     Time Frame (Toileting Goal 1, OT) long term goal (LTG);by discharge  -JJ     Progress/Outcome (Toileting Goal 1, OT) new goal  -       Row Name 09/30/24 1420          Therapy Assessment/Plan (OT)    Planned Therapy Interventions (OT) activity tolerance training;adaptive equipment training;BADL retraining;functional balance retraining;transfer/mobility retraining;strengthening exercise;occupation/activity based interventions;patient/caregiver education/training  -                User Key  (r) = Recorded By, (t) = Taken By, (c) = Cosigned By      Initials Name Provider Type    Tayla Camp, OTR/L, CSRS Occupational Therapist                   Clinical Impression       Row Name 09/30/24 1420          Pain Assessment    Pretreatment Pain Rating 0/10 - no pain  -J     Posttreatment Pain Rating 0/10 - no pain  -       Row Name 09/30/24 1420          Plan of Care Review    Plan of Care Reviewed With patient  -     Outcome Evaluation OT eval completed. Pt presents lethargic but oriented x4, sitting up in bed. He reports at baseline being independent with all adls and mobility, residing at home alone with family assisting as needed. He remained on 3L O2/NC throughout session with O2 sats in mis 90s with all activity. He demonstrates impaired balance, strength, activity tolerance and decreased safety awareness. He was able to bring self to sitting at EOB with Min A and vcs. CGA for sit <> stand t/f from EOB and Min Ax2 with use of SC for in room mobility. Decreased balance noted with dynamic standing activity. He was left sitting up in chair at end of session. He would benefit from skilled OT services to address these deficits. Recommend d/c to short term rehab.  -       Row Name 09/30/24 1420          Therapy Assessment/Plan (OT)    Rehab Potential (OT) good, to achieve stated therapy goals  -     Criteria for Skilled Therapeutic Interventions Met (OT) yes;skilled treatment is necessary  -     Therapy Frequency (OT) 5 times/wk  -     Predicted Duration of Therapy Intervention (OT) 10 days  -       Row Name 09/30/24 1420          Therapy Plan Review/Discharge Plan (OT)    Anticipated Discharge Disposition (OT) skilled nursing facility  -       Row Name 09/30/24 1420          Positioning and Restraints    Pre-Treatment Position in bed  -     Post Treatment Position chair  -J     In Chair notified nsg;reclined;call light within reach;encouraged to call for  assist;patient within staff view  -JJ               User Key  (r) = Recorded By, (t) = Taken By, (c) = Cosigned By      Initials Name Provider Type    Tyala Camp OTR/L, WILLI Occupational Therapist                   Outcome Measures       Row Name 09/30/24 1420          How much help from another is currently needed...    Putting on and taking off regular lower body clothing? 2  -JJ     Bathing (including washing, rinsing, and drying) 2  -JJ     Toileting (which includes using toilet bed pan or urinal) 2  -JJ     Putting on and taking off regular upper body clothing 3  -JJ     Taking care of personal grooming (such as brushing teeth) 3  -JJ     Eating meals 4  -JJ     AM-PAC 6 Clicks Score (OT) 16  -JJ       Row Name 09/30/24 0800 09/30/24 0703       How much help from another person do you currently need...    Turning from your back to your side while in flat bed without using bedrails? 3  -HT 3  -SE    Moving from lying on back to sitting on the side of a flat bed without bedrails? 3  -HT 3  -SE    Moving to and from a bed to a chair (including a wheelchair)? 3  -HT 3  -SE    Standing up from a chair using your arms (e.g., wheelchair, bedside chair)? 3  -HT 3  -SE    Climbing 3-5 steps with a railing? 3  -HT 3  -SE    To walk in hospital room? 3  -HT 3  -SE    AM-PAC 6 Clicks Score (PT) 18  -HT 18  -SE    Highest Level of Mobility Goal 6 --> Walk 10 steps or more  -HT 6 --> Walk 10 steps or more  -SE      Row Name 09/30/24 1420          Functional Assessment    Outcome Measure Options AM-PAC 6 Clicks Daily Activity (OT)  -               User Key  (r) = Recorded By, (t) = Taken By, (c) = Cosigned By      Initials Name Provider Type    Marielena Ivey RN Registered Nurse    Shyam José RN Registered Nurse    Tayla Camp OTR/L, WILLI Occupational Therapist                    Occupational Therapy Education       Title: PT OT SLP Therapies (In Progress)       Topic: Occupational  Therapy (In Progress)       Point: ADL training (Done)       Description:   Instruct learner(s) on proper safety adaptation and remediation techniques during self care or transfers.   Instruct in proper use of assistive devices.                  Learning Progress Summary             Patient Acceptance, E, VU by  at 9/30/2024 1441                         Point: Home exercise program (Not Started)       Description:   Instruct learner(s) on appropriate technique for monitoring, assisting and/or progressing therapeutic exercises/activities.                  Learner Progress:  Not documented in this visit.              Point: Precautions (Done)       Description:   Instruct learner(s) on prescribed precautions during self-care and functional transfers.                  Learning Progress Summary             Patient Acceptance, E, VU by CHARMAINE at 9/30/2024 1441                         Point: Body mechanics (Done)       Description:   Instruct learner(s) on proper positioning and spine alignment during self-care, functional mobility activities and/or exercises.                  Learning Progress Summary             Patient Acceptance, E, VU by  at 9/30/2024 1441                                         User Key       Initials Effective Dates Name Provider Type Discipline     07/11/23 -  Tayla Gibbs, MOISES/L, CSRS Occupational Therapist OT                  OT Recommendation and Plan  Planned Therapy Interventions (OT): activity tolerance training, adaptive equipment training, BADL retraining, functional balance retraining, transfer/mobility retraining, strengthening exercise, occupation/activity based interventions, patient/caregiver education/training  Therapy Frequency (OT): 5 times/wk  Plan of Care Review  Plan of Care Reviewed With: patient  Outcome Evaluation: OT eval completed. Pt presents lethargic but oriented x4, sitting up in bed. He reports at baseline being independent with all adls and mobility, residing  at home alone with family assisting as needed. He remained on 3L O2/NC throughout session with O2 sats in mis 90s with all activity. He demonstrates impaired balance, strength, activity tolerance and decreased safety awareness. He was able to bring self to sitting at EOB with Min A and vcs. CGA for sit <> stand t/f from EOB and Min Ax2 with use of SC for in room mobility. Decreased balance noted with dynamic standing activity. He was left sitting up in chair at end of session. He would benefit from skilled OT services to address these deficits. Recommend d/c to short term rehab.     Time Calculation:         Time Calculation- OT       Row Name 09/30/24 1420             Time Calculation- OT    OT Start Time 1420  -      OT Stop Time 1501  -      OT Time Calculation (min) 41 min  -      OT Received On 09/30/24  -      OT Goal Re-Cert Due Date 10/10/24  -                User Key  (r) = Recorded By, (t) = Taken By, (c) = Cosigned By      Initials Name Provider Type    Tayla Camp OTR/L, WILLI Occupational Therapist                  Therapy Charges for Today       Code Description Service Date Service Provider Modifiers Qty    09011206075 HC OT EVAL MOD COMPLEXITY 3 9/30/2024 Tayla Gibbs OTR/L, WILLI GO 1                 Tayla Gibbs, OTR/L, WILLI  9/30/2024

## 2024-09-30 NOTE — THERAPY EVALUATION
Patient Name: Tucker Knox  : 1960    MRN: [id number]                              Today's Date: 2024       Admit Date: 2024    Visit Dx:     ICD-10-CM ICD-9-CM   1. Impaired mobility [Z74.09]  Z74.09 799.89     Patient Active Problem List   Diagnosis    Diabetes mellitus    Hypertension    Pulmonary HTN    COPD (chronic obstructive pulmonary disease)    NSTEMI, initial episode of care    Malignant neoplasm of sigmoid colon    FH: colon cancer    Sleep apnea    History of adenomatous polyp of colon    Colon adenocarcinoma    Acute on chronic systolic CHF (congestive heart failure)    PVC's (premature ventricular contractions)    Presence of external cardiac defibrillator    Acute exacerbation of CHF (congestive heart failure)     Past Medical History:   Diagnosis Date    Cancer     CHF (congestive heart failure)     COPD (chronic obstructive pulmonary disease)     Coronary artery disease     stent x 1    Family history of colon cancer     Hyperlipidemia     Hypertension     Sleep apnea     does not wear machine, supposed to wear cpap with oxygen and does not wear it    Type 2 diabetes mellitus      Past Surgical History:   Procedure Laterality Date    BACK SURGERY      CARDIAC CATHETERIZATION Left 2022    Procedure: Cardiac Catheterization/Vascular Study;  Surgeon: Todd Avendano MD;  Location:  [facility];  Service: Cardiology;  Laterality: Left;    CARDIAC CATHETERIZATION      with stent x1    CARDIAC CATHETERIZATION N/A 2024    Procedure: Left Heart Cath;  Surgeon: Ruben Adler MD;  Location:  [facility]ATH [facility];  Service: Cardiology;  Laterality: N/A;    COLON RESECTION N/A 2023    Procedure: COLON RESECTION LAPAROSCOPIC SIGMOID WITH DAVINCI ROBOT, INTRA-OPERATIVE FLEXIBLE SIGMOIDOSCOPY, SPLENIC FLEXURE MOBILIZATION, OPEN UMBILICAL HERNIA REPAIR;  Surgeon: Oma Velasquez MD;  Location: [facility] OR;  Service: Robotics - DaVinci;   Laterality: N/A;    COLONOSCOPY N/A 10/09/2023    Diverticulosis; One 5mm polyp in ascending colon; One 5mm polyp in transverse colon; One 10mm polyp at 65cm proximal to anus; One 20mm polyp at 65cm proximal to anus-Tattooed; One 20mm polyp at 42cm proximal to anus-Clip (MR conditional) placed-Tattooed; Rule out malignancy-Partially obstructing tumor in recto-sigmoid colon-biopsied-Tattooed; One 10mm polyp in rectum    ELBOW PROCEDURE      KNEE SURGERY      LUMBAR DISC SURGERY        General Information       [facility] Name 09/30/24 1415          Physical Therapy Time and Intention    Document Type evaluation  DX: Acute exacerbation of CHF. Presents with shortness of breath and fever chills.  -ALEXANDRA (r) BL (t) ALEXANDRA (c)     Mode of Treatment physical therapy  PMH:combined systolic and diastolic CHF, CAD status post stents, hypertension, hyperlipidemia, COPD, chronic hypoxic respiratory failure on 3 L, tobacco use, diabetes.  -ALEXANDRA (r) BL (t) ALEXANDRA (c)       [facility] Name 09/30/24 1415          General Information    Patient Profile Reviewed yes  -ALEXANDRA (r) BL (t) ALEXANDRA (c)     Prior Level of Function independent:;all household mobility;transfer;ADL's;bed mobility  uses single point cane  -ALEXANDRA (r) BL (t) ALEXANDRA (c)     Existing Precautions/Restrictions fall;oxygen therapy device and L/min  -ALEXANDRA (r) BL (t) ALEXANDRA (c)     Barriers to Rehab medically complex  -ALEXANDRA (r) BL (t) ALEXANDRA (c)       [facility] Name 09/30/24 1415          Living Environment    People in Home alone  -ALEXANDRA (r) BL (t) ALEXANDRA (c)       [facility] Name 09/30/24 1415          Home Main Entrance    Number of Stairs, Main Entrance six  -ALEXANDRA (r) BL (t) ALEXANDRA (c)     Stair Railings, Main Entrance railing on right side (ascending)  -ALEXANDRA (r) BL (t) ALEXANDRA (c)       [facility] Name 09/30/24 1415          Stairs Within Home, Primary    Number of Stairs, Within Home, Primary none  -ALEXANDRA (r) BL (t) ALEXANDRA (c)     Stair Railings, Within Home, Primary none  -ALEXANDRA (r) BL (t) ALEXANDRA (c)       [facility] Name 09/30/24 1415          Cognition    Orientation  Status (Cognition) oriented x 4  -ALEXANDRA (r) BL (t) ALEXANDRA (c)       Row Name 09/30/24 1415          Safety Issues, Functional Mobility    Safety Issues Affecting Function (Mobility) at risk behavior observed;impulsivity;friction/shear risk  -ALEXANDRA (r) BL (t) ALEXANDRA (c)     Impairments Affecting Function (Mobility) strength;balance;endurance/activity tolerance;pain  -ALEXANDRA (r) BL (t) ALEXANDRA (c)               User Key  (r) = Recorded By, (t) = Taken By, (c) = Cosigned By      Initials Name Provider Type    Bentley Morris, PT DPT Physical Therapist    Tim Olsen PT Student PT Student                   Mobility       Row Name 09/30/24 1415          Bed Mobility    Bed Mobility supine-sit;rolling right;scooting/bridging  -ALEXANDRA (r) BL (t) ALEXANDRA (c)     Rolling Right [place] (Bed Mobility) standby assist  -ALEXANDRA (r) BL (t) ALEXANDRA (c)     Scooting/Bridging [place] (Bed Mobility) standby assist  -ALEXANDRA (r) BL (t) ALEXANDRA (c)     Supine-Sit [place] (Bed Mobility) minimum assist (75% patient effort);verbal cues  -ALEXANDRA (r) BL (t) ALEXANDRA (c)     Assistive Device (Bed Mobility) head of bed elevated;bed rails  -ALEXANDRA (r) BL (t) ALEXANDRA (c)       Row Name 09/30/24 1415          Sit-Stand Transfer    Sit-Stand [place] (Transfers) minimum assist (75% patient effort)  -ALEXANDRA (r) BL (t) ALEXANDRA (c)     Assistive Device (Sit-Stand Transfers) cane, straight  -ALEXANDRA (r) BL (t) ALEXANDRA (c)       Row Name 09/30/24 1415          Gait/Stairs (Locomotion)    [place] Level (Gait) minimum assist (75% patient effort)  -ALEXANDRA (r) BL (t) ALEXANDRA (c)     Assistive Device (Gait) cane, straight  -ALEXANDRA (r) BL (t) ALEXANDRA (c)     Distance in Feet (Gait) 15  -ALXEANDRA (r) BL (t) ALEXANDRA (c)     Deviations/Abnormal Patterns (Gait) gait speed decreased;festinating/shuffling;jennifer decreased;stride length decreased  -ALEXANDRA (r) BL (t) ALEXANDRA (c)     Comment, (Gait/Stairs) pt. has poor balance with tendancy of R and posterior lean.  -ALEXANDRA (r) BL (t) ALEXANDRA (c)               User Key  (r) = Recorded By, (t) = Taken By, (c) =  Cosigned By      Initials Name Provider Type    Bentley Morris, PT DPT Physical Therapist    Tim Olsen, PT Student PT Student                   Obj/Interventions       Row Name 09/30/24 1415          Range of Motion Comprehensive    General Range of Motion bilateral lower extremity ROM WFL  -ALEXANDRA (r) BL (t) ALEXANDRA (c)       Row Name 09/30/24 1415          Strength Comprehensive (MMT)    General Manual Muscle Testing (MMT) Assessment no strength deficits identified  -ALEXANDRA (r) BL (t) ALEXANDRA (c)     Comment, General Manual Muscle Testing (MMT) Assessment B LE strength grossly 4-/5  -ALEXANDRA (r) BL (t) ALEXANDRA (c)       Row Name 09/30/24 1415          Balance    Balance Assessment sitting static balance;sitting dynamic balance;sit to stand dynamic balance;standing static balance;standing dynamic balance  -ALEXANDRA (r) BL (t) ALEXANDRA (c)     Static Sitting Balance independent  -ALEXANDRA (r) BL (t) ALEXANDRA (c)     Dynamic Sitting Balance supervision  -ALEXANDRA (r) BL (t) ALEXANDRA (c)     Position, Sitting Balance unsupported;sitting edge of bed  -ALEXANDRA (r) BL (t) ALEXANDRA (c)     Sit to Stand Dynamic Balance moderate assist;2-person assist  -ALEXANDRA (r) BL (t) ALEXANDRA (c)     Static Standing Balance minimal assist  -ALEXANDRA (r) BL (t) ALEXANDRA (c)     Dynamic Standing Balance minimal assist;2-person assist  -ALEXANDRA (r) BL (t) ALEXANDRA (c)     Position/Device Used, Standing Balance supported;cane, straight  -ALEAXNDRA (r) BL (t) ALEXANDRA (c)     Balance Interventions sitting;standing;sit to stand;supported;static;dynamic;minimal challenge;moderate challenge;occupation based/functional task;weight shifting activity  -ALEXANDRA (r) BL (t) ALEXANDRA (c)     Comment, Balance poor balance with posterior and R lean.  -ALEXANDRA (r) BL (t) ALEXANDRA (c)       Row Name 09/30/24 1415          Sensory Assessment (Somatosensory)    Sensory Assessment (Somatosensory) LE sensation intact  -ALEXANDRA (r) BL (t) ALEAXNDRA (c)               User Key  (r) = Recorded By, (t) = Taken By, (c) = Cosigned By      Initials Name Provider Type    Bentley Morris, PT DPT Physical  Therapist    Tim Olsen, PT Student PT Student                   Goals/Plan       Row Name 09/30/24 1415          Bed Mobility Goal 1 (PT)    Activity/Assistive Device (Bed Mobility Goal 1, PT) rolling to right;rolling to left;scooting;sit to supine;supine to sit  -ALEXANDRA (r) BL (t) ALEXANDRA (c)     [place] Level/Cues Needed (Bed Mobility Goal 1, PT) standby assist  -ALEXANDRA (r) BL (t) ALEXANDRA (c)     Time Frame (Bed Mobility Goal 1, PT) long term goal (LTG);10 days  -ALEXANDRA     Progress/Outcomes (Bed Mobility Goal 1, PT) new goal  -ALEXANDRA (r) BL (t) ALEXANDRA (c)       Row Name 09/30/24 1415          Transfer Goal 1 (PT)    Activity/Assistive Device (Transfer Goal 1, PT) sit-to-stand/stand-to-sit;bed-to-chair/chair-to-bed  -ALEXANDRA (r) BL (t) ALEXANDRA (c)     [place] Level/Cues Needed (Transfer Goal 1, PT) standby assist  -ALEXANDRA     Time Frame (Transfer Goal 1, PT) long term goal (LTG);10 days  -ALEXANDRA     Progress/Outcome (Transfer Goal 1, PT) new goal  -ALEXANDRA (r) BL (t) ALEXANDRA (c)       Row Name 09/30/24 1415          Gait Training Goal 1 (PT)    Activity/Assistive Device (Gait Training Goal 1, PT) gait (walking locomotion);assistive device use;decrease fall risk;diminish gait deviation;forward stepping;improve balance and speed;increase endurance/gait distance;increase energy conservation;cane, straight;turning, left;turning, right  -ALEXANDRA (r) BL (t) ALEXANDRA (c)     [place] Level (Gait Training Goal 1, PT) standby assist  -ALEXANDRA (r) BL (t) ALEXANDRA (c)     Distance (Gait Training Goal 1, PT) 50'  -ALEXANDRA (r) BL (t) ALEXANDRA (c)     Time Frame (Gait Training Goal 1, PT) long term goal (LTG);10 days  -ALEXANDRA     Progress/Outcome (Gait Training Goal 1, PT) new goal  -ALEXANDRA (r) BL (t) ALEXANDRA (c)       Row Name 09/30/24 1415          Stairs Goal 1 (PT)    Activity/Assistive Device (Stairs Goal 1, PT) --  -ALEXANDRA       Row Name 09/30/24 1415          Therapy Assessment/Plan (PT)    Planned Therapy Interventions (PT) balance training;bed mobility training;gait training;home exercise  program;patient/family education;stair training;strengthening;transfer training;postural re-education  -ALEXANDRA               User Key  (r) = Recorded By, (t) = Taken By, (c) = Cosigned By      Initials Name Provider Type    Bentley Morris, PT DPT Physical Therapist    Tim Olsen, PT Student PT Student                   Clinical Impression       Row Name 09/30/24 1413          Pain    Pretreatment Pain Rating 0/10 - no pain  -ALEXANDRA (r) BL (t) ALEXANDRA (c)     Posttreatment Pain Rating 0/10 - no pain  -ALEXANDRA (r) BL (t) ALEXANDRA (c)     Pain Intervention(s) Ambulation/increased activity;Repositioned  -ALEXANDRA (r) BL (t) ALEXANDRA (c)     Additional Documentation Pain Scale: Numbers Pre/Post-Treatment (Group)  -ALEXANDRA (r) BL (t) ALEXANDRA (c)       Row Name 09/30/24 1410          Plan of Care Review    Plan of Care Reviewed With patient  -ALEXANDRA (r) BL (t) ALEXANDRA (c)     Progress no change  -ALEXANDRA (r) BL (t) ALEXANDRA (c)     Outcome Evaluation PT eval complete. Oriented x4. Presents in fowlers position with 3L of O2 via NC. Pt. is very lethargic throughout evaluation. Pt. able to get to sitting EOB with minimal assistance. Showed good strength across B LE with sensation intact. Pt. showed impulsivity when coming to standing, moving prior to either therapist being prepared. Able to come to standing with min. assist. primarily to provide stability through trunk. Shows decreased balance with continuous posterior and right lean. Walks with high guard positioning and shuffling gait. Skilled therapy recommended to address functional mobility deficits, balance and endurance deficits. D/C recommended to short term rehab.  -ALEXANDRA (r) BL (t) ALEXANDRA (c)       Row Name 09/30/24 1418          Therapy Assessment/Plan (PT)    Patient/Family Therapy Goals Statement (PT) Return home  -ALEXANDRA (r) BL (t) ALEXANDRA (c)     Rehab Potential (PT) fair, will monitor progress closely  -ALEXANDRA (r) BL (t) ALEXANDRA (c)     Criteria for Skilled Interventions Met (PT) yes;meets criteria;skilled treatment is necessary  -ALEXANDRA (r)  BL (t) ALEXANDRA (c)     Therapy Frequency (PT) 2 times/day  -ALEXANDRA (r) BL (t) ALEXANDRA (c)     Predicted Duration of Therapy Intervention (PT) Until D/C or goals met.  -ALEXANDRA (r) BL (t) ALEXANDRA (c)       Row Name 09/30/24 1415          Vital Signs    O2 Delivery Pre Treatment supplemental O2  -ALEXANDRA (r) BL (t) ALEXANDRA (c)     O2 Delivery Intra Treatment supplemental O2  -ALEXANDRA (r) BL (t) ALEXANDRA (c)     O2 Delivery Post Treatment supplemental O2  -ALEXANDRA (r) BL (t) ALEXANDRA (c)     Pre Patient Position Supine  -ALEXANDRA (r) BL (t) ALEXANDRA (c)     Intra Patient Position Standing  -ALEXANDRA (r) BL (t) ALEXANDRA (c)     Post Patient Position Sitting  -ALEXANDRA (r) BL (t) ALEXANDRA (c)       Row Name 09/30/24 1415          Positioning and Restraints    Pre-Treatment Position in bed  -ALEXANDRA (r) BL (t) ALEXANDRA (c)     Post Treatment Position chair  -ALEXANDRA (r) BL (t) ALEXANDRA (c)     In Chair notified nsg;reclined;sitting;call light within reach;encouraged to call for assist;legs elevated  -ALEXANDRA (r) BL (t) ALEXANDRA (c)               User Key  (r) = Recorded By, (t) = Taken By, (c) = Cosigned By      Initials Name Provider Type    Bentley Morris, PT DPT Physical Therapist    Tim Olsen, STEPHANIE Student PT Student                   Outcome Measures       Row Name 09/30/24 1415 09/30/24 0800       How much help from another person do you currently need...    Turning from your back to your side while in flat bed without using bedrails? 3  -ALEXANDRA (r) BL (t) ALEXANDRA (c) 3  -HT    Moving from lying on back to sitting on the side of a flat bed without bedrails? 3  -ALEXANDRA (r) BL (t) ALEXANDRA (c) 3  -HT    Moving to and from a bed to a chair (including a wheelchair)? 3  -ALEXANDRA (r) BL (t) ALEXANDRA (c) 3  -HT    Standing up from a chair using your arms (e.g., wheelchair, bedside chair)? 3  -ALEXANDRA (r) BL (t) ALEXANDRA (c) 3  -HT    Climbing 3-5 steps with a railing? 3  -ALEXANDRA (r) BL (t) ALEXANDRA (c) 3  -HT    To walk in hospital room? 3  -ALEXANDRA (r) BL (t) ALEXANDRA (c) 3  -HT    AM-PAC 6 Clicks Score (PT) 18  -ALEXANDRA (r) BL (t) 18  -HT    Highest Level of Mobility Goal 6 --> Walk 10 steps or more   -ALEXANDRA (r) BL (t) 6 --> Walk 10 steps or more  -      Row Name 09/30/24 0703          How much help from another person do you currently need...    Turning from your back to your side while in flat bed without using bedrails? 3  -SE     Moving from lying on back to sitting on the side of a flat bed without bedrails? 3  -SE     Moving to and from a bed to a chair (including a wheelchair)? 3  -SE     Standing up from a chair using your arms (e.g., wheelchair, bedside chair)? 3  -SE     Climbing 3-5 steps with a railing? 3  -SE     To walk in hospital room? 3  -SE     AM-PAC 6 Clicks Score (PT) 18  -SE     Highest Level of Mobility Goal 6 --> Walk 10 steps or more  -       Row Name 09/30/24 1420 09/30/24 1415       Functional Assessment    Outcome Measure Options AM-PAC 6 Clicks Daily Activity (OT)  -CHARMAINE AM-PAC 6 Clicks Basic Mobility (PT)  -ALEXANDRA (r) BL (t) ALEXANDRA (c)              User Key  (r) = Recorded By, (t) = Taken By, (c) = Cosigned By      Initials Name Provider Type    ALEXANDRA Bentley Kaur, PT DPT Physical Therapist    Marielena Ivey, RN Registered Nurse    Shyam José RN Registered Nurse    Tayla Camp, OTR/L, CSRS Occupational Therapist    Tim Olsen, PT Student PT Student                                 Physical Therapy Education       Title: PT OT SLP Therapies (In Progress)       Topic: Physical Therapy (In Progress)       Point: Mobility training (Done)       Learning Progress Summary             Patient Acceptance, E,D, VU,DU by  at 9/30/2024 1512    Comment: Pt. educated on proper body mechanics to maintain safety when performing functional mobility. Educated on safety procedures to abide by while at [facility]                         Point: Home exercise program (Not Started)       Learner Progress:  Not documented in this visit.              Point: Body mechanics (Done)       Learning Progress Summary             Patient Acceptance, E,D, VU,DU by  at 9/30/2024 1512     Comment: Pt. educated on proper body mechanics to maintain safety when performing functional mobility. Educated on safety procedures to abide by while at [facility]                         Point: Precautions (Done)       Learning Progress Summary             Patient Acceptance, E,D, LORDU by  at 9/30/2024 1512    Comment: Pt. educated on proper body mechanics to maintain safety when performing functional mobility. Educated on safety procedures to abide by while at [facility]                                         User Key       Initials Effective Dates Name Provider Type Discipline     08/21/24 -  Tim Heller, PT Student PT Student PT                  PT Recommendation and Plan     Plan of Care Reviewed With: patient  Progress: no change  Outcome Evaluation: PT eval complete. Oriented x4. Presents in fowlers position with 3L of O2 via NC. Pt. is very lethargic throughout evaluation. Pt. able to get to sitting EOB with minimal assistance. Showed good strength across B LE with sensation intact. Pt. showed impulsivity when coming to standing, moving prior to either therapist being prepared. Able to come to standing with min. assist. primarily to provide stability through trunk. Shows decreased balance with continuous posterior and right lean. Walks with high guard positioning and shuffling gait. Skilled therapy recommended to address functional mobility deficits, balance and endurance deficits. D/C recommended to short term rehab.     Time Calculation:         PT Charges       Row Name 09/30/24 1514             Time Calculation    Start Time 1415  -ALEXANDRA (r) BL (t) ALEXADNRA (c)      Stop Time 1500  -ALEXANDRA (r) BL (t) ALEXANDRA (c)      Time Calculation (min) 45 min  -ALEXANDRA (r) BL (t)      PT Received On 09/30/24  -ALEXANDRA (r) BL (t) ALEXANDRA (c)      PT Goal Re-Cert Due Date 10/10/24  -ALEXANDRA (r) BL (t) ALEXANDRA (c)                User Key  (r) = Recorded By, (t) = Taken By, (c) = Cosigned By      Initials Name Provider Type    Bentley Morris  A, PT DPT Physical Therapist    Tim Olsen, PT Student PT Student                      PT G-Codes  Outcome Measure Options: AM-PAC 6 Clicks Daily Activity (OT)  AM-PAC 6 Clicks Score (PT): 18  AM-PAC 6 Clicks Score (OT): 16  PT Discharge Summary  Anticipated Discharge Disposition (PT): sub acute care setting    Tim Heller, PT Student  9/30/2024

## 2024-09-30 NOTE — PLAN OF CARE
Goal Outcome Evaluation:  Plan of Care Reviewed With: patient        Progress: no change  Outcome Evaluation: PT eval complete. Oriented x4. Presents in fowlers position with 3L of O2 via NC. Pt. is very lethargic throughout evaluation. Pt. able to get to sitting EOB with minimal assistance. Showed good strength across B LE with sensation intact. Pt. showed impulsivity when coming to standing, moving prior to either therapist being prepared. Able to come to standing with min. assist. primarily to provide stability through trunk. Shows decreased balance with continuous posterior and right lean. Walks with high guard positioning and shuffling gait. Skilled therapy recommended to address functional mobility deficits, balance and endurance deficits. D/C recommended to short term rehab.      Anticipated Discharge Disposition (PT): sub acute care setting

## 2024-09-30 NOTE — NURSING NOTE
[facility]  INPATIENT WOUND & OSTOMY CARE    Today's Date: 09/30/24    Patient Name: Tucker Knox  MRN: [id number]  [facility]: [id number]  PCP: Kt Alfredo MD  Attending Provider: Mohit Barnes MD  Length of Stay: 2    I placed pressure injury prevention measures orders per protocol due to patient being at risk for skin breakdown and being admitted to the unit.     Apply silicone foam border dressing per protocol to sacral spine/bilateral heels for protection.  Nursing to change dressing every 3 days and PRN if soiled. Nursing is to peel back dressing with every assessment to assess skin underneath dressing. No barrier cream under dressing.    Please reach out to wound care nurse if any skin issues arise.     This document has been electronically signed by Alexia Mcarthur RN on 9/30/2024 08:39 CDT

## 2024-10-01 LAB
ANION GAP SERPL CALCULATED.3IONS-SCNC: 9 MMOL/L (ref 5–15)
BASOPHILS # BLD AUTO: 0.04 10*3/MM3 (ref 0–0.2)
BASOPHILS NFR BLD AUTO: 0.4 % (ref 0–1.5)
BUN SERPL-MCNC: 37 MG/DL (ref 8–23)
BUN/CREAT SERPL: 34.3 (ref 7–25)
CALCIUM SPEC-SCNC: 8.1 MG/DL (ref 8.6–10.5)
CHLORIDE SERPL-SCNC: 101 MMOL/L (ref 98–107)
CO2 SERPL-SCNC: 31 MMOL/L (ref 22–29)
CREAT SERPL-MCNC: 1.08 MG/DL (ref 0.76–1.27)
DEPRECATED RDW RBC AUTO: 52.6 FL (ref 37–54)
EGFRCR SERPLBLD CKD-EPI 2021: 77.1 ML/MIN/1.73
EOSINOPHIL # BLD AUTO: 0.07 10*3/MM3 (ref 0–0.4)
EOSINOPHIL NFR BLD AUTO: 0.7 % (ref 0.3–6.2)
ERYTHROCYTE [DISTWIDTH] IN BLOOD BY AUTOMATED COUNT: 15.2 % (ref 12.3–15.4)
GLUCOSE BLDC GLUCOMTR-MCNC: 110 MG/DL (ref 70–130)
GLUCOSE BLDC GLUCOMTR-MCNC: 123 MG/DL (ref 70–130)
GLUCOSE BLDC GLUCOMTR-MCNC: 143 MG/DL (ref 70–130)
GLUCOSE BLDC GLUCOMTR-MCNC: 351 MG/DL (ref 70–130)
GLUCOSE BLDC GLUCOMTR-MCNC: 54 MG/DL (ref 70–130)
GLUCOSE BLDC GLUCOMTR-MCNC: 55 MG/DL (ref 70–130)
GLUCOSE SERPL-MCNC: 82 MG/DL (ref 65–99)
HCT VFR BLD AUTO: 39.7 % (ref 37.5–51)
HGB BLD-MCNC: 11.2 G/DL (ref 13–17.7)
IMM GRANULOCYTES # BLD AUTO: 0.06 10*3/MM3 (ref 0–0.05)
IMM GRANULOCYTES NFR BLD AUTO: 0.6 % (ref 0–0.5)
LYMPHOCYTES # BLD AUTO: 0.76 10*3/MM3 (ref 0.7–3.1)
LYMPHOCYTES NFR BLD AUTO: 7.3 % (ref 19.6–45.3)
MCH RBC QN AUTO: 27.1 PG (ref 26.6–33)
MCHC RBC AUTO-ENTMCNC: 28.2 G/DL (ref 31.5–35.7)
MCV RBC AUTO: 96.1 FL (ref 79–97)
MONOCYTES # BLD AUTO: 0.62 10*3/MM3 (ref 0.1–0.9)
MONOCYTES NFR BLD AUTO: 5.9 % (ref 5–12)
NEUTROPHILS NFR BLD AUTO: 8.9 10*3/MM3 (ref 1.7–7)
NEUTROPHILS NFR BLD AUTO: 85.1 % (ref 42.7–76)
NRBC BLD AUTO-RTO: 0 /100 WBC (ref 0–0.2)
PLATELET # BLD AUTO: 185 10*3/MM3 (ref 140–450)
PMV BLD AUTO: 10 FL (ref 6–12)
POTASSIUM SERPL-SCNC: 4.7 MMOL/L (ref 3.5–5.2)
RBC # BLD AUTO: 4.13 10*6/MM3 (ref 4.14–5.8)
SODIUM SERPL-SCNC: 141 MMOL/L (ref 136–145)
WBC NRBC COR # BLD AUTO: 10.45 10*3/MM3 (ref 3.4–10.8)

## 2024-10-01 PROCEDURE — 82948 REAGENT STRIP/BLOOD GLUCOSE: CPT

## 2024-10-01 PROCEDURE — 80048 BASIC METABOLIC PNL TOTAL CA: CPT | Performed by: INTERNAL MEDICINE

## 2024-10-01 PROCEDURE — 63710000001 INSULIN GLARGINE PER 5 UNITS: Performed by: NURSE PRACTITIONER

## 2024-10-01 PROCEDURE — 85025 COMPLETE CBC W/AUTO DIFF WBC: CPT | Performed by: INTERNAL MEDICINE

## 2024-10-01 PROCEDURE — 97530 THERAPEUTIC ACTIVITIES: CPT

## 2024-10-01 PROCEDURE — 63710000001 INSULIN LISPRO (HUMAN) PER 5 UNITS: Performed by: INTERNAL MEDICINE

## 2024-10-01 PROCEDURE — 25010000002 ENOXAPARIN PER 10 MG: Performed by: INTERNAL MEDICINE

## 2024-10-01 RX ADMIN — MUPIROCIN 1 APPLICATION: 20 OINTMENT TOPICAL at 09:19

## 2024-10-01 RX ADMIN — DEXTROSE MONOHYDRATE 25 G: 25 INJECTION, SOLUTION INTRAVENOUS at 11:46

## 2024-10-01 RX ADMIN — SACUBITRIL AND VALSARTAN 1 TABLET: 24; 26 TABLET, FILM COATED ORAL at 21:14

## 2024-10-01 RX ADMIN — ASPIRIN 81 MG: 81 TABLET, COATED ORAL at 09:19

## 2024-10-01 RX ADMIN — SACUBITRIL AND VALSARTAN 1 TABLET: 24; 26 TABLET, FILM COATED ORAL at 09:19

## 2024-10-01 RX ADMIN — INSULIN LISPRO 8 UNITS: 100 INJECTION, SOLUTION INTRAVENOUS; SUBCUTANEOUS at 09:18

## 2024-10-01 RX ADMIN — Medication 10 ML: at 09:20

## 2024-10-01 RX ADMIN — CHLORHEXIDINE GLUCONATE 1 APPLICATION: 500 CLOTH TOPICAL at 05:04

## 2024-10-01 RX ADMIN — DOCUSATE SODIUM 50 MG AND SENNOSIDES 8.6 MG 2 TABLET: 8.6; 5 TABLET, FILM COATED ORAL at 09:19

## 2024-10-01 RX ADMIN — GABAPENTIN 600 MG: 300 CAPSULE ORAL at 09:19

## 2024-10-01 RX ADMIN — INSULIN GLARGINE 30 UNITS: 100 INJECTION, SOLUTION SUBCUTANEOUS at 21:14

## 2024-10-01 RX ADMIN — DULOXETINE HYDROCHLORIDE 30 MG: 30 CAPSULE, DELAYED RELEASE ORAL at 09:19

## 2024-10-01 RX ADMIN — ENOXAPARIN SODIUM 40 MG: 100 INJECTION SUBCUTANEOUS at 09:19

## 2024-10-01 RX ADMIN — DEXTROSE 15 G: 15 GEL ORAL at 11:19

## 2024-10-01 RX ADMIN — PRAVASTATIN SODIUM 40 MG: 20 TABLET ORAL at 21:14

## 2024-10-01 NOTE — PLAN OF CARE
Problem: Adult Inpatient Plan of Care  Goal: Plan of Care Review  Outcome: Progressing  Goal: Patient-Specific Goal (Individualized)  Outcome: Progressing  Goal: Absence of Hospital-Acquired Illness or Injury  Outcome: Progressing  Intervention: Identify and Manage Fall Risk  Recent Flowsheet Documentation  Taken 10/1/2024 1400 by Vianey Delgado RN  Safety Promotion/Fall Prevention: safety round/check completed  Taken 10/1/2024 1300 by Vianey Delgado RN  Safety Promotion/Fall Prevention: safety round/check completed  Taken 10/1/2024 1200 by Vianey Delgado RN  Safety Promotion/Fall Prevention: safety round/check completed  Taken 10/1/2024 1100 by Vianey Delgado RN  Safety Promotion/Fall Prevention: safety round/check completed  Taken 10/1/2024 1000 by Vianey Delgado RN  Safety Promotion/Fall Prevention:   safety round/check completed   room organization consistent   clutter free environment maintained  Taken 10/1/2024 0900 by Vianey Delgado RN  Safety Promotion/Fall Prevention: safety round/check completed  Taken 10/1/2024 0800 by Vianey Delgado RN  Safety Promotion/Fall Prevention:   safety round/check completed   assistive device/personal items within reach   clutter free environment maintained   fall prevention program maintained   nonskid shoes/slippers when out of bed   room organization consistent  Intervention: Prevent Skin Injury  Recent Flowsheet Documentation  Taken 10/1/2024 1400 by Vianey Delgado RN  Body Position:   30 degrees   weight shifting  Taken 10/1/2024 1300 by Vianey Delgado RN  Body Position:   30 degrees   neutral body alignment   weight shifting   position changed independently  Taken 10/1/2024 1200 by Vianey Delgado RN  Body Position:   legs elevated   weight shifting   sitting up in bed  Taken 10/1/2024 1100 by Vianey Delgado RN  Body Position:   left   30 degrees   weight shifting  Taken 10/1/2024 1000 by Vianey Delgado RN  Body Position:    supine   weight shifting  Taken 10/1/2024 0900 by Vianey Delgado RN  Body Position:   left   30 degrees  Taken 10/1/2024 0800 by Vianey Delgado RN  Body Position:   turned   left   30 degrees  Skin Protection: silicone foam dressing in place  Intervention: Prevent and Manage VTE (Venous Thromboembolism) Risk  Recent Flowsheet Documentation  Taken 10/1/2024 1400 by Vianey Delgado RN  Activity Management: activity encouraged  Taken 10/1/2024 1300 by Vianey Delgado RN  Activity Management: activity encouraged  Taken 10/1/2024 1200 by Vianey Delgado RN  Activity Management:   patient refuses activity   activity encouraged  Taken 10/1/2024 1100 by Vianey Delgado RN  Activity Management: activity encouraged  Taken 10/1/2024 1000 by Vianey Delgado RN  Activity Management:   activity encouraged   patient refuses activity  Taken 10/1/2024 0900 by Vianey Delgado RN  Activity Management:   activity encouraged   patient refuses activity  Taken 10/1/2024 0800 by Vianey Delgado RN  Activity Management:   sitting, edge of bed   activity encouraged  Range of Motion: ROM (range of motion) performed  Intervention: Prevent Infection  Recent Flowsheet Documentation  Taken 10/1/2024 1400 by Vianey Delgado RN  Infection Prevention: single patient room provided  Taken 10/1/2024 1300 by Vianey Delgado RN  Infection Prevention: single patient room provided  Taken 10/1/2024 1200 by Vianey Delgado RN  Infection Prevention: single patient room provided  Taken 10/1/2024 1100 by Vianey Delgado RN  Infection Prevention: single patient room provided  Taken 10/1/2024 1000 by Vianey Delgado RN  Infection Prevention: single patient room provided  Taken 10/1/2024 0900 by Vianey Delgado RN  Infection Prevention: single patient room provided  Taken 10/1/2024 0800 by Vianey Delgado RN  Infection Prevention:   single patient room provided   equipment surfaces disinfected  Goal: Optimal  Comfort and Wellbeing  Outcome: Progressing  Intervention: Provide Person-Centered Care  Recent Flowsheet Documentation  Taken 10/1/2024 1200 by Vianey Delgado RN  Trust Relationship/Rapport: care explained  Taken 10/1/2024 0800 by Vianey Delgado RN  Trust Relationship/Rapport:   care explained   questions encouraged   thoughts/feelings acknowledged  Goal: Readiness for Transition of Care  Outcome: Progressing     Problem: COPD (Chronic Obstructive Pulmonary Disease) Comorbidity  Goal: Maintenance of COPD Symptom Control  Outcome: Progressing  Intervention: Maintain COPD-Symptom Control  Recent Flowsheet Documentation  Taken 10/1/2024 0800 by Vianey Delgado RN  Medication Review/Management: medications reviewed     Problem: Diabetes Comorbidity  Goal: Blood Glucose Level Within Targeted Range  Outcome: Progressing     Problem: Heart Failure Comorbidity  Goal: Maintenance of Heart Failure Symptom Control  Outcome: Progressing  Intervention: Maintain Heart Failure-Management  Recent Flowsheet Documentation  Taken 10/1/2024 0800 by Vianey Delgado RN  Medication Review/Management: medications reviewed     Problem: Obstructive Sleep Apnea Risk or Actual Comorbidity Management  Goal: Unobstructed Breathing During Sleep  Outcome: Progressing     Problem: Fall Injury Risk  Goal: Absence of Fall and Fall-Related Injury  Outcome: Progressing  Intervention: Identify and Manage Contributors  Recent Flowsheet Documentation  Taken 10/1/2024 0800 by Vianey Delgado RN  Medication Review/Management: medications reviewed  Intervention: Promote Injury-Free Environment  Recent Flowsheet Documentation  Taken 10/1/2024 1400 by Vianey Delgado, RN  Safety Promotion/Fall Prevention: safety round/check completed  Taken 10/1/2024 1300 by Vianey Delgado, RN  Safety Promotion/Fall Prevention: safety round/check completed  Taken 10/1/2024 1200 by Vianey Delgado RN  Safety Promotion/Fall Prevention: safety  round/check completed  Taken 10/1/2024 1100 by Vianey Delgado, RN  Safety Promotion/Fall Prevention: safety round/check completed  Taken 10/1/2024 1000 by Vianey Delgado, RN  Safety Promotion/Fall Prevention:   safety round/check completed   room organization consistent   clutter free environment maintained  Taken 10/1/2024 0900 by Vianey Delgado, RN  Safety Promotion/Fall Prevention: safety round/check completed  Taken 10/1/2024 0800 by Vianey Delgado, RN  Safety Promotion/Fall Prevention:   safety round/check completed   assistive device/personal items within reach   clutter free environment maintained   fall prevention program maintained   nonskid shoes/slippers when out of bed   room organization consistent

## 2024-10-01 NOTE — CASE MANAGEMENT/SOCIAL WORK
Continued Stay Note   [facility]     Patient Name: Tucker Knox  MRN: [id number]  Today's Date: 10/1/2024    Admit Date: 9/28/2024        Discharge Plan       Row Name 10/01/24 1248       Plan    Plan Comments Received call from patient's sister inquiring about advance directives.  SW has left voicemail for pastoral care.                   Discharge Codes    No documentation.                       GAGANDEEP Stout

## 2024-10-01 NOTE — PLAN OF CARE
"Goal Outcome Evaluation:      Patient required Mod assist for bed transfers with HOB elevated. Patient had good sitting balance. Patient moans throughout treatment and stated he \"hurts all over\". Patient worked thru 2 LE exercises with encouragement. Patient sat EOB a total of 18 minutes. Patient is very weak and will benefit from strengthening activities.                                        "

## 2024-10-01 NOTE — PLAN OF CARE
Problem: Adult Inpatient Plan of Care  Goal: Plan of Care Review  Outcome: Progressing  Flowsheets  Taken 10/1/2024 0534 by Mackenzie Lipscomb RN  Progress: no change  Outcome Evaluation: Pt A&Ox4. VSS. Afebrile. Slept most of night. Restless at times. 3L NC. BMx1. F/c taken out. Turned q2h. Safety maintained.  Taken 9/30/2024 1420 by Tayla Gibbs, OTR/L, CSRS  Plan of Care Reviewed With: patient  Goal: Patient-Specific Goal (Individualized)  Outcome: Progressing  Goal: Absence of Hospital-Acquired Illness or Injury  Outcome: Progressing  Intervention: Identify and Manage Fall Risk  Recent Flowsheet Documentation  Taken 10/1/2024 0500 by Mackenzie Lipscomb RN  Safety Promotion/Fall Prevention:   safety round/check completed   fall prevention program maintained  Taken 10/1/2024 0400 by Mackenzie Lipscomb RN  Safety Promotion/Fall Prevention:   safety round/check completed   fall prevention program maintained  Taken 10/1/2024 0300 by Mackenzie Lipscomb RN  Safety Promotion/Fall Prevention:   safety round/check completed   fall prevention program maintained  Taken 10/1/2024 0200 by Mackenzie Lipscomb RN  Safety Promotion/Fall Prevention:   safety round/check completed   fall prevention program maintained  Taken 10/1/2024 0100 by Mackenzie Lipscomb RN  Safety Promotion/Fall Prevention:   safety round/check completed   fall prevention program maintained  Taken 10/1/2024 0000 by Mackenzie Lipscomb RN  Safety Promotion/Fall Prevention:   safety round/check completed   fall prevention program maintained  Taken 9/30/2024 2300 by Mackenzie Lipscomb RN  Safety Promotion/Fall Prevention:   safety round/check completed   fall prevention program maintained  Taken 9/30/2024 2200 by Mackenzie Lipscomb RN  Safety Promotion/Fall Prevention:   safety round/check completed   fall prevention program maintained  Taken 9/30/2024 2100 by Mackenzie Lipscomb RN  Safety Promotion/Fall Prevention:   safety round/check completed   fall prevention program  maintained  Taken 9/30/2024 2000 by Mackenzie Lipscomb RN  Safety Promotion/Fall Prevention:   safety round/check completed   fall prevention program maintained  Taken 9/30/2024 1900 by Mackenzie Lipscomb RN  Safety Promotion/Fall Prevention:   safety round/check completed   fall prevention program maintained  Intervention: Prevent Skin Injury  Recent Flowsheet Documentation  Taken 10/1/2024 0500 by Mackenzie Lipscomb RN  Body Position:   turned   right  Taken 10/1/2024 0300 by Mackenzie Lipscomb RN  Body Position:   turned   left  Taken 10/1/2024 0100 by Mackenzie Lipscomb RN  Body Position:   turned   right  Taken 9/30/2024 2300 by Mackenzie Lipscomb RN  Body Position:   turned   left  Taken 9/30/2024 2100 by Mackenzie Lipscomb RN  Body Position:   turned   right  Taken 9/30/2024 1900 by Mackenzie Lipscomb RN  Body Position:   turned   left  Intervention: Prevent and Manage VTE (Venous Thromboembolism) Risk  Recent Flowsheet Documentation  Taken 10/1/2024 0400 by Mackenzie Lipscomb RN  Activity Management: activity encouraged  Taken 10/1/2024 0200 by Mackenzie Lipscomb RN  Activity Management: activity encouraged  Taken 10/1/2024 0000 by Mackenzie Lipscomb RN  Activity Management: activity encouraged  Taken 9/30/2024 2200 by Mackenzie Lipscomb RN  Activity Management: activity encouraged  Taken 9/30/2024 2000 by Mackenzie Lipscomb RN  Activity Management: activity encouraged  VTE Prevention/Management: (see mar) other (see comments)  Range of Motion: active ROM (range of motion) encouraged  Intervention: Prevent Infection  Recent Flowsheet Documentation  Taken 10/1/2024 0500 by Mackenzie Lipscomb RN  Infection Prevention: single patient room provided  Taken 10/1/2024 0400 by Mackenzie Lipscomb RN  Infection Prevention: single patient room provided  Taken 10/1/2024 0300 by Mackenzie Lipscomb RN  Infection Prevention: single patient room provided  Taken 10/1/2024 0200 by Mackenzie Lipscomb RN  Infection Prevention: single patient room provided  Taken 10/1/2024 0100  by Mackenzie Lipscomb RN  Infection Prevention: single patient room provided  Taken 10/1/2024 0000 by Mackenzie Lipscomb RN  Infection Prevention: single patient room provided  Taken 9/30/2024 2300 by Mackenzie Lipscomb RN  Infection Prevention: single patient room provided  Taken 9/30/2024 2200 by Mackenzie Lipscomb RN  Infection Prevention: single patient room provided  Taken 9/30/2024 2100 by Mackenzie Lipscomb RN  Infection Prevention: single patient room provided  Taken 9/30/2024 2000 by Mackenzie Lipscomb RN  Infection Prevention: single patient room provided  Taken 9/30/2024 1900 by Mackenzie Lipscomb RN  Infection Prevention: single patient room provided  Goal: Optimal Comfort and Wellbeing  Outcome: Progressing  Intervention: Provide Person-Centered Care  Recent Flowsheet Documentation  Taken 9/30/2024 2000 by Mackenzie Lipscomb RN  Trust Relationship/Rapport:   care explained   emotional support provided  Goal: Readiness for Transition of Care  Outcome: Progressing     Problem: COPD (Chronic Obstructive Pulmonary Disease) Comorbidity  Goal: Maintenance of COPD Symptom Control  Outcome: Progressing  Intervention: Maintain COPD-Symptom Control  Recent Flowsheet Documentation  Taken 10/1/2024 0500 by Mackenzie Lipscomb RN  Medication Review/Management: medications reviewed  Taken 10/1/2024 0400 by Mackenzie Lipscomb RN  Medication Review/Management: medications reviewed  Taken 10/1/2024 0300 by Mackenzie Lipscomb RN  Medication Review/Management: medications reviewed  Taken 10/1/2024 0200 by Mackenzie Lipscomb RN  Medication Review/Management: medications reviewed  Taken 10/1/2024 0100 by Mackenzie Lipscomb RN  Medication Review/Management: medications reviewed  Taken 10/1/2024 0000 by Mackenzie Lipscomb RN  Medication Review/Management: medications reviewed  Taken 9/30/2024 2300 by Mackenzie Lipscomb RN  Medication Review/Management: medications reviewed  Taken 9/30/2024 2200 by Mackenzie Lipscomb RN  Medication Review/Management: medications  reviewed  Taken 9/30/2024 2100 by Mackenzie Lipscomb RN  Medication Review/Management: medications reviewed  Taken 9/30/2024 2000 by Mackenzie Lipscomb RN  Medication Review/Management: medications reviewed  Taken 9/30/2024 1900 by Mackenzie Lipscomb RN  Medication Review/Management: medications reviewed     Problem: Diabetes Comorbidity  Goal: Blood Glucose Level Within Targeted Range  Outcome: Progressing     Problem: Heart Failure Comorbidity  Goal: Maintenance of Heart Failure Symptom Control  Outcome: Progressing  Intervention: Maintain Heart Failure-Management  Recent Flowsheet Documentation  Taken 10/1/2024 0500 by Mackenzie Lipscomb RN  Medication Review/Management: medications reviewed  Taken 10/1/2024 0400 by Mackenzie Lipscomb RN  Medication Review/Management: medications reviewed  Taken 10/1/2024 0300 by Mackenzie Lipscomb RN  Medication Review/Management: medications reviewed  Taken 10/1/2024 0200 by Mackenzie Lipscomb RN  Medication Review/Management: medications reviewed  Taken 10/1/2024 0100 by Mackenzie Lipscomb RN  Medication Review/Management: medications reviewed  Taken 10/1/2024 0000 by Mackenzie Lipscomb RN  Medication Review/Management: medications reviewed  Taken 9/30/2024 2300 by Mackenzie Lipscomb RN  Medication Review/Management: medications reviewed  Taken 9/30/2024 2200 by Mackenzie Lipscomb RN  Medication Review/Management: medications reviewed  Taken 9/30/2024 2100 by Mackenzie Lipscomb RN  Medication Review/Management: medications reviewed  Taken 9/30/2024 2000 by Mackenzie Lipscomb RN  Medication Review/Management: medications reviewed  Taken 9/30/2024 1900 by Mackenzie Lipscomb RN  Medication Review/Management: medications reviewed     Problem: Obstructive Sleep Apnea Risk or Actual Comorbidity Management  Goal: Unobstructed Breathing During Sleep  Outcome: Progressing     Problem: Fall Injury Risk  Goal: Absence of Fall and Fall-Related Injury  Outcome: Progressing  Intervention: Identify and Manage Contributors  Recent  Flowsheet Documentation  Taken 10/1/2024 0500 by Mackenzie Lipscomb RN  Medication Review/Management: medications reviewed  Taken 10/1/2024 0400 by Mackenzie Lipscomb RN  Medication Review/Management: medications reviewed  Taken 10/1/2024 0300 by Mackenzie Lipscomb RN  Medication Review/Management: medications reviewed  Taken 10/1/2024 0200 by Mackenzie Lipscomb RN  Medication Review/Management: medications reviewed  Taken 10/1/2024 0100 by Mackenzie Lipscomb RN  Medication Review/Management: medications reviewed  Taken 10/1/2024 0000 by Mackenzie Lipscomb RN  Medication Review/Management: medications reviewed  Taken 9/30/2024 2300 by Mackenzie Lipscomb RN  Medication Review/Management: medications reviewed  Taken 9/30/2024 2200 by Mackenzie Lipscomb RN  Medication Review/Management: medications reviewed  Taken 9/30/2024 2100 by Mackenzie Lipscomb RN  Medication Review/Management: medications reviewed  Taken 9/30/2024 2000 by Mackenzie Lipscomb RN  Medication Review/Management: medications reviewed  Taken 9/30/2024 1900 by Mackenzie Lipscomb RN  Medication Review/Management: medications reviewed  Intervention: Promote Injury-Free Environment  Recent Flowsheet Documentation  Taken 10/1/2024 0500 by Mackenzie Lipscomb RN  Safety Promotion/Fall Prevention:   safety round/check completed   fall prevention program maintained  Taken 10/1/2024 0400 by Mackenzie Lipscomb RN  Safety Promotion/Fall Prevention:   safety round/check completed   fall prevention program maintained  Taken 10/1/2024 0300 by Mackenzie Lipscomb RN  Safety Promotion/Fall Prevention:   safety round/check completed   fall prevention program maintained  Taken 10/1/2024 0200 by Mackenzie Lipscomb RN  Safety Promotion/Fall Prevention:   safety round/check completed   fall prevention program maintained  Taken 10/1/2024 0100 by Mackenzie Lipscomb RN  Safety Promotion/Fall Prevention:   safety round/check completed   fall prevention program maintained  Taken 10/1/2024 0000 by Mackenzie Lipscomb RN  Safety  Promotion/Fall Prevention:   safety round/check completed   fall prevention program maintained  Taken 9/30/2024 2300 by Mackenzie Lipscomb RN  Safety Promotion/Fall Prevention:   safety round/check completed   fall prevention program maintained  Taken 9/30/2024 2200 by Mackenzie Lipscomb RN  Safety Promotion/Fall Prevention:   safety round/check completed   fall prevention program maintained  Taken 9/30/2024 2100 by Mackenzie Lipscomb RN  Safety Promotion/Fall Prevention:   safety round/check completed   fall prevention program maintained  Taken 9/30/2024 2000 by Mackenzie Lipscomb RN  Safety Promotion/Fall Prevention:   safety round/check completed   fall prevention program maintained  Taken 9/30/2024 1900 by Mackenzie Lipscomb RN  Safety Promotion/Fall Prevention:   safety round/check completed   fall prevention program maintained   Goal Outcome Evaluation:           Progress: no change  Outcome Evaluation: Pt A&Ox4. VSS. Afebrile. Slept most of night. Restless at times. 3L NC. BMx1. F/c taken out. Turned q2h. Safety maintained.

## 2024-10-01 NOTE — PAYOR COMM NOTE
"10/1 CLINICAL    Farhana Norton (64 y.o. Male)       Date of Birth   1960    Social Security Number       Address   [address] [place] [id number]    Home Phone   [phone]    MRN   [id number]       Judaism   Other    Marital Status                               Admission Date   9/28/24    Admission Type   Urgent    Admitting Provider   Quentin Morin DO    Attending Provider   Quentin Morin DO    Department, Room/Bed   [facility] 4B, 440/1       Discharge Date       Discharge Disposition       Discharge Destination                                 Attending Provider: Quentin Morin DO    Allergies: Penicillins    Isolation: None   Infection: None   Code Status: CPR    Ht: 175.3 cm (69\")   Wt: 119 kg (263 lb 1.6 oz)    Admission Cmt: None   Principal Problem: Acute exacerbation of CHF (congestive heart failure) [I50.9]                   Active Insurance as of 9/28/2024       Primary Coverage       Payor Plan Insurance Group Employer/Plan Group    ANTHEM MEDICARE REPLACEMENT ANTH MEDICARE ADVANTAGE [id number]       Payor Plan Address Payor Plan Phone Number Payor Plan Fax Number Effective Dates    [address] [phone]  2/1/2024 - None Entered    [facility] [id number]         Subscriber Name Subscriber Birth Date Member ID       FARHANA NORTON 1960 [id number]                     Emergency Contacts        (Rel.) Home Phone Work Phone Mobile Phone    alycia norton (Son) [phone] -- --    jose deng (Sister) [phone] -- --              Current Facility-Administered Medications   Medication Dose Route Frequency Provider Last Rate Last Admin    aspirin EC tablet 81 mg  81 mg Oral Daily Quentin Morin DO   81 mg at 10/01/24 0919    sennosides-docusate (PERICOLACE) 8.6-50 MG per tablet 2 tablet  2 tablet Oral BID Quentin Morin DO   2 tablet at 10/01/24 0919    And    " polyethylene glycol (MIRALAX) packet 17 g  17 g Oral Daily PRN Quentin Morin DO        And    bisacodyl (DULCOLAX) EC tablet 5 mg  5 mg Oral Daily PRN Quentin Morin DO        And    bisacodyl (DULCOLAX) suppository 10 mg  10 mg Rectal Daily PRN Quentin Morin DO        Calcium Replacement - Follow Nurse / BPA Driven Protocol   Does not apply PRN Quentin Morin DO        Chlorhexidine Gluconate Cloth 2 % pads 1 Application  1 Application Topical Q24H Quentin Morin DO   1 Application at 10/01/24 0504    dextrose (D50W) (25 g/50 mL) IV injection 25 g  25 g Intravenous Q15 Min PRN Quentin Morin DO   25 g at 10/01/24 1146    dextrose (GLUTOSE) oral gel 15 g  15 g Oral Q15 Min PRN Quentin Morin DO   15 g at 10/01/24 1119    DULoxetine (CYMBALTA) DR capsule 30 mg  30 mg Oral Daily Quentin Morin DO   30 mg at 10/01/24 0919    Enoxaparin Sodium (LOVENOX) syringe 40 mg  40 mg Subcutaneous Daily Quentin Morin DO   40 mg at 10/01/24 0919    gabapentin (NEURONTIN) capsule 600 mg  600 mg Oral Daily Quentin Morin DO   600 mg at 10/01/24 0919    glucagon (GLUCAGEN) injection 1 mg  1 mg Intramuscular Q15 Min PRN Quentin Morin DO        insulin glargine (LANTUS, SEMGLEE) injection 10 Units  10 Units Subcutaneous Once Layo Urrutia APRN        insulin glargine (LANTUS, SEMGLEE) injection 30 Units  30 Units Subcutaneous Nightly Layo Urrutia APRN        Insulin Lispro (humaLOG) injection 2-9 Units  2-9 Units Subcutaneous 4x Daily AC & at Bedtime Quentin Morin DO   8 Units at 10/01/24 0918    ipratropium-albuterol (DUO-NEB) nebulizer solution 3 mL  3 mL Nebulization Q4H PRN Quentin Morin DO        Magnesium Standard Dose Replacement - Follow Nurse / BPA Driven Protocol   Does not apply PRN Quentin Morin DO        melatonin tablet 10 mg  10 mg Oral  Nightly PRN Darrell Valdez APRN   10 mg at 09/30/24 2004    mupirocin (BACTROBAN) 2 % nasal ointment 1 Application  1 Application Each Nare BID Quentin Morin DO   1 Application at 10/01/24 0919    Phosphorus Replacement - Follow Nurse / BPA Driven Protocol   Does not apply PRN Quentin Morin DO        Potassium Replacement - Follow Nurse / BPA Driven Protocol   Does not apply PRN Quentin Morin DO        pravastatin (PRAVACHOL) tablet 40 mg  40 mg Oral Nightly Quentin Morin DO   40 mg at 09/30/24 2003    sacubitril-valsartan (ENTRESTO) 24-26 MG tablet 1 tablet  1 tablet Oral BID Quentin Morin DO   1 tablet at 10/01/24 0919    sodium chloride 0.9 % flush 10 mL  10 mL Intravenous Q12H Quentin Morin DO   10 mL at 10/01/24 0920    sodium chloride 0.9 % flush 10 mL  10 mL Intravenous PRN Quentin Morin DO        sodium chloride 0.9 % infusion 40 mL  40 mL Intravenous PRN Quentin Morin DO         Orders (last 24 hrs)        Start     Ordered    10/01/24 2100  insulin glargine (LANTUS, SEMGLEE) injection 30 Units  Nightly         10/01/24 1001    10/01/24 1807  Inpatient Wound Care MD Consult  Once        Specialty:  Wound Care  Provider:  (Not yet assigned)    10/01/24 1808    10/01/24 1646  POC Glucose Once  PROCEDURE ONCE        Comments: Complete no more than 45 minutes prior to patient eating      10/01/24 1624    10/01/24 1219  POC Glucose Once  PROCEDURE ONCE        Comments: Complete no more than 45 minutes prior to patient eating      10/01/24 1201    10/01/24 1151  POC Glucose Once  PROCEDURE ONCE        Comments: Complete no more than 45 minutes prior to patient eating      10/01/24 1139    10/01/24 1136  POC Glucose Once  PROCEDURE ONCE        Comments: Complete no more than 45 minutes prior to patient eating      10/01/24 1116    10/01/24 1100  insulin glargine (LANTUS, SEMGLEE) injection 10 Units  Once       "   10/01/24 1002    10/01/24 0729  POC Glucose Once  PROCEDURE ONCE        Comments: Complete no more than 45 minutes prior to patient eating      10/01/24 0715    10/01/24 0600  CBC Auto Differential  PROCEDURE ONCE,   Status:  Canceled         09/30/24 2201    10/01/24 0300  CBC & Differential  Daily       09/30/24 1725    10/01/24 0300  Basic Metabolic Panel  Daily       09/30/24 1725    10/01/24 0300  CBC Auto Differential  PROCEDURE ONCE         09/30/24 1901    10/01/24 0000  Discharge Follow-up with Specialty: Cardiology; 1 Week         10/01/24 1539    09/30/24 1952  POC Glucose Once  PROCEDURE ONCE        Comments: Complete no more than 45 minutes prior to patient eating      09/30/24 1939    09/29/24 2328  melatonin tablet 10 mg  Nightly PRN         09/29/24 2328 09/29/24 0400  Chlorhexidine Gluconate Cloth 2 % pads 1 Application  Every 24 Hours         09/28/24 1635    09/28/24 2100  sodium chloride 0.9 % flush 10 mL  Every 12 Hours Scheduled         09/28/24 1635 09/28/24 2100  sennosides-docusate (PERICOLACE) 8.6-50 MG per tablet 2 tablet  2 Times Daily        Placed in \"And\" Linked Group    09/28/24 1635 09/28/24 2100  insulin glargine (LANTUS, SEMGLEE) injection 20 Units  Nightly,   Status:  Discontinued         09/28/24 1638    09/28/24 2100  pravastatin (PRAVACHOL) tablet 40 mg  Nightly         09/28/24 1638    09/28/24 2100  sacubitril-valsartan (ENTRESTO) 24-26 MG tablet 1 tablet  2 Times Daily         09/28/24 1638 09/28/24 1852  ipratropium-albuterol (DUO-NEB) nebulizer solution 3 mL  Every 4 Hours PRN         09/28/24 1854 09/28/24 1730  mupirocin (BACTROBAN) 2 % nasal ointment 1 Application  2 Times Daily         09/28/24 1635    09/28/24 1730  Enoxaparin Sodium (LOVENOX) syringe 40 mg  Daily         09/28/24 1635    09/28/24 1730  aspirin EC tablet 81 mg  Daily         09/28/24 1638    09/28/24 1730  DULoxetine (CYMBALTA) DR capsule 30 mg  Daily         09/28/24 1638    " "09/28/24 1730  gabapentin (NEURONTIN) capsule 600 mg  Daily         09/28/24 1638    09/28/24 1730  Insulin Lispro (humaLOG) injection 2-9 Units  4 Times Daily Before Meals & Nightly         09/28/24 1639    09/28/24 1700  Vital Signs Every Hour and Per Hospital Policy Based on Patient Condition  Every Hour       09/28/24 1635    09/28/24 1700  Intake & Output  Every Hour       09/28/24 1635    09/28/24 1700  POC Glucose 4x Daily Before Meals & at Bedtime  4 Times Daily Before Meals & at Bedtime      Comments: Complete no more than 45 minutes prior to patient eating      09/28/24 1639    09/28/24 1639  dextrose (GLUTOSE) oral gel 15 g  Every 15 Minutes PRN         09/28/24 1639    09/28/24 1639  dextrose (D50W) (25 g/50 mL) IV injection 25 g  Every 15 Minutes PRN         09/28/24 1639    09/28/24 1639  glucagon (GLUCAGEN) injection 1 mg  Every 15 Minutes PRN         09/28/24 1639    09/28/24 1637  Daily Weights  Daily       09/28/24 1635    09/28/24 1635  Potassium Replacement - Follow Nurse / BPA Driven Protocol  As Needed         09/28/24 1635    09/28/24 1635  Magnesium Standard Dose Replacement - Follow Nurse / BPA Driven Protocol  As Needed         09/28/24 1635    09/28/24 1635  Phosphorus Replacement - Follow Nurse / BPA Driven Protocol  As Needed         09/28/24 1635    09/28/24 1635  Calcium Replacement - Follow Nurse / BPA Driven Protocol  As Needed         09/28/24 1635    09/28/24 1635  Oral Care - Patient Not on NPPV & Not Intubated  Every Shift       09/28/24 1635    09/28/24 1634  sodium chloride 0.9 % flush 10 mL  As Needed         09/28/24 1635    09/28/24 1634  sodium chloride 0.9 % infusion 40 mL  As Needed         09/28/24 1635    09/28/24 1634  polyethylene glycol (MIRALAX) packet 17 g  Daily PRN        Placed in \"And\" Linked Group    09/28/24 1635    09/28/24 1634  bisacodyl (DULCOLAX) EC tablet 5 mg  Daily PRN        Placed in \"And\" Linked Group    09/28/24 1635    09/28/24 1634  bisacodyl " "(DULCOLAX) suppository 10 mg  Daily PRN        Placed in \"And\" Linked Group    09/28/24 1635    Unscheduled  Follow Hypoglycemia Standing Orders For Blood Glucose <70 & Notify Provider of Treatment  As Needed      Comments: Follow Hypoglycemia Orders As Outlined in Process Instructions (Open Order Report to View Full Instructions)  Notify Provider Any Time Hypoglycemia Treatment is Administered    09/28/24 1639    Unscheduled  Wound Care  As Needed       09/30/24 0839    --  insulin detemir (LEVEMIR) 100 UNIT/ML injection  2 Times Daily         09/30/24 1040    --  metFORMIN (GLUCOPHAGE) 1000 MG tablet  2 Times Daily With Meals         09/30/24 1040                     Physician Progress Notes (last 48 hours)        Mohit Barnes MD at 10/01/24 0757              [facility] Intensivist Services  INPATIENT PROGRESS NOTE    Patient Name: Tucker Knox  Date of Admission: 9/28/2024  Today's Date: 10/01/24  Length of Stay: 3  Primary Care Physician: Kt Alfredo MD    Subjective   Chief Complaint: Breath  HPI   Mr. Knox is a pleasant 64-year-old male who presented to an outside ED due to shortness of breath.  Past medical history includes combined systolic and diastolic CHF, CAD status post stents, hypertension, hyperlipidemia, COPD, chronic hypoxic respiratory failure on 3 L, tobacco use, diabetes.     History is provided by the patient.  He was recently discharged from [facility] after a stay for CHF exacerbation.  He has had 3 admissions this year, 2 at [facility] and 1 at [facility].  He was discharged from [facility] on 9/18/2024.  During that admission he was diuresed with IV Lasix.  He had improvement of his symptoms and he was discharged.  Patient states initially he was doing well up until 2 days ago.  Over the past 2 days he has noted increased dyspnea worse with exertion.  He also has a dry cough which is chronic.  Also notes chills.  Denies any fever, " chest pain, nausea, vomiting.  No sick contacts.     Due to his shortness of breath he presented to an outside ED.  At the outside ED his chest x-ray was consistent with volume overload.  He also had an elevated troponin and BNP.  Lactate of 2 and white count of 12.  He was given IV insulin and IV Lasix and transferred to [facility] ICU.     On exam the patient is resting comfortably in bed.  He is breathing comfortably on 3 L satting in the upper 90s.  States he would like to have something to eat and drink when able.  States that he does have some dyspnea which is worse with exertion.    Interval history:  9/29/2024 patient was admitted overnight with increasing shortness of breath and chest x-ray is showing some pulmonary edema.  Patient denies any shortness of breath or chest pain.  Patient was started on Lasix twice daily but has not had any good urine.  I did order stat BMP today and did show worsening of his renal function and patient does look dry.  Lasix was stopped today and I am starting him on normal saline with gentle hydration due to his low ejection fraction.  Patient is at his baseline oxygen.    9/30/2024 patient is somewhat more awake today he has no complaints other than he was not able to sleep last night.  Patient diuretics were held yesterday and was started on IV fluids and his renal function and potassium has improved today with improvement in his mental status also.      10/1/2024 patient wakes up to verbal stimuli he answer question he denies any shortness of breath or chest pain. diuretics continue to be on hold with improvement in his renal function.  Off IV fluids.  Patient has been waiting for floor bed.      Review of Systems   All pertinent negatives and positives are as above. All other systems have been reviewed and are negative unless otherwise stated.     Objective    Temp:  [98 °F (36.7 °C)-99 °F (37.2 °C)] 98.3 °F (36.8 °C)  Heart Rate:  [] 96  Resp:  [19-20] 20  BP:  ()/(57-94) 123/69  Physical Exam    General: Well appearing, in no respiratory distress  Head: Normocephalic, atraumatic  Eyes: Conjunctiva clear, sclera non-icteric  Teeth/Gums: Poor dentition dry mucous membranes   Neck: Supple without stridor  Heart: Regular rate and rhythm, no murmur  Lungs: Diminished bilaterally  no wheezing no crackles  Abdomen: Soft, nontender  MSK/extremities:  left lower extremity edema much worse than right with bilateral cyanosis of the feet with palpable pulses.  Neurologic: AOx3, grossly no focal deficits       Results Review:    WBC   Date Value Ref Range Status   10/01/2024 10.45 3.40 - 10.80 10*3/mm3 Final     RBC   Date Value Ref Range Status   10/01/2024 4.13 (L) 4.14 - 5.80 10*6/mm3 Final     Hemoglobin   Date Value Ref Range Status   10/01/2024 11.2 (L) 13.0 - 17.7 g/dL Final     Hematocrit   Date Value Ref Range Status   10/01/2024 39.7 37.5 - 51.0 % Final     MCV   Date Value Ref Range Status   10/01/2024 96.1 79.0 - 97.0 fL Final     MCH   Date Value Ref Range Status   10/01/2024 27.1 26.6 - 33.0 pg Final     MCHC   Date Value Ref Range Status   10/01/2024 28.2 (L) 31.5 - 35.7 g/dL Final     RDW   Date Value Ref Range Status   10/01/2024 15.2 12.3 - 15.4 % Final     RDW-SD   Date Value Ref Range Status   10/01/2024 52.6 37.0 - 54.0 fl Final     MPV   Date Value Ref Range Status   10/01/2024 10.0 6.0 - 12.0 fL Final     Platelets   Date Value Ref Range Status   10/01/2024 185 140 - 450 10*3/mm3 Final     Neutrophil %   Date Value Ref Range Status   10/01/2024 85.1 (H) 42.7 - 76.0 % Final     Lymphocyte %   Date Value Ref Range Status   10/01/2024 7.3 (L) 19.6 - 45.3 % Final     Monocyte %   Date Value Ref Range Status   10/01/2024 5.9 5.0 - 12.0 % Final     Eosinophil %   Date Value Ref Range Status   10/01/2024 0.7 0.3 - 6.2 % Final     Basophil %   Date Value Ref Range Status   10/01/2024 0.4 0.0 - 1.5 % Final     Immature Grans %   Date Value Ref Range Status  "  10/01/2024 0.6 (H) 0.0 - 0.5 % Final     Neutrophils, Absolute   Date Value Ref Range Status   10/01/2024 8.90 (H) 1.70 - 7.00 10*3/mm3 Final     Lymphocytes, Absolute   Date Value Ref Range Status   10/01/2024 0.76 0.70 - 3.10 10*3/mm3 Final     Monocytes, Absolute   Date Value Ref Range Status   10/01/2024 0.62 0.10 - 0.90 10*3/mm3 Final     Eosinophils, Absolute   Date Value Ref Range Status   10/01/2024 0.07 0.00 - 0.40 10*3/mm3 Final     Basophils, Absolute   Date Value Ref Range Status   10/01/2024 0.04 0.00 - 0.20 10*3/mm3 Final     Immature Grans, Absolute   Date Value Ref Range Status   10/01/2024 0.06 (H) 0.00 - 0.05 10*3/mm3 Final     nRBC   Date Value Ref Range Status   10/01/2024 0.0 0.0 - 0.2 /100 WBC Final       Basic Metabolic Panel    Sodium Sodium   Date Value Ref Range Status   10/01/2024 141 136 - 145 mmol/L Final   09/30/2024 139 136 - 145 mmol/L Final   09/29/2024 137 136 - 145 mmol/L Final   09/29/2024 138 136 - 145 mmol/L Final   09/28/2024 138 136 - 145 mmol/L Final      Potassium Potassium   Date Value Ref Range Status   10/01/2024 4.7 3.5 - 5.2 mmol/L Final   09/30/2024 5.0 3.5 - 5.2 mmol/L Final   09/29/2024 5.2 3.5 - 5.2 mmol/L Final   09/29/2024 5.3 (H) 3.5 - 5.2 mmol/L Final     Comment:     Slight hemolysis detected by analyzer. Result may be falsely elevated.   09/28/2024 4.7 3.5 - 5.2 mmol/L Final      Chloride Chloride   Date Value Ref Range Status   10/01/2024 101 98 - 107 mmol/L Final   09/30/2024 100 98 - 107 mmol/L Final   09/29/2024 98 98 - 107 mmol/L Final   09/29/2024 97 (L) 98 - 107 mmol/L Final   09/28/2024 98 98 - 107 mmol/L Final      Bicarbonate No results found for: \"PLASMABICARB\"   BUN BUN   Date Value Ref Range Status   10/01/2024 37 (H) 8 - 23 mg/dL Final   09/30/2024 52 (H) 8 - 23 mg/dL Final   09/29/2024 54 (H) 8 - 23 mg/dL Final   09/29/2024 55 (H) 8 - 23 mg/dL Final   09/28/2024 50 (H) 8 - 23 mg/dL Final      Creatinine Creatinine   Date Value Ref Range Status " "  10/01/2024 1.08 0.76 - 1.27 mg/dL Final   09/30/2024 1.60 (H) 0.76 - 1.27 mg/dL Final   09/29/2024 1.77 (H) 0.76 - 1.27 mg/dL Final   09/29/2024 1.75 (H) 0.76 - 1.27 mg/dL Final   09/28/2024 1.65 (H) 0.76 - 1.27 mg/dL Final      Calcium Calcium   Date Value Ref Range Status   10/01/2024 8.1 (L) 8.6 - 10.5 mg/dL Final   09/30/2024 8.3 (L) 8.6 - 10.5 mg/dL Final   09/29/2024 8.4 (L) 8.6 - 10.5 mg/dL Final   09/29/2024 8.5 (L) 8.6 - 10.5 mg/dL Final   09/28/2024 8.8 8.6 - 10.5 mg/dL Final      Glucose      No components found for: \"GLUCOSE.*\"         No radiology results for the last day    Result Review:  I have personally reviewed the results from the time of this admission to 10/1/2024 07:57 CDT and agree with these findings:  [x]  Laboratory list / accordion  []  Microbiology  [x]  Radiology  []  EKG/Telemetry   []  Cardiology/Vascular   []  Pathology  []  Old records  []  Other:  Most notable findings include:       Culture Data:   No results found for: \"BLOODCX\", \"URINECX\", \"WOUNDCX\", \"MRSACX\", \"RESPCX\", \"STOOLCX\"    I have reviewed the patient's current medications.     Assessment/Plan   Assessment  Active Hospital Problems    Diagnosis     **Acute exacerbation of CHF (congestive heart failure)      -Acute on chronic hypoxemic respiratory failure  -Acute pulm edema  -Acute on chronic kidney disease possibly related to dehydration and diuresis  -Hyperkalemia  -COPD with acute exacerbation   - diabetes mellitus  -Acute on chronic systolic congestive heart failure with ejection fraction of 30-35%      Plan:  -Renal function is improving with holding diuretics and gentle hydration we will - - continue holding Lasix today  - IV fluid was stopped-   -Encourage oral intake  -Avoid any nephrotoxic medications  -Strict input output chart  -Patient is on 3 L of oxygen which is his baseline keep pulse ox above 88%  -Patient has left lower limb swelling I ordered a venous Doppler ultrasound to rule out DVT -report is " pending   -Sliding scale insulin  -Case management to help with his home situation possible need for placement  -Stable to be transferred to the floor       Discussed with: Patient and bedside nurse     Disposition  Patient can be transferred to the floor from critical care standpoint    Electronically signed by Mohit Barnes MD on 10/1/2024 at 07:57 CDT      Addendum      Discussed with Dr. Joiner from the hospitalist service and he accepted the patient to be transferred under their care    Electronically signed by Mohit Barnes MD at 10/01/24 1509       Mohit Barnes MD at 09/30/24 0739              [facility] Intensivist Services  INPATIENT PROGRESS NOTE    Patient Name: Tucker Knox  Date of Admission: 9/28/2024  Today's Date: 09/30/24  Length of Stay: 2  Primary Care Physician: Kt Alfredo MD    Subjective   Chief Complaint: Breath  HPI   Mr. Knox is a pleasant 64-year-old male who presented to an outside ED due to shortness of breath.  Past medical history includes combined systolic and diastolic CHF, CAD status post stents, hypertension, hyperlipidemia, COPD, chronic hypoxic respiratory failure on 3 L, tobacco use, diabetes.     History is provided by the patient.  He was recently discharged from [facility] after a stay for CHF exacerbation.  He has had 3 admissions this year, 2 at [facility] and 1 at [facility].  He was discharged from [facility] on 9/18/2024.  During that admission he was diuresed with IV Lasix.  He had improvement of his symptoms and he was discharged.  Patient states initially he was doing well up until 2 days ago.  Over the past 2 days he has noted increased dyspnea worse with exertion.  He also has a dry cough which is chronic.  Also notes chills.  Denies any fever, chest pain, nausea, vomiting.  No sick contacts.     Due to his shortness of breath he presented to an outside ED.  At the outside ED his chest x-ray was  consistent with volume overload.  He also had an elevated troponin and BNP.  Lactate of 2 and white count of 12.  He was given IV insulin and IV Lasix and transferred to [facility] ICU.     On exam the patient is resting comfortably in bed.  He is breathing comfortably on 3 L satting in the upper 90s.  States he would like to have something to eat and drink when able.  States that he does have some dyspnea which is worse with exertion.    Interval history:  9/29/2024 patient was admitted overnight with increasing shortness of breath and chest x-ray is showing some pulmonary edema.  Patient denies any shortness of breath or chest pain.  Patient was started on Lasix twice daily but has not had any good urine.  I did order stat BMP today and did show worsening of his renal function and patient does look dry.  Lasix was stopped today and I am starting him on normal saline with gentle hydration due to his low ejection fraction.  Patient is at his baseline oxygen.    9/30/2024 patient is somewhat more awake today he has no complaints other than he was not able to sleep last night.  Patient diuretics were held yesterday and was started on IV fluids and his renal function and potassium has improved today with improvement in his mental status also.        Review of Systems   All pertinent negatives and positives are as above. All other systems have been reviewed and are negative unless otherwise stated.     Objective    Temp:  [98 °F (36.7 °C)-99 °F (37.2 °C)] 98.3 °F (36.8 °C)  Heart Rate:  [80-95] 86  Resp:  [18-26] 18  BP: ()/(54-73) 104/68  Physical Exam    General: Well appearing, in no respiratory distress  Head: Normocephalic, atraumatic  Eyes: Conjunctiva clear, sclera non-icteric  Teeth/Gums: Poor dentition dry mucous membranes   Neck: Supple without stridor  Heart: Regular rate and rhythm, no murmur  Lungs: Diminished bilaterally  no wheezing no crackles  Abdomen: Soft, nontender  MSK/extremities:  left lower  extremity edema much worse than right with bilateral cyanosis of the feet with palpable pulses.  Neurologic: AOx3, grossly no focal deficits       Results Review:    WBC   Date Value Ref Range Status   09/30/2024 9.35 3.40 - 10.80 10*3/mm3 Final     RBC   Date Value Ref Range Status   09/30/2024 3.86 (L) 4.14 - 5.80 10*6/mm3 Final     Hemoglobin   Date Value Ref Range Status   09/30/2024 10.3 (L) 13.0 - 17.7 g/dL Final     Hematocrit   Date Value Ref Range Status   09/30/2024 37.0 (L) 37.5 - 51.0 % Final     MCV   Date Value Ref Range Status   09/30/2024 95.9 79.0 - 97.0 fL Final     MCH   Date Value Ref Range Status   09/30/2024 26.7 26.6 - 33.0 pg Final     MCHC   Date Value Ref Range Status   09/30/2024 27.8 (L) 31.5 - 35.7 g/dL Final     RDW   Date Value Ref Range Status   09/30/2024 15.1 12.3 - 15.4 % Final     RDW-SD   Date Value Ref Range Status   09/30/2024 52.1 37.0 - 54.0 fl Final     MPV   Date Value Ref Range Status   09/30/2024 10.2 6.0 - 12.0 fL Final     Platelets   Date Value Ref Range Status   09/30/2024 181 140 - 450 10*3/mm3 Final     Neutrophil %   Date Value Ref Range Status   09/30/2024 85.3 (H) 42.7 - 76.0 % Final     Lymphocyte %   Date Value Ref Range Status   09/30/2024 6.1 (L) 19.6 - 45.3 % Final     Monocyte %   Date Value Ref Range Status   09/30/2024 7.0 5.0 - 12.0 % Final     Eosinophil %   Date Value Ref Range Status   09/30/2024 0.7 0.3 - 6.2 % Final     Basophil %   Date Value Ref Range Status   09/30/2024 0.5 0.0 - 1.5 % Final     Immature Grans %   Date Value Ref Range Status   09/30/2024 0.4 0.0 - 0.5 % Final     Neutrophils, Absolute   Date Value Ref Range Status   09/30/2024 7.97 (H) 1.70 - 7.00 10*3/mm3 Final     Lymphocytes, Absolute   Date Value Ref Range Status   09/30/2024 0.57 (L) 0.70 - 3.10 10*3/mm3 Final     Monocytes, Absolute   Date Value Ref Range Status   09/30/2024 0.65 0.10 - 0.90 10*3/mm3 Final     Eosinophils, Absolute   Date Value Ref Range Status   09/30/2024  "0.07 0.00 - 0.40 10*3/mm3 Final     Basophils, Absolute   Date Value Ref Range Status   09/30/2024 0.05 0.00 - 0.20 10*3/mm3 Final     Immature Grans, Absolute   Date Value Ref Range Status   09/30/2024 0.04 0.00 - 0.05 10*3/mm3 Final     nRBC   Date Value Ref Range Status   09/30/2024 0.0 0.0 - 0.2 /100 WBC Final       Basic Metabolic Panel    Sodium Sodium   Date Value Ref Range Status   09/30/2024 139 136 - 145 mmol/L Final   09/29/2024 137 136 - 145 mmol/L Final   09/29/2024 138 136 - 145 mmol/L Final   09/28/2024 138 136 - 145 mmol/L Final      Potassium Potassium   Date Value Ref Range Status   09/30/2024 5.0 3.5 - 5.2 mmol/L Final   09/29/2024 5.2 3.5 - 5.2 mmol/L Final   09/29/2024 5.3 (H) 3.5 - 5.2 mmol/L Final     Comment:     Slight hemolysis detected by analyzer. Result may be falsely elevated.   09/28/2024 4.7 3.5 - 5.2 mmol/L Final      Chloride Chloride   Date Value Ref Range Status   09/30/2024 100 98 - 107 mmol/L Final   09/29/2024 98 98 - 107 mmol/L Final   09/29/2024 97 (L) 98 - 107 mmol/L Final   09/28/2024 98 98 - 107 mmol/L Final      Bicarbonate No results found for: \"PLASMABICARB\"   BUN BUN   Date Value Ref Range Status   09/30/2024 52 (H) 8 - 23 mg/dL Final   09/29/2024 54 (H) 8 - 23 mg/dL Final   09/29/2024 55 (H) 8 - 23 mg/dL Final   09/28/2024 50 (H) 8 - 23 mg/dL Final      Creatinine Creatinine   Date Value Ref Range Status   09/30/2024 1.60 (H) 0.76 - 1.27 mg/dL Final   09/29/2024 1.77 (H) 0.76 - 1.27 mg/dL Final   09/29/2024 1.75 (H) 0.76 - 1.27 mg/dL Final   09/28/2024 1.65 (H) 0.76 - 1.27 mg/dL Final      Calcium Calcium   Date Value Ref Range Status   09/30/2024 8.3 (L) 8.6 - 10.5 mg/dL Final   09/29/2024 8.4 (L) 8.6 - 10.5 mg/dL Final   09/29/2024 8.5 (L) 8.6 - 10.5 mg/dL Final   09/28/2024 8.8 8.6 - 10.5 mg/dL Final      Glucose      No components found for: \"GLUCOSE.*\"         XR Chest 1 View    Result Date: 9/28/2024  1.. Pulmonary venous hypertension with mild interstitial " "edema and right-sided effusion.  This report was signed and finalized on 9/28/2024 5:05 PM by Dr. Antonio Prather MD.       Result Review:  I have personally reviewed the results from the time of this admission to 9/30/2024 07:39 CDT and agree with these findings:  [x]  Laboratory list / accordion  []  Microbiology  [x]  Radiology  []  EKG/Telemetry   []  Cardiology/Vascular   []  Pathology  []  Old records  []  Other:  Most notable findings include:       Culture Data:   No results found for: \"BLOODCX\", \"URINECX\", \"WOUNDCX\", \"MRSACX\", \"RESPCX\", \"STOOLCX\"    I have reviewed the patient's current medications.     Assessment/Plan   Assessment  Active Hospital Problems    Diagnosis     **Acute exacerbation of CHF (congestive heart failure)      -Acute on chronic hypoxemic respiratory failure  -Acute pulm edema  -Acute on chronic kidney disease possibly related to dehydration and diuresis  -Hyperkalemia  -COPD with acute exacerbation   - diabetes mellitus  -Acute on chronic systolic congestive heart failure with ejection fraction of 30-35%      Plan:  -Renal function is improving with holding diuretics and starting gentle hydration we will continue IV fluids and holding Lasix today  -Lasix was stopped yesterday we will continue holding  -Continue normal saline at 75 mL/h and encourage oral intake.  This will need to be reassessed especially with his low ejection fraction.  -Avoid any nephrotoxic medications  -Strict input output chart  -Patient is on 3 L of oxygen which is his baseline keep pulse ox above 88%  -Patient has left lower limb swelling I ordered a venous Doppler ultrasound to rule out DVT which is still pending  -Sliding scale insulin       Discussed with: Patient and bedside nurse     Disposition  Patient can be transferred to the floor from critical care standpoint    Electronically signed by Mohit Barnes MD on 9/30/2024 at 07:39 CDT    Electronically signed by Mohit Barnes MD at 09/30/24 0741   "     Consult Notes (last 48 hours)  Notes from 09/29/24 1815 through 10/01/24 1815   No notes of this type exist for this encounter.

## 2024-10-01 NOTE — DISCHARGE PLACEMENT REQUEST
"To: [facility]      From: Michelle CECY [phone]        Farhana Norton (64 y.o. Male)       Date of Birth   1960    Social Security Number       Address   [address] [place] [id number]    Home Phone   [phone]    MRN   [id number]       Scientologist   Other    Marital Status                               Admission Date   9/28/24    Admission Type   Urgent    Admitting Provider   Quentin Morin DO    Attending Provider   Mohit Barnes MD    Department, Room/Bed   [facility] INTENSIVE CARE, I004/1       Discharge Date       Discharge Disposition       Discharge Destination                                 Attending Provider: Mohit Barnes MD    Allergies: Penicillins    Isolation: None   Infection: None   Code Status: CPR    Ht: 175.3 cm (69\")   Wt: 119 kg (263 lb 1.6 oz)    Admission Cmt: None   Principal Problem: Acute exacerbation of CHF (congestive heart failure) [I50.9]                   Active Insurance as of 9/28/2024       Primary Coverage       Payor Plan Insurance Group Employer/Plan Group    ANTHEM MEDICARE REPLACEMENT ANTHEM MEDICARE ADVANTAGE [id number]       Payor Plan Address Payor Plan Phone Number Payor Plan Fax Number Effective Dates    [address] [phone]  2/1/2024 - None Entered    [facility] [address]         Subscriber Name Subscriber Birth Date Member ID       FARHANA NORTON 1960 [id number]                     Emergency Contacts        (Rel.) Home Phone Work Phone Mobile Phone    alycia norton (Son) [phone] -- --    jose deng (Sister) [phone] -- --                 History & Physical        Quentin Morin DO at 09/28/24 1844              [facility] Intensivist Services  History and Physical Note    Patient Name: Farhana Norton  Date of Admission: 9/28/2024  Today's Date: 09/28/24  Length of Stay: 0  Primary Care Physician: Kt Alfredo " MD Florin    Subjective     Chief Complaint: Shortness of breath    HPI:  Mr. Knox is a pleasant 64-year-old male who presented to an outside ED due to shortness of breath.  Past medical history includes combined systolic and diastolic CHF, CAD status post stents, hypertension, hyperlipidemia, COPD, chronic hypoxic respiratory failure on 3 L, tobacco use, diabetes.    History is provided by the patient.  He was recently discharged from [facility] after a stay for CHF exacerbation.  He has had 3 admissions this year, 2 at [facility] and 1 at [facility].  He was discharged from [facility] on 9/18/2024.  During that admission he was diuresed with IV Lasix.  He had improvement of his symptoms and he was discharged.  Patient states initially he was doing well up until 2 days ago.  Over the past 2 days he has noted increased dyspnea worse with exertion.  He also has a dry cough which is chronic.  Also notes chills.  Denies any fever, chest pain, nausea, vomiting.  No sick contacts.    Due to his shortness of breath he presented to an outside ED.  At the outside ED his chest x-ray was consistent with volume overload.  He also had an elevated troponin and BNP.  Lactate of 2 and white count of 12.  He was given IV insulin and IV Lasix and transferred to [facility] ICU.    On exam the patient is resting comfortably in bed.  He is breathing comfortably on 3 L satting in the upper 90s.  States he would like to have something to eat and drink when able.  States that he does have some dyspnea which is worse with exertion.    Review of Systems:  All pertinent negatives and positives are as above. All other systems have been reviewed and are negative unless otherwise stated.       Objective      Physical Exam:  General: Well appearing, in no respiratory distress  Head: Normocephalic, atraumatic  Eyes: Conjunctiva clear, sclera non-icteric  Teeth/Gums: Poor dentition  Neck: Supple without stridor  Heart: Regular rate and rhythm,  "no murmur  Lungs: Diminished bilaterally with rales in bilateral bases  Abdomen: Soft, nontender  MSK/extremities: Bilateral lower extremity pitting edema up to above the knee  Neurologic: AOx3, grossly no focal deficits      Results Review:      Results from last 7 days   Lab Units 09/28/24  1808   WBC 10*3/mm3 12.53*   HEMOGLOBIN g/dL 11.7*   HEMATOCRIT % 40.2   PLATELETS 10*3/mm3 201     Visit Vitals  /96   Pulse 101   Temp 98.4 °F (36.9 °C) (Oral)   Resp 17   Ht 175.3 cm (69\")   Wt 117 kg (257 lb 0.9 oz)   SpO2 96%   BMI 37.96 kg/m²       Medications:  aspirin, 81 mg, Oral, Daily  [START ON 9/29/2024] Chlorhexidine Gluconate Cloth, 1 Application, Topical, Q24H  DULoxetine, 30 mg, Oral, Daily  empagliflozin, 10 mg, Oral, Daily  enoxaparin, 40 mg, Subcutaneous, Daily  furosemide, 80 mg, Intravenous, Q12H  gabapentin, 600 mg, Oral, Daily  insulin glargine, 20 Units, Subcutaneous, Nightly  insulin lispro, 2-9 Units, Subcutaneous, 4x Daily AC & at Bedtime  mupirocin, 1 Application, Each Nare, BID  pravastatin, 40 mg, Oral, Nightly  sacubitril-valsartan, 1 tablet, Oral, BID  senna-docusate sodium, 2 tablet, Oral, BID  sodium chloride, 10 mL, Intravenous, Q12H             Assessment/Plan     Problems:  Combined systolic and diastolic CHF with acute exacerbation  Chronic hypoxic respiratory failure on home O2  COPD without exacerbation  Hypertension  Hyperlipidemia  Diabetes  Leukocytosis    Assessment:  Patient is a 64-year-old male admitted to [facility] ICU from an outside ED.  Patient is clinically and hemodynamically stable.  Will start treatment for decompensated heart failure.  Suspect recurrent admissions for CHF exacerbation secondary to noncompliance.  Patient admits to missing some of his medications.  Will review and restart home meds as indicated.     Plan:  Combined systolic and diastolic CHF with acute exacerbation  -Start Lasix 80 mg IV twice daily  -Strict I's and O's with daily " weight  -Continuous telemetry monitoring  -Fluid restriction of 2 L/day  -Restart home Entresto    COPD without acute exacerbation, chronic hypoxic respiratory failure  -Supplemental oxygen as needed and wean as tolerated, goal O2 sat of 88-92%  -No inhalers on his home med list, will provide DuoNeb as needed  -Frequent incentive spirometer  -Tobacco cessation    Hypertension, hyperlipidemia  -Review and restart home meds as indicated    Diabetes  -Restart home long-acting  -Sliding scale  -Accu-Cheks ACHS      Critical Care Set:  Feeding: By mouth diet  Analgesia: As needed ordered  Sedation: N/A  Thromboprophylaxis: DVT Lovenox  HOB: 30+  Ulcer prophylaxis: N/A  Glycemic control: Diabetic, insulin as above  SBT: N/A  Bowel movement: Regimen ordered  Catheter removal: Urinary catheter placed by outside ED, will continue tonight with diuresis can likely discontinue tomorrow  Medication de-escalation: N/A      Disposition  Continue critical care management.      Madyson Morin DO  Pulmonary Critical Care Medicine  9/28/2024  18:44 CDT      Electronically signed by Quentin Morin DO at 09/28/24 1855       Current Facility-Administered Medications   Medication Dose Route Frequency Provider Last Rate Last Admin    aspirin EC tablet 81 mg  81 mg Oral Daily Quentin Morin DO   81 mg at 10/01/24 0919    sennosides-docusate (PERICOLACE) 8.6-50 MG per tablet 2 tablet  2 tablet Oral BID Quentin Morin DO   2 tablet at 10/01/24 0919    And    polyethylene glycol (MIRALAX) packet 17 g  17 g Oral Daily PRN Quentin Morin DO        And    bisacodyl (DULCOLAX) EC tablet 5 mg  5 mg Oral Daily PRN Quentin Morin DO        And    bisacodyl (DULCOLAX) suppository 10 mg  10 mg Rectal Daily PRN Quentin Morin DO        Calcium Replacement - Follow Nurse / BPA Driven Protocol   Does not apply PRQuentin Mendoza DO        Chlorhexidine Gluconate Cloth 2  % pads 1 Application  1 Application Topical Q24H Quentin Morin DO   1 Application at 10/01/24 0504    dextrose (D50W) (25 g/50 mL) IV injection 25 g  25 g Intravenous Q15 Min PRN Quentin Morin DO   25 g at 10/01/24 1146    dextrose (GLUTOSE) oral gel 15 g  15 g Oral Q15 Min PRN Quentin Morin DO   15 g at 10/01/24 1119    DULoxetine (CYMBALTA) DR capsule 30 mg  30 mg Oral Daily Quentin Morin DO   30 mg at 10/01/24 0919    Enoxaparin Sodium (LOVENOX) syringe 40 mg  40 mg Subcutaneous Daily Quentin Morin DO   40 mg at 10/01/24 0919    gabapentin (NEURONTIN) capsule 600 mg  600 mg Oral Daily Quentin Morin, DO   600 mg at 10/01/24 0919    glucagon (GLUCAGEN) injection 1 mg  1 mg Intramuscular Q15 Min PRN Quentin Morin DO        insulin glargine (LANTUS, SEMGLEE) injection 10 Units  10 Units Subcutaneous Once Layo Urrutia APRN        insulin glargine (LANTUS, SEMGLEE) injection 30 Units  30 Units Subcutaneous Nightly Layo Urrutia APRN        Insulin Lispro (humaLOG) injection 2-9 Units  2-9 Units Subcutaneous 4x Daily AC & at Bedtime Quentin Morin DO   8 Units at 10/01/24 0918    ipratropium-albuterol (DUO-NEB) nebulizer solution 3 mL  3 mL Nebulization Q4H PRN Quentin Morin DO        Magnesium Standard Dose Replacement - Follow Nurse / BPA Driven Protocol   Does not apply PRN Quentin Morin DO        melatonin tablet 10 mg  10 mg Oral Nightly PRN Darrell Valdez APRN   10 mg at 09/30/24 2004    mupirocin (BACTROBAN) 2 % nasal ointment 1 Application  1 Application Each Nare BID Quentin Morin DO   1 Application at 10/01/24 0919    Phosphorus Replacement - Follow Nurse / BPA Driven Protocol   Does not apply PRN Quentin Morin DO        Potassium Replacement - Follow Nurse / BPA Driven Protocol   Does not apply PRN Quentin Morin DO        pravastatin  (PRAVACHOL) tablet 40 mg  40 mg Oral Nightly Quenitn Morin, DO   40 mg at 09/30/24 2003    sacubitril-valsartan (ENTRESTO) 24-26 MG tablet 1 tablet  1 tablet Oral BID Quentin Morin, DO   1 tablet at 10/01/24 0919    sodium chloride 0.9 % flush 10 mL  10 mL Intravenous Q12H Quentin Morin, DO   10 mL at 10/01/24 0920    sodium chloride 0.9 % flush 10 mL  10 mL Intravenous PRN Quentin Morin, DO        sodium chloride 0.9 % infusion 40 mL  40 mL Intravenous PRN Quentin Morin, DO         Operative/Procedure Notes (all)    No notes of this type exist for this encounter.          Physician Progress Notes (most recent note)        Mohit Barnes MD at 10/01/24 0757              [facility] Intensivist Services  INPATIENT PROGRESS NOTE    Patient Name: Tucker Knox  Date of Admission: 9/28/2024  Today's Date: 10/01/24  Length of Stay: 3  Primary Care Physician: Kt Alfredo MD    Subjective   Chief Complaint: Breath  HPI   Mr. Knox is a pleasant 64-year-old male who presented to an outside ED due to shortness of breath.  Past medical history includes combined systolic and diastolic CHF, CAD status post stents, hypertension, hyperlipidemia, COPD, chronic hypoxic respiratory failure on 3 L, tobacco use, diabetes.     History is provided by the patient.  He was recently discharged from [facility] after a stay for CHF exacerbation.  He has had 3 admissions this year, 2 at [facility] and 1 at [facility].  He was discharged from [facility] on 9/18/2024.  During that admission he was diuresed with IV Lasix.  He had improvement of his symptoms and he was discharged.  Patient states initially he was doing well up until 2 days ago.  Over the past 2 days he has noted increased dyspnea worse with exertion.  He also has a dry cough which is chronic.  Also notes chills.  Denies any fever, chest pain, nausea, vomiting.  No  sick contacts.     Due to his shortness of breath he presented to an outside ED.  At the outside ED his chest x-ray was consistent with volume overload.  He also had an elevated troponin and BNP.  Lactate of 2 and white count of 12.  He was given IV insulin and IV Lasix and transferred to [facility] ICU.     On exam the patient is resting comfortably in bed.  He is breathing comfortably on 3 L satting in the upper 90s.  States he would like to have something to eat and drink when able.  States that he does have some dyspnea which is worse with exertion.    Interval history:  9/29/2024 patient was admitted overnight with increasing shortness of breath and chest x-ray is showing some pulmonary edema.  Patient denies any shortness of breath or chest pain.  Patient was started on Lasix twice daily but has not had any good urine.  I did order stat BMP today and did show worsening of his renal function and patient does look dry.  Lasix was stopped today and I am starting him on normal saline with gentle hydration due to his low ejection fraction.  Patient is at his baseline oxygen.    9/30/2024 patient is somewhat more awake today he has no complaints other than he was not able to sleep last night.  Patient diuretics were held yesterday and was started on IV fluids and his renal function and potassium has improved today with improvement in his mental status also.      10/1/2024 patient wakes up to verbal stimuli he answer question he denies any shortness of breath or chest pain. diuretics continue to be on hold with improvement in his renal function.  Off IV fluids.  Patient has been waiting for floor bed.      Review of Systems   All pertinent negatives and positives are as above. All other systems have been reviewed and are negative unless otherwise stated.     Objective    Temp:  [98 °F (36.7 °C)-99 °F (37.2 °C)] 98.3 °F (36.8 °C)  Heart Rate:  [] 96  Resp:  [19-20] 20  BP: ()/(57-94) 123/69  Physical  Exam    General: Well appearing, in no respiratory distress  Head: Normocephalic, atraumatic  Eyes: Conjunctiva clear, sclera non-icteric  Teeth/Gums: Poor dentition dry mucous membranes   Neck: Supple without stridor  Heart: Regular rate and rhythm, no murmur  Lungs: Diminished bilaterally  no wheezing no crackles  Abdomen: Soft, nontender  MSK/extremities:  left lower extremity edema much worse than right with bilateral cyanosis of the feet with palpable pulses.  Neurologic: AOx3, grossly no focal deficits       Results Review:    WBC   Date Value Ref Range Status   10/01/2024 10.45 3.40 - 10.80 10*3/mm3 Final     RBC   Date Value Ref Range Status   10/01/2024 4.13 (L) 4.14 - 5.80 10*6/mm3 Final     Hemoglobin   Date Value Ref Range Status   10/01/2024 11.2 (L) 13.0 - 17.7 g/dL Final     Hematocrit   Date Value Ref Range Status   10/01/2024 39.7 37.5 - 51.0 % Final     MCV   Date Value Ref Range Status   10/01/2024 96.1 79.0 - 97.0 fL Final     MCH   Date Value Ref Range Status   10/01/2024 27.1 26.6 - 33.0 pg Final     MCHC   Date Value Ref Range Status   10/01/2024 28.2 (L) 31.5 - 35.7 g/dL Final     RDW   Date Value Ref Range Status   10/01/2024 15.2 12.3 - 15.4 % Final     RDW-SD   Date Value Ref Range Status   10/01/2024 52.6 37.0 - 54.0 fl Final     MPV   Date Value Ref Range Status   10/01/2024 10.0 6.0 - 12.0 fL Final     Platelets   Date Value Ref Range Status   10/01/2024 185 140 - 450 10*3/mm3 Final     Neutrophil %   Date Value Ref Range Status   10/01/2024 85.1 (H) 42.7 - 76.0 % Final     Lymphocyte %   Date Value Ref Range Status   10/01/2024 7.3 (L) 19.6 - 45.3 % Final     Monocyte %   Date Value Ref Range Status   10/01/2024 5.9 5.0 - 12.0 % Final     Eosinophil %   Date Value Ref Range Status   10/01/2024 0.7 0.3 - 6.2 % Final     Basophil %   Date Value Ref Range Status   10/01/2024 0.4 0.0 - 1.5 % Final     Immature Grans %   Date Value Ref Range Status   10/01/2024 0.6 (H) 0.0 - 0.5 % Final  "    Neutrophils, Absolute   Date Value Ref Range Status   10/01/2024 8.90 (H) 1.70 - 7.00 10*3/mm3 Final     Lymphocytes, Absolute   Date Value Ref Range Status   10/01/2024 0.76 0.70 - 3.10 10*3/mm3 Final     Monocytes, Absolute   Date Value Ref Range Status   10/01/2024 0.62 0.10 - 0.90 10*3/mm3 Final     Eosinophils, Absolute   Date Value Ref Range Status   10/01/2024 0.07 0.00 - 0.40 10*3/mm3 Final     Basophils, Absolute   Date Value Ref Range Status   10/01/2024 0.04 0.00 - 0.20 10*3/mm3 Final     Immature Grans, Absolute   Date Value Ref Range Status   10/01/2024 0.06 (H) 0.00 - 0.05 10*3/mm3 Final     nRBC   Date Value Ref Range Status   10/01/2024 0.0 0.0 - 0.2 /100 WBC Final       Basic Metabolic Panel    Sodium Sodium   Date Value Ref Range Status   10/01/2024 141 136 - 145 mmol/L Final   09/30/2024 139 136 - 145 mmol/L Final   09/29/2024 137 136 - 145 mmol/L Final   09/29/2024 138 136 - 145 mmol/L Final   09/28/2024 138 136 - 145 mmol/L Final      Potassium Potassium   Date Value Ref Range Status   10/01/2024 4.7 3.5 - 5.2 mmol/L Final   09/30/2024 5.0 3.5 - 5.2 mmol/L Final   09/29/2024 5.2 3.5 - 5.2 mmol/L Final   09/29/2024 5.3 (H) 3.5 - 5.2 mmol/L Final     Comment:     Slight hemolysis detected by analyzer. Result may be falsely elevated.   09/28/2024 4.7 3.5 - 5.2 mmol/L Final      Chloride Chloride   Date Value Ref Range Status   10/01/2024 101 98 - 107 mmol/L Final   09/30/2024 100 98 - 107 mmol/L Final   09/29/2024 98 98 - 107 mmol/L Final   09/29/2024 97 (L) 98 - 107 mmol/L Final   09/28/2024 98 98 - 107 mmol/L Final      Bicarbonate No results found for: \"PLASMABICARB\"   BUN BUN   Date Value Ref Range Status   10/01/2024 37 (H) 8 - 23 mg/dL Final   09/30/2024 52 (H) 8 - 23 mg/dL Final   09/29/2024 54 (H) 8 - 23 mg/dL Final   09/29/2024 55 (H) 8 - 23 mg/dL Final   09/28/2024 50 (H) 8 - 23 mg/dL Final      Creatinine Creatinine   Date Value Ref Range Status   10/01/2024 1.08 0.76 - 1.27 mg/dL " "Final   09/30/2024 1.60 (H) 0.76 - 1.27 mg/dL Final   09/29/2024 1.77 (H) 0.76 - 1.27 mg/dL Final   09/29/2024 1.75 (H) 0.76 - 1.27 mg/dL Final   09/28/2024 1.65 (H) 0.76 - 1.27 mg/dL Final      Calcium Calcium   Date Value Ref Range Status   10/01/2024 8.1 (L) 8.6 - 10.5 mg/dL Final   09/30/2024 8.3 (L) 8.6 - 10.5 mg/dL Final   09/29/2024 8.4 (L) 8.6 - 10.5 mg/dL Final   09/29/2024 8.5 (L) 8.6 - 10.5 mg/dL Final   09/28/2024 8.8 8.6 - 10.5 mg/dL Final      Glucose      No components found for: \"GLUCOSE.*\"         No radiology results for the last day    Result Review:  I have personally reviewed the results from the time of this admission to 10/1/2024 07:57 CDT and agree with these findings:  [x]  Laboratory list / accordion  []  Microbiology  [x]  Radiology  []  EKG/Telemetry   []  Cardiology/Vascular   []  Pathology  []  Old records  []  Other:  Most notable findings include:       Culture Data:   No results found for: \"BLOODCX\", \"URINECX\", \"WOUNDCX\", \"MRSACX\", \"RESPCX\", \"STOOLCX\"    I have reviewed the patient's current medications.     Assessment/Plan   Assessment  Active Hospital Problems    Diagnosis     **Acute exacerbation of CHF (congestive heart failure)      -Acute on chronic hypoxemic respiratory failure  -Acute pulm edema  -Acute on chronic kidney disease possibly related to dehydration and diuresis  -Hyperkalemia  -COPD with acute exacerbation   - diabetes mellitus  -Acute on chronic systolic congestive heart failure with ejection fraction of 30-35%      Plan:  -Renal function is improving with holding diuretics and gentle hydration we will - - continue holding Lasix today  - IV fluid was stopped-   -Encourage oral intake  -Avoid any nephrotoxic medications  -Strict input output chart  -Patient is on 3 L of oxygen which is his baseline keep pulse ox above 88%  -Patient has left lower limb swelling I ordered a venous Doppler ultrasound to rule out DVT -report is pending   -Sliding scale insulin  -Case " management to help with his home situation possible need for placement  -Stable to be transferred to the floor       Discussed with: Patient and bedside nurse     Disposition  Patient can be transferred to the floor from critical care standpoint    Electronically signed by Mohit Barnes MD on 10/1/2024 at 07:57 CDT    Electronically signed by Mohit Barnes MD at 10/01/24 0800       Consult Notes (most recent note)    No notes of this type exist for this encounter.          Physical Therapy Notes (all)        Tim Heller, PT Student at 24 1529  Version 1 of 1      Attestation signed by Bentley Kaur PT DPT at 24 1536    I reviewed the documentation and agree.                   Goal Outcome Evaluation:  Plan of Care Reviewed With: patient        Progress: no change  Outcome Evaluation: PT eval complete. Oriented x4. Presents in fowlers position with 3L of O2 via NC. Pt. is very lethargic throughout evaluation. Pt. able to get to sitting EOB with minimal assistance. Showed good strength across B LE with sensation intact. Pt. showed impulsivity when coming to standing, moving prior to either therapist being prepared. Able to come to standing with min. assist. primarily to provide stability through trunk. Shows decreased balance with continuous posterior and right lean. Walks with high guard positioning and shuffling gait. Skilled therapy recommended to address functional mobility deficits, balance and endurance deficits. D/C recommended to short term rehab.      Anticipated Discharge Disposition (PT): sub acute care setting                          Electronically signed by Bentley Kaur PT DPT at 24 1536       Tim Heller PT Student at 24 1529  Version 1 of 1      Attestation signed by Bentley Kaur PT DPT at 24 1536    I reviewed the documentation and agree.                   Patient Name: Tucker Knox  : 1960    MRN: [id number]                               Today's Date: 9/30/2024       Admit Date: 9/28/2024    Visit Dx:     ICD-10-CM ICD-9-CM   1. Impaired mobility [Z74.09]  Z74.09 799.89     Patient Active Problem List   Diagnosis    Diabetes mellitus    Hypertension    Pulmonary HTN    COPD (chronic obstructive pulmonary disease)    NSTEMI, initial episode of care    Malignant neoplasm of sigmoid colon    FH: colon cancer    Sleep apnea    History of adenomatous polyp of colon    Colon adenocarcinoma    Acute on chronic systolic CHF (congestive heart failure)    PVC's (premature ventricular contractions)    Presence of external cardiac defibrillator    Acute exacerbation of CHF (congestive heart failure)     Past Medical History:   Diagnosis Date    Cancer     CHF (congestive heart failure)     COPD (chronic obstructive pulmonary disease)     Coronary artery disease     stent x 1    Family history of colon cancer     Hyperlipidemia     Hypertension     Sleep apnea     does not wear machine, supposed to wear cpap with oxygen and does not wear it    Type 2 diabetes mellitus      Past Surgical History:   Procedure Laterality Date    BACK SURGERY      CARDIAC CATHETERIZATION Left 04/13/2022    Procedure: Cardiac Catheterization/Vascular Study;  Surgeon: Todd Avendano MD;  Location:  [facility] CATH INVASIVE LOCATION;  Service: Cardiology;  Laterality: Left;    CARDIAC CATHETERIZATION      with stent x1    CARDIAC CATHETERIZATION N/A 9/12/2024    Procedure: Left Heart Cath;  Surgeon: Ruben Adler MD;  Location:  [facility] CATH INVASIVE LOCATION;  Service: Cardiology;  Laterality: N/A;    COLON RESECTION N/A 12/5/2023    Procedure: COLON RESECTION LAPAROSCOPIC SIGMOID WITH DAVINCI ROBOT, INTRA-OPERATIVE FLEXIBLE SIGMOIDOSCOPY, SPLENIC FLEXURE MOBILIZATION, OPEN UMBILICAL HERNIA REPAIR;  Surgeon: Oma Velasquez MD;  Location:  [facility] OR;  Service: Robotics - DaVinci;  Laterality: N/A;    COLONOSCOPY N/A 10/09/2023    Diverticulosis; One 5mm polyp in ascending colon;  One 5mm polyp in transverse colon; One 10mm polyp at 65cm proximal to anus; One 20mm polyp at 65cm proximal to anus-Tattooed; One 20mm polyp at 42cm proximal to anus-Clip (MR conditional) placed-Tattooed; Rule out malignancy-Partially obstructing tumor in recto-sigmoid colon-biopsied-Tattooed; One 10mm polyp in rectum    ELBOW PROCEDURE      KNEE SURGERY      LUMBAR DISC SURGERY        General Information       Row Name 09/30/24 1415          Physical Therapy Time and Intention    Document Type evaluation  DX: Acute exacerbation of CHF. Presents with shortness of breath and fever chills.  -ALEXANDRA (r) BL (t) ALEXANDRA (c)     Mode of Treatment physical therapy  PMH:combined systolic and diastolic CHF, CAD status post stents, hypertension, hyperlipidemia, COPD, chronic hypoxic respiratory failure on 3 L, tobacco use, diabetes.  -ALEXANDRA (r) BL (t) ALEXANDRA (c)       Row Name 09/30/24 1415          General Information    Patient Profile Reviewed yes  -ALEXANDRA (r) BL (t) ALEXANDRA (c)     Prior Level of Function independent:;all household mobility;transfer;ADL's;bed mobility  uses single point cane  -ALEXANDRA (r) BL (t) ALEXANDRA (c)     Existing Precautions/Restrictions fall;oxygen therapy device and L/min  -ALEXANDRA (r) BL (t) ALEXANDRA (c)     Barriers to Rehab medically complex  -ALEXANDRA (r) BL (t) ALEXANDRA (c)       Row Name 09/30/24 1415          Living Environment    People in Home alone  -ALEXANDRA (r) BL (t) ALEXANDRA (c)       Row Name 09/30/24 1415          Home Main Entrance    Number of Stairs, Main Entrance six  -ALEXANDRA (r) BL (t) ALEXANDRA (c)     Stair Railings, Main Entrance railing on right side (ascending)  -ALEXANDRA (r) BL (t) ALEXANDRA (c)       Row Name 09/30/24 1415          Stairs Within Home, Primary    Number of Stairs, Within Home, Primary none  -ALEXANDRA (r) BL (t) ALEXANDRA (c)     Stair Railings, Within Home, Primary none  -ALEXANDRA (r) BL (t) ALEXANDRA (c)       [facility] Name 09/30/24 1415          Cognition    Orientation Status (Cognition) oriented x 4  -ALEXANDRA (r) BL (t) ALEXANDRA (c)       Row Name 09/30/24 141          Safety  Issues, Functional Mobility    Safety Issues Affecting Function (Mobility) at risk behavior observed;impulsivity;friction/shear risk  -ALEXANDRA (r) BL (t) ALEXANDRA (c)     Impairments Affecting Function (Mobility) strength;balance;endurance/activity tolerance;pain  -ALEXANDRA (r) BL (t) ALEXANDRA (c)               User Key  (r) = Recorded By, (t) = Taken By, (c) = Cosigned By      Initials Name Provider Type    Bentley Morris, PT DPT Physical Therapist    Tim Olsen PT Student PT Student                   Mobility       Row Name 09/30/24 1415          Bed Mobility    Bed Mobility supine-sit;rolling right;scooting/bridging  -ALEXANDRA (r) BL (t) ALEXANDRA (c)     Rolling Right [place] (Bed Mobility) standby assist  -ALEXANDRA (r) BL (t) ALEXANDRA (c)     Scooting/Bridging [place] (Bed Mobility) standby assist  -ALEXANDRA (r) BL (t) ALEXANDRA (c)     Supine-Sit [place] (Bed Mobility) minimum assist (75% patient effort);verbal cues  -LAEXANDRA (r) BL (t) ALEXANDRA (c)     Assistive Device (Bed Mobility) head of bed elevated;bed rails  -ALEXANDRA (r) BL (t) ALEXANDRA (c)       Row Name 09/30/24 1415          Sit-Stand Transfer    Sit-Stand [place] (Transfers) minimum assist (75% patient effort)  -ALEXANDRA (r) BL (t) ALEXANDRA (c)     Assistive Device (Sit-Stand Transfers) cane, straight  -ALEXANDRA (r) BL (t) ALEXANDRA (c)       Row Name 09/30/24 1415          Gait/Stairs (Locomotion)    [place] Level (Gait) minimum assist (75% patient effort)  -ALEXANDRA (r) BL (t) ALEXANDRA (c)     Assistive Device (Gait) cane, straight  -ALEXANDRA (r) BL (t) ALEXANDRA (c)     Distance in Feet (Gait) 15  -ALEXANDRA (r) BL (t) ALEXANDRA (c)     Deviations/Abnormal Patterns (Gait) gait speed decreased;festinating/shuffling;jennifer decreased;stride length decreased  -ALEXANDRA (r) BL (t) ALEXANDRA (c)     Comment, (Gait/Stairs) pt. has poor balance with tendancy of R and posterior lean.  -ALEXANDRA (r) BL (t) ALEXANDRA (c)               User Key  (r) = Recorded By, (t) = Taken By, (c) = Cosigned By      Initials Name Provider Type    Bentley Morris, PT DPT Physical Therapist    BL  Tim Heller, PT Student PT Student                   Obj/Interventions       Row Name 09/30/24 1415          Range of Motion Comprehensive    General Range of Motion bilateral lower extremity ROM WFL  -ALEXANDRA (r) BL (t) ALEXANDRA (c)       Row Name 09/30/24 1415          Strength Comprehensive (MMT)    General Manual Muscle Testing (MMT) Assessment no strength deficits identified  -ALEXANDRA (r) BL (t) ALEXANDRA (c)     Comment, General Manual Muscle Testing (MMT) Assessment B LE strength grossly 4-/5  -ALEXANDRA (r) BL (t) ALEXANDRA (c)       [facility] Name 09/30/24 1415          Balance    Balance Assessment sitting static balance;sitting dynamic balance;sit to stand dynamic balance;standing static balance;standing dynamic balance  -ALEXANDRA (r) BL (t) ALEXANDRA (c)     Static Sitting Balance independent  -ALEXANDRA (r) BL (t) ALEXANDRA (c)     Dynamic Sitting Balance supervision  -ALEXANDRA (r) BL (t) ALEXANDRA (c)     Position, Sitting Balance unsupported;sitting edge of bed  -ALEXANDRA (r) BL (t) ALEXANDRA (c)     Sit to Stand Dynamic Balance moderate assist;2-person assist  -ALEXANDRA (r) BL (t) ALEXANDRA (c)     Static Standing Balance minimal assist  -ALEXANDRA (r) BL (t) ALEXANDRA (c)     Dynamic Standing Balance minimal assist;2-person assist  -ALEXANDRA (r) BL (t) ALEXANDRA (c)     Position/Device Used, Standing Balance supported;cane, straight  -ALEXANDRA (r) BL (t) ALEXANDRA (c)     Balance Interventions sitting;standing;sit to stand;supported;static;dynamic;minimal challenge;moderate challenge;occupation based/functional task;weight shifting activity  -ALEXANDRA (r) BL (t) ALEXANDRA (c)     Comment, Balance poor balance with posterior and R lean.  -ALEXANDRA (r) BL (t) ALEXANDRA (c)       [facility] Name 09/30/24 1415          Sensory Assessment (Somatosensory)    Sensory Assessment (Somatosensory) LE sensation intact  -ALEXANDRA (r) BL (t) ALEXANDRA (c)               User Key  (r) = Recorded By, (t) = Taken By, (c) = Cosigned By      Initials Name Provider Type    Bentley Morris, PT DPT Physical Therapist    Tim Olsen, PT Student PT Student                   Goals/Plan       Row Name  09/30/24 1415          Bed Mobility Goal 1 (PT)    Activity/Assistive Device (Bed Mobility Goal 1, PT) rolling to right;rolling to left;scooting;sit to supine;supine to sit  -ALEXANDRA (r) BL (t) ALEXANDRA (c)     [place] Level/Cues Needed (Bed Mobility Goal 1, PT) standby assist  -ALEXANDRA (r) BL (t) ALEXANDRA (c)     Time Frame (Bed Mobility Goal 1, PT) long term goal (LTG);10 days  -ALEXANDRA     Progress/Outcomes (Bed Mobility Goal 1, PT) new goal  -ALEXANDRA (r) BL (t) ALEXANDRA (c)       Row Name 09/30/24 1415          Transfer Goal 1 (PT)    Activity/Assistive Device (Transfer Goal 1, PT) sit-to-stand/stand-to-sit;bed-to-chair/chair-to-bed  -ALEXANDRA (r) BL (t) ALEXANDRA (c)     [place] Level/Cues Needed (Transfer Goal 1, PT) standby assist  -ALEXANDRA     Time Frame (Transfer Goal 1, PT) long term goal (LTG);10 days  -ALEXANDRA     Progress/Outcome (Transfer Goal 1, PT) new goal  -ALEXANDRA (r) BL (t) ALEXANDRA (c)       Row Name 09/30/24 1415          Gait Training Goal 1 (PT)    Activity/Assistive Device (Gait Training Goal 1, PT) gait (walking locomotion);assistive device use;decrease fall risk;diminish gait deviation;forward stepping;improve balance and speed;increase endurance/gait distance;increase energy conservation;cane, straight;turning, left;turning, right  -ALEXANDRA (r) BL (t) ALEXANDRA (c)     [place] Level (Gait Training Goal 1, PT) standby assist  -ALEXANDRA (r) BL (t) ALEXANDRA (c)     Distance (Gait Training Goal 1, PT) 50'  -ALEXANDRA (r) BL (t) ALEXANDRA (c)     Time Frame (Gait Training Goal 1, PT) long term goal (LTG);10 days  -ALEXANDRA     Progress/Outcome (Gait Training Goal 1, PT) new goal  -ALEXANDRA (r) BL (t) ALEXANDRA (c)       Row Name 09/30/24 1415          Stairs Goal 1 (PT)    Activity/Assistive Device (Stairs Goal 1, PT) --  -ALEXANDRA       Row Name 09/30/24 1415          Therapy Assessment/Plan (PT)    Planned Therapy Interventions (PT) balance training;bed mobility training;gait training;home exercise program;patient/family education;stair training;strengthening;transfer training;postural re-education  -ALEXANDRA                User Key  (r) = Recorded By, (t) = Taken By, (c) = Cosigned By      Initials Name Provider Type    Bentley Morris, PT DPT Physical Therapist    Tim Olsen, STEPHANIE Student PT Student                   Clinical Impression       Row Name 09/30/24 8183          Pain    Pretreatment Pain Rating 0/10 - no pain  -ALEXANDRA (r) BL (t) ALEXANDRA (c)     Posttreatment Pain Rating 0/10 - no pain  -ALEXANDRA (r) BL (t) ALEXANDRA (c)     Pain Intervention(s) Ambulation/increased activity;Repositioned  -ALEXANDRA (r) BL (t) ALEXANDRA (c)     Additional Documentation Pain Scale: Numbers Pre/Post-Treatment (Group)  -ALEXANDRA (r) BL (t) ALEXANDRA (c)       Row Name 09/30/24 1777          Plan of Care Review    Plan of Care Reviewed With patient  -ALEXANDRA (r) BL (t) ALEXANDRA (c)     Progress no change  -ALEXANDRA (r) BL (t) ALEXANDRA (c)     Outcome Evaluation PT eval complete. Oriented x4. Presents in fowlers position with 3L of O2 via NC. Pt. is very lethargic throughout evaluation. Pt. able to get to sitting EOB with minimal assistance. Showed good strength across B LE with sensation intact. Pt. showed impulsivity when coming to standing, moving prior to either therapist being prepared. Able to come to standing with min. assist. primarily to provide stability through trunk. Shows decreased balance with continuous posterior and right lean. Walks with high guard positioning and shuffling gait. Skilled therapy recommended to address functional mobility deficits, balance and endurance deficits. D/C recommended to short term rehab.  -ALEXANDRA (r) BL (t) ALEXANDRA (c)       Row Name 09/30/24 8237          Therapy Assessment/Plan (PT)    Patient/Family Therapy Goals Statement (PT) Return home  -ALEXANDRA (r) BL (t) ALEXANDRA (c)     Rehab Potential (PT) fair, will monitor progress closely  -ALEXANDRA (r) BL (t) ALEXANDRA (c)     Criteria for Skilled Interventions Met (PT) yes;meets criteria;skilled treatment is necessary  -ALEXANDRA (r) BL (t) ALEXANDRA (c)     Therapy Frequency (PT) 2 times/day  -ALEXANDRA (r) BL (t) ALEXANDRA (c)     Predicted Duration of Therapy  Intervention (PT) Until D/C or goals met.  -ALEXANDRA (r) BL (t) ALEXANDRA (c)       Row Name 09/30/24 1415          Vital Signs    O2 Delivery Pre Treatment supplemental O2  -ALEXANDRA (r) BL (t) ALEXANDRA (c)     O2 Delivery Intra Treatment supplemental O2  -ALEXANDRA (r) BL (t) ALEXANDRA (c)     O2 Delivery Post Treatment supplemental O2  -ALEXANDRA (r) BL (t) ALEXANDRA (c)     Pre Patient Position Supine  -ALEXANDRA (r) BL (t) ALEXANDRA (c)     Intra Patient Position Standing  -ALEXANDRA (r) BL (t) ALEXANDRA (c)     Post Patient Position Sitting  -ALEXANDRA (r) BL (t) ALEXANDRA (c)       Row Name 09/30/24 1415          Positioning and Restraints    Pre-Treatment Position in bed  -ALEXANDRA (r) BL (t) ALEXANDRA (c)     Post Treatment Position chair  -ALEXANDRA (r) BL (t) ALEXANDRA (c)     In Chair notified nsg;reclined;sitting;call light within reach;encouraged to call for assist;legs elevated  -ALEXANDRA (r) BL (t) ALEXANDRA (c)               User Key  (r) = Recorded By, (t) = Taken By, (c) = Cosigned By      Initials Name Provider Type    Bentley Morris, PT DPT Physical Therapist    Tim Olsen, STEPHANIE Student PT Student                   Outcome Measures       Row Name 09/30/24 1415 09/30/24 0800       How much help from another person do you currently need...    Turning from your back to your side while in flat bed without using bedrails? 3  -ALEXANDRA (r) BL (t) ALEXANDRA (c) 3  -HT    Moving from lying on back to sitting on the side of a flat bed without bedrails? 3  -ALEXANDRA (r) BL (t) ALEXANDRA (c) 3  -HT    Moving to and from a bed to a chair (including a wheelchair)? 3  -ALEXANDRA (r) BL (t) ALEXANDRA (c) 3  -HT    Standing up from a chair using your arms (e.g., wheelchair, bedside chair)? 3  -ALEXANDRA (r) BL (t) ALEXANDRA (c) 3  -HT    Climbing 3-5 steps with a railing? 3  -ALEXANDRA (r) BL (t) ALEXANDRA (c) 3  -HT    To walk in hospital room? 3  -ALEXANDRA (r) BL (t) ALEXANDRA (c) 3  -HT    AM-PAC 6 Clicks Score (PT) 18  -ALEXANDRA (r) BL (t) 18  -HT    Highest Level of Mobility Goal 6 --> Walk 10 steps or more  -ALEXANDRA (r) BL (t) 6 --> Walk 10 steps or more  -HT      Row Name 09/30/24 0703          How much help from  another person do you currently need...    Turning from your back to your side while in flat bed without using bedrails? 3  -SE     Moving from lying on back to sitting on the side of a flat bed without bedrails? 3  -SE     Moving to and from a bed to a chair (including a wheelchair)? 3  -SE     Standing up from a chair using your arms (e.g., wheelchair, bedside chair)? 3  -SE     Climbing 3-5 steps with a railing? 3  -SE     To walk in hospital room? 3  -SE     AM-PAC 6 Clicks Score (PT) 18  -SE     Highest Level of Mobility Goal 6 --> Walk 10 steps or more  -SE       Row Name 09/30/24 1420 09/30/24 1415       Functional Assessment    Outcome Measure Options AM-PAC 6 Clicks Daily Activity (OT)  -CHARMAINE AM-PAC 6 Clicks Basic Mobility (PT)  -ALEXANDRA (r) BL (t) ALEXANDRA (c)              User Key  (r) = Recorded By, (t) = Taken By, (c) = Cosigned By      Initials Name Provider Type    Bentley Morris, PT DPT Physical Therapist    Marielena Ivey, RN Registered Nurse    Shyam José, RN Registered Nurse    Tayla Camp, OTR/L, CSRS Occupational Therapist    Tim Olsen, PT Student PT Student                                 Physical Therapy Education       Title: PT OT SLP Therapies (In Progress)       Topic: Physical Therapy (In Progress)       Point: Mobility training (Done)       Learning Progress Summary             Patient Acceptance, E,D, VU,DU by BL at 9/30/2024 1512    Comment: Pt. educated on proper body mechanics to maintain safety when performing functional mobility. Educated on safety procedures to abide by while at [facility]                         Point: Home exercise program (Not Started)       Learner Progress:  Not documented in this visit.              Point: Body mechanics (Done)       Learning Progress Summary             Patient Acceptance, E,D, VU,DU by BL at 9/30/2024 1512    Comment: Pt. educated on proper body mechanics to maintain safety when performing functional mobility.  Educated on safety procedures to abide by while at [facility]                         Point: Precautions (Done)       Learning Progress Summary             Patient Acceptance, E,D, VU,DU by  at 9/30/2024 1512    Comment: Pt. educated on proper body mechanics to maintain safety when performing functional mobility. Educated on safety procedures to abide by while at [facility]                                         User Key       Initials Effective Dates Name Provider Type Discipline     08/21/24 -  Tim Heller, PT Student PT Student PT                  PT Recommendation and Plan     Plan of Care Reviewed With: patient  Progress: no change  Outcome Evaluation: PT eval complete. Oriented x4. Presents in fowlers position with 3L of O2 via NC. Pt. is very lethargic throughout evaluation. Pt. able to get to sitting EOB with minimal assistance. Showed good strength across B LE with sensation intact. Pt. showed impulsivity when coming to standing, moving prior to either therapist being prepared. Able to come to standing with min. assist. primarily to provide stability through trunk. Shows decreased balance with continuous posterior and right lean. Walks with high guard positioning and shuffling gait. Skilled therapy recommended to address functional mobility deficits, balance and endurance deficits. D/C recommended to short term rehab.     Time Calculation:         PT Charges       Row Name 09/30/24 1514             Time Calculation    Start Time 1415  -ALEXANDRA (r) BL (t) ALEXANDRA (c)      Stop Time 1500  -ALEXANDRA (r) BL (t) ALEXANDRA (c)      Time Calculation (min) 45 min  -ALEXANDRA (r) BL (t)      PT Received On 09/30/24  -ALEXANDRA (r) BL (t) ALEXANDRA (c)      PT Goal Re-Cert Due Date 10/10/24  -ALEXANDRA (r) BL (t) ALEXANDRA (c)                User Key  (r) = Recorded By, (t) = Taken By, (c) = Cosigned By      Initials Name Provider Type    Bentley Morris, PT DPT Physical Therapist     Tim Heller, PT Student PT Student                      PT  G-Codes  Outcome Measure Options: AM-PAC 6 Clicks Daily Activity (OT)  AM-PAC 6 Clicks Score (PT): 18  AM-PAC 6 Clicks Score (OT): 16  PT Discharge Summary  Anticipated Discharge Disposition (PT): sub acute care setting    Tim Heller, PT Student  2024      Electronically signed by Bentley Kaur, PT DPT at 24 1536          Occupational Therapy Notes (all)        Tayla Gibbs, OTR/L, CSRS at 24 1510          Patient Name: Tucker Knox  : 1960    MRN: [id number]                              Today's Date: 2024       Admit Date: 2024    Visit Dx: No diagnosis found.  Patient Active Problem List   Diagnosis    Diabetes mellitus    Hypertension    Pulmonary HTN    COPD (chronic obstructive pulmonary disease)    NSTEMI, initial episode of care    Malignant neoplasm of sigmoid colon    FH: colon cancer    Sleep apnea    History of adenomatous polyp of colon    Colon adenocarcinoma    Acute on chronic systolic CHF (congestive heart failure)    PVC's (premature ventricular contractions)    Presence of external cardiac defibrillator    Acute exacerbation of CHF (congestive heart failure)     Past Medical History:   Diagnosis Date    Cancer     CHF (congestive heart failure)     COPD (chronic obstructive pulmonary disease)     Coronary artery disease     stent x 1    Family history of colon cancer     Hyperlipidemia     Hypertension     Sleep apnea     does not wear machine, supposed to wear cpap with oxygen and does not wear it    Type 2 diabetes mellitus      Past Surgical History:   Procedure Laterality Date    BACK SURGERY      CARDIAC CATHETERIZATION Left 2022    Procedure: Cardiac Catheterization/Vascular Study;  Surgeon: Todd Avendano MD;  Location: [facility] CATH INVASIVE LOCATION;  Service: Cardiology;  Laterality: Left;    CARDIAC CATHETERIZATION      with stent x1    CARDIAC CATHETERIZATION N/A 2024    Procedure: Left Heart Cath;  Surgeon: Ruben Adler,  MD;  Location:  [facility] CATH INVASIVE LOCATION;  Service: Cardiology;  Laterality: N/A;    COLON RESECTION N/A 12/5/2023    Procedure: COLON RESECTION LAPAROSCOPIC SIGMOID WITH DAVINCI ROBOT, INTRA-OPERATIVE FLEXIBLE SIGMOIDOSCOPY, SPLENIC FLEXURE MOBILIZATION, OPEN UMBILICAL HERNIA REPAIR;  Surgeon: Oma Velasquez MD;  Location:  [facility] OR;  Service: Robotics - DaVinci;  Laterality: N/A;    COLONOSCOPY N/A 10/09/2023    Diverticulosis; One 5mm polyp in ascending colon; One 5mm polyp in transverse colon; One 10mm polyp at 65cm proximal to anus; One 20mm polyp at 65cm proximal to anus-Tattooed; One 20mm polyp at 42cm proximal to anus-Clip (MR conditional) placed-Tattooed; Rule out malignancy-Partially obstructing tumor in recto-sigmoid colon-biopsied-Tattooed; One 10mm polyp in rectum    ELBOW PROCEDURE      KNEE SURGERY      LUMBAR DISC SURGERY        General Information       Row Name 09/30/24 1420          General Information    Patient Profile Reviewed yes  -JJ     Prior Level of Function independent:;all household mobility;transfer;ADL's;bed mobility  -JJ     Existing Precautions/Restrictions fall;oxygen therapy device and L/min  3L  -JJ     Barriers to Rehab medically complex  -JJ       Row Name 09/30/24 1420          Living Environment    People in Home alone  -JJ       Row Name 09/30/24 1420          Home Main Entrance    Number of Stairs, Main Entrance five;six  -JJ     Stair Railings, Main Entrance railing on right side (ascending)  -JJ       Row Name 09/30/24 1420          Stairs Within Home, Primary    Number of Stairs, Within Home, Primary none  -JJ       Row Name 09/30/24 1420          Cognition    Orientation Status (Cognition) oriented x 4  -JJ       Row Name 09/30/24 1420          Safety Issues, Functional Mobility    Impairments Affecting Function (Mobility) strength;balance;endurance/activity tolerance;pain  -JJ               User Key  (r) = Recorded By, (t) = Taken By, (c) = Cosigned By       Initials Name Provider Type    Tayla Gonzalez OTR/L, WILLI Occupational Therapist                     Mobility/ADL's       Row Name 09/30/24 1420          Bed Mobility    Bed Mobility supine-sit  -     Supine-Sit [place] (Bed Mobility) minimum assist (75% patient effort);verbal cues  -     Assistive Device (Bed Mobility) head of bed elevated;bed rails  -       Row Name 09/30/24 1420          Transfers    Transfers sit-stand transfer;stand-sit transfer  -       Row Name 09/30/24 1420          Sit-Stand Transfer    Sit-Stand [place] (Transfers) minimum assist (75% patient effort)  -     Assistive Device (Sit-Stand Transfers) cane, straight  -       Row Name 09/30/24 1420          Stand-Sit Transfer    Stand-Sit [place] (Transfers) minimum assist (75% patient effort)  -     Assistive Device (Stand-Sit Transfers) cane, straight  -[facility] Name 09/30/24 1420          Functional Mobility    Functional Mobility- Device cane, straight  -     Functional Mobility- Safety Issues other (see comments)  -       Row Name 09/30/24 1420          Activities of Daily Living    BADL Assessment/Intervention lower body dressing  -       Row Name 09/30/24 1420          Lower Body Dressing Assessment/Training    [place] Level (Lower Body Dressing) maximum assist (25% patient effort)  -     Position (Lower Body Dressing) edge of bed sitting  -     Comment, (Lower Body Dressing) adjust B socks  -               User Key  (r) = Recorded By, (t) = Taken By, (c) = Cosigned By      Initials Name Provider Type    Tayla Gonzalez OTR/L, WILLI Occupational Therapist                   Obj/Interventions       Row Name 09/30/24 1420          Sensory Assessment (Somatosensory)    Sensory Assessment (Somatosensory) UE sensation intact  -       Row Name 09/30/24 1420          Vision Assessment/Intervention    Visual Impairment/Limitations WFL  -       Row Name 09/30/24 1420          Range  of Motion Comprehensive    General Range of Motion bilateral upper extremity ROM WFL  -JJ       Row Name 09/30/24 1420          Strength Comprehensive (MMT)    Comment, General Manual Muscle Testing (MMT) Assessment B UE strength 4-/5  -JJ       Row Name 09/30/24 1420          Balance    Balance Assessment sitting static balance;sitting dynamic balance;standing static balance;standing dynamic balance  -JJ     Static Sitting Balance independent  -JJ     Dynamic Sitting Balance supervision  -JJ     Position, Sitting Balance unsupported;sitting edge of bed  -JJ     Static Standing Balance minimal assist  -JJ     Dynamic Standing Balance minimal assist;2-person assist  -JJ     Position/Device Used, Standing Balance supported;cane, straight  -JJ               User Key  (r) = Recorded By, (t) = Taken By, (c) = Cosigned By      Initials Name Provider Type    Tayla Camp, OTR/L, CSRS Occupational Therapist                   Goals/Plan       Row Name 09/30/24 1420          Bathing Goal 1 (OT)    Activity/Device (Bathing Goal 1, OT) bathing skills, all  -JJ     [place] Level/Cues Needed (Bathing Goal 1, OT) minimum assist (75% or more patient effort)  -JJ     Time Frame (Bathing Goal 1, OT) long term goal (LTG);by discharge  -JJ     Progress/Outcomes (Bathing Goal 1, OT) new goal  -JJ       Row Name 09/30/24 1420          Dressing Goal 1 (OT)    Activity/Device (Dressing Goal 1, OT) dressing skills, all  -JJ     [place]/Cues Needed (Dressing Goal 1, OT) minimum assist (75% or more patient effort)  -JJ     Time Frame (Dressing Goal 1, OT) long term goal (LTG);by discharge  -JJ     Progress/Outcome (Dressing Goal 1, OT) new goal  -JJ       Row Name 09/30/24 1420          Toileting Goal 1 (OT)    Activity/Device (Toileting Goal 1, OT) toileting skills, all  -JJ     [place] Level/Cues Needed (Toileting Goal 1, OT) contact guard required  -JJ     Time Frame (Toileting Goal 1, OT) long term goal (LTG);by  discharge  -     Progress/Outcome (Toileting Goal 1, OT) new goal  -J       Row Name 09/30/24 1420          Therapy Assessment/Plan (OT)    Planned Therapy Interventions (OT) activity tolerance training;adaptive equipment training;BADL retraining;functional balance retraining;transfer/mobility retraining;strengthening exercise;occupation/activity based interventions;patient/caregiver education/training  -               User Key  (r) = Recorded By, (t) = Taken By, (c) = Cosigned By      Initials Name Provider Type    Tayla Camp, OTR/L, CSRS Occupational Therapist                   Clinical Impression       Row Name 09/30/24 1420          Pain Assessment    Pretreatment Pain Rating 0/10 - no pain  -     Posttreatment Pain Rating 0/10 - no pain  -       Row Name 09/30/24 1420          Plan of Care Review    Plan of Care Reviewed With patient  -     Outcome Evaluation OT eval completed. Pt presents lethargic but oriented x4, sitting up in bed. He reports at baseline being independent with all adls and mobility, residing at home alone with family assisting as needed. He remained on 3L O2/NC throughout session with O2 sats in mis 90s with all activity. He demonstrates impaired balance, strength, activity tolerance and decreased safety awareness. He was able to bring self to sitting at EOB with Min A and vcs. CGA for sit <> stand t/f from EOB and Min Ax2 with use of SC for in room mobility. Decreased balance noted with dynamic standing activity. He was left sitting up in chair at end of session. He would benefit from skilled OT services to address these deficits. Recommend d/c to short term rehab.  -       Row Name 09/30/24 1420          Therapy Assessment/Plan (OT)    Rehab Potential (OT) good, to achieve stated therapy goals  -J     Criteria for Skilled Therapeutic Interventions Met (OT) yes;skilled treatment is necessary  -J     Therapy Frequency (OT) 5 times/wk  -J     Predicted Duration of  Therapy Intervention (OT) 10 days  -       Row Name 09/30/24 1420          Therapy Plan Review/Discharge Plan (OT)    Anticipated Discharge Disposition (OT) skilled nursing facility  -       Row Name 09/30/24 1420          Positioning and Restraints    Pre-Treatment Position in bed  -     Post Treatment Position chair  -JJ     In Chair notified nsg;reclined;call light within reach;encouraged to call for assist;patient within staff view  -               User Key  (r) = Recorded By, (t) = Taken By, (c) = Cosigned By      Initials Name Provider Type    Tayla Camp, OTR/L, CSRS Occupational Therapist                   Outcome Measures       Row Name 09/30/24 1420          How much help from another is currently needed...    Putting on and taking off regular lower body clothing? 2  -JJ     Bathing (including washing, rinsing, and drying) 2  -JJ     Toileting (which includes using toilet bed pan or urinal) 2  -JJ     Putting on and taking off regular upper body clothing 3  -JJ     Taking care of personal grooming (such as brushing teeth) 3  -JJ     Eating meals 4  -JJ     AM-PAC 6 Clicks Score (OT) 16  -       Row Name 09/30/24 0800 09/30/24 0703       How much help from another person do you currently need...    Turning from your back to your side while in flat bed without using bedrails? 3  -HT 3  -SE    Moving from lying on back to sitting on the side of a flat bed without bedrails? 3  -HT 3  -SE    Moving to and from a bed to a chair (including a wheelchair)? 3  -HT 3  -SE    Standing up from a chair using your arms (e.g., wheelchair, bedside chair)? 3  -HT 3  -SE    Climbing 3-5 steps with a railing? 3  -HT 3  -SE    To walk in hospital room? 3  -HT 3  -SE    AM-PAC 6 Clicks Score (PT) 18  -HT 18  -SE    Highest Level of Mobility Goal 6 --> Walk 10 steps or more  -HT 6 --> Walk 10 steps or more  -SE      Row Name 09/30/24 1420          Functional Assessment    Outcome Measure Options AM-PAC 6  Clicks Daily Activity (OT)  -               User Key  (r) = Recorded By, (t) = Taken By, (c) = Cosigned By      Initials Name Provider Type     Marielena Deutsch, RN Registered Nurse    Shyam José RN Registered Nurse    Tayla Camp, MOISES/L, WILLI Occupational Therapist                    Occupational Therapy Education       Title: PT OT SLP Therapies (In Progress)       Topic: Occupational Therapy (In Progress)       Point: ADL training (Done)       Description:   Instruct learner(s) on proper safety adaptation and remediation techniques during self care or transfers.   Instruct in proper use of assistive devices.                  Learning Progress Summary             Patient Acceptance, E, VU by CHARMAINE at 9/30/2024 1441                         Point: Home exercise program (Not Started)       Description:   Instruct learner(s) on appropriate technique for monitoring, assisting and/or progressing therapeutic exercises/activities.                  Learner Progress:  Not documented in this visit.              Point: Precautions (Done)       Description:   Instruct learner(s) on prescribed precautions during self-care and functional transfers.                  Learning Progress Summary             Patient Acceptance, E, VU by CHARMAINE at 9/30/2024 1441                         Point: Body mechanics (Done)       Description:   Instruct learner(s) on proper positioning and spine alignment during self-care, functional mobility activities and/or exercises.                  Learning Progress Summary             Patient Acceptance, E, VU by CHARMAINE at 9/30/2024 1441                                         User Key       Initials Effective Dates Name Provider Type [facility] 07/11/23 -  Tayla Gibbs OTR/L, WILLI Occupational Therapist OT                  OT Recommendation and Plan  Planned Therapy Interventions (OT): activity tolerance training, adaptive equipment training, BADL retraining, functional balance  retraining, transfer/mobility retraining, strengthening exercise, occupation/activity based interventions, patient/caregiver education/training  Therapy Frequency (OT): 5 times/wk  Plan of Care Review  Plan of Care Reviewed With: patient  Outcome Evaluation: OT eval completed. Pt presents lethargic but oriented x4, sitting up in bed. He reports at baseline being independent with all adls and mobility, residing at home alone with family assisting as needed. He remained on 3L O2/NC throughout session with O2 sats in mis 90s with all activity. He demonstrates impaired balance, strength, activity tolerance and decreased safety awareness. He was able to bring self to sitting at EOB with Min A and vcs. CGA for sit <> stand t/f from EOB and Min Ax2 with use of SC for in room mobility. Decreased balance noted with dynamic standing activity. He was left sitting up in chair at end of session. He would benefit from skilled OT services to address these deficits. Recommend d/c to short term rehab.     Time Calculation:         Time Calculation- OT       Row Name 09/30/24 1420             Time Calculation- OT    OT Start Time 1420  -      OT Stop Time 1501  -      OT Time Calculation (min) 41 min  -      OT Received On 09/30/24  -      OT Goal Re-Cert Due Date 10/10/24  -                User Key  (r) = Recorded By, (t) = Taken By, (c) = Cosigned By      Initials Name Provider Type    Tayla Camp OTR/L, CSRS Occupational Therapist                  Therapy Charges for Today       Code Description Service Date Service Provider Modifiers Qty    38422531730  OT EVAL MOD COMPLEXITY 3 9/30/2024 Tayla Gibbs OTR/L, CSRS GO 1                 MOISES [place]/L, CSRS  9/30/2024    Electronically signed by Tayla Gibbs OTR/L, CSRS at 09/30/24 1510       Tayla Gibbs OTR/L, MERCEDESS at 09/30/24 1509          Goal Outcome Evaluation:  Plan of Care Reviewed With: patient           Outcome  Evaluation: OT eval completed. Pt presents lethargic but oriented x4, sitting up in bed. He reports at baseline being independent with all adls and mobility, residing at home alone with family assisting as needed. He remained on 3L O2/NC throughout session with O2 sats in mis 90s with all activity. He demonstrates impaired balance, strength, activity tolerance and decreased safety awareness. He was able to bring self to sitting at EOB with Min A and vcs. CGA for sit <> stand t/f from EOB and Min Ax2 with use of SC for in room mobility. Decreased balance noted with dynamic standing activity. He was left sitting up in chair at end of session. He would benefit from skilled OT services to address these deficits. Recommend d/c to short term rehab.      Anticipated Discharge Disposition (OT): skilled nursing facility                          Electronically signed by Tayla Gibbs OTR/L, CSRS at 09/30/24 5750

## 2024-10-01 NOTE — CASE MANAGEMENT/SOCIAL WORK
Continued Stay Note  [facility]     Patient Name: Tucker Knox  MRN: [id number]  Today's Date: 10/1/2024    Admit Date: 9/28/2024    Plan: Tentative SNF   Discharge Plan       Row Name 10/01/24 1405       Plan    Plan Tentative SNF    Plan Comments SW spoke with patient re: rehab placement.  Patient is agreeable to a referral to [facility].  Patient is only agreeable to a referral at this time.  If bed offered will need to see if patient is agreeable to SNF.  Patient's insurance also requires prior approval.      Row Name 10/01/24 1248       Plan    Plan Comments Received call from patient's sister inquiring about advance directives.  SW has left voicemail for pastoral care.                   Discharge Codes    No documentation.                       GAGANDEEP Stout

## 2024-10-01 NOTE — THERAPY TREATMENT NOTE
Acute Care - Physical Therapy Treatment Note  [facility]     Patient Name: Tucker Knox  : 1960  MRN: [id number]  Today's Date: 10/1/2024      Visit Dx:     ICD-10-CM ICD-9-CM   1. Impaired mobility [Z74.09]  Z74.09 799.89     Patient Active Problem List   Diagnosis    Diabetes mellitus    Hypertension    Pulmonary HTN    COPD (chronic obstructive pulmonary disease)    NSTEMI, initial episode of care    Malignant neoplasm of sigmoid colon    FH: colon cancer    Sleep apnea    History of adenomatous polyp of colon    Colon adenocarcinoma    Acute on chronic systolic CHF (congestive heart failure)    PVC's (premature ventricular contractions)    Presence of external cardiac defibrillator    Acute exacerbation of CHF (congestive heart failure)     Past Medical History:   Diagnosis Date    Cancer     CHF (congestive heart failure)     COPD (chronic obstructive pulmonary disease)     Coronary artery disease     stent x 1    Family history of colon cancer     Hyperlipidemia     Hypertension     Sleep apnea     does not wear machine, supposed to wear cpap with oxygen and does not wear it    Type 2 diabetes mellitus      Past Surgical History:   Procedure Laterality Date    BACK SURGERY      CARDIAC CATHETERIZATION Left 2022    Procedure: Cardiac Catheterization/Vascular Study;  Surgeon: Todd Avendano MD;  Location: [facility] CATH INVASIVE LOCATION;  Service: Cardiology;  Laterality: Left;    CARDIAC CATHETERIZATION      with stent x1    CARDIAC CATHETERIZATION N/A 2024    Procedure: Left Heart Cath;  Surgeon: Ruben Adler MD;  Location: [facility] CATH INVASIVE LOCATION;  Service: Cardiology;  Laterality: N/A;    COLON RESECTION N/A 2023    Procedure: COLON RESECTION LAPAROSCOPIC SIGMOID WITH DAVINCI ROBOT, INTRA-OPERATIVE FLEXIBLE SIGMOIDOSCOPY, SPLENIC FLEXURE MOBILIZATION, OPEN UMBILICAL HERNIA REPAIR;  Surgeon: Oma Velasquez MD;  Location: [facility] OR;  Service: Robotics - DaVinci;   "Laterality: N/A;    COLONOSCOPY N/A 10/09/2023    Diverticulosis; One 5mm polyp in ascending colon; One 5mm polyp in transverse colon; One 10mm polyp at 65cm proximal to anus; One 20mm polyp at 65cm proximal to anus-Tattooed; One 20mm polyp at 42cm proximal to anus-Clip (MR conditional) placed-Tattooed; Rule out malignancy-Partially obstructing tumor in recto-sigmoid colon-biopsied-Tattooed; One 10mm polyp in rectum    ELBOW PROCEDURE      KNEE SURGERY      LUMBAR DISC SURGERY       PT Assessment (Last 12 Hours)       PT Evaluation and Treatment       Row Name 10/01/24 0942          Physical Therapy Time and Intention    Subjective Information complains of;pain  -     Document Type therapy note (daily note)  -     Mode of Treatment physical therapy  -       Row Name 10/01/24 0942          General Information    Existing Precautions/Restrictions fall;oxygen therapy device and L/min  -     Comment, General Information Patient can be rude and vulgar. Was warned by nsg.  -       Row Name 10/01/24 0942          Pain    Pre/Posttreatment Pain Comment would not give number, constant moaning. States \"allover, even my hair\"  -       Row Name 10/01/24 0942          Pain Scale: FACES Pre/Post-Treatment    Pain: FACES Scale, Pretreatment 0-->no hurt  -     Posttreatment Pain Rating 6-->hurts even more  -       Row Name 10/01/24 0942          Bed Mobility    Supine-Sit [place] (Bed Mobility) verbal cues;moderate assist (50% patient effort)  -     Sit-Supine [place] (Bed Mobility) verbal cues;moderate assist (50% patient effort)  -     Assistive Device (Bed Mobility) head of bed elevated  -     Comment, (Bed Mobility) Pt. refused to exercise, just repeated I'm done\" at 30 x's. Eventually said \"I'm done living\". Patient continued to sit EOB 10 min until agreed to lay down.  -       Row Name 10/01/24 0942          Transfers    Comment, (Transfers) Refused to stand  -[facility] Name 10/01/24 0942 "          Balance    Static Sitting Balance standby assist  -BRUNO     Dynamic Sitting Balance contact guard  -BRUNO     Position, Sitting Balance supported;unsupported;sitting edge of bed  -BRUNO     Comment, Balance R lean  -BRUNO       Row Name 10/01/24 0942          Motor Skills    Comments, Therapeutic Exercise Patient half hazardly pumprd feet, mostly toes and extended knees. Pt. became irritated when asked to exercises.  -BRUNO       Row Name             Wound 09/14/24 1141 Left anterior third toe Abrasion    Wound - Properties Group Placement Date: 09/14/24  -SK Placement Time: 1141  -SK Side: Left  -SK Orientation: anterior  -SK Location: third toe  -SK Primary Wound Type: Abrasion  -SK    Retired Wound - Properties Group Placement Date: 09/14/24  -SK Placement Time: 1141  -SK Side: Left  -SK Orientation: anterior  -SK Location: third toe  -SK Primary Wound Type: Abrasion  -SK    Retired Wound - Properties Group Date first assessed: 09/14/24  -SK Time first assessed: 1141  -SK Side: Left  -SK Location: third toe  -SK Primary Wound Type: Abrasion  -SK      Row Name             Wound 09/14/24 1141 Left anterior great toe Skin Tear    Wound - Properties Group Placement Date: 09/14/24  -SK Placement Time: 1141  -SK Side: Left  -SK Orientation: anterior  -SK Location: great toe  -SK, Area just below Nail area of Great toe  Primary Wound Type: Skin tear  -SK    Retired Wound - Properties Group Placement Date: 09/14/24  -SK Placement Time: 1141  -SK Side: Left  -SK Orientation: anterior  -SK Location: great toe  -SK, Area just below Nail area of Great toe  Primary Wound Type: Skin tear  -SK    Retired Wound - Properties Group Date first assessed: 09/14/24  -SK Time first assessed: 1141  -SK Side: Left  -SK Location: great toe  -SK, Area just below Nail area of Great toe  Primary Wound Type: Skin tear  -SK      Row Name             Wound 09/14/24 1141 Right anterior ankle Abrasion    Wound - Properties Group Placement Date:  09/14/24  -SK Placement Time: 1141  -SK Side: Right  -SK Orientation: anterior  -SK Location: ankle  -SK Primary Wound Type: Abrasion  -SK    Retired Wound - Properties Group Placement Date: 09/14/24  -SK Placement Time: 1141  -SK Side: Right  -SK Orientation: anterior  -SK Location: ankle  -SK Primary Wound Type: Abrasion  -SK    Retired Wound - Properties Group Date first assessed: 09/14/24  -SK Time first assessed: 1141  -SK Side: Right  -SK Location: ankle  -SK Primary Wound Type: Abrasion  -SK      Row Name             Wound 09/28/24 1635 calf    Wound - Properties Group Placement Date: 09/28/24  -CB Placement Time: 1635  -CB Present on Original Admission: Y  -CB Location: calf  -CB    Retired Wound - Properties Group Placement Date: 09/28/24  -CB Placement Time: 1635  -CB Present on Original Admission: Y  -CB Location: calf  -CB    Retired Wound - Properties Group Date first assessed: 09/28/24  -CB Time first assessed: 1635  -CB Present on Original Admission: Y  -CB Location: calf  -CB      Row Name             Wound 09/28/24 1636 Left posterior calf    Wound - Properties Group Placement Date: 09/28/24  -CB Placement Time: 1636  -CB Present on Original Admission: Y  -CB Side: Left  -CB Orientation: posterior  -CB Location: calf  -CB    Retired Wound - Properties Group Placement Date: 09/28/24  -CB Placement Time: 1636  -CB Present on Original Admission: Y  -CB Side: Left  -CB Orientation: posterior  -CB Location: calf  -CB    Retired Wound - Properties Group Date first assessed: 09/28/24  -CB Time first assessed: 1636  -CB Present on Original Admission: Y  -CB Side: Left  -CB Location: calf  -CB      Row Name             Wound Left posterior ankle Abrasion    Wound - Properties Group Side: Left  -SK Orientation: posterior  -SK Location: ankle  -SK Primary Wound Type: Abrasion  -SK    Retired Wound - Properties Group Side: Left  -SK Orientation: posterior  -SK Location: ankle  -SK Primary Wound Type: Abrasion   -SK    Retired Wound - Properties Group Side: Left  -SK Location: ankle  -SK Primary Wound Type: Abrasion  -SK      Row Name 10/01/24 0942          Positioning and Restraints    Pre-Treatment Position in bed  -     Post Treatment Position bed  -BRUNO     In Bed notified nsg;fowlers;call light within reach;encouraged to call for assist;exit alarm on;side rails up x3  Refused to position on side with bolsters.  -BRUNO               User Key  (r) = Recorded By, (t) = Taken By, (c) = Cosigned By      Initials Name Provider Type    Radha Child PTA Physical Therapist Assistant    Tavia Olguin RN Registered Nurse    Mignon Arreola RN Registered Nurse                    Physical Therapy Education       Title: PT OT SLP Therapies (In Progress)       Topic: Physical Therapy (In Progress)       Point: Mobility training (In Progress)       Learning Progress Summary             Patient Acceptance, E,D, NR by  at 10/1/2024 1439    Comment: bed mobility    Acceptance, E,D, VU,DU by  at 9/30/2024 1512    Comment: Pt. educated on proper body mechanics to maintain safety when performing functional mobility. Educated on safety procedures to abide by while at [facility]                         Point: Home exercise program (Not Started)       Learner Progress:  Not documented in this visit.              Point: Body mechanics (Done)       Learning Progress Summary             Patient Acceptance, E,D, VU,DU by  at 9/30/2024 1512    Comment: Pt. educated on proper body mechanics to maintain safety when performing functional mobility. Educated on safety procedures to abide by while at [facility]                         Point: Precautions (Done)       Learning Progress Summary             Patient Acceptance, E,D, VU,DU by  at 9/30/2024 1512    Comment: Pt. educated on proper body mechanics to maintain safety when performing functional mobility. Educated on safety procedures to abide by while at [facility]  [place]                                         User Key       Initials Effective Dates Name Provider Type Discipline     02/03/23 -  Radha Limon PTA Physical Therapist Assistant PT    BL 08/21/24 -  Tim Heller, PT Student PT Student PT                  PT Recommendation and Plan         Outcome Measures       Row Name 10/01/24 1400             How much help from another person do you currently need...    Turning from your back to your side while in flat bed without using bedrails? 3  -BRUNO      Moving from lying on back to sitting on the side of a flat bed without bedrails? 3  -BRUNO      Moving to and from a bed to a chair (including a wheelchair)? 3  -BRUNO      Standing up from a chair using your arms (e.g., wheelchair, bedside chair)? 3  -BRUNO      Climbing 3-5 steps with a railing? 3  -BRUNO      To walk in hospital room? 3  -BRUNO      AM-PAC 6 Clicks Score (PT) 18  -BRUNO      Highest Level of Mobility Goal 6 --> Walk 10 steps or more  -BRUNO                User Key  (r) = Recorded By, (t) = Taken By, (c) = Cosigned By      Initials Name Provider Type    BRUNO Radha Limon PTA Physical Therapist Assistant                     Time Calculation:    PT Charges       Row Name 10/01/24 1442             Time Calculation    Start Time 0942  -BRUNO      Stop Time 1007  -BRUNO      Time Calculation (min) 25 min  -BRUNO         Timed Charges    67925 - PT Therapeutic Activity Minutes 25  -BRUNO         Total Minutes    Timed Charges Total Minutes 25  -BRUNO       Total Minutes 25  -BRUNO                User Key  (r) = Recorded By, (t) = Taken By, (c) = Cosigned By      Initials Name Provider Type    BRUNO Radha Limon PTA Physical Therapist Assistant                  Therapy Charges for Today       Code Description Service Date Service Provider Modifiers Qty    [id number]  PT THERAPEUTIC ACT EA 15 MIN 10/1/2024 Radha Limon PTA GP 2            PT G-Codes  Outcome Measure Options: AM-PAC 6 Clicks Daily Activity (OT)  AM-PAC 6 Clicks  Score (PT): 18  AM-PAC 6 Clicks Score (OT): 16    Radha Limon, PTA  10/1/2024

## 2024-10-01 NOTE — PROGRESS NOTES
[facility] Intensivist Services  INPATIENT PROGRESS NOTE    Patient Name: Tucker Knox  Date of Admission: 9/28/2024  Today's Date: 10/01/24  Length of Stay: 3  Primary Care Physician: Kt Alfredo MD    Subjective   Chief Complaint: Breath  HPI   Mr. Knox is a pleasant 64-year-old male who presented to an outside ED due to shortness of breath.  Past medical history includes combined systolic and diastolic CHF, CAD status post stents, hypertension, hyperlipidemia, COPD, chronic hypoxic respiratory failure on 3 L, tobacco use, diabetes.     History is provided by the patient.  He was recently discharged from [facility] after a stay for CHF exacerbation.  He has had 3 admissions this year, 2 at [facility] and 1 at [facility].  He was discharged from [facility] on 9/18/2024.  During that admission he was diuresed with IV Lasix.  He had improvement of his symptoms and he was discharged.  Patient states initially he was doing well up until 2 days ago.  Over the past 2 days he has noted increased dyspnea worse with exertion.  He also has a dry cough which is chronic.  Also notes chills.  Denies any fever, chest pain, nausea, vomiting.  No sick contacts.     Due to his shortness of breath he presented to an outside ED.  At the outside ED his chest x-ray was consistent with volume overload.  He also had an elevated troponin and BNP.  Lactate of 2 and white count of 12.  He was given IV insulin and IV Lasix and transferred to [facility] ICU.     On exam the patient is resting comfortably in bed.  He is breathing comfortably on 3 L satting in the upper 90s.  States he would like to have something to eat and drink when able.  States that he does have some dyspnea which is worse with exertion.    Interval history:  9/29/2024 patient was admitted overnight with increasing shortness of breath and chest x-ray is showing some pulmonary edema.  Patient denies any shortness of  breath or chest pain.  Patient was started on Lasix twice daily but has not had any good urine.  I did order stat BMP today and did show worsening of his renal function and patient does look dry.  Lasix was stopped today and I am starting him on normal saline with gentle hydration due to his low ejection fraction.  Patient is at his baseline oxygen.    9/30/2024 patient is somewhat more awake today he has no complaints other than he was not able to sleep last night.  Patient diuretics were held yesterday and was started on IV fluids and his renal function and potassium has improved today with improvement in his mental status also.      10/1/2024 patient wakes up to verbal stimuli he answer question he denies any shortness of breath or chest pain. diuretics continue to be on hold with improvement in his renal function.  Off IV fluids.  Patient has been waiting for floor bed.      Review of Systems   All pertinent negatives and positives are as above. All other systems have been reviewed and are negative unless otherwise stated.     Objective    Temp:  [98 °F (36.7 °C)-99 °F (37.2 °C)] 98.3 °F (36.8 °C)  Heart Rate:  [] 96  Resp:  [19-20] 20  BP: ()/(57-94) 123/69  Physical Exam    General: Well appearing, in no respiratory distress  Head: Normocephalic, atraumatic  Eyes: Conjunctiva clear, sclera non-icteric  Teeth/Gums: Poor dentition dry mucous membranes   Neck: Supple without stridor  Heart: Regular rate and rhythm, no murmur  Lungs: Diminished bilaterally  no wheezing no crackles  Abdomen: Soft, nontender  MSK/extremities:  left lower extremity edema much worse than right with bilateral cyanosis of the feet with palpable pulses.  Neurologic: AOx3, grossly no focal deficits       Results Review:    WBC   Date Value Ref Range Status   10/01/2024 10.45 3.40 - 10.80 10*3/mm3 Final     RBC   Date Value Ref Range Status   10/01/2024 4.13 (L) 4.14 - 5.80 10*6/mm3 Final     Hemoglobin   Date Value Ref Range  Status   10/01/2024 11.2 (L) 13.0 - 17.7 g/dL Final     Hematocrit   Date Value Ref Range Status   10/01/2024 39.7 37.5 - 51.0 % Final     MCV   Date Value Ref Range Status   10/01/2024 96.1 79.0 - 97.0 fL Final     MCH   Date Value Ref Range Status   10/01/2024 27.1 26.6 - 33.0 pg Final     MCHC   Date Value Ref Range Status   10/01/2024 28.2 (L) 31.5 - 35.7 g/dL Final     RDW   Date Value Ref Range Status   10/01/2024 15.2 12.3 - 15.4 % Final     RDW-SD   Date Value Ref Range Status   10/01/2024 52.6 37.0 - 54.0 fl Final     MPV   Date Value Ref Range Status   10/01/2024 10.0 6.0 - 12.0 fL Final     Platelets   Date Value Ref Range Status   10/01/2024 185 140 - 450 10*3/mm3 Final     Neutrophil %   Date Value Ref Range Status   10/01/2024 85.1 (H) 42.7 - 76.0 % Final     Lymphocyte %   Date Value Ref Range Status   10/01/2024 7.3 (L) 19.6 - 45.3 % Final     Monocyte %   Date Value Ref Range Status   10/01/2024 5.9 5.0 - 12.0 % Final     Eosinophil %   Date Value Ref Range Status   10/01/2024 0.7 0.3 - 6.2 % Final     Basophil %   Date Value Ref Range Status   10/01/2024 0.4 0.0 - 1.5 % Final     Immature Grans %   Date Value Ref Range Status   10/01/2024 0.6 (H) 0.0 - 0.5 % Final     Neutrophils, Absolute   Date Value Ref Range Status   10/01/2024 8.90 (H) 1.70 - 7.00 10*3/mm3 Final     Lymphocytes, Absolute   Date Value Ref Range Status   10/01/2024 0.76 0.70 - 3.10 10*3/mm3 Final     Monocytes, Absolute   Date Value Ref Range Status   10/01/2024 0.62 0.10 - 0.90 10*3/mm3 Final     Eosinophils, Absolute   Date Value Ref Range Status   10/01/2024 0.07 0.00 - 0.40 10*3/mm3 Final     Basophils, Absolute   Date Value Ref Range Status   10/01/2024 0.04 0.00 - 0.20 10*3/mm3 Final     Immature Grans, Absolute   Date Value Ref Range Status   10/01/2024 0.06 (H) 0.00 - 0.05 10*3/mm3 Final     nRBC   Date Value Ref Range Status   10/01/2024 0.0 0.0 - 0.2 /100 WBC Final       Basic Metabolic Panel    Sodium Sodium   Date  "Value Ref Range Status   10/01/2024 141 136 - 145 mmol/L Final   09/30/2024 139 136 - 145 mmol/L Final   09/29/2024 137 136 - 145 mmol/L Final   09/29/2024 138 136 - 145 mmol/L Final   09/28/2024 138 136 - 145 mmol/L Final      Potassium Potassium   Date Value Ref Range Status   10/01/2024 4.7 3.5 - 5.2 mmol/L Final   09/30/2024 5.0 3.5 - 5.2 mmol/L Final   09/29/2024 5.2 3.5 - 5.2 mmol/L Final   09/29/2024 5.3 (H) 3.5 - 5.2 mmol/L Final     Comment:     Slight hemolysis detected by analyzer. Result may be falsely elevated.   09/28/2024 4.7 3.5 - 5.2 mmol/L Final      Chloride Chloride   Date Value Ref Range Status   10/01/2024 101 98 - 107 mmol/L Final   09/30/2024 100 98 - 107 mmol/L Final   09/29/2024 98 98 - 107 mmol/L Final   09/29/2024 97 (L) 98 - 107 mmol/L Final   09/28/2024 98 98 - 107 mmol/L Final      Bicarbonate No results found for: \"PLASMABICARB\"   BUN BUN   Date Value Ref Range Status   10/01/2024 37 (H) 8 - 23 mg/dL Final   09/30/2024 52 (H) 8 - 23 mg/dL Final   09/29/2024 54 (H) 8 - 23 mg/dL Final   09/29/2024 55 (H) 8 - 23 mg/dL Final   09/28/2024 50 (H) 8 - 23 mg/dL Final      Creatinine Creatinine   Date Value Ref Range Status   10/01/2024 1.08 0.76 - 1.27 mg/dL Final   09/30/2024 1.60 (H) 0.76 - 1.27 mg/dL Final   09/29/2024 1.77 (H) 0.76 - 1.27 mg/dL Final   09/29/2024 1.75 (H) 0.76 - 1.27 mg/dL Final   09/28/2024 1.65 (H) 0.76 - 1.27 mg/dL Final      Calcium Calcium   Date Value Ref Range Status   10/01/2024 8.1 (L) 8.6 - 10.5 mg/dL Final   09/30/2024 8.3 (L) 8.6 - 10.5 mg/dL Final   09/29/2024 8.4 (L) 8.6 - 10.5 mg/dL Final   09/29/2024 8.5 (L) 8.6 - 10.5 mg/dL Final   09/28/2024 8.8 8.6 - 10.5 mg/dL Final      Glucose      No components found for: \"GLUCOSE.*\"         No radiology results for the last day    Result Review:  I have personally reviewed the results from the time of this admission to 10/1/2024 07:57 CDT and agree with these findings:  [x]  Laboratory list / accordion  []  " "Microbiology  [x]  Radiology  []  EKG/Telemetry   []  Cardiology/Vascular   []  Pathology  []  Old records  []  Other:  Most notable findings include:       Culture Data:   No results found for: \"BLOODCX\", \"URINECX\", \"WOUNDCX\", \"MRSACX\", \"RESPCX\", \"STOOLCX\"    I have reviewed the patient's current medications.     Assessment/Plan   Assessment  Active Hospital Problems    Diagnosis     **Acute exacerbation of CHF (congestive heart failure)      -Acute on chronic hypoxemic respiratory failure  -Acute pulm edema  -Acute on chronic kidney disease possibly related to dehydration and diuresis  -Hyperkalemia  -COPD with acute exacerbation   - diabetes mellitus  -Acute on chronic systolic congestive heart failure with ejection fraction of 30-35%      Plan:  -Renal function is improving with holding diuretics and gentle hydration we will - - continue holding Lasix today  - IV fluid was stopped-   -Encourage oral intake  -Avoid any nephrotoxic medications  -Strict input output chart  -Patient is on 3 L of oxygen which is his baseline keep pulse ox above 88%  -Patient has left lower limb swelling I ordered a venous Doppler ultrasound to rule out DVT -report is pending   -Sliding scale insulin  -Case management to help with his home situation possible need for placement  -Stable to be transferred to the floor       Discussed with: Patient and bedside nurse     Disposition  Patient can be transferred to the floor from critical care standpoint    Electronically signed by Mohit Barnes MD on 10/1/2024 at 07:57 CDT      Addendum      Discussed with Dr. Joiner from the hospitalist service and he accepted the patient to be transferred under their care  "

## 2024-10-02 PROBLEM — R41.0 DELIRIUM: Status: ACTIVE | Noted: 2024-01-01

## 2024-10-02 PROBLEM — J96.11 CHRONIC RESPIRATORY FAILURE WITH HYPOXIA: Status: ACTIVE | Noted: 2024-10-02

## 2024-10-02 LAB
ANION GAP SERPL CALCULATED.3IONS-SCNC: 10 MMOL/L (ref 5–15)
BASOPHILS # BLD AUTO: 0.04 10*3/MM3 (ref 0–0.2)
BASOPHILS NFR BLD AUTO: 0.4 % (ref 0–1.5)
BUN SERPL-MCNC: 35 MG/DL (ref 8–23)
BUN/CREAT SERPL: 33.7 (ref 7–25)
CALCIUM SPEC-SCNC: 8.5 MG/DL (ref 8.6–10.5)
CHLORIDE SERPL-SCNC: 99 MMOL/L (ref 98–107)
CO2 SERPL-SCNC: 31 MMOL/L (ref 22–29)
CREAT SERPL-MCNC: 1.04 MG/DL (ref 0.76–1.27)
DEPRECATED RDW RBC AUTO: 52.7 FL (ref 37–54)
EGFRCR SERPLBLD CKD-EPI 2021: 80.7 ML/MIN/1.73
EOSINOPHIL # BLD AUTO: 0.04 10*3/MM3 (ref 0–0.4)
EOSINOPHIL NFR BLD AUTO: 0.4 % (ref 0.3–6.2)
ERYTHROCYTE [DISTWIDTH] IN BLOOD BY AUTOMATED COUNT: 15.1 % (ref 12.3–15.4)
GLUCOSE BLDC GLUCOMTR-MCNC: 120 MG/DL (ref 70–130)
GLUCOSE BLDC GLUCOMTR-MCNC: 134 MG/DL (ref 70–130)
GLUCOSE BLDC GLUCOMTR-MCNC: 159 MG/DL (ref 70–130)
GLUCOSE SERPL-MCNC: 141 MG/DL (ref 65–99)
HCT VFR BLD AUTO: 38.7 % (ref 37.5–51)
HGB BLD-MCNC: 11.2 G/DL (ref 13–17.7)
IMM GRANULOCYTES # BLD AUTO: 0.03 10*3/MM3 (ref 0–0.05)
IMM GRANULOCYTES NFR BLD AUTO: 0.3 % (ref 0–0.5)
LYMPHOCYTES # BLD AUTO: 0.49 10*3/MM3 (ref 0.7–3.1)
LYMPHOCYTES NFR BLD AUTO: 5.5 % (ref 19.6–45.3)
MCH RBC QN AUTO: 27.7 PG (ref 26.6–33)
MCHC RBC AUTO-ENTMCNC: 28.9 G/DL (ref 31.5–35.7)
MCV RBC AUTO: 95.6 FL (ref 79–97)
MONOCYTES # BLD AUTO: 0.49 10*3/MM3 (ref 0.1–0.9)
MONOCYTES NFR BLD AUTO: 5.5 % (ref 5–12)
NEUTROPHILS NFR BLD AUTO: 7.87 10*3/MM3 (ref 1.7–7)
NEUTROPHILS NFR BLD AUTO: 87.9 % (ref 42.7–76)
NRBC BLD AUTO-RTO: 0 /100 WBC (ref 0–0.2)
PLATELET # BLD AUTO: 184 10*3/MM3 (ref 140–450)
PMV BLD AUTO: 9.8 FL (ref 6–12)
POTASSIUM SERPL-SCNC: 5.1 MMOL/L (ref 3.5–5.2)
RBC # BLD AUTO: 4.05 10*6/MM3 (ref 4.14–5.8)
SODIUM SERPL-SCNC: 140 MMOL/L (ref 136–145)
WBC NRBC COR # BLD AUTO: 8.96 10*3/MM3 (ref 3.4–10.8)

## 2024-10-02 PROCEDURE — 97535 SELF CARE MNGMENT TRAINING: CPT | Performed by: OCCUPATIONAL THERAPIST

## 2024-10-02 PROCEDURE — 80048 BASIC METABOLIC PNL TOTAL CA: CPT | Performed by: INTERNAL MEDICINE

## 2024-10-02 PROCEDURE — 63710000001 INSULIN LISPRO (HUMAN) PER 5 UNITS: Performed by: INTERNAL MEDICINE

## 2024-10-02 PROCEDURE — 63710000001 INSULIN GLARGINE PER 5 UNITS: Performed by: NURSE PRACTITIONER

## 2024-10-02 PROCEDURE — 97530 THERAPEUTIC ACTIVITIES: CPT

## 2024-10-02 PROCEDURE — 25010000002 ENOXAPARIN PER 10 MG: Performed by: INTERNAL MEDICINE

## 2024-10-02 PROCEDURE — 85025 COMPLETE CBC W/AUTO DIFF WBC: CPT | Performed by: INTERNAL MEDICINE

## 2024-10-02 PROCEDURE — 82948 REAGENT STRIP/BLOOD GLUCOSE: CPT

## 2024-10-02 RX ORDER — NICOTINE 21 MG/24HR
1 PATCH, TRANSDERMAL 24 HOURS TRANSDERMAL
Status: DISCONTINUED | OUTPATIENT
Start: 2024-10-02 | End: 2024-10-04 | Stop reason: HOSPADM

## 2024-10-02 RX ORDER — QUETIAPINE FUMARATE 25 MG/1
25 TABLET, FILM COATED ORAL NIGHTLY
Status: DISCONTINUED | OUTPATIENT
Start: 2024-10-02 | End: 2024-10-04 | Stop reason: HOSPADM

## 2024-10-02 RX ADMIN — DOCUSATE SODIUM 50 MG AND SENNOSIDES 8.6 MG 2 TABLET: 8.6; 5 TABLET, FILM COATED ORAL at 21:14

## 2024-10-02 RX ADMIN — Medication 10 ML: at 21:18

## 2024-10-02 RX ADMIN — INSULIN LISPRO 2 UNITS: 100 INJECTION, SOLUTION INTRAVENOUS; SUBCUTANEOUS at 13:24

## 2024-10-02 RX ADMIN — NICOTINE 1 PATCH: 21 PATCH, EXTENDED RELEASE TRANSDERMAL at 18:24

## 2024-10-02 RX ADMIN — INSULIN GLARGINE 30 UNITS: 100 INJECTION, SOLUTION SUBCUTANEOUS at 21:15

## 2024-10-02 RX ADMIN — ZINC OXIDE 1 APPLICATION: 200 OINTMENT TOPICAL at 17:03

## 2024-10-02 RX ADMIN — GABAPENTIN 600 MG: 300 CAPSULE ORAL at 15:10

## 2024-10-02 RX ADMIN — SACUBITRIL AND VALSARTAN 1 TABLET: 24; 26 TABLET, FILM COATED ORAL at 15:09

## 2024-10-02 RX ADMIN — QUETIAPINE FUMARATE 25 MG: 25 TABLET, FILM COATED ORAL at 21:14

## 2024-10-02 RX ADMIN — ASPIRIN 81 MG: 81 TABLET, COATED ORAL at 15:10

## 2024-10-02 RX ADMIN — Medication 10 ML: at 09:45

## 2024-10-02 RX ADMIN — SACUBITRIL AND VALSARTAN 1 TABLET: 24; 26 TABLET, FILM COATED ORAL at 21:14

## 2024-10-02 RX ADMIN — PRAVASTATIN SODIUM 40 MG: 20 TABLET ORAL at 21:14

## 2024-10-02 NOTE — PLAN OF CARE
Goal Outcome Evaluation:  Plan of Care Reviewed With: patient        Progress: declining  Outcome Evaluation: Pt. demo's increased confusion compared to date of eval. Mr Knox making utterance continuously but not conversational.  He had incontinent of bowel episode and required Dep A to cleanse self and change brief while in bed.  One cue to initiate activity for facial washing and hair grooming. Pt. perseverative with hair combing & required cue to cease. OTR provided items for oral care. Cue to attend to items on tray & initiate set up & dequencing of oral care. Pt. attempted to comb hair with packaged toothbrush. OTR reoriented the task and set up needs to patient who looked at packaged items and appeared confused. He made one attempt to open and then requested assist. OTR opened then applied toothpaste so pt could complete task. Cueing still required to ensure pt used items correctly. Mr. Knox swallowed toothpaste after brushing vs spitting in basin.  Reported suspected increased confusion to RN. Cont OT      Anticipated Discharge Disposition (OT): skilled nursing facility

## 2024-10-02 NOTE — CASE MANAGEMENT/SOCIAL WORK
Continued Stay Note   [place]     Patient Name: Tucker Knox  MRN: [id number]  Today's Date: 10/2/2024    Admit Date: 9/28/2024    Plan: [facility]   Discharge Plan       Row Name 10/02/24 1634       Plan    Plan [facility]    Plan Comments Started precert with INS for patient to go to [facility]. Will update once decision is made.                   Discharge Codes    No documentation.                 Expected Discharge Date and Time       Expected Discharge Date Expected Discharge Time    Oct 3, 2024               Shantel Ugalde RN

## 2024-10-02 NOTE — PROGRESS NOTES
Enter Query Response Below      Query Response: acute kidney injury secondary to ATN             If applicable, please update the problem list.     Patient: Tucker Knox        : 1960  Account: [id number]           Admit Date:         How to Respond to this query:       a. Click New Note     b. Answer query within the yellow box.                c. Update the Problem List, if applicable.      If you have any questions about this query contact me at: [email or web address]     ,    Progress notes document acute on chronic kidney disease.  There is no documented baseline creatinine.   Daily Creatinine levels:     1.65     1.75-1.77     1.60  10/1   1.08  10/2   1.04  Treatment includes IV fluids and holding diuretic.    Can acute kidney disease be further specified as:     Acute kidney injury (AMA)  Other- specify: __________    AMA is defined by KDIGO as any of the following:    Increase in creatinine > 0.3 mg/dl within 48 hours or less; or    Increase in creatinine level to > 1.5x baseline (historical or measure), which is known or presumed to have occurred within the prior 7 days; or    Urine volume <0.5 ml/kg/h for 6 hours.  Reference: www. KIDGO.org    By submitting this query, we are merely seeking further clarification of documentation to accurately reflect all conditions that you are monitoring, evaluating, treating or that extend the hospitalization or utilize additional resources of care. Please utilize your independent clinical judgment when addressing the question(s) above.     This query and your response, once completed, will be entered into the legal medical record.    Sincerely,  Kristina GUTIÉRREZ, RN  Clinical Documentation Integrity Program   [email or web address]

## 2024-10-02 NOTE — PLAN OF CARE
Goal Outcome Evaluation:              Outcome Evaluation: Nutrition assessment copmlete. PO 79% of meals, 537 mL oral fluid/d. Glu ranges . Several areas of skin breakdown noted - per flowsheets, none are classified as pressure injuries. Will continue current diet order and follow per protocol.

## 2024-10-02 NOTE — PROGRESS NOTES
[facility] Medicine Services  INPATIENT PROGRESS NOTE    Patient Name: Tucker Knox  Date of Admission: 9/28/2024  Today's Date: 10/02/24  Length of Stay: 4  Primary Care Physician: Kt Alfredo MD    Subjective   Chief Complaint: Shortness of breath  HPI   64-year-old male with past medical history of combined systolic diastolic congestive failure, AICD, coronary artery disease status post PCI, hypertension, COPD, chronic hypoxic respiratory failure on 2 L/min, colon cancer, diabetes mellitus who presented to the emergency room 2 weeks after discharge from an exacerbation of congestive failure with fluid overload elevated troponin and BNP.  He was transferred to our intensive care unit for further care of pulmonary edema.  On admission he was noted to be dry with uptrending of BMP and was started on gentle hydration and continuing oxygen at 3 L/min.  Admission troponin was 483 and follow-up 548 with a BNP of 18,381. This improve his mentation and renal function.  He is transferred to telemetry floor on 10/1/2024.  Creatinine improved from 1.6-1.04.  An ultrasound of the left lower extremity venous Doppler was negative for DVT.    Cardiac workup was negative.  He was deemed to have an NSTEMI type II due to demand ischemia but had resolved with time.  Cardiac medications were started to help assist in setting of his known history of CHF.  Diet was advanced as tolerated as he had return of bowel function.  He was weaned off of oxygen during the day but steadfastly refused to take the CPAP at night.  He was deemed to be stable for discharge home and benefited from his hospital stay.     Reviewing records it is noted that:  On 12/5/2023, he underwent a robotic low anterior resection for biopsy-proven adenocarcinoma of the rectosigmoid junction.  He follow-up with Dr. Stallworth on 2/29/2024 who recommended:  Moderately differentiated rectosigmoid colon  adenocarcinoma:   AJCC Stage: IIIA (pT3, pN1a, M0, G2).   Tumor burden: 7.5 cm tumor rectosigmoid colon, with 1/27 metastatic regional nodes.  No tumor perforation. No lymphovascular invasion.  Negative perineural invasion.  Negative margins.  Complications of tumor: Anemia  Microsatellite instability status (MSI): MLH 1, MSH 2, MSH 6, PMS 2: Pending  BRAF/NRAS/KRAS mutation status:  Pending.   Tumor status:  12/5/2023-colon resection laparoscopic sigmoid, intraoperative flexible sigmoidoscopy, splenic flexure mobilization, open umbilical hernia repair.  Dr. Velasquez.  Findings: Rectosigmoid mass-distal and proximal margins confirmed.  Abdominal exploration negative for evidence of metastatic disease.  Tumor noted to be at the rectosigmoid junction requiring an LAR to get margins.'  12/5/2023- [facility]us/xT (reported 2/17/24)-genomic variants: Potentially [id number] missense variant.  Pertinent negatives: BRAF/NRAS.  Immunotherapy markers: TMB 8.9 (82nd percentile).  MSI stable.  FDA approved therapies, current diagnosis: Anti-EGFR MAb-cetuximab or panitumumab.   Schedule treatment -  C1, 2/26/24; C2, 3/11/24; C3, 3/25/24; C4, 4/8/24; C5, 4/22/24 at [facility]:      -  To be administered every 2 weeks x 12 cycles   BSA =   D1:   Oxaliplatin 85 mg/m2 IVPB over 2 hours (TD = mg).   Leucovorin 400 mg/m2 IVPB over 2 hours (TD= mg).   5- mg/ m2 IVP (TD = mg).   Begin 5-FU 2400 mg/m2 IVPB over 46 hours. (TD= mg).    He apparently has no had chemotherapy started since he was admitted to [facility] on 3/16/2024 for acute respiratory failure with COVID and required prolonged intubation.  He was discharged on 4/4/2024 from that facility.     Today:  Patient remains encephalopathic.  He is not taking much oral intake and is needing a lot of assistance from nursing to have any caloric intake. Not taking his medications this morning and removing and oxygen.     Echo 2D 9/09/2024    Left ventricular systolic function is  moderately decreased. Left ventricular ejection fraction appears to be 31 - 35%.    The left ventricular cavity is mildly dilated.    Left ventricular wall thickness is consistent with mild concentric hypertrophy.    Left ventricular diastolic function is consistent with (grade II w/high LAP) pseudonormalization.    Mildly reduced right ventricular systolic function noted.    The right ventricular cavity is moderately dilated.    The left atrial cavity is mildly dilated.    The right atrial cavity is dilated.    Mild aortic valve stenosis is present.    Estimated right ventricular systolic pressure from tricuspid regurgitation is moderately elevated (45-55 mmHg).    [facility] 9/12/2024  Selective coronary angiography:  1. Left main: The left main bifurcates into the LAD and left circumflex arteries.  There are mild luminal irregularities only present.  2. Left anterior descending artery: The LAD runs in the interventricular groove giving off 2 diagonals and multiple septal perforators before wrapping around the apex as an apical recurrent branch.  There is a severe calcified 70 to 80% stenosis in the proximal vessel.  3. Left circumflex artery: The left circumflex is nondominant for posterior circulation.  It gives off 2 obtuse marginal branches.  There are mild luminal irregularities only present.  4. Right coronary artery: The RCA is dominant for posterior circulation giving rise to PDA and right PL branches.  The RCA is chronically totally occluded in the mid vessel at a previous stent.  The PDA and right PL fill via both right to right and left-to-right collaterals.     Impression:  1.  Severe two-vessel disease of the LAD and right coronary artery as described above.  2.  Elevated left heart filling pressures     Plan:  -CT surgery consult for evaluation of CABG.  -Routine post-cath care and TR band removal.  -Return to telemetry floor for continued diuresis and optimization of medical therapy.    Review of Systems    All pertinent negatives and positives are as above. All other systems have been reviewed and are negative unless otherwise stated.     Objective    Temp:  [97.1 °F (36.2 °C)-98.7 °F (37.1 °C)] 97.1 °F (36.2 °C)  Heart Rate:  [93-99] 94  Resp:  [18-22] 20  BP: (101-139)/(62-73) 101/62  Physical Exam  Constitutional:       Appearance: He is well-developed. He is ill-appearing.      Comments: Somnolent, disheveled and responding to chronically ill-appearing elderly male   HENT:      Head: Normocephalic and atraumatic.      Right Ear: External ear normal.      Left Ear: External ear normal.      Nose: Nose normal.      Mouth/Throat:      Mouth: Mucous membranes are dry.   Eyes:      General:         Right eye: No discharge.         Left eye: No discharge.      Extraocular Movements: Extraocular movements intact.      Conjunctiva/sclera: Conjunctivae normal.      Pupils: Pupils are equal, round, and reactive to light.   Neck:      Vascular: No JVD.   Cardiovascular:      Rate and Rhythm: Regular rhythm. Tachycardia present.      Heart sounds: Normal heart sounds. No murmur heard.  Pulmonary:      Effort: No respiratory distress.      Breath sounds: No wheezing or rales.   Chest:      Chest wall: No tenderness.   Abdominal:      General: Bowel sounds are normal. There is no distension.      Palpations: Abdomen is soft.      Tenderness: There is no abdominal tenderness. There is no guarding or rebound.   Musculoskeletal:         General: No tenderness or deformity. Normal range of motion.      Cervical back: Normal range of motion and neck supple. No rigidity.      Comments: +1 edema chronic bilateral lower extremities.  Feet are cyanotic without necrosis.   Skin:     General: Skin is warm.      Findings: No rash.   Neurological:      Mental Status: He is disoriented.      Cranial Nerves: No cranial nerve deficit.      Sensory: No sensory deficit.      Motor: Weakness present. No abnormal muscle tone.      Deep Tendon  "Reflexes: Reflexes normal.     Results Review:  I have reviewed the labs, radiology results, and diagnostic studies.    Laboratory Data:   Results from last 7 days   Lab Units 10/02/24  0341 10/01/24  0230 09/30/24  0219   WBC 10*3/mm3 8.96 10.45 9.35   HEMOGLOBIN g/dL 11.2* 11.2* 10.3*   HEMATOCRIT % 38.7 39.7 37.0*   PLATELETS 10*3/mm3 184 185 181        Results from last 7 days   Lab Units 10/02/24  0341 10/01/24  0230 09/30/24  0219   SODIUM mmol/L 140 141 139   POTASSIUM mmol/L 5.1 4.7 5.0   CHLORIDE mmol/L 99 101 100   CO2 mmol/L 31.0* 31.0* 31.0*   BUN mg/dL 35* 37* 52*   CREATININE mg/dL 1.04 1.08 1.60*   CALCIUM mg/dL 8.5* 8.1* 8.3*   GLUCOSE mg/dL 141* 82 103*       Culture Data:   No results found for: \"BLOODCX\", \"URINECX\", \"WOUNDCX\", \"MRSACX\", \"RESPCX\", \"STOOLCX\"    Radiology Data:   Imaging Results (Last 24 Hours)       ** No results found for the last 24 hours. **            I have reviewed the patient's current medications.     Assessment/Plan   Assessment  Active Hospital Problems    Diagnosis     **Acute exacerbation of CHF (congestive heart failure)     Chronic respiratory failure with hypoxia     Delirium     Presence of external cardiac defibrillator     Malignant neoplasm of sigmoid colon     Sleep apnea     Pulmonary HTN     COPD (chronic obstructive pulmonary disease)     Diabetes mellitus     Hypertension      Treatment Plan  Continue treatment in telemetry floor  Vitals every 4 hours  Diet cardiac consistent carbohydrate  IV fluids saline lock  Up with assistance and fall precautions    Acute on chronic systolic and diastolic congestive heart failure  Continue diuresis  Ins and outs  Daily weights  Patient appears a dry weight    Coronary artery disease with recent left heart catheterization showing two-vessel disease being evaluated for possible CABG  Continue to monitor symptoms and vital signs  Cardiology input from Dr. Arreaga noted from previous admission  Will have to consider " referral.  With patient current functional status I am not sure he will be a good candidate for an invasive procedure.  This was discussed with his sister.    Colon cancer status post laparoscopic resection in December 2023  Dr. Stallworth consult note from February 2024 noted  Will ask for follow-up and recommendations  Given the patient's coronary artery disease I am not sure what the options for treatment are going to be  This is a concerning situation and this was discussed with his sister.  Will ask palliative care consult    Diabetes mellitus  Continue insulin    COPD with chronic respiratory failure  Oxygen to 3 L/min nasal cannula  Continue bronchodilators    Delirium  Seroquel 25 mg at bedtime  Nicotine patch  Reorient frequently and monitor sleep cycle    DVT prophylaxis SCD    Medical Decision Making  Number and Complexity of problems: 6 complex problems  Differential Diagnosis: See above    Conditions and Status        Condition is unchanged.     [facility] Data  External documents reviewed: [facility], [facility] medical records  Cardiac tracing (EKG, telemetry) interpretation: Sinus rhythm  Radiology interpretation: See above  Labs reviewed: As above  Any tests that were considered but not ordered: None     Decision rules/scores evaluated (example [id number], Wells, etc): Not applicable patient and his sister     Discussed with: Patient and his sister     Care Planning  Shared decision making: Patient  Code status and discussions: Full code      Disposition  Social Determinants of Health that impact treatment or disposition: None  I expect the patient to be discharged to home versus skilled nursing facility in several days.     Electronically signed by Sea Joiner MD, 10/02/24, 17:09 CDT.

## 2024-10-02 NOTE — CASE MANAGEMENT/SOCIAL WORK
Continued Stay Note   [facility]     Patient Name: Tucker Knox  MRN: [id number]  Today's Date: 10/2/2024    Admit Date: 9/28/2024    Plan: SNF   Discharge Plan       Row Name 10/02/24 1203       Plan    Plan SNF    Patient/Family in Agreement with Plan yes    Plan Comments Referral pending at [facility]; admissions is reviewing and will let SW know shortly.                   Discharge Codes    No documentation.                       GAGANDEEP Cespedes

## 2024-10-02 NOTE — DISCHARGE PLACEMENT REQUEST
"Farhana Norton (64 y.o. Male)       Date of Birth   1960    Social Security Number       Address   [address] [place], [place] [address]    Home Phone   [phone]    MRN   [id number]       Anabaptist   Other    Marital Status                               Admission Date   9/28/24    Admission Type   Urgent    Admitting Provider   Quentin Morin DO    Attending Provider   Sea Joiner MD    Department, Room/Bed   [facility] 4B, 440/1       Discharge Date       Discharge Disposition       Discharge Destination                                 Attending Provider: Sea Joiner MD    Allergies: Penicillins    Isolation: None   Infection: None   Code Status: CPR    Ht: 175.3 cm (69\")   Wt: 123 kg (270 lb 4.8 oz)    Admission Cmt: None   Principal Problem: Acute exacerbation of CHF (congestive heart failure) [I50.9]                   Active Insurance as of 9/28/2024       Primary Coverage       Payor Plan Insurance Group Employer/Plan Group    ANTHEM MEDICARE REPLACEMENT ANTHEM MEDICARE ADVANTAGE [id number]       Payor Plan Address Payor Plan Phone Number Payor Plan Fax Number Effective Dates    [address] [phone]  2/1/2024 - None Entered    [facility] [id number]         Subscriber Name Subscriber Birth Date Member ID       FARHANA NORTON 1960 [id number]                     Emergency Contacts        (Rel.) Home Phone Work Phone Mobile Phone    alycia norton (Son) [phone] -- --    sowmyajose (Sister) [phone] -- --                 History & Physical        Quentin Morin DO at 09/28/24 1844              [facility] Intensivist Services  History and Physical Note    Patient Name: Farhana Norton  Date of Admission: 9/28/2024  Today's Date: 09/28/24  Length of Stay: 0  Primary Care Physician: Kt Alfredo MD    Subjective     Chief Complaint: Shortness of " breath    HPI:  Mr. Knox is a pleasant 64-year-old male who presented to an outside ED due to shortness of breath.  Past medical history includes combined systolic and diastolic CHF, CAD status post stents, hypertension, hyperlipidemia, COPD, chronic hypoxic respiratory failure on 3 L, tobacco use, diabetes.    History is provided by the patient.  He was recently discharged from [facility] after a stay for CHF exacerbation.  He has had 3 admissions this year, 2 at [facility] and 1 at [facility].  He was discharged from [facility] on 9/18/2024.  During that admission he was diuresed with IV Lasix.  He had improvement of his symptoms and he was discharged.  Patient states initially he was doing well up until 2 days ago.  Over the past 2 days he has noted increased dyspnea worse with exertion.  He also has a dry cough which is chronic.  Also notes chills.  Denies any fever, chest pain, nausea, vomiting.  No sick contacts.    Due to his shortness of breath he presented to an outside ED.  At the outside ED his chest x-ray was consistent with volume overload.  He also had an elevated troponin and BNP.  Lactate of 2 and white count of 12.  He was given IV insulin and IV Lasix and transferred to [facility] ICU.    On exam the patient is resting comfortably in bed.  He is breathing comfortably on 3 L satting in the upper 90s.  States he would like to have something to eat and drink when able.  States that he does have some dyspnea which is worse with exertion.    Review of Systems:  All pertinent negatives and positives are as above. All other systems have been reviewed and are negative unless otherwise stated.       Objective      Physical Exam:  General: Well appearing, in no respiratory distress  Head: Normocephalic, atraumatic  Eyes: Conjunctiva clear, sclera non-icteric  Teeth/Gums: Poor dentition  Neck: Supple without stridor  Heart: Regular rate and rhythm, no murmur  Lungs: Diminished bilaterally with rales in  "bilateral bases  Abdomen: Soft, nontender  MSK/extremities: Bilateral lower extremity pitting edema up to above the knee  Neurologic: AOx3, grossly no focal deficits      Results Review:      Results from last 7 days   Lab Units 09/28/24  1808   WBC 10*3/mm3 12.53*   HEMOGLOBIN g/dL 11.7*   HEMATOCRIT % 40.2   PLATELETS 10*3/mm3 201     Visit Vitals  /96   Pulse 101   Temp 98.4 °F (36.9 °C) (Oral)   Resp 17   Ht 175.3 cm (69\")   Wt 117 kg (257 lb 0.9 oz)   SpO2 96%   BMI 37.96 kg/m²       Medications:  aspirin, 81 mg, Oral, Daily  [START ON 9/29/2024] Chlorhexidine Gluconate Cloth, 1 Application, Topical, Q24H  DULoxetine, 30 mg, Oral, Daily  empagliflozin, 10 mg, Oral, Daily  enoxaparin, 40 mg, Subcutaneous, Daily  furosemide, 80 mg, Intravenous, Q12H  gabapentin, 600 mg, Oral, Daily  insulin glargine, 20 Units, Subcutaneous, Nightly  insulin lispro, 2-9 Units, Subcutaneous, 4x Daily AC & at Bedtime  mupirocin, 1 Application, Each Nare, BID  pravastatin, 40 mg, Oral, Nightly  sacubitril-valsartan, 1 tablet, Oral, BID  senna-docusate sodium, 2 tablet, Oral, BID  sodium chloride, 10 mL, Intravenous, Q12H             Assessment/Plan     Problems:  Combined systolic and diastolic CHF with acute exacerbation  Chronic hypoxic respiratory failure on home O2  COPD without exacerbation  Hypertension  Hyperlipidemia  Diabetes  Leukocytosis    Assessment:  Patient is a 64-year-old male admitted to [facility] ICU from an outside ED.  Patient is clinically and hemodynamically stable.  Will start treatment for decompensated heart failure.  Suspect recurrent admissions for CHF exacerbation secondary to noncompliance.  Patient admits to missing some of his medications.  Will review and restart home meds as indicated.     Plan:  Combined systolic and diastolic CHF with acute exacerbation  -Start Lasix 80 mg IV twice daily  -Strict I's and O's with daily weight  -Continuous telemetry monitoring  -Fluid restriction of 2 " "L/day  -Restart home Entresto    COPD without acute exacerbation, chronic hypoxic respiratory failure  -Supplemental oxygen as needed and wean as tolerated, goal O2 sat of 88-92%  -No inhalers on his home med list, will provide DuoNeb as needed  -Frequent incentive spirometer  -Tobacco cessation    Hypertension, hyperlipidemia  -Review and restart home meds as indicated    Diabetes  -Restart home long-acting  -Sliding scale  -Accu-Cheks ACHS      Critical Care Set:  Feeding: By mouth diet  Analgesia: As needed ordered  Sedation: N/A  Thromboprophylaxis: DVT Lovenox  HOB: 30+  Ulcer prophylaxis: N/A  Glycemic control: Diabetic, insulin as above  SBT: N/A  Bowel movement: Regimen ordered  Catheter removal: Urinary catheter placed by outside ED, will continue tonight with diuresis can likely discontinue tomorrow  Medication de-escalation: N/A      Disposition  Continue critical care management.      Madyson Morin DO  Pulmonary Critical Care Medicine  2024  18:44 CDT      Electronically signed by Quentin Morin DO at 24 [phone]          Physician Progress Notes (last 24 hours)        Mohit Barnes MD at 10/02/24 1248            Enter Query Response Below      Query Response:    acute on chronic hypoxemic respiratory failure           If applicable, please update the problem list.     Patient: Tucker Knox        : 1960  Account: [id number]           Admit Date:         How to Respond to this query:       a. Click New Note     b. Answer query within the yellow box.                c. Update the Problem List, if applicable.      If you have any questions about this query contact me at: [email or web address]     ,    Patient presents with increased dyspnea, worse with exertion.  Progress notes document exacerbation of heart failure and acute on chronic hypoxemic respiratory failure, with note on  including \"Patient is on 3 to 4 L of oxygen which is his baseline.\"  " Vital sign flowsheet shows no SpO2 readings below 90%, with the exception of one-time readings of 78% ( @ 0800), 85% ( @ ), 87% (10/1 @ 0100) and 88% (10/1 @ 0600), with no changes in treatment.  There is no documented respiratory distress or increased work of breathing.    After study, was acute respiratory failure clinically supported during this admission?    Acute respiratory failure was supported with additional clinical indicators:____________  Acute respiratory failure was not supported  Other- specify_____________  Unable to determine    By submitting this query, we are merely seeking further clarification of documentation to accurately reflect all conditions that you are monitoring, evaluating, treating or that extend the hospitalization or utilize additional resources of care. Please utilize your independent clinical judgment when addressing the question(s) above.     This query and your response, once completed, will be entered into the legal medical record.    Sincerely,  Kristina GUTIÉRREZ, RN  Clinical Documentation Integrity Program   [email or web address]    Electronically signed by Mohit Barnes MD at 10/02/24 1248       Mohit Barnes MD at 10/02/24 1247            Enter Query Response Below      Query Response: acute kidney injury secondary to ATN             If applicable, please update the problem list.     Patient: Tucker Knox        : 1960  Account: [id number]           Admit Date:         How to Respond to this query:       a. Click New Note     b. Answer query within the yellow box.                c. Update the Problem List, if applicable.      If you have any questions about this query contact me at: [email or web address]     ,    Progress notes document acute on chronic kidney disease.  There is no documented baseline creatinine.   Daily Creatinine levels:     1.65     1.75-1.77     1.60  10/1   1.08  10/2   1.04  Treatment includes  IV fluids and holding diuretic.    Can acute kidney disease be further specified as:     Acute kidney injury (AMA)  Other- specify: __________    AMA is defined by KDIGO as any of the following:    Increase in creatinine > 0.3 mg/dl within 48 hours or less; or    Increase in creatinine level to > 1.5x baseline (historical or measure), which is known or presumed to have occurred within the prior 7 days; or    Urine volume <0.5 ml/kg/h for 6 hours.  Reference: www. KIDGO.org    By submitting this query, we are merely seeking further clarification of documentation to accurately reflect all conditions that you are monitoring, evaluating, treating or that extend the hospitalization or utilize additional resources of care. Please utilize your independent clinical judgment when addressing the question(s) above.     This query and your response, once completed, will be entered into the legal medical record.    Sincerely,  Kristina GUTIÉRREZ, RN  Clinical Documentation Integrity Program   [email or web address]    Electronically signed by Mohit Barnes MD at 10/02/24 1247          Physical Therapy Notes (last 72 hours)        Tim Heller, PT Student at 09/30/24 1529  Version 1 of 1      Attestation signed by Bentley Kaur PT DPT at 09/30/24 1536    I reviewed the documentation and agree.                   Goal Outcome Evaluation:  Plan of Care Reviewed With: patient        Progress: no change  Outcome Evaluation: PT eval complete. Oriented x4. Presents in fowlers position with 3L of O2 via NC. Pt. is very lethargic throughout evaluation. Pt. able to get to sitting EOB with minimal assistance. Showed good strength across B LE with sensation intact. Pt. showed impulsivity when coming to standing, moving prior to either therapist being prepared. Able to come to standing with min. assist. primarily to provide stability through trunk. Shows decreased balance with continuous posterior and right lean. Walks with high guard  positioning and shuffling gait. Skilled therapy recommended to address functional mobility deficits, balance and endurance deficits. D/C recommended to short term rehab.      Anticipated Discharge Disposition (PT): sub acute care setting                          Electronically signed by Bentley Kaur, PT DPT at 24 1536       Tmi Heller, PT Student at 24 1526  Version 1 of 1      Attestation signed by Bentley Kaur PT DPT at 24 1536    I reviewed the documentation and agree.                   Patient Name: Tucker Knox  : 1960    MRN: [id number]                              Today's Date: 2024       Admit Date: 2024    Visit Dx:     ICD-10-CM ICD-9-CM   1. Impaired mobility [Z74.09]  Z74.09 799.89     Patient Active Problem List   Diagnosis    Diabetes mellitus    Hypertension    Pulmonary HTN    COPD (chronic obstructive pulmonary disease)    NSTEMI, initial episode of care    Malignant neoplasm of sigmoid colon    FH: colon cancer    Sleep apnea    History of adenomatous polyp of colon    Colon adenocarcinoma    Acute on chronic systolic CHF (congestive heart failure)    PVC's (premature ventricular contractions)    Presence of external cardiac defibrillator    Acute exacerbation of CHF (congestive heart failure)     Past Medical History:   Diagnosis Date    Cancer     CHF (congestive heart failure)     COPD (chronic obstructive pulmonary disease)     Coronary artery disease     stent x 1    Family history of colon cancer     Hyperlipidemia     Hypertension     Sleep apnea     does not wear machine, supposed to wear cpap with oxygen and does not wear it    Type 2 diabetes mellitus      Past Surgical History:   Procedure Laterality Date    BACK SURGERY      CARDIAC CATHETERIZATION Left 2022    Procedure: Cardiac Catheterization/Vascular Study;  Surgeon: Todd Avendano MD;  Location:  [facility] CATH INVASIVE LOCATION;  Service: Cardiology;  Laterality: Left;     CARDIAC CATHETERIZATION      with stent x1    CARDIAC CATHETERIZATION N/A 9/12/2024    Procedure: Left Heart Cath;  Surgeon: Ruben Adler MD;  Location:  [facility] CATH INVASIVE LOCATION;  Service: Cardiology;  Laterality: N/A;    COLON RESECTION N/A 12/5/2023    Procedure: COLON RESECTION LAPAROSCOPIC SIGMOID WITH DAVINCI ROBOT, INTRA-OPERATIVE FLEXIBLE SIGMOIDOSCOPY, SPLENIC FLEXURE MOBILIZATION, OPEN UMBILICAL HERNIA REPAIR;  Surgeon: Oma Velasquez MD;  Location:  [facility] OR;  Service: Robotics - DaVinci;  Laterality: N/A;    COLONOSCOPY N/A 10/09/2023    Diverticulosis; One 5mm polyp in ascending colon; One 5mm polyp in transverse colon; One 10mm polyp at 65cm proximal to anus; One 20mm polyp at 65cm proximal to anus-Tattooed; One 20mm polyp at 42cm proximal to anus-Clip (MR conditional) placed-Tattooed; Rule out malignancy-Partially obstructing tumor in recto-sigmoid colon-biopsied-Tattooed; One 10mm polyp in rectum    ELBOW PROCEDURE      KNEE SURGERY      LUMBAR DISC SURGERY        General Information       Row Name 09/30/24 1415          Physical Therapy Time and Intention    Document Type evaluation  DX: Acute exacerbation of CHF. Presents with shortness of breath and fever chills.  -ALEXANDRA (r) BL (t) ALEXANDRA (c)     Mode of Treatment physical therapy  PMH:combined systolic and diastolic CHF, CAD status post stents, hypertension, hyperlipidemia, COPD, chronic hypoxic respiratory failure on 3 L, tobacco use, diabetes.  -ALEXANDRA (r) BL (t) ALEXANDRA (c)       Row Name 09/30/24 1410          General Information    Patient Profile Reviewed yes  -ALEXANDRA (r) BL (t) ALEXANDRA (c)     Prior Level of Function independent:;all household mobility;transfer;ADL's;bed mobility  uses single point cane  -ALEXANDRA (r) BL (t) ALEXANDRA (c)     Existing Precautions/Restrictions fall;oxygen therapy device and L/min  -ALEXANDRA (r) BL (t) ALEXANDRA (c)     Barriers to Rehab medically complex  -ALEXANDRA (r) BL (t) ALEXANDRA (c)       Row Name 09/30/24 1410          Living Environment    People  in Home alone  -ALEXANDRA (r) BL (t) ALEXANDRA (c)       Row Name 09/30/24 1415          Home Main Entrance    Number of Stairs, Main Entrance six  -ALEXANDRA (r) BL (t) ALEXANDRA (c)     Stair Railings, Main Entrance railing on right side (ascending)  -ALEXANDRA (r) BL (t) ALEXANDRA (c)       Row Name 09/30/24 1415          Stairs Within Home, Primary    Number of Stairs, Within Home, Primary none  -ALEXANDRA (r) BL (t) ALEXANDRA (c)     Stair Railings, Within Home, Primary none  -ALEXANDRA (r) BL (t) ALEXANDRA (c)       Row Name 09/30/24 1415          Cognition    Orientation Status (Cognition) oriented x 4  -ALEXANDRA (r) BL (t) ALEXANDRA (c)       Row Name 09/30/24 1415          Safety Issues, Functional Mobility    Safety Issues Affecting Function (Mobility) at risk behavior observed;impulsivity;friction/shear risk  -ALEXANDRA (r) BL (t) ALEXANDRA (c)     Impairments Affecting Function (Mobility) strength;balance;endurance/activity tolerance;pain  -ALEXANDRA (r) BL (t) ALEXANDRA (c)               User Key  (r) = Recorded By, (t) = Taken By, (c) = Cosigned By      Initials Name Provider Type    Bentley Morris, PT DPT Physical Therapist    Tim Olsen PT Student PT Student                   Mobility       Row Name 09/30/24 1415          Bed Mobility    Bed Mobility supine-sit;rolling right;scooting/bridging  -ALEXANDRA (r) BL (t) ALEXANDRA (c)     Rolling Right [place] (Bed Mobility) standby assist  -ALEXANDRA (r) BL (t) ALEXANDRA (c)     Scooting/Bridging [place] (Bed Mobility) standby assist  -ALEXANDRA (r) BL (t) ALEXANDRA (c)     Supine-Sit [place] (Bed Mobility) minimum assist (75% patient effort);verbal cues  -ALEXANDRA (r) BL (t) ALEXANDRA (c)     Assistive Device (Bed Mobility) head of bed elevated;bed rails  -ALEXANDRA (r) BL (t) ALEXANDRA (c)       Row Name 09/30/24 1415          Sit-Stand Transfer    Sit-Stand [place] (Transfers) minimum assist (75% patient effort)  -ALEXANDRA (r) BL (t) ALEXANDRA (c)     Assistive Device (Sit-Stand Transfers) cane, straight  -ALEXANDRA (r) BL (t) ALEXANDRA (c)       Row Name 09/30/24 1413          Gait/Stairs (Locomotion)    [place] Level  (Gait) minimum assist (75% patient effort)  -ALEXANDRA (r) BL (t) ALEXANDRA (c)     Assistive Device (Gait) cane, straight  -ALEXANDRA (r) BL (t) ALEXANDRA (c)     Distance in Feet (Gait) 15  -ALEXANDRA (r) BL (t) ALEXANDRA (c)     Deviations/Abnormal Patterns (Gait) gait speed decreased;festinating/shuffling;jennifer decreased;stride length decreased  -ALEXANDRA (r) BL (t) ALEXANDRA (c)     Comment, (Gait/Stairs) pt. has poor balance with tendancy of R and posterior lean.  -ALEXANDRA (r) BL (t) ALEXANDRA (c)               User Key  (r) = Recorded By, (t) = Taken By, (c) = Cosigned By      Initials Name Provider Type    Bentley Morris, PT DPT Physical Therapist    Tim Olsen PT Student PT Student                   Obj/Interventions       Row Name 09/30/24 1415          Range of Motion Comprehensive    General Range of Motion bilateral lower extremity ROM WFL  -ALEXANDRA (r) BL (t) ALEXANDRA (c)       Row Name 09/30/24 1415          Strength Comprehensive (MMT)    General Manual Muscle Testing (MMT) Assessment no strength deficits identified  -ALEXANDRA (r) BL (t) ALEXANDRA (c)     Comment, General Manual Muscle Testing (MMT) Assessment B LE strength grossly 4-/5  -ALEXANDRA (r) BL (t) ALEXANDRA (c)       Row Name 09/30/24 1415          Balance    Balance Assessment sitting static balance;sitting dynamic balance;sit to stand dynamic balance;standing static balance;standing dynamic balance  -ALEXANDRA (r) BL (t) ALEXANDRA (c)     Static Sitting Balance independent  -ALEXANDRA (r) BL (t) ALEXANDRA (c)     Dynamic Sitting Balance supervision  -ALEXANDRA (r) BL (t) ALEXANDRA (c)     Position, Sitting Balance unsupported;sitting edge of bed  -ALEXANDRA (r) BL (t) ALEXANDRA (c)     Sit to Stand Dynamic Balance moderate assist;2-person assist  -ALEXANDRA (r) BL (t) ALEXANDRA (c)     Static Standing Balance minimal assist  -ALEXANDRA (r) BL (t) ALEXANDRA (c)     Dynamic Standing Balance minimal assist;2-person assist  -ALEXANDRA (r) BL (t) ALEXANDRA (c)     Position/Device Used, Standing Balance supported;cane, straight  -ALEXANDRA (r) BL (t) ALEXANDRA (c)     Balance Interventions sitting;standing;sit to  stand;supported;static;dynamic;minimal challenge;moderate challenge;occupation based/functional task;weight shifting activity  -ALEXANDRA (r) BL (t) ALEXANDRA (c)     Comment, Balance poor balance with posterior and R lean.  -ALEXANDRA (r) BL (t) ALEXANDRA (c)       Row Name 09/30/24 1415          Sensory Assessment (Somatosensory)    Sensory Assessment (Somatosensory) LE sensation intact  -ALEXANDRA (r) BL (t) ALEXANDRA (c)               User Key  (r) = Recorded By, (t) = Taken By, (c) = Cosigned By      Initials Name Provider Type    Bentley Morris, PT DPT Physical Therapist    Tim Olsen, PT Student PT Student                   Goals/Plan       Row Name 09/30/24 1415          Bed Mobility Goal 1 (PT)    Activity/Assistive Device (Bed Mobility Goal 1, PT) rolling to right;rolling to left;scooting;sit to supine;supine to sit  -ALEXANDRA (r) BL (t) ALEXANDRA (c)     [place] Level/Cues Needed (Bed Mobility Goal 1, PT) standby assist  -ALEXANDRA (r) BL (t) ALEXANDRA (c)     Time Frame (Bed Mobility Goal 1, PT) long term goal (LTG);10 days  -ALEXANDRA     Progress/Outcomes (Bed Mobility Goal 1, PT) new goal  -ALEXANDRA (r) BL (t) ALEXANDRA (c)       Row Name 09/30/24 1415          Transfer Goal 1 (PT)    Activity/Assistive Device (Transfer Goal 1, PT) sit-to-stand/stand-to-sit;bed-to-chair/chair-to-bed  -ALEXANDRA (r) BL (t) ALEXANDRA (c)     [place] Level/Cues Needed (Transfer Goal 1, PT) standby assist  -ALEXANDRA     Time Frame (Transfer Goal 1, PT) long term goal (LTG);10 days  -ALEXANDRA     Progress/Outcome (Transfer Goal 1, PT) new goal  -ALEXANDRA (r) BL (t) ALEXANDRA (c)       Row Name 09/30/24 1415          Gait Training Goal 1 (PT)    Activity/Assistive Device (Gait Training Goal 1, PT) gait (walking locomotion);assistive device use;decrease fall risk;diminish gait deviation;forward stepping;improve balance and speed;increase endurance/gait distance;increase energy conservation;cane, straight;turning, left;turning, right  -ALEXANDRA (r) BL (t) ALEXANDRA (c)     [place] Level (Gait Training Goal 1, PT) standby assist  -ALEXANDRA (r)  BL (t) ALEXANDRA (c)     Distance (Gait Training Goal 1, PT) 50'  -ALEXANDRA (r) BL (t) ALEXANDRA (c)     Time Frame (Gait Training Goal 1, PT) long term goal (LTG);10 days  -ALEXANDRA     Progress/Outcome (Gait Training Goal 1, PT) new goal  -ALEXANDRA (r) BL (t) ALEXANDRA (c)       Row Name 09/30/24 1415          Stairs Goal 1 (PT)    Activity/Assistive Device (Stairs Goal 1, PT) --  -ALEXANDRA       [facility] Name 09/30/24 1415          Therapy Assessment/Plan (PT)    Planned Therapy Interventions (PT) balance training;bed mobility training;gait training;home exercise program;patient/family education;stair training;strengthening;transfer training;postural re-education  -ALEXANDRA               User Key  (r) = Recorded By, (t) = Taken By, (c) = Cosigned By      Initials Name Provider Type    Bentley Morris, PT DPT Physical Therapist    Tim Olsen, PT Student PT Student                   Clinical Impression       Row Name 09/30/24 1415          Pain    Pretreatment Pain Rating 0/10 - no pain  -ALEXANDRA (r) BL (t) ALEXANDRA (c)     Posttreatment Pain Rating 0/10 - no pain  -ALEXANDRA (r) BL (t) ALEXANDRA (c)     Pain Intervention(s) Ambulation/increased activity;Repositioned  -ALEXANDRA (r) BL (t) ALEXANDRA (c)     Additional Documentation Pain Scale: Numbers Pre/Post-Treatment (Group)  -ALEXANDRA (r) BL (t) ALEXANDRA (c)       Row Name 09/30/24 1415          Plan of Care Review    Plan of Care Reviewed With patient  -ALEXANDRA (r) BL (t) ALEXANDRA (c)     Progress no change  -ALEXANDRA (r) BL (t) ALEXANDRA (c)     Outcome Evaluation PT eval complete. Oriented x4. Presents in fowlers position with 3L of O2 via NC. Pt. is very lethargic throughout evaluation. Pt. able to get to sitting EOB with minimal assistance. Showed good strength across B LE with sensation intact. Pt. showed impulsivity when coming to standing, moving prior to either therapist being prepared. Able to come to standing with min. assist. primarily to provide stability through trunk. Shows decreased balance with continuous posterior and right lean. Walks with high guard  positioning and shuffling gait. Skilled therapy recommended to address functional mobility deficits, balance and endurance deficits. D/C recommended to short term rehab.  -ALEXANDRA (r) BL (t) ALEXANDRA (c)       Row Name 09/30/24 1415          Therapy Assessment/Plan (PT)    Patient/Family Therapy Goals Statement (PT) Return home  -ALEXANDRA (r) BL (t) ALEXANDRA (c)     Rehab Potential (PT) fair, will monitor progress closely  -ALEXANDRA (r) BL (t) ALEXANDRA (c)     Criteria for Skilled Interventions Met (PT) yes;meets criteria;skilled treatment is necessary  -ALEXANDRA (r) BL (t) ALEXANDRA (c)     Therapy Frequency (PT) 2 times/day  -ALEXANDRA (r) BL (t) ALEXANDRA (c)     Predicted Duration of Therapy Intervention (PT) Until D/C or goals met.  -ALEXANDRA (r) BL (t) ALEXANDRA (c)       [facility] Name 09/30/24 1415          Vital Signs    O2 Delivery Pre Treatment supplemental O2  -ALEXANDRA (r) BL (t) ALEXANDRA (c)     O2 Delivery Intra Treatment supplemental O2  -ALEXANDRA (r) BL (t) ALEXANDRA (c)     O2 Delivery Post Treatment supplemental O2  -ALEXANDRA (r) BL (t) ALEXANDRA (c)     Pre Patient Position Supine  -ALEXANDRA (r) BL (t) ALEXANDRA (c)     Intra Patient Position Standing  -ALEXANDRA (r) BL (t) ALEXANDRA (c)     Post Patient Position Sitting  -ALEXANDRA (r) BL (t) ALEXANDRA (c)       [facility] Name 09/30/24 1415          Positioning and Restraints    Pre-Treatment Position in bed  -ALEXANDRA (r) BL (t) ALEXANDRA (c)     Post Treatment Position chair  -ALEXANDRA (r) BL (t) ALEXANDRA (c)     In Chair notified nsg;reclined;sitting;call light within reach;encouraged to call for assist;legs elevated  -ALEXANDRA (r) BL (t) ALEXANDRA (c)               User Key  (r) = Recorded By, (t) = Taken By, (c) = Cosigned By      Initials Name Provider Type    Bentley Morris, PT DPT Physical Therapist    Tim Olsen, PT Student PT Student                   Outcome Measures       Row Name 09/30/24 1415 09/30/24 0800       How much help from another person do you currently need...    Turning from your back to your side while in flat bed without using bedrails? 3  -ALEXANDRA (r) BL (t) ALEXANDRA (c) 3  -HT    Moving from lying on back to sitting  on the side of a flat bed without bedrails? 3  -ALEXANDRA (r) BL (t) ALEXANDRA (c) 3  -HT    Moving to and from a bed to a chair (including a wheelchair)? 3  -ALEXANDRA (r) BL (t) ALEXANDRA (c) 3  -HT    Standing up from a chair using your arms (e.g., wheelchair, bedside chair)? 3  -ALEXANDRA (r) BL (t) ALEXANDRA (c) 3  -HT    Climbing 3-5 steps with a railing? 3  -ALEXANDRA (r) BL (t) ALEXANDRA (c) 3  -HT    To walk in hospital room? 3  -ALEXANDRA (r) BL (t) ALEXANDRA (c) 3  -HT    AM-PAC 6 Clicks Score (PT) 18  -ALEXANDRA (r) BL (t) 18  -HT    Highest Level of Mobility Goal 6 --> Walk 10 steps or more  -ALEXANDRA (r) BL (t) 6 --> Walk 10 steps or more  -HT      Row Name 09/30/24 0703          How much help from another person do you currently need...    Turning from your back to your side while in flat bed without using bedrails? 3  -SE     Moving from lying on back to sitting on the side of a flat bed without bedrails? 3  -SE     Moving to and from a bed to a chair (including a wheelchair)? 3  -SE     Standing up from a chair using your arms (e.g., wheelchair, bedside chair)? 3  -SE     Climbing 3-5 steps with a railing? 3  -SE     To walk in hospital room? 3  -SE     AM-PAC 6 Clicks Score (PT) 18  -SE     Highest Level of Mobility Goal 6 --> Walk 10 steps or more  -SE       Row Name 09/30/24 1420 09/30/24 1415       Functional Assessment    Outcome Measure Options AM-PAC 6 Clicks Daily Activity (OT)  -CHARMAINE AM-PAC 6 Clicks Basic Mobility (PT)  -ALEXANDRA (r) BL (t) ALEXANDRA (c)              User Key  (r) = Recorded By, (t) = Taken By, (c) = Cosigned By      Initials Name Provider Type    Bentley Morris, PT DPT Physical Therapist    Marielena Ivey, RN Registered Nurse    Shyam José RN Registered Nurse    Tayla Camp OTR/L, CSRS Occupational Therapist    Tim Olsen, PT Student PT Student                                 Physical Therapy Education       Title: PT OT SLP Therapies (In Progress)       Topic: Physical Therapy (In Progress)       Point: Mobility training (Done)        Learning Progress Summary             Patient Acceptance, E,D, VU,DU by BL at 9/30/2024 1512    Comment: Pt. educated on proper body mechanics to maintain safety when performing functional mobility. Educated on safety procedures to abide by while at [facility]                         Point: Home exercise program (Not Started)       Learner Progress:  Not documented in this visit.              Point: Body mechanics (Done)       Learning Progress Summary             Patient Acceptance, E,D, VU,DU by  at 9/30/2024 1512    Comment: Pt. educated on proper body mechanics to maintain safety when performing functional mobility. Educated on safety procedures to abide by while at [facility]                         Point: Precautions (Done)       Learning Progress Summary             Patient Acceptance, E,D, VU,DU by  at 9/30/2024 1512    Comment: Pt. educated on proper body mechanics to maintain safety when performing functional mobility. Educated on safety procedures to abide by while at [facility]                                         User Key       Initials Effective Dates Name Provider Type Discipline     08/21/24 -  Tim Heller, PT Student PT Student PT                  PT Recommendation and Plan     Plan of Care Reviewed With: patient  Progress: no change  Outcome Evaluation: PT eval complete. Oriented x4. Presents in fowlers position with 3L of O2 via NC. Pt. is very lethargic throughout evaluation. Pt. able to get to sitting EOB with minimal assistance. Showed good strength across B LE with sensation intact. Pt. showed impulsivity when coming to standing, moving prior to either therapist being prepared. Able to come to standing with min. assist. primarily to provide stability through trunk. Shows decreased balance with continuous posterior and right lean. Walks with high guard positioning and shuffling gait. Skilled therapy recommended to address functional mobility deficits, balance and  "endurance deficits. D/C recommended to short term rehab.     Time Calculation:         PT Charges       Row Name 24 1514             Time Calculation    Start Time 1415  -ALEXANDRA (r) BL (t) ALEXANDRA (c)      Stop Time 1500  -ALEXANDRA (r) BL (t) ALEXANDRA (c)      Time Calculation (min) 45 min  -ALEXANDRA (r) BL (t)      PT Received On 24  -ALEXANDRA (r) BL (t) ALEXANDRA (c)      PT Goal Re-Cert Due Date 10/10/24  -ALEXANDRA (r) BL (t) ALEXANDRA (c)                User Key  (r) = Recorded By, (t) = Taken By, (c) = Cosigned By      Initials Name Provider Type    Bentley Morris, PT DPT Physical Therapist    Tim Olsen, PT Student PT Student                      PT G-Codes  Outcome Measure Options: AM-PAC 6 Clicks Daily Activity (OT)  AM-PAC 6 Clicks Score (PT): 18  AM-PAC 6 Clicks Score (OT): 16  PT Discharge Summary  Anticipated Discharge Disposition (PT): sub acute care setting    Tim Heller PT Student  2024      Electronically signed by Bentley Kaur, PT DPT at 24 1536       Radha Limon, PTA at 10/01/24 1440  Version 1 of 1         Goal Outcome Evaluation:      Patient required Mod assist for bed transfers with HOB elevated. Patient had good sitting balance. Patient moans throughout treatment and stated he \"hurts all over\". Patient worked thru 2 LE exercises with encouragement. Patient sat EOB a total of 18 minutes. Patient is very weak and will benefit from strengthening activities.                                          Electronically signed by Radha Limon PTA at 10/01/24 1442       Radha Limon PTA at 10/01/24 1443  Version 1 of 1         Acute Care - Physical Therapy Treatment Note   [facility]     Patient Name: Tucker Knox  : 1960  MRN: [id number]  Today's Date: 10/1/2024      Visit Dx:     ICD-10-CM ICD-9-CM   1. Impaired mobility [Z74.09]  Z74.09 799.89     Patient Active Problem List   Diagnosis    Diabetes mellitus    Hypertension    Pulmonary HTN    COPD (chronic obstructive pulmonary " disease)    NSTEMI, initial episode of care    Malignant neoplasm of sigmoid colon    FH: colon cancer    Sleep apnea    History of adenomatous polyp of colon    Colon adenocarcinoma    Acute on chronic systolic CHF (congestive heart failure)    PVC's (premature ventricular contractions)    Presence of external cardiac defibrillator    Acute exacerbation of CHF (congestive heart failure)     Past Medical History:   Diagnosis Date    Cancer     CHF (congestive heart failure)     COPD (chronic obstructive pulmonary disease)     Coronary artery disease     stent x 1    Family history of colon cancer     Hyperlipidemia     Hypertension     Sleep apnea     does not wear machine, supposed to wear cpap with oxygen and does not wear it    Type 2 diabetes mellitus      Past Surgical History:   Procedure Laterality Date    BACK SURGERY      CARDIAC CATHETERIZATION Left 04/13/2022    Procedure: Cardiac Catheterization/Vascular Study;  Surgeon: Todd Avendano MD;  Location:  [facility] CATH [facility]AS[facility];  Service: Cardiology;  Laterality: Left;    CARDIAC CATHETERIZATION      with stent x1    CARDIAC CATHETERIZATION N/A 9/12/2024    Procedure: Left Heart Cath;  Surgeon: Ruben Adler MD;  Location:  [facility] CATH [facility];  Service: Cardiology;  Laterality: N/A;    COLON RESECTION N/A 12/5/2023    Procedure: COLON RESECTION LAPAROSCOPIC SIGMOID WITH DAVINCI ROBOT, INTRA-OPERATIVE FLEXIBLE SIGMOIDOSCOPY, SPLENIC FLEXURE MOBILIZATION, OPEN UMBILICAL HERNIA REPAIR;  Surgeon: Oma Velasquez MD;  Location:  [facility] OR;  Service: Robotics - DaVinci;  Laterality: N/A;    COLONOSCOPY N/A 10/09/2023    Diverticulosis; One 5mm polyp in ascending colon; One 5mm polyp in transverse colon; One 10mm polyp at 65cm proximal to anus; One 20mm polyp at 65cm proximal to anus-Tattooed; One 20mm polyp at 42cm proximal to anus-Clip (MR conditional) placed-Tattooed; Rule out malignancy-Partially obstructing tumor in recto-sigmoid  "colon-biopsied-Tattooed; One 10mm polyp in rectum    ELBOW PROCEDURE      KNEE SURGERY      LUMBAR DISC SURGERY       PT Assessment (Last 12 Hours)       PT Evaluation and Treatment       Row Name 10/01/24 0942          Physical Therapy Time and Intention    Subjective Information complains of;pain  -     Document Type therapy note (daily note)  -     Mode of Treatment physical therapy  -       Row Name 10/01/24 0942          General Information    Existing Precautions/Restrictions fall;oxygen therapy device and L/min  -     Comment, General Information Patient can be rude and vulgar. Was warned by nsg.  -       Row Name 10/01/24 0942          Pain    Pre/Posttreatment Pain Comment would not give number, constant moaning. States \"allover, even my hair\"  -       Row Name 10/01/24 0942          Pain Scale: FACES Pre/Post-Treatment    Pain: FACES Scale, Pretreatment 0-->no hurt  -     Posttreatment Pain Rating 6-->hurts even more  -       Row Name 10/01/24 0942          Bed Mobility    Supine-Sit [place] (Bed Mobility) verbal cues;moderate assist (50% patient effort)  -     Sit-Supine [place] (Bed Mobility) verbal cues;moderate assist (50% patient effort)  -     Assistive Device (Bed Mobility) head of bed elevated  -     Comment, (Bed Mobility) Pt. refused to exercise, just repeated I'm done\" at 30 x's. Eventually said \"I'm done living\". Patient continued to sit EOB 10 min until agreed to lay down.  -       Row Name 10/01/24 0942          Transfers    Comment, (Transfers) Refused to stand  -       Row Name 10/01/24 0942          Balance    Static Sitting Balance standby assist  -     Dynamic Sitting Balance contact guard  -     Position, Sitting Balance supported;unsupported;sitting edge of bed  -     Comment, Balance R lean  -       Row Name 10/01/24 0942          Motor Skills    Comments, Therapeutic Exercise Patient half hazardly pumprd feet, mostly toes and extended " knees. Pt. became irritated when asked to exercises.  -BRUNO       Row Name             Wound 09/14/24 1141 Left anterior third toe Abrasion    Wound - Properties Group Placement Date: 09/14/24  -SK Placement Time: 1141  -SK Side: Left  -SK Orientation: anterior  -SK Location: third toe  -SK Primary Wound Type: Abrasion  -SK    Retired Wound - Properties Group Placement Date: 09/14/24  -SK Placement Time: 1141  -SK Side: Left  -SK Orientation: anterior  -SK Location: third toe  -SK Primary Wound Type: Abrasion  -SK    Retired Wound - Properties Group Date first assessed: 09/14/24  -SK Time first assessed: 1141  -SK Side: Left  -SK Location: third toe  -SK Primary Wound Type: Abrasion  -SK      Row Name             Wound 09/14/24 1141 Left anterior great toe Skin Tear    Wound - Properties Group Placement Date: 09/14/24  -SK Placement Time: 1141  -SK Side: Left  -SK Orientation: anterior  -SK Location: great toe  -SK, Area just below Nail area of Great toe  Primary Wound Type: Skin tear  -SK    Retired Wound - Properties Group Placement Date: 09/14/24  -SK Placement Time: 1141  -SK Side: Left  -SK Orientation: anterior  -SK Location: great toe  -SK, Area just below Nail area of Great toe  Primary Wound Type: Skin tear  -SK    Retired Wound - Properties Group Date first assessed: 09/14/24  -SK Time first assessed: 1141  -SK Side: Left  -SK Location: great toe  -SK, Area just below Nail area of Great toe  Primary Wound Type: Skin tear  -SK      Row Name             Wound 09/14/24 1141 Right anterior ankle Abrasion    Wound - Properties Group Placement Date: 09/14/24  -SK Placement Time: 1141  -SK Side: Right  -SK Orientation: anterior  -SK Location: ankle  -SK Primary Wound Type: Abrasion  -SK    Retired Wound - Properties Group Placement Date: 09/14/24  -SK Placement Time: 1141  -SK Side: Right  -SK Orientation: anterior  -SK Location: ankle  -SK Primary Wound Type: Abrasion  -SK    Retired Wound - Properties Group Date  first assessed: 09/14/24  -SK Time first assessed: 1141  -SK Side: Right  -SK Location: ankle  -SK Primary Wound Type: Abrasion  -SK      Row Name             Wound 09/28/24 1635 calf    Wound - Properties Group Placement Date: 09/28/24  -CB Placement Time: 1635  -CB Present on Original Admission: Y  -CB Location: calf  -CB    Retired Wound - Properties Group Placement Date: 09/28/24  -CB Placement Time: 1635  -CB Present on Original Admission: Y  -CB Location: calf  -CB    Retired Wound - Properties Group Date first assessed: 09/28/24  -CB Time first assessed: 1635  -CB Present on Original Admission: Y  -CB Location: calf  -CB      Row Name             Wound 09/28/24 1636 Left posterior calf    Wound - Properties Group Placement Date: 09/28/24  -CB Placement Time: 1636  -CB Present on Original Admission: Y  -CB Side: Left  -CB Orientation: posterior  -CB Location: calf  -CB    Retired Wound - Properties Group Placement Date: 09/28/24  -CB Placement Time: 1636  -CB Present on Original Admission: Y  -CB Side: Left  -CB Orientation: posterior  -CB Location: calf  -CB    Retired Wound - Properties Group Date first assessed: 09/28/24  -CB Time first assessed: 1636  -CB Present on Original Admission: Y  -CB Side: Left  -CB Location: calf  -CB      Row Name             Wound Left posterior ankle Abrasion    Wound - Properties Group Side: Left  -SK Orientation: posterior  -SK Location: ankle  -SK Primary Wound Type: Abrasion  -SK    Retired Wound - Properties Group Side: Left  -SK Orientation: posterior  -SK Location: ankle  -SK Primary Wound Type: Abrasion  -SK    Retired Wound - Properties Group Side: Left  -SK Location: ankle  -SK Primary Wound Type: Abrasion  -SK      Row Name 10/01/24 0942          Positioning and Restraints    Pre-Treatment Position in bed  -BRUNO     Post Treatment Position bed  -BRUNO     In Bed notified nsg;fowlers;call light within reach;encouraged to call for assist;exit alarm on;side rails up x3   Refused to position on side with bolsters.  -               User Key  (r) = Recorded By, (t) = Taken By, (c) = Cosigned By      Initials Name Provider Type     Radha Limon PTA Physical Therapist Assistant    Tavia Olguin RN Registered Nurse    Mignon Arreola RN Registered Nurse                    Physical Therapy Education       Title: PT OT SLP Therapies (In Progress)       Topic: Physical Therapy (In Progress)       Point: Mobility training (In Progress)       Learning Progress Summary             Patient Acceptance, E,D, NR by  at 10/1/2024 1439    Comment: bed mobility    Acceptance, E,D, VU,DU by  at 9/30/2024 1512    Comment: Pt. educated on proper body mechanics to maintain safety when performing functional mobility. Educated on safety procedures to abide by while at [facility]                         Point: Home exercise program (Not Started)       Learner Progress:  Not documented in this visit.              Point: Body mechanics (Done)       Learning Progress Summary             Patient Acceptance, E,D, VU,DU by  at 9/30/2024 1512    Comment: Pt. educated on proper body mechanics to maintain safety when performing functional mobility. Educated on safety procedures to abide by while at [facility]                         Point: Precautions (Done)       Learning Progress Summary             Patient Acceptance, E,D, VU,DU by  at 9/30/2024 1512    Comment: Pt. educated on proper body mechanics to maintain safety when performing functional mobility. Educated on safety procedures to abide by while at [facility]                                         User Key       Initials Effective Dates Name Provider Type [facility] 02/03/23 -  Radha Limon PTA Physical Therapist Assistant PT     08/21/24 -  Tim Heller, STEPHANIE Student PT Student PT                  PT Recommendation and Plan         Outcome Measures       Row Name 10/01/24 1400             How much  help from another person do you currently need...    Turning from your back to your side while in flat bed without using bedrails? 3  -BRUNO      Moving from lying on back to sitting on the side of a flat bed without bedrails? 3  -BRUNO      Moving to and from a bed to a chair (including a wheelchair)? 3  -BRUNO      Standing up from a chair using your arms (e.g., wheelchair, bedside chair)? 3  -BRUNO      Climbing 3-5 steps with a railing? 3  -BRUNO      To walk in hospital room? 3  -BRUNO      AM-PAC 6 Clicks Score (PT) 18  -BRUNO      Highest Level of Mobility Goal 6 --> Walk 10 steps or more  -BRUNO                User Key  (r) = Recorded By, (t) = Taken By, (c) = Cosigned By      Initials Name Provider Type    Radha Child PTA Physical Therapist Assistant                     Time Calculation:    PT Charges       Row Name 10/01/24 1442             Time Calculation    Start Time 0942  -BRUNO      Stop Time 1007  -BRUNO      Time Calculation (min) 25 min  -BRUNO         Timed Charges    00296 - PT Therapeutic Activity Minutes 25  -BRUNO         Total Minutes    Timed Charges Total Minutes 25  -BRUNO       Total Minutes 25  -BRUNO                User Key  (r) = Recorded By, (t) = Taken By, (c) = Cosigned By      Initials Name Provider Type    Radha Child PTA Physical Therapist Assistant                  Therapy Charges for Today       Code Description Service Date Service Provider Modifiers Qty    [id number]  PT THERAPEUTIC ACT EA 15 MIN 10/1/2024 Radha Limon PTA GP 2            PT G-Codes  Outcome Measure Options: AM-PAC 6 Clicks Daily Activity (OT)  AM-PAC 6 Clicks Score (PT): 18  AM-PAC 6 Clicks Score (OT): 16    Radha Limon PTA  10/1/2024      Electronically signed by Radha Limon PTA at 10/01/24 1443       Maxi Nolan PTA at 10/02/24 1136  Version 1 of 1         Goal Outcome Evaluation:  Plan of Care Reviewed With: patient        Progress: declining  Outcome Evaluation: Pt was in bed, would wake to  verbal cues.  Pt appeared very lethargic, required increase time to respond verbally or physically to cues or questions.  Pt oriented to self only.  Transfered supine to sitting with min/mod assist.  Sitting EOB, pt maintained balance with CGA.  Transfered sit to stand with min/mod assist.  Pt took a few side steps with cane and HHA and mod assist.  Pt seemed to have difficulty processing how to take steps.  He would not respond to cues verbally or physically to attempt to correct gait and safety.  Will continue to work with pt to increase strength and progress mobility as pt is able.                                 Electronically signed by Maxi Nolan PTA at 10/02/24 1400       Maxi Nolan PTA at 10/02/24 1401  Version 1 of 1         Acute Care - Physical Therapy Treatment Note   [place]     Patient Name: Tucker Knox  : 1960  MRN: [id number]  Today's Date: 10/2/2024      Visit Dx:     ICD-10-CM ICD-9-CM   1. Impaired mobility [Z74.09]  Z74.09 799.89     Patient Active Problem List   Diagnosis    Diabetes mellitus    Hypertension    Pulmonary HTN    COPD (chronic obstructive pulmonary disease)    NSTEMI, initial episode of care    Malignant neoplasm of sigmoid colon    FH: colon cancer    Sleep apnea    History of adenomatous polyp of colon    Colon adenocarcinoma    Acute on chronic systolic CHF (congestive heart failure)    PVC's (premature ventricular contractions)    Presence of external cardiac defibrillator    Acute exacerbation of CHF (congestive heart failure)     Past Medical History:   Diagnosis Date    Cancer     CHF (congestive heart failure)     COPD (chronic obstructive pulmonary disease)     Coronary artery disease     stent x 1    Family history of colon cancer     Hyperlipidemia     Hypertension     Sleep apnea     does not wear machine, supposed to wear cpap with oxygen and does not wear it    Type 2 diabetes mellitus      Past Surgical History:   Procedure  Laterality Date    BACK SURGERY      CARDIAC CATHETERIZATION Left 04/13/2022    Procedure: Cardiac Catheterization/Vascular Study;  Surgeon: Todd Avendano MD;  Location:  [facility] CATH [facility]VAS[facility];  Service: Cardiology;  Laterality: Left;    CARDIAC CATHETERIZATION      with stent x1    CARDIAC CATHETERIZATION N/A 9/12/2024    Procedure: Left Heart Cath;  Surgeon: Ruben Adler MD;  Location:  [facility] CATH [facility];  Service: Cardiology;  Laterality: N/A;    COLON RESECTION N/A 12/5/2023    Procedure: COLON RESECTION LAPAROSCOPIC SIGMOID WITH DAVINCI ROBOT, INTRA-OPERATIVE FLEXIBLE SIGMOIDOSCOPY, SPLENIC FLEXURE MOBILIZATION, OPEN UMBILICAL HERNIA REPAIR;  Surgeon: Oma Velasquez MD;  Location:  [facility] OR;  Service: Robotics - DaVinci;  Laterality: N/A;    COLONOSCOPY N/A 10/09/2023    Diverticulosis; One 5mm polyp in ascending colon; One 5mm polyp in transverse colon; One 10mm polyp at 65cm proximal to anus; One 20mm polyp at 65cm proximal to anus-Tattooed; One 20mm polyp at 42cm proximal to anus-Clip (MR conditional) placed-Tattooed; Rule out malignancy-Partially obstructing tumor in recto-sigmoid colon-biopsied-Tattooed; One 10mm polyp in rectum    ELBOW PROCEDURE      KNEE SURGERY      LUMBAR DISC SURGERY       PT Assessment (Last 12 Hours)       PT Evaluation and Treatment       Row Name 10/02/24 1136          Physical Therapy Time and Intention    Subjective Information no complaints  -BALJINDER     Document Type therapy note (daily note)  -BALJINDER     Mode of Treatment physical therapy  -BALJINDER     Comment pt was lethargic, very slow to procress or respond.  Oriented to person only  -BALJINDER       Row Name 10/02/24 1136          General Information    Existing Precautions/Restrictions fall;oxygen therapy device and L/min  -BALJINDER       Row Name 10/02/24 1136          Pain Scale: FACES Pre/Post-Treatment    Pain: FACES Scale, Pretreatment 0-->no hurt  -BALJINDER     Posttreatment Pain Rating 0-->no hurt  -BALJINDER       Row Name  10/02/24 1136          Bed Mobility    Supine-Sit [place] (Bed Mobility) verbal cues;minimum assist (75% patient effort);moderate assist (50% patient effort)  -BALJINDER     Sit-Supine [place] (Bed Mobility) verbal cues;minimum assist (75% patient effort)  -BALJINDER       Row Name 10/02/24 1136          Sit-Stand Transfer    Sit-Stand [place] (Transfers) verbal cues;minimum assist (75% patient effort)  -BALJINDER     Assistive Device (Sit-Stand Transfers) cane, straight  -BALJINDER       Row Name 10/02/24 1136          Stand-Sit Transfer    Stand-Sit [place] (Transfers) verbal cues;minimum assist (75% patient effort)  -BALJINDER     Assistive Device (Stand-Sit Transfers) cane, straight  -BALJINDER       Row Name 10/02/24 1136          Gait/Stairs (Locomotion)    [place] Level (Gait) verbal cues;minimum assist (75% patient effort);moderate assist (50% patient effort)  -BALJINDER     Assistive Device (Gait) cane, straight  HHA  -BALJINDER     Distance in Feet (Gait) --  side steps at EOB  -BALJINDER     Comment, (Gait/Stairs) pt appeared to have a difficult time understanding how to take steps.  Would not respond to cues verbally or physically therefore did not attempt further amb  -BALJINDER       Row Name             Wound 09/14/24 1141 Left anterior third toe Abrasion    Wound - Properties Group Placement Date: 09/14/24  -SK Placement Time: 1141  -SK Side: Left  -SK Orientation: anterior  -SK Location: third toe  -SK Primary Wound Type: Abrasion  -SK    Retired Wound - Properties Group Placement Date: 09/14/24  -SK Placement Time: 1141  -SK Side: Left  -SK Orientation: anterior  -SK Location: third toe  -SK Primary Wound Type: Abrasion  -SK    Retired Wound - Properties Group Date first assessed: 09/14/24  -SK Time first assessed: 1141  -SK Side: Left  -SK Location: third toe  -SK Primary Wound Type: Abrasion  -SK      Row Name             Wound 09/14/24 1141 Left anterior great toe Skin Tear    Wound - Properties Group Placement Date: 09/14/24  -SK  Placement Time: 1141  -SK Side: Left  -SK Orientation: anterior  -SK Location: great toe  -SK, Area just below Nail area of Great toe  Primary Wound Type: Skin tear  -SK    Retired Wound - Properties Group Placement Date: 09/14/24  -SK Placement Time: 1141  -SK Side: Left  -SK Orientation: anterior  -SK Location: great toe  -SK, Area just below Nail area of Great toe  Primary Wound Type: Skin tear  -SK    Retired Wound - Properties Group Date first assessed: 09/14/24  -SK Time first assessed: 1141  -SK Side: Left  -SK Location: great toe  -SK, Area just below Nail area of Great toe  Primary Wound Type: Skin tear  -SK      Row Name             Wound 09/14/24 1141 Right anterior ankle Abrasion    Wound - Properties Group Placement Date: 09/14/24  -SK Placement Time: 1141  -SK Side: Right  -SK Orientation: anterior  -SK Location: ankle  -SK Primary Wound Type: Abrasion  -SK    Retired Wound - Properties Group Placement Date: 09/14/24  -SK Placement Time: 1141  -SK Side: Right  -SK Orientation: anterior  -SK Location: ankle  -SK Primary Wound Type: Abrasion  -SK    Retired Wound - Properties Group Date first assessed: 09/14/24  -SK Time first assessed: 1141  -SK Side: Right  -SK Location: ankle  -SK Primary Wound Type: Abrasion  -SK      Row Name             Wound 09/28/24 1635 calf    Wound - Properties Group Placement Date: 09/28/24  -CB Placement Time: 1635  -CB Present on Original Admission: Y  -CB Location: calf  -CB    Retired Wound - Properties Group Placement Date: 09/28/24  -CB Placement Time: 1635  -CB Present on Original Admission: Y  -CB Location: calf  -CB    Retired Wound - Properties Group Date first assessed: 09/28/24  -CB Time first assessed: 1635  -CB Present on Original Admission: Y  -CB Location: calf  -CB      Row Name             Wound 09/28/24 1636 Left posterior calf    Wound - Properties Group Placement Date: 09/28/24  -CB Placement Time: 1636  -CB Present on Original Admission: Y  -CB Side:  Left  -CB Orientation: posterior  -CB Location: calf  -CB    Retired Wound - Properties Group Placement Date: 09/28/24  -CB Placement Time: 1636  -CB Present on Original Admission: Y  -CB Side: Left  -CB Orientation: posterior  -CB Location: calf  -CB    Retired Wound - Properties Group Date first assessed: 09/28/24  -CB Time first assessed: 1636  -CB Present on Original Admission: Y  -CB Side: Left  -CB Location: calf  -CB      Row Name             Wound Left posterior ankle Abrasion    Wound - Properties Group Side: Left  -SK Orientation: posterior  -SK Location: ankle  -SK Primary Wound Type: Abrasion  -SK    Retired Wound - Properties Group Side: Left  -SK Orientation: posterior  -SK Location: ankle  -SK Primary Wound Type: Abrasion  -SK    Retired Wound - Properties Group Side: Left  -SK Location: ankle  -SK Primary Wound Type: Abrasion  -SK      Row Name             Wound 10/01/24 scrotum    Wound - Properties Group Placement Date: 10/01/24  - Location: scrotum  -JM    Retired Wound - Properties Group Placement Date: 10/01/24  - Location: scrotum  -JM    Retired Wound - Properties Group Date first assessed: 10/01/24  - Location: scrotum  -JM      Row Name 10/02/24 1136          Positioning and Restraints    Pre-Treatment Position in bed  -BALJINDER     Post Treatment Position bed  -BALJINDER     In Bed fowlers;call light within reach;encouraged to call for assist;exit alarm on;side rails up x2  -BALJINDER               User Key  (r) = Recorded By, (t) = Taken By, (c) = Cosigned By      Initials Name Provider Type    Maxi Tadeo PTA Physical Therapist Assistant    Tavia Olguin RN Registered Nurse    Mignon Arreola RN Registered Nurse    Ana Villaseñor, RN Registered Nurse                    Physical Therapy Education       Title: PT OT SLP Therapies (In Progress)       Topic: Physical Therapy (In Progress)       Point: Mobility training (In Progress)       Learning Progress Summary              Patient Acceptance, E,D, NR by  at 10/1/2024 1439    Comment: bed mobility    Acceptance, E,D, VU,DU by  at 9/30/2024 1512    Comment: Pt. educated on proper body mechanics to maintain safety when performing functional mobility. Educated on safety procedures to abide by while at [facility]                         Point: Home exercise program (Not Started)       Learner Progress:  Not documented in this visit.              Point: Body mechanics (Done)       Learning Progress Summary             Patient Acceptance, E,D, VU,DU by  at 9/30/2024 1512    Comment: Pt. educated on proper body mechanics to maintain safety when performing functional mobility. Educated on safety procedures to abide by while at [facility]                         Point: Precautions (Done)       Learning Progress Summary             Patient Acceptance, E,D, VU,DU by  at 9/30/2024 1512    Comment: Pt. educated on proper body mechanics to maintain safety when performing functional mobility. Educated on safety procedures to abide by while at [facility]                                         User Key       Initials Effective Dates Name Provider Type Discipline     02/03/23 -  Radha Limon PTA Physical Therapist Assistant PT     08/21/24 -  Tim Heller, STEPHANIE Student PT Student PT                  PT Recommendation and Plan     Plan of Care Reviewed With: patient  Progress: declining  Outcome Evaluation: Pt was in bed, would wake to verbal cues.  Pt appeared very lethargic, required increase time to respond verbally or physically to cues or questions.  Pt oriented to self only.  Transfered supine to sitting with min/mod assist.  Sitting EOB, pt maintained balance with CGA.  Transfered sit to stand with min/mod assist.  Pt took a few side steps with cane and HHA and mod assist.  Pt seemed to have difficulty processing how to take steps.  He would not respond to cues verbally or physically to attempt to correct gait and  safety.  Will continue to work with pt to increase strength and progress mobility as pt is able.   Outcome Measures       Row Name 10/02/24 1136 10/01/24 1400          How much help from another person do you currently need...    Turning from your back to your side while in flat bed without using bedrails? 2  -BALJINDER 3  -BRUNO     Moving from lying on back to sitting on the side of a flat bed without bedrails? 2  -BALJINDER 3  -BRUNO     Moving to and from a bed to a chair (including a wheelchair)? 2  -BALJINDER 3  -BRUNO     Standing up from a chair using your arms (e.g., wheelchair, bedside chair)? 2  -BALJINDER 3  -BRUNO     Climbing 3-5 steps with a railing? 1  -BALJINDER 3  -BRUNO     To walk in hospital room? 2  -BALJINDER 3  -BRUNO     AM-PAC 6 Clicks Score (PT) 11  -BALJINDER 18  -BRUNO     Highest Level of Mobility Goal 4 --> Transfer to chair/commode  -BALJINDER 6 --> Walk 10 steps or more  -BRUNO        Functional Assessment    Outcome Measure Options AM-PAC 6 Clicks Basic Mobility (PT)  -BALJINDER --               User Key  (r) = Recorded By, (t) = Taken By, (c) = Cosigned By      Initials Name Provider Type    Radha Child PTA Physical Therapist Assistant    Maxi Tadeo PTA Physical Therapist Assistant                     Time Calculation:    PT Charges       Row Name 10/02/24 1136             Time Calculation    Start Time 1136  -BALJINDER      Stop Time 1200  -BALJINDER      Time Calculation (min) 24 min  -BALJINDER      PT Received On 10/02/24  -BALJINDER         Time Calculation- PT    Total Timed Code Minutes- PT 24 minute(s)  -BALJINDER         Timed Charges    62366 - PT Therapeutic Activity Minutes 24  -BALJINDER         Total Minutes    Timed Charges Total Minutes 24  -BALJINDER       Total Minutes 24  -BALJINDER                User Key  (r) = Recorded By, (t) = Taken By, (c) = Cosigned By      Initials Name Provider Type    Maxi Tadeo PTA Physical Therapist Assistant                  Therapy Charges for Today       Code Description Service Date Service Provider Modifiers Qty    88756206845 HC PT  THERAPEUTIC ACT EA 15 MIN 10/2/2024 Maxi Nolan PTA GP 2            PT G-Codes  Outcome Measure Options: AM-PAC 6 Clicks Basic Mobility (PT)  AM-PAC 6 Clicks Score (PT): 11  AM-PAC 6 Clicks Score (OT): 16    Maxi Nolan PTA  10/2/2024      Electronically signed by Maxi Nolan PTA at 10/02/24 1401          Occupational Therapy Notes (last 48 hours)        Nichelle Hunter, OTR/L at 10/02/24 1458          Patient Name: Tucker Knox  : 1960    MRN: [id number]                              Today's Date: 10/2/2024       Admit Date: 2024    Visit Dx:     ICD-10-CM ICD-9-CM   1. Impaired mobility [Z74.09]  Z74.09 799.89     Patient Active Problem List   Diagnosis    Diabetes mellitus    Hypertension    Pulmonary HTN    COPD (chronic obstructive pulmonary disease)    NSTEMI, initial episode of care    Malignant neoplasm of sigmoid colon    FH: colon cancer    Sleep apnea    History of adenomatous polyp of colon    Colon adenocarcinoma    Acute on chronic systolic CHF (congestive heart failure)    PVC's (premature ventricular contractions)    Presence of external cardiac defibrillator    Acute exacerbation of CHF (congestive heart failure)     Past Medical History:   Diagnosis Date    Cancer     CHF (congestive heart failure)     COPD (chronic obstructive pulmonary disease)     Coronary artery disease     stent x 1    Family history of colon cancer     Hyperlipidemia     Hypertension     Sleep apnea     does not wear machine, supposed to wear cpap with oxygen and does not wear it    Type 2 diabetes mellitus      Past Surgical History:   Procedure Laterality Date    BACK SURGERY      CARDIAC CATHETERIZATION Left 2022    Procedure: Cardiac Catheterization/Vascular Study;  Surgeon: Todd Avendano MD;  Location:  [facility] CATH INVASIVE LOC[facility];  Service: Cardiology;  Laterality: Left;    CARDIAC CATHETERIZATION      with stent x1    CARDIAC CATHETERIZATION N/A 2024    Procedure:  Left Heart Cath;  Surgeon: Ruben Adler MD;  Location:  [facility] CATH INVASIVE LOCATION;  Service: Cardiology;  Laterality: N/A;    COLON RESECTION N/A 12/5/2023    Procedure: COLON RESECTION LAPAROSCOPIC SIGMOID WITH DAVINCI ROBOT, INTRA-OPERATIVE FLEXIBLE SIGMOIDOSCOPY, SPLENIC FLEXURE MOBILIZATION, OPEN UMBILICAL HERNIA REPAIR;  Surgeon: Oma Velasquez MD;  Location:  [facility] OR;  Service: Robotics - DaVinci;  Laterality: N/A;    COLONOSCOPY N/A 10/09/2023    Diverticulosis; One 5mm polyp in ascending colon; One 5mm polyp in transverse colon; One 10mm polyp at 65cm proximal to anus; One 20mm polyp at 65cm proximal to anus-Tattooed; One 20mm polyp at 42cm proximal to anus-Clip (MR conditional) placed-Tattooed; Rule out malignancy-Partially obstructing tumor in recto-sigmoid colon-biopsied-Tattooed; One 10mm polyp in rectum    ELBOW PROCEDURE      KNEE SURGERY      LUMBAR DISC SURGERY        General Information       Row Name 10/02/24 1400          OT Time and Intention    Document Type therapy note (daily note)  -     Mode of Treatment occupational therapy  -       Row Name 10/02/24 1400          General Information    Patient Profile Reviewed yes  -     Existing Precautions/Restrictions fall;oxygen therapy device and L/min  -       Row Name 10/02/24 1400          Safety Issues, Functional Mobility    Safety Issues Affecting Function (Mobility) ability to follow commands;at risk behavior observed;friction/shear risk;insight into deficits/self-awareness;judgment;problem-solving;sequencing abilities;safety precaution awareness;safety precautions follow-through/compliance  -     Impairments Affecting Function (Mobility) strength;balance;endurance/activity tolerance;pain;cognition  -     Cognitive Impairments, Mobility Safety/Performance insight into deficits/self-awareness;judgment;problem-solving/reasoning;safety precaution awareness;safety precaution follow-through;attention  -               User  Key  (r) = Recorded By, (t) = Taken By, (c) = Cosigned By      Initials Name Provider Type     Nichelle Hunter, OTR/L Occupational Therapist                     Mobility/ADL's       Row Name 10/02/24 1400          Bed Mobility    Bed Mobility rolling left;rolling right  -     Rolling Left [place] (Bed Mobility) minimum assist (75% patient effort);moderate assist (50% patient effort)  -     Rolling Right [place] (Bed Mobility) minimum assist (75% patient effort);moderate assist (50% patient effort)  -       Row Name 10/02/24 1400          Activities of Daily Living    BADL Assessment/Intervention toileting;grooming  -       Row Name 10/02/24 1400          Toileting Assessment/Training    [place] Level (Toileting) dependent (less than 25% patient effort);change pad/brief;perform perineal hygiene  -     Position (Toileting) supine  -       Row Name 10/02/24 1400          Grooming Assessment/Training    [place] Level (Grooming) hair care, combing/brushing;wash face, hands;modified independence;set up;oral care regimen;verbal cues;nonverbal cues (demo/gesture)  -     Oral Care teeth brushed - regular toothbrush  -     Position (Grooming) sitting up in bed  -     Comment, (Grooming) One cue to initiate activity for facial washing and hair grooming.  Pt. perseverative with hair combing & required cue to cease.  OTR provided items for oral care.  Cue to attend to items on tray & initiate set up & dequencing of oral care.  Pt. attempted to comb hair with packaged toothbrush. OTR reoriented the task and set up needs to patient who looked at packaged items and appeared confused.  He made one attempt to open and then requested assist.  OTR opened then applied toothpaste so pt could complete task.  Cueing still required to ensure pt used items correctly. Mr. Knox swallowed toothpaste after brushing vs spitting in basin.  -               User Key  (r) = Recorded By, (t) = Taken By, (c)  = Cosigned By      Initials Name Provider Type    Nichelle Shea OTR/L Occupational Therapist                   Obj/Interventions       Row Name 10/02/24 1400          Balance    Balance Interventions sitting;occupation based/functional task  -               User Key  (r) = Recorded By, (t) = Taken By, (c) = Cosigned By      Initials Name Provider Type    Nichelle Shea OTR/L Occupational Therapist                   Goals/Plan    No documentation.                  Clinical Impression       Row Name 10/02/24 1400          Pain Assessment    Additional Documentation Pain Scale: FACES Pre/Post-Treatment (Group)  -       Row Name 10/02/24 1400          Pain Scale: FACES Pre/Post-Treatment    Pain: FACES Scale, Pretreatment 0-->no hurt  -     Posttreatment Pain Rating 0-->no hurt  -       Row Name 10/02/24 1400          Plan of Care Review    Plan of Care Reviewed With patient  -     Progress declining  -     Outcome Evaluation Pt. demo's increased confusion compared to date of eval. Mr Knox making utterance continuously but not conversational.  He had incontinent of bowel episode and required Dep A to cleanse self and change brief while in bed.  One cue to initiate activity for facial washing and hair grooming. Pt. perseverative with hair combing & required cue to cease. OTR provided items for oral care. Cue to attend to items on tray & initiate set up & dequencing of oral care. Pt. attempted to comb hair with packaged toothbrush. OTR reoriented the task and set up needs to patient who looked at packaged items and appeared confused. He made one attempt to open and then requested assist. OTR opened then applied toothpaste so pt could complete task. Cueing still required to ensure pt used items correctly. Mr. Knox swallowed toothpaste after brushing vs spitting in basin.  Reported suspected increased confusion to RN. Cont OT  -       Row Name 10/02/24 1400          Therapy Assessment/Plan (OT)     Rehab Potential (OT) fair, will monitor progress closely  -     Criteria for Skilled Therapeutic Interventions Met (OT) yes;skilled treatment is necessary  -     Therapy Frequency (OT) 5 times/wk  -       Row Name 10/02/24 1400          Therapy Plan Review/Discharge Plan (OT)    Anticipated Discharge Disposition (OT) skilled nursing facility  -       Row Name 10/02/24 1400          Positioning and Restraints    Pre-Treatment Position in bed  -     Post Treatment Position bed  -     In Bed fowlers;call light within reach;encouraged to call for assist;side rails up x3;exit alarm on;notified [facility]  -               User Key  (r) = Recorded By, (t) = Taken By, (c) = Cosigned By      Initials Name Provider Type     Nichelle Hunter, OTR/L Occupational Therapist                   Outcome Measures       Row Name 10/02/24 1400          How much help from another is currently needed...    Putting on and taking off regular lower body clothing? 2  -CH     Bathing (including washing, rinsing, and drying) 2  -CH     Toileting (which includes using toilet bed pan or urinal) 2  -CH     Putting on and taking off regular upper body clothing 2  -CH     Taking care of personal grooming (such as brushing teeth) 3  -CH     Eating meals 3  -CH     AM-PAC 6 Clicks Score (OT) 14  -       Row Name 10/02/24 1136          How much help from another person do you currently need...    Turning from your back to your side while in flat bed without using bedrails? 2  -BALJINDER     Moving from lying on back to sitting on the side of a flat bed without bedrails? 2  -BALJINDER     Moving to and from a bed to a chair (including a wheelchair)? 2  -BALJINDER     Standing up from a chair using your arms (e.g., wheelchair, bedside chair)? 2  -BALJINDER     Climbing 3-5 steps with a railing? 1  -BALJINDER     To walk in hospital room? 2  -BALJINDER     AM-PAC 6 Clicks Score (PT) 11  -BALJINDER     Highest Level of Mobility Goal 4 --> Transfer to chair/commode  -       Row Name 10/02/24  1400 10/02/24 1136       Functional Assessment    Outcome Measure Options AM-PAC 6 Clicks Daily Activity (OT)  - AM-PAC 6 Clicks Basic Mobility (PT)  -BALJINDER              User Key  (r) = Recorded By, (t) = Taken By, (c) = Cosigned By      Initials Name Provider Type     Nichelle Hunter, OTR/L Occupational Therapist    BALJINDER Maxi Nolan, PTA Physical Therapist Assistant                    Occupational Therapy Education       Title: PT OT SLP Therapies (In Progress)       Topic: Occupational Therapy (In Progress)       Point: ADL training (Done)       Description:   Instruct learner(s) on proper safety adaptation and remediation techniques during self care or transfers.   Instruct in proper use of assistive devices.                  Learning Progress Summary             Patient Acceptance, E,D, VU,NR by  at 10/2/2024 1457    Acceptance, E, VU by CHARMAINE at 9/30/2024 1441                         Point: Home exercise program (Not Started)       Description:   Instruct learner(s) on appropriate technique for monitoring, assisting and/or progressing therapeutic exercises/activities.                  Learner Progress:  Not documented in this visit.              Point: Precautions (Done)       Description:   Instruct learner(s) on prescribed precautions during self-care and functional transfers.                  Learning Progress Summary             Patient Acceptance, E,D, VU,NR by  at 10/2/2024 1457    Acceptance, E, VU by CHARMAINE at 9/30/2024 1441                         Point: Body mechanics (Done)       Description:   Instruct learner(s) on proper positioning and spine alignment during self-care, functional mobility activities and/or exercises.                  Learning Progress Summary             Patient Acceptance, E,D, VU,NR by  at 10/2/2024 1457    Acceptance, E, VU by CHARMAINE at 9/30/2024 1441                                         User Key       Initials Effective Dates Name Provider Type [facility] 07/11/23 -   Nichelle Hunter OTR/L Occupational Therapist OT    JBRITT 07/11/23 -  Tayla Gibbs OTR/L, CSRS Occupational Therapist OT                  OT Recommendation and Plan  Therapy Frequency (OT): 5 times/wk  Plan of Care Review  Plan of Care Reviewed With: patient  Progress: declining  Outcome Evaluation: Pt. demo's increased confusion compared to date of eval. Mr Knox making utterance continuously but not conversational.  He had incontinent of bowel episode and required Dep A to cleanse self and change brief while in bed.  One cue to initiate activity for facial washing and hair grooming. Pt. perseverative with hair combing & required cue to cease. OTR provided items for oral care. Cue to attend to items on tray & initiate set up & dequencing of oral care. Pt. attempted to comb hair with packaged toothbrush. OTR reoriented the task and set up needs to patient who looked at packaged items and appeared confused. He made one attempt to open and then requested assist. OTR opened then applied toothpaste so pt could complete task. Cueing still required to ensure pt used items correctly. Mr. Knox swallowed toothpaste after brushing vs spitting in basin.  Reported suspected increased confusion to RN. Cont OT     Time Calculation:         Time Calculation- OT       Row Name 10/02/24 1400             Time Calculation- OT    OT Start Time 1400  -CH      OT Stop Time 1438  -CH      OT Time Calculation (min) 38 min  -CH      OT Received On 10/02/24  -CH         Timed Charges    68726 - OT Self Care/Mgmt Minutes 38  -CH         Total Minutes    Timed Charges Total Minutes 38  -CH       Total Minutes 38  -CH                User Key  (r) = Recorded By, (t) = Taken By, (c) = Cosigned By      Initials Name Provider Type    CH Nichelle Hunter OTR/L Occupational Therapist                  Therapy Charges for Today       Code Description Service Date Service Provider Modifiers Qty    [id number]  OT SELF CARE/MGMT/TRAIN EA 15 MIN  10/2/2024 Nichelle Hunter OTR/L GO 3                 MOISES Rice/L  10/2/2024    Electronically signed by Nichelle Hunter OTR/L at 10/02/24 3776       Nichelle Hunter OTR/L at 10/02/24 3384          Goal Outcome Evaluation:  Plan of Care Reviewed With: patient        Progress: declining  Outcome Evaluation: Pt. demo's increased confusion compared to date of eval. Mr Knox making utterance continuously but not conversational.  He had incontinent of bowel episode and required Dep A to cleanse self and change brief while in bed.  One cue to initiate activity for facial washing and hair grooming. Pt. perseverative with hair combing & required cue to cease. OTR provided items for oral care. Cue to attend to items on tray & initiate set up & dequencing of oral care. Pt. attempted to comb hair with packaged toothbrush. OTR reoriented the task and set up needs to patient who looked at packaged items and appeared confused. He made one attempt to open and then requested assist. OTR opened then applied toothpaste so pt could complete task. Cueing still required to ensure pt used items correctly. Mr. Knox swallowed toothpaste after brushing vs spitting in basin.  Reported suspected increased confusion to RN. Cont OT      Anticipated Discharge Disposition (OT): skilled nursing facility                          Electronically signed by Nichelle Hunter OTR/L at 10/02/24 4315

## 2024-10-02 NOTE — THERAPY TREATMENT NOTE
Patient Name: Tucker Knox  : 1960    MRN: [id number]                              Today's Date: 10/2/2024       Admit Date: 2024    Visit Dx:     ICD-10-CM ICD-9-CM   1. Impaired mobility [Z74.09]  Z74.09 799.89     Patient Active Problem List   Diagnosis    Diabetes mellitus    Hypertension    Pulmonary HTN    COPD (chronic obstructive pulmonary disease)    NSTEMI, initial episode of care    Malignant neoplasm of sigmoid colon    FH: colon cancer    Sleep apnea    History of adenomatous polyp of colon    Colon adenocarcinoma    Acute on chronic systolic CHF (congestive heart failure)    PVC's (premature ventricular contractions)    Presence of external cardiac defibrillator    Acute exacerbation of CHF (congestive heart failure)     Past Medical History:   Diagnosis Date    Cancer     CHF (congestive heart failure)     COPD (chronic obstructive pulmonary disease)     Coronary artery disease     stent x 1    Family history of colon cancer     Hyperlipidemia     Hypertension     Sleep apnea     does not wear machine, supposed to wear cpap with oxygen and does not wear it    Type 2 diabetes mellitus      Past Surgical History:   Procedure Laterality Date    BACK SURGERY      CARDIAC CATHETERIZATION Left 2022    Procedure: Cardiac Catheterization/Vascular Study;  Surgeon: Todd Avendano MD;  Location:  [facility]ATH [facility];  Service: Cardiology;  Laterality: Left;    CARDIAC CATHETERIZATION      with stent x1    CARDIAC CATHETERIZATION N/A 2024    Procedure: Left Heart Cath;  Surgeon: Ruben Adler MD;  Location:  [facility] CATH [facility];  Service: Cardiology;  Laterality: N/A;    COLON RESECTION N/A 2023    Procedure: COLON RESECTION LAPAROSCOPIC SIGMOID WITH DAVINCI ROBOT, INTRA-OPERATIVE FLEXIBLE SIGMOIDOSCOPY, SPLENIC FLEXURE MOBILIZATION, OPEN UMBILICAL HERNIA REPAIR;  Surgeon: Oma Velasquez MD;  Location[facility];  Service: Robotics - DaVinci;   Laterality: N/A;    COLONOSCOPY N/A 10/09/2023    Diverticulosis; One 5mm polyp in ascending colon; One 5mm polyp in transverse colon; One 10mm polyp at 65cm proximal to anus; One 20mm polyp at 65cm proximal to anus-Tattooed; One 20mm polyp at 42cm proximal to anus-Clip (MR conditional) placed-Tattooed; Rule out malignancy-Partially obstructing tumor in recto-sigmoid colon-biopsied-Tattooed; One 10mm polyp in rectum    ELBOW PROCEDURE      KNEE SURGERY      LUMBAR DISC SURGERY        General Information       Row Name 10/02/24 1400          OT Time and Intention    Document Type therapy note (daily note)  -     Mode of Treatment occupational therapy  -       Row Name 10/02/24 1400          General Information    Patient Profile Reviewed yes  -     Existing Precautions/Restrictions fall;oxygen therapy device and L/min  -       Row Name 10/02/24 1400          Safety Issues, Functional Mobility    Safety Issues Affecting Function (Mobility) ability to follow commands;at risk behavior observed;friction/shear risk;insight into deficits/self-awareness;judgment;problem-solving;sequencing abilities;safety precaution awareness;safety precautions follow-through/compliance  -     Impairments Affecting Function (Mobility) strength;balance;endurance/activity tolerance;pain;cognition  -     Cognitive Impairments, Mobility Safety/Performance insight into deficits/self-awareness;judgment;problem-solving/reasoning;safety precaution awareness;safety precaution follow-through;attention  -               User Key  (r) = Recorded By, (t) = Taken By, (c) = Cosigned By      Initials Name Provider Type     Nichelle Hunter, OTR/L Occupational Therapist                     Mobility/ADL's       Row Name 10/02/24 1400          Bed Mobility    Bed Mobility rolling left;rolling right  -     Rolling Left [place] (Bed Mobility) minimum assist (75% patient effort);moderate assist (50% patient effort)  -     Rolling Right  [place] (Bed Mobility) minimum assist (75% patient effort);moderate assist (50% patient effort)  -       Row Name 10/02/24 1400          Activities of Daily Living    BADL Assessment/Intervention toileting;grooming  -       Row Name 10/02/24 1400          Toileting Assessment/Training    [place] Level (Toileting) dependent (less than 25% patient effort);change pad/brief;perform perineal hygiene  -     Position (Toileting) supine  -       Row Name 10/02/24 1400          Grooming Assessment/Training    [place] Level (Grooming) hair care, combing/brushing;wash face, hands;modified independence;set up;oral care regimen;verbal cues;nonverbal cues (demo/gesture)  -     Oral Care teeth brushed - regular toothbrush  -     Position (Grooming) sitting up in bed  -     Comment, (Grooming) One cue to initiate activity for facial washing and hair grooming.  Pt. perseverative with hair combing & required cue to cease.  OTR provided items for oral care.  Cue to attend to items on tray & initiate set up & dequencing of oral care.  Pt. attempted to comb hair with packaged toothbrush. OTR reoriented the task and set up needs to patient who looked at packaged items and appeared confused.  He made one attempt to open and then requested assist.  OTR opened then applied toothpaste so pt could complete task.  Cueing still required to ensure pt used items correctly. Mr. Knox swallowed toothpaste after brushing vs spitting in basin.  -               User Key  (r) = Recorded By, (t) = Taken By, (c) = Cosigned By      Initials Name Provider Type    Nichelle Shea OTR/L Occupational Therapist                   Obj/Interventions       Row Name 10/02/24 1400          Balance    Balance Interventions sitting;occupation based/functional task  -               User Key  (r) = Recorded By, (t) = Taken By, (c) = Cosigned By      Initials Name Provider Type    Nichelle Shea OTR/L Occupational Therapist                    Goals/Plan    No documentation.                  Clinical Impression       Row Name 10/02/24 1400          Pain Assessment    Additional Documentation Pain Scale: FACES Pre/Post-Treatment (Group)  -       Row Name 10/02/24 1400          Pain Scale: FACES Pre/Post-Treatment    Pain: FACES Scale, Pretreatment 0-->no hurt  -     Posttreatment Pain Rating 0-->no hurt  -       Row Name 10/02/24 1400          Plan of Care Review    Plan of Care Reviewed With patient  -     Progress declining  -     Outcome Evaluation Pt. demo's increased confusion compared to date of eval. Mr Knox making utterance continuously but not conversational.  He had incontinent of bowel episode and required Dep A to cleanse self and change brief while in bed.  One cue to initiate activity for facial washing and hair grooming. Pt. perseverative with hair combing & required cue to cease. OTR provided items for oral care. Cue to attend to items on tray & initiate set up & dequencing of oral care. Pt. attempted to comb hair with packaged toothbrush. OTR reoriented the task and set up needs to patient who looked at packaged items and appeared confused. He made one attempt to open and then requested assist. OTR opened then applied toothpaste so pt could complete task. Cueing still required to ensure pt used items correctly. Mr. Knox swallowed toothpaste after brushing vs spitting in basin.  Reported suspected increased confusion to RN. Cont OT  -       Row Name 10/02/24 1400          Therapy Assessment/Plan (OT)    Rehab Potential (OT) fair, will monitor progress closely  -     Criteria for Skilled Therapeutic Interventions Met (OT) yes;skilled treatment is necessary  -     Therapy Frequency (OT) 5 times/wk  -       Row Name 10/02/24 1400          Therapy Plan Review/Discharge Plan (OT)    Anticipated Discharge Disposition (OT) skilled nursing facility  -       Row Name 10/02/24 1400          Positioning and  Restraints    Pre-Treatment Position in bed  -CH     Post Treatment Position bed  -CH     In Bed fowlers;call light within reach;encouraged to call for assist;side rails up x3;exit alarm on;[place]ified [facility]  -               User Key  (r) = Recorded By, (t) = Taken By, (c) = Cosigned By      Initials Name Provider Type    Nichelle Shea, OTR/L Occupational Therapist                   Outcome Measures       Row Name 10/02/24 1400          How much help from another is currently needed...    Putting on and taking off regular lower body clothing? 2  -CH     Bathing (including washing, rinsing, and drying) 2  -CH     Toileting (which includes using toilet bed pan or urinal) 2  -CH     Putting on and taking off regular upper body clothing 2  -CH     Taking care of personal grooming (such as brushing teeth) 3  -CH     Eating meals 3  -CH     AM-PAC 6 Clicks Score (OT) 14  -       Row Name 10/02/24 1136          How much help from another person do you currently need...    Turning from your back to your side while in flat bed without using bedrails? 2  -BALJINDER     Moving from lying on back to sitting on the side of a flat bed without bedrails? 2  -BALJINDER     Moving to and from a bed to a chair (including a wheelchair)? 2  -BALJINDER     Standing up from a chair using your arms (e.g., wheelchair, bedside chair)? 2  -BALJINDER     Climbing 3-5 steps with a railing? 1  -BALJINDER     To walk in hospital room? 2  -BALJINDER     AM-PAC 6 Clicks Score (PT) 11  -BALJINDER     Highest Level of Mobility Goal 4 --> Transfer to chair/commode  -BALJINDER       Row Name 10/02/24 1400 10/02/24 1136       Functional Assessment    Outcome Measure Options AM-PAC 6 Clicks Daily Activity (OT)  - AM-PAC 6 Clicks Basic Mobility (PT)  -BALJINDER              User Key  (r) = Recorded By, (t) = Taken By, (c) = Cosigned By      Initials Name Provider Type    Nichelle Shea, OTR/L Occupational Therapist    Maxi Tadeo, PTA Physical Therapist Assistant                    Occupational  Therapy Education       Title: PT OT SLP Therapies (In Progress)       Topic: Occupational Therapy (In Progress)       Point: ADL training (Done)       Description:   Instruct learner(s) on proper safety adaptation and remediation techniques during self care or transfers.   Instruct in proper use of assistive devices.                  Learning Progress Summary             Patient Acceptance, E,D, VU,NR by  at 10/2/2024 1457    Acceptance, E, VU by  at 9/30/2024 1441                         Point: Home exercise program (Not Started)       Description:   Instruct learner(s) on appropriate technique for monitoring, assisting and/or progressing therapeutic exercises/activities.                  Learner Progress:  Not documented in this visit.              Point: Precautions (Done)       Description:   Instruct learner(s) on prescribed precautions during self-care and functional transfers.                  Learning Progress Summary             Patient Acceptance, E,D, VU,NR by  at 10/2/2024 1457    Acceptance, E, VU by BRITT at 9/30/2024 1441                         Point: Body mechanics (Done)       Description:   Instruct learner(s) on proper positioning and spine alignment during self-care, functional mobility activities and/or exercises.                  Learning Progress Summary             Patient Acceptance, E,D, VU,NR by  at 10/2/2024 1457    Acceptance, E, VU by  at 9/30/2024 1441                                         User Key       Initials Effective Dates Name Provider Type Discipline     07/11/23 -  Nichelle Hunter, OTR/L Occupational Therapist OT    J 07/11/23 -  Tayla Gibbs OTR/L, CSRS Occupational Therapist OT                  OT Recommendation and Plan  Therapy Frequency (OT): 5 times/wk  Plan of Care Review  Plan of Care Reviewed With: patient  Progress: declining  Outcome Evaluation: PtMelani gamboa's increased confusion compared to date of eval. Mr Knox making utterance continuously but  not conversational.  He had incontinent of bowel episode and required Dep A to cleanse self and change brief while in bed.  One cue to initiate activity for facial washing and hair grooming. Pt. perseverative with hair combing & required cue to cease. OTR provided items for oral care. Cue to attend to items on tray & initiate set up & dequencing of oral care. Pt. attempted to comb hair with packaged toothbrush. OTR reoriented the task and set up needs to patient who looked at packaged items and appeared confused. He made one attempt to open and then requested assist. OTR opened then applied toothpaste so pt could complete task. Cueing still required to ensure pt used items correctly. Mr. Knox swallowed toothpaste after brushing vs spitting in basin.  Reported suspected increased confusion to RN. Cont OT     Time Calculation:         Time Calculation- OT       Row Name 10/02/24 1400             Time Calculation- OT    OT Start Time 1400  -CH      OT Stop Time 1438  -CH      OT Time Calculation (min) 38 min  -CH      OT Received On 10/02/24  -         Timed Charges    63972 - OT Self Care/Mgmt Minutes 38  -CH         Total Minutes    Timed Charges Total Minutes 38  -CH       Total Minutes 38  -CH                User Key  (r) = Recorded By, (t) = Taken By, (c) = Cosigned By      Initials Name Provider Type     Nichelle Hunter OTR/L Occupational Therapist                  Therapy Charges for Today       Code Description Service Date Service Provider Modifiers Qty    [id number] HC OT SELF CARE/MGMT/TRAIN EA 15 MIN 10/2/2024 Nichelle Hunter OTR/PILO GO 3                 MOISES Rice/PILO  10/2/2024

## 2024-10-02 NOTE — PLAN OF CARE
Problem: Adult Inpatient Plan of Care  Goal: Plan of Care Review  Outcome: Progressing  Goal: Patient-Specific Goal (Individualized)  Outcome: Progressing  Goal: Absence of Hospital-Acquired Illness or Injury  Outcome: Progressing  Intervention: Identify and Manage Fall Risk  Recent Flowsheet Documentation  Taken 10/2/2024 0400 by Jc Conde RN  Safety Promotion/Fall Prevention: safety round/check completed  Taken 10/2/2024 0200 by Jc Conde RN  Safety Promotion/Fall Prevention: safety round/check completed  Taken 10/2/2024 0000 by Jc Conde RN  Safety Promotion/Fall Prevention: safety round/check completed  Taken 10/1/2024 2200 by Jc Conde RN  Safety Promotion/Fall Prevention: safety round/check completed  Taken 10/1/2024 2100 by Jc Conde RN  Safety Promotion/Fall Prevention: safety round/check completed  Taken 10/1/2024 2000 by Jc Conde RN  Safety Promotion/Fall Prevention: safety round/check completed  Intervention: Prevent Skin Injury  Recent Flowsheet Documentation  Taken 10/1/2024 2000 by Jc Conde RN  Body Position:   patient/family refused   position changed independently  Intervention: Prevent and Manage VTE (Venous Thromboembolism) Risk  Recent Flowsheet Documentation  Taken 10/1/2024 2000 by Jc Conde RN  VTE Prevention/Management: (see mar) other (see comments)  Range of Motion: ROM (range of motion) performed  Goal: Optimal Comfort and Wellbeing  Outcome: Progressing  Goal: Readiness for Transition of Care  Outcome: Progressing     Problem: COPD (Chronic Obstructive Pulmonary Disease) Comorbidity  Goal: Maintenance of COPD Symptom Control  Outcome: Progressing     Problem: Diabetes Comorbidity  Goal: Blood Glucose Level Within Targeted Range  Outcome: Progressing     Problem: Heart Failure Comorbidity  Goal: Maintenance of Heart Failure Symptom Control  Outcome: Progressing     Problem: Obstructive Sleep Apnea Risk or Actual Comorbidity Management  Goal:  Unobstructed Breathing During Sleep  Outcome: Progressing     Problem: Fall Injury Risk  Goal: Absence of Fall and Fall-Related Injury  Outcome: Progressing  Intervention: Promote Injury-Free Environment  Recent Flowsheet Documentation  Taken 10/2/2024 0400 by Jc Conde RN  Safety Promotion/Fall Prevention: safety round/check completed  Taken 10/2/2024 0200 by Jc Conde RN  Safety Promotion/Fall Prevention: safety round/check completed  Taken 10/2/2024 0000 by Jc Conde RN  Safety Promotion/Fall Prevention: safety round/check completed  Taken 10/1/2024 2200 by Jc Conde RN  Safety Promotion/Fall Prevention: safety round/check completed  Taken 10/1/2024 2100 by Jc Conde RN  Safety Promotion/Fall Prevention: safety round/check completed  Taken 10/1/2024 2000 by Jc Conde RN  Safety Promotion/Fall Prevention: safety round/check completed     Problem: Skin Injury Risk Increased  Goal: Skin Health and Integrity  Outcome: Progressing   Goal Outcome Evaluation:         S/ST 92 -107 per tele. Vss. Pt is some what agitated when talking to nursing staff. No c/o pain. Will continue to monitor.

## 2024-10-02 NOTE — THERAPY TREATMENT NOTE
Acute Care - Physical Therapy Treatment Note  [facility]     Patient Name: Tucker Knox  : 1960  MRN: [id number]  Today's Date: 10/2/2024      Visit Dx:     ICD-10-CM ICD-9-CM   1. Impaired mobility [Z74.09]  Z74.09 799.89     Patient Active Problem List   Diagnosis    Diabetes mellitus    Hypertension    Pulmonary HTN    COPD (chronic obstructive pulmonary disease)    NSTEMI, initial episode of care    Malignant neoplasm of sigmoid colon    FH: colon cancer    Sleep apnea    History of adenomatous polyp of colon    Colon adenocarcinoma    Acute on chronic systolic CHF (congestive heart failure)    PVC's (premature ventricular contractions)    Presence of external cardiac defibrillator    Acute exacerbation of CHF (congestive heart failure)     Past Medical History:   Diagnosis Date    Cancer     CHF (congestive heart failure)     COPD (chronic obstructive pulmonary disease)     Coronary artery disease     stent x 1    Family history of colon cancer     Hyperlipidemia     Hypertension     Sleep apnea     does not wear machine, supposed to wear cpap with oxygen and does not wear it    Type 2 diabetes mellitus      Past Surgical History:   Procedure Laterality Date    BACK SURGERY      CARDIAC CATHETERIZATION Left 2022    Procedure: Cardiac Catheterization/Vascular Study;  Surgeon: Todd Avendano MD;  Location: [facility] CATH INVASIVE LOCATION;  Service: Cardiology;  Laterality: Left;    CARDIAC CATHETERIZATION      with stent x1    CARDIAC CATHETERIZATION N/A 2024    Procedure: Left Heart Cath;  Surgeon: Ruben Adler MD;  Location: [facility] CATH INVASIVE LOCATION;  Service: Cardiology;  Laterality: N/A;    COLON RESECTION N/A 2023    Procedure: COLON RESECTION LAPAROSCOPIC SIGMOID WITH DAVINCI ROBOT, INTRA-OPERATIVE FLEXIBLE SIGMOIDOSCOPY, SPLENIC FLEXURE MOBILIZATION, OPEN UMBILICAL HERNIA REPAIR;  Surgeon: Oma Velasquez MD;  Location: [facility] OR;  Service: Robotics - DaVinci;   Laterality: N/A;    COLONOSCOPY N/A 10/09/2023    Diverticulosis; One 5mm polyp in ascending colon; One 5mm polyp in transverse colon; One 10mm polyp at 65cm proximal to anus; One 20mm polyp at 65cm proximal to anus-Tattooed; One 20mm polyp at 42cm proximal to anus-Clip (MR conditional) placed-Tattooed; Rule out malignancy-Partially obstructing tumor in recto-sigmoid colon-biopsied-Tattooed; One 10mm polyp in rectum    ELBOW PROCEDURE      KNEE SURGERY      LUMBAR DISC SURGERY       PT Assessment (Last 12 Hours)       PT Evaluation and Treatment       Row Name 10/02/24 1136          Physical Therapy Time and Intention    Subjective Information no complaints  -     Document Type therapy note (daily note)  -     Mode of Treatment physical therapy  -     Comment pt was lethargic, very slow to procress or respond.  Oriented to person only  -       Row Name 10/02/24 1136          General Information    Existing Precautions/Restrictions fall;oxygen therapy device and L/min  -       Row Name 10/02/24 1136          Pain Scale: FACES Pre/Post-Treatment    Pain: FACES Scale, Pretreatment 0-->no hurt  -     Posttreatment Pain Rating 0-->no hurt  -       Row Name 10/02/24 1136          Bed Mobility    Supine-Sit [place] (Bed Mobility) verbal cues;minimum assist (75% patient effort);moderate assist (50% patient effort)  -     Sit-Supine [place] (Bed Mobility) verbal cues;minimum assist (75% patient effort)  -       Row Name 10/02/24 1136          Sit-Stand Transfer    Sit-Stand [place] (Transfers) verbal cues;minimum assist (75% patient effort)  -     Assistive Device (Sit-Stand Transfers) cane, straight  -       Row Name 10/02/24 1136          Stand-Sit Transfer    Stand-Sit [place] (Transfers) verbal cues;minimum assist (75% patient effort)  -     Assistive Device (Stand-Sit Transfers) cane, straight  -       Row Name 10/02/24 1136          Gait/Stairs (Locomotion)    [place]  Level (Gait) verbal cues;minimum assist (75% patient effort);moderate assist (50% patient effort)  -BALJINDER     Assistive Device (Gait) cane, straight  HHA  -BALJINDER     Distance in Feet (Gait) --  side steps at EOB  -BALJINDER     Comment, (Gait/Stairs) pt appeared to have a difficult time understanding how to take steps.  Would not respond to cues verbally or physically therefore did not attempt further amb  -BALJINDER       Row Name             Wound 09/14/24 1141 Left anterior third toe Abrasion    Wound - Properties Group Placement Date: 09/14/24  -SK Placement Time: 1141  -SK Side: Left  -SK Orientation: anterior  -SK Location: third toe  -SK Primary Wound Type: Abrasion  -SK    Retired Wound - Properties Group Placement Date: 09/14/24  -SK Placement Time: 1141  -SK Side: Left  -SK Orientation: anterior  -SK Location: third toe  -SK Primary Wound Type: Abrasion  -SK    Retired Wound - Properties Group Date first assessed: 09/14/24  -SK Time first assessed: 1141  -SK Side: Left  -SK Location: third toe  -SK Primary Wound Type: Abrasion  -SK      Row Name             Wound 09/14/24 1141 Left anterior great toe Skin Tear    Wound - Properties Group Placement Date: 09/14/24  -SK Placement Time: 1141  -SK Side: Left  -SK Orientation: anterior  -SK Location: great toe  -SK, Area just below Nail area of Great toe  Primary Wound Type: Skin tear  -SK    Retired Wound - Properties Group Placement Date: 09/14/24  -SK Placement Time: 1141  -SK Side: Left  -SK Orientation: anterior  -SK Location: great toe  -SK, Area just below Nail area of Great toe  Primary Wound Type: Skin tear  -SK    Retired Wound - Properties Group Date first assessed: 09/14/24  -SK Time first assessed: 1141  -SK Side: Left  -SK Location: great toe  -SK, Area just below Nail area of Great toe  Primary Wound Type: Skin tear  -SK      Row Name             Wound 09/14/24 1141 Right anterior ankle Abrasion    Wound - Properties Group Placement Date: 09/14/24  -SK Placement  Time: 1141  -SK Side: Right  -SK Orientation: anterior  -SK Location: ankle  -SK Primary Wound Type: Abrasion  -SK    Retired Wound - Properties Group Placement Date: 09/14/24  -SK Placement Time: 1141  -SK Side: Right  -SK Orientation: anterior  -SK Location: ankle  -SK Primary Wound Type: Abrasion  -SK    Retired Wound - Properties Group Date first assessed: 09/14/24  -SK Time first assessed: 1141  -SK Side: Right  -SK Location: ankle  -SK Primary Wound Type: Abrasion  -SK      Row Name             Wound 09/28/24 1635 calf    Wound - Properties Group Placement Date: 09/28/24  -CB Placement Time: 1635  -CB Present on Original Admission: Y  -CB Location: calf  -CB    Retired Wound - Properties Group Placement Date: 09/28/24  -CB Placement Time: 1635  -CB Present on Original Admission: Y  -CB Location: calf  -CB    Retired Wound - Properties Group Date first assessed: 09/28/24  -CB Time first assessed: 1635  -CB Present on Original Admission: Y  -CB Location: calf  -CB      Row Name             Wound 09/28/24 1636 Left posterior calf    Wound - Properties Group Placement Date: 09/28/24  -CB Placement Time: 1636  -CB Present on Original Admission: Y  -CB Side: Left  -CB Orientation: posterior  -CB Location: calf  -CB    Retired Wound - Properties Group Placement Date: 09/28/24  -CB Placement Time: 1636  -CB Present on Original Admission: Y  -CB Side: Left  -CB Orientation: posterior  -CB Location: calf  -CB    Retired Wound - Properties Group Date first assessed: 09/28/24  -CB Time first assessed: 1636  -CB Present on Original Admission: Y  -CB Side: Left  -CB Location: calf  -CB      Row Name             Wound Left posterior ankle Abrasion    Wound - Properties Group Side: Left  -SK Orientation: posterior  -SK Location: ankle  -SK Primary Wound Type: Abrasion  -SK    Retired Wound - Properties Group Side: Left  -SK Orientation: posterior  -SK Location: ankle  -SK Primary Wound Type: Abrasion  -SK    Retired Wound -  Properties Group Side: Left  -SK Location: ankle  -SK Primary Wound Type: Abrasion  -SK      Row Name             Wound 10/01/24 scrotum    Wound - Properties Group Placement Date: 10/01/24  -RAJEEV Location: scrotum  -JM    Retired Wound - Properties Group Placement Date: 10/01/24  -RAJEEV Location: scrotum  -JM    Retired Wound - Properties Group Date first assessed: 10/01/24  -RAJEEV Location: scrotum  -JM      Row Name 10/02/24 1136          Positioning and Restraints    Pre-Treatment Position in bed  -BALJINDER     Post Treatment Position bed  -BALJINDER     In Bed fowlers;call light within reach;encouraged to call for assist;exit alarm on;side rails up x2  -BALJINDER               User Key  (r) = Recorded By, (t) = Taken By, (c) = Cosigned By      Initials Name Provider Type    Maxi Tadeo, PTA Physical Therapist Assistant    Tavia Olguin RN Registered Nurse    Mignon Arreola RN Registered Nurse    Ana Villaseñor RN Registered Nurse                    Physical Therapy Education       Title: PT OT SLP Therapies (In Progress)       Topic: Physical Therapy (In Progress)       Point: Mobility training (In Progress)       Learning Progress Summary             Patient Acceptance, E,D, NR by BRUNO at 10/1/2024 1439    Comment: bed mobility    Acceptance, E,D, VU,DU by  at 9/30/2024 1512    Comment: Pt. educated on proper body mechanics to maintain safety when performing functional mobility. Educated on safety procedures to abide by while at [facility]                         Point: Home exercise program (Not Started)       Learner Progress:  Not documented in this visit.              Point: Body mechanics (Done)       Learning Progress Summary             Patient Acceptance, E,D, VU,DU by  at 9/30/2024 1512    Comment: Pt. educated on proper body mechanics to maintain safety when performing functional mobility. Educated on safety procedures to abide by while at [facility]                         Point:  Precautions (Done)       Learning Progress Summary             Patient Acceptance, E,D, LOR,DU by  at 9/30/2024 9060    Comment: Pt. educated on proper body mechanics to maintain safety when performing functional mobility. Educated on safety procedures to abide by while at [facility]                                         User Key       Initials Effective Dates Name Provider Type Discipline     02/03/23 -  Radha Limon, PTA Physical Therapist Assistant PT     08/21/24 -  Tim Heller, STEPHANIE Student PT Student PT                  PT Recommendation and Plan     Plan of Care Reviewed With: patient  Progress: declining  Outcome Evaluation: Pt was in bed, would wake to verbal cues.  Pt appeared very lethargic, required increase time to respond verbally or physically to cues or questions.  Pt oriented to self only.  Transfered supine to sitting with min/mod assist.  Sitting EOB, pt maintained balance with CGA.  Transfered sit to stand with min/mod assist.  Pt took a few side steps with cane and HHA and mod assist.  Pt seemed to have difficulty processing how to take steps.  He would not respond to cues verbally or physically to attempt to correct gait and safety.  Will continue to work with pt to increase strength and progress mobility as pt is able.   Outcome Measures       Row Name 10/02/24 1136 10/01/24 1400          How much help from another person do you currently need...    Turning from your back to your side while in flat bed without using bedrails? 2  -BALJINDER 3  -BRUNO     Moving from lying on back to sitting on the side of a flat bed without bedrails? 2  -BALJINDER 3  -BRUNO     Moving to and from a bed to a chair (including a wheelchair)? 2  -BALJINDER 3  -BRUNO     Standing up from a chair using your arms (e.g., wheelchair, bedside chair)? 2  -BALJINDER 3  -BRUNO     Climbing 3-5 steps with a railing? 1  -BALJINDER 3  -BRUNO     To walk in hospital room? 2  -BALJINDER 3  -BRUNO     AM-PAC 6 Clicks Score (PT) 11  -BALJINDER 18  -BRUNO     Highest Level of Mobility  Goal 4 --> Transfer to chair/commode  -BALJINDER 6 --> Walk 10 steps or more  -BRUNO        Functional Assessment    Outcome Measure Options AM-PAC 6 Clicks Basic Mobility (PT)  -BALJINDER --               User Key  (r) = Recorded By, (t) = Taken By, (c) = Cosigned By      Initials Name Provider Type    Radha Child, FRANCHESCA Physical Therapist Assistant    Maxi Tadeo PTA Physical Therapist Assistant                     Time Calculation:    PT Charges       Row Name 10/02/24 1136             Time Calculation    Start Time 1136  -BALJINDER      Stop Time 1200  -BALJINDER      Time Calculation (min) 24 min  -BALJINDER      PT Received On 10/02/24  -BALJINDER         Time Calculation- PT    Total Timed Code Minutes- PT 24 minute(s)  -BALJINDER         Timed Charges    47835 - PT Therapeutic Activity Minutes 24  -BALJINDER         Total Minutes    Timed Charges Total Minutes 24  -BALJINDER       Total Minutes 24  -BALJINDER                User Key  (r) = Recorded By, (t) = Taken By, (c) = Cosigned By      Initials Name Provider Type    Maxi Tadeo PTA Physical Therapist Assistant                  Therapy Charges for Today       Code Description Service Date Service Provider Modifiers Qty    [id number]  PT THERAPEUTIC ACT EA 15 MIN 10/2/2024 Maxi Nolan PTA GP 2            PT G-Codes  Outcome Measure Options: AM-PAC 6 Clicks Basic Mobility (PT)  AM-PAC 6 Clicks Score (PT): 11  AM-PAC 6 Clicks Score (OT): 16    Maxi Nolan PTA  10/2/2024

## 2024-10-02 NOTE — PLAN OF CARE
Goal Outcome Evaluation:  Plan of Care Reviewed With: patient        Progress: declining  Outcome Evaluation: Pt was in bed, would wake to verbal cues.  Pt appeared very lethargic, required increase time to respond verbally or physically to cues or questions.  Pt oriented to self only.  Transfered supine to sitting with min/mod assist.  Sitting EOB, pt maintained balance with CGA.  Transfered sit to stand with min/mod assist.  Pt took a few side steps with cane and HHA and mod assist.  Pt seemed to have difficulty processing how to take steps.  He would not respond to cues verbally or physically to attempt to correct gait and safety.  Will continue to work with pt to increase strength and progress mobility as pt is able.

## 2024-10-02 NOTE — CONSULTS
[facility]  INPATIENT WOUND & OSTOMY CONSULTATION    Today's Date: 10/02/24    Patient Name: Tucker Knox  MRN: [id number]  CSN: [id number]  PCP: Kt Alfredo MD  Referring Provider:   Consulting Provider (From admission, onward)      Start Ordered     Status Ordering Provider    10/01/24 1807 10/01/24 1808  Inpatient Wound Care MD Consult  Once        Specialty:  Wound Care  Provider:  Marielena Jernigan APRN Acknowledged POLIDORI, MARIANO ARIEL           Attending Provider: Quentin Morin*  Length of Stay: 4    SUBJECTIVE   Chief Complaint: ***    HPI: Tucker Knox, a 64 y.o.male, presents with a past medical history of ***.  A full past medical history is listed below.  Inpatient wound care consulted due to ***      Visit Dx:    ICD-10-CM ICD-9-CM   1. Impaired mobility [Z74.09]  Z74.09 799.89       Hospital Problem List:     Acute exacerbation of CHF (congestive heart failure)      History:   Past Medical History:   Diagnosis Date    Cancer     CHF (congestive heart failure)     COPD (chronic obstructive pulmonary disease)     Coronary artery disease     stent x 1    Family history of colon cancer     Hyperlipidemia     Hypertension     Sleep apnea     does not wear machine, supposed to wear cpap with oxygen and does not wear it    Type 2 diabetes mellitus      Past Surgical History:   Procedure Laterality Date    BACK SURGERY      CARDIAC CATHETERIZATION Left 04/13/2022    Procedure: Cardiac Catheterization/Vascular Study;  Surgeon: Todd Avendano MD;  Location:  [facility] CATH INVASIVE LOC[facility];  Service: Cardiology;  Laterality: Left;    CARDIAC CATHETERIZATION      with stent x1    CARDIAC CATHETERIZATION N/A 9/12/2024    Procedure: Left Heart Cath;  Surgeon: Ruben Adler MD;  Location:  [facility] CATH INVAS[facility];  Service: Cardiology;  Laterality: N/A;    COLON RESECTION N/A 12/5/2023    Procedure: COLON RESECTION LAPAROSCOPIC SIGMOID WITH DAVINCI  ROBOT, INTRA-OPERATIVE FLEXIBLE SIGMOIDOSCOPY, SPLENIC FLEXURE MOBILIZATION, OPEN UMBILICAL HERNIA REPAIR;  Surgeon: Oma Velasquez MD;  Location: [facility] OR;  Service: Robotics - DaVinci;  Laterality: N/A;    COLONOSCOPY N/A 10/09/2023    Diverticulosis; One 5mm polyp in ascending colon; One 5mm polyp in transverse colon; One 10mm polyp at 65cm proximal to anus; One 20mm polyp at 65cm proximal to anus-Tattooed; One 20mm polyp at 42cm proximal to anus-Clip (MR conditional) placed-Tattooed; Rule out malignancy-Partially obstructing tumor in recto-sigmoid colon-biopsied-Tattooed; One 10mm polyp in rectum    ELBOW PROCEDURE      KNEE SURGERY      LUMBAR DISC SURGERY       Social History     Socioeconomic History    Marital status:    Tobacco Use    Smoking status: Former     Current packs/day: 1.00     Average packs/day: 1 pack/day for 45.0 years (45.0 ttl pk-yrs)     Types: Cigarettes    Smokeless tobacco: Never   Vaping Use    Vaping status: Some Days    Substances: Nicotine, Flavoring    Devices: Disposable   Substance and Sexual Activity    Alcohol use: Not Currently    Drug use: Yes     Types: Marijuana    Sexual activity: Defer     Family History   Problem Relation Age of Onset    Esophageal cancer Mother     Heart disease Mother     Colon cancer Father 80    Heart disease Father     No Known Problems Sister     COPD Brother     Alcohol abuse Brother     Colon polyps Neg Hx     Liver cancer Neg Hx     Rectal cancer Neg Hx     Stomach cancer Neg Hx     Liver disease Neg Hx        Allergies:  Allergies   Allergen Reactions    Penicillins Unknown - Low Severity     Pt states happened when child does not know        Medications:    Current Facility-Administered Medications:     aspirin EC tablet 81 mg, 81 mg, Oral, Daily, Quentin Morin DO, 81 mg at 10/01/24 0919    sennosides-docusate (PERICOLACE) 8.6-50 MG per tablet 2 tablet, 2 tablet, Oral, BID, 2 tablet at 10/01/24 0919 **AND**  polyethylene glycol (MIRALAX) packet 17 g, 17 g, Oral, Daily PRN **AND** bisacodyl (DULCOLAX) EC tablet 5 mg, 5 mg, Oral, Daily PRN **AND** bisacodyl (DULCOLAX) suppository 10 mg, 10 mg, Rectal, Daily PRN, Quentin Morin DO    Calcium Replacement - Follow Nurse / BPA Driven Protocol, , Does not apply, PRN, Quentin Morin DO    Chlorhexidine Gluconate Cloth 2 % pads 1 Application, 1 Application, Topical, Q24H, Quentin Morin DO, 1 Application at 10/01/24 0504    dextrose (D50W) (25 g/50 mL) IV injection 25 g, 25 g, Intravenous, Q15 Min PRN, Quentin Morin DO, 25 g at 10/01/24 1146    dextrose (GLUTOSE) oral gel 15 g, 15 g, Oral, Q15 Min PRN, Quentin Morin DO, 15 g at 10/01/24 1119    DULoxetine (CYMBALTA) DR capsule 30 mg, 30 mg, Oral, Daily, Quentin Morin DO, 30 mg at 10/01/24 0919    Enoxaparin Sodium (LOVENOX) syringe 40 mg, 40 mg, Subcutaneous, Daily, Quentin Morin DO, 40 mg at 10/01/24 0919    gabapentin (NEURONTIN) capsule 600 mg, 600 mg, Oral, Daily, Quentin Morin DO, 600 mg at 10/01/24 0919    glucagon (GLUCAGEN) injection 1 mg, 1 mg, Intramuscular, Q15 Min PRN, Quentin Morin DO    insulin glargine (LANTUS, SEMGLEE) injection 10 Units, 10 Units, Subcutaneous, Once, Layo Urrutia APRN    insulin glargine (LANTUS, SEMGLEE) injection 30 Units, 30 Units, Subcutaneous, Nightly, Layo Urrutia APRN, 30 Units at 10/01/24 2114    Insulin Lispro (humaLOG) injection 2-9 Units, 2-9 Units, Subcutaneous, 4x Daily AC & at Bedtime, Quentin Morin DO, 8 Units at 10/01/24 0918    ipratropium-albuterol (DUO-NEB) nebulizer solution 3 mL, 3 mL, Nebulization, Q4H PRN, Quentin Morin DO    Magnesium Standard Dose Replacement - Follow Nurse / BPA Driven Protocol, , Does not apply, PRN, Quentin Morin DO    mupirocin (BACTROBAN) 2 % nasal ointment 1 Application, 1 Application,  Each Nare, BID, Quentin Morin, , 1 Application at 10/01/24 0919    Phosphorus Replacement - Follow Nurse / BPA Driven Protocol, , Does not apply, PRN, Quentin Morin,     Potassium Replacement - Follow Nurse / BPA Driven Protocol, , Does not apply, PRN, Quentin Morin, DO    pravastatin (PRAVACHOL) tablet 40 mg, 40 mg, Oral, Nightly, Quentin Morin, , 40 mg at 10/01/24 2114    sacubitril-valsartan (ENTRESTO) 24-26 MG tablet 1 tablet, 1 tablet, Oral, BID, Quentin Morin DO, 1 tablet at 10/01/24 2114    sodium chloride 0.9 % flush 10 mL, 10 mL, Intravenous, Q12H, Quentin Morin, , 10 mL at 10/01/24 0920    sodium chloride 0.9 % flush 10 mL, 10 mL, Intravenous, PRN, Quentin Morin,     sodium chloride 0.9 % infusion 40 mL, 40 mL, Intravenous, PRN, Quentin Morin,     OBJECTIVE     Vitals:    10/02/24 0420   BP: 118/73   Pulse: 99   Resp: 20   Temp: 97.8 °F (36.6 °C)   SpO2: 91%       PHYSICAL EXAM: ***  Physical Exam       Results Review:  Lab Results (last 48 hours)       Procedure Component Value Units Date/Time    Basic Metabolic Panel [[id number]]  (Abnormal) Collected: 10/02/24 0341    Specimen: Blood Updated: 10/02/24 0409     Glucose 141 mg/dL      BUN 35 mg/dL      Creatinine 1.04 mg/dL      Sodium 140 mmol/L      Potassium 5.1 mmol/L      Chloride 99 mmol/L      CO2 31.0 mmol/L      Calcium 8.5 mg/dL      BUN/Creatinine Ratio 33.7     Anion Gap 10.0 mmol/L      eGFR 80.7 mL/min/1.73     Narrative:      GFR Normal >60  Chronic Kidney Disease <60  Kidney Failure <15      CBC & Differential [[id number]]  (Abnormal) Collected: 10/02/24 0341    Specimen: Blood Updated: 10/02/24 0345    Narrative:      The following orders were created for panel order CBC & Differential.  Procedure                               Abnormality         Status                     ---------                               -----------          ------                     CBC Auto Differential[[id number]]        Abnormal            Final result                 Please view results for these tests on the individual orders.    CBC Auto Differential [[id number]]  (Abnormal) Collected: 10/02/24 0341    Specimen: Blood Updated: 10/02/24 0345     WBC 8.96 10*3/mm3      RBC 4.05 10*6/mm3      Hemoglobin 11.2 g/dL      Hematocrit 38.7 %      MCV 95.6 fL      MCH 27.7 pg      MCHC 28.9 g/dL      RDW 15.1 %      RDW-SD 52.7 fl      MPV 9.8 fL      Platelets 184 10*3/mm3      Neutrophil % 87.9 %      Lymphocyte % 5.5 %      Monocyte % 5.5 %      Eosinophil % 0.4 %      Basophil % 0.4 %      Immature Grans % 0.3 %      Neutrophils, Absolute 7.87 10*3/mm3      Lymphocytes, Absolute 0.49 10*3/mm3      Monocytes, Absolute 0.49 10*3/mm3      Eosinophils, Absolute 0.04 10*3/mm3      Basophils, Absolute 0.04 10*3/mm3      Immature Grans, Absolute 0.03 10*3/mm3      nRBC 0.0 /100 WBC     POC Glucose Once [[id number]]  (Abnormal) Collected: 10/01/24 2035    Specimen: Blood Updated: 10/01/24 2046     Glucose 143 mg/dL      Comment: : [id number] Jorge CamposthMeter ID: [id number]       POC Glucose Once [[id number]]  (Normal) Collected: 10/01/24 1624    Specimen: Blood Updated: 10/01/24 1645     Glucose 110 mg/dL      Comment: : [id number] Ho SaldañaaisaMeter ID: [id number]       POC Glucose Once [[id number]]  (Normal) Collected: 10/01/24 1201    Specimen: Blood Updated: 10/01/24 1218     Glucose 123 mg/dL      Comment: : [id number] Wil BOOeter ID: [id number]       POC Glucose Once [[id number]]  (Abnormal) Collected: 10/01/24 1139    Specimen: Blood Updated: 10/01/24 1150     Glucose 54 mg/dL      Comment: : [id number] Wil BOOeter ID: [id number]       POC Glucose Once [[id number]]  (Abnormal) Collected: 10/01/24 1116    Specimen: Blood Updated: 10/01/24 1135     Glucose 55 mg/dL      Comment: : [id number] Wil Collazo ID: [id number]        POC Glucose Once [[id number]]  (Abnormal) Collected: 10/01/24 0715    Specimen: Blood Updated: 10/01/24 0728     Glucose 351 mg/dL      Comment: : [id number] Wil Collazo ID: [id number]       Basic Metabolic Panel [[id number]]  (Abnormal) Collected: 10/01/24 0230    Specimen: Blood Updated: 10/01/24 0300     Glucose 82 mg/dL      BUN 37 mg/dL      Creatinine 1.08 mg/dL      Sodium 141 mmol/L      Potassium 4.7 mmol/L      Chloride 101 mmol/L      CO2 31.0 mmol/L      Calcium 8.1 mg/dL      BUN/Creatinine Ratio 34.3     Anion Gap 9.0 mmol/L      eGFR 77.1 mL/min/1.73     Narrative:      GFR Normal >60  Chronic Kidney Disease <60  Kidney Failure <15      CBC & Differential [[id number]]  (Abnormal) Collected: 10/01/24 0230    Specimen: Blood Updated: 10/01/24 0238    Narrative:      The following orders were created for panel order CBC & Differential.  Procedure                               Abnormality         Status                     ---------                               -----------         ------                     CBC Auto Differential[[id number]]        Abnormal            Final result                 Please view results for these tests on the individual orders.    CBC Auto Differential [[id number]]  (Abnormal) Collected: 10/01/24 0230    Specimen: Blood Updated: 10/01/24 0238     WBC 10.45 10*3/mm3      RBC 4.13 10*6/mm3      Hemoglobin 11.2 g/dL      Hematocrit 39.7 %      MCV 96.1 fL      MCH 27.1 pg      MCHC 28.2 g/dL      RDW 15.2 %      RDW-SD 52.6 fl      MPV 10.0 fL      Platelets 185 10*3/mm3      Neutrophil % 85.1 %      Lymphocyte % 7.3 %      Monocyte % 5.9 %      Eosinophil % 0.7 %      Basophil % 0.4 %      Immature Grans % 0.6 %      Neutrophils, Absolute 8.90 10*3/mm3      Lymphocytes, Absolute 0.76 10*3/mm3      Monocytes, Absolute 0.62 10*3/mm3      Eosinophils, Absolute 0.07 10*3/mm3      Basophils, Absolute 0.04 10*3/mm3      Immature Grans, Absolute 0.06 10*3/mm3      nRBC 0.0  /100 WBC     POC Glucose Once [[id number]]  (Normal) Collected: 09/30/24 1939    Specimen: Blood Updated: 09/30/24 1951     Glucose 73 mg/dL      Comment: : [id number] Ruel MannMeter ID: [id number]       POC Glucose Once [[id number]]  (Normal) Collected: 09/30/24 1632    Specimen: Blood Updated: 09/30/24 1648     Glucose 91 mg/dL      Comment: : [id number] Della ChristiansonyMeter ID: [id number]       POC Glucose Once [[id number]]  (Normal) Collected: 09/30/24 1155    Specimen: Blood Updated: 09/30/24 1205     Glucose 90 mg/dL      Comment: : [id number] Get PegueroMeter ID: [id number]             Imaging Results (Last 72 Hours)       Procedure Component Value Units Date/Time    US Venous Doppler Lower Extremity Left (duplex) [[id number]] Collected: 10/01/24 1521     Updated: 10/01/24 1524    Narrative:      History: Pain and swelling       Impression:      Impression: There is no evidence of deep venous thrombosis or  superficial thrombophlebitis of the left lower extremity.     Comments: Left lower extremity venous duplex exam was performed using  color Doppler flow, Doppler wave form analysis, and grayscale imaging,  with and without compression. There is no evidence of deep venous  thrombosis of the common femoral, superficial femoral, popliteal,  posterior tibial, and peroneal veins. There is no thrombus identified in  the saphenofemoral junction or the greater saphenous vein.         This report was signed and finalized on 10/1/2024 3:21 PM by Dr. Suresh Kong MD.       XR Outside Films [[id number]] Resulted: 09/30/24 1152     Updated: 09/30/24 1152    Narrative:      This procedure was auto-finalized with no dictation required.               ASSESSMENT/PLAN       Examination and evaluation of wound(s) was performed.    DIAGNOSIS:   Associated dermatitis  Candidiasis  Bowel bladder incontinence  Obesity  Confusion    Acute exacerbation of CHF (congestive heart failure)    PLAN:   Orders placed for  skin care as listed below.   Pressure/moisture management orders were previously placed on admission.  Plan to continue these orders.      Start     Ordered    10/02/24 1800  Skin Care  2 Times Daily      Question Answer Comment   Wound Locations Perineal area    Wound Care Instructions Clean with no rinse cleansing foam. Pat dry. Use magic barrier cream and maintain dry area.        10/02/24 0823    10/02/24 0824  Apply External Urinary Catheter  Once         10/02/24 0823          This document has been electronically signed by VIV Lai on 10/2/2024 08:22 CDT

## 2024-10-03 LAB
ALBUMIN SERPL-MCNC: 3.3 G/DL (ref 3.5–5.2)
ALBUMIN/GLOB SERPL: 1.2 G/DL
ALP SERPL-CCNC: 97 U/L (ref 39–117)
ALT SERPL W P-5'-P-CCNC: 12 U/L (ref 1–41)
ANION GAP SERPL CALCULATED.3IONS-SCNC: 6 MMOL/L (ref 5–15)
AST SERPL-CCNC: 11 U/L (ref 1–40)
BASOPHILS # BLD AUTO: 0.04 10*3/MM3 (ref 0–0.2)
BASOPHILS NFR BLD AUTO: 0.7 % (ref 0–1.5)
BILIRUB SERPL-MCNC: 0.4 MG/DL (ref 0–1.2)
BUN SERPL-MCNC: 31 MG/DL (ref 8–23)
BUN/CREAT SERPL: 34.1 (ref 7–25)
CALCIUM SPEC-SCNC: 8.2 MG/DL (ref 8.6–10.5)
CHLORIDE SERPL-SCNC: 101 MMOL/L (ref 98–107)
CO2 SERPL-SCNC: 33 MMOL/L (ref 22–29)
CREAT SERPL-MCNC: 0.91 MG/DL (ref 0.76–1.27)
DEPRECATED RDW RBC AUTO: 53.2 FL (ref 37–54)
EGFRCR SERPLBLD CKD-EPI 2021: 94.7 ML/MIN/1.73
EOSINOPHIL # BLD AUTO: 0.06 10*3/MM3 (ref 0–0.4)
EOSINOPHIL NFR BLD AUTO: 1 % (ref 0.3–6.2)
ERYTHROCYTE [DISTWIDTH] IN BLOOD BY AUTOMATED COUNT: 15.1 % (ref 12.3–15.4)
GLOBULIN UR ELPH-MCNC: 2.8 GM/DL
GLUCOSE BLDC GLUCOMTR-MCNC: 141 MG/DL (ref 70–130)
GLUCOSE BLDC GLUCOMTR-MCNC: 60 MG/DL (ref 70–130)
GLUCOSE BLDC GLUCOMTR-MCNC: 66 MG/DL (ref 70–130)
GLUCOSE BLDC GLUCOMTR-MCNC: 79 MG/DL (ref 70–130)
GLUCOSE BLDC GLUCOMTR-MCNC: 92 MG/DL (ref 70–130)
GLUCOSE SERPL-MCNC: 104 MG/DL (ref 65–99)
HCT VFR BLD AUTO: 37.9 % (ref 37.5–51)
HGB BLD-MCNC: 10.6 G/DL (ref 13–17.7)
IMM GRANULOCYTES # BLD AUTO: 0.02 10*3/MM3 (ref 0–0.05)
IMM GRANULOCYTES NFR BLD AUTO: 0.3 % (ref 0–0.5)
LYMPHOCYTES # BLD AUTO: 0.7 10*3/MM3 (ref 0.7–3.1)
LYMPHOCYTES NFR BLD AUTO: 11.5 % (ref 19.6–45.3)
MCH RBC QN AUTO: 27.2 PG (ref 26.6–33)
MCHC RBC AUTO-ENTMCNC: 28 G/DL (ref 31.5–35.7)
MCV RBC AUTO: 97.4 FL (ref 79–97)
MONOCYTES # BLD AUTO: 0.47 10*3/MM3 (ref 0.1–0.9)
MONOCYTES NFR BLD AUTO: 7.7 % (ref 5–12)
NEUTROPHILS NFR BLD AUTO: 4.82 10*3/MM3 (ref 1.7–7)
NEUTROPHILS NFR BLD AUTO: 78.8 % (ref 42.7–76)
NRBC BLD AUTO-RTO: 0 /100 WBC (ref 0–0.2)
PLATELET # BLD AUTO: 189 10*3/MM3 (ref 140–450)
PMV BLD AUTO: 10.3 FL (ref 6–12)
POTASSIUM SERPL-SCNC: 4.9 MMOL/L (ref 3.5–5.2)
PROT SERPL-MCNC: 6.1 G/DL (ref 6–8.5)
RBC # BLD AUTO: 3.89 10*6/MM3 (ref 4.14–5.8)
SODIUM SERPL-SCNC: 140 MMOL/L (ref 136–145)
WBC NRBC COR # BLD AUTO: 6.11 10*3/MM3 (ref 3.4–10.8)

## 2024-10-03 PROCEDURE — 80053 COMPREHEN METABOLIC PANEL: CPT | Performed by: FAMILY MEDICINE

## 2024-10-03 PROCEDURE — 97110 THERAPEUTIC EXERCISES: CPT

## 2024-10-03 PROCEDURE — 25010000002 ENOXAPARIN PER 10 MG: Performed by: INTERNAL MEDICINE

## 2024-10-03 PROCEDURE — 99223 1ST HOSP IP/OBS HIGH 75: CPT | Performed by: CLINICAL NURSE SPECIALIST

## 2024-10-03 PROCEDURE — 85025 COMPLETE CBC W/AUTO DIFF WBC: CPT | Performed by: FAMILY MEDICINE

## 2024-10-03 PROCEDURE — 97116 GAIT TRAINING THERAPY: CPT

## 2024-10-03 PROCEDURE — 63710000001 INSULIN GLARGINE PER 5 UNITS: Performed by: FAMILY MEDICINE

## 2024-10-03 PROCEDURE — 97535 SELF CARE MNGMENT TRAINING: CPT | Performed by: OCCUPATIONAL THERAPIST

## 2024-10-03 PROCEDURE — 82948 REAGENT STRIP/BLOOD GLUCOSE: CPT

## 2024-10-03 RX ORDER — GABAPENTIN 300 MG/1
600 CAPSULE ORAL EVERY 12 HOURS SCHEDULED
Status: DISCONTINUED | OUTPATIENT
Start: 2024-10-03 | End: 2024-10-04 | Stop reason: HOSPADM

## 2024-10-03 RX ORDER — OXYCODONE AND ACETAMINOPHEN 5; 325 MG/1; MG/1
1 TABLET ORAL ONCE
Status: COMPLETED | OUTPATIENT
Start: 2024-10-04 | End: 2024-10-04

## 2024-10-03 RX ADMIN — Medication 10 ML: at 08:50

## 2024-10-03 RX ADMIN — Medication 10 ML: at 20:43

## 2024-10-03 RX ADMIN — INSULIN GLARGINE 15 UNITS: 100 INJECTION, SOLUTION SUBCUTANEOUS at 20:43

## 2024-10-03 RX ADMIN — GABAPENTIN 600 MG: 300 CAPSULE ORAL at 08:50

## 2024-10-03 RX ADMIN — NICOTINE 1 PATCH: 21 PATCH, EXTENDED RELEASE TRANSDERMAL at 08:51

## 2024-10-03 RX ADMIN — DULOXETINE HYDROCHLORIDE 30 MG: 30 CAPSULE, DELAYED RELEASE ORAL at 08:50

## 2024-10-03 RX ADMIN — ASPIRIN 81 MG: 81 TABLET, COATED ORAL at 08:50

## 2024-10-03 RX ADMIN — ENOXAPARIN SODIUM 40 MG: 100 INJECTION SUBCUTANEOUS at 08:49

## 2024-10-03 RX ADMIN — QUETIAPINE FUMARATE 25 MG: 25 TABLET, FILM COATED ORAL at 20:44

## 2024-10-03 RX ADMIN — SACUBITRIL AND VALSARTAN 1 TABLET: 24; 26 TABLET, FILM COATED ORAL at 20:44

## 2024-10-03 RX ADMIN — PRAVASTATIN SODIUM 40 MG: 20 TABLET ORAL at 20:44

## 2024-10-03 RX ADMIN — SACUBITRIL AND VALSARTAN 1 TABLET: 24; 26 TABLET, FILM COATED ORAL at 08:50

## 2024-10-03 RX ADMIN — DOCUSATE SODIUM 50 MG AND SENNOSIDES 8.6 MG 2 TABLET: 8.6; 5 TABLET, FILM COATED ORAL at 08:50

## 2024-10-03 NOTE — THERAPY TREATMENT NOTE
Acute Care - Physical Therapy Treatment Note  [facility]     Patient Name: Tucker Knox  : 1960  MRN: [id number]  Today's Date: 10/3/2024      Visit Dx:     ICD-10-CM ICD-9-CM   1. Impaired mobility [Z74.09]  Z74.09 799.89     Patient Active Problem List   Diagnosis    Diabetes mellitus    Hypertension    Pulmonary HTN    COPD (chronic obstructive pulmonary disease)    NSTEMI, initial episode of care    Malignant neoplasm of sigmoid colon    FH: colon cancer    Sleep apnea    History of adenomatous polyp of colon    Colon adenocarcinoma    Acute on chronic systolic CHF (congestive heart failure)    PVC's (premature ventricular contractions)    Presence of external cardiac defibrillator    Acute exacerbation of CHF (congestive heart failure)    Chronic respiratory failure with hypoxia    Delirium     Past Medical History:   Diagnosis Date    Cancer     CHF (congestive heart failure)     COPD (chronic obstructive pulmonary disease)     Coronary artery disease     stent x 1    Family history of colon cancer     Hyperlipidemia     Hypertension     Sleep apnea     does not wear machine, supposed to wear cpap with oxygen and does not wear it    Type 2 diabetes mellitus      Past Surgical History:   Procedure Laterality Date    BACK SURGERY      CARDIAC CATHETERIZATION Left 2022    Procedure: Cardiac Catheterization/Vascular Study;  Surgeon: Todd Avendano MD;  Location:  [facility]ATH [facility];  Service: Cardiology;  Laterality: Left;    CARDIAC CATHETERIZATION      with stent x1    CARDIAC CATHETERIZATION N/A 2024    Procedure: Left Heart Cath;  Surgeon: Ruben Adler MD;  Location:  [facility] CATH [facility];  Service: Cardiology;  Laterality: N/A;    COLON RESECTION N/A 2023    Procedure: COLON RESECTION LAPAROSCOPIC SIGMOID WITH DAVINCI ROBOT, INTRA-OPERATIVE FLEXIBLE SIGMOIDOSCOPY, SPLENIC FLEXURE MOBILIZATION, OPEN UMBILICAL HERNIA REPAIR;  Surgeon: Oma Velasquez MD;   Location: [facility];  [facility];  Laterality: N/A;    COLONOSCOPY N/A 10/09/2023    Diverticulosis; One 5mm polyp in ascending colon; One 5mm polyp in transverse colon; One 10mm polyp at 65cm proximal to anus; One 20mm polyp at 65cm proximal to anus-Tattooed; One 20mm polyp at 42cm proximal to anus-Clip (MR conditional) placed-Tattooed; Rule out malignancy-Partially obstructing tumor in recto-sigmoid colon-biopsied-Tattooed; One 10mm polyp in rectum    ELBOW PROCEDURE      KNEE SURGERY      LUMBAR DISC SURGERY       PT Assessment (Last 12 Hours)       PT Evaluation and Treatment       Row Name 10/03/24 1316          Physical Therapy Time and Intention    Subjective Information no complaints  -     Document Type therapy note (daily note)  -     Mode of Treatment physical therapy  -     Comment pt alert and oriented today  -       Row Name 10/03/24 1316          General Information    Existing Precautions/Restrictions fall;oxygen therapy device and L/min  -       Row Name 10/03/24 1316          Pain    Pretreatment Pain Rating 0/10 - no pain  -     Posttreatment Pain Rating 0/10 - no pain  -       Row Name 10/03/24 1316          Bed Mobility    Supine-Sit Island (Bed Mobility) verbal cues;minimum assist (75% patient effort)  -     Sit-Supine Island (Bed Mobility) verbal cues;minimum assist (75% patient effort)  -       Row Name 10/03/24 1316          Sit-Stand Transfer    Sit-Stand Island (Transfers) verbal cues;minimum assist (75% patient effort)  -     Assistive Device (Sit-Stand Transfers) cane, straight  -       Row Name 10/03/24 1316          Stand-Sit Transfer    Stand-Sit Island (Transfers) verbal cues;minimum assist (75% patient effort)  -       Row Name 10/03/24 1316          Gait/Stairs (Locomotion)    Island Level (Gait) verbal cues;minimum assist (75% patient effort)  -     Assistive Device (Gait) cane, straight  -     Distance in  Feet (Gait) 15  x 2 with one sitting rest  -BALJINDER     Deviations/Abnormal Patterns (Gait) gait speed decreased  -BALJINDER       Row Name 10/03/24 1316          Motor Skills    Comments, Therapeutic Exercise sitting AROM BLE X 15  -BALJINDER       Row Name             Wound 09/14/24 1141 Left anterior third toe Abrasion    Wound - Properties Group Placement Date: 09/14/24  -SK Placement Time: 1141  -SK Side: Left  -SK Orientation: anterior  -SK Location: third toe  -SK Primary Wound Type: Abrasion  -SK    Retired Wound - Properties Group Placement Date: 09/14/24  -SK Placement Time: 1141  -SK Side: Left  -SK Orientation: anterior  -SK Location: third toe  -SK Primary Wound Type: Abrasion  -SK    Retired Wound - Properties Group Date first assessed: 09/14/24  -SK Time first assessed: 1141  -SK Side: Left  -SK Location: third toe  -SK Primary Wound Type: Abrasion  -SK      Row Name             Wound 09/14/24 1141 Left anterior great toe Skin Tear    Wound - Properties Group Placement Date: 09/14/24  -SK Placement Time: 1141  -SK Side: Left  -SK Orientation: anterior  -SK Location: great toe  -SK, Area just below Nail area of Great toe  Primary Wound Type: Skin tear  -SK    Retired Wound - Properties Group Placement Date: 09/14/24  -SK Placement Time: 1141  -SK Side: Left  -SK Orientation: anterior  -SK Location: great toe  -SK, Area just below Nail area of Great toe  Primary Wound Type: Skin tear  -SK    Retired Wound - Properties Group Date first assessed: 09/14/24  -SK Time first assessed: 1141  -SK Side: Left  -SK Location: great toe  -SK, Area just below Nail area of Great toe  Primary Wound Type: Skin tear  -SK      Row Name             Wound 09/14/24 1141 Right anterior ankle Abrasion    Wound - Properties Group Placement Date: 09/14/24  -SK Placement Time: 1141  -SK Side: Right  -SK Orientation: anterior  -SK Location: ankle  -SK Primary Wound Type: Abrasion  -SK    Retired Wound - Properties Group Placement Date: 09/14/24  -SK  Placement Time: 1141  -SK Side: Right  -SK Orientation: anterior  -SK Location: ankle  -SK Primary Wound Type: Abrasion  -SK    Retired Wound - Properties Group Date first assessed: 09/14/24  -SK Time first assessed: 1141  -SK Side: Right  -SK Location: ankle  -SK Primary Wound Type: Abrasion  -SK      Row Name             Wound 09/28/24 1635 calf    Wound - Properties Group Placement Date: 09/28/24  -CB Placement Time: 1635  -CB Present on Original Admission: Y  -CB Location: calf  -CB    Retired Wound - Properties Group Placement Date: 09/28/24  -CB Placement Time: 1635  -CB Present on Original Admission: Y  -CB Location: calf  -CB    Retired Wound - Properties Group Date first assessed: 09/28/24  -CB Time first assessed: 1635  -CB Present on Original Admission: Y  -CB Location: calf  -CB      Row Name             Wound 09/28/24 1636 Left posterior calf    Wound - Properties Group Placement Date: 09/28/24  -CB Placement Time: 1636  -CB Present on Original Admission: Y  -CB Side: Left  -CB Orientation: posterior  -CB Location: calf  -CB    Retired Wound - Properties Group Placement Date: 09/28/24  -CB Placement Time: 1636  -CB Present on Original Admission: Y  -CB Side: Left  -CB Orientation: posterior  -CB Location: calf  -CB    Retired Wound - Properties Group Date first assessed: 09/28/24  -CB Time first assessed: 1636  -CB Present on Original Admission: Y  -CB Side: Left  -CB Location: calf  -CB      Row Name             Wound Left posterior ankle Abrasion    Wound - Properties Group Side: Left  -SK Orientation: posterior  -SK Location: ankle  -SK Primary Wound Type: Abrasion  -SK    Retired Wound - Properties Group Side: Left  -SK Orientation: posterior  -SK Location: ankle  -SK Primary Wound Type: Abrasion  -SK    Retired Wound - Properties Group Side: Left  -SK Location: ankle  -SK Primary Wound Type: Abrasion  -SK      Row Name             Wound 10/01/24 scrotum    Wound - Properties Group Placement Date:  10/01/24  -RAJEEV Location: [facility]    Retired Wound - Properties Group Placement Date: 10/01/24  -RAJEEV Location: [facility]    Retired Wound - Properties Group Date first assessed: 10/01/24  -RAJEEV Location: [facility]      Row Name 10/03/24 1316          Positioning and Restraints    Pre-Treatment Position in bed  -BALJINDER     Post Treatment Position bed  -BALJINDER     In Bed fowlers;call light within reach;encouraged to call for assist;exit alarm on;side rails up x2  -BALJINDER               User Key  (r) = Recorded By, (t) = Taken By, (c) = Cosigned By      Initials Name Provider Type    Maxi Tadeo PTA Physical Therapist Assistant    Tavia Olguin RN Registered Nurse    Mignon Arreola RN Registered Nurse    Ana Villaseñor RN Registered Nurse                    Physical Therapy Education       Title: PT OT SLP Therapies (In Progress)       Topic: Physical Therapy (In Progress)       Point: Mobility training (In Progress)       Learning Progress Summary             Patient Acceptance, E,D, NR by  at 10/1/2024 1439    Comment: bed mobility    Acceptance, E,D, VU,DU by  at 9/30/2024 1512    Comment: Pt. educated on proper body mechanics to maintain safety when performing functional mobility. Educated on safety procedures to abide by while at [facility]                         Point: Home exercise program (Not Started)       Learner Progress:  Not documented in this visit.              Point: Body mechanics (Done)       Learning Progress Summary             Patient Acceptance, E,D, VU,DU by BL at 9/30/2024 1512    Comment: Pt. educated on proper body mechanics to maintain safety when performing functional mobility. Educated on safety procedures to abide by while at [facility]                         Point: Precautions (Done)       Learning Progress Summary             Patient Acceptance, E,D, VU,DU by BL at 9/30/2024 1512    Comment: Pt. educated on proper body mechanics to maintain safety  when performing functional mobility. Educated on safety procedures to abide by while at [facility]                                         User Key       Initials Effective Dates Name Provider Type Discipline     02/03/23 -  Radha Limon PTA Physical Therapist Assistant PT     08/21/24 -  Tim Heller, STEPHANIE Student PT Student PT                  PT Recommendation and Plan     Plan of Care Reviewed With: patient  Progress: improving  Outcome Evaluation: Pt was in bed, much improved today.  Pt was alert and oriented, able to follow all commands.  Transfered supine to sitting with min assist.  sitting EOB, performed LE exericses x 15 AROM.  Transfered sit to stand with min assist.  Amb 15' x 2 with straight cane and CGA/min assist.  Will continue to work with pt to increase strength and progress mobility.   Outcome Measures       Row Name 10/03/24 1316 10/02/24 1136 10/01/24 1400       How much help from another person do you currently need...    Turning from your back to your side while in flat bed without using bedrails? 4  -BALJINDER 2  -BALJINDER 3  -BRUNO    Moving from lying on back to sitting on the side of a flat bed without bedrails? 3  -BALJINDER 2  -BALJINDER 3  -BRUNO    Moving to and from a bed to a chair (including a wheelchair)? 3  -BALJINDER 2  -BALJINDER 3  -BRUNO    Standing up from a chair using your arms (e.g., wheelchair, bedside chair)? 3  -BALJINDER 2  -BALJINDER 3  -BRUNO    Climbing 3-5 steps with a railing? 2  -BALJINDER 1  -BALJINDER 3  -BRUNO    To walk in hospital room? 3  -BALJINDER 2  -BALJINDER 3  -BRUNO    AM-PAC 6 Clicks Score (PT) 18  -BALJINDER 11  -BALJINDER 18  -BRUNO    Highest Level of Mobility Goal 6 --> Walk 10 steps or more  -BALJINDER 4 --> Transfer to chair/commode  -BALJINDER 6 --> Walk 10 steps or more  -BRUNO       Functional Assessment    Outcome Measure Options AM-PAC 6 Clicks Basic Mobility (PT)  -BALJINDER AM-PAC 6 Clicks Basic Mobility (PT)  -BALJINDER --              User Key  (r) = Recorded By, (t) = Taken By, (c) = Cosigned By      Initials Name Provider Type    BRUNO Radha Limon PTA Physical  Therapist Assistant    Maxi Tadeo PTA Physical Therapist Assistant                     Time Calculation:    PT Charges       Row Name 10/03/24 1316             Time Calculation    Start Time 1316  -BALJINDER      Stop Time 1342  -BALJINDER      Time Calculation (min) 26 min  -BALJINDER      PT Received On 10/03/24  -BALJINDER         Time Calculation- PT    Total Timed Code Minutes- PT 26 minute(s)  -BALJINDER         Timed Charges    97028 - PT Therapeutic Exercise Minutes 10  -BALJINDER      73920 - Gait Training Minutes  16  -BALJINDER         Total Minutes    Timed Charges Total Minutes 26  -BALJINDER       Total Minutes 26  -BALJINDER                User Key  (r) = Recorded By, (t) = Taken By, (c) = Cosigned By      Initials Name Provider Type    Maxi Tadeo PTA Physical Therapist Assistant                  Therapy Charges for Today       Code Description Service Date Service Provider Modifiers Qty    85748764596 HC PT THERAPEUTIC ACT EA 15 MIN 10/2/2024 Maxi Nolan PTA GP 2    91937443389 HC GAIT TRAINING EA 15 MIN 10/3/2024 Maxi Nolan, PTA GP 1    99378018069 HC PT THER PROC EA 15 MIN 10/3/2024 Maxi Nolan, PTA GP 1            PT G-Codes  Outcome Measure Options: AM-PAC 6 Clicks Basic Mobility (PT)  AM-PAC 6 Clicks Score (PT): 18  AM-PAC 6 Clicks Score (OT): 14    Maxi Nolan PTA  10/3/2024

## 2024-10-03 NOTE — CONSULTS
[facility] Palliative Care Services    Palliative Care Initial Consult   Attending Physician: Sea Joiner,*  Referring Provider: Sea Joiner MD     Reason for Referral: assistance with clarification of goals of care  Family/Support: Son and sister    Code Status and Medical Interventions: CPR (Attempt to Resuscitate); Full Support   Ordered at: 09/28/24 1636     Code Status (Patient has no pulse and is not breathing):    CPR (Attempt to Resuscitate)     Medical Interventions (Patient has pulse or is breathing):    Full Support     Goals of Care: TBD.    HPI:   64 y.o. 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 Velasquez followed by Dr. Stallworth last seen 2/14/2024 and recommended systemic therapy x 12 cycles referred to radiation oncology, however declined recommendations/followup, CHF, COPD, oxygen dependency-3 L, coronary artery disease s/p PCI to RCA on 4/13/2022  severe two-vessel disease of LAD and RCA per [facility] 9/12, Hyperlipidemia, Hypertension, Sleep apnea-noncompliant with CPAP, ongoing tobacco use, carotid artery disease-mild bilateral, chronic lower extremity wounds, impaired mobility-uses cane, and Type 2 diabetes mellitus.  Recent hospitalization 9/9-9/18 due to acute CHF, right pleural effusion, echocardiogram completed at that time showed EF 31 to 35% (EF 41 to 45% 12/7/2023), diastolic dysfunction, mild aortic valve stenosis, and moderate pulmonary hypertension due to tricuspid regurgitation, left heart cath 9/12 showed severe two-vessel disease of the LAD and RCA with elevated left heart filling pressures, CT surgery consulted for consideration of coronary artery bypass grafting with plans for outpatient cardiac MRI and follow-up with CT surgery and discharged home; 8/29-8/31 due to acute respiratory failure, right pleural effusion, and pneumonia, underwent  "thoracentesis 8/30 with removal of 1500 mL, fluid discharged home. [facility] related to shortness of breath.  Recent hospitalization and discharged 9/18 and treated at that time with IV Lasix.  Noted to initially be doing well up until 2 days prior to this hospitalization secondary to progressive dyspnea with exertion.  Evaluated at out lying ED and transferred to [facility] where he was admitted to ICU due to decompensated heart failure and COPD exacerbation.  Started on IV Lasix drip which was discontinued given worsening kidney function and started on gentle IV fluids.SW notes family concerns with regard to patient's lack of understanding of medical issues and medical noncompliance.  Therapy recommending short-term rehab.  Altered mental status with increased confusion noted by therapy yesterday, oriented to self and difficulty processing.  Received Seroquel scheduled starting 10/2.  Kidney function has normalized.  Decline in hemoglobin 10.6 from 11.2, CO2 elevated. Laying in bed without apparent distress.  He is alert and able to answer questions appropriately.  He reorients easily.  Staff at bedside repositioning.  With complaints of being cold air has been turned off and gets placed for patient's comfort.  No family at bedside.  Call placed to patient's son at number in chart, no answer, left voicemail.  Palliative Care Spoke With: patient and family reports he was a former over the road .  He has not been able to drive \"for a very long time\".  He has oxygen dependency at baseline.  Verbalizes shortness of breath and lower extremity swelling led to current hospitalization.  Spoke with patient's sister, Oxana via telephone who verbalizes her concerns with patient's overall decline secondary to multiple medical issues and patient's poor prognostic awareness.  States patient required extended ventilator support earlier this year while hospitalized in " "[place] and family were actually called in to pursue palliative extubation, however patient did fairly well postextubation but unfortunately has had multiple health issues including a \"mini stroke\" requiring hospitalization later in the [place] area. Due to the Palliative Care Topics Discussed: palliative care, goals of care, care options, resuscitation status, and discharge options we will establish an advance care plan.   Advance Care Planning   Advance Care Planning Discussion: Patient agreeable to conversation, later spoke with patient's sister, Oxana, who is subsequently a nurse, via telephone.  Explored events leading to current hospitalization and overall prognosis secondary to colon cancer, CHF exacerbation, severe two-vessel disease, chronic respiratory failure with oxygen dependency, and multiple comorbidities.  With regard to colon cancer diagnosis further explored decision to not pursue systemic therapy and recommendations as outlined per oncology.  States I did not want to \"I am ready to die\".  We further addressed understanding of cardiac issues and plans for further workup as recommended per CT surgery.  States he is aware he has a weak heart which makes him high risk for aberrant rhythms.  We discussed recommendations for LifeVest at discharge and states he did not receive as \"I did not like it\".  Verbalizes understanding and purpose of LifeVest while awaiting further cardiac workup.  We further explored medical priorities including CODE STATUS and medical interventions and patient is elected to de-escalate care measures to no CPR/full support interventions.  When we discussed the scenario of respiratory decline and potential for ventilator support asks \"how long\".  We further discussed potential trial of therapy and patient verbalizes his wishes not to remain on life support measures long-term.  We further reviewed and initiated a living will document and patient was able to name his son and " sister as healthcare surrogates.  He is elected not to receive life-prolonging measures or artificial nutrition long-term, nor would he wish to be a organ or tissue donor.  Content of conversation as above has been discussed with patient's sister, Oxana who agrees with wishes but admits she is surprised patient would wish to undergo ventilator support as he had such a difficult time with ventilator liberation earlier this year.  We discussed plan for ongoing conversation with regard to care goals and recommended completion of a MOST document prior to discharge to further delineate care goals in the future.  Patient and sister appreciative for conversation and had the opportunity to ask questions.  Oxana will plan to reach out to patient's son to further discuss plan of care as above.     Review of Systems   Constitutional: Positive for malaise/fatigue.   Cardiovascular:  Positive for dyspnea on exertion and leg swelling.   Respiratory:  Positive for shortness of breath.    Musculoskeletal:  Positive for muscle weakness.   Genitourinary:  Negative for dysuria.   Neurological:  Positive for weakness.   Psychiatric/Behavioral:  The patient is not nervous/anxious.      1- Pain Assessment  CPOT Facial Expression: 0-->relaxed, neutral  CPOT Body Movements: 0-->absence of movements  CPOT Muscle Tension: 0-->relaxed  Ventilator Compliance/Vocalization: 0-->tolerating ventilator or movement  CPOT Score: 0  PAINAD Breathin-->normal  PAINAD Negative Vocalization: 0-->none  PAINAD Facial Expression: 0-->smiling or inexpressive  PAINAD Body Language: 0-->relaxed  PAINAD Consolability: 0-->no need to console  PAINAD Score: 0    Past Medical History:   Diagnosis Date    Cancer     CHF (congestive heart failure)     COPD (chronic obstructive pulmonary disease)     Coronary artery disease     stent x 1    Family history of colon cancer     Hyperlipidemia     Hypertension     Sleep apnea     does not wear machine, supposed to  wear cpap with oxygen and does not wear it    Type 2 diabetes mellitus      Past Surgical History:   Procedure Laterality Date    BACK SURGERY      CARDIAC CATHETERIZATION Left 04/13/2022    Procedure: Cardiac Catheterization/Vascular Study;  Surgeon: Todd Avendano MD;  Location:  [facility] CATH INVASIVE LOCATION;  Service: Cardiology;  Laterality: Left;    CARDIAC CATHETERIZATION      with stent x1    CARDIAC CATHETERIZATION N/A 9/12/2024    Procedure: Left Heart Cath;  Surgeon: Ruben Adler MD;  Location:  [facility] CATH INVASIVE LOCATION;  Service: Cardiology;  Laterality: N/A;    COLON RESECTION N/A 12/5/2023    Procedure: COLON RESECTION LAPAROSCOPIC SIGMOID WITH DAVINCI ROBOT, INTRA-OPERATIVE FLEXIBLE SIGMOIDOSCOPY, SPLENIC FLEXURE MOBILIZATION, OPEN UMBILICAL HERNIA REPAIR;  Surgeon: Oma Velasquez MD;  Location:  [facility] OR;  Service: Robotics - DaVinci;  Laterality: N/A;    COLONOSCOPY N/A 10/09/2023    Diverticulosis; One 5mm polyp in ascending colon; One 5mm polyp in transverse colon; One 10mm polyp at 65cm proximal to anus; One 20mm polyp at 65cm proximal to anus-Tattooed; One 20mm polyp at 42cm proximal to anus-Clip (MR conditional) placed-Tattooed; Rule out malignancy-Partially obstructing tumor in recto-sigmoid colon-biopsied-Tattooed; One 10mm polyp in rectum    ELBOW PROCEDURE      KNEE SURGERY      LUMBAR DISC SURGERY       Social History     Socioeconomic History    Marital status:    Tobacco Use    Smoking status: Former     Current packs/day: 1.00     Average packs/day: 1 pack/day for 45.0 years (45.0 ttl pk-yrs)     Types: Cigarettes    Smokeless tobacco: Never   Vaping Use    Vaping status: Some Days    Substances: Nicotine, Flavoring    Devices: Disposable   Substance and Sexual Activity    Alcohol use: Not Currently    Drug use: Yes     Types: Marijuana    Sexual activity: Defer         Current Facility-Administered Medications:     aspirin EC tablet 81 mg, 81 mg, Oral, Daily,  Quentin Morin DO, 81 mg at 10/02/24 1510    sennosides-docusate (PERICOLACE) 8.6-50 MG per tablet 2 tablet, 2 tablet, Oral, BID, 2 tablet at 10/02/24 2114 **AND** polyethylene glycol (MIRALAX) packet 17 g, 17 g, Oral, Daily PRN **AND** bisacodyl (DULCOLAX) EC tablet 5 mg, 5 mg, Oral, Daily PRN **AND** bisacodyl (DULCOLAX) suppository 10 mg, 10 mg, Rectal, Daily PRN, Quentin Morin DO    Calcium Replacement - Follow Nurse / BPA Driven Protocol, , Does not apply, PRN, Quentin Morin DO    dextrose (D50W) (25 g/50 mL) IV injection 25 g, 25 g, Intravenous, Q15 Min PRN, Quentin Morin DO, 25 g at 10/01/24 1146    dextrose (GLUTOSE) oral gel 15 g, 15 g, Oral, Q15 Min PRN, Quentin Morin DO, 15 g at 10/01/24 1119    DULoxetine (CYMBALTA) DR capsule 30 mg, 30 mg, Oral, Daily, Quentin Morin DO, 30 mg at 10/01/24 0919    Enoxaparin Sodium (LOVENOX) syringe 40 mg, 40 mg, Subcutaneous, Daily, Quentin Morin DO, 40 mg at 10/01/24 0919    gabapentin (NEURONTIN) capsule 600 mg, 600 mg, Oral, Daily, Quentin Morin DO, 600 mg at 10/02/24 1510    glucagon (GLUCAGEN) injection 1 mg, 1 mg, Intramuscular, Q15 Min PRN, Quentin Morin DO    hydrocortisone-bacitracin-zinc oxide-nystatin (MAGIC BARRIER) ointment 1 Application, 1 Application, Topical, PRN, Marielena Jernigan, APRN, 1 Application at 10/02/24 1703    insulin glargine (LANTUS, SEMGLEE) injection 30 Units, 30 Units, Subcutaneous, Nightly, Layo Urrutia, VIV, 30 Units at 10/02/24 2115    Insulin Lispro (humaLOG) injection 2-9 Units, 2-9 Units, Subcutaneous, 4x Daily AC & at Bedtime, Quentin Morin DO, 2 Units at 10/02/24 1324    ipratropium-albuterol (DUO-NEB) nebulizer solution 3 mL, 3 mL, Nebulization, Q4H PRN, Quentin Morin DO    Magnesium Standard Dose Replacement - Follow Nurse / BPA Driven Protocol, , Does not apply, PRN, Patience,  "Quentin Deutsch DO    nicotine (NICODERM CQ) 21 MG/24HR patch 1 patch, 1 patch, Transdermal, Q24H, Sea Joiner MD, 1 patch at 10/02/24 1824    Phosphorus Replacement - Follow Nurse / BPA Driven Protocol, , Does not apply, PRNPatience Brentley Thomas, DO    Potassium Replacement - Follow Nurse / BPA Driven Protocol, , Does not apply, PRN, Quentin Morin DO    pravastatin (PRAVACHOL) tablet 40 mg, 40 mg, Oral, Nightly, Quentin Morin DO, 40 mg at 10/02/24 2114    QUEtiapine (SEROquel) tablet 25 mg, 25 mg, Oral, Nightly, Sea Joiner MD, 25 mg at 10/02/24 2114    sacubitril-valsartan (ENTRESTO) 24-26 MG tablet 1 tablet, 1 tablet, Oral, BID, Quentin Morin DO, 1 tablet at 10/02/24 2114    sodium chloride 0.9 % flush 10 mL, 10 mL, Intravenous, Q12H, Quentin Morin DO, 10 mL at 10/02/24 2118    sodium chloride 0.9 % flush 10 mL, 10 mL, Intravenous, PRN, Quentin Morin DO    sodium chloride 0.9 % infusion 40 mL, 40 mL, Intravenous, PRN, Quentin Morin DO    Allergies   Allergen Reactions    Penicillins Unknown - Low Severity     Pt states happened when child does not know      I have utilized all available immediate resources to obtain, update, or review the patient's current medications (including all prescriptions, over-the-counter products, herbals, cannabis/cannabidiol products, and vitamin/mineral/dietary (nutritional) supplements) for name, route of administration, type, dose and frequency.      Intake/Output Summary (Last 24 hours) at 10/3/2024 0741  Last data filed at 10/3/2024 0400  Gross per 24 hour   Intake 360 ml   Output 400 ml   Net -40 ml       Physical Exam:    Diagnostics: Reviewed  /68 (BP Location: Left arm, Patient Position: Lying)   Pulse 86   Temp 98.4 °F (36.9 °C) (Oral)   Resp 20   Ht 175.3 cm (69\")   Wt 122 kg (268 lb 6.4 oz)   SpO2 91%   BMI 39.64 kg/m²     Vitals and nursing note " reviewed.   Constitutional:       Appearance: Not in distress. Morbidly obese. Ill-appearing and chronically ill-appearing.      Interventions: Nasal cannula in place.   Eyes:      General: Lids are normal.      Pupils: Pupils are equal, round, and reactive to light.   HENT:      Head: Normocephalic.   Pulmonary:      Effort: Accessory muscle usage present.      Breath sounds: Decreased breath sounds present.   Cardiovascular:      Normal rate.   Edema:     Peripheral edema present.  Abdominal:      General: Abdomen is protuberant.      Palpations: Abdomen is soft.   Musculoskeletal: Normal range of motion.      Cervical back: Neck supple. Skin:     General: Skin is warm.      Comments: Wounds as documented   Genitourinary:     Comments: External urinary catheter in place  Neurological:      Mental Status: Alert.      Comments: Orients easily.   Psychiatric:         Mood and Affect: Mood normal.     Patient status: Disease state: Controlled with current treatments.  Current Functional status: Palliative Performance Scale Score: Performance 30% based on the following measures: Ambulation: Totally bed bound, Activity and Evidence of Disease: Unable to do any work, extensive evidence of disease, Self-Care: Total care required,  Intake: Reduced, LOC: Full, drowsy or confusion   Baseline ECOG Status(4) Completely disabled, unable to carry out self-care.  Totally confined to bed or chair.  Nutritional status: Albumin 3.3 Body mass index is 39.64 kg/m².         Hospital Problem List      Acute exacerbation of CHF (congestive heart failure)    Diabetes mellitus    Hypertension    Pulmonary HTN    COPD (chronic obstructive pulmonary disease)    Malignant neoplasm of sigmoid colon    Sleep apnea    Presence of external cardiac defibrillator    Chronic respiratory failure with hypoxia    Delirium    Impression/Problem List:    Moderately differentiated rectosigmoid colon adenocarcinoma stage IIIa-s/p colon resection 12/5/2023  by Dr. Velasquez followed by Dr. Stallworth last seen 2/14/2024 and recommended systemic therapy x 12 cycles referred to radiation oncology, however declined recommendations/follow-up  Acute on chronic systolic and diastolic congestive heart failure EF 31 to 35% 9/10 (EF 41 to 45% 12/7/2023)  Coronary artery disease s/p PCI to RCA on 4/13/2022 severe two-vessel disease of LAD and RCA per [facility] 9/12  Chronic respiratory failure with oxygen dependency-3 L baseline  Pleural effusion, right sided  Altered mental status  COPD/centrilobular emphysema  Hyperlipidemia  Hypertension  Sleep apnea-noncompliant with CPAP  Ongoing tobacco use  Carotid artery disease-mild bilateral  Chronic lower extremity wounds  Impaired mobility-uses cane  Type 2 diabetes mellitus   Multiple wounds/abrasions    Recommendations/Plan:  1. plan: Goals of care include CODE STATUS no CPR/full support interventions.    Family support: The patient receives support from his son and sister ..  Advance Directives: Advance Directive Status: Patient does not have advance directive   POA/Healthcare surrogate-son, Aniceto and sister, Oxana.    2.  Palliative care encounter  - Prognosis is poor to guarded long-term secondary to colon cancer-declined recommendations for systemic therapy/follow-up, combined CHF, severe two-vessel coronary artery disease, chronic respiratory failure with oxygen dependency, altered mental status, and multiple comorbidities.  -Patient appears to have poor prognostic awareness.  Sister appears to have good prognostic awareness.    -Recent hospitalization and discharged 9/18 and treated at that time with IV Lasix.  Noted to initially be doing well up until 2 days prior to this hospitalization secondary to progressive dyspnea with exertion.  Evaluated at out lying ED and transferred to [facility] where he was admitted to ICU due to decompensated heart failure and COPD exacerbation.      -Started on IV Lasix drip which was discontinued given  worsening kidney function and started on gentle IV fluids.  -SW notes family concerns with regard to patient's lack of understanding of medical issues and medical noncompliance.    -Therapy recommending short-term rehab.    10/2-Altered mental status with increased confusion noted by therapy yesterday, oriented to self and difficulty processing.    -Started on Seroquel for delirium scheduled 10/2.      10/3-CODE STATUS changes no CPR/full support interventions  -Would benefit from ongoing conversation with regard to overall poor to guarded long-term prognosis  -A living will document completed with assistance from   -Recommend completion of a MOST document  -High risk rehospitalization's and decline given multiple comorbid factors  -HCS/sister, Oxana updated on plan of care.  Call placed to patient's son Aniceto, no answer, left voicemail.  Oxana to follow-up with son and update per our discussion  -Anticipating SNF at discharge      Thank you for this consult and allowing us to participate in patient's plan of care. Palliative Care Team will continue to follow patient.     20 minutes spent on advance care planning-discussing with patient and/or family, answering questions, providing some guidance about a plan and documentation of care, and coordinating care face to face.    Crista Williamson, VIV  10/3/2024  07:41 CDT

## 2024-10-03 NOTE — PLAN OF CARE
Goal Outcome Evaluation:  Plan of Care Reviewed With: patient        Progress: improving  Outcome Evaluation: OT tx complete.  Noteable improvement in pt mentation.  He was able to respond to questions in full sentences. Mr Knox wished to t/f to [facility] for BM.  Min A for bed mob & CGA for t/fs with verbal & visual cues for hand placement to reduce fall risk.  Pt able to follow commands this date.  Max A for clothing mgmt and to change brief.  Mod A for hygiene.  Pt demo'd good dynamic balance during attempt to cleanse self.  T/f back to bed. Cont OT tx. Anticipate DC to SNF      Anticipated Discharge Disposition (OT): skilled nursing facility

## 2024-10-03 NOTE — THERAPY TREATMENT NOTE
Patient Name: Tucker Knox  : 1960    MRN: [id number]                              Today's Date: 10/3/2024       Admit Date: 2024    Visit Dx:     ICD-10-CM ICD-9-CM   1. Impaired mobility [Z74.09]  Z74.09 799.89     Patient Active Problem List   Diagnosis    Diabetes mellitus    Hypertension    Pulmonary HTN    COPD (chronic obstructive pulmonary disease)    NSTEMI, initial episode of care    Malignant neoplasm of sigmoid colon    FH: colon cancer    Sleep apnea    History of adenomatous polyp of colon    Colon adenocarcinoma    Acute on chronic systolic CHF (congestive heart failure)    PVC's (premature ventricular contractions)    Presence of external cardiac defibrillator    Acute exacerbation of CHF (congestive heart failure)    Chronic respiratory failure with hypoxia    Delirium     Past Medical History:   Diagnosis Date    Cancer     CHF (congestive heart failure)     COPD (chronic obstructive pulmonary disease)     Coronary artery disease     stent x 1    Family history of colon cancer     Hyperlipidemia     Hypertension     Sleep apnea     does not wear machine, supposed to wear cpap with oxygen and does not wear it    Type 2 diabetes mellitus      Past Surgical History:   Procedure Laterality Date    BACK SURGERY      CARDIAC CATHETERIZATION Left 2022    Procedure: Cardiac Catheterization/Vascular Study;  Surgeon: Todd Avendano MD;  Location:  [facility];  Service: Cardiology;  Laterality: Left;    CARDIAC CATHETERIZATION      with stent x1    CARDIAC CATHETERIZATION N/A 2024    Procedure: Left Heart Cath;  Surgeon: Ruben Adler MD;  Location:  [facility]ATH [facility];  Service: Cardiology;  Laterality: N/A;    COLON RESECTION N/A 2023    Procedure: COLON RESECTION LAPAROSCOPIC SIGMOID WITH DAVINCI ROBOT, INTRA-OPERATIVE FLEXIBLE SIGMOIDOSCOPY, SPLENIC FLEXURE MOBILIZATION, OPEN UMBILICAL HERNIA REPAIR;  Surgeon: Oma Velasquez MD;   Location: [facility] OR;  Service: Robotics - [place];  Laterality: N/A;    COLONOSCOPY N/A 10/09/2023    Diverticulosis; One 5mm polyp in ascending colon; One 5mm polyp in transverse colon; One 10mm polyp at 65cm proximal to anus; One 20mm polyp at 65cm proximal to anus-Tattooed; One 20mm polyp at 42cm proximal to anus-Clip (MR conditional) placed-Tattooed; Rule out malignancy-Partially obstructing tumor in recto-sigmoid colon-biopsied-Tattooed; One 10mm polyp in rectum    ELBOW PROCEDURE      KNEE SURGERY      LUMBAR DISC SURGERY        General Information       Row Name 10/03/24 1502          OT Time and Intention    Document Type therapy note (daily note)  -     Mode of Treatment occupational therapy  -       Row Name 10/03/24 1502          General Information    Patient Profile Reviewed yes  -     Existing Precautions/Restrictions fall;oxygen therapy device and L/min  -       Row Name 10/03/24 1502          Cognition    Orientation Status (Cognition) oriented to;person;place  -       Row Name 10/03/24 1503          Safety Issues, Functional Mobility    Safety Issues Affecting Function (Mobility) friction/shear risk;at risk behavior observed;judgment  -     Impairments Affecting Function (Mobility) strength;balance;endurance/activity tolerance;pain;cognition  -     Cognitive Impairments, Mobility Safety/Performance insight into deficits/self-awareness;awareness, need for assistance  -               User Key  (r) = Recorded By, (t) = Taken By, (c) = Cosigned By      Initials Name Provider Type     Nichelle Hunter, OTR/L Occupational Therapist                     Mobility/ADL's       Row Name 10/03/24 150          Bed Mobility    Bed Mobility supine-sit;sit-supine  -     Scooting/Bridging [place] (Bed Mobility) standby assist;verbal cues;nonverbal cues (demo/gesture)  -     Supine-Sit [place] (Bed Mobility) verbal cues;minimum assist (75% patient effort)  -     Sit-Supine [place]  (Bed Mobility) verbal cues;minimum assist (75% patient effort)  -     Assistive Device (Bed Mobility) bed rails;head of bed elevated  -[facility] Name 10/03/24 1502          Transfers    Transfers toilet transfer  -[facility] Name 10/03/24 1502          Toilet Transfer    Type (Toilet Transfer) sit-stand;stand-sit;stand pivot/stand step  -     [place] (Toilet Transfer) contact guard;verbal cues;nonverbal cues (demo/gesture)  -     Assistive Device (Toilet Transfer) commode, 3-in-1  -[facility] Name 10/03/24 1502          Activities of Daily Living    BADL Assessment/Intervention toileting  -[facility] Name 10/03/24 1502          Toileting Assessment/Training    [place] (Toileting) maximum assist (25% patient effort);adjust/manage clothing;change pad/brief;moderate assist (50% patient effort);perform perineal hygiene  -     Position (Toileting) supported standing;supported sitting  -               User Key  (r) = Recorded By, (t) = Taken By, (c) = Cosigned By      Initials Name Provider Type     Nichelle Hunter, OTR/L Occupational Therapist                   Obj/Interventions       [facility] Name 10/03/24 1502          Balance    Balance Interventions sitting;standing;sit to stand;supported;static;dynamic;occupation based/functional task  -               User Key  (r) = Recorded By, (t) = Taken By, (c) = Cosigned By      Initials Name Provider Type     Nichelle Hunter, OTR/L Occupational Therapist                   Goals/Plan    No documentation.                  Clinical Impression       [facility] Name 10/03/24 1502          Pain Scale: FACES Pre/Post-Treatment    Pain: FACES Scale, Pretreatment 0-->no hurt  -     Posttreatment Pain Rating 0-->no hurt  -[facility] Name 10/03/24 1502          Plan of Care Review    Plan of Care Reviewed With patient  -CH     Progress improving  -     Outcome Evaluation OT tx complete.  Noteable improvement in pt mentation.  He was able to respond  to questions in full sentences. Mr Knox wished to t/f to [facility] for BM.  Min A for bed mob & CGA for t/fs with verbal & visual cues for hand placement to reduce fall risk.  Pt able to follow commands this date.  Max A for clothing mgmt and to change brief.  Mod A for hygiene.  Pt demo'd good dynamic balance during attempt to cleanse self.  T/f back to bed. Cont OT tx. Anticipate DC to SNF  -       Row Name 10/03/24 1502          Therapy Plan Review/Discharge Plan (OT)    Anticipated Discharge Disposition (OT) skilled nursing facility  -       Row Name 10/03/24 1502          Positioning and Restraints    Pre-Treatment Position in bed  -     Post Treatment Position bed  -     In Bed fowlers;call light within reach;encouraged to call for assist;exit alarm on;with nsg;side rails up x3  -               User Key  (r) = Recorded By, (t) = Taken By, (c) = Cosigned By      Initials Name Provider Type     Nichelle Hunter, OTR/L Occupational Therapist                   Outcome Measures       Row Name 10/03/24 1502          How much help from another is currently needed...    Putting on and taking off regular lower body clothing? 2  -CH     Bathing (including washing, rinsing, and drying) 2  -CH     Toileting (which includes using toilet bed pan or urinal) 2  -CH     Putting on and taking off regular upper body clothing 3  -CH     Taking care of personal grooming (such as brushing teeth) 3  -CH     Eating meals 4  -CH     AM-PAC 6 Clicks Score (OT) 16  -CH       Row Name 10/03/24 1316 10/03/24 0825       How much help from another person do you currently need...    Turning from your back to your side while in flat bed without using bedrails? 4  -BALJINDER 4  -SHIVAM    Moving from lying on back to sitting on the side of a flat bed without bedrails? 3  -BALJINDER 3  -SHIVAM    Moving to and from a bed to a chair (including a wheelchair)? 3  -BALJINDER 3  -SHIVAM    Standing up from a chair using your arms (e.g., wheelchair, bedside chair)? 3  -BALJINDER  3  -SHIVAM    Climbing 3-5 steps with a railing? 2  -BALJINDER 2  -SHIVAM    To walk in hospital room? 3  -BALJINDER 2  -SHIVAM    AM-PAC 6 Clicks Score (PT) 18  -BALJINDER 17  -SHIVAM    Highest Level of Mobility Goal 6 --> Walk 10 steps or more  -BALJINDER 5 --> Static standing  -SHIVAM      Row Name 10/03/24 1502 10/03/24 1316       Functional Assessment    Outcome Measure Options AM-PAC 6 Clicks Daily Activity (OT)  - AM-PAC 6 Clicks Basic Mobility (PT)  -BALJINDER              User Key  (r) = Recorded By, (t) = Taken By, (c) = Cosigned By      Initials Name Provider Type    Nichelle Shea, OTR/L Occupational Therapist    Maxi Tadeo, PTA Physical Therapist Assistant    Farnaz Rosenbaum LPN Licensed Nurse                    Occupational Therapy Education       Title: PT OT SLP Therapies (In Progress)       Topic: Occupational Therapy (In Progress)       Point: ADL training (Done)       Description:   Instruct learner(s) on proper safety adaptation and remediation techniques during self care or transfers.   Instruct in proper use of assistive devices.                  Learning Progress Summary             Patient Acceptance, E, VU,NR by  at 10/3/2024 1553    Acceptance, E,D, VU,NR by  at 10/2/2024 1457    Acceptance, E, VU by  at 9/30/2024 1441                         Point: Home exercise program (Not Started)       Description:   Instruct learner(s) on appropriate technique for monitoring, assisting and/or progressing therapeutic exercises/activities.                  Learner Progress:  Not documented in this visit.              Point: Precautions (Done)       Description:   Instruct learner(s) on prescribed precautions during self-care and functional transfers.                  Learning Progress Summary             Patient Acceptance, E,D, VU,NR by  at 10/2/2024 1457    Acceptance, E, VU by CHARMAINE at 9/30/2024 1441                         Point: Body mechanics (Done)       Description:   Instruct learner(s) on proper positioning and  spine alignment during self-care, functional mobility activities and/or exercises.                  Learning Progress Summary             Patient Acceptance, E, VU,NR by  at 10/3/2024 1553    Acceptance, E,D, VU,NR by  at 10/2/2024 1457    Acceptance, E, VU by  at 9/30/2024 1441                                         User Key       Initials Effective Dates Name Provider Type [facility] 07/11/23 -  Nichelle Hunter OTR/L Occupational Therapist OT    [facility] 07/11/23 -  Tayla Gibbs OTR/L, CSRS Occupational Therapist OT                  OT Recommendation and Plan  Therapy Frequency (OT): 5 times/wk  Plan of Care Review  Plan of Care Reviewed With: patient  Progress: improving  Outcome Evaluation: OT tx complete.  Noteable improvement in pt mentation.  He was able to respond to questions in full sentences. Mr Knox wished to t/f to [facility] for BM.  Min A for bed mob & CGA for t/fs with verbal & visual cues for hand placement to reduce fall risk.  Pt able to follow commands this date.  Max A for clothing mgmt and to change brief.  Mod A for hygiene.  Pt demo'd good dynamic balance during attempt to cleanse self.  T/f back to bed. Cont OT tx. Anticipate DC to SNF     Time Calculation:         Time Calculation- OT       Row Name 10/03/24 1502 10/03/24 1316          Time Calculation- OT    OT Start Time 1502  - --     OT Stop Time 1530  - --     OT Time Calculation (min) 28 min  - --     Total Timed Code Minutes- OT 28 minute(s)  - --     OT Received On 10/03/24  - --        Timed Charges    47684 - Gait Training Minutes  -- 16  -BALJINDER     97430 - OT Self Care/Mgmt Minutes 28  -CH --        Total Minutes    Timed Charges Total Minutes 28  -CH 16  -BALJINDER      Total Minutes 28  -CH 16  -BALJINDER               User Key  (r) = Recorded By, (t) = Taken By, (c) = Cosigned By      Initials Name Provider Type     Nichelle Hunter, OTR/L Occupational Therapist    Maxi Tadeo, PTA Physical Therapist Assistant                   Therapy Charges for Today       Code Description Service Date Service Provider Modifiers Qty    45932694159 HC OT SELF CARE/MGMT/TRAIN EA 15 MIN 10/2/2024 Nichelle Hunter OTR/L GO 3    [id number] HC OT SELF CARE/MGMT/TRAIN EA 15 MIN 10/3/2024 Nichelle Hunter OTR/L GO 2                 Nichelle Hunter OTR/L  10/3/2024

## 2024-10-03 NOTE — PLAN OF CARE
Goal Outcome Evaluation:           Progress: no change  Outcome Evaluation: Patient would not answer orientation questions. VSS. Patient has been resting well during the shift.

## 2024-10-03 NOTE — PLAN OF CARE
Goal Outcome Evaluation:  Plan of Care Reviewed With: patient        Progress: improving  Outcome Evaluation: Pt was in bed, much improved today.  Pt was alert and oriented, able to follow all commands.  Transfered supine to sitting with min assist.  sitting EOB, performed LE exericses x 15 AROM.  Transfered sit to stand with min assist.  Amb 15' x 2 with straight cane and CGA/min assist.  Will continue to work with pt to increase strength and progress mobility.

## 2024-10-03 NOTE — CASE MANAGEMENT/SOCIAL WORK
Continued Stay Note   [place]     Patient Name: Tucker Knox  MRN: [id number]  Today's Date: 10/3/2024    Admit Date: 9/28/2024    Plan: [facility]   Discharge Plan       Row Name 10/03/24 1020       Plan    Plan Comments SW had a voicemail from pt's sister, Oxana.  SW attempted to return call and left a voicemail [phone].      Row Name 10/03/24 0915       Plan    Plan [facility]    Patient/Family in Agreement with Plan yes    Final Discharge Disposition Code 03 - skilled nursing facility (SNF)    Final Note Precert. completed for [facility].  SW spoke to pt and he has accepted bed offer.  Pt can dc once medically stable.  Phone:   [phone] Fax: [phone].                   Discharge Codes    No documentation.                 Expected Discharge Date and Time       Expected Discharge Date Expected Discharge Time    Oct 3, 2024               [facility]

## 2024-10-03 NOTE — PROGRESS NOTES
[facility] Medicine Services  INPATIENT PROGRESS NOTE    Patient Name: Tucker Knox  Date of Admission: 9/28/2024  Today's Date: 10/03/24  Length of Stay: 5  Primary Care Physician: Kt Alfredo MD    Subjective   Chief Complaint: Shortness of breath  HPI   64-year-old male with past medical history of combined systolic diastolic congestive failure, AICD, coronary artery disease status post PCI, hypertension, COPD, chronic hypoxic respiratory failure on 2 L/min, colon cancer, diabetes mellitus who presented to the emergency room 2 weeks after discharge from an exacerbation of congestive failure with fluid overload elevated troponin and BNP.  He was transferred to our intensive care unit for further care of pulmonary edema.  On admission he was noted to be dry with uptrending of BMP and was started on gentle hydration and continuing oxygen at 3 L/min.  Admission troponin was 483 and follow-up 548 with a BNP of 18,381. This improve his mentation and renal function.  He is transferred to telemetry floor on 10/1/2024.  Creatinine improved from 1.6-1.04.  An ultrasound of the left lower extremity venous Doppler was negative for DVT.    Cardiac workup was negative.  He was deemed to have an NSTEMI type II due to demand ischemia but had resolved with time.  Cardiac medications were started to help assist in setting of his known history of CHF.  Diet was advanced as tolerated as he had return of bowel function.  He was weaned off of oxygen during the day but steadfastly refused to take the CPAP at night.  He was deemed to be stable for discharge home and benefited from his hospital stay.     Reviewing records it is noted that:  On 12/5/2023, he underwent a robotic low anterior resection for biopsy-proven adenocarcinoma of the rectosigmoid junction.  He follow-up with Dr. Stallworth on 2/29/2024 who recommended:  Moderately differentiated rectosigmoid colon  adenocarcinoma:   AJCC Stage: IIIA (pT3, pN1a, M0, G2).   Tumor burden: 7.5 cm tumor rectosigmoid colon, with 1/27 metastatic regional nodes.  No tumor perforation. No lymphovascular invasion.  Negative perineural invasion.  Negative margins.  Complications of tumor: Anemia  Microsatellite instability status (MSI): MLH 1, MSH 2, MSH 6, PMS 2: Pending  BRAF/NRAS/KRAS mutation status:  Pending.   Tumor status:  12/5/2023-colon resection laparoscopic sigmoid, intraoperative flexible sigmoidoscopy, splenic flexure mobilization, open umbilical hernia repair.  Dr. Velasquez.  Findings: Rectosigmoid mass-distal and proximal margins confirmed.  Abdominal exploration negative for evidence of metastatic disease.  Tumor noted to be at the rectosigmoid junction requiring an LAR to get margins.'  12/5/2023- [employer]us/xT (reported 2/17/24)-genomic variants: Potentially [id number] missense variant.  Pertinent negatives: BRAF/NRAS.  Immunotherapy markers: TMB 8.9 (82nd percentile).  MSI stable.  FDA approved therapies, current diagnosis: Anti-EGFR MAb-cetuximab or panitumumab.   Schedule treatment -  C1, 2/26/24; C2, 3/11/24; C3, 3/25/24; C4, 4/8/24; C5, 4/22/24 at [facility]:      -  To be administered every 2 weeks x 12 cycles   BSA =   D1:   Oxaliplatin 85 mg/m2 IVPB over 2 hours (TD = mg).   Leucovorin 400 mg/m2 IVPB over 2 hours (TD= mg).   5- mg/ m2 IVP (TD = mg).   Begin 5-FU 2400 mg/m2 IVPB over 46 hours. (TD= mg).    He apparently has no had chemotherapy started since he was admitted to [facility] on 3/16/2024 for acute respiratory failure with COVID and required prolonged intubation.  He was discharged on 4/4/2024 from that facility.     10/02  Patient remains encephalopathic.  He is not taking much oral intake and is needing a lot of assistance from nursing to have any caloric intake. Not taking his medications this morning and removing and oxygen.     Today:  Patient appears much more alert and collaborative.  I  discussed the findings and poor prognosis associated with colon cancer and current coronary artery disease.    Review of records:  Echo 2D 9/09/2024    Left ventricular systolic function is moderately decreased. Left ventricular ejection fraction appears to be 31 - 35%.    The left ventricular cavity is mildly dilated.    Left ventricular wall thickness is consistent with mild concentric hypertrophy.    Left ventricular diastolic function is consistent with (grade II w/high LAP) pseudonormalization.    Mildly reduced right ventricular systolic function noted.    The right ventricular cavity is moderately dilated.    The left atrial cavity is mildly dilated.    The right atrial cavity is dilated.    Mild aortic valve stenosis is present.    Estimated right ventricular systolic pressure from tricuspid regurgitation is moderately elevated (45-55 mmHg).    [facility] 9/12/2024  Selective coronary angiography:  1. Left main: The left main bifurcates into the LAD and left circumflex arteries.  There are mild luminal irregularities only present.  2. Left anterior descending artery: The LAD runs in the interventricular groove giving off 2 diagonals and multiple septal perforators before wrapping around the apex as an apical recurrent branch.  There is a severe calcified 70 to 80% stenosis in the proximal vessel.  3. Left circumflex artery: The left circumflex is nondominant for posterior circulation.  It gives off 2 obtuse marginal branches.  There are mild luminal irregularities only present.  4. Right coronary artery: The RCA is dominant for posterior circulation giving rise to PDA and right PL branches.  The RCA is chronically totally occluded in the mid vessel at a previous stent.  The PDA and right PL fill via both right to right and left-to-right collaterals.     Impression:  1.  Severe two-vessel disease of the LAD and right coronary artery as described above.  2.  Elevated left heart filling pressures     Plan:  -CT  surgery consult for evaluation of CABG.  -Routine post-cath care and TR band removal.  -Return to telemetry floor for continued diuresis and optimization of medical therapy.    Review of Systems   All pertinent negatives and positives are as above. All other systems have been reviewed and are negative unless otherwise stated.     Objective    Temp:  [97.1 °F (36.2 °C)-98.8 °F (37.1 °C)] 97.5 °F (36.4 °C)  Heart Rate:  [86-96] 96  Resp:  [19-20] 20  BP: (100-121)/(49-73) 121/73  Physical Exam  Constitutional:       Appearance: He is well-developed. He is ill-appearing.      Comments: Somnolent, disheveled and responding to chronically ill-appearing elderly male   HENT:      Head: Normocephalic and atraumatic.      Right Ear: External ear normal.      Left Ear: External ear normal.      Nose: Nose normal.      Mouth/Throat:      Mouth: Mucous membranes are dry.   Eyes:      General:         Right eye: No discharge.         Left eye: No discharge.      Extraocular Movements: Extraocular movements intact.      Conjunctiva/sclera: Conjunctivae normal.      Pupils: Pupils are equal, round, and reactive to light.   Neck:      Vascular: No JVD.   Cardiovascular:      Rate and Rhythm: Regular rhythm. Tachycardia present.      Heart sounds: Normal heart sounds. No murmur heard.  Pulmonary:      Effort: No respiratory distress.      Breath sounds: No wheezing or rales.   Chest:      Chest wall: No tenderness.   Abdominal:      General: Bowel sounds are normal. There is no distension.      Palpations: Abdomen is soft.      Tenderness: There is no abdominal tenderness. There is no guarding or rebound.   Musculoskeletal:         General: No tenderness or deformity. Normal range of motion.      Cervical back: Normal range of motion and neck supple. No rigidity.      Comments: +1 edema chronic bilateral lower extremities.  Feet are cyanotic without necrosis.   Skin:     General: Skin is warm.      Findings: No rash.   Neurological:  "     Mental Status: He is disoriented.      Cranial Nerves: No cranial nerve deficit.      Sensory: No sensory deficit.      Motor: Weakness present. No abnormal muscle tone.      Deep Tendon Reflexes: Reflexes normal.     Results Review:  I have reviewed the labs, radiology results, and diagnostic studies.    Laboratory Data:   Results from last 7 days   Lab Units 10/03/24  0328 10/02/24  0341 10/01/24  0230   WBC 10*3/mm3 6.11 8.96 10.45   HEMOGLOBIN g/dL 10.6* 11.2* 11.2*   HEMATOCRIT % 37.9 38.7 39.7   PLATELETS 10*3/mm3 189 184 185        Results from last 7 days   Lab Units 10/03/24  0328 10/02/24  0341 10/01/24  0230   SODIUM mmol/L 140 140 141   POTASSIUM mmol/L 4.9 5.1 4.7   CHLORIDE mmol/L 101 99 101   CO2 mmol/L 33.0* 31.0* 31.0*   BUN mg/dL 31* 35* 37*   CREATININE mg/dL 0.91 1.04 1.08   CALCIUM mg/dL 8.2* 8.5* 8.1*   BILIRUBIN mg/dL 0.4  --   --    ALK PHOS U/L 97  --   --    ALT (SGPT) U/L 12  --   --    AST (SGOT) U/L 11  --   --    GLUCOSE mg/dL 104* 141* 82       Culture Data:   No results found for: \"BLOODCX\", \"URINECX\", \"WOUNDCX\", \"MRSACX\", \"RESPCX\", \"STOOLCX\"    Radiology Data:   Imaging Results (Last 24 Hours)       ** No results found for the last 24 hours. **            I have reviewed the patient's current medications.     Assessment/Plan   Assessment  Active Hospital Problems    Diagnosis     **Acute exacerbation of CHF (congestive heart failure)     Chronic respiratory failure with hypoxia     Delirium     Presence of external cardiac defibrillator     Malignant neoplasm of sigmoid colon     Sleep apnea     Pulmonary HTN     COPD (chronic obstructive pulmonary disease)     Diabetes mellitus     Hypertension      Treatment Plan  Continue treatment in telemetry floor  Vitals every 4 hours  Diet cardiac consistent carbohydrate  IV fluids saline lock  Up with assistance and fall precautions    Acute on chronic systolic and diastolic congestive heart failure  Continue diuresis  Ins and " outs  Daily weights  Patient appears a dry weight    Coronary artery disease with recent left heart catheterization showing two-vessel disease being evaluated for possible CABG  Continue to monitor symptoms and vital signs  Cardiology input from Dr. Arreaga noted from previous admission  Will have to consider referral.  With patient current functional status and untreated colon cancer patient is not likely a candidate for an invasive procedure.  This was discussed with his sister.    Colon cancer status post laparoscopic resection in December 2023  Dr. Stallworth consult note from February 2024 noted  Discussed the case with Dr. Stallworth he does not think the patient is amenable to treatment.  Dr. Stallworth can follow-up as outpatient.  Palliative care consult    Diabetes mellitus  Continue insulin    COPD with chronic respiratory failure  Oxygen to 3 L/min nasal cannula  Continue bronchodilators    Delirium  Seroquel 25 mg at bedtime  Nicotine patch  Reorient frequently and monitor sleep cycle    DVT prophylaxis SCD    Medical Decision Making  Number and Complexity of problems: 6 complex problems  Differential Diagnosis: See above    Conditions and Status        Condition is unchanged.     [facility] Data  External documents reviewed: [facility] EHR, [facility] medical records  Cardiac tracing (EKG, telemetry) interpretation: Sinus rhythm  Radiology interpretation: See above  Labs reviewed: As above  Any tests that were considered but not ordered: None     Decision rules/scores evaluated (example [id number], [place], etc): Not applicable patient and his sister     Discussed with: Patient and his sister     Care Planning  Shared decision making: Patient  Code status and discussions: Full code      Disposition  Social Determinants of Health that impact treatment or disposition: None  I expect the patient to be discharged to skilled nursing facility when accepted.    Electronically signed by [employer]  MD, 10/03/24, 14:48 CDT.

## 2024-10-03 NOTE — CASE MANAGEMENT/SOCIAL WORK
Continued Stay Note  Psychiatric     Patient Name: Tucker Knox  MRN: [id number]  Today's Date: 10/3/2024    Admit Date: 9/28/2024    Plan: [facility]   Discharge Plan       Row Name 10/03/24 0902       Plan    Plan [facility]    Patient/Family in Agreement with Plan yes    Final Discharge Disposition Code 03 - skilled nursing facility (SNF)    Final Note Precert. completed for [facility].  SW spoke to pt and he has accepted bed offer.  Pt can dc once medically stable.  Phone:   [phone] Fax: [phone].                   Discharge Codes    No documentation.                 Expected Discharge Date and Time       Expected Discharge Date Expected Discharge Time    Oct 3, 2024               GAGANDEEP Cespedes

## 2024-10-04 VITALS
SYSTOLIC BLOOD PRESSURE: 103 MMHG | DIASTOLIC BLOOD PRESSURE: 61 MMHG | OXYGEN SATURATION: 97 % | BODY MASS INDEX: 40.58 KG/M2 | HEART RATE: 94 BPM | TEMPERATURE: 98 F | HEIGHT: 69 IN | WEIGHT: 274 LBS | RESPIRATION RATE: 18 BRPM

## 2024-10-04 PROBLEM — I50.23 ACUTE ON CHRONIC HFREF (HEART FAILURE WITH REDUCED EJECTION FRACTION): Status: ACTIVE | Noted: 2024-10-04

## 2024-10-04 LAB
ANION GAP SERPL CALCULATED.3IONS-SCNC: 4 MMOL/L (ref 5–15)
BASOPHILS # BLD AUTO: 0.03 10*3/MM3 (ref 0–0.2)
BASOPHILS NFR BLD AUTO: 0.5 % (ref 0–1.5)
BUN SERPL-MCNC: 29 MG/DL (ref 8–23)
BUN/CREAT SERPL: 30.9 (ref 7–25)
CALCIUM SPEC-SCNC: 8.1 MG/DL (ref 8.6–10.5)
CHLORIDE SERPL-SCNC: 102 MMOL/L (ref 98–107)
CO2 SERPL-SCNC: 33 MMOL/L (ref 22–29)
CREAT SERPL-MCNC: 0.94 MG/DL (ref 0.76–1.27)
DEPRECATED RDW RBC AUTO: 54.7 FL (ref 37–54)
EGFRCR SERPLBLD CKD-EPI 2021: 91.1 ML/MIN/1.73
EOSINOPHIL # BLD AUTO: 0.13 10*3/MM3 (ref 0–0.4)
EOSINOPHIL NFR BLD AUTO: 2.1 % (ref 0.3–6.2)
ERYTHROCYTE [DISTWIDTH] IN BLOOD BY AUTOMATED COUNT: 15.3 % (ref 12.3–15.4)
GLUCOSE BLDC GLUCOMTR-MCNC: 188 MG/DL (ref 70–130)
GLUCOSE BLDC GLUCOMTR-MCNC: 192 MG/DL (ref 70–130)
GLUCOSE BLDC GLUCOMTR-MCNC: 197 MG/DL (ref 70–130)
GLUCOSE SERPL-MCNC: 176 MG/DL (ref 65–99)
HCT VFR BLD AUTO: 36.8 % (ref 37.5–51)
HGB BLD-MCNC: 10.3 G/DL (ref 13–17.7)
IMM GRANULOCYTES # BLD AUTO: 0.04 10*3/MM3 (ref 0–0.05)
IMM GRANULOCYTES NFR BLD AUTO: 0.6 % (ref 0–0.5)
LYMPHOCYTES # BLD AUTO: 0.97 10*3/MM3 (ref 0.7–3.1)
LYMPHOCYTES NFR BLD AUTO: 15.3 % (ref 19.6–45.3)
MCH RBC QN AUTO: 27.4 PG (ref 26.6–33)
MCHC RBC AUTO-ENTMCNC: 28 G/DL (ref 31.5–35.7)
MCV RBC AUTO: 97.9 FL (ref 79–97)
MONOCYTES # BLD AUTO: 0.49 10*3/MM3 (ref 0.1–0.9)
MONOCYTES NFR BLD AUTO: 7.7 % (ref 5–12)
NEUTROPHILS NFR BLD AUTO: 4.67 10*3/MM3 (ref 1.7–7)
NEUTROPHILS NFR BLD AUTO: 73.8 % (ref 42.7–76)
NRBC BLD AUTO-RTO: 0 /100 WBC (ref 0–0.2)
PLATELET # BLD AUTO: 192 10*3/MM3 (ref 140–450)
PMV BLD AUTO: 10 FL (ref 6–12)
POTASSIUM SERPL-SCNC: 4.9 MMOL/L (ref 3.5–5.2)
RBC # BLD AUTO: 3.76 10*6/MM3 (ref 4.14–5.8)
SODIUM SERPL-SCNC: 139 MMOL/L (ref 136–145)
WBC NRBC COR # BLD AUTO: 6.33 10*3/MM3 (ref 3.4–10.8)

## 2024-10-04 PROCEDURE — 85025 COMPLETE CBC W/AUTO DIFF WBC: CPT | Performed by: FAMILY MEDICINE

## 2024-10-04 PROCEDURE — 97535 SELF CARE MNGMENT TRAINING: CPT | Performed by: OCCUPATIONAL THERAPIST

## 2024-10-04 PROCEDURE — 63710000001 INSULIN LISPRO (HUMAN) PER 5 UNITS: Performed by: INTERNAL MEDICINE

## 2024-10-04 PROCEDURE — 80048 BASIC METABOLIC PNL TOTAL CA: CPT | Performed by: INTERNAL MEDICINE

## 2024-10-04 PROCEDURE — 99233 SBSQ HOSP IP/OBS HIGH 50: CPT | Performed by: CLINICAL NURSE SPECIALIST

## 2024-10-04 PROCEDURE — 82948 REAGENT STRIP/BLOOD GLUCOSE: CPT

## 2024-10-04 PROCEDURE — 25010000002 ENOXAPARIN PER 10 MG: Performed by: INTERNAL MEDICINE

## 2024-10-04 PROCEDURE — 97530 THERAPEUTIC ACTIVITIES: CPT

## 2024-10-04 RX ORDER — FUROSEMIDE 40 MG
20 TABLET ORAL DAILY
Qty: 30 TABLET | Refills: 0 | Status: ON HOLD | OUTPATIENT
Start: 2024-10-04

## 2024-10-04 RX ORDER — QUETIAPINE FUMARATE 25 MG/1
25 TABLET, FILM COATED ORAL NIGHTLY
Qty: 30 TABLET | Refills: 0 | Status: ON HOLD | OUTPATIENT
Start: 2024-10-04

## 2024-10-04 RX ORDER — ACETAMINOPHEN 325 MG/1
650 TABLET ORAL EVERY 6 HOURS PRN
Status: DISCONTINUED | OUTPATIENT
Start: 2024-10-04 | End: 2024-10-04 | Stop reason: HOSPADM

## 2024-10-04 RX ORDER — ACETAMINOPHEN 325 MG/1
650 TABLET ORAL EVERY 6 HOURS PRN
Qty: 60 TABLET | Refills: 0 | Status: ON HOLD | OUTPATIENT
Start: 2024-10-04

## 2024-10-04 RX ORDER — NICOTINE 21 MG/24HR
1 PATCH, TRANSDERMAL 24 HOURS TRANSDERMAL
Qty: 14 PATCH | Refills: 0 | Status: ON HOLD | OUTPATIENT
Start: 2024-10-05

## 2024-10-04 RX ADMIN — GABAPENTIN 600 MG: 300 CAPSULE ORAL at 08:33

## 2024-10-04 RX ADMIN — INSULIN LISPRO 2 UNITS: 100 INJECTION, SOLUTION INTRAVENOUS; SUBCUTANEOUS at 11:58

## 2024-10-04 RX ADMIN — ENOXAPARIN SODIUM 40 MG: 100 INJECTION SUBCUTANEOUS at 08:33

## 2024-10-04 RX ADMIN — ACETAMINOPHEN 650 MG: 325 TABLET ORAL at 16:16

## 2024-10-04 RX ADMIN — DULOXETINE HYDROCHLORIDE 30 MG: 30 CAPSULE, DELAYED RELEASE ORAL at 08:33

## 2024-10-04 RX ADMIN — SACUBITRIL AND VALSARTAN 1 TABLET: 24; 26 TABLET, FILM COATED ORAL at 08:33

## 2024-10-04 RX ADMIN — INSULIN LISPRO 2 UNITS: 100 INJECTION, SOLUTION INTRAVENOUS; SUBCUTANEOUS at 08:34

## 2024-10-04 RX ADMIN — ASPIRIN 81 MG: 81 TABLET, COATED ORAL at 08:33

## 2024-10-04 RX ADMIN — INSULIN LISPRO 2 UNITS: 100 INJECTION, SOLUTION INTRAVENOUS; SUBCUTANEOUS at 17:44

## 2024-10-04 RX ADMIN — OXYCODONE HYDROCHLORIDE AND ACETAMINOPHEN 1 TABLET: 5; 325 TABLET ORAL at 00:26

## 2024-10-04 RX ADMIN — NICOTINE 1 PATCH: 21 PATCH, EXTENDED RELEASE TRANSDERMAL at 08:37

## 2024-10-04 RX ADMIN — GABAPENTIN 600 MG: 300 CAPSULE ORAL at 00:26

## 2024-10-04 NOTE — NURSING NOTE
Report given to SOLEDAD Saucedo at [facility]. Pt to be transported by EMS. Attempted to call POA but unable to reach at this time.

## 2024-10-04 NOTE — PROGRESS NOTES
"            [facility] Palliative Care Services    Palliative Care Daily Progress Note   Chief complaint-follow up goals of care/advanced care planning and support for patient/family    Code Status:   Code Status and Medical Interventions: No CPR (Do Not Attempt to Resuscitate); Full Support   Ordered at: 10/03/24 1101     Level Of Support Discussed With:    Patient     Code Status (Patient has no pulse and is not breathing):    No CPR (Do Not Attempt to Resuscitate)     Medical Interventions (Patient has pulse or is breathing):    Full Support      Advanced Directives: Advance Directive Status: Patient does not have advance directive   Goals of Care: Ongoing.     S: Medical record reviewed. Events noted.  Continues to work with therapy.  Labs remain stable.  Neurontin has been increased from daily to every 12 hours.  Received 1 dose of oxycodone overnight due to complaints of bilateral leg pain.  Laying in bed without apparent distress, states he had \"a rough night\" due to leg cramps.  He has a good appetite.  No family at bedside.  Due to the Palliative Care Topics Discussed: resuscitation status we will establish an advance care plan.   Advance Care Planning   Advance Care Planning Discussion: Agreeable to conversation.  Ongoing discussion with regard to medical priorities and we further discussed his prolonged ventilator support needs earlier this year and challenges with ventilator liberation as discussed with his family.  He has elected to further de-escalate care measures to no CPR/limited support interventions, no permanent feeding tube.  To that end, we will complete a MOST document to further delineate care goals in the future.  Anticipating SNF for continued rehab efforts.     Review of Systems   Constitutional: Positive for malaise/fatigue.   Cardiovascular:  Positive for dyspnea on exertion and leg swelling.   Respiratory:  Positive for shortness of breath.    Musculoskeletal:  Positive for " muscle weakness.   Genitourinary:  Negative for dysuria.   Neurological:  Positive for weakness.   Psychiatric/Behavioral:  The patient is not nervous/anxious.     Pain Assessment  Preferred Pain Scale: number (Numeric Rating Pain Scale)  Nonverbal Indicators of Pain: nonverbal indicators absent  CPOT Facial Expression: 0-->relaxed, neutral  CPOT Body Movements: 0-->absence of movements  CPOT Muscle Tension: 0-->relaxed  Ventilator Compliance/Vocalization: 0-->tolerating ventilator or movement  CPOT Score: 0  PAINAD Breathin-->normal  PAINAD Negative Vocalization: 0-->none  PAINAD Facial Expression: 0-->smiling or inexpressive  PAINAD Body Language: 0-->relaxed  PAINAD Consolability: 0-->no need to console  PAINAD Score: 0  Pain Location: extremity (legs)  Pain Description: aching    O:     Intake/Output Summary (Last 24 hours) at 10/4/2024 1451  Last data filed at 10/4/2024 0922  Gross per 24 hour   Intake 200 ml   Output --   Net 200 ml       Diagnostics and current medications: Reviewed.      Current Facility-Administered Medications:     aspirin EC tablet 81 mg, 81 mg, Oral, Daily, Quentin Morin DO, 81 mg at 10/04/24 0833    sennosides-docusate (PERICOLACE) 8.6-50 MG per tablet 2 tablet, 2 tablet, Oral, BID, 2 tablet at 10/03/24 0850 **AND** polyethylene glycol (MIRALAX) packet 17 g, 17 g, Oral, Daily PRN **AND** bisacodyl (DULCOLAX) EC tablet 5 mg, 5 mg, Oral, Daily PRN **AND** bisacodyl (DULCOLAX) suppository 10 mg, 10 mg, Rectal, Daily PRN, Quentin Morin DO    Calcium Replacement - Follow Nurse / BPA Driven Protocol, , Does not apply, PRN, Quentin Morin DO    dextrose (D50W) (25 g/50 mL) IV injection 25 g, 25 g, Intravenous, Q15 Min PRN, Quentin Morin DO, 25 g at 10/01/24 1146    dextrose (GLUTOSE) oral gel 15 g, 15 g, Oral, Q15 Min PRN, Quentin Morin, DO, 15 g at 10/01/24 1119    DULoxetine (CYMBALTA) DR capsule 30 mg, 30 mg, Oral, Daily,  Quentin Morin DO, 30 mg at 10/04/24 0833    Enoxaparin Sodium (LOVENOX) syringe 40 mg, 40 mg, Subcutaneous, Daily, Quentin Morin DO, 40 mg at 10/04/24 0833    gabapentin (NEURONTIN) capsule 600 mg, 600 mg, Oral, Q12H, Dalton Lees MD, 600 mg at 10/04/24 0833    glucagon (GLUCAGEN) injection 1 mg, 1 mg, Intramuscular, Q15 Min PRN, Quentin Morin DO    hydrocortisone-bacitracin-zinc oxide-nystatin (MAGIC BARRIER) ointment 1 Application, 1 Application, Topical, PRN, Marielena Jernigan, APRN, 1 Application at 10/02/24 1703    insulin glargine (LANTUS, SEMGLEE) injection 15 Units, 15 Units, Subcutaneous, Nightly, Sea Joiner MD, 15 Units at 10/03/24 2043    Insulin Lispro (humaLOG) injection 2-9 Units, 2-9 Units, Subcutaneous, 4x Daily AC & at Bedtime, Quentin Morin DO, 2 Units at 10/04/24 1158    ipratropium-albuterol (DUO-NEB) nebulizer solution 3 mL, 3 mL, Nebulization, Q4H PRN, Quentin Morin DO    Magnesium Standard Dose Replacement - Follow Nurse / BPA Driven Protocol, , Does not apply, PRN, Quentin Morin DO    nicotine (NICODERM CQ) 21 MG/24HR patch 1 patch, 1 patch, Transdermal, Q24H, Sea Joiner MD, 1 patch at 10/04/24 0837    Phosphorus Replacement - Follow Nurse / BPA Driven Protocol, , Does not apply, PRN, Quentin Morin DO    Potassium Replacement - Follow Nurse / BPA Driven Protocol, , Does not apply, PRN, Quentin Morin DO    pravastatin (PRAVACHOL) tablet 40 mg, 40 mg, Oral, Nightly, Quentin Morin DO, 40 mg at 10/03/24 2044    QUEtiapine (SEROquel) tablet 25 mg, 25 mg, Oral, Nightly, Sea Joiner MD, 25 mg at 10/03/24 2044    sacubitril-valsartan (ENTRESTO) 24-26 MG tablet 1 tablet, 1 tablet, Oral, BID, Quentin Morin DO, 1 tablet at 10/04/24 0886    sodium chloride 0.9 % flush 10 mL, 10 mL, Intravenous, Q12H, Quentin Morin DO,  "10 mL at 10/03/24 2043    sodium chloride 0.9 % flush 10 mL, 10 mL, Intravenous, PRN, Quentin Morin, DO    sodium chloride 0.9 % infusion 40 mL, 40 mL, Intravenous, PRN, Quentin Morin,     Allergies   Allergen Reactions    Penicillins Unknown - Low Severity     Pt states happened when child does not know      I have utilized all available immediate resources to obtain, update, or review the patient's current medications (including all prescriptions, over-the-counter products, herbals, cannabis/cannabidiol products, and vitamin/mineral/dietary (nutritional) supplements) for name, route of administration, type, dose and frequency.    A:    /72 (BP Location: Left arm, Patient Position: Lying)   Pulse 88   Temp 98.2 °F (36.8 °C) (Oral)   Resp 18   Ht 175.3 cm (69\")   Wt 124 kg (274 lb)   SpO2 97%   BMI 40.46 kg/m²     Vitals and nursing note reviewed.   Constitutional:       Appearance: Not in distress. Morbidly obese. Ill-appearing and chronically ill-appearing.      Interventions: Nasal cannula in place.   Eyes:      General: Lids are normal.      Pupils: Pupils are equal, round, and reactive to light.   HENT:      Head: Normocephalic.   Pulmonary:      Effort: Normal effort     Breath sounds: Decreased breath sounds present.   Cardiovascular:      Normal rate.   Abdominal:      General: Abdomen is protuberant.   Musculoskeletal: Normal range of motion.      Cervical back: Neck supple.   Skin:     General: Skin is warm.      Comments: Wounds as documented   Genitourinary:     Comments: External urinary catheter in place  Neurological:      Mental Status: Alert.   Psychiatric:         Mood and Affect: Mood normal.      Patient status: Disease state: Controlled with current treatments.  Current Functional status: Palliative Performance Scale Score: Performance 30% based on the following measures: Ambulation: Totally bed bound, Activity and Evidence of Disease: Unable to do any " work, extensive evidence of disease, Self-Care: Total care required,  Intake: Reduced, LOC: Full, drowsy or confusion   Baseline ECOG Status(4) Completely disabled, unable to carry out self-care.  Totally confined to bed or chair.  Nutritional status: Albumin 3.3 Body mass index is 39.64 kg/m².      Hospital Problem List      Acute exacerbation of CHF (congestive heart failure)    Diabetes mellitus    Hypertension    Pulmonary HTN    COPD (chronic obstructive pulmonary disease)    Malignant neoplasm of sigmoid colon    Sleep apnea    Presence of external cardiac defibrillator    Chronic respiratory failure with hypoxia    Delirium     Impression/Problem List:     Moderately differentiated rectosigmoid colon adenocarcinoma stage IIIa-s/p colon resection 12/5/2023 by Dr. Velasquez followed by Dr. Stallworth last seen 2/14/2024 and recommended systemic therapy x 12 cycles referred to radiation oncology, however declined recommendations/follow-up  Acute on chronic systolic and diastolic congestive heart failure EF 31 to 35% 9/10 (EF 41 to 45% 12/7/2023)  Coronary artery disease s/p PCI to RCA on 4/13/2022 severe two-vessel disease of LAD and RCA per [facility] 9/12  Chronic respiratory failure with oxygen dependency-3 L baseline  Pleural effusion, right sided  Altered mental status  COPD/centrilobular emphysema  Hyperlipidemia  Hypertension  Sleep apnea-noncompliant with CPAP  Ongoing tobacco use  Carotid artery disease-mild bilateral  Chronic lower extremity wounds  Impaired mobility-uses cane  Type 2 diabetes mellitus   Multiple wounds/abrasions     Recommendations/Plan:  1. plan: Goals of care include CODE STATUS no CPR/limited interventions, no intubation, no permanent feeding tube    Family support: The patient receives support from his son and sister ..  Advance Directives: Advance Directive Status: Patient does not have advance directive   POA/Healthcare surrogate-son, Aniceto and sister, Oxana.     2.  Palliative care  encounter  - Prognosis is poor to guarded long-term secondary to colon cancer-declined recommendations for systemic therapy/follow-up, combined CHF, severe two-vessel coronary artery disease, chronic respiratory failure with oxygen dependency, altered mental status, and multiple comorbidities.  -Patient appears to have poor prognostic awareness.  Sister appears to have good prognostic awareness.     -Recent hospitalization and discharged 9/18 and treated at that time with IV Lasix.  Noted to initially be doing well up until 2 days prior to this hospitalization secondary to progressive dyspnea with exertion.  Evaluated at out lying ED and transferred to [facility] where he was admitted to ICU due to decompensated heart failure and COPD exacerbation.       -Started on IV Lasix drip which was discontinued given worsening kidney function and started on gentle IV fluids.  -SW notes family concerns with regard to patient's lack of understanding of medical issues and medical noncompliance.    -Therapy recommending short-term rehab.    10/2-Altered mental status with increased confusion noted by therapy yesterday, oriented to self and difficulty processing.    -Started on Seroquel for delirium scheduled 10/2.       10/3-CODE STATUS changes no CPR/full support interventions  -Would benefit from ongoing conversation with regard to overall poor to guarded long-term prognosis  -A living will document completed with assistance from   -Recommend completion of a MOST document  -High risk rehospitalization's and decline given multiple comorbid factors  -HCS/sister, Oxana updated on plan of care.  Call placed to patient's son Aniceto, no answer, left voicemail.  Oxana to follow-up with son and update per our discussion  -Anticipating SNF at discharge    10/4-CODE STATUS changes no CPR/limited support interventions, no intubation, no permanent feeding tube  -Will plan to complete a MOST document  -A living will document completed  with assistance from  10/3  -Anticipating SNF at discharge, best supportive care with hospice would likely be a good option after rehab      Thank you for allowing us to participate in patient's plan of care. Palliative Care Team will continue to follow patient.     18 minutes spent on advance care planning-discussing with patient and/or family, answering questions, providing some guidance about a plan and documentation of care, and coordinating care face to face.    Crista Williamson, VIV  10/4/2024  14:51 CDT

## 2024-10-04 NOTE — PLAN OF CARE
Goal Outcome Evaluation:  Plan of Care Reviewed With: patient        Progress: no change  Outcome Evaluation: VSS. S/ST  PVC on tele. Pt on 4L O2 with sats WNL. During sleep, pt will remove NC and O2 sats will drop to low 80s. Complained of bilateral leg pain and requested pain medication. See Dr. Lees orders. Groin is excoriated. Barrier cream applied. Pt rested well. Call light within reach. Safety maintained.

## 2024-10-04 NOTE — PLAN OF CARE
Goal Outcome Evaluation:  Plan of Care Reviewed With: patient        Progress: no change  Outcome Evaluation: Pt. AxO x 3 & agreeable to tx.  Evident that pt's level of awareness/alertness is variable.  Mr. Knox able to follow commands & came to EOB at Min A.  OTR provided rwx to utilize for fxl mob.  Pt. stood CGA and ambulated to BR.  Pt stood sinkside and waas able to brush teeeth and wash face. Noteable decline in his posture from upright to forward flexed. Assisted pt back to bed for hair care to pose graded level challenge and reduce his fall risk. Pt. decompensates with standing activities and requires RB. Cont OT tx to increase fxl act dany.      Anticipated Discharge Disposition (OT): skilled nursing facility

## 2024-10-04 NOTE — THERAPY TREATMENT NOTE
Acute Care - Physical Therapy Treatment Note  [facility]     Patient Name: Tucker Knox  : 1960  MRN: [id number]  Today's Date: 10/4/2024      Visit Dx:     ICD-10-CM ICD-9-CM   1. Impaired mobility [Z74.09]  Z74.09 799.89     Patient Active Problem List   Diagnosis    Diabetes mellitus    Hypertension    Pulmonary HTN    COPD (chronic obstructive pulmonary disease)    NSTEMI, initial episode of care    Malignant neoplasm of sigmoid colon    FH: colon cancer    Sleep apnea    History of adenomatous polyp of colon    Colon adenocarcinoma    Acute on chronic systolic CHF (congestive heart failure)    PVC's (premature ventricular contractions)    Presence of external cardiac defibrillator    Acute exacerbation of CHF (congestive heart failure)    Chronic respiratory failure with hypoxia    Delirium     Past Medical History:   Diagnosis Date    Cancer     CHF (congestive heart failure)     COPD (chronic obstructive pulmonary disease)     Coronary artery disease     stent x 1    Family history of colon cancer     Hyperlipidemia     Hypertension     Sleep apnea     does not wear machine, supposed to wear cpap with oxygen and does not wear it    Type 2 diabetes mellitus      Past Surgical History:   Procedure Laterality Date    BACK SURGERY      CARDIAC CATHETERIZATION Left 2022    Procedure: Cardiac Catheterization/Vascular Study;  Surgeon: Todd Avendano MD;  Location:  [facility] CATH [facility];  Service: Cardiology;  Laterality: Left;    CARDIAC CATHETERIZATION      with stent x1    CARDIAC CATHETERIZATION N/A 2024    Procedure: Left Heart Cath;  Surgeon: Ruben Adler MD;  Location:  [facility] CATH [facility];  Service: Cardiology;  Laterality: N/A;    COLON RESECTION N/A 2023    Procedure: COLON RESECTION LAPAROSCOPIC SIGMOID WITH DAVINCI ROBOT, INTRA-OPERATIVE FLEXIBLE SIGMOIDOSCOPY, SPLENIC FLEXURE MOBILIZATION, OPEN UMBILICAL HERNIA REPAIR;  Surgeon: Oma Velasquez MD;   Location: [facility] OR;  Service: Robotics - [place];  Laterality: N/A;    COLONOSCOPY N/A 10/09/2023    Diverticulosis; One 5mm polyp in ascending colon; One 5mm polyp in transverse colon; One 10mm polyp at 65cm proximal to anus; One 20mm polyp at 65cm proximal to anus-Tattooed; One 20mm polyp at 42cm proximal to anus-Clip (MR conditional) placed-Tattooed; Rule out malignancy-Partially obstructing tumor in recto-sigmoid colon-biopsied-Tattooed; One 10mm polyp in rectum    ELBOW PROCEDURE      KNEE SURGERY      LUMBAR DISC SURGERY       PT Assessment (Last 12 Hours)       PT Evaluation and Treatment       Row Name 10/04/24 1140          Physical Therapy Time and Intention    Subjective Information complains of;pain  -BALJINDER     Document Type therapy note (daily note)  -BALJINDER     Mode of Treatment physical therapy  -BALJINDER     Comment pt was lethargic, unable to follow commands  -BALJINDER       Row Name 10/04/24 1140          General Information    Existing Precautions/Restrictions fall;oxygen therapy device and L/min  -BALJINDER       Row Name 10/04/24 1140          Pain    Pretreatment Pain Rating 0/10 - no pain  -BALJINDER     Posttreatment Pain Rating 0/10 - no pain  -BALJINDER       Row Name 10/04/24 1140          Bed Mobility    Supine-Sit [place] (Bed Mobility) verbal cues;maximum assist (25% patient effort)  -BALJINDER     Sit-Supine [place] (Bed Mobility) verbal cues;moderate assist (50% patient effort);maximum assist (25% patient effort)  -BALJINDER     Comment, (Bed Mobility) attempted sitting EOB to see if pt would become more alert, he continued to keep eyes closed  -BALJINDER       Row Name             Wound 09/14/24 1141 Left anterior third toe Abrasion    Wound - Properties Group Placement Date: 09/14/24  -SK Placement Time: 1141 -SK Side: Left  -SK Orientation: anterior  -SK Location: third toe  -SK Primary Wound Type: Abrasion  -SK    Retired Wound - Properties Group Placement Date: 09/14/24  -SK Placement Time: 1141 -SK Side: Left  -SK  Orientation: anterior  -SK Location: third toe  -SK Primary Wound Type: Abrasion  -SK    Retired Wound - Properties Group Date first assessed: 09/14/24  -SK Time first assessed: 1141  -SK Side: Left  -SK Location: third toe  -SK Primary Wound Type: Abrasion  -SK      Row Name             Wound 09/14/24 1141 Left anterior great toe Skin Tear    Wound - Properties Group Placement Date: 09/14/24  -SK Placement Time: 1141  -SK Side: Left  -SK Orientation: anterior  -SK Location: great toe  -SK, Area just below Nail area of Great toe  Primary Wound Type: Skin tear  -SK    Retired Wound - Properties Group Placement Date: 09/14/24  -SK Placement Time: 1141  -SK Side: Left  -SK Orientation: anterior  -SK Location: great toe  -SK, Area just below Nail area of Great toe  Primary Wound Type: Skin tear  -SK    Retired Wound - Properties Group Date first assessed: 09/14/24  -SK Time first assessed: 1141  -SK Side: Left  -SK Location: great toe  -SK, Area just below Nail area of Great toe  Primary Wound Type: Skin tear  -SK      Row Name             Wound 09/14/24 1141 Right anterior ankle Abrasion    Wound - Properties Group Placement Date: 09/14/24  -SK Placement Time: 1141  -SK Side: Right  -SK Orientation: anterior  -SK Location: ankle  -SK Primary Wound Type: Abrasion  -SK    Retired Wound - Properties Group Placement Date: 09/14/24  -SK Placement Time: 1141  -SK Side: Right  -SK Orientation: anterior  -SK Location: ankle  -SK Primary Wound Type: Abrasion  -SK    Retired Wound - Properties Group Date first assessed: 09/14/24  -SK Time first assessed: 1141  -SK Side: Right  -SK Location: ankle  -SK Primary Wound Type: Abrasion  -SK      Row Name             Wound 09/28/24 1635 calf    Wound - Properties Group Placement Date: 09/28/24  -CB Placement Time: 1635  -CB Present on Original Admission: Y  -CB Location: calf  -CB    Retired Wound - Properties Group Placement Date: 09/28/24  -CB Placement Time: 1635  -CB Present on  Original Admission: Y  -CB Location: calf  -CB    Retired Wound - Properties Group Date first assessed: 09/28/24  -CB Time first assessed: 1635  -CB Present on Original Admission: Y  -CB Location: calf  -CB      Row Name             Wound 09/28/24 1636 Left posterior calf    Wound - Properties Group Placement Date: 09/28/24  -CB Placement Time: 1636  -CB Present on Original Admission: Y  -CB Side: Left  -CB Orientation: posterior  -CB Location: calf  -CB    Retired Wound - Properties Group Placement Date: 09/28/24  -CB Placement Time: 1636  -CB Present on Original Admission: Y  -CB Side: Left  -CB Orientation: posterior  -CB Location: calf  -CB    Retired Wound - Properties Group Date first assessed: 09/28/24  -CB Time first assessed: 1636  -CB Present on Original Admission: Y  -CB Side: Left  -CB Location: calf  -CB      Row Name             Wound Left posterior ankle Abrasion    Wound - Properties Group Side: Left  -SK Orientation: posterior  -SK Location: ankle  -SK Primary Wound Type: Abrasion  -SK    Retired Wound - Properties Group Side: Left  -SK Orientation: posterior  -SK Location: ankle  -SK Primary Wound Type: Abrasion  -SK    Retired Wound - Properties Group Side: Left  -SK Location: ankle  -SK Primary Wound Type: Abrasion  -SK      Row Name             Wound 10/01/24 scrotum    Wound - Properties Group Placement Date: 10/01/24  - Location: scrotum  -JM    Retired Wound - Properties Group Placement Date: 10/01/24  - Location: scrotum  -JM    Retired Wound - Properties Group Date first assessed: 10/01/24  - Location: scrotum  -JM      Row Name 10/04/24 1140          Positioning and Restraints    Pre-Treatment Position in bed  -BALJINDER     Post Treatment Position bed  -BALJINDER     In Bed fowlers;call light within reach;encouraged to call for assist;exit alarm on;side rails up x3  -BALJINDER               User Key  (r) = Recorded By, (t) = Taken By, (c) = Cosigned By      Initials Name Provider Type    ABLJINDER Nolan  Maxi DAIGLE, PTA Physical Therapist Assistant    Tavia Olguin RN Registered Nurse    Mignon Arreola RN Registered Nurse    Ana Villaseñor RN Registered Nurse                    Physical Therapy Education       Title: PT OT SLP Therapies (In Progress)       Topic: Physical Therapy (In Progress)       Point: Mobility training (In Progress)       Learning Progress Summary             Patient Acceptance, E,D, NR by  at 10/1/2024 1439    Comment: bed mobility    Acceptance, E,D, VU,DU by  at 9/30/2024 1512    Comment: Pt. educated on proper body mechanics to maintain safety when performing functional mobility. Educated on safety procedures to abide by while at [facility]                         Point: Home exercise program (Not Started)       Learner Progress:  Not documented in this visit.              Point: Body mechanics (Done)       Learning Progress Summary             Patient Acceptance, E,D, VU,DU by  at 9/30/2024 1512    Comment: Pt. educated on proper body mechanics to maintain safety when performing functional mobility. Educated on safety procedures to abide by while at [facility]                         Point: Precautions (Done)       Learning Progress Summary             Patient Acceptance, E,D, VU,DU by  at 9/30/2024 1512    Comment: Pt. educated on proper body mechanics to maintain safety when performing functional mobility. Educated on safety procedures to abide by while at [facility]                                         User Key       Initials Effective Dates Name Provider Type Discipline     02/03/23 -  Radha Limon PTA Physical Therapist Assistant PT     08/21/24 -  Tim Heller PT Student PT Student PT                  PT Recommendation and Plan     Plan of Care Reviewed With: patient  Progress: improving  Outcome Evaluation: Pt was in bed, much improved today.  Pt was alert and oriented, able to follow all commands.  Transfered supine to sitting  with min assist.  sitting EOB, performed LE exericses x 15 AROM.  Transfered sit to stand with min assist.  Amb 15' x 2 with straight cane and CGA/min assist.  Will continue to work with pt to increase strength and progress mobility.   Outcome Measures       Row Name 10/04/24 1140 10/03/24 1316 10/02/24 1136       How much help from another person do you currently need...    Turning from your back to your side while in flat bed without using bedrails? 2  -BALJINDER 4  -BALJINDER 2  -BALJINDER    Moving from lying on back to sitting on the side of a flat bed without bedrails? 2  -BALJINDER 3  -BALJINDER 2  -BALJINDER    Moving to and from a bed to a chair (including a wheelchair)? 2  -BALJINDER 3  -BALJINDER 2  -BALJINDER    Standing up from a chair using your arms (e.g., wheelchair, bedside chair)? 2  -BALJINDER 3  -BALJINDER 2  -BALJINDER    Climbing 3-5 steps with a railing? 1  -BALJINDER 2  -BALJINDER 1  -BALJINDER    To walk in hospital room? 2  -BALJINDER 3  -BALJINDER 2  -BALJINDER    AM-PAC 6 Clicks Score (PT) 11  -BALJINDER 18  -BALJINDER 11  -BALJINDER    Highest Level of Mobility Goal 4 --> Transfer to chair/commode  -BALJINDER 6 --> Walk 10 steps or more  -BALJINDER 4 --> Transfer to chair/commode  -BALJINDER       Functional Assessment    Outcome Measure Options AM-PAC 6 Clicks Basic Mobility (PT)  -BALJINDER AM-PAC 6 Clicks Basic Mobility (PT)  -BALJINDER AM-PAC 6 Clicks Basic Mobility (PT)  -BALJINDER      Row Name 10/01/24 1400             How much help from another person do you currently need...    Turning from your back to your side while in flat bed without using bedrails? 3  -BRUNO      Moving from lying on back to sitting on the side of a flat bed without bedrails? 3  -BRUNO      Moving to and from a bed to a chair (including a wheelchair)? 3  -BRUNO      Standing up from a chair using your arms (e.g., wheelchair, bedside chair)? 3  -BRUNO      Climbing 3-5 steps with a railing? 3  -BRUNO      To walk in hospital room? 3  -BRUNO      AM-PAC 6 Clicks Score (PT) 18  -BRUNO      Highest Level of Mobility Goal 6 --> Walk 10 steps or more  -BRUNO                User Key  (r) = Recorded By, (t) = Taken By, (c)  = Cosigned By      Initials Name Provider Type    Radha Child, FRANCHESCA Physical Therapist Assistant    Maxi Tadeo PTA Physical Therapist Assistant                     Time Calculation:    PT Charges       Row Name 10/04/24 1140             Time Calculation    Start Time 1140  -BALJINDER      Stop Time 1150  -BALJNIDER      Time Calculation (min) 10 min  -BALJINDER      PT Received On 10/04/24  -BALJINDER         Time Calculation- PT    Total Timed Code Minutes- PT 10 minute(s)  -BALJINDER         Timed Charges    03051 - PT Therapeutic Activity Minutes 10  -BALJINDER         Total Minutes    Timed Charges Total Minutes 10  -BALJINDER       Total Minutes 10  -BALJINDER                User Key  (r) = Recorded By, (t) = Taken By, (c) = Cosigned By      Initials Name Provider Type    Maxi Tadeo PTA Physical Therapist Assistant                  Therapy Charges for Today       Code Description Service Date Service Provider Modifiers Qty    55515013876 HC GAIT TRAINING EA 15 MIN 10/3/2024 Maxi Nolan, PTA GP 1    37349328543 HC PT THER PROC EA 15 MIN 10/3/2024 Maxi Nolan, PTA GP 1    22872244961 HC PT THERAPEUTIC ACT EA 15 MIN 10/4/2024 Maxi Nolan, PTA GP 1            PT G-Codes  Outcome Measure Options: AM-PAC 6 Clicks Basic Mobility (PT)  AM-PAC 6 Clicks Score (PT): 11  AM-PAC 6 Clicks Score (OT): 16    Maxi Nolan PTA  10/4/2024

## 2024-10-04 NOTE — CASE MANAGEMENT/SOCIAL WORK
Continued Stay Note  [facility]     Patient Name: Tucker Knox  MRN: [id number]  Today's Date: 10/4/2024    Admit Date: 9/28/2024    Plan: [facility]   Discharge Plan       Row Name 10/04/24 1108       Plan    Plan [facility]    Plan Comments Prec[facility] completed for [facility].  Precert. is good until 10/9.  Phone:  [phone] Fax: [phone].                   Discharge Codes    No documentation.                 Expected Discharge Date and Time       Expected Discharge Date Expected Discharge Time    Oct 3, 2024               GAGANDEEP Cespedes

## 2024-10-04 NOTE — DISCHARGE SUMMARY
[facility]  DISCHARGE SUMMARY       Date of Admission: 9/28/2024  Date of Discharge:  10/4/2024  Primary Care Physician: Kt Alfredo MD    Presenting Problem/History of Present Illness:  Mr. Knox is a pleasant 64-year-old male who presented to an outside ED due to shortness of breath.  Past medical history includes combined systolic and diastolic CHF, CAD status post stents, hypertension, hyperlipidemia, COPD, chronic hypoxic respiratory failure on 3 L, tobacco use, diabetes.     History is provided by the patient.  He was recently discharged from [facility] after a stay for CHF exacerbation.  He has had 3 admissions this year, 2 at [facility] and 1 at [facility].  He was discharged from [facility] on 9/18/2024.  During that admission he was diuresed with IV Lasix.  He had improvement of his symptoms and he was discharged.  Patient states initially he was doing well up until 2 days ago.  Over the past 2 days he has noted increased dyspnea worse with exertion.  He also has a dry cough which is chronic.  Also notes chills.  Denies any fever, chest pain, nausea, vomiting.  No sick contacts.     Due to his shortness of breath he presented to an outside ED.  At the outside ED his chest x-ray was consistent with volume overload.  He also had an elevated troponin and BNP.  Lactate of 2 and white count of 12.  He was given IV insulin and IV Lasix and transferred to [facility] ICU.     On exam the patient is resting comfortably in bed.  He is breathing comfortably on 3 L satting in the upper 90s.  States he would like to have something to eat and drink when able.  States that he does have some dyspnea which is worse with exertion.    Final Discharge Diagnoses:  Active Hospital Problems    Diagnosis     **Acute exacerbation of CHF (congestive heart failure)     Chronic respiratory failure with hypoxia     Delirium     Presence of external cardiac  defibrillator     Malignant neoplasm of sigmoid colon     Sleep apnea     Pulmonary HTN     COPD (chronic obstructive pulmonary disease)     Diabetes mellitus     Hypertension        Consults:   VIV Ríos, palliative care    Procedures Performed: -    Pertinent Test Results:   Results for orders placed during the hospital encounter of 09/09/24    Adult Transthoracic Echo Complete W/ Cont if Necessary Per Protocol    Interpretation Summary    Left ventricular systolic function is moderately decreased. Left ventricular ejection fraction appears to be 31 - 35%.    The left ventricular cavity is mildly dilated.    Left ventricular wall thickness is consistent with mild concentric hypertrophy.    Left ventricular diastolic function is consistent with (grade II w/high LAP) pseudonormalization.    Mildly reduced right ventricular systolic function noted.    The right ventricular cavity is moderately dilated.    The left atrial cavity is mildly dilated.    The right atrial cavity is dilated.    Mild aortic valve stenosis is present.    Estimated right ventricular systolic pressure from tricuspid regurgitation is moderately elevated (45-55 mmHg).      Imaging Results (All)       Procedure Component Value Units Date/Time    US Venous Doppler Lower Extremity Left (duplex) [[id number]] Collected: 10/01/24 1521     Updated: 10/01/24 1524    Narrative:      History: Pain and swelling       Impression:      Impression: There is no evidence of deep venous thrombosis or  superficial thrombophlebitis of the left lower extremity.     Comments: Left lower extremity venous duplex exam was performed using  color Doppler flow, Doppler wave form analysis, and grayscale imaging,  with and without compression. There is no evidence of deep venous  thrombosis of the common femoral, superficial femoral, popliteal,  posterior tibial, and peroneal veins. There is no thrombus identified in  the saphenofemoral junction or the greater  saphenous vein.         This report was signed and finalized on 10/1/2024 3:21 PM by Dr. Suresh Kong MD.       XR Outside Films [[id number]] Resulted: 09/30/24 1152     Updated: 09/30/24 1152    Narrative:      This procedure was auto-finalized with no dictation required.    XR Chest 1 View [[id number]] Collected: 09/28/24 1703     Updated: 09/28/24 1708    Narrative:      EXAMINATION: XR CHEST 1 VW-  9/28/2024 5:04 PM     HISTORY: Shortness of breath. CHF.     FINDINGS: Upright frontal projection of the chest is compared to prior  exam of 9/9/2024. There is pulmonary venous hypertension with  redistribution and mild interstitial edema. Pulmonary edema is mildly  asymmetric within the right lung in comparison with the left. There is a  small right-sided pleural effusion with blunting of the costophrenic  angle. There is no free air beneath the hemidiaphragms.       Impression:      1.. Pulmonary venous hypertension with mild interstitial edema and  right-sided effusion.     This report was signed and finalized on 9/28/2024 5:05 PM by Dr. Antonio Prather MD.             LAB RESULTS:      Lab 10/04/24  0436 10/03/24  0328 10/02/24  0341 10/01/24  0230 09/30/24  0219   WBC 6.33 6.11 8.96 10.45 9.35   HEMOGLOBIN 10.3* 10.6* 11.2* 11.2* 10.3*   HEMATOCRIT 36.8* 37.9 38.7 39.7 37.0*   PLATELETS 192 189 184 185 181   NEUTROS ABS 4.67 4.82 7.87* 8.90* 7.97*   IMMATURE GRANS (ABS) 0.04 0.02 0.03 0.06* 0.04   LYMPHS ABS 0.97 0.70 0.49* 0.76 0.57*   MONOS ABS 0.49 0.47 0.49 0.62 0.65   EOS ABS 0.13 0.06 0.04 0.07 0.07   MCV 97.9* 97.4* 95.6 96.1 95.9         Lab 10/04/24  0436 10/03/24  0328 10/02/24  0341 10/01/24  0230 09/30/24  0219 09/29/24  0943 09/28/24  1808   SODIUM 139 140 140 141 139   < > 138   POTASSIUM 4.9 4.9 5.1 4.7 5.0   < > 4.7   CHLORIDE 102 101 99 101 100   < > 98   CO2 33.0* 33.0* 31.0* 31.0* 31.0*   < > 30.0*   ANION GAP 4.0* 6.0 10.0 9.0 8.0   < > 10.0   BUN 29* 31* 35* 37* 52*   < > 50*    CREATININE 0.94 0.91 1.04 1.08 1.60*   < > 1.65*   EGFR 91.1 94.7 80.7 77.1 48.1*   < > 46.4*   GLUCOSE 176* 104* 141* 82 103*   < > 251*   CALCIUM 8.1* 8.2* 8.5* 8.1* 8.3*   < > 8.8   MAGNESIUM  --   --   --   --   --   --  2.0   PHOSPHORUS  --   --   --   --   --   --  5.0*    < > = values in this interval not displayed.         Lab 10/03/24  0328   TOTAL PROTEIN 6.1   ALBUMIN 3.3*   GLOBULIN 2.8   ALT (SGPT) 12   AST (SGOT) 11   BILIRUBIN 0.4   ALK PHOS 97         Lab 09/28/24 1923 09/28/24  1808   PROBNP  --  18,381.0*   HSTROP T 548* 483*                 Brief Urine Lab Results       None          Microbiology Results (last 10 days)       ** No results found for the last 240 hours. **            Hospital Course:   Acute on chronic systolic and diastolic congestive heart failure  Patient was gentle diuresis, monitored daily weights ins and outs and BMP daily.  At this time he appears at dry weight without respiratory distress and residual minimal 1+ edema pretibial.      Coronary artery disease with recent left heart catheterization showing two-vessel disease being evaluated for possible CABG  Cardiology input from Dr. Arreaga noted from previous admission  With patient current functional status and untreated colon cancer patient is not likely a candidate for an invasive procedure.  This was discussed with his sister.     Colon cancer status post laparoscopic resection in December 2023  Dr. Stallworth consult note from February 2024 noted  Discussed the case with Dr. Stallworth he does not think the patient is amenable to treatment.  Dr. Stallworth can follow-up as outpatient.  Palliative care consulted.     Diabetes mellitus  Continue insulin  See medication reconciliation     COPD with chronic respiratory failure  Oxygen to 3 L/min nasal cannula  Continue bronchodilators     Delirium, resolved  Seroquel 25 mg at bedtime  Nicotine patch  Reorient frequently and monitor sleep cycle    Patient has become  "deconditioned and will require subacute rehabilitation placement arrangements in place.    Physical Exam on Discharge:  /72 (BP Location: Left arm, Patient Position: Lying)   Pulse 88   Temp 98.2 °F (36.8 °C) (Oral)   Resp 18   Ht 175.3 cm (69\")   Wt 124 kg (274 lb)   SpO2 97%   BMI 40.46 kg/m²   Physical Exam  Constitutional:       General: He is not in acute distress.     Appearance: Normal appearance. He is not ill-appearing.   HENT:      Head: Normocephalic and atraumatic.      Right Ear: External ear normal.      Left Ear: External ear normal.      Nose: Nose normal.      Mouth/Throat:      Mouth: Mucous membranes are moist.   Eyes:      Extraocular Movements: Extraocular movements intact.      Conjunctiva/sclera: Conjunctivae normal.      Pupils: Pupils are equal, round, and reactive to light.   Cardiovascular:      Rate and Rhythm: Normal rate and regular rhythm.      Pulses: Normal pulses.   Pulmonary:      Effort: Pulmonary effort is normal.      Breath sounds: Normal breath sounds. No wheezing or rales.   Abdominal:      General: Abdomen is flat. Bowel sounds are normal. There is no distension.      Palpations: Abdomen is soft.      Tenderness: There is no abdominal tenderness. There is no guarding.   Musculoskeletal:         General: No swelling or tenderness. Normal range of motion.      Cervical back: Normal range of motion and neck supple.   Skin:     General: Skin is warm and dry.      Capillary Refill: Capillary refill takes less than 2 seconds.   Neurological:      General: No focal deficit present.      Mental Status: He is alert. Mental status is at baseline.      Motor: Weakness present.   Psychiatric:         Mood and Affect: Mood normal.         Behavior: Behavior normal.     Condition on Discharge:   Stable    Discharge Disposition:  [facility] subacute rehabilitation    Discharge Medications:     Discharge Medications        New Medications        Instructions Start Date "   acetaminophen 325 MG tablet  Commonly known as: TYLENOL   650 mg, Oral, Every 6 Hours PRN      nicotine 21 MG/24HR patch  Commonly known as: NICODERM CQ   1 patch, Transdermal, Every 24 Hours Scheduled   Start Date: October 5, 2024     QUEtiapine 25 MG tablet  Commonly known as: SEROquel   25 mg, Oral, Nightly             Changes to Medications        Instructions Start Date   furosemide 40 MG tablet  Commonly known as: LASIX  What changed: how much to take   20 mg, Oral, Daily             Continue These Medications        Instructions Start Date   albuterol sulfate  (90 Base) MCG/ACT inhaler  Commonly known as: PROVENTIL HFA;VENTOLIN HFA;PROAIR HFA   2 puffs, Inhalation, Every 4 Hours PRN      aspirin 81 MG EC tablet   81 mg, Oral, Daily      dapagliflozin Propanediol 10 MG tablet  Commonly known as: Farxiga   10 mg, Oral, Daily      DULoxetine 30 MG capsule  Commonly known as: CYMBALTA   30 mg, Oral, Daily      Entresto 24-26 MG tablet  Generic drug: sacubitril-valsartan   1 tablet, Oral, 2 Times Daily      gabapentin 600 MG tablet  Commonly known as: NEURONTIN   600 mg, Oral, 2 Times Daily, Patient states he takes 800 mg bid      insulin detemir 100 UNIT/ML injection  Commonly known as: LEVEMIR   14 Units, Subcutaneous, 2 Times Daily      metFORMIN 1000 MG tablet  Commonly known as: GLUCOPHAGE   1,000 mg, Oral, 2 Times Daily With Meals      metoprolol succinate XL 25 MG 24 hr tablet  Commonly known as: TOPROL-XL   25 mg, Oral, Every 24 Hours Scheduled      nitroglycerin 0.4 MG SL tablet  Commonly known as: NITROSTAT   0.4 mg, Sublingual, Every 5 Minutes PRN, Take no more than 3 doses in 15 minutes.      polyethylene glycol 17 GM/SCOOP powder  Commonly known as: MIRALAX   17 g, Oral, Daily      pravastatin 40 MG tablet  Commonly known as: PRAVACHOL   40 mg, Oral, Nightly      Tradjenta 5 MG tablet tablet  Generic drug: linagliptin   5 mg, Oral, Daily               This patient has current or prior  documentation of an left ventricular ejection fraction (LVEF) of less than or equal to 40%.    The patient was prescribed or already taking an ACE/ARB.    The patient was prescribed already taking bisoprolol, carvedilol, or sustained release metoprolol succinate.     Discharge Diet:   Cardiac consistent carbohydrate    Activity at Discharge:   Resume usual activity    Follow-up Appointments:   Future Appointments   Date Time Provider Department Center   10/8/2024 12:30 PM [facility] MRI 1 [facility] PAD MR BI [facility]   10/21/2024  2:15 PM Merchant, Agustin LOCKE MD [facility]  [facility]       Test Results Pending at Discharge: -    Electronically signed by Sea Joiner MD, 10/04/24, 15:52 CDT.    Time: 46 minutes.

## 2024-10-04 NOTE — THERAPY TREATMENT NOTE
Patient Name: Tucker Knox  : 1960    MRN: [id number]                              Today's Date: 10/4/2024       Admit Date: 2024    Visit Dx:     ICD-10-CM ICD-9-CM   1. Impaired mobility [Z74.09]  Z74.09 799.89     Patient Active Problem List   Diagnosis    Diabetes mellitus    Hypertension    Pulmonary HTN    COPD (chronic obstructive pulmonary disease)    NSTEMI, initial episode of care    Malignant neoplasm of sigmoid colon    FH: colon cancer    Sleep apnea    History of adenomatous polyp of colon    Colon adenocarcinoma    Acute on chronic systolic CHF (congestive heart failure)    PVC's (premature ventricular contractions)    Presence of external cardiac defibrillator    Acute exacerbation of CHF (congestive heart failure)    Chronic respiratory failure with hypoxia    Delirium     Past Medical History:   Diagnosis Date    Cancer     CHF (congestive heart failure)     COPD (chronic obstructive pulmonary disease)     Coronary artery disease     stent x 1    Family history of colon cancer     Hyperlipidemia     Hypertension     Sleep apnea     does not wear machine, supposed to wear cpap with oxygen and does not wear it    Type 2 diabetes mellitus      Past Surgical History:   Procedure Laterality Date    BACK SURGERY      CARDIAC CATHETERIZATION Left 2022    Procedure: Cardiac Catheterization/Vascular Study;  Surgeon: Todd Avendano MD;  Location:  [facility];  Service: Cardiology;  Laterality: Left;    CARDIAC CATHETERIZATION      with stent x1    CARDIAC CATHETERIZATION N/A 2024    Procedure: Left Heart Cath;  Surgeon: Ruben Adler MD;  Location:  [facility]ATH [facility];  Service: Cardiology;  Laterality: N/A;    COLON RESECTION N/A 2023    Procedure: COLON RESECTION LAPAROSCOPIC SIGMOID WITH DAVINCI ROBOT, INTRA-OPERATIVE FLEXIBLE SIGMOIDOSCOPY, SPLENIC FLEXURE MOBILIZATION, OPEN UMBILICAL HERNIA REPAIR;  Surgeon: Oma Velasquez MD;   Location: [facility];  Service: Robotics - [place];  Laterality: N/A;    COLONOSCOPY N/A 10/09/2023    Diverticulosis; One 5mm polyp in ascending colon; One 5mm polyp in transverse colon; One 10mm polyp at 65cm proximal to anus; One 20mm polyp at 65cm proximal to anus-Tattooed; One 20mm polyp at 42cm proximal to anus-Clip (MR conditional) placed-Tattooed; Rule out malignancy-Partially obstructing tumor in recto-sigmoid colon-biopsied-Tattooed; One 10mm polyp in rectum    ELBOW PROCEDURE      KNEE SURGERY      LUMBAR DISC SURGERY        General Information       Row Name 10/04/24 1516          OT Time and Intention    Document Type therapy note (daily note)  -     Mode of Treatment occupational therapy  -       Row Name 10/04/24 1516          General Information    Existing Precautions/Restrictions fall;oxygen therapy device and L/min  -       Row Name 10/04/24 1516          Cognition    Orientation Status (Cognition) oriented to;person;place  -       Row Name 10/04/24 1516          Safety Issues, Functional Mobility    Impairments Affecting Function (Mobility) strength;balance;endurance/activity tolerance;pain;cognition  -     Cognitive Impairments, Mobility Safety/Performance attention;insight into deficits/self-awareness  -               User Key  (r) = Recorded By, (t) = Taken By, (c) = Cosigned By      Initials Name Provider Type     Nichelle Hunter, OTR/L Occupational Therapist                     Mobility/ADL's       Row Name 10/04/24 1516          Bed Mobility    Bed Mobility supine-sit  -     Supine-Sit [place] (Bed Mobility) minimum assist (75% patient effort);verbal cues  -     Sit-Supine [place] (Bed Mobility) contact guard;verbal cues  -     Bed Mobility, Safety Issues decreased use of legs for bridging/pushing  -     Assistive Device (Bed Mobility) bed rails;head of bed elevated  -       Row Name 10/04/24 1516          Transfers    Transfers sit-stand transfer;stand-sit  transfer  -       Row Name 10/04/24 1516          Sit-Stand Transfer    Sit-Stand [place] (Transfers) contact guard  -     Assistive Device (Sit-Stand Transfers) walker, front-wheeled  -       Row Name 10/04/24 1516          Stand-Sit Transfer    Stand-Sit [place] (Transfers) contact guard  -     Assistive Device (Stand-Sit Transfers) walker, front-wheeled  -       Row Name 10/04/24 1516          Functional Mobility    Functional Mobility- Ind. Level contact guard assist  -     Functional Mobility- Device walker, front-wheeled  -       Row Name 10/04/24 1516          Activities of Daily Living    BADL Assessment/Intervention grooming;lower body dressing  -       Row Name 10/04/24 1516          Lower Body Dressing Assessment/Training    [place] Level (Lower Body Dressing) pants/bottoms;don;doff;moderate assist (50% patient effort)  -     Position (Lower Body Dressing) edge of bed sitting;unsupported sitting;supported standing  -       Row Name 10/04/24 1516          Grooming Assessment/Training    [place] Level (Grooming) contact guard assist;oral care regimen;wash face, hands;hair care, combing/brushing  -     Oral Care teeth brushed - regular toothbrush  -     Position (Grooming) supported standing;sink side  -     Comment, (Grooming) Pt stood sinkside and waas able to brush teeeth and wash face.  Noteable decline in his posture from upright to forward flexed.  Assisted pt back to bed for hair care to pose graded level challenge and reduce his fall risk.  Pt. decompensates with standing activities and requires RB.  Cont OT tx to increase fxl act dany.  -               User Key  (r) = Recorded By, (t) = Taken By, (c) = Cosigned By      Initials Name Provider Type     Nichelle Hunter, OTR/L Occupational Therapist                   Obj/Interventions       Row Name 10/04/24 1516          Balance    Balance Interventions sitting;standing;sit to  stand;supported;static;dynamic;occupation based/functional task  -               User Key  (r) = Recorded By, (t) = Taken By, (c) = Cosigned By      Initials Name Provider Type     Nichelle Hunter, OTR/L Occupational Therapist                   Goals/Plan    No documentation.                  Clinical Impression       Row Name 10/04/24 1516          Pain Scale: FACES Pre/Post-Treatment    Pain: FACES Scale, Pretreatment 0-->no hurt  -     Posttreatment Pain Rating 2-->hurts little bit  -     Pain Location - Side/Orientation Right  -     Pain Location - knee  -       Row Name 10/04/24 1516          Plan of Care Review    Plan of Care Reviewed With patient  -     Progress no change  -     Outcome Evaluation Pt. AxO x 3 & agreeable to tx.  Evident that pt's level of awareness/alertness is variable.  Mr. Knox able to follow commands & came to EOB at Min A.  OTR provided rwx to utilize for fxl mob.  Pt. stood CGA and ambulated to BR.  Pt stood sinkside and waas able to brush teeeth and wash face. Noteable decline in his posture from upright to forward flexed. Assisted pt back to bed for hair care to pose graded level challenge and reduce his fall risk. Pt. decompensates with standing activities and requires RB. Cont OT tx to increase fxl act dany.  -       Row Name 10/04/24 1516          Therapy Assessment/Plan (OT)    Rehab Potential (OT) good, to achieve stated therapy goals  -       Row Name 10/04/24 1516          Therapy Plan Review/Discharge Plan (OT)    Anticipated Discharge Disposition (OT) skilled nursing facility  -[facility] Name 10/04/24 1516          Positioning and Restraints    Pre-Treatment Position in bed  -     Post Treatment Position bed  -     In Bed fowlers;call light within reach;encouraged to call for assist;exit alarm on;side rails up x3;side lying left;pillow between legs;notified nsg  -               User Key  (r) = Recorded By, (t) = Taken By, (c) = Cosigned By       Initials Name Provider Type    Nichelle Shea, OTR/L Occupational Therapist                   Outcome Measures       Row Name 10/04/24 1516          How much help from another is currently needed...    Putting on and taking off regular lower body clothing? 2  -CH     Bathing (including washing, rinsing, and drying) 2  -CH     Toileting (which includes using toilet bed pan or urinal) 2  -CH     Putting on and taking off regular upper body clothing 3  -CH     Taking care of personal grooming (such as brushing teeth) 3  -CH     Eating meals 4  -CH     AM-PAC 6 Clicks Score (OT) 16  -CH       Row Name 10/04/24 1140          How much help from another person do you currently need...    Turning from your back to your side while in flat bed without using bedrails? 2  -BALJINDER     Moving from lying on back to sitting on the side of a flat bed without bedrails? 2  -BALJINDER     Moving to and from a bed to a chair (including a wheelchair)? 2  -BALJINDER     Standing up from a chair using your arms (e.g., wheelchair, bedside chair)? 2  -BALJINDER     Climbing 3-5 steps with a railing? 1  -BALJINDER     To walk in hospital room? 2  -BALJINDER     AM-PAC 6 Clicks Score (PT) 11  -BALJINDER     Highest Level of Mobility Goal 4 --> Transfer to chair/commode  -BALJINDER       Row Name 10/04/24 1516 10/04/24 1140       Functional Assessment    Outcome Measure Options AM-PAC 6 Clicks Daily Activity (OT)  - AM-PAC 6 Clicks Basic Mobility (PT)  -BALJINDER              User Key  (r) = Recorded By, (t) = Taken By, (c) = Cosigned By      Initials Name Provider Type    Nichelle Shea, OTR/L Occupational Therapist    Maxi Tadeo PTA Physical Therapist Assistant                    Occupational Therapy Education       Title: PT OT SLP Therapies (In Progress)       Topic: Occupational Therapy (In Progress)       Point: ADL training (Done)       Description:   Instruct learner(s) on proper safety adaptation and remediation techniques during self care or transfers.   Instruct in proper  use of assistive devices.                  Learning Progress Summary             Patient Acceptance, E, VU,NR by  at 10/4/2024 1553    Acceptance, E, VU,NR by  at 10/3/2024 1553    Acceptance, E,D, VU,NR by  at 10/2/2024 1457    Acceptance, E, VU by  at 9/30/2024 1441                         Point: Home exercise program (Not Started)       Description:   Instruct learner(s) on appropriate technique for monitoring, assisting and/or progressing therapeutic exercises/activities.                  Learner Progress:  Not documented in this visit.              Point: Precautions (Done)       Description:   Instruct learner(s) on prescribed precautions during self-care and functional transfers.                  Learning Progress Summary             Patient Acceptance, E, VU,NR by  at 10/4/2024 1553    Acceptance, E,D, VU,NR by  at 10/2/2024 1457    Acceptance, E, VU by  at 9/30/2024 1441                         Point: Body mechanics (Done)       Description:   Instruct learner(s) on proper positioning and spine alignment during self-care, functional mobility activities and/or exercises.                  Learning Progress Summary             Patient Acceptance, E, VU,NR by  at 10/4/2024 1553    Acceptance, E, VU,NR by  at 10/3/2024 1553    Acceptance, E,D, VU,NR by  at 10/2/2024 1457    Acceptance, E, VU by  at 9/30/2024 1441                                         User Key       Initials Effective Dates Name Provider Type Discipline     07/11/23 -  Nichelle Hunter, OTR/L Occupational Therapist OT     07/11/23 -  Tayla Gibbs OTR/L, CSRS Occupational Therapist OT                  OT Recommendation and Plan  Therapy Frequency (OT): 5 times/wk  Plan of Care Review  Plan of Care Reviewed With: patient  Progress: no change  Outcome Evaluation: Pt. AxO x 3 & agreeable to tx.  Evident that pt's level of awareness/alertness is variable.  Mr. Knox able to follow commands & came to EOB at Min A.   OTR provided rwx to utilize for fxl mob.  Pt. stood CGA and ambulated to BR.  Pt stood sinkside and waas able to brush teeeth and wash face. Noteable decline in his posture from upright to forward flexed. Assisted pt back to bed for hair care to pose graded level challenge and reduce his fall risk. Pt. decompensates with standing activities and requires RB. Cont OT tx to increase fxl act dany.     Time Calculation:         Time Calculation- OT       Row Name 10/04/24 1516             Time Calculation- OT    OT Start Time 1516  -                User Key  (r) = Recorded By, (t) = Taken By, (c) = Cosigned By      Initials Name Provider Type     Nichelle Hunter OTR/L Occupational Therapist                  Therapy Charges for Today       Code Description Service Date Service Provider Modifiers Qty    [id number] HC OT SELF CARE/MGMT/TRAIN EA 15 MIN 10/3/2024 Nichelle Hunter OTR/PILO GO 2                 MOISES Rice/PILO  10/4/2024

## 2024-10-05 ENCOUNTER — HOSPITAL ENCOUNTER (EMERGENCY)
Facility: HOSPITAL | Age: 64
Discharge: LEFT BEFORE DISCHARGE | End: 2024-10-05
Payer: MEDICARE

## 2024-10-05 VITALS
SYSTOLIC BLOOD PRESSURE: 130 MMHG | TEMPERATURE: 98.2 F | WEIGHT: 270 LBS | DIASTOLIC BLOOD PRESSURE: 81 MMHG | OXYGEN SATURATION: 100 % | RESPIRATION RATE: 24 BRPM | HEIGHT: 69 IN | HEART RATE: 95 BPM | BODY MASS INDEX: 39.99 KG/M2

## 2024-10-05 DIAGNOSIS — J44.1 CHRONIC OBSTRUCTIVE PULMONARY DISEASE WITH ACUTE EXACERBATION: Primary | ICD-10-CM

## 2024-10-05 PROCEDURE — 96374 THER/PROPH/DIAG INJ IV PUSH: CPT

## 2024-10-05 PROCEDURE — 99283 EMERGENCY DEPT VISIT LOW MDM: CPT

## 2024-10-05 PROCEDURE — 94640 AIRWAY INHALATION TREATMENT: CPT

## 2024-10-05 PROCEDURE — 94799 UNLISTED PULMONARY SVC/PX: CPT

## 2024-10-05 PROCEDURE — 25010000002 METHYLPREDNISOLONE PER 125 MG: Performed by: FAMILY MEDICINE

## 2024-10-05 RX ORDER — METHYLPREDNISOLONE SODIUM SUCCINATE 125 MG/2ML
125 INJECTION, POWDER, LYOPHILIZED, FOR SOLUTION INTRAMUSCULAR; INTRAVENOUS ONCE
Status: COMPLETED | OUTPATIENT
Start: 2024-10-05 | End: 2024-10-05

## 2024-10-05 RX ORDER — ALBUTEROL SULFATE 0.83 MG/ML
5 SOLUTION RESPIRATORY (INHALATION) ONCE
Status: COMPLETED | OUTPATIENT
Start: 2024-10-05 | End: 2024-10-05

## 2024-10-05 RX ORDER — ALBUTEROL SULFATE 0.83 MG/ML
5 SOLUTION RESPIRATORY (INHALATION) ONCE
Status: DISCONTINUED | OUTPATIENT
Start: 2024-10-05 | End: 2024-10-05

## 2024-10-05 RX ADMIN — ALBUTEROL SULFATE 5 MG: 2.5 SOLUTION RESPIRATORY (INHALATION) at 05:43

## 2024-10-05 RX ADMIN — METHYLPREDNISOLONE SODIUM SUCCINATE 125 MG: 125 INJECTION, POWDER, FOR SOLUTION INTRAMUSCULAR; INTRAVENOUS at 05:39

## 2024-10-05 NOTE — THERAPY DISCHARGE NOTE
Acute Care - Physical Therapy Discharge Summary  [facility]       Patient Name: Tucker Knox  : 1960  MRN: [id number]    Today's Date: 10/5/2024                 Admit Date: 2024      PT Recommendation and Plan    Visit Dx:    ICD-10-CM ICD-9-CM   1. Impaired mobility [Z74.09]  Z74.09 799.89        Outcome Measures       Row Name 10/04/24 1140 10/03/24 1316          How much help from another person do you currently need...    Turning from your back to your side while in flat bed without using bedrails? 2  -BALJINDER 4  -BALJINDER     Moving from lying on back to sitting on the side of a flat bed without bedrails? 2  -BALJINDER 3  -BALJINDER     Moving to and from a bed to a chair (including a wheelchair)? 2  -BALJINDER 3  -BALJINDER     Standing up from a chair using your arms (e.g., wheelchair, bedside chair)? 2  -BALJINDER 3  -BALJINDER     Climbing 3-5 steps with a railing? 1  -BALJINDER 2  -BALJINDER     To walk in hospital room? 2  -BALJINDER 3  -BALJINDER     AM-PAC 6 Clicks Score (PT) 11  -BALJINDER 18  -BALJINDER     Highest Level of Mobility Goal 4 --> Transfer to chair/commode  -BALJINDER 6 --> Walk 10 steps or more  -BALJINDER        Functional Assessment    Outcome Measure Options AM-PAC 6 Clicks Basic Mobility (PT)  -BALJINDER AM-PAC 6 Clicks Basic Mobility (PT)  -BALJINDER               User Key  (r) = Recorded By, (t) = Taken By, (c) = Cosigned By      Initials Name Provider Type    Maxi Tadeo PTA Physical Therapist Assistant                         PT Rehab Goals       Row Name 10/05/24 1610             Bed Mobility Goal 1 (PT)    Activity/Assistive Device (Bed Mobility Goal 1, PT) rolling to right;rolling to left;scooting;sit to supine;supine to sit  -LY      Keller Level/Cues Needed (Bed Mobility Goal 1, PT) standby assist  -LY      Time Frame (Bed Mobility Goal 1, PT) long term goal (LTG);10 days  -LY      Progress/Outcomes (Bed Mobility Goal 1, PT) goal not met  -LY         Transfer Goal 1 (PT)    Activity/Assistive Device (Transfer Goal 1, PT)  sit-to-stand/stand-to-sit;bed-to-chair/chair-to-bed  -LY      Geary Level/Cues Needed (Transfer Goal 1, PT) standby assist  -LY      Time Frame (Transfer Goal 1, PT) long term goal (LTG);10 days  -LY      Progress/Outcome (Transfer Goal 1, PT) goal not met  -LY         Gait Training Goal 1 (PT)    Activity/Assistive Device (Gait Training Goal 1, PT) gait (walking locomotion);assistive device use;decrease fall risk;diminish gait deviation;forward stepping;improve balance and speed;increase endurance/gait distance;increase energy conservation;cane, straight;turning, left;turning, right  -LY      Geary Level (Gait Training Goal 1, PT) standby assist  -LY      Distance (Gait Training Goal 1, PT) 50'  -LY      Time Frame (Gait Training Goal 1, PT) long term goal (LTG);10 days  -LY      Progress/Outcome (Gait Training Goal 1, PT) goal not met  -LY                User Key  (r) = Recorded By, (t) = Taken By, (c) = Cosigned By      Initials Name Provider Type Discipline    Anh Hui PTA Physical Therapist Assistant PT                        PT Discharge Summary  Anticipated Discharge Disposition (PT): sub acute care setting  Reason for Discharge: Discharge from facility  Outcomes Achieved: Refer to plan of care for updates on goals achieved  Discharge Destination: SNF      Anh Taylor PTA   10/5/2024

## 2024-10-05 NOTE — ED NOTES
"Patient states that he is going home and a friend is coming to get him. This nurse tried to verify that patient would go home with portable oxygen. Educated that he is discharged to the nursing home and that nothing is set up for him to go home. Patient requesting that this nurse remove everything from patient and wheel him outside. This nurse explained that it is not medically advised to leave this facility without oxygen for transportation and without a discharge plan. Patient states \"I don't give a fuck what you have to say\". This nurse attempted to educate patient regarding staying for at least portable oxygen setup for transportation. Patient completely refused any offer for different facility and thoroughly explained options. Patient states that he is scheduled for open heart on 10/20 and should be admitted. Again educated regarding no current need for admission and patient verbalized he would go home. This nurse spoke to Maria Dolores who was on the phone with Lucila . [facility]. Patient stated \"fuck that\". Patient removed monitoring equipment. Dressed himself unsteadily on bedside. Patient attempts to redirect by this nurse and Maria Dolores  unsuccessful. Manda WHITLOCK thoroughly explained AMA form and that he is considered this related to leaving to go home instead of nursing home, Maria Dolores witnessed conversation. Patient continued to refuse, signed AMA paper, and walked out of ambulance door from facility.   "

## 2024-10-05 NOTE — PAYOR COMM NOTE
"DC TO SNF 10-4-24    Farhana Norton (64 y.o. Male)       Date of Birth   1960    Social Security Number       Address   [address] [place], [place] [address]    Home Phone   [phone]    MRN   [id number]       Mandaeism   Other    Marital Status                               Admission Date   9/28/24    Admission Type   Urgent    Admitting Provider   Quentin Morin DO    Attending Provider       Department, Room/Bed   [facility] 4B, 440/1       Discharge Date   10/4/2024    Discharge Disposition   Skilled Nursing Facility (DC - External)    Discharge Destination                                 Attending Provider: (none)   Allergies: Penicillins    Isolation: None   Infection: None   Code Status: Prior    Ht: 175.3 cm (69\")   Wt: 124 kg (274 lb)    Admission Cmt: None   Principal Problem: Acute exacerbation of CHF (congestive heart failure) [I50.9]                   Active Insurance as of 9/28/2024       Primary Coverage       Payor Plan Insurance Group Employer/Plan Group    ANTHEM MEDICARE REPLACEMENT ANTHEM MEDICARE ADVANTAGE [id number]       Payor Plan Address Payor Plan Phone Number Payor Plan Fax Number Effective Dates    [address] [phone]  2/1/2024 - None Entered    [facility] [id number]         Subscriber Name Subscriber Birth Date Member ID       FARHANA NORTON 1960 [id number]                     Emergency Contacts        (Rel.) Home Phone Work Phone Mobile Phone    alycia norton (Son) [phone] -- --    jose deng (Sister) [phone] -- --                 Discharge Summary        Sea Joiner MD at 10/04/24 [address] [place] [address] Medicine Services  DISCHARGE SUMMARY       Date of Admission: 9/28/2024  Date of Discharge:  10/4/2024  Primary Care Physician: Kt Alfredo MD    Presenting Problem/History of Present Illness:  Mr. Norton is a pleasant " 64-year-old male who presented to an outside ED due to shortness of breath.  Past medical history includes combined systolic and diastolic CHF, CAD status post stents, hypertension, hyperlipidemia, COPD, chronic hypoxic respiratory failure on 3 L, tobacco use, diabetes.     History is provided by the patient.  He was recently discharged from [facility] after a stay for CHF exacerbation.  He has had 3 admissions this year, 2 at [facility] and 1 at [facility].  He was discharged from [facility] on 9/18/2024.  During that admission he was diuresed with IV Lasix.  He had improvement of his symptoms and he was discharged.  Patient states initially he was doing well up until 2 days ago.  Over the past 2 days he has noted increased dyspnea worse with exertion.  He also has a dry cough which is chronic.  Also notes chills.  Denies any fever, chest pain, nausea, vomiting.  No sick contacts.     Due to his shortness of breath he presented to an outside ED.  At the outside ED his chest x-ray was consistent with volume overload.  He also had an elevated troponin and BNP.  Lactate of 2 and white count of 12.  He was given IV insulin and IV Lasix and transferred to [facility] ICU.     On exam the patient is resting comfortably in bed.  He is breathing comfortably on 3 L satting in the upper 90s.  States he would like to have something to eat and drink when able.  States that he does have some dyspnea which is worse with exertion.    Final Discharge Diagnoses:  Active Hospital Problems    Diagnosis     **Acute exacerbation of CHF (congestive heart failure)     Chronic respiratory failure with hypoxia     Delirium     Presence of external cardiac defibrillator     Malignant neoplasm of sigmoid colon     Sleep apnea     Pulmonary HTN     COPD (chronic obstructive pulmonary disease)     Diabetes mellitus     Hypertension        Consults:   VIV Ríos, palliative care    Procedures Performed: -    Pertinent Test Results:    Results for orders placed during the hospital encounter of 09/09/24    Adult Transthoracic Echo Complete W/ Cont if Necessary Per Protocol    Interpretation Summary    Left ventricular systolic function is moderately decreased. Left ventricular ejection fraction appears to be 31 - 35%.    The left ventricular cavity is mildly dilated.    Left ventricular wall thickness is consistent with mild concentric hypertrophy.    Left ventricular diastolic function is consistent with (grade II w/high LAP) pseudonormalization.    Mildly reduced right ventricular systolic function noted.    The right ventricular cavity is moderately dilated.    The left atrial cavity is mildly dilated.    The right atrial cavity is dilated.    Mild aortic valve stenosis is present.    Estimated right ventricular systolic pressure from tricuspid regurgitation is moderately elevated (45-55 mmHg).      Imaging Results (All)       Procedure Component Value Units Date/Time    US Venous Doppler Lower Extremity Left (duplex) [[id number]] Collected: 10/01/24 1521     Updated: 10/01/24 1524    Narrative:      History: Pain and swelling       Impression:      Impression: There is no evidence of deep venous thrombosis or  superficial thrombophlebitis of the left lower extremity.     Comments: Left lower extremity venous duplex exam was performed using  color Doppler flow, Doppler wave form analysis, and grayscale imaging,  with and without compression. There is no evidence of deep venous  thrombosis of the common femoral, superficial femoral, popliteal,  posterior tibial, and peroneal veins. There is no thrombus identified in  the saphenofemoral junction or the greater saphenous vein.         This report was signed and finalized on 10/1/2024 3:21 PM by Dr. Suresh Kong MD.       XR Outside Films [[id number]] Resulted: 09/30/24 1152     Updated: 09/30/24 1152    Narrative:      This procedure was auto-finalized with no dictation required.    XR Chest 1  View [[id number]] Collected: 09/28/24 1703     Updated: 09/28/24 1708    Narrative:      EXAMINATION: XR CHEST 1 VW-  9/28/2024 5:04 PM     HISTORY: Shortness of breath. CHF.     FINDINGS: Upright frontal projection of the chest is compared to prior  exam of 9/9/2024. There is pulmonary venous hypertension with  redistribution and mild interstitial edema. Pulmonary edema is mildly  asymmetric within the right lung in comparison with the left. There is a  small right-sided pleural effusion with blunting of the costophrenic  angle. There is no free air beneath the hemidiaphragms.       Impression:      1.. Pulmonary venous hypertension with mild interstitial edema and  right-sided effusion.     This report was signed and finalized on 9/28/2024 5:05 PM by Dr. Antonio Prather MD.             LAB RESULTS:      Lab 10/04/24  0436 10/03/24  0328 10/02/24  0341 10/01/24  0230 09/30/24  0219   WBC 6.33 6.11 8.96 10.45 9.35   HEMOGLOBIN 10.3* 10.6* 11.2* 11.2* 10.3*   HEMATOCRIT 36.8* 37.9 38.7 39.7 37.0*   PLATELETS 192 189 184 185 181   NEUTROS ABS 4.67 4.82 7.87* 8.90* 7.97*   IMMATURE GRANS (ABS) 0.04 0.02 0.03 0.06* 0.04   LYMPHS ABS 0.97 0.70 0.49* 0.76 0.57*   MONOS ABS 0.49 0.47 0.49 0.62 0.65   EOS ABS 0.13 0.06 0.04 0.07 0.07   MCV 97.9* 97.4* 95.6 96.1 95.9         Lab 10/04/24  0436 10/03/24  0328 10/02/24  0341 10/01/24  0230 09/30/24  0219 09/29/24  0943 09/28/24  1808   SODIUM 139 140 140 141 139   < > 138   POTASSIUM 4.9 4.9 5.1 4.7 5.0   < > 4.7   CHLORIDE 102 101 99 101 100   < > 98   CO2 33.0* 33.0* 31.0* 31.0* 31.0*   < > 30.0*   ANION GAP 4.0* 6.0 10.0 9.0 8.0   < > 10.0   BUN 29* 31* 35* 37* 52*   < > 50*   CREATININE 0.94 0.91 1.04 1.08 1.60*   < > 1.65*   EGFR 91.1 94.7 80.7 77.1 48.1*   < > 46.4*   GLUCOSE 176* 104* 141* 82 103*   < > 251*   CALCIUM 8.1* 8.2* 8.5* 8.1* 8.3*   < > 8.8   MAGNESIUM  --   --   --   --   --   --  2.0   PHOSPHORUS  --   --   --   --   --   --  5.0*    < > = values in this  "interval not displayed.         Lab 10/03/24  0328   TOTAL PROTEIN 6.1   ALBUMIN 3.3*   GLOBULIN 2.8   ALT (SGPT) 12   AST (SGOT) 11   BILIRUBIN 0.4   ALK PHOS 97         Lab 09/28/24  1923 09/28/24  1808   PROBNP  --  18,381.0*   HSTROP T 548* 483*                 Brief Urine Lab Results       None          Microbiology Results (last 10 days)       ** No results found for the last 240 hours. **            Hospital Course:   Acute on chronic systolic and diastolic congestive heart failure  Patient was gentle diuresis, monitored daily weights ins and outs and BMP daily.  At this time he appears at dry weight without respiratory distress and residual minimal 1+ edema pretibial.      Coronary artery disease with recent left heart catheterization showing two-vessel disease being evaluated for possible CABG  Cardiology input from Dr. Arreaga noted from previous admission  With patient current functional status and untreated colon cancer patient is not likely a candidate for an invasive procedure.  This was discussed with his sister.     Colon cancer status post laparoscopic resection in December 2023  Dr. Stallworth consult note from February 2024 noted  Discussed the case with Dr. Stallworth he does not think the patient is amenable to treatment.  Dr. Stallworth can follow-up as outpatient.  Palliative care consulted.     Diabetes mellitus  Continue insulin  See medication reconciliation     COPD with chronic respiratory failure  Oxygen to 3 L/min nasal cannula  Continue bronchodilators     Delirium, resolved  Seroquel 25 mg at bedtime  Nicotine patch  Reorient frequently and monitor sleep cycle    Patient has become deconditioned and will require subacute rehabilitation placement arrangements in place.    Physical Exam on Discharge:  /72 (BP Location: Left arm, Patient Position: Lying)   Pulse 88   Temp 98.2 °F (36.8 °C) (Oral)   Resp 18   Ht 175.3 cm (69\")   Wt 124 kg (274 lb)   SpO2 97%   BMI 40.46 " kg/m²   Physical Exam  Constitutional:       General: He is not in acute distress.     Appearance: Normal appearance. He is not ill-appearing.   HENT:      Head: Normocephalic and atraumatic.      Right Ear: External ear normal.      Left Ear: External ear normal.      Nose: Nose normal.      Mouth/Throat:      Mouth: Mucous membranes are moist.   Eyes:      Extraocular Movements: Extraocular movements intact.      Conjunctiva/sclera: Conjunctivae normal.      Pupils: Pupils are equal, round, and reactive to light.   Cardiovascular:      Rate and Rhythm: Normal rate and regular rhythm.      Pulses: Normal pulses.   Pulmonary:      Effort: Pulmonary effort is normal.      Breath sounds: Normal breath sounds. No wheezing or rales.   Abdominal:      General: Abdomen is flat. Bowel sounds are normal. There is no distension.      Palpations: Abdomen is soft.      Tenderness: There is no abdominal tenderness. There is no guarding.   Musculoskeletal:         General: No swelling or tenderness. Normal range of motion.      Cervical back: Normal range of motion and neck supple.   Skin:     General: Skin is warm and dry.      Capillary Refill: Capillary refill takes less than 2 seconds.   Neurological:      General: No focal deficit present.      Mental Status: He is alert. Mental status is at baseline.      Motor: Weakness present.   Psychiatric:         Mood and Affect: Mood normal.         Behavior: Behavior normal.     Condition on Discharge:   Stable    Discharge Disposition:  [facility] subacute rehabilitation    Discharge Medications:     Discharge Medications        New Medications        Instructions Start Date   acetaminophen 325 MG tablet  Commonly known as: TYLENOL   650 mg, Oral, Every 6 Hours PRN      nicotine 21 MG/24HR patch  Commonly known as: NICODERM CQ   1 patch, Transdermal, Every 24 Hours Scheduled   Start Date: October 5, 2024     QUEtiapine 25 MG tablet  Commonly known as: SEROquel   25 mg,  Oral, Nightly             Changes to Medications        Instructions Start Date   furosemide 40 MG tablet  Commonly known as: LASIX  What changed: how much to take   20 mg, Oral, Daily             Continue These Medications        Instructions Start Date   albuterol sulfate  (90 Base) MCG/ACT inhaler  Commonly known as: PROVENTIL HFA;VENTOLIN HFA;PROAIR HFA   2 puffs, Inhalation, Every 4 Hours PRN      aspirin 81 MG EC tablet   81 mg, Oral, Daily      dapagliflozin Propanediol 10 MG tablet  Commonly known as: Farxiga   10 mg, Oral, Daily      DULoxetine 30 MG capsule  Commonly known as: CYMBALTA   30 mg, Oral, Daily      Entresto 24-26 MG tablet  Generic drug: sacubitril-valsartan   1 tablet, Oral, 2 Times Daily      gabapentin 600 MG tablet  Commonly known as: NEURONTIN   600 mg, Oral, 2 Times Daily, Patient states he takes 800 mg bid      insulin detemir 100 UNIT/ML injection  Commonly known as: LEVEMIR   14 Units, Subcutaneous, 2 Times Daily      metFORMIN 1000 MG tablet  Commonly known as: GLUCOPHAGE   1,000 mg, Oral, 2 Times Daily With Meals      metoprolol succinate XL 25 MG 24 hr tablet  Commonly known as: TOPROL-XL   25 mg, Oral, Every 24 Hours Scheduled      nitroglycerin 0.4 MG SL tablet  Commonly known as: NITROSTAT   0.4 mg, Sublingual, Every 5 Minutes PRN, Take no more than 3 doses in 15 minutes.      polyethylene glycol 17 GM/SCOOP powder  Commonly known as: MIRALAX   17 g, Oral, Daily      pravastatin 40 MG tablet  Commonly known as: PRAVACHOL   40 mg, Oral, Nightly      Tradjenta 5 MG tablet tablet  Generic drug: linagliptin   5 mg, Oral, Daily               This patient has current or prior documentation of an left ventricular ejection fraction (LVEF) of less than or equal to 40%.    The patient was prescribed or already taking an ACE/ARB.    The patient was prescribed already taking bisoprolol, carvedilol, or sustained release metoprolol succinate.     Discharge Diet:   Cardiac consistent  carbohydrate    Activity at Discharge:   Resume usual activity    Follow-up Appointments:   Future Appointments   Date Time Provider Department Center   10/8/2024 12:30 PM PAD [facility] MRI 1 BH PAD MR BI PAD   10/21/2024  2:15 PM Merchant, Agustin LOCKE MD [facility] CTS  PAD PAD       Test Results Pending at Discharge: -    Electronically signed by Sea Joiner MD, 10/04/24, 15:52 CDT.    Time: 46 minutes.         Electronically signed by Sea Joiner MD at 10/04/24 8890

## 2024-10-05 NOTE — ED PROVIDER NOTES
Subjective   History of Present Illness  64-year-old  male brought in by EMS secondary to shortness of breath.  Discusses that upon arrival to his rehab facility last night he noticed the room was cameron, musty and moldy.  As a result of that his COPD became aggravated yielding him to contact EMS.  EMS gave DuoNeb and wrap with near total symptom resolution.  Discusses he does feel improved at this point but would like something a little more.  Denies dyspnea, pleurisy, kiel chest pain at rest or with exertion.  Bowel bladder unremarkable.  Neuro unremarkable.  Constitutional unremarkable.        Review of Systems   All other systems reviewed and are negative.      Past Medical History:   Diagnosis Date    Cancer     CHF (congestive heart failure)     COPD (chronic obstructive pulmonary disease)     Coronary artery disease     stent x 1    Family history of colon cancer     Hyperlipidemia     Hypertension     Sleep apnea     does not wear machine, supposed to wear cpap with oxygen and does not wear it    Type 2 diabetes mellitus        Allergies   Allergen Reactions    Penicillins Unknown - Low Severity     Pt states happened when child does not know        Past Surgical History:   Procedure Laterality Date    BACK SURGERY      CARDIAC CATHETERIZATION Left 04/13/2022    Procedure: Cardiac Catheterization/Vascular Study;  Surgeon: Todd Avendano MD;  Location: [facility] CATH INVASIVE LOCATION;  Service: Cardiology;  Laterality: Left;    CARDIAC CATHETERIZATION      with stent x1    CARDIAC CATHETERIZATION N/A 9/12/2024    Procedure: Left Heart Cath;  Surgeon: Ruben Adler MD;  Location: [facility] CATH INVASIVE LOCATION;  Service: Cardiology;  Laterality: N/A;    COLON RESECTION N/A 12/5/2023    Procedure: COLON RESECTION LAPAROSCOPIC SIGMOID WITH DAVINCI ROBOT, INTRA-OPERATIVE FLEXIBLE SIGMOIDOSCOPY, SPLENIC FLEXURE MOBILIZATION, OPEN UMBILICAL HERNIA REPAIR;  Surgeon: Oma Velasquez MD;  Location: [facility]  OR;  Service: Robotics - DaVinci;  Laterality: N/A;    COLONOSCOPY N/A 10/09/2023    Diverticulosis; One 5mm polyp in ascending colon; One 5mm polyp in transverse colon; One 10mm polyp at 65cm proximal to anus; One 20mm polyp at 65cm proximal to anus-Tattooed; One 20mm polyp at 42cm proximal to anus-Clip (MR conditional) placed-Tattooed; Rule out malignancy-Partially obstructing tumor in recto-sigmoid colon-biopsied-Tattooed; One 10mm polyp in rectum    ELBOW PROCEDURE      KNEE SURGERY      LUMBAR DISC SURGERY         Family History   Problem Relation Age of Onset    Esophageal cancer Mother     Heart disease Mother     Colon cancer Father 80    Heart disease Father     No Known Problems Sister     COPD Brother     Alcohol abuse Brother     Colon polyps Neg Hx     Liver cancer Neg Hx     Rectal cancer Neg Hx     Stomach cancer Neg Hx     Liver disease Neg Hx        Social History     Socioeconomic History    Marital status:    Tobacco Use    Smoking status: Former     Current packs/day: 1.00     Average packs/day: 1 pack/day for 45.0 years (45.0 ttl pk-yrs)     Types: Cigarettes    Smokeless tobacco: Never   Vaping Use    Vaping status: Some Days    Substances: Nicotine, Flavoring    Devices: Disposable   Substance and Sexual Activity    Alcohol use: Not Currently    Drug use: Yes     Types: Marijuana    Sexual activity: Defer           Objective   Physical Exam  Vitals and nursing note reviewed.   Constitutional:       General: He is not in acute distress.     Appearance: He is obese. He is not ill-appearing, toxic-appearing or diaphoretic.   HENT:      Head: Normocephalic and atraumatic.      Mouth/Throat:      Pharynx: Oropharynx is clear.   Eyes:      Extraocular Movements: Extraocular movements intact.      Pupils: Pupils are equal, round, and reactive to light.   Cardiovascular:      Rate and Rhythm: Normal rate and regular rhythm.   Pulmonary:      Effort: Pulmonary effort is normal. No respiratory  distress.      Breath sounds: No stridor. Wheezing present. No rhonchi or rales.      Comments: Faint bilateral end expiratory wheeze posteriorly otherwise clear  Chest:      Chest wall: No tenderness.   Abdominal:      General: Abdomen is flat.      Palpations: Abdomen is soft.   Musculoskeletal:         General: No tenderness. Normal range of motion.      Cervical back: Normal range of motion and neck supple.      Right lower leg: Edema present.      Left lower leg: Edema present.      Comments: 2+ bilateral lower extremity pitting edema   Skin:     General: Skin is warm and dry.      Capillary Refill: Capillary refill takes less than 2 seconds.   Neurological:      General: No focal deficit present.      Mental Status: He is alert and oriented to person, place, and time.   Psychiatric:         Mood and Affect: Mood normal.         Behavior: Behavior normal.         Thought Content: Thought content normal.         Judgment: Judgment normal.         Procedures           ED Course                                             Medical Decision Making  Risk  Prescription drug management.        Final diagnoses:   None       ED Disposition  ED Disposition       None            No follow-up provider specified.       Medication List      No changes were made to your prescriptions during this visit.            Vijay Leary MD  10/07/24 1926

## 2024-10-05 NOTE — ED NOTES
Report called back to [facility]. Reports they would have housekeeping clean his room. This nurse informed patient. Patient refusing to go back to nursing home. States he has oxygen at home and will call someone to pick him up. Redirection unsuccessful.

## 2024-10-05 NOTE — CASE MANAGEMENT/SOCIAL WORK
Spoke with pt regarding that it is in his best interest to return to [facility] instead of going home so he can get the therapy he is needing in preparation for his heart surgery on the 20th of this month.  Discussed with pt that we can assist in a transfer to a different rehab facility from there if he wishes to no longer stay there, but in order to do this properly he will need to return there first since that is the facility currently approved by his insurance.  Pt states he will not go back there, not even temporarily, and has already called a friend who is on his way to pick him pt up from the hospital and take him home.  This CM explained to pt that he will not have any new medications at his home that were prescribed to him when he left the hospital yesterday, CM explained that the rehab facility has those medications. Pt states he does not care.  Pt states he does have oxygen at home but he does not have a portable tank to use during the hour long ride to his home.  This CM let pt know that I will call his oxygen company, [employer], and get a portable tank delivered here for him to use during his ride home.  Pt refused and said his ride was already here, he will not wait for an oxygen tank, and he is leaving now.  Pt unhooked from his monitoring equipment, removed his oxygen tubing, and walked out of his ER room.  Manda WHITLOCK explained to pt that he is now leaving AMA d/t not following the advice of returning to the SNF he is discharged to and going home instead, and is not willing to wait for a portable oxygen tank to use during his transport home.  This CM explained again to at least go back to his ER room while I get a portable oxygen tank delivered to his room to use for his ride home.  Pt continued to refuse, signed the AMA papers with Manda WHITLOCK, and walked out of the ER through the ambulance doors.

## 2024-10-06 NOTE — THERAPY DISCHARGE NOTE
Acute Care - Occupational Therapy Discharge Summary  [facility]     Patient Name: Tucker Knox  : 1960  MRN: [id number]    Today's Date: 10/6/2024                 Admit Date: 2024        OT Recommendation and Plan    Visit Dx:    ICD-10-CM ICD-9-CM   1. Impaired mobility [Z74.09]  Z74.09 799.89                OT Rehab Goals       Row Name 10/06/24 1500             Bathing Goal 1 (OT)    Activity/Device (Bathing Goal 1, OT) bathing skills, all  -LS      [place] Level/Cues Needed (Bathing Goal 1, OT) minimum assist (75% or more patient effort)  -LS      Time Frame (Bathing Goal 1, OT) long term goal (LTG);by discharge  -LS      Progress/Outcomes (Bathing Goal 1, OT) goal not met  -LS         Dressing Goal 1 (OT)    Activity/Device (Dressing Goal 1, OT) dressing skills, all  -LS      [place]/Cues Needed (Dressing Goal 1, OT) minimum assist (75% or more patient effort)  -LS      Time Frame (Dressing Goal 1, OT) long term goal (LTG);by discharge  -LS      Progress/Outcome (Dressing Goal 1, OT) goal not met  -LS         Toileting Goal 1 (OT)    Activity/Device (Toileting Goal 1, OT) toileting skills, all  -LS      [place] Level/Cues Needed (Toileting Goal 1, OT) contact guard required  -LS      Time Frame (Toileting Goal 1, OT) long term goal (LTG);by discharge  -LS      Progress/Outcome (Toileting Goal 1, OT) goal not met  -LS                User Key  (r) = Recorded By, (t) = Taken By, (c) = Cosigned By      Initials Name Provider Type Discipline    LS Anh Mcmahon, OTR/L Occupational Therapist OT                     Outcome Measures       Row Name 10/04/24 1140             How much help from another person do you currently need...    Turning from your back to your side while in flat bed without using bedrails? 2  -BALJINDER      Moving from lying on back to sitting on the side of a flat bed without bedrails? 2  -BALJINDER      Moving to and from a bed to a chair (including a wheelchair)? 2  -BALJINDER       Standing up from a chair using your arms (e.g., wheelchair, bedside chair)? 2  -BALJINDER      Climbing 3-5 steps with a railing? 1  -BALJINDER      To walk in hospital room? 2  -BALJINDER      AM-PAC 6 Clicks Score (PT) 11  -BALJINDER      Highest Level of Mobility Goal 4 --> Transfer to chair/commode  -BALJINDER         Functional Assessment    Outcome Measure Options AM-PAC 6 Clicks Basic Mobility (PT)  -BALJINDER                User Key  (r) = Recorded By, (t) = Taken By, (c) = Cosigned By      Initials Name Provider Type    Maxi Tadeo, PTA Physical Therapist Assistant                    Timed Therapy Charges  Total Units: 3      Suggested Charges  Total Units: 3      Procedure Name Documented Minutes Units Code    HC OT SELF CARE/MGMT/TRAIN EA 15 MIN 40 3   16720 (CPT®)                 Documented Minutes  Total Minutes: 40      Therapy Provided Minutes    50654 - OT Self Care/Mgmt Minutes 40                        OT Discharge Summary  Anticipated Discharge Disposition (OT): skilled nursing facility  Reason for Discharge: Discharge from facility  Outcomes Achieved: Refer to plan of care for updates on goals achieved  Discharge Destination: SNF      MOISES Hdye/PILO  10/6/2024

## 2024-10-08 ENCOUNTER — HOSPITAL ENCOUNTER (OUTPATIENT)
Dept: MRI IMAGING | Facility: HOSPITAL | Age: 64
Discharge: HOME | End: 2024-10-08
Payer: MEDICARE

## 2024-10-12 PROBLEM — J96.90 RESPIRATORY FAILURE: Status: RESOLVED | Noted: 2024-10-12 | Resolved: 2024-10-12

## 2024-10-12 PROBLEM — J96.90 RESPIRATORY FAILURE: Status: ACTIVE | Noted: 2024-10-12

## 2024-10-12 PROBLEM — N17.9 STAGE 1 ACUTE KIDNEY INJURY: Status: ACTIVE | Noted: 2024-01-01

## 2024-10-12 PROBLEM — J96.21 ACUTE ON CHRONIC RESPIRATORY FAILURE WITH HYPOXIA AND HYPERCAPNIA: Status: ACTIVE | Noted: 2024-01-01

## 2024-10-12 PROBLEM — J96.22 ACUTE ON CHRONIC RESPIRATORY FAILURE WITH HYPOXIA AND HYPERCAPNIA: Status: ACTIVE | Noted: 2024-01-01

## 2024-10-12 NOTE — PROGRESS NOTES
Body mass index is 40.77 kg/m².  Estimated Creatinine Clearance: 63.3 mL/min (A) (by C-G formula based on SCr of 1.56 mg/dL (H)).  Lovenox 40mg SQ every 24 hours adjusted to Lovenox 40mg SQ every 12 hours due to BMI>40.

## 2024-10-12 NOTE — ED PROVIDER NOTES
"Subjective   History of Present Illness  63 y/o  male BIBEMS in respiratory distress from a N/[facility].  Seen previously for the same which resulted in his leaving the ambulance bay after acting aggressively toward medics/staff.  Aspirin, NTG, Albuterol, Duoneb, Magnesium, Solumedrol, Oxygen given by EMS.  Long standing heavy comorbidities including DM, HTN, Hyperlipidaemia, obesity, COPD, CHF, past tobacco abuse.  There was no report of CP by EMS.  Patient says \"it is tight.\"  Bowel/Bladder/Neuro unremarkable.  Constitutional NEG.            Review of Systems   All other systems reviewed and are negative.      Past Medical History:   Diagnosis Date   • Cancer    • CHF (congestive heart failure)    • COPD (chronic obstructive pulmonary disease)    • Coronary artery disease     stent x 1   • Family history of colon cancer    • Hyperlipidemia    • Hypertension    • Sleep apnea     does not wear machine, supposed to wear cpap with oxygen and does not wear it   • Type 2 diabetes mellitus        Allergies   Allergen Reactions   • Penicillins Unknown - Low Severity     Pt states happened when child does not know        Past Surgical History:   Procedure Laterality Date   • BACK SURGERY     • CARDIAC CATHETERIZATION Left 04/13/2022    Procedure: Cardiac Catheterization/Vascular Study;  Surgeon: Todd Avendano MD;  Location:  [facility] CATH [facility]ASIVE LOC[facility];  Service: Cardiology;  Laterality: Left;   • CARDIAC CATHETERIZATION      with stent x1   • CARDIAC CATHETERIZATION N/A 9/12/2024    Procedure: Left Heart Cath;  Surgeon: Ruben Adler MD;  Location:  [facility] CATH INVAS[facility];  Service: Cardiology;  Laterality: N/A;   • COLON RESECTION N/A 12/5/2023    Procedure: COLON RESECTION LAPAROSCOPIC SIGMOID WITH DAVINCI ROBOT, INTRA-OPERATIVE FLEXIBLE SIGMOIDOSCOPY, SPLENIC FLEXURE MOBILIZATION, OPEN UMBILICAL HERNIA REPAIR;  Surgeon: Oma Velasquez MD;  Location: [facility];  Service: Robotics - DaVinci;  Laterality: " N/A;   • COLONOSCOPY N/A 10/09/2023    Diverticulosis; One 5mm polyp in ascending colon; One 5mm polyp in transverse colon; One 10mm polyp at 65cm proximal to anus; One 20mm polyp at 65cm proximal to anus-Tattooed; One 20mm polyp at 42cm proximal to anus-Clip (MR conditional) placed-Tattooed; Rule out malignancy-Partially obstructing tumor in recto-sigmoid colon-biopsied-Tattooed; One 10mm polyp in rectum   • ELBOW PROCEDURE     • KNEE SURGERY     • LUMBAR DISC SURGERY         Family History   Problem Relation Age of Onset   • Esophageal cancer Mother    • Heart disease Mother    • Colon cancer Father 80   • Heart disease Father    • No Known Problems Sister    • COPD Brother    • Alcohol abuse Brother    • Colon polyps Neg Hx    • Liver cancer Neg Hx    • Rectal cancer Neg Hx    • Stomach cancer Neg Hx    • Liver disease Neg Hx        Social History     Socioeconomic History   • Marital status:    Tobacco Use   • Smoking status: Former     Current packs/day: 1.00     Average packs/day: 1 pack/day for 45.0 years (45.0 ttl pk-yrs)     Types: Cigarettes   • Smokeless tobacco: Never   Vaping Use   • Vaping status: Some Days   • Substances: Nicotine, Flavoring   • Devices: Disposable   Substance and Sexual Activity   • Alcohol use: Not Currently   • Drug use: Yes     Types: Marijuana   • Sexual activity: Defer           Objective   Physical Exam  Constitutional:       General: He is in acute distress.      Appearance: He is obese. He is ill-appearing. He is not toxic-appearing or diaphoretic.      Comments: Generally unkempt; obese; appears worse on this encounter than the previous    HENT:      Head: Normocephalic and atraumatic.   Eyes:      Extraocular Movements: Extraocular movements intact.      Pupils: Pupils are equal, round, and reactive to light.   Cardiovascular:      Rate and Rhythm: Regular rhythm. Tachycardia present.   Pulmonary:      Effort: Tachypnea and respiratory distress present.      Breath  sounds: Examination of the right-upper field reveals rhonchi. Examination of the left-upper field reveals rhonchi. Examination of the right-middle field reveals wheezing. Examination of the left-middle field reveals wheezing. Examination of the right-lower field reveals decreased breath sounds. Examination of the left-lower field reveals decreased breath sounds. Decreased breath sounds, wheezing and rhonchi present.   Abdominal:      Palpations: Abdomen is soft.   Musculoskeletal:         General: Normal range of motion.      Cervical back: Normal range of motion and neck supple.      Right lower leg: Edema present.      Left lower leg: Edema present.   Skin:     Capillary Refill: Capillary refill takes less than 2 seconds.      Coloration: Skin is cyanotic.      Findings: No ecchymosis or erythema.      Comments: Mottled bilateral L/E    Neurological:      General: No focal deficit present.      Mental Status: He is oriented to person, place, and time.   Psychiatric:         Mood and Affect: Mood normal.         Behavior: Behavior normal.       Procedures           ED Course                                             Medical Decision Making  Will require admission     Amount and/or Complexity of Data Reviewed  Labs: ordered.     Details: Labs Reviewed  CBC WITH AUTO DIFFERENTIAL - Abnormal; Notable for the following components:     Hemoglobin                    11.5 (*)               MCV                           98.1 (*)               MCHC                          28.0 (*)               RDW-SD                        55.4 (*)               Neutrophil %                  78.9 (*)               Lymphocyte %                  10.2 (*)               Immature Grans %              0.6 (*)                Neutrophils, Absolute         7.32 (*)               Immature Grans, Absolute      0.06 (*)            All other components within normal limits  BLOOD GAS, ARTERIAL W/CO-OXIMETRY - Abnormal; Notable for the following  components:     pH, Arterial                  7.268 (*)               pCO2, Arterial                78.1 (*)               pO2, Arterial                 70.2 (*)               HCO3, Arterial                35.7 (*)               Base Excess, Arterial         6.5 (*)                O2 Saturation, Arterial       92.0 (*)               Hemoglobin, Blood Gas         11.5 (*)               Hematocrit, Blood Gas         35.3 (*)               Oxyhemoglobin                 91.4 (*)               pH, Temp Corrected            7.268 (*)               pCO2, Temperature Corrected   78.1 (*)               pO2, Temperature Corrected    70.2 (*)            All other components within normal limits  RAINBOW DRAW         Narrative: The following orders were created for panel order [place] Draw.                  Procedure                               Abnormality         Status                                     ---------                               -----------         ------                                     Green Top (Gel)[[id number]]                                  Final result                               Lavender Top[[id number]]                                     Final result                               Red Top[[id number]]                                          Final result                               Light Blue Top[[id number]]                                   Final result                                                 Please view results for these tests on the individual orders.  BLOOD GAS, ARTERIAL  COMPREHENSIVE METABOLIC PANEL  SINGLE HS TROPONIN T  MAGNESIUM  BNP (IN-HOUSE)  CBC AND DIFFERENTIAL         Narrative: The following orders were created for panel order CBC & Differential.                  Procedure                               Abnormality         Status                                     ---------                               -----------         ------                                     CBC Auto  Differential[[id number]]        Abnormal            Final result                                                 Please view results for these tests on the individual orders.  GREEN TOP  LAVENDER TOP  RED TOP  LIGHT BLUE TOP    Radiology: ordered.     Details: Plain films 1 view chest today appears worsened as compared to the previous in that the right-sided effusion has grown in size, continues with pulmonary edema right greater than left (read by ER MD).  ECG/medicine tests: ordered.    Risk  Prescription drug management.        Final diagnoses:   None       ED Disposition  ED Disposition       None            No follow-up provider specified.       Medication List      No changes were made to your prescriptions during this visit.            Vijay Leary MD  10/12/24 0551       Vijay Leary MD  10/13/24 0544

## 2024-10-12 NOTE — ED NOTES
Nursing report ED to floor  Tucker Knox  64 y.o.  male    HPI:   Chief Complaint   Patient presents with    Shortness of Breath       Admitting doctor:   BRITT Stout DO    Consulting provider(s):  Consults       Date and Time Order Name Status Description    9/14/2024  8:12 PM Inpatient Wound Care MD Consult Completed     9/11/2024  1:47 PM Inpatient Cardiology Consult Completed              Admitting diagnosis:   The primary encounter diagnosis was Respiratory failure with hypoxia and hypercapnia, unspecified chronicity. Diagnoses of Recurrent right pleural effusion, Acute on chronic respiratory failure with hypoxia and hypercapnia, Acute on chronic congestive heart failure, unspecified heart failure type, and COPD exacerbation were also pertinent to this visit.    Code status:   Current Code Status       Date Active Code Status Order ID Comments User Context       10/12/2024 1122 No CPR (Do Not Attempt to Resuscitate) [id number]  Jazmin APRN ED        Question Answer    Code Status (Patient has no pulse and is not breathing) No CPR (Do Not Attempt to Resuscitate)    Medical Interventions (Patient has pulse or is breathing) Limited Support    Medical Intervention Limits: No intubation (DNI)                    Allergies:   Penicillins    Intake and Output    Intake/Output Summary (Last 24 hours) at 10/12/2024 1137  Last data filed at 10/12/2024 0807  Gross per 24 hour   Intake --   Output 150 ml   Net -150 ml       Weight:       10/12/24  0518   Weight: 125 kg (276 lb 1.6 oz)       Most recent vitals:   Vitals:    10/12/24 0811 10/12/24 0816 10/12/24 0901 10/12/24 1016   BP:  (!) 139/128 117/92 92/62   BP Location:       Patient Position:       Pulse: 95 89 86 84   Resp: 25      Temp:       TempSrc:       SpO2: 97% 93% 90% 94%   Weight:       Height:         Oxygen Therapy: .    Active LDAs/IV Access:   Lines, Drains & Airways       Active LDAs       Name Placement date Placement time  Site Days    Peripheral IV 10/05/24 0539 Right Antecubital 10/05/24  0539  Antecubital  7    Peripheral IV 10/12/24 0514 Right Antecubital 10/12/24  0514  Antecubital  less than 1    Peripheral IV 10/12/24 0535 Anterior;Distal;Left;Upper Arm 10/12/24  0535  Arm  less than 1    External Urinary Catheter 10/03/24  0400  --  9                    Labs (abnormal labs have a star):   Labs Reviewed   COMPREHENSIVE METABOLIC PANEL - Abnormal; Notable for the following components:       Result Value    Glucose 202 (*)     BUN 45 (*)     Creatinine 1.56 (*)     Potassium 5.4 (*)     CO2 35.0 (*)     BUN/Creatinine Ratio 28.8 (*)     eGFR 49.6 (*)     All other components within normal limits    Narrative:     GFR Normal >60  Chronic Kidney Disease <60  Kidney Failure <15     SINGLE HS TROPONIN T - Abnormal; Notable for the following components:    HS Troponin T 143 (*)     All other components within normal limits    Narrative:     High Sensitive Troponin T Reference Range:  <14.0 ng/L- Negative Female for AMI  <22.0 ng/L- Negative Male for AMI  >=14 - Abnormal Female indicating possible myocardial injury.  >=22 - Abnormal Male indicating possible myocardial injury.   Clinicians would have to utilize clinical acumen, EKG, Troponin, and serial changes to determine if it is an Acute Myocardial Infarction or myocardial injury due to an underlying chronic condition.        MAGNESIUM - Abnormal; Notable for the following components:    Magnesium 2.6 (*)     All other components within normal limits   CBC WITH AUTO DIFFERENTIAL - Abnormal; Notable for the following components:    Hemoglobin 11.5 (*)     MCV 98.1 (*)     MCHC 28.0 (*)     RDW-SD 55.4 (*)     Neutrophil % 78.9 (*)     Lymphocyte % 10.2 (*)     Immature Grans % 0.6 (*)     Neutrophils, Absolute 7.32 (*)     Immature Grans, Absolute 0.06 (*)     All other components within normal limits   BNP (IN-HOUSE) - Abnormal; Notable for the following components:    proBNP  14,625.0 (*)     All other components within normal limits    Narrative:     This assay is used as an aid in the diagnosis of individuals suspected of having heart failure. It can be used as an aid in the diagnosis of acute decompensated heart failure (ADHF) in patients presenting with signs and symptoms of ADHF to the emergency department (ED). In addition, NT-proBNP of <300 pg/mL indicates ADHF is not likely.    Age Range Result Interpretation  NT-proBNP Concentration (pg/mL:      <50             Positive            >450                   Gray                 300-450                    Negative             <300    50-75           Positive            >900                  Gray                300-900                  Negative            <300      >75             Positive            >1800                  Gray                300-1800                  Negative            <300   BLOOD GAS, ARTERIAL W/CO-OXIMETRY - Abnormal; Notable for the following components:    pH, Arterial 7.268 (*)     pCO2, Arterial 78.1 (*)     pO2, Arterial 70.2 (*)     HCO3, Arterial 35.7 (*)     Base Excess, Arterial 6.5 (*)     O2 Saturation, Arterial 92.0 (*)     Hemoglobin, Blood Gas 11.5 (*)     Hematocrit, Blood Gas 35.3 (*)     Oxyhemoglobin 91.4 (*)     pH, Temp Corrected 7.268 (*)     pCO2, Temperature Corrected 78.1 (*)     pO2, Temperature Corrected 70.2 (*)     All other components within normal limits   HIGH SENSITIVITIY TROPONIN T 2HR - Abnormal; Notable for the following components:    HS Troponin T 130 (*)     Troponin T Delta -13 (*)     All other components within normal limits    Narrative:     High Sensitive Troponin T Reference Range:  <14.0 ng/L- Negative Female for AMI  <22.0 ng/L- Negative Male for AMI  >=14 - Abnormal Female indicating possible myocardial injury.  >=22 - Abnormal Male indicating possible myocardial injury.   Clinicians would have to utilize clinical acumen, EKG, Troponin, and serial changes to  determine if it is an Acute Myocardial Infarction or myocardial injury due to an underlying chronic condition.        BLOOD GAS, ARTERIAL W/CO-OXIMETRY - Abnormal; Notable for the following components:    pH, Arterial 7.265 (*)     pCO2, Arterial 79.4 (*)     HCO3, Arterial 36.0 (*)     Base Excess, Arterial 6.7 (*)     Hemoglobin, Blood Gas 11.6 (*)     Hematocrit, Blood Gas 35.5 (*)     Potassium, Arterial 5.5 (*)     pH, Temp Corrected 7.265 (*)     pCO2, Temperature Corrected 79.4 (*)     All other components within normal limits   BLOOD GAS, ARTERIAL - Abnormal; Notable for the following components:    pCO2, Arterial 59.7 (*)     pO2, Arterial 70.2 (*)     HCO3, Arterial 33.9 (*)     Base Excess, Arterial 6.9 (*)     pCO2, Temperature Corrected 59.7 (*)     pO2, Temperature Corrected 70.2 (*)     All other components within normal limits   RESPIRATORY PANEL PCR W/ COVID-19 (SARS-COV-2), NP SWAB IN UTM/VTP, 2 HR TAT - Normal    Narrative:     In the setting of a positive respiratory panel with a viral infection PLUS a negative procalcitonin without other underlying concern for bacterial infection, consider observing off antibiotics or discontinuation of antibiotics and continue supportive care. If the respiratory panel is positive for atypical bacterial infection (Bordetella pertussis, Chlamydophila pneumoniae, or Mycoplasma pneumoniae), consider antibiotic de-escalation to target atypical bacterial infection.   RAINBOW DRAW    Narrative:     The following orders were created for panel order [place] Draw.  Procedure                               Abnormality         Status                     ---------                               -----------         ------                     Green Top (Gel)[[id number]]                                  Final result               Lavender Top[[id number]]                                     Final result               Red Top[[id number]]                                          Final  result               Light Blue Top[[id number]]                                   Final result                 Please view results for these tests on the individual orders.   BLOOD GAS, ARTERIAL   BLOOD GAS, ARTERIAL W/CO-OXIMETRY   BLOOD GAS, ARTERIAL   CBC AND DIFFERENTIAL    Narrative:     The following orders were created for panel order CBC & Differential.  Procedure                               Abnormality         Status                     ---------                               -----------         ------                     CBC Auto Differential[[id number]]        Abnormal            Final result                 Please view results for these tests on the individual orders.   GREEN TOP   LAVENDER TOP   RED TOP   LIGHT BLUE TOP       Meds given in ED:   Medications   sodium chloride 0.9 % flush 10 mL (10 mL Intravenous Given 10/12/24 0731)   sodium chloride 0.9 % flush 10 mL (10 mL Intravenous Given 10/12/24 0731)   nitroglycerin (NITROSTAT) SL tablet 0.4 mg (has no administration in time range)   bumetanide (BUMEX) injection 1 mg (has no administration in time range)   albuterol (PROVENTIL) nebulizer solution 0.083% 2.5 mg/3mL (2.5 mg Nebulization Given 10/12/24 0538)   furosemide (LASIX) injection 20 mg (20 mg Intravenous Given 10/12/24 0613)   nitroglycerin (NITROSTAT) ointment 0.5 inch (0.5 inches Topical Given 10/12/24 0613)   ipratropium-albuterol (DUO-NEB) nebulizer solution 3 mL (3 mL Nebulization Given 10/12/24 0806)           NIH Stroke Scale:       Isolation/Infection(s):  No active isolations   No active infections     COVID Testing  Collected .  Resulted .    Nursing report ED to floor:  Mental status: .  Ambulatory status: .  Precautions: .    ED nurse phone extentsion- ..

## 2024-10-12 NOTE — ED PROVIDER NOTES
EMERGENCY DEPARTMENT ATTENDING PROGRESS NOTE    Patient Name: Tucker Knox    Chief Complaint   Patient presents with    Shortness of Breath         MEDICAL DECISION MAKING (ADDENDUM TO MDM OF PRIOR PROVIDER):    Tucker Knox is a 64 y.o. male who was initially seen by the prior emergency medicine provider, Dr Leary. Please see their note for full history and physical exam.    Patient had presented to the ED with respiratory distress.    Will need admission for hypoxic hypercapnic respiratory failure in the setting of COPD exacerbation and CHF exacerbation.    Briefly, patient is a 64-year-old male with history of combined heart failure, coronary disease status post stents, hypertension, hyperlipidemia, diabetes, COPD and chronic respiratory failure on 3 L home oxygen who presented to the emergency department in respiratory distress.  En route to [facility] ER, paramedics gave magnesium 2 g, Solu-Medrol 125 mg, albuterol x 5, 324 mg aspirin.    Initial ABG revealed hypercapnic respiratory failure, pH 7.26, pCO2 78.  Patient was placed on BiPAP.  Chest x-ray revealed congestive heart failure and worsening right sided pleural effusion.  He received Lasix and additional neb treatments.    At time of signout, reassessed the patient.  He remained drowsy.  Repeat ABG ordered, revealed no significant change in hypercapnic respiratory failure.  BiPAP settings were adjusted.  Repeat abg revealed improved ph and pco2.  Stable for floor.    Patient remains comfortable on BiPAP, albeit a little drowsy.  Plan is for admission for continued supportive care, IV diuresis, nebs and steroids.        Signed:  Gregory Hernandez MD  Emergency Medicine Physician    Please note that portions of this note were completed with a voice recognition program.   ED Diagnosis:  (J96.91,  J96.92) Respiratory failure with hypoxia and hypercapnia, unspecified chronicity    (J90) Recurrent right pleural effusion    (J96.21,  J96.22) Acute on  chronic respiratory failure with hypoxia and hypercapnia    (I50.9) Acute on chronic congestive heart failure, unspecified heart failure type    (J44.1) COPD exacerbation     Disposition: Admit to Hospitalist        Signed:  Gregory Miranda MD  Emergency Medicine Physician    Please note that portions of this note were completed with a voice recognition program.      Gregory Miranda MD  10/12/24 [id number]

## 2024-10-12 NOTE — PROGRESS NOTES
[facility]    Date:  10/12/24     Vitals:    10/12/24 1218   BP: 153/96   Pulse: 94   Resp: 22   Temp: 97.1 °F (36.2 °C)   SpO2: 97%        Indication:  Heart Failure    Procedure:  ReDS device sensor unit applied to right side of chest and right side of back.  Appropriate positioning confirmed based off of the unit's calculation.  Chest measurement obtained with the chest size ruler.  Measurement session performed over 45 seconds.      Results:  ReDS Value= 36    Interpretation:  36-38% - mildly elevated lung fluid content    Rafy Peña, RRT 10/12/24 13:55 CDT

## 2024-10-12 NOTE — PLAN OF CARE
Goal Outcome Evaluation:   A&Ox4, on 3L n/c, resting in bed. Received from . IV bumex given- adequate output- bladder scan 32ml. Lovenox VTE. Sinus/ST on tele monitor. VSS. Hourly rounding. Admission done. Lokelma given- no BM yet. Fall risk-bed alarm set- safety maintained. Accuchecks w/ meals. Will continue to monitor until change of shift.

## 2024-10-12 NOTE — H&P
[facility] Medicine Services  HISTORY AND PHYSICAL    Date of Admission: 10/12/2024  Primary Care Physician: Kt Alfredo MD    Subjective   Primary Historian: Patient/EMS    Chief Complaint: Shortness of breath/AMS    History of Present Illness  Tucker Knox is a 64-year-old male with a past medical history of hypertension, type 2 diabetes mellitus, COPD, hyperlipidemia, CHF most recent echocardiogram in/10/2024 revealed an EF of 31-35% with moderately decreased left ventricular systolic function and left ventricular diastolic consistent with grade 2 new pseudonormalization, coronary artery disease, JOSE with noncompliance, colon cancer and significant medical noncompliance history.  Patient presented to [facility], [place], operated by [facility] emergency department today per EMS from nursing facility, they were called due to patient's shortness of breath, uncharacteristic aggressive behavior and confusion.  En route to the hospital paramedics gave magnesium 2 g, Solu-Medrol 125,  and albuterol x 5.  Initial ABG revealed hypercapnic respiratory failure pH of 7.26, pCO2 of 78.  Patient was placed on BiPAP, chest x-ray revealed congestive heart failure and worsening right sided pleural effusion.  He received 20 mg of IV Lasix and additional nebulizer treatments.  Was monitored for several hours repeat ABGs revealed compensation with pCO2 of 59.7, pO2 of 70.2 and a bicarb of 33.9.  Upon assessment patient is on BiPAP however is now alert and oriented and able to participate in assessment and history.  Patient states he has been taking all his medications now that he is at the nursing facility, patient states he has had increasing shortness of breath, abdominal tightness, with no changes in urinary status.  Additionally has extremely alessio low perfused legs with Doppler pulses present without complaints of pain.    Initial troponin of 143 subsequent of 130 with a delta of -3, which is improved  from his last admission on 9/28/2024 with a troponin of 548.  BNP of 14,625 which is improved from his discharge BNP of 18,381.  AMA stage I with baseline creatinine of 0.94, today 1.56.    Patient and his sister were made aware that he will remain on BiPAP until the a.m. when ABGs can be redone, patient may remove and be placed on O2 for eating.  Due to patient's AMA and lack of sniffing and improvement with Lasix in the past patient will be given 1 mg of Bumex every 12, bladder scan to evaluate any urinary retention, and will increase to Bumex gtt. if needed.  Has had thoracentesis in the past for pleural effusions, at this time patient does not require, will continue to follow closely.  Patient and sisters questions were answered to the best my ability and they are in agreement for continued evaluation and treatment.    Patient was discharged on 10/4/2024 after similar episode, extensive discussion with palliative care regarding hospice, patient was unable to make decision at that time.  Discussed at length with patient and sister today who would like to proceed with additional discussions on Monday with palliative care when sister can be back in person from [place].    Review of Systems   Constitutional:  Positive for activity change and fatigue.   Respiratory:  Positive for shortness of breath.    Cardiovascular:  Positive for leg swelling. Negative for chest pain.   Gastrointestinal:  Positive for diarrhea.   Neurological:  Positive for weakness.      Otherwise complete ROS reviewed and negative except as mentioned in the HPI.    Past Medical History:   Past Medical History:   Diagnosis Date    Cancer     CHF (congestive heart failure)     COPD (chronic obstructive pulmonary disease)     Coronary artery disease     stent x 1    Family history of colon cancer     Hyperlipidemia     Hypertension     Sleep apnea     does not wear machine, supposed to wear cpap with oxygen and does not wear it    Type 2  diabetes mellitus      Past Surgical History:  Past Surgical History:   Procedure Laterality Date    BACK SURGERY      CARDIAC CATHETERIZATION Left 04/13/2022    Procedure: Cardiac Catheterization/Vascular Study;  Surgeon: Todd Avendano MD;  Location:  [facility] CATH INVASIVE LOCATION;  Service: Cardiology;  Laterality: Left;    CARDIAC CATHETERIZATION      with stent x1    CARDIAC CATHETERIZATION N/A 9/12/2024    Procedure: Left Heart Cath;  Surgeon: Ruben Adler MD;  Location:  [facility] CATH INVASIVE LOCATION;  Service: Cardiology;  Laterality: N/A;    COLON RESECTION N/A 12/5/2023    Procedure: COLON RESECTION LAPAROSCOPIC SIGMOID WITH DAVINCI ROBOT, INTRA-OPERATIVE FLEXIBLE SIGMOIDOSCOPY, SPLENIC FLEXURE MOBILIZATION, OPEN UMBILICAL HERNIA REPAIR;  Surgeon: Oma Velasquez MD;  Location:  [facility] OR;  Service: Robotics - DaVinci;  Laterality: N/A;    COLONOSCOPY N/A 10/09/2023    Diverticulosis; One 5mm polyp in ascending colon; One 5mm polyp in transverse colon; One 10mm polyp at 65cm proximal to anus; One 20mm polyp at 65cm proximal to anus-Tattooed; One 20mm polyp at 42cm proximal to anus-Clip (MR conditional) placed-Tattooed; Rule out malignancy-Partially obstructing tumor in recto-sigmoid colon-biopsied-Tattooed; One 10mm polyp in rectum    ELBOW PROCEDURE      KNEE SURGERY      LUMBAR DISC SURGERY       Social History:  reports that he has quit smoking. His smoking use included cigarettes. He has a 45 pack-year smoking history. He has never used smokeless tobacco. He reports that he does not currently use alcohol. He reports current drug use. Drug: Marijuana.    Family History: family history includes Alcohol abuse in his brother; COPD in his brother; Colon cancer (age of onset: 80) in his father; Esophageal cancer in his mother; Heart disease in his father and mother; No Known Problems in his sister.       Allergies:  Allergies   Allergen Reactions    Penicillins Unknown - Low Severity     Pt states  happened when child does not know        Medications:  Prior to Admission medications    Medication Sig Start Date End Date Taking? Authorizing Provider   acetaminophen (TYLENOL) 325 MG tablet Take 2 tablets by mouth Every 6 (Six) Hours As Needed for Mild Pain. 10/4/24   Sea Joiner MD   albuterol sulfate  (90 Base) MCG/ACT inhaler Inhale 2 puffs Every 4 (Four) Hours As Needed for Wheezing. 9/18/24   Zoey Schwartz MD   aspirin 81 MG EC tablet Take 1 tablet by mouth Daily. 12/27/21   David Cadena DO   dapagliflozin Propanediol (Farxiga) 10 MG tablet Take 10 mg by mouth Daily. 9/18/24   Zoey Schwartz MD   DULoxetine (CYMBALTA) 30 MG capsule Take 1 capsule by mouth Daily. 9/18/24   Zoey Schwartz MD   furosemide (LASIX) 40 MG tablet Take 0.5 tablets by mouth Daily. 10/4/24   Sea Joiner MD   gabapentin (NEURONTIN) 600 MG tablet Take 1 tablet by mouth 2 (Two) Times a Day. Patient states he takes 800 mg bid 9/18/24   Zoey Schwartz MD   insulin detemir (LEVEMIR) 100 UNIT/ML injection Inject 14 Units under the skin into the appropriate area as directed 2 (Two) Times a Day.    ProviderAmelia MD   metFORMIN (GLUCOPHAGE) 1000 MG tablet Take 1 tablet by mouth 2 (Two) Times a Day With Meals.    ProviderAmelia MD   metoprolol succinate XL (TOPROL-XL) 25 MG 24 hr tablet Take 1 tablet by mouth Daily. 9/18/24   Zoey Schwartz MD   nicotine (NICODERM CQ) 21 MG/24HR patch Place 1 patch on the skin as directed by provider Daily. 10/5/24   Sea Joiner MD   nitroglycerin (NITROSTAT) 0.4 MG SL tablet Place 1 tablet under the tongue Every 5 (Five) Minutes As Needed for Chest Pain. Take no more than 3 doses in 15 minutes.    Amelia Pavon MD   polyethylene glycol (MIRALAX) 17 GM/SCOOP powder Take 17 g by mouth Daily.    Amelia Pavon MD   pravastatin (PRAVACHOL) 40 MG tablet Take 1 tablet by mouth Every Night. 9/18/24   Efren  "MD Zoey   QUEtiapine (SEROquel) 25 MG tablet Take 1 tablet by mouth Every Night. 10/4/24   Sea Joiner MD   sacubitril-valsartan (Entresto) 24-26 MG tablet Take 1 tablet by mouth 2 (Two) Times a Day. 9/18/24   Zoey Schwartz MD   Tradjenta 5 MG tablet tablet Take 1 tablet by mouth Daily. 8/15/23   Provider, MD Amelia     I have utilized all available immediate resources to obtain, update, or review the patient's current medications (including all prescriptions, over-the-counter products, herbals, cannabis/cannabidiol products, and vitamin/mineral/dietary (nutritional) supplements).    Objective     Vital Signs: /96 (BP Location: Left arm, Patient Position: Lying)   Pulse 94   Temp 97.1 °F (36.2 °C) (Oral)   Resp 22   Ht 175.3 cm (69\")   Wt 125 kg (276 lb 1.6 oz)   SpO2 97%   BMI 40.77 kg/m²   Physical Exam  Vitals and nursing note reviewed.   Constitutional:       General: He is not in acute distress.     Appearance: He is morbidly obese. He is ill-appearing.      Comments: BiPAP in place   HENT:      Head: Normocephalic and atraumatic.      Nose: Nose normal. No congestion or rhinorrhea.      Mouth/Throat:      Mouth: Mucous membranes are moist.      Pharynx: Oropharynx is clear.   Eyes:      Pupils: Pupils are equal, round, and reactive to light.   Cardiovascular:      Rate and Rhythm: Normal rate.      Pulses: Normal pulses.           Dorsalis pedis pulses are detected w/ Doppler on the right side and detected w/ Doppler on the left side.        Posterior tibial pulses are detected w/ Doppler on the right side and detected w/ Doppler on the left side.      Heart sounds: Normal heart sounds.   Pulmonary:      Effort: Accessory muscle usage present. No tachypnea or respiratory distress.      Breath sounds: Examination of the right-upper field reveals decreased breath sounds. Examination of the left-upper field reveals decreased breath sounds. Examination of the right-middle " field reveals rhonchi. Examination of the left-middle field reveals rhonchi. Examination of the right-lower field reveals rhonchi. Examination of the left-lower field reveals rhonchi. Decreased breath sounds present.   Abdominal:      General: Abdomen is protuberant. Bowel sounds are normal. There is distension.      Tenderness: There is no abdominal tenderness.   Genitourinary:     Comments: Voiding per urinal  Musculoskeletal:      Cervical back: Normal range of motion and neck supple.      Right lower leg: Edema present.      Left lower leg: Edema present.      Comments: Generalized weakness   Skin:     General: Skin is warm and cool.      Capillary Refill: Capillary refill takes less than 2 seconds.      Coloration: Skin is pale.      Comments: Extremely ready BLE   Neurological:      Mental Status: He is alert and oriented to person, place, and time.   Psychiatric:         Attention and Perception: Attention normal.         Mood and Affect: Mood normal.         Speech: Speech is delayed.         Behavior: Behavior is cooperative.         Cognition and Memory: Cognition is impaired. Memory is impaired.        Results Reviewed:  Lab Results (last 24 hours)       Procedure Component Value Units Date/Time    Blood Gas, Arterial With Co-Ox [[id number]]  (Abnormal) Collected: 10/12/24 0759    Specimen: Arterial Blood Updated: 10/12/24 1100     Site Right Radial     Jorge's Test Positive     pH, Arterial 7.265 pH units      Comment: 84 Value below reference range        pCO2, Arterial 79.4 mm Hg      Comment: 86 Value above critical limit        pO2, Arterial 93.7 mm Hg      HCO3, Arterial 36.0 mmol/L      Comment: 83 Value above reference range        Base Excess, Arterial 6.7 mmol/L      Comment: 83 Value above reference range        O2 Saturation, Arterial 96.8 %      Hemoglobin, Blood Gas 11.6 g/dL      Comment: 84 Value below reference range        Hematocrit, Blood Gas 35.5 %      Comment: 84 Value below  reference range        Oxyhemoglobin 95.0 %      Methemoglobin 0.60 %      Carboxyhemoglobin 1.3 %      Temperature 37.0     Sodium, Arterial 141 mmol/L      Potassium, Arterial 5.5 mmol/L      Comment: 83 Value above reference range        Barometric Pressure for Blood Gas 756 mmHg      Modality BiPap     FIO2 70 %      Ventilator Mode NIV     Set Mech Resp Rate 10     Comment: Corrected for Gwen Lund CRT   Corrected result. Previous result was 14.0 on 10/12/2024 at 0800 CDT.        IPAP 14     Comment: Entered for Gwen Lund CRT   Appended report. These results have been appended to a previously final verified report.        EPAP 7     Comment: Entered for Gwen Lund CRT   Appended report. These results have been appended to a previously final verified report.        Notified Who DR JUSTICE     Notified By Gwen Lund CRT     Notified Time 10/12/2024 08:00     Collected by [id number]     Comment: Meter: [id number]     :  Gwen Lund CRT         pH, Temp Corrected 7.265 pH Units      pCO2, Temperature Corrected 79.4 mm Hg      pO2, Temperature Corrected 93.7 mm Hg     Blood Gas, Arterial - [[id number]]  (Abnormal) Collected: 10/12/24 1048    Specimen: Arterial Blood Updated: 10/12/24 1048     Site Right Radial     Jorge's Test Positive     pH, Arterial 7.362 pH units      pCO2, Arterial 59.7 mm Hg      Comment: 83 Value above reference range        pO2, Arterial 70.2 mm Hg      Comment: 84 Value below reference range        HCO3, Arterial 33.9 mmol/L      Comment: 83 Value above reference range        Base Excess, Arterial 6.9 mmol/L      Comment: 83 Value above reference range        O2 Saturation, Arterial 94.5 %      Temperature 37.0     Barometric Pressure for Blood Gas 755 mmHg      Modality BiPap     FIO2 40 %      Ventilator Mode NIV     Set Mech Resp Rate 22.0     IPAP 20     Comment: Meter: [id number]     :  Gwen Lund CRT         EPAP 8      Collected by [id number]     pCO2, Temperature Corrected 59.7 mm Hg      pH, Temp Corrected 7.362 pH Units      pO2, Temperature Corrected 70.2 mm Hg      PO2/FIO2 176    Respiratory Panel PCR w/COVID-19(SARS-CoV-2) [facility] In-House, NP Swab in UTM/VTM, 2 HR TAT - Swab, Nasopharynx [[id number]]  (Normal) Collected: 10/12/24 0833    Specimen: Swab from Nasopharynx Updated: 10/12/24 0924     ADENOVIRUS, PCR Not Detected     Coronavirus 229E Not Detected     Coronavirus HKU1 Not Detected     Coronavirus NL63 Not Detected     Coronavirus OC43 Not Detected     COVID19 Not Detected     Human Metapneumovirus Not Detected     Human Rhinovirus/Enterovirus Not Detected     Influenza A PCR Not Detected     Influenza B PCR Not Detected     Parainfluenza Virus 1 Not Detected     Parainfluenza Virus 2 Not Detected     Parainfluenza Virus 3 Not Detected     Parainfluenza Virus 4 Not Detected     RSV, PCR Not Detected     Bordetella pertussis pcr Not Detected     Bordetella parapertussis PCR Not Detected     Chlamydophila pneumoniae PCR Not Detected     Mycoplasma pneumo by PCR Not Detected    Narrative:      In the setting of a positive respiratory panel with a viral infection PLUS a negative procalcitonin without other underlying concern for bacterial infection, consider observing off antibiotics or discontinuation of antibiotics and continue supportive care. If the respiratory panel is positive for atypical bacterial infection (Bordetella pertussis, Chlamydophila pneumoniae, or Mycoplasma pneumoniae), consider antibiotic de-escalation to target atypical bacterial infection.    High Sensitivity Troponin T 2Hr [[id number]]  (Abnormal) Collected: 10/12/24 0730    Specimen: Blood Updated: 10/12/24 0802     HS Troponin T 130 ng/L      Troponin T Delta -13 ng/L     Narrative:      High Sensitive Troponin T Reference Range:  <14.0 ng/L- Negative Female for AMI  <22.0 ng/L- Negative Male for AMI  >=14 - Abnormal Female  indicating possible myocardial injury.  >=22 - Abnormal Male indicating possible myocardial injury.   Clinicians would have to utilize clinical acumen, EKG, Troponin, and serial changes to determine if it is an Acute Myocardial Infarction or myocardial injury due to an underlying chronic condition.         Comprehensive Metabolic Panel [[id number]]  (Abnormal) Collected: 10/12/24 0532    Specimen: Blood from Arm, Left Updated: 10/12/24 0617     Glucose 202 mg/dL      BUN 45 mg/dL      Creatinine 1.56 mg/dL      Sodium 142 mmol/L      Potassium 5.4 mmol/L      Comment: Slight hemolysis detected by analyzer. Result may be falsely elevated.        Chloride 98 mmol/L      CO2 35.0 mmol/L      Calcium 9.0 mg/dL      Total Protein 6.8 g/dL      Albumin 4.0 g/dL      ALT (SGPT) 13 U/L      AST (SGOT) 14 U/L      Alkaline Phosphatase 103 U/L      Total Bilirubin 0.6 mg/dL      Globulin 2.8 gm/dL      A/G Ratio 1.4 g/dL      BUN/Creatinine Ratio 28.8     Anion Gap 9.0 mmol/L      eGFR 49.6 mL/min/1.73     Narrative:      GFR Normal >60  Chronic Kidney Disease <60  Kidney Failure <15      Single High Sensitivity Troponin T [[id number]]  (Abnormal) Collected: 10/12/24 0532    Specimen: Blood from Arm, Left Updated: 10/12/24 0615     HS Troponin T 143 ng/L     Narrative:      High Sensitive Troponin T Reference Range:  <14.0 ng/L- Negative Female for AMI  <22.0 ng/L- Negative Male for AMI  >=14 - Abnormal Female indicating possible myocardial injury.  >=22 - Abnormal Male indicating possible myocardial injury.   Clinicians would have to utilize clinical acumen, EKG, Troponin, and serial changes to determine if it is an Acute Myocardial Infarction or myocardial injury due to an underlying chronic condition.         BNP [[id number]]  (Abnormal) Collected: 10/12/24 0532    Specimen: Blood from Arm, Left Updated: 10/12/24 0615     proBNP 14,625.0 pg/mL     Narrative:      This assay is used as an aid in the diagnosis of individuals  suspected of having heart failure. It can be used as an aid in the diagnosis of acute decompensated heart failure (ADHF) in patients presenting with signs and symptoms of ADHF to the emergency department (ED). In addition, NT-proBNP of <300 pg/mL indicates ADHF is not likely.    Age Range Result Interpretation  NT-proBNP Concentration (pg/mL:      <50             Positive            >450                   Gray                 300-450                    Negative             <300    50-75           Positive            >900                  Gray                300-900                  Negative            <300      >75             Positive            >1800                  Gray                300-1800                  Negative            <300    Magnesium [[id number]]  (Abnormal) Collected: 10/12/24 0532    Specimen: Blood from Arm, Left Updated: 10/12/24 0612     Magnesium 2.6 mg/dL     CBC & Differential [[id number]]  (Abnormal) Collected: 10/12/24 0532    Specimen: Blood from Arm, Left Updated: 10/12/24 0550    Narrative:      The following orders were created for panel order CBC & Differential.  Procedure                               Abnormality         Status                     ---------                               -----------         ------                     CBC Auto Differential[[id number]]        Abnormal            Final result                 Please view results for these tests on the individual orders.    CBC Auto Differential [[id number]]  (Abnormal) Collected: 10/12/24 0532    Specimen: Blood from Arm, Left Updated: 10/12/24 0550     WBC 9.29 10*3/mm3      RBC 4.18 10*6/mm3      Hemoglobin 11.5 g/dL      Hematocrit 41.0 %      MCV 98.1 fL      MCH 27.5 pg      MCHC 28.0 g/dL      RDW 15.4 %      RDW-SD 55.4 fl      MPV 10.6 fL      Platelets 286 10*3/mm3      Neutrophil % 78.9 %      Lymphocyte % 10.2 %      Monocyte % 8.6 %      Eosinophil % 1.1 %      Basophil % 0.6 %      Immature Grans % 0.6 %       Neutrophils, Absolute 7.32 10*3/mm3      Lymphocytes, Absolute 0.95 10*3/mm3      Monocytes, Absolute 0.80 10*3/mm3      Eosinophils, Absolute 0.10 10*3/mm3      Basophils, Absolute 0.06 10*3/mm3      Immature Grans, Absolute 0.06 10*3/mm3      nRBC 0.0 /100 WBC     [place] Draw [[id number]] Collected: 10/12/24 0532    Specimen: Blood from Arm, Left Updated: 10/12/24 0546    Narrative:      The following orders were created for panel order [place] Draw.  Procedure                               Abnormality         Status                     ---------                               -----------         ------                     Green Top (Gel)[[id number]]                                  Final result               Lavender Top[[id number]]                                     Final result               Red Top[[id number]]                                          Final result               Light Blue Top[[id number]]                                   Final result                 Please view results for these tests on the individual orders.    Green Top (Gel) [[id number]] Collected: 10/12/24 0532    Specimen: Blood from Arm, Left Updated: 10/12/24 0546     Extra Tube Hold for add-ons.     Comment: Auto resulted.       Lavender Top [[id number]] Collected: 10/12/24 0532    Specimen: Blood from Arm, Left Updated: 10/12/24 0546     Extra Tube hold for add-on     Comment: Auto resulted       Red Top [[id number]] Collected: 10/12/24 0532    Specimen: Blood from Arm, Left Updated: 10/12/24 0546     Extra Tube Hold for add-ons.     Comment: Auto resulted.       Light Blue Top [[id number]] Collected: 10/12/24 0532    Specimen: Blood from Arm, Left Updated: 10/12/24 0546     Extra Tube Hold for add-ons.     Comment: Auto resulted       Blood Gas, Arterial With Co-Ox [[id number]]  (Abnormal) Collected: 10/12/24 0522    Specimen: Arterial Blood Updated: 10/12/24 0522     Site Right Radial     Jorge's Test Positive     pH, Arterial 7.268 pH units       Comment: 84 Value below reference range        pCO2, Arterial 78.1 mm Hg      Comment: 86 Value above critical limit        pO2, Arterial 70.2 mm Hg      Comment: 84 Value below reference range        HCO3, Arterial 35.7 mmol/L      Comment: 83 Value above reference range        Base Excess, Arterial 6.5 mmol/L      Comment: 83 Value above reference range        O2 Saturation, Arterial 92.0 %      Comment: 84 Value below reference range        Hemoglobin, Blood Gas 11.5 g/dL      Comment: 84 Value below reference range        Hematocrit, Blood Gas 35.3 %      Comment: 84 Value below reference range        Oxyhemoglobin 91.4 %      Comment: 84 Value below reference range        Methemoglobin 0.00 %      Comment: 84 Value below reference range        Carboxyhemoglobin 1.3 %      Temperature 37.0     Sodium, Arterial 141 mmol/L      Potassium, Arterial 5.2 mmol/L      Comment: 83 Value above reference range        Barometric Pressure for Blood Gas 756 mmHg      Modality Nasal Cannula     FIO2 44 %      Flow Rate 6.0 lpm      Ventilator Mode NA     Notified Who MD BARBOSA     Notified By [email or web address]     Notified Time 10/12/2024 05:22     Collected by [id number]     Comment: Meter: [id number]     :  [email or web address]        pH, Temp Corrected 7.268 pH Units      pCO2, Temperature Corrected 78.1 mm Hg      pO2, Temperature Corrected 70.2 mm Hg           Imaging Results (Last 24 Hours)       Procedure Component Value Units Date/Time    XR Chest 1 View [[id number]] Collected: 10/12/24 0650     Updated: 10/12/24 0654    Narrative:      EXAMINATION: XR CHEST 1 VW- 10/12/2024 6:50 AM     HISTORY: CHF/COPD Protocol.     REPORT: A frontal view of the chest was obtained.     COMPARISON: Chest x-ray 9/28/2024.     The heart is enlarged, there is central and basilar vascular congestion  and mild pulmonary edema. The right pleural effusion appears slightly  increased in size but remains small. No pneumothorax is  identified.  Moderate atelectasis in the lung bases greater on the right. No acute  osseous abnormality.       Impression:      Congestive heart failure with slight increase in the right  pleural effusion, moderate at basilar atelectasis greater on the right.     This report was signed and finalized on 10/12/2024 6:51 AM by Dr. Puneet Whitley MD.                 Assessment / Plan   Assessment:   Active Hospital Problems    Diagnosis     **Acute on chronic respiratory failure with hypoxia and hypercapnia     Stage 1 acute kidney injury     Acute on chronic HFrEF (heart failure with reduced ejection fraction)     Colon adenocarcinoma     Pulmonary HTN     Diabetes mellitus     Hypertension        Treatment Plan  The patient will be admitted to Dr. Stout's service here at [facility].    1..  Acute on chronic respiratory failure with hypoxia and hypercapnia/pulmonary hypertension-patient initially presented with acute mental status changes and significant shortness of breath, initial ABGs revealed pH of 7.2, pCO2 of 78.1, pO2 of 70.2, and a bicarb of 35.7.  Patient was placed on BiPAP with resolution and compensation to a pCO2 of 59.7 and pO2 of 70.2.  Patient remains on BiPAP at this time will reevaluate in the a.m.  Continue nebulizers, incentive spirometry, will reevaluate oxygenation needs in the a.m., pulmonary hygiene and ABGs as needed.    2.  Stage I acute kidney injury-baseline creatinine of 0.96, currently at 1.56, will hold Entresto, follow with daily BMP.    3.  Acute on chronic HFrEF-continue heart failure pathway including strict intake and output, daily weights, Reds vest reading pending, initiate Bumex 1 mg every 12 hours may increase to gtt. depending on results.    4.  Colon cancer-patient currently being treated by Dr. Mondragon, unsure of completion of full cycles due to patient canceling multiple appointments.  Will discuss further with patient in the a.m. when he is more  alert.    5.  Diabetes mellitus-A1c pending, initiate Accu-Cheks before meals and at bedtime with SSI.    6.  Hypertension- will continue to hold Entresto due to AMA and hypotension.  Continue metoprolol, every 4 vital signs.    Hyperkalemia at 5.4, Lokelma x 1 given.    VTE prophylaxis with Lovenox  Labs in a.m.  Medical Decision Making  Acute on chronic respiratory failure, acute on chronic, high complexity, unchanged  Stage I acute kidney injury, acute on chronic, high complexity, unchanged  Acute on chronic HFrEF, chronic, high complexity, worsening  Colon cancer, chronic, moderate complexity, unchanged  Diabetes mellitus, chronic, moderate complexity, unchanged  Hypertension, chronic, moderate complexity, unchanged  Number and Complexity of problems: 7  Differential Diagnosis: None    Conditions and Status        Condition is unchanged.     [facility] Data  External documents reviewed: None  Cardiac tracing (EKG, telemetry) interpretation: 10/12/2024 EKG sinus rhythm with fusion complexes, right bundle brianna block, previous anteroseptal infarct, ventricular rate of 100, atrial rate of 100, QTc of 485  Radiology interpretation: 10/12/2024 chest x-ray per radiology reviewed  Labs reviewed: 10/12/2024 ABG, CBC, CMP, troponin, magnesium, BNP reviewed, repeat labs in a.m.  Any tests that were considered but not ordered: None     Decision rules/scores evaluated (example [id number], Wells, etc): None     Discussed with: Dr. Stout, his Sister Oxana and patient     Care Planning  Shared decision making: After Girish his Sister Oxana and patient  Code status and discussions: DNR/DNI per patient    Disposition  Social Determinants of Health that impact treatment or disposition: Patient will be returning to [facility] and rehab at discharge on palliative versus hospice care.  Estimated length of stay is determined.     I confirmed that the patient's advanced care plan is present, code status is documented, and  a surrogate decision maker is listed in the patient's medical record.     The patient's surrogate decision maker is Sister Oxana.     The patient was seen and examined by me on 10/12/2024 at 1235.    Electronically signed by VIV Hoyos, 10/12/24, 12:35 CDT.    I performed a substantive part of the MDM during the patient’s E/M visit. I personally evaluated and examined the patient. I personally made or approved the documented management plan.     Recently in the hospital from 9/28-10/4.  Discharged by Dr. Joiner.     There was some consideration into transitioning to hospice/palliative care on his previous admission.  This is going to be discussed again.    In the meantime, we will proceed with the above and tried to get him feeling better.     Electronically signed by BRITT Stout DO, 10/12/2024, 14:08 CDT.

## 2024-10-13 PROBLEM — I21.A1 TYPE 2 MYOCARDIAL INFARCTION DUE TO HEART FAILURE: Status: ACTIVE | Noted: 2022-03-11

## 2024-10-13 PROBLEM — I50.9 TYPE 2 MYOCARDIAL INFARCTION DUE TO HEART FAILURE: Status: ACTIVE | Noted: 2022-03-11

## 2024-10-13 PROBLEM — E87.5 HYPERKALEMIA: Status: ACTIVE | Noted: 2024-01-01

## 2024-10-13 NOTE — PROGRESS NOTES
RT EQUIPMENT DEVICE RELATED - SKIN ASSESSMENT    Jon Score:  Jon Score: 16     RT Medical Equipment/Device:     NIV Mask:  Under-the-nose   size:  B    Skin Assessment:      Neck:  Intact    Device Skin Pressure Protection:  Skin-to skin areas padded    Nurse Notification:  Christina Lock, RRT

## 2024-10-13 NOTE — PROGRESS NOTES
[facility] Medicine Services  INPATIENT PROGRESS NOTE    Patient Name: Tucker Knox  Date of Admission: 10/12/2024  Today's Date: 10/13/24  Length of Stay: 1  Primary Care Physician: Kt Alfredo MD    Subjective   Chief Complaint: Worsening renal function.  HPI  Renal function is worsened despite diuresis and supportive care.  His blood pressure has been better since early this morning.  Entresto continues to be on hold.  Repeating Lokelma for persistent hyperkalemia.  Consulting nephrology.    More drowsy recently cenersen pending back on BiPAP.  Reassess ABG.    Review of Systems  All pertinent negatives and positives are as above. All other systems have been reviewed and are negative unless otherwise stated.     Objective    Temp:  [97.5 °F (36.4 °C)-98.2 °F (36.8 °C)] 97.6 °F (36.4 °C)  Heart Rate:  [67-90] 86  Resp:  [20-22] 20  BP: ()/(51-78) 115/63  Physical Exam  Constitutional:       Appearance: He is obese. He is ill-appearing (chronically).      Comments: Chronically ill-appearing 64-year-old  gentleman who looks older than his stated age.   HENT:      Head: Normocephalic and atraumatic.   Eyes:      Conjunctiva/sclera: Conjunctivae normal.      Pupils: Pupils are equal, round, and reactive to light.   Cardiovascular:      Rate and Rhythm: Normal rate and regular rhythm.      Heart sounds: Normal heart sounds.   Pulmonary:      Effort: Pulmonary effort is normal. No respiratory distress.      Comments: He was on 3 L, but nursing recently put him back on BiPAP.  Musculoskeletal:         General: Swelling (trace) present.      Cervical back: Neck supple.   Skin:     General: Skin is dry.      Findings: No rash.   Neurological:      General: No focal deficit present.      Mental Status: He is alert and oriented to person, place, and time.      Motor: Weakness (generalized) present.   Psychiatric:      Comments: Drowsy.       Results  Review:  I have reviewed the labs, radiology results, and diagnostic studies.    Laboratory Data:   Results from last 7 days   Lab Units 10/13/24  0424 10/12/24  0532   WBC 10*3/mm3 8.04 9.29   HEMOGLOBIN g/dL 11.1* 11.5*   HEMATOCRIT % 38.5 41.0   PLATELETS 10*3/mm3 251 286        Results from last 7 days   Lab Units 10/13/24  0424 10/12/24  0759 10/12/24  0532   SODIUM mmol/L 134*  --  142   SODIUM, ARTERIAL mmol/L  --  141  --    POTASSIUM mmol/L 5.9*  --  5.4*   CHLORIDE mmol/L 95*  --  98   CO2 mmol/L 30.0*  --  35.0*   BUN mg/dL 61*  --  45*   CREATININE mg/dL 2.16*  --  1.56*   CALCIUM mg/dL 8.4*  --  9.0   BILIRUBIN mg/dL  --   --  0.6   ALK PHOS U/L  --   --  103   ALT (SGPT) U/L  --   --  13   AST (SGOT) U/L  --   --  14   GLUCOSE mg/dL 257*  --  202*     Results for orders placed during the hospital encounter of 09/09/24    Adult Transthoracic Echo Complete W/ Cont if Necessary Per Protocol    Interpretation Summary    Left ventricular systolic function is moderately decreased. Left ventricular ejection fraction appears to be 31 - 35%.    The left ventricular cavity is mildly dilated.    Left ventricular wall thickness is consistent with mild concentric hypertrophy.    Left ventricular diastolic function is consistent with (grade II w/high LAP) pseudonormalization.    Mildly reduced right ventricular systolic function noted.    The right ventricular cavity is moderately dilated.    The left atrial cavity is mildly dilated.    The right atrial cavity is dilated.    Mild aortic valve stenosis is present.    Estimated right ventricular systolic pressure from tricuspid regurgitation is moderately elevated (45-55 mmHg).    Lab Results   Lab Value Date/Time    TROPONINT 130 (C) 10/12/2024 0730    TROPONINT 143 (C) 10/12/2024 0532    TROPONINT 548 (C) 09/28/2024 1923    TROPONINT 483 (C) 09/28/2024 1808    TROPONINT 240 (C) 09/09/2024 1749    TROPONINT 252 (C) 09/09/2024 1528    TROPONINT 41 (H) 03/26/2024 0600     TROPONINT 33 (H) 03/25/2024 2021    TROPONINT 30 (H) 03/25/2024 1605    TROPONINT 46 (H) 03/16/2024 1906    TROPONINT 69 (H) 03/16/2024 1332    TROPONINT 67 (H) 03/16/2024 1228    TROPONINT 63 (C) 12/07/2023 0847    TROPONINT 60 (C) 12/07/2023 0653    TROPONINT 0.019 02/18/2022 1717    TROPONINT 0.017 02/18/2022 1444    TROPONINT 0.092 (C) 12/26/2021 0200    TROPONINT 0.081 (C) 12/25/2021 1247    TROPONINT 0.089 (C) 12/25/2021 1103     I have reviewed the patient's current medications.     Assessment/Plan   Assessment  Active Hospital Problems    Diagnosis     **Acute on chronic respiratory failure with hypoxia and hypercapnia     Acute on chronic HFrEF (heart failure with reduced ejection fraction)     Type 2 myocardial infarction due to heart failure     Hyperkalemia     AMA (acute kidney injury)     Colon adenocarcinoma     Pulmonary HTN     Type 2 diabetes mellitus with hyperglycemia, with long-term current use of insulin     Hypertension      Treatment Plan  The patient was admitted to my service here at [facility] on 10/12 via the emergency department.      1..  Acute on chronic respiratory failure with hypoxia and hypercapnia/pulmonary hypertension-patient initially presented with acute mental status changes and significant shortness of breath, initial ABGs revealed pH of 7.2, pCO2 of 78.1, pO2 of 70.2, and a bicarb of 35.7.  Patient was placed on BiPAP with resolution and compensation to a pCO2 of 59.7 and pO2 of 70.2.  He will continue intermittent BiPAP and also with sleep.  Continue efforts at pulmonary toilet with nebulizers, incentive spirometry.  He was recently on 3 L of oxygen, but nursing put him back on BiPAP due to being drowsy.  Recheck ABG.     2.  Acute kidney injury-baseline creatinine of 0.96, currently at 1.56.  Entresto was held.  Creatinine has worsened today at 2.16 with a potassium of 5.9.  Repeat Lokelma.  Hold further Bumex.  Repeat BMP at 1500. Consult nephrology.      3.  Acute on chronic HFrEF- Pausing Bumex. Continue strict intake and output, daily weights, Reds vest readings.  Thoughts are is that he is volume overloaded, but does not appear to be frankly overloaded on exam.     4.  Colon cancer-patient currently being treated by Dr. Mondragon, unsure of completion of full cycles due to patient canceling multiple appointments.  Will discuss further with patient in the a.m. when he is more alert.     5.  Diabetes mellitus-A1c 9.9 in September. Initiated Accu-Cheks before meals and at bedtime with SSI along with basal insulin.     6.  Hypertension with recent hypotension- will continue to hold Entresto due to AMA and hypotension.  Continue metoprolol, every 4 vital signs.     VTE prophylaxis with Lovenox.     Medical Decision Making  Acute on chronic respiratory failure, acute on chronic, high complexity, unchanged  Stage I acute kidney injury, acute on chronic, high complexity, unchanged  Acute on chronic HFrEF, chronic, high complexity, worsening  Colon cancer, chronic, moderate complexity, unchanged  Diabetes mellitus, chronic, moderate complexity, unchanged  Hypertension, chronic, moderate complexity, unchanged  Number and Complexity of problems: 7  Differential Diagnosis: None     Conditions and Status        Condition is unchanged to worsening.     [facility] Data  External documents reviewed: Reviewed prior [facility] records.   Cardiac tracing (EKG, telemetry) interpretation: 10/12/2024 EKG sinus rhythm with fusion complexes, right bundle brianna block, previous anteroseptal infarct, ventricular rate of 100, atrial rate of 100, QTc of 485.   Radiology interpretation: Previously reviewed by radiology.  Labs reviewed: As above.    Any tests that were considered but not ordered: None considered at present.      Decision rules/scores evaluated (example [id number], Wells, etc): None considered at present.      Discussed with: The patient and his nurse, Vianey.      Care  Planning  Shared decision making: Consented to admission for further workup and treatment.    Code status and discussions: DNR/DNI per patient     Disposition  Social Determinants of Health that impact treatment or disposition: Patient will be returning to [facility] at discharge on palliative versus hospice care.  Estimated length of stay is undetermined at present.      I confirmed that the patient's advanced care plan is present, code status is documented, and a surrogate decision maker is listed in the patient's medical record.      The patient's surrogate decision maker is his sister, Oxana.     Electronically signed by BRITT Stout DO, 10/13/24, 13:31 CDT.

## 2024-10-13 NOTE — PLAN OF CARE
Goal Outcome Evaluation:   Disoriented to situation, Bipap on, resting in bed comfortably. Recommended to wear bi pap frequently CO2 retention. Hourly rounding. VSS. Fall risk- bed alarm on- safety maintained. Bladder scan 111ml- IC episode & Primafit in place. Urine sent to lab. Lokelma given. Lovenox VTE. Will continue to monitor until change of shift.

## 2024-10-13 NOTE — THERAPY EVALUATION
Patient Name: Tucker Knox  : 1960    MRN: [id number]                              Today's Date: 10/13/2024       Admit Date: 10/12/2024    Visit Dx:     ICD-10-CM ICD-9-CM   1. Respiratory failure with hypoxia and hypercapnia, unspecified chronicity  J96.91 518.81    J96.92    2. Recurrent right pleural effusion  J90 511.9   3. Acute on chronic respiratory failure with hypoxia and hypercapnia  J96.21 518.84    J96.22 786.09     799.02   4. Acute on chronic congestive heart failure, unspecified heart failure type  I50.9 428.0   5. COPD exacerbation  J44.1 491.21   6. Impaired mobility [Z74.09]  Z74.09 799.89     Patient Active Problem List   Diagnosis    Type 2 diabetes mellitus with hyperglycemia, with long-term current use of insulin    Hypertension    Pulmonary HTN    COPD (chronic obstructive pulmonary disease)    Type 2 myocardial infarction due to heart failure    Malignant neoplasm of sigmoid colon    FH: colon cancer    Sleep apnea    History of adenomatous polyp of colon    Colon adenocarcinoma    Acute on chronic systolic CHF (congestive heart failure)    PVC's (premature ventricular contractions)    Presence of external cardiac defibrillator    Acute exacerbation of CHF (congestive heart failure)    Acute on chronic respiratory failure with hypoxia and hypercapnia    Delirium    Acute on chronic HFrEF (heart failure with reduced ejection fraction)    AMA (acute kidney injury)    Hyperkalemia     Past Medical History:   Diagnosis Date    Cancer     CHF (congestive heart failure)     COPD (chronic obstructive pulmonary disease)     Coronary artery disease     stent x 1    Family history of colon cancer     Hyperlipidemia     Hypertension     Sleep apnea     does not wear machine, supposed to wear cpap with oxygen and does not wear it    Type 2 diabetes mellitus      Past Surgical History:   Procedure Laterality Date    BACK SURGERY      CARDIAC CATHETERIZATION Left 2022    Procedure:  Cardiac Catheterization/Vascular Study;  Surgeon: Todd Avendano MD;  Location:  [facility]ATH [facility];  Service: Cardiology;  Laterality: Left;    CARDIAC CATHETERIZATION      with stent x1    CARDIAC CATHETERIZATION N/A 9/12/2024    Procedure: Left Heart Cath;  Surgeon: Ruben Adler MD;  Location:  [facility] CATH [facility];  Service: Cardiology;  Laterality: N/A;    COLON RESECTION N/A 12/5/2023    Procedure: COLON RESECTION LAPAROSCOPIC SIGMOID WITH DAVINCI ROBOT, INTRA-OPERATIVE FLEXIBLE SIGMOIDOSCOPY, SPLENIC FLEXURE MOBILIZATION, OPEN UMBILICAL HERNIA REPAIR;  Surgeon: Oma Velasquez MD;  Location:  [facility] OR;  Service: Robotics - DaVinci;  Laterality: N/A;    COLONOSCOPY N/A 10/09/2023    Diverticulosis; One 5mm polyp in ascending colon; One 5mm polyp in transverse colon; One 10mm polyp at 65cm proximal to anus; One 20mm polyp at 65cm proximal to anus-Tattooed; One 20mm polyp at 42cm proximal to anus-Clip (MR conditional) placed-Tattooed; Rule out malignancy-Partially obstructing tumor in recto-sigmoid colon-biopsied-Tattooed; One 10mm polyp in rectum    ELBOW PROCEDURE      KNEE SURGERY      LUMBAR DISC SURGERY        General Information       Row Name 10/13/24 1110          Physical Therapy Time and Intention    Document Type evaluation  Dx: acute on chronic respiratory failure with hypoxia and hypercapnia. Pt was taken by EMS to ED with respiratory distress.  -SB (r) AT (t) SB (c)     Mode of Treatment physical therapy  PMH: Cancer CHF  COPD  Coronary artery disease stent x 1 Family history of colon cancer Hyperlipidemia Hypertension    Sleep apnea Type 2 diabetes mellitus  -SB (r) AT (t) SB (c)       Row Name 10/13/24 1110          General Information    Patient Profile Reviewed yes  -SB (r) AT (t) SB (c)     Prior Level of Function independent:;ADL's;cooking;cleaning;driving;shopping;min assist:;all household mobility;community mobility  He is using a SPC for ambulation. 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  -SB (r) AT (t) SB (c)     Existing Precautions/Restrictions fall;oxygen therapy device and L/min  3L  -SB (r) AT (t) SB (c)     Barriers to Rehab medically complex;previous functional deficit  -SB (r) AT (t) SB (c)       Row Name 10/13/24 1110          Living Environment    People in Home alone  -SB (r) AT (t) SB (c)       Row Name 10/13/24 1110          Home Main Entrance    Number of Stairs, Main Entrance eight  -SB (r) AT (t) SB (c)     Stair Railings, Main Entrance railing on right side (ascending)  -SB (r) AT (t) SB (c)       Row Name 10/13/24 1110          Stairs Within Home, Primary    Number of Stairs, Within Home, Primary none  -SB (r) AT (t) SB (c)       Row Name 10/13/24 1110          Cognition    Orientation Status (Cognition) oriented x 4  -SB (r) AT (t) SB (c)       Row Name 10/13/24 1110          Safety Issues/Impairments Affecting Functional Mobility    Safety Issues Affecting Function (Mobility) friction/shear risk  -SB (r) AT (t) SB (c)     Impairments Affecting Function (Mobility) balance;endurance/activity tolerance;strength;shortness of breath  -SB (r) AT (t) SB (c)               User Key  (r) = Recorded By, (t) = Taken By, (c) = Cosigned By      Initials Name Provider Type    SB Jessica Jordan, PT DPT Physical Therapist    AT [facility] Sanjuana, PT Student PT Student                   Mobility       Row Name 10/13/24 1110          Bed Mobility    Bed Mobility sit-supine;scooting/bridging  -SB (r) AT (t) SB (c)     Scooting/Bridging [place] (Bed Mobility) maximum assist (25% patient effort);1 person assist  -SB (r) AT (t) SB (c)     Sit-Supine [place] (Bed Mobility) moderate assist (50% patient effort);1 person assist;verbal cues  -SB (r) AT (t) SB (c)     Assistive Device (Bed Mobility) bed rails;head of bed elevated  -SB (r) AT (t) SB (c)       Row Name 10/13/24 1110          Sit-Stand Transfer    Sit-Stand [place] (Transfers) maximum assist  (25% patient effort);1 person assist  -SB (r) AT (t) SB (c)     Assistive Device (Sit-Stand Transfers) cane, straight  -SB (r) AT (t) SB (c)       Row Name 10/13/24 1110          Gait/Stairs (Locomotion)    [place] Level (Gait) 1 person assist;minimum assist (75% patient effort);verbal cues;nonverbal cues (demo/gesture)  -SB (r) AT (t) SB (c)     Assistive Device (Gait) cane, straight  -SB (r) AT (t) SB (c)     Patient was able to Ambulate yes  -SB (r) AT (t) SB (c)     Distance in Feet (Gait) 5  -SB (r) AT (t) SB (c)     Deviations/Abnormal Patterns (Gait) left sided deviations;right sided deviations;base of support, narrow;jennifer decreased;festinating/shuffling;gait speed decreased;stride length decreased  -SB (r) AT (t) SB (c)               User Key  (r) = Recorded By, (t) = Taken By, (c) = Cosigned By      Initials Name Provider Type    SB Jessica Jordan, PT DPT Physical Therapist    AT [facility] Sanjuana, PT Student PT Student                   Obj/Interventions       Row Name 10/13/24 1110          Range of Motion Comprehensive    General Range of Motion bilateral lower extremity ROM WFL  -SB (r) AT (t) SB (c)       Row Name 10/13/24 1110          Strength Comprehensive (MMT)    General Manual Muscle Testing (MMT) Assessment lower extremity strength deficits identified  -SB (r) AT (t) SB (c)     Comment, General Manual Muscle Testing (MMT) Assessment Yuri LE grossly 4-/5  -SB (r) AT (t) SB (c)       Row Name 10/13/24 1110          Balance    Balance Assessment sitting static balance;sitting dynamic balance;standing static balance;standing dynamic balance  -SB (r) AT (t) SB (c)     Static Sitting Balance contact guard  -SB (r) AT (t) SB (c)     Dynamic Sitting Balance contact guard  -SB (r) AT (t) SB (c)     Position, Sitting Balance unsupported;sitting edge of bed  -SB (r) AT (t) SB (c)     Static Standing Balance moderate assist;1-person assist  -SB (r) AT (t) SB (c)     Dynamic Standing Balance moderate  "assist;1-person assist  -SB (r) AT (t) SB (c)     Position/Device Used, Standing Balance supported;cane, straight  -SB (r) AT (t) SB (c)       Row Name 10/13/24 1110          Sensory Assessment (Somatosensory)    Sensory Assessment (Somatosensory) LE sensation intact  Pt can \"barely\" feel light touch sensation  -SB (r) AT (t) SB (c)               User Key  (r) = Recorded By, (t) = Taken By, (c) = Cosigned By      Initials Name Provider Type    SB Jessica Jordan, PT DPT Physical Therapist    AT [facility], Sanjuana, PT Student PT Student                   Goals/Plan       Row Name 10/13/24 1110          Bed Mobility Goal 1 (PT)    Activity/Assistive Device (Bed Mobility Goal 1, PT) bed mobility activities, all  -SB (r) AT (t) SB (c)     [place] Level/Cues Needed (Bed Mobility Goal 1, PT) modified independence  -SB (r) AT (t) SB (c)     Time Frame (Bed Mobility Goal 1, PT) long term goal (LTG)  -SB (r) AT (t) SB (c)     Progress/Outcomes (Bed Mobility Goal 1, PT) new goal  -SB (r) AT (t) SB (c)       Row Name 10/13/24 1110          Transfer Goal 1 (PT)    Activity/Assistive Device (Transfer Goal 1, PT) sit-to-stand/stand-to-sit;bed-to-chair/chair-to-bed  -SB (r) AT (t) SB (c)     [place] Level/Cues Needed (Transfer Goal 1, PT) standby assist  -SB (r) AT (t) SB (c)     Time Frame (Transfer Goal 1, PT) long term goal (LTG)  -SB (r) AT (t) SB (c)     Progress/Outcome (Transfer Goal 1, PT) new goal  -SB (r) AT (t) SB (c)       Row Name 10/13/24 1110          Gait Training Goal 1 (PT)    Activity/Assistive Device (Gait Training Goal 1, PT) gait (walking locomotion);assistive device use;backward stepping;decrease asymmetrical patterns;decrease fall risk;diminish gait deviation;forward stepping;improve balance and speed;increase endurance/gait distance;increase energy conservation;sidestepping;turning, left;turning, right;cane, straight  -SB (r) AT (t) SB (c)     [place] Level (Gait Training Goal 1, PT) modified " independence  -SB (r) AT (t) SB (c)     Distance (Gait Training Goal 1, PT) 40  -SB (r) AT (t) SB (c)     Time Frame (Gait Training Goal 1, PT) long term goal (LTG)  -SB (r) AT (t) SB (c)     Progress/Outcome (Gait Training Goal 1, PT) new goal  -SB (r) AT (t) SB (c)       Row Name 10/13/24 1110          Balance Goal 1 (PT)    Activity/Assistive Device (Balance Goal) standing static balance;standing dynamic balance  -SB (r) AT (t) SB (c)     [place] Level/Cues Needed (Balance Goal 1, PT) contact guard required  -SB (r) AT (t) SB (c)     Time Frame (Balance Goal 1, PT) long-term goal (LTG)  -SB (r) AT (t) SB (c)     Progress/Outcomes (Balance Goal 1, PT) other (see comments)  New goal  -SB (r) AT (t) SB (c)       Row Name 10/13/24 1110          Stairs Goal 1 (PT)    Activity/Assistive Device (Stairs Goal 1, PT) --  -SB (r) AT (t) SB (c)     [place] Level/Cues Needed (Stairs Goal 1, PT) --  -SB (r) AT (t) SB (c)     Number of Stairs (Stairs Goal 1, PT) --  -SB (r) AT (t) SB (c)     Time Frame (Stairs Goal 1, PT) --  -SB (r) AT (t) SB (c)     Progress/Outcome (Stairs Goal 1, PT) --  -SB (r) AT (t) SB (c)       Row Name 10/13/24 1110          Therapy Assessment/Plan (PT)    Planned Therapy Interventions (PT) balance training;bed mobility training;gait training;patient/family education;strengthening;transfer training  -SB (r) AT (t) SB (c)               User Key  (r) = Recorded By, (t) = Taken By, (c) = Cosigned By      Initials Name Provider Type    Jessica Ch, PT DPT Physical Therapist    [facility] PT Student                   Clinical Impression       Row Name 10/13/24 1110          Pain    Pretreatment Pain Rating 6/10  -SB (r) AT (t) SB (c)     Posttreatment Pain Rating 6/10  -SB (r) AT (t) SB (c)     Pain Location chest  stated it feels like something is sitting on his chest  -SB (r) AT (t) SB (c)     Pain Management Interventions exercise or physical activity utilized  -SB (r)  AT (t) SB (c)       Row Name 10/13/24 1110          Plan of Care Review    Plan of Care Reviewed With patient  -SB (r) AT (t) SB (c)     Progress no change  -SB (r) AT (t) SB (c)     Outcome Evaluation PT eval complete. Pt was OxAx4. Pt reported that he had 6/10 pain in the anterior chest and stated that if felt like something was laying on his chest. Pt reported that he was independent prior to being in the hospital. Pt stated he was independent with ADL's, cooking, cleaning, driving, and shopping. Pt stated that he uses a SPC for ambulation to help with balance. Pt was sitting EOB when therapist arrived. Pt was able to go sit to stand with max x 1 assistance. Once pt was standing he was able to maintain balance with mod x 1 assistance. Pt was able to ambulate 5 ft with SPC. Pt would benfit from training with a walker rather than continuing to use the SPC. Pt was unsteady and rocking side to side with gait. He has short, uneven steps. Pt also demonstrates diminished LE strength. Pt would benefit from skilled physical therapy to improve LE strength, endurance, and ambuilation. DC recommendation SNF.  -SB (r) AT (t) SB (c)       Row Name 10/13/24 1110          Therapy Assessment/Plan (PT)    Rehab Potential (PT) good  -SB (r) AT (t) SB (c)     Criteria for Skilled Interventions Met (PT) yes;meets criteria;skilled treatment is necessary  -SB (r) AT (t) SB (c)     Therapy Frequency (PT) 2 times/day  -SB (r) AT (t) SB (c)     Predicted Duration of Therapy Intervention (PT) until discharge or until goals are met  -SB (r) AT (t) SB (c)       Row Name 10/13/24 1110          Vital Signs    O2 Delivery Pre Treatment supplemental O2  3L  -SB (r) AT (t) SB (c)     O2 Delivery Post Treatment supplemental O2  3L  -SB (r) AT (t) SB (c)     Pre Patient Position Supine  -SB (r) AT (t) SB (c)     Post Patient Position Supine  -SB (r) AT (t) SB (c)       Row Name 10/13/24 1110          Positioning and Restraints    Pre-Treatment  Position in bed  -SB (r) AT (t) SB (c)     Post Treatment Position bed  -SB (r) AT (t) SB (c)     In Bed fowlers;call light within reach;encouraged to call for assist;exit alarm on;side rails up x3  -SB (r) AT (t) SB (c)               User Key  (r) = Recorded By, (t) = Taken By, (c) = Cosigned By      Initials Name Provider Type    Jessica Ch PT DPT Physical Therapist    AT [facility], PT Student PT Student                   Outcome Measures       Row Name 10/13/24 1110 10/13/24 0715       How much help from another person do you currently need...    Turning from your back to your side while in flat bed without using bedrails? 3  -SB (r) AT (t) SB (c) 4  -AR    Moving from lying on back to sitting on the side of a flat bed without bedrails? 2  -SB (r) AT (t) SB (c) 4  -AR    Moving to and from a bed to a chair (including a wheelchair)? 2  -SB (r) AT (t) SB (c) 3  -AR    Standing up from a chair using your arms (e.g., wheelchair, bedside chair)? 1  -SB (r) AT (t) SB (c) 3  -AR    Climbing 3-5 steps with a railing? 1  -SB (r) AT (t) SB (c) 3  -AR    To walk in hospital room? 2  -SB (r) AT (t) SB (c) 3  -AR    AM-PAC 6 Clicks Score (PT) 11  -SB (r) AT (t) 20  -AR    Highest Level of Mobility Goal 4 --> Transfer to chair/commode  -SB (r) AT (t) 6 --> Walk 10 steps or more  -AR      Row Name 10/13/24 1110          Functional Assessment    Outcome Measure Options AM-PAC 6 Clicks Basic Mobility (PT)  -SB (r) AT (t) SB (c)               User Key  (r) = Recorded By, (t) = Taken By, (c) = Cosigned By      Initials Name Provider Type    Jessica Ch, PT DPT Physical Therapist    Vianey Aguilera, RN Registered Nurse    AT [facility], PT Student PT Student                                 Physical Therapy Education       Title: PT OT SLP Therapies (In Progress)       Topic: Physical Therapy (In Progress)       Point: Mobility training (Done)       Learning Progress Summary            Patient Acceptance, E VU  by AT at 10/13/2024 1201    Comment: Patient was educted on the benefits of physical activity, POC, and discharge recommendation.                      Point: Home exercise program (Not Started)       Learner Progress:  Not documented in this visit.              Point: Body mechanics (Not Started)       Learner Progress:  Not documented in this visit.              Point: Precautions (Done)       Learning Progress Summary            Patient Acceptance, E, VU by AT at 10/13/2024 1201    Comment: Patient was educted on the benefits of physical activity, POC, and discharge recommendation.                                      User Key       Initials Effective Dates Name Provider Type Discipline    AT 08/22/24 -  Sanjuana Melgar, PT Student PT Student PT                  PT Recommendation and Plan  Planned Therapy Interventions (PT): balance training, bed mobility training, gait training, patient/family education, strengthening, transfer training  Progress: no change  Outcome Evaluation: PT eval complete. Pt was OxAx4. Pt reported that he had 6/10 pain in the anterior chest and stated that if felt like something was laying on his chest. Pt reported that he was independent prior to being in the hospital. Pt stated he was independent with ADL's, cooking, cleaning, driving, and shopping. Pt stated that he uses a SPC for ambulation to help with balance. Pt was sitting EOB when therapist arrived. Pt was able to go sit to stand with max x 1 assistance. Once pt was standing he was able to maintain balance with mod x 1 assistance. Pt was able to ambulate 5 ft with SPC. Pt would benfit from training with a walker rather than continuing to use the SPC. Pt was unsteady and rocking side to side with gait. He has short, uneven steps. Pt also demonstrates diminished LE strength. Pt would benefit from skilled physical therapy to improve LE strength, endurance, and ambuilation. DC recommendation SNF.     Time Calculation:         PT  Charges       Row Name 10/13/24 1110             Time Calculation    Start Time 1110  10 min CR  -SB (r) AT (t) SB (c)      Stop Time 1139  -SB (r) AT (t) SB (c)      Time Calculation (min) 29 min  -SB (r) AT (t)      PT Received On 10/13/24  -SB (r) AT (t) SB (c)      PT Goal Re-Cert Due Date 10/23/24  -SB (r) AT (t) SB (c)         Untimed Charges    PT Eval/Re-eval Minutes 39  -SB (r) AT (t) SB (c)         Total Minutes    Untimed Charges Total Minutes 39  -SB (r) AT (t)       Total Minutes 39  -SB (r) AT (t)                User Key  (r) = Recorded By, (t) = Taken By, (c) = Cosigned By      Initials Name Provider Type    Jessica Ch, PT DPT Physical Therapist    AT [facility], PT Student PT Student                      PT G-Codes  Outcome Measure Options: AM-PAC 6 Clicks Basic Mobility (PT)  AM-PAC 6 Clicks Score (PT): 11  PT Discharge Summary  Anticipated Discharge Disposition (PT): skilled nursing facility    Sanjuana Melgar, PT Student  10/13/2024

## 2024-10-13 NOTE — CONSULTS
Nephrology ([facility]) Consult Note      Patient:  Tucker Knox  YOB: 1960  Date of Service: 10/13/2024  MRN: [id number]   Acct: [id number]   Primary Care Physician: Kt Alfredo MD  Advance Directive:   Code Status and Medical Interventions: No CPR (Do Not Attempt to Resuscitate); Limited Support; No intubation (DNI)   Ordered at: 10/12/24 1122     Medical Intervention Limits:    No intubation (DNI)     Code Status (Patient has no pulse and is not breathing):    No CPR (Do Not Attempt to Resuscitate)     Medical Interventions (Patient has pulse or is breathing):    Limited Support     Admit Date: 10/12/2024       Hospital Day: 1  Referring Provider: No ref. provider found      Patient personally seen and examined.  Complete chart including Consults, Notes, Operative Reports, Labs, Cardiology, and Radiology studies reviewed as able.        Subjective:  Tucker Knox is a 64 y.o. male for whom we were consulted for evaluation and treatment of acute kidney injury.  Patient recalled no prior nephrologic evaluations.  Denied current chest pain, nausea or vomiting.  Was lethargic but arousable on BiPAP.  Denied dysuria, hematuria, hemoptysis, rash.  Reported no NSAID usage aside from aspirin.  Held Entresto and Bumex due to worsening renal function.    Allergies:  Penicillins    Home Meds:  Medications Prior to Admission   Medication Sig Dispense Refill Last Dose/Taking    acetaminophen (TYLENOL) 325 MG tablet Take 2 tablets by mouth Every 6 (Six) Hours As Needed for Mild Pain. 60 tablet 0 Past Week    albuterol sulfate  (90 Base) MCG/ACT inhaler Inhale 2 puffs Every 4 (Four) Hours As Needed for Wheezing. 8 g 0 Past Week    aspirin 81 MG EC tablet Take 1 tablet by mouth Daily. 100 tablet 2 10/11/2024 Morning    DULoxetine (CYMBALTA) 30 MG capsule Take 1 capsule by mouth Daily. 30 capsule 0 10/11/2024 Morning    gabapentin (NEURONTIN) 600 MG  tablet Take 1 tablet by mouth 2 (Two) Times a Day. Patient states he takes 800 mg bid 60 tablet 0 10/11/2024    metoprolol succinate XL (TOPROL-XL) 25 MG 24 hr tablet Take 1 tablet by mouth Daily. 30 tablet 0 10/11/2024    pravastatin (PRAVACHOL) 40 MG tablet Take 1 tablet by mouth Every Night. 30 tablet 0 10/11/2024    dapagliflozin Propanediol (Farxiga) 10 MG tablet Take 10 mg by mouth Daily. 30 tablet 0 Unknown    furosemide (LASIX) 40 MG tablet Take 0.5 tablets by mouth Daily. 30 tablet 0 Unknown    insulin detemir (LEVEMIR) 100 UNIT/ML injection Inject 14 Units under the skin into the appropriate area as directed 2 (Two) Times a Day.   Unknown    metFORMIN (GLUCOPHAGE) 1000 MG tablet Take 1 tablet by mouth 2 (Two) Times a Day With Meals.   Unknown    nicotine (NICODERM CQ) 21 MG/24HR patch Place 1 patch on the skin as directed by provider Daily. 14 patch 0 Unknown    nitroglycerin (NITROSTAT) 0.4 MG SL tablet Place 1 tablet under the tongue Every 5 (Five) Minutes As Needed for Chest Pain. Take no more than 3 doses in 15 minutes.   Unknown    polyethylene glycol (MIRALAX) 17 GM/SCOOP powder Take 17 g by mouth Daily.   Unknown    QUEtiapine (SEROquel) 25 MG tablet Take 1 tablet by mouth Every Night. 30 tablet 0 Unknown    sacubitril-valsartan (Entresto) 24-26 MG tablet Take 1 tablet by mouth 2 (Two) Times a Day. 60 tablet 0 Unknown    Tradjenta 5 MG tablet tablet Take 1 tablet by mouth Daily.   Unknown       Medicines:  Current Facility-Administered Medications   Medication Dose Route Frequency Provider Last Rate Last Admin    acetaminophen (TYLENOL) tablet 650 mg  650 mg Oral Q6H PRN BRITT Stout DO   650 mg at 10/13/24 0851    albuterol (PROVENTIL) nebulizer solution 0.042% 1.25 mg/3mL  1.25 mg Nebulization Q6H PRN Jazmin Garrison APRN        aspirin EC tablet 81 mg  81 mg Oral Daily Jazmin Garrison APRN   81 mg at 10/13/24 0851    [Held by provider] bumetanide (BUMEX) injection 1 mg  1 mg  Intravenous Q12H Jazmin Garrison APRN   1 mg at 10/13/24 1225    calcium carbonate (TUMS) chewable tablet 500 mg (200 mg elemental)  2 tablet Oral TID PRN Karen Parekh DO        dextrose (D50W) (25 g/50 mL) IV injection 25 g  25 g Intravenous Q15 Min PRN Jazmin Garrison APRN        dextrose (GLUTOSE) oral gel 15 g  15 g Oral Q15 Min PRN Jazmin Garrison APRN        DULoxetine (CYMBALTA) DR capsule 30 mg  30 mg Oral Daily Jazmin Garrison APRN   30 mg at 10/13/24 0851    Enoxaparin Sodium (LOVENOX) syringe 40 mg  40 mg Subcutaneous Q12H Jazmin Garrison APRN   40 mg at 10/13/24 0852    gabapentin (NEURONTIN) capsule 600 mg  600 mg Oral Q12H BRITT Stout DO   600 mg at 10/13/24 0851    glucagon (GLUCAGEN) injection 1 mg  1 mg Intramuscular Q15 Min PRN Jazmin Garrison APRN        insulin glargine (LANTUS, SEMGLEE) injection 14 Units  14 Units Subcutaneous Q12H Jazmin Garrison APRN   14 Units at 10/13/24 0852    Insulin Lispro (humaLOG) injection 2-7 Units  2-7 Units Subcutaneous 4x Daily AC & at Bedtime Jazmin Garrison APRN   2 Units at 10/13/24 1225    metoprolol succinate XL (TOPROL-XL) 24 hr tablet 25 mg  25 mg Oral Q24H Jazmin Garrison APRN   25 mg at 10/13/24 0851    nitroglycerin (NITROSTAT) SL tablet 0.4 mg  0.4 mg Sublingual Q5 Min PRN Jazmin Garrison APRN        pravastatin (PRAVACHOL) tablet 40 mg  40 mg Oral Nightly Jazmin Garrison APRN   40 mg at 10/12/24 1939    QUEtiapine (SEROquel) tablet 25 mg  25 mg Oral Nightly Jazmin Garrison APRN   25 mg at 10/12/24 1940    [Held by provider] sacubitril-valsartan (ENTRESTO) 24-26 MG tablet 1 tablet  1 tablet Oral BID , VIV Wu        sodium chloride 0.9 % flush 10 mL  10 mL Intravenous PRN Vijay Leary MD   10 mL at 10/12/24 0731    sodium chloride 0.9 % flush 10 mL  10 mL Intravenous PRN Vijay Leary MD   10 mL at 10/12/24 0731       Past Medical History:  Past Medical History:   Diagnosis Date    Cancer      CHF (congestive heart failure)     COPD (chronic obstructive pulmonary disease)     Coronary artery disease     stent x 1    Family history of colon cancer     Hyperlipidemia     Hypertension     Sleep apnea     does not wear machine, supposed to wear cpap with oxygen and does not wear it    Type 2 diabetes mellitus        Past Surgical History:  Past Surgical History:   Procedure Laterality Date    BACK SURGERY      CARDIAC CATHETERIZATION Left 04/13/2022    Procedure: Cardiac Catheterization/Vascular Study;  Surgeon: Todd Avendano MD;  Location:  [facility] CATH INVASIVE LOC[facility];  Service: Cardiology;  Laterality: Left;    CARDIAC CATHETERIZATION      with stent x1    CARDIAC CATHETERIZATION N/A 9/12/2024    Procedure: Left Heart Cath;  Surgeon: Ruben Adler MD;  Location:  [facility] CATH INVASIVE LOC[facility];  Service: Cardiology;  Laterality: N/A;    COLON RESECTION N/A 12/5/2023    Procedure: COLON RESECTION LAPAROSCOPIC SIGMOID WITH DAVINCI ROBOT, INTRA-OPERATIVE FLEXIBLE SIGMOIDOSCOPY, SPLENIC FLEXURE MOBILIZATION, OPEN UMBILICAL HERNIA REPAIR;  Surgeon: Oma Velasquez MD;  Location:  [facility] OR;  Service: Robotics - DaVinci;  Laterality: N/A;    COLONOSCOPY N/A 10/09/2023    Diverticulosis; One 5mm polyp in ascending colon; One 5mm polyp in transverse colon; One 10mm polyp at 65cm proximal to anus; One 20mm polyp at 65cm proximal to anus-Tattooed; One 20mm polyp at 42cm proximal to anus-Clip (MR conditional) placed-Tattooed; Rule out malignancy-Partially obstructing tumor in recto-sigmoid colon-biopsied-Tattooed; One 10mm polyp in rectum    ELBOW PROCEDURE      KNEE SURGERY      LUMBAR DISC SURGERY         Family History  Family History   Problem Relation Age of Onset    Esophageal cancer Mother     Heart disease Mother     Colon cancer Father 80    Heart disease Father     No Known Problems Sister     COPD Brother     Alcohol abuse Brother     Colon polyps Neg Hx     Liver cancer Neg Hx     Rectal cancer Neg  Hx     Stomach cancer Neg Hx     Liver disease Neg Hx        Social History  Social History     Socioeconomic History    Marital status:    Tobacco Use    Smoking status: Former     Current packs/day: 1.00     Average packs/day: 1 pack/day for 45.0 years (45.0 ttl pk-yrs)     Types: Cigarettes    Smokeless tobacco: Never   Vaping Use    Vaping status: Some Days    Substances: Nicotine, Flavoring    Devices: Disposable   Substance and Sexual Activity    Alcohol use: Not Currently    Drug use: Yes     Types: Marijuana    Sexual activity: Defer         Review of Systems:  History obtained from chart review and the patient  General ROS: No fever or chills  Respiratory ROS: No cough, +shortness of breath  Cardiovascular ROS: No chest pain or palpitations  Gastrointestinal ROS: No abdominal pain or melena  Genito-Urinary ROS: No dysuria or hematuria  Psych ROS: No anxiety and depression  14 point ROS reviewed with the patient and negative except as noted above and in the HPI unless unable to obtain.      Objective:  Patient Vitals for the past 24 hrs:   BP Temp Temp src Pulse Resp SpO2 Weight   10/13/24 1202 115/63 97.6 °F (36.4 °C) Oral 86 20 92 % --   10/13/24 0758 107/67 97.5 °F (36.4 °C) Oral 75 20 94 % --   10/13/24 0557 -- -- -- -- -- -- 126 kg (278 lb 9.6 oz)   10/13/24 0403 (!) 80/60 97.5 °F (36.4 °C) Oral 67 20 94 % --   10/12/24 2336 105/51 97.7 °F (36.5 °C) Oral 83 20 98 % --   10/12/24 1941 133/78 98.1 °F (36.7 °C) Oral 86 20 90 % --       Intake/Output Summary (Last 24 hours) at 10/13/2024 1600  Last data filed at 10/13/2024 0403  Gross per 24 hour   Intake 800 ml   Output --   Net 800 ml     General: Lethargic but arousable  Chest:  clear to auscultation bilaterally without respiratory distress  CVS: regular rate and rhythm  Abdominal: soft, nontender, positive bowel sounds  Extremities: tr ble edema  Skin: warm and dry without rash      Labs:  Results from last 7 days   Lab Units 10/13/24  5433  "10/12/24  0532   WBC 10*3/mm3 8.04 9.29   HEMOGLOBIN g/dL 11.1* 11.5*   HEMATOCRIT % 38.5 41.0   PLATELETS 10*3/mm3 251 286         Results from last 7 days   Lab Units 10/13/24  0424 10/12/24  0759 10/12/24  0532   SODIUM mmol/L 134*  --  142   SODIUM, ARTERIAL mmol/L  --  141  --    POTASSIUM mmol/L 5.9*  --  5.4*   CHLORIDE mmol/L 95*  --  98   CO2 mmol/L 30.0*  --  35.0*   BUN mg/dL 61*  --  45*   CREATININE mg/dL 2.16*  --  1.56*   CALCIUM mg/dL 8.4*  --  9.0   EGFR mL/min/1.73 33.6*  --  49.6*   BILIRUBIN mg/dL  --   --  0.6   ALK PHOS U/L  --   --  103   ALT (SGPT) U/L  --   --  13   AST (SGOT) U/L  --   --  14   GLUCOSE mg/dL 257*  --  202*       Radiology:   Imaging Results (Last 72 Hours)       Procedure Component Value Units Date/Time    XR Chest 1 View [[id number]] Collected: 10/12/24 0650     Updated: 10/12/24 0654    Narrative:      EXAMINATION: XR CHEST 1 VW- 10/12/2024 6:50 AM     HISTORY: CHF/COPD Protocol.     REPORT: A frontal view of the chest was obtained.     COMPARISON: Chest x-ray 9/28/2024.     The heart is enlarged, there is central and basilar vascular congestion  and mild pulmonary edema. The right pleural effusion appears slightly  increased in size but remains small. No pneumothorax is identified.  Moderate atelectasis in the lung bases greater on the right. No acute  osseous abnormality.       Impression:      Congestive heart failure with slight increase in the right  pleural effusion, moderate at basilar atelectasis greater on the right.     This report was signed and finalized on 10/12/2024 6:51 AM by Dr. Puneet Whitley MD.               Culture:  No results found for: \"BLOODCX\", \"URINECX\", \"WOUNDCX\", \"MRSACX\", \"RESPCX\", \"STOOLCX\"      Assessment   Acute kidney injury  Acute on chronic systolic congestive heart failure  Hypertension  Diabetes type 2  Acute on chronic hypercapnic respiratory failure  Hyperkalemia  Hyponatremia  Respiratory acidosis      Plan:  Discussed with " patient, nursing  Workup reviewed today  Monitor labs  Continue BiPAP and other ventilator adjustments  Lokelma for correction of hyperkalemia  Holding IV diuretics today given function pending further testing  Reassess in the morning      Thank you for the consult, we appreciate the opportunity to provide care to your patients.  Feel free to contact me if I can be of any further assistance.      Kt Whitaker MD  10/13/2024  16:00 CDT

## 2024-10-13 NOTE — PLAN OF CARE
Goal Outcome Evaluation:  Plan of Care Reviewed With: (P) patient        Progress: (P) no change  Outcome Evaluation: (P) PT eval complete. Pt was OxAx4. Pt reported that he had 6/10 pain in the anterior chest and stated that if felt like something was laying on his chest. Pt reported that he was independent prior to being in the hospital. Pt stated he was independent with ADL's, cooking, cleaning, driving, and shopping. Pt stated that he uses a SPC for ambulation to help with balance. Pt was sitting EOB when therapist arrived. Pt was able to go sit to stand with max x 1 assistance. Once pt was standing he was able to maintain balance with mod x 1 assistance. Pt was able to ambulate 5 ft with SPC. Pt would benfit from training with a walker rather than continuing to use the SPC. Pt was unsteady and rocking side to side with gait. He has short, uneven steps. Pt also demonstrates diminished LE strength. Pt would benefit from skilled physical therapy to improve LE strength, endurance, and ambuilation. DC recommendation SNF.    Anticipated Discharge Disposition (PT): (P) skilled nursing facility

## 2024-10-14 NOTE — PLAN OF CARE
Problem: Noninvasive Ventilation Acute  Goal: Effective Unassisted Ventilation and Oxygenation  Outcome: Progressing   Goal Outcome Evaluation:   Pt wears Bipap at hs.

## 2024-10-14 NOTE — NURSING NOTE
[facility]  INPATIENT WOUND & OSTOMY CARE    Today's Date: 10/14/24    Patient Name: Tucker Knox  MRN: [id number]  CSN: [id number]  PCP: Kt Alfredo MD  Attending Provider: Patricia Arthur DO  Length of Stay: 2    Rounded with VIV Fraga for initial wound care consult. See her consult note for details. Patient is currently sleeping in bed. No family at bedside.     Magic barrier cream ordered QID for scrotal and emili area.     Pressure injury prevention measures ordered per protocol due to patient being at risk for skin breakdown.    Apply silicone foam border dressing per protocol to sacral spine/bilateral heels for protection.  Nursing to change dressing every 3 days and PRN if soiled. Nursing is to peel back dressing with every assessment to assess skin underneath dressing. No barrier cream under dressing.     Patient educated on importance of turning and repositioning at frequent intervals to prevent skin breakdown.    Inpatient wound care will continue to follow during hospital stay.  Please contact if any issues or concerns arise.     This document has been electronically signed by Alexia Mcarthur RN on 10/14/2024 15:50 CDT

## 2024-10-14 NOTE — PLAN OF CARE
Goal Outcome Evaluation:  Plan of Care Reviewed With: patient           Outcome Evaluation: OT evaluation completed. Patient A&O x3. Patient with supplemental O2 ia nasal cannul;a 3L. Patient states pain is 6/10 through back and RLE. Patient found sitting EOB eating breakfast. Patient agreeable to participate. Patient BUE ROM WFL and strength found to be grossly 4/5. Patient completed sit>stand to SC with mod A. Patient completed functional mobility with min A and use of cane, assistance given for O2 tank. Patient completed bed<>bathroom ambulation. Patient completed stand>sit with min A. Patient completed sit>supine with CGA and VCs. Patient left R sidelying position with call light within reach and bed alarm on. HOB slgihtly raised. Continued OT recommended for ADL training, transfer training and endurance training. D/C recommendation to SNF for continued therapy.    Anticipated Discharge Disposition (OT): skilled nursing facility

## 2024-10-14 NOTE — PAYOR COMM NOTE
"10/14/24 [facility] [phone]  [phone]    ER ADMIT ON 10/12/24              Farhana Rossi (64 y.o. Male)       Date of Birth   1960    Social Security Number       Address   [address] [place] [address]    Home Phone   [phone]    MRN   [id number]       Buddhism   Other    Marital Status                               Admission Date   10/12/24    Admission Type   Emergency    Admitting Provider   Patricia Arthur DO    Attending Provider   Patricia Arthur DO    Department, Room/Bed   [facility] 4B, 449/1       Discharge Date       Discharge Disposition       Discharge Destination                                 Attending Provider: Patricia Arthur DO    Allergies: Penicillins    Isolation: None   Infection: None   Code Status: No CPR    Ht: 175.3 cm (69\")   Wt: 128 kg (283 lb 3.2 oz)    Admission Cmt: None   Principal Problem: Acute on chronic respiratory failure with hypoxia and hypercapnia [J96.21,J96.22]                   Active Insurance as of 10/12/2024       Primary Coverage       Payor Plan Insurance Group Employer/Plan Group    ANTHEM MEDICARE REPLACEMENT ANTHEM MEDICARE ADVANTAGE [id number]       Payor Plan Address Payor Plan Phone Number Payor Plan Fax Number Effective Dates    [address] [phone]  2/1/2024 - None Entered    [facility] [id number]         Subscriber Name Subscriber Birth Date Member ID       FARHANA ROSSI 1960 [id number]                     Emergency Contacts        (Rel.) Home Phone Work Phone Mobile Phone    alycia rossi (Son) [phone] -- --    sowmyajose (Sister) [phone] -- --             [facility] Encounter Date/Time: 10/12/2024 0510   Hospital Account: [id number]    MRN: [id number]   Patient:  Farhana Rossi   Contact Serial #: [id number]   SSN:          ENCOUNTER             Patient " Class: Inpatient   Unit: [facility] 4B   Hospital Service: Cardiology     Bed: 449/1   Admitting Provider: Patricia Arthur DO   Referring Physician:     Attending Provider: Patricia Arthur DO   Adm Diagnosis: Respiratory failure [J96*               PATIENT             Name: Farhana Norton : 1960 (64 yrs)   Address: [address] [place] [address] Sex: Male   City: [place] [address]   County: [place]   Marital Status:  Ethnicity: NOT                                                                         Race: WHITE   Primary Care Provider: Kt Alfredo* Patients Phone: Home Phone: [phone]     Mobile Phone: [phone]     EMERGENCY CONTACT   Contact Name Legal Guardian? Relationship to Patient Home Phone Work Phone Mobile Phone   1. ottoniel nortony  2. jose deng      Son  Sister [phone]             GUARANTOR             Guarantor: Farhana Norton     : 1960   Address: [address] [place] [address] Sex: [place] [address]     Relation to Patient: Self       Home Phone: [phone]   Guarantor ID: [id number]       Work Phone:     GUARANTOR EMPLOYER   Employer:           Status: DISABLED   COVERAGE          PRIMARY INSURANCE   Payor: ANTHEM MEDICARE REPLACEMENT Plan: ANTHEM MEDICARE ADVANTAGE   Group Number: [id number] Insurance Type: INDEMNITY   Subscriber Name: FARHANA NORTON Subscriber : 1960   Subscriber ID: [id number] Coverage Address: [place] [id number]  [place] [address]   Pat. Rel. to Subscriber: Self Coverage Phone: [phone]   SECONDARY INSURANCE   Payor: N/A Plan: N/A   Group Number:   Insurance Type:     Subscriber Name:   Subscriber :     Subscriber ID:   Coverage Address:     Pat. Rel. to Subscriber:   Coverage Phone:        Contact Serial # ([id number])         2024    Chart ID ([id number])              BRITT Stout DO   Physician  Hospitalist     H&P     Signed     Date of  Service: 10/12/24 1120  Creation Time: 10/12/24 1120     Signed       Expand All Collapse All         [facility] Medicine Services  HISTORY AND PHYSICAL     Date of Admission: 10/12/2024  Primary Care Physician: Kt Alfredo MD     Subjective   Primary Historian: Patient/EMS     Chief Complaint: Shortness of breath/AMS     History of Present Illness  Tucker Knox is a 64-year-old male with a past medical history of hypertension, type 2 diabetes mellitus, COPD, hyperlipidemia, CHF most recent echocardiogram in/10/2024 revealed an EF of 31-35% with moderately decreased left ventricular systolic function and left ventricular diastolic consistent with grade 2 new pseudonormalization, coronary artery disease, JOSE with noncompliance, colon cancer and significant medical noncompliance history.  Patient presented to [facility] emergency department today per EMS from nursing facility, they were called due to patient's shortness of breath, uncharacteristic aggressive behavior and confusion.  En route to the hospital paramedics gave magnesium 2 g, Solu-Medrol 125,  and albuterol x 5.  Initial ABG revealed hypercapnic respiratory failure pH of 7.26, pCO2 of 78.  Patient was placed on BiPAP, chest x-ray revealed congestive heart failure and worsening right sided pleural effusion.  He received 20 mg of IV Lasix and additional nebulizer treatments.  Was monitored for several hours repeat ABGs revealed compensation with pCO2 of 59.7, pO2 of 70.2 and a bicarb of 33.9.  Upon assessment patient is on BiPAP however is now alert and oriented and able to participate in assessment and history.  Patient states he has been taking all his medications now that he is at the nursing facility, patient states he has had increasing shortness of breath, abdominal tightness, with no changes in urinary status.  Additionally has extremely alessio low perfused legs with Doppler pulses present without  complaints of pain.     Initial troponin of 143 subsequent of 130 with a delta of -3, which is improved from his last admission on 9/28/2024 with a troponin of 548.  BNP of 14,625 which is improved from his discharge BNP of 18,381.  AMA stage I with baseline creatinine of 0.94, today 1.56.     Patient and his sister were made aware that he will remain on BiPAP until the a.m. when ABGs can be redone, patient may remove and be placed on O2 for eating.  Due to patient's AMA and lack of sniffing and improvement with Lasix in the past patient will be given 1 mg of Bumex every 12, bladder scan to evaluate any urinary retention, and will increase to Bumex gtt. if needed.  Has had thoracentesis in the past for pleural effusions, at this time patient does not require, will continue to follow closely.  Patient and sisters questions were answered to the best my ability and they are in agreement for continued evaluation and treatment.     Patient was discharged on 10/4/2024 after similar episode, extensive discussion with palliative care regarding hospice, patient was unable to make decision at that time.  Discussed at length with patient and sister today who would like to proceed with additional discussions on Monday with palliative care when sister can be back in person from [place].     Review of Systems   Constitutional:  Positive for activity change and fatigue.   Respiratory:  Positive for shortness of breath.    Cardiovascular:  Positive for leg swelling. Negative for chest pain.   Gastrointestinal:  Positive for diarrhea.   Neurological:  Positive for weakness.      Otherwise complete ROS reviewed and negative except as mentioned in the HPI.     Past Medical History:   Medical History        Past Medical History:   Diagnosis Date    Cancer      CHF (congestive heart failure)      COPD (chronic obstructive pulmonary disease)      Coronary artery disease       stent x 1    Family history of colon cancer       Hyperlipidemia      Hypertension      Sleep apnea       does not wear machine, supposed to wear cpap with oxygen and does not wear it    Type 2 diabetes mellitus           Past Surgical History:  Surgical History         Past Surgical History:   Procedure Laterality Date    BACK SURGERY        CARDIAC CATHETERIZATION Left 04/13/2022     Procedure: Cardiac Catheterization/Vascular Study;  Surgeon: Todd Avendano MD;  Location:  [facility] CATH INVASIVE LOC[facility];  Service: Cardiology;  Laterality: Left;    CARDIAC CATHETERIZATION         with stent x1    CARDIAC CATHETERIZATION N/A 9/12/2024     Procedure: Left Heart Cath;  Surgeon: Ruben Adler MD;  Location:  [facility] CATH INVASIVE LOCATION;  Service: Cardiology;  Laterality: N/A;    COLON RESECTION N/A 12/5/2023     Procedure: COLON RESECTION LAPAROSCOPIC SIGMOID WITH DAVINCI ROBOT, INTRA-OPERATIVE FLEXIBLE SIGMOIDOSCOPY, SPLENIC FLEXURE MOBILIZATION, OPEN UMBILICAL HERNIA REPAIR;  Surgeon: Oma Velasquez MD;  Location:  [facility] OR;  Service: Robotics - DaVinci;  Laterality: N/A;    COLONOSCOPY N/A 10/09/2023     Diverticulosis; One 5mm polyp in ascending colon; One 5mm polyp in transverse colon; One 10mm polyp at 65cm proximal to anus; One 20mm polyp at 65cm proximal to anus-Tattooed; One 20mm polyp at 42cm proximal to anus-Clip (MR conditional) placed-Tattooed; Rule out malignancy-Partially obstructing tumor in recto-sigmoid colon-biopsied-Tattooed; One 10mm polyp in rectum    ELBOW PROCEDURE        KNEE SURGERY        LUMBAR DISC SURGERY             Social History:  reports that he has quit smoking. His smoking use included cigarettes. He has a 45 pack-year smoking history. He has never used smokeless tobacco. He reports that he does not currently use alcohol. He reports current drug use. Drug: Marijuana.     Family History: family history includes Alcohol abuse in his brother; COPD in his brother; Colon cancer (age of onset: 80) in his father; Esophageal cancer  in his mother; Heart disease in his father and mother; No Known Problems in his sister.        Allergies:  Allergies         Allergies   Allergen Reactions    Penicillins Unknown - Low Severity       Pt states happened when child does not know             Medications:          Prior to Admission medications    Medication Sig Start Date End Date Taking? Authorizing Provider   acetaminophen (TYLENOL) 325 MG tablet Take 2 tablets by mouth Every 6 (Six) Hours As Needed for Mild Pain. 10/4/24     Sea Joiner MD   albuterol sulfate  (90 Base) MCG/ACT inhaler Inhale 2 puffs Every 4 (Four) Hours As Needed for Wheezing. 9/18/24     Zoey Schwartz MD   aspirin 81 MG EC tablet Take 1 tablet by mouth Daily. 12/27/21     David Cadena DO   dapagliflozin Propanediol (Farxiga) 10 MG tablet Take 10 mg by mouth Daily. 9/18/24     Zoey Schwartz MD   DULoxetine (CYMBALTA) 30 MG capsule Take 1 capsule by mouth Daily. 9/18/24     Zoey Schwartz MD   furosemide (LASIX) 40 MG tablet Take 0.5 tablets by mouth Daily. 10/4/24     Sea Joiner MD   gabapentin (NEURONTIN) 600 MG tablet Take 1 tablet by mouth 2 (Two) Times a Day. Patient states he takes 800 mg bid 9/18/24     Zoey Schwartz MD   insulin detemir (LEVEMIR) 100 UNIT/ML injection Inject 14 Units under the skin into the appropriate area as directed 2 (Two) Times a Day.       ProviderAmelia MD   metFORMIN (GLUCOPHAGE) 1000 MG tablet Take 1 tablet by mouth 2 (Two) Times a Day With Meals.       ProviderAmelia MD   metoprolol succinate XL (TOPROL-XL) 25 MG 24 hr tablet Take 1 tablet by mouth Daily. 9/18/24     Zoey Schwartz MD   nicotine (NICODERM CQ) 21 MG/24HR patch Place 1 patch on the skin as directed by provider Daily. 10/5/24     Sea Joiner MD   nitroglycerin (NITROSTAT) 0.4 MG SL tablet Place 1 tablet under the tongue Every 5 (Five) Minutes As Needed for Chest Pain. Take no more than 3  "doses in 15 minutes.       ProviderAmelia MD   polyethylene glycol (MIRALAX) 17 GM/SCOOP powder Take 17 g by mouth Daily.       Amelia Pavon MD   pravastatin (PRAVACHOL) 40 MG tablet Take 1 tablet by mouth Every Night. 9/18/24     Zoey Schwartz MD   QUEtiapine (SEROquel) 25 MG tablet Take 1 tablet by mouth Every Night. 10/4/24     Sea Joiner MD   sacubitril-valsartan (Entresto) 24-26 MG tablet Take 1 tablet by mouth 2 (Two) Times a Day. 9/18/24     Zoey Schwartz MD   Tradjenta 5 MG tablet tablet Take 1 tablet by mouth Daily. 8/15/23     ProviderAmelia MD      I have utilized all available immediate resources to obtain, update, or review the patient's current medications (including all prescriptions, over-the-counter products, herbals, cannabis/cannabidiol products, and vitamin/mineral/dietary (nutritional) supplements).     Objective      Vital Signs: /96 (BP Location: Left arm, Patient Position: Lying)   Pulse 94   Temp 97.1 °F (36.2 °C) (Oral)   Resp 22   Ht 175.3 cm (69\")   Wt 125 kg (276 lb 1.6 oz)   SpO2 97%   BMI 40.77 kg/m²   Physical Exam  Vitals and nursing note reviewed.   Constitutional:       General: He is not in acute distress.     Appearance: He is morbidly obese. He is ill-appearing.      Comments: BiPAP in place   HENT:      Head: Normocephalic and atraumatic.      Nose: Nose normal. No congestion or rhinorrhea.      Mouth/Throat:      Mouth: Mucous membranes are moist.      Pharynx: Oropharynx is clear.   Eyes:      Pupils: Pupils are equal, round, and reactive to light.   Cardiovascular:      Rate and Rhythm: Normal rate.      Pulses: Normal pulses.           Dorsalis pedis pulses are detected w/ Doppler on the right side and detected w/ Doppler on the left side.        Posterior tibial pulses are detected w/ Doppler on the right side and detected w/ Doppler on the left side.      Heart sounds: Normal heart sounds.   Pulmonary:      " Effort: Accessory muscle usage present. No tachypnea or respiratory distress.      Breath sounds: Examination of the right-upper field reveals decreased breath sounds. Examination of the left-upper field reveals decreased breath sounds. Examination of the right-middle field reveals rhonchi. Examination of the left-middle field reveals rhonchi. Examination of the right-lower field reveals rhonchi. Examination of the left-lower field reveals rhonchi. Decreased breath sounds present.   Abdominal:      General: Abdomen is protuberant. Bowel sounds are normal. There is distension.      Tenderness: There is no abdominal tenderness.   Genitourinary:     Comments: Voiding per urinal  Musculoskeletal:      Cervical back: Normal range of motion and neck supple.      Right lower leg: Edema present.      Left lower leg: Edema present.      Comments: Generalized weakness   Skin:     General: Skin is warm and cool.      Capillary Refill: Capillary refill takes less than 2 seconds.      Coloration: Skin is pale.      Comments: Extremely ready BLE   Neurological:      Mental Status: He is alert and oriented to person, place, and time.   Psychiatric:         Attention and Perception: Attention normal.         Mood and Affect: Mood normal.         Speech: Speech is delayed.         Behavior: Behavior is cooperative.         Cognition and Memory: Cognition is impaired. Memory is impaired.         Results Reviewed:  Lab Results (last 24 hours)         Procedure Component Value Units Date/Time     Blood Gas, Arterial With Co-Ox [[id number]]  (Abnormal) Collected: 10/12/24 0759     Specimen: Arterial Blood Updated: 10/12/24 1100       Site Right Radial       Jorge's Test Positive       pH, Arterial 7.265 pH units         Comment: 84 Value below reference range          pCO2, Arterial 79.4 mm Hg         Comment: 86 Value above critical limit          pO2, Arterial 93.7 mm Hg         HCO3, Arterial 36.0 mmol/L         Comment: 83 Value  above reference range          Base Excess, Arterial 6.7 mmol/L         Comment: 83 Value above reference range          O2 Saturation, Arterial 96.8 %         Hemoglobin, Blood Gas 11.6 g/dL         Comment: 84 Value below reference range          Hematocrit, Blood Gas 35.5 %         Comment: 84 Value below reference range          Oxyhemoglobin 95.0 %         Methemoglobin 0.60 %         Carboxyhemoglobin 1.3 %         Temperature 37.0       Sodium, Arterial 141 mmol/L         Potassium, Arterial 5.5 mmol/L         Comment: 83 Value above reference range          Barometric Pressure for Blood Gas 756 mmHg         Modality BiPap       FIO2 70 %         Ventilator Mode NIV       Set [facility] Resp Rate 10       Comment: Corrected for Gwen Lund CRT   Corrected result. Previous result was 14.0 on 10/12/2024 at 0800 CDT.          IPAP 14       Comment: Entered for Gwen Lund CRT   Appended report. These results have been appended to a previously final verified report.          EPAP 7       Comment: Entered for Gwen Lund CRT   Appended report. These results have been appended to a previously final verified report.          Notified Who DR JUSTICE       Notified By Gwen Lund CRT       Notified Time 10/12/2024 08:00       Collected by [id number]       Comment: Meter: [id number]     :  Gwen Lund CRT           pH, Temp Corrected 7.265 pH Units         pCO2, Temperature Corrected 79.4 mm Hg         pO2, Temperature Corrected 93.7 mm Hg       Blood Gas, Arterial - [[id number]]  (Abnormal) Collected: 10/12/24 1048     Specimen: Arterial Blood Updated: 10/12/24 1048       Site Right Radial       Jorge's Test Positive       pH, Arterial 7.362 pH units         pCO2, Arterial 59.7 mm Hg         Comment: 83 Value above reference range          pO2, Arterial 70.2 mm Hg         Comment: 84 Value below reference range          HCO3, Arterial 33.9 mmol/L         Comment: 83 Value above  reference range          Base Excess, Arterial 6.9 mmol/L         Comment: 83 Value above reference range          O2 Saturation, Arterial 94.5 %         Temperature 37.0       Barometric Pressure for Blood Gas 755 mmHg         Modality BiPap       FIO2 40 %         Ventilator Mode NIV       Set [facility] Resp Rate 22.0       IPAP 20       Comment: Meter: [id number]     :  Gwen Lund, CRT           EPAP 8       Collected by [id number]       pCO2, Temperature Corrected 59.7 mm Hg         pH, Temp Corrected 7.362 pH Units         pO2, Temperature Corrected 70.2 mm Hg         PO2/FIO2 176     Respiratory Panel PCR w/COVID-19(SARS-CoV-2) [facility]IP/[facility]I/[facility]Y In-House, NP Swab in UTM/VTM, 2 HR TAT - Swab, Nasopharynx [[id number]]  (Normal) Collected: 10/12/24 0833     Specimen: Swab from Nasopharynx Updated: 10/12/24 0924       ADENOVIRUS, PCR Not Detected       Coronavirus 229E Not Detected       Coronavirus HKU1 Not Detected       Coronavirus NL63 Not Detected       Coronavirus OC43 Not Detected       COVID19 Not Detected       Human Metapneumovirus Not Detected       Human Rhinovirus/Enterovirus Not Detected       Influenza A PCR Not Detected       Influenza B PCR Not Detected       Parainfluenza Virus 1 Not Detected       Parainfluenza Virus 2 Not Detected       Parainfluenza Virus 3 Not Detected       Parainfluenza Virus 4 Not Detected       RSV, PCR Not Detected       Bordetella pertussis pcr Not Detected       Bordetella parapertussis PCR Not Detected       Chlamydophila pneumoniae PCR Not Detected       Mycoplasma pneumo by PCR Not Detected     Narrative:       In the setting of a positive respiratory panel with a viral infection PLUS a negative procalcitonin without other underlying concern for bacterial infection, consider observing off antibiotics or discontinuation of antibiotics and continue supportive care. If the respiratory panel is positive for atypical bacterial infection (Bordetella  pertussis, Chlamydophila pneumoniae, or Mycoplasma pneumoniae), consider antibiotic de-escalation to target atypical bacterial infection.     High Sensitivity Troponin T 2Hr [[id number]]  (Abnormal) Collected: 10/12/24 0730     Specimen: Blood Updated: 10/12/24 0802       HS Troponin T 130 ng/L         Troponin T Delta -13 ng/L       Narrative:       High Sensitive Troponin T Reference Range:  <14.0 ng/L- Negative Female for AMI  <22.0 ng/L- Negative Male for AMI  >=14 - Abnormal Female indicating possible myocardial injury.  >=22 - Abnormal Male indicating possible myocardial injury.   Clinicians would have to utilize clinical acumen, EKG, Troponin, and serial changes to determine if it is an Acute Myocardial Infarction or myocardial injury due to an underlying chronic condition.           Comprehensive Metabolic Panel [[id number]]  (Abnormal) Collected: 10/12/24 0532     Specimen: Blood from Arm, Left Updated: 10/12/24 0617       Glucose 202 mg/dL         BUN 45 mg/dL         Creatinine 1.56 mg/dL         Sodium 142 mmol/L         Potassium 5.4 mmol/L         Comment: Slight hemolysis detected by analyzer. Result may be falsely elevated.          Chloride 98 mmol/L         CO2 35.0 mmol/L         Calcium 9.0 mg/dL         Total Protein 6.8 g/dL         Albumin 4.0 g/dL         ALT (SGPT) 13 U/L         AST (SGOT) 14 U/L         Alkaline Phosphatase 103 U/L         Total Bilirubin 0.6 mg/dL         Globulin 2.8 gm/dL         A/G Ratio 1.4 g/dL         BUN/Creatinine Ratio 28.8       Anion Gap 9.0 mmol/L         eGFR 49.6 mL/min/1.73       Narrative:       GFR Normal >60  Chronic Kidney Disease <60  Kidney Failure <15        Single High Sensitivity Troponin T [[id number]]  (Abnormal) Collected: 10/12/24 0532     Specimen: Blood from Arm, Left Updated: 10/12/24 0615       HS Troponin T 143 ng/L       Narrative:       High Sensitive Troponin T Reference Range:  <14.0 ng/L- Negative Female for AMI  <22.0 ng/L- Negative  Male for AMI  >=14 - Abnormal Female indicating possible myocardial injury.  >=22 - Abnormal Male indicating possible myocardial injury.   Clinicians would have to utilize clinical acumen, EKG, Troponin, and serial changes to determine if it is an Acute Myocardial Infarction or myocardial injury due to an underlying chronic condition.           BNP [[id number]]  (Abnormal) Collected: 10/12/24 0532     Specimen: Blood from Arm, Left Updated: 10/12/24 0615       proBNP 14,625.0 pg/mL       Narrative:       This assay is used as an aid in the diagnosis of individuals suspected of having heart failure. It can be used as an aid in the diagnosis of acute decompensated heart failure (ADHF) in patients presenting with signs and symptoms of ADHF to the emergency department (ED). In addition, NT-proBNP of <300 pg/mL indicates ADHF is not likely.     Age Range         Result Interpretation  NT-proBNP Concentration (pg/mL:        <50             Positive            >450                         Hu                           300-450                           Negative               <300     50-75           Positive            >900                  Gray                300-900                  Negative            <300        >75             Positive            >1800                  Gray                300-1800                  Negative            <300     Magnesium [[id number]]  (Abnormal) Collected: 10/12/24 0532     Specimen: Blood from Arm, Left Updated: 10/12/24 0612       Magnesium 2.6 mg/dL       CBC & Differential [[id number]]  (Abnormal) Collected: 10/12/24 0532     Specimen: Blood from Arm, Left Updated: 10/12/24 0550     Narrative:       The following orders were created for panel order CBC & Differential.  Procedure                               Abnormality         Status                     ---------                               -----------         ------                     CBC Auto Differential[[id number]]         Abnormal            Final result                  Please view results for these tests on the individual orders.     CBC Auto Differential [[id number]]  (Abnormal) Collected: 10/12/24 0532     Specimen: Blood from Arm, Left Updated: 10/12/24 0550       WBC 9.29 10*3/mm3         RBC 4.18 10*6/mm3         Hemoglobin 11.5 g/dL         Hematocrit 41.0 %         MCV 98.1 fL         MCH 27.5 pg         MCHC 28.0 g/dL         RDW 15.4 %         RDW-SD 55.4 fl         MPV 10.6 fL         Platelets 286 10*3/mm3         Neutrophil % 78.9 %         Lymphocyte % 10.2 %         Monocyte % 8.6 %         Eosinophil % 1.1 %         Basophil % 0.6 %         Immature Grans % 0.6 %         Neutrophils, Absolute 7.32 10*3/mm3         Lymphocytes, Absolute 0.95 10*3/mm3         Monocytes, Absolute 0.80 10*3/mm3         Eosinophils, Absolute 0.10 10*3/mm3         Basophils, Absolute 0.06 10*3/mm3         Immature Grans, Absolute 0.06 10*3/mm3         nRBC 0.0 /100 WBC       Foster Draw [[id number]] Collected: 10/12/24 0532     Specimen: Blood from Arm, Left Updated: 10/12/24 0546     Narrative:       The following orders were created for panel order Foster Draw.  Procedure                               Abnormality         Status                     ---------                               -----------         ------                     Green Top (Gel)[[id number]]                                  Final result               Lavender Top[[id number]]                                     Final result               Red Top[[id number]]                                          Final result               Light Blue Top[[id number]]                                   Final result                  Please view results for these tests on the individual orders.     Green Top (Gel) [[id number]] Collected: 10/12/24 0532     Specimen: Blood from Arm, Left Updated: 10/12/24 0546       Extra Tube Hold for add-ons.       Comment: Auto resulted.        Lavender Top  [[id number]] Collected: 10/12/24 0532     Specimen: Blood from Arm, Left Updated: 10/12/24 0546       Extra Tube hold for add-on       Comment: Auto resulted        Red Top [[id number]] Collected: 10/12/24 0532     Specimen: Blood from Arm, Left Updated: 10/12/24 0546       Extra Tube Hold for add-ons.       Comment: Auto resulted.        Light Blue Top [[id number]] Collected: 10/12/24 0532     Specimen: Blood from Arm, Left Updated: 10/12/24 0546       Extra Tube Hold for add-ons.       Comment: Auto resulted        Blood Gas, Arterial With Co-Ox [[id number]]  (Abnormal) Collected: 10/12/24 0522     Specimen: Arterial Blood Updated: 10/12/24 0522       Site Right Radial       Jorge's Test Positive       pH, Arterial 7.268 pH units         Comment: 84 Value below reference range          pCO2, Arterial 78.1 mm Hg         Comment: 86 Value above critical limit          pO2, Arterial 70.2 mm Hg         Comment: 84 Value below reference range          HCO3, Arterial 35.7 mmol/L         Comment: 83 Value above reference range          Base Excess, Arterial 6.5 mmol/L         Comment: 83 Value above reference range          O2 Saturation, Arterial 92.0 %         Comment: 84 Value below reference range          Hemoglobin, Blood Gas 11.5 g/dL         Comment: 84 Value below reference range          Hematocrit, Blood Gas 35.3 %         Comment: 84 Value below reference range          Oxyhemoglobin 91.4 %         Comment: 84 Value below reference range          Methemoglobin 0.00 %         Comment: 84 Value below reference range          Carboxyhemoglobin 1.3 %         Temperature 37.0       Sodium, Arterial 141 mmol/L         Potassium, Arterial 5.2 mmol/L         Comment: 83 Value above reference range          Barometric Pressure for Blood Gas 756 mmHg         Modality Nasal Cannula       FIO2 44 %         Flow Rate 6.0 lpm         Ventilator Mode NA       Notified Who MD BARBOSA       Notified By [email or web address]       Notified Time  10/12/2024 05:22       Collected by [id number]       Comment: Meter: [id number]     :  [email or web address]          pH, Temp Corrected 7.268 pH Units         pCO2, Temperature Corrected 78.1 mm Hg         pO2, Temperature Corrected 70.2 mm Hg               Imaging Results (Last 24 Hours)         Procedure Component Value Units Date/Time     XR Chest 1 View [[id number]] Collected: 10/12/24 0650       Updated: 10/12/24 0654     Narrative:       EXAMINATION: XR CHEST 1 VW- 10/12/2024 6:50 AM     HISTORY: CHF/COPD Protocol.     REPORT: A frontal view of the chest was obtained.     COMPARISON: Chest x-ray 9/28/2024.     The heart is enlarged, there is central and basilar vascular congestion  and mild pulmonary edema. The right pleural effusion appears slightly  increased in size but remains small. No pneumothorax is identified.  Moderate atelectasis in the lung bases greater on the right. No acute  osseous abnormality.        Impression:       Congestive heart failure with slight increase in the right  pleural effusion, moderate at basilar atelectasis greater on the right.     This report was signed and finalized on 10/12/2024 6:51 AM by Dr. Puneet Whitley MD.                      Assessment / Plan   Assessment:        Active Hospital Problems     Diagnosis      **Acute on chronic respiratory failure with hypoxia and hypercapnia      Stage 1 acute kidney injury      Acute on chronic HFrEF (heart failure with reduced ejection fraction)      Colon adenocarcinoma      Pulmonary HTN      Diabetes mellitus      Hypertension           Treatment Plan  The patient will be admitted to Dr. Stout's service here at [facility].     1..  Acute on chronic respiratory failure with hypoxia and hypercapnia/pulmonary hypertension-patient initially presented with acute mental status changes and significant shortness of breath, initial ABGs revealed pH of 7.2, pCO2 of 78.1, pO2 of 70.2, and a bicarb of 35.7.  Patient was  placed on BiPAP with resolution and compensation to a pCO2 of 59.7 and pO2 of 70.2.  Patient remains on BiPAP at this time will reevaluate in the a.m.  Continue nebulizers, incentive spirometry, will reevaluate oxygenation needs in the a.m., pulmonary hygiene and ABGs as needed.     2.  Stage I acute kidney injury-baseline creatinine of 0.96, currently at 1.56, will hold Entresto, follow with daily BMP.     3.  Acute on chronic HFrEF-continue heart failure pathway including strict intake and output, daily weights, Reds vest reading pending, initiate Bumex 1 mg every 12 hours may increase to gtt. depending on results.     4.  Colon cancer-patient currently being treated by Dr. Mondragon, unsure of completion of full cycles due to patient canceling multiple appointments.  Will discuss further with patient in the a.m. when he is more alert.     5.  Diabetes mellitus-A1c pending, initiate Accu-Cheks before meals and at bedtime with SSI.     6.  Hypertension- will continue to hold Entresto due to AMA and hypotension.  Continue metoprolol, every 4 vital signs.     Hyperkalemia at 5.4, Lokelma x 1 given.     VTE prophylaxis with Lovenox  Labs in a.m.  Medical Decision Making  Acute on chronic respiratory failure, acute on chronic, high complexity, unchanged  Stage I acute kidney injury, acute on chronic, high complexity, unchanged  Acute on chronic HFrEF, chronic, high complexity, worsening  Colon cancer, chronic, moderate complexity, unchanged  Diabetes mellitus, chronic, moderate complexity, unchanged  Hypertension, chronic, moderate complexity, unchanged  Number and Complexity of problems: 7  Differential Diagnosis: None     Conditions and Status        Condition is unchanged.     [facility] Data  External documents reviewed: None  Cardiac tracing (EKG, telemetry) interpretation: 10/12/2024 EKG sinus rhythm with fusion complexes, right bundle brianna block, previous anteroseptal infarct, ventricular rate of 100, atrial rate of  100, QTc of 485  Radiology interpretation: 10/12/2024 chest x-ray per radiology reviewed  Labs reviewed: 10/12/2024 ABG, CBC, CMP, troponin, magnesium, BNP reviewed, repeat labs in a.m.  Any tests that were considered but not ordered: None     Decision rules/scores evaluated (example [id number], Wells, etc): None     Discussed with: Dr. Stout, his Sister Oxana and patient     Care Planning  Shared decision making: After Girish his Sister Oxana and patient  Code status and discussions: DNR/DNI per patient     Disposition  Social Determinants of Health that impact treatment or disposition: Patient will be [facility] and rehab at discharge on palliative versus hospice care.  Estimated length of stay is determined.      I confirmed that the patient's advanced care plan is present, code status is documented, and a surrogate decision maker is listed in the patient's medical record.      The patient's surrogate decision maker is Sister Oxana.      The patient was seen and examined by me on 10/12/2024 at 1235.     Electronically signed by VIV Hoyos, 10/12/24, 12:35 CDT.     I performed a substantive part of the MDM during the patient’s E/M visit. I personally evaluated and examined the patient. I personally made or approved the documented management plan.      Recently in the hospital from 9/28-10/4.  Discharged by Dr. Joiner.      There was some consideration into transitioning to hospice/palliative care on his previous admission.  This is going to be discussed again.     In the meantime, we will proceed with the above and tried to get him feeling better.      Electronically signed by BRITT Stout DO, 10/12/2024, 14:08 CDT.                         Kt Whitaker MD   Physician  Nephrology     Consults     Signed     Date of Service: 10/13/24 1600  Creation Time: 10/13/24 1600  Consult Orders   Inpatient Nephrology Consult [[id number]] ordered by BRITT Stout  DO Artie at 10/13/24 1335          Signed       Expand All Collapse All    Nephrology ([facility]) Consult Note        Patient:  Tucker Knox  YOB: 1960  Date of Service: 10/13/2024  MRN: [id number]        Acct: [id number]      Primary Care Physician: Kt Alfredo MD  Advance Directive:       Code Status and Medical Interventions: No CPR (Do Not Attempt to Resuscitate); Limited Support; No intubation (DNI)   Ordered at: 10/12/24 1122     Medical Intervention Limits:     No intubation (DNI)     Code Status (Patient has no pulse and is not breathing):     No CPR (Do Not Attempt to Resuscitate)     Medical Interventions (Patient has pulse or is breathing):     Limited Support      Admit Date: 10/12/2024                      Hospital Day: 1  Referring Provider: No ref. provider found        Patient personally seen and examined.  Complete chart including Consults, Notes, Operative Reports, Labs, Cardiology, and Radiology studies reviewed as able.           Subjective:  Tucker Knox is a 64 y.o. male for whom we were consulted for evaluation and treatment of acute kidney injury.  Patient recalled no prior nephrologic evaluations.  Denied current chest pain, nausea or vomiting.  Was lethargic but arousable on BiPAP.  Denied dysuria, hematuria, hemoptysis, rash.  Reported no NSAID usage aside from aspirin.  Held Entresto and Bumex due to worsening renal function.     Allergies:  Penicillins     Home Meds:  Prescriptions Prior to Admission           Medications Prior to Admission   Medication Sig Dispense Refill Last Dose/Taking    acetaminophen (TYLENOL) 325 MG tablet Take 2 tablets by mouth Every 6 (Six) Hours As Needed for Mild Pain. 60 tablet 0 Past Week    albuterol sulfate  (90 Base) MCG/ACT inhaler Inhale 2 puffs Every 4 (Four) Hours As Needed for Wheezing. 8 g 0 Past Week    aspirin 81 MG EC tablet Take 1 tablet by mouth Daily. 100  tablet 2 10/11/2024 Morning    DULoxetine (CYMBALTA) 30 MG capsule Take 1 capsule by mouth Daily. 30 capsule 0 10/11/2024 Morning    gabapentin (NEURONTIN) 600 MG tablet Take 1 tablet by mouth 2 (Two) Times a Day. Patient states he takes 800 mg bid 60 tablet 0 10/11/2024    metoprolol succinate XL (TOPROL-XL) 25 MG 24 hr tablet Take 1 tablet by mouth Daily. 30 tablet 0 10/11/2024    pravastatin (PRAVACHOL) 40 MG tablet Take 1 tablet by mouth Every Night. 30 tablet 0 10/11/2024    dapagliflozin Propanediol (Farxiga) 10 MG tablet Take 10 mg by mouth Daily. 30 tablet 0 Unknown    furosemide (LASIX) 40 MG tablet Take 0.5 tablets by mouth Daily. 30 tablet 0 Unknown    insulin detemir (LEVEMIR) 100 UNIT/ML injection Inject 14 Units under the skin into the appropriate area as directed 2 (Two) Times a Day.     Unknown    metFORMIN (GLUCOPHAGE) 1000 MG tablet Take 1 tablet by mouth 2 (Two) Times a Day With Meals.     Unknown    nicotine (NICODERM CQ) 21 MG/24HR patch Place 1 patch on the skin as directed by provider Daily. 14 patch 0 Unknown    nitroglycerin (NITROSTAT) 0.4 MG SL tablet Place 1 tablet under the tongue Every 5 (Five) Minutes As Needed for Chest Pain. Take no more than 3 doses in 15 minutes.     Unknown    polyethylene glycol (MIRALAX) 17 GM/SCOOP powder Take 17 g by mouth Daily.     Unknown    QUEtiapine (SEROquel) 25 MG tablet Take 1 tablet by mouth Every Night. 30 tablet 0 Unknown    sacubitril-valsartan (Entresto) 24-26 MG tablet Take 1 tablet by mouth 2 (Two) Times a Day. 60 tablet 0 Unknown    Tradjenta 5 MG tablet tablet Take 1 tablet by mouth Daily.     Unknown            Medicines:  Current Medications             Current Facility-Administered Medications   Medication Dose Route Frequency Provider Last Rate Last Admin    acetaminophen (TYLENOL) tablet 650 mg  650 mg Oral Q6H PRN BRITT Stout DO   650 mg at 10/13/24 0851    albuterol (PROVENTIL) nebulizer solution 0.042% 1.25 mg/3mL  1.25 mg  Nebulization Q6H PRN Jazmin Garrison APRN        aspirin EC tablet 81 mg  81 mg Oral Daily Jazmin Garrison APRN   81 mg at 10/13/24 0851    [Held by provider] bumetanide (BUMEX) injection 1 mg  1 mg Intravenous Q12H Jazmin Garrison APRN   1 mg at 10/13/24 1225    calcium carbonate (TUMS) chewable tablet 500 mg (200 mg elemental)  2 tablet Oral TID PRN Karen Parekh DO        dextrose (D50W) (25 g/50 mL) IV injection 25 g  25 g Intravenous Q15 Min PRN Jazmin Garrison APRN        dextrose (GLUTOSE) oral gel 15 g  15 g Oral Q15 Min PRN Jazmin Garrison APRN        DULoxetine (CYMBALTA) DR capsule 30 mg  30 mg Oral Daily Jazmin Garrison APRN   30 mg at 10/13/24 0851    Enoxaparin Sodium (LOVENOX) syringe 40 mg  40 mg Subcutaneous Q12H Jazmin Garrison APRN   40 mg at 10/13/24 0852    gabapentin (NEURONTIN) capsule 600 mg  600 mg Oral Q12H BRITT Stout DO   600 mg at 10/13/24 0851    glucagon (GLUCAGEN) injection 1 mg  1 mg Intramuscular Q15 Min PRN Jazmin Garrison APRN        insulin glargine (LANTUS, SEMGLEE) injection 14 Units  14 Units Subcutaneous Q12H Jazmin Garrison APRN   14 Units at 10/13/24 0852    Insulin Lispro (humaLOG) injection 2-7 Units  2-7 Units Subcutaneous 4x Daily AC & at Bedtime Jazmin Garrison APRN   2 Units at 10/13/24 1225    metoprolol succinate XL (TOPROL-XL) 24 hr tablet 25 mg  25 mg Oral Q24H Jazmin Garrison APRN   25 mg at 10/13/24 0851    nitroglycerin (NITROSTAT) SL tablet 0.4 mg  0.4 mg Sublingual Q5 Min PRN Jazmin Garrison APRN        pravastatin (PRAVACHOL) tablet 40 mg  40 mg Oral Nightly Jazmin Garrison APRN   40 mg at 10/12/24 1939    QUEtiapine (SEROquel) tablet 25 mg  25 mg Oral Nightly Jazmin APRN   25 mg at 10/12/24 1940    [Held by provider] sacubitril-valsartan (ENTRESTO) 24-26 MG tablet 1 tablet  1 tablet Oral BID Jazmin Garrison APRN        sodium chloride 0.9 % flush 10 mL  10 mL Intravenous PRN Vijay Leary MD   10 mL  at 10/12/24 0731    sodium chloride 0.9 % flush 10 mL  10 mL Intravenous PRN Vijay Leary MD   10 mL at 10/12/24 0731            Past Medical History:  Medical History        Past Medical History:   Diagnosis Date    Cancer      CHF (congestive heart failure)      COPD (chronic obstructive pulmonary disease)      Coronary artery disease       stent x 1    Family history of colon cancer      Hyperlipidemia      Hypertension      Sleep apnea       does not wear machine, supposed to wear cpap with oxygen and does not wear it    Type 2 diabetes mellitus              Past Surgical History:  Surgical History         Past Surgical History:   Procedure Laterality Date    BACK SURGERY        CARDIAC CATHETERIZATION Left 04/13/2022     Procedure: Cardiac Catheterization/Vascular Study;  Surgeon: Todd Avendano MD;  Location:  [facility] CATH INVASIVE LOCATION;  Service: Cardiology;  Laterality: Left;    CARDIAC CATHETERIZATION         with stent x1    CARDIAC CATHETERIZATION N/A 9/12/2024     Procedure: Left Heart Cath;  Surgeon: Ruben Adler MD;  Location:  [facility] CATH INVASIVE LOCATION;  Service: Cardiology;  Laterality: N/A;    COLON RESECTION N/A 12/5/2023     Procedure: COLON RESECTION LAPAROSCOPIC SIGMOID WITH DAVINCI ROBOT, INTRA-OPERATIVE FLEXIBLE SIGMOIDOSCOPY, SPLENIC FLEXURE MOBILIZATION, OPEN UMBILICAL HERNIA REPAIR;  Surgeon: Oma Velasquez MD;  Location:  [facility] OR;  Service: Robotics - DaVinci;  Laterality: N/A;    COLONOSCOPY N/A 10/09/2023     Diverticulosis; One 5mm polyp in ascending colon; One 5mm polyp in transverse colon; One 10mm polyp at 65cm proximal to anus; One 20mm polyp at 65cm proximal to anus-Tattooed; One 20mm polyp at 42cm proximal to anus-Clip (MR conditional) placed-Tattooed; Rule out malignancy-Partially obstructing tumor in recto-sigmoid colon-biopsied-Tattooed; One 10mm polyp in rectum    ELBOW PROCEDURE        KNEE SURGERY        LUMBAR DISC SURGERY                Family History         Family History   Problem Relation Age of Onset    Esophageal cancer Mother      Heart disease Mother      Colon cancer Father 80    Heart disease Father      No Known Problems Sister      COPD Brother      Alcohol abuse Brother      Colon polyps Neg Hx      Liver cancer Neg Hx      Rectal cancer Neg Hx      Stomach cancer Neg Hx      Liver disease Neg Hx           Social History  Social History   Social History            Socioeconomic History    Marital status:    Tobacco Use    Smoking status: Former       Current packs/day: 1.00       Average packs/day: 1 pack/day for 45.0 years (45.0 ttl pk-yrs)       Types: Cigarettes    Smokeless tobacco: Never   Vaping Use    Vaping status: Some Days    Substances: Nicotine, Flavoring    Devices: Disposable   Substance and Sexual Activity    Alcohol use: Not Currently    Drug use: Yes       Types: Marijuana    Sexual activity: Defer               Review of Systems:  History obtained from chart review and the patient  General ROS: No fever or chills  Respiratory ROS: No cough, +shortness of breath  Cardiovascular ROS: No chest pain or palpitations  Gastrointestinal ROS: No abdominal pain or melena  Genito-Urinary ROS: No dysuria or hematuria  Psych ROS: No anxiety and depression  14 point ROS reviewed with the patient and negative except as noted above and in the HPI unless unable to obtain.        Objective:  Patient Vitals for the past 24 hrs:    BP Temp Temp src Pulse Resp SpO2 Weight   10/13/24 1202 115/63 97.6 °F (36.4 °C) Oral 86 20 92 % --   10/13/24 0758 107/67 97.5 °F (36.4 °C) Oral 75 20 94 % --   10/13/24 0557 -- -- -- -- -- -- 126 kg (278 lb 9.6 oz)   10/13/24 0403 (!) 80/60 97.5 °F (36.4 °C) Oral 67 20 94 % --   10/12/24 2336 105/51 97.7 °F (36.5 °C) Oral 83 20 98 % --   10/12/24 1941 133/78 98.1 °F (36.7 °C) Oral 86 20 90 % --         Intake/Output Summary (Last 24 hours) at 10/13/2024 1600  Last data filed at 10/13/2024 0403      Gross per 24 hour    Intake 800 ml   Output --   Net 800 ml      General: Lethargic but arousable  Chest:  clear to auscultation bilaterally without respiratory distress  CVS: regular rate and rhythm  Abdominal: soft, nontender, positive bowel sounds  Extremities: tr ble edema  Skin: warm and dry without rash        Labs:        Results from last 7 days   Lab Units 10/13/24  0424 10/12/24  0532   WBC 10*3/mm3 8.04 9.29   HEMOGLOBIN g/dL 11.1* 11.5*   HEMATOCRIT % 38.5 41.0   PLATELETS 10*3/mm3 251 286                   Results from last 7 days   Lab Units 10/13/24  0424 10/12/24  0759 10/12/24  0532   SODIUM mmol/L 134*  --  142   SODIUM, ARTERIAL mmol/L  --  141  --    POTASSIUM mmol/L 5.9*  --  5.4*   CHLORIDE mmol/L 95*  --  98   CO2 mmol/L 30.0*  --  35.0*   BUN mg/dL 61*  --  45*   CREATININE mg/dL 2.16*  --  1.56*   CALCIUM mg/dL 8.4*  --  9.0   EGFR mL/min/1.73 33.6*  --  49.6*   BILIRUBIN mg/dL  --   --  0.6   ALK PHOS U/L  --   --  103   ALT (SGPT) U/L  --   --  13   AST (SGOT) U/L  --   --  14   GLUCOSE mg/dL 257*  --  202*         Radiology:   Imaging Results (Last 72 Hours)         Procedure Component Value Units Date/Time     XR Chest 1 View [[id number]] Collected: 10/12/24 0650       Updated: 10/12/24 0654     Narrative:       EXAMINATION: XR CHEST 1 VW- 10/12/2024 6:50 AM     HISTORY: CHF/COPD Protocol.     REPORT: A frontal view of the chest was obtained.     COMPARISON: Chest x-ray 9/28/2024.     The heart is enlarged, there is central and basilar vascular congestion  and mild pulmonary edema. The right pleural effusion appears slightly  increased in size but remains small. No pneumothorax is identified.  Moderate atelectasis in the lung bases greater on the right. No acute  osseous abnormality.        Impression:       Congestive heart failure with slight increase in the right  pleural effusion, moderate at basilar atelectasis greater on the right.     This report was signed and finalized on 10/12/2024 6:51 AM by   "Puneet Whitley MD.                   Culture:  No results found for: \"BLOODCX\", \"URINECX\", \"WOUNDCX\", \"MRSACX\", \"RESPCX\", \"STOOLCX\"        Assessment   Acute kidney injury  Acute on chronic systolic congestive heart failure  Hypertension  Diabetes type 2  Acute on chronic hypercapnic respiratory failure  Hyperkalemia  Hyponatremia  Respiratory acidosis        Plan:  Discussed with patient, nursing  Workup reviewed today  Monitor labs  Continue BiPAP and other ventilator adjustments  Lokelma for correction of hyperkalemia  Holding IV diuretics today given function pending further testing  Reassess in the morning        Thank you for the consult, we appreciate the opportunity to provide care to your patients.  Feel free to contact me if I can be of any further assistance.       Kt Whitaker MD  10/13/2024  16:00 CDT                   Recurrent right pleural effusion    Acute on chronic respiratory failure with hypoxia and hypercapnia    Acute on chronic congestive heart failure, unspecified heart failure type    COPD exacerbation    Respiratory failure with hypoxia and hypercapnia, unspecified chronicity      Patricia Arthur DO Horn, Frances Marie, DO   Physician  Specialty: Hospitalist     Progress Notes     Signed     Date of Service: 10/14/24 1345  Creation Time: 10/14/24 1345     Signed              [facility] Medicine Services  INPATIENT PROGRESS NOTE     Patient Name: Tucker Knox  Date of Admission: 10/12/2024  Today's Date: 10/14/24  Length of Stay: 2  Primary Care Physician: Kt Alfredo MD     Subjective   Chief Complaint: short of breath  HPI   Patient states he is not really doing much better.   Feels he has a long way to go.   No current nausea, chest pain.  Occasional cough  Does not like BiPAP           Review of Systems   All pertinent negatives and positives are as above. All other systems have been reviewed and are negative " unless otherwise stated.      Objective    Temp:  [97 °F (36.1 °C)-98 °F (36.7 °C)] 97.7 °F (36.5 °C)  Heart Rate:  [65-75] 75  Resp:  [14-18] 16  BP: ()/(50-73) 106/69  Physical Exam  Vitals and nursing note reviewed.   Constitutional:       Appearance: He is obese.   HENT:      Head: Normocephalic and atraumatic.      Right Ear: External ear normal.      Left Ear: External ear normal.      Nose: Nose normal.      Mouth/Throat:      Mouth: Mucous membranes are moist.   Eyes:      Extraocular Movements: Extraocular movements intact.      Conjunctiva/sclera: Conjunctivae normal.      Pupils: Pupils are equal, round, and reactive to light.   Cardiovascular:      Rate and Rhythm: Normal rate and regular rhythm.      Pulses: Normal pulses.      Heart sounds: No murmur heard.     No friction rub. No gallop.   Pulmonary:      Effort: Pulmonary effort is normal.      Breath sounds: Wheezing and rhonchi present.   Abdominal:      General: Bowel sounds are normal.      Palpations: Abdomen is soft.   Musculoskeletal:         General: Normal range of motion.      Cervical back: Normal range of motion and neck supple.   Skin:     General: Skin is warm and dry.      Capillary Refill: Capillary refill takes less than 2 seconds.   Neurological:      General: No focal deficit present.      Mental Status: He is alert and oriented to person, place, and time.      Cranial Nerves: No cranial nerve deficit.   Psychiatric:         Mood and Affect: Mood normal.         Behavior: Behavior normal.                  Results Review:  I have reviewed the labs, radiology results, and diagnostic studies.     Laboratory Data:          Results from last 7 days   Lab Units 10/14/24  0252 10/13/24  0424 10/12/24  0532   WBC 10*3/mm3 6.68 8.04 9.29   HEMOGLOBIN g/dL 10.3* 11.1* 11.5*   HEMATOCRIT % 36.7* 38.5 41.0   PLATELETS 10*3/mm3 239 251 286                  Results from last 7 days   Lab Units 10/14/24  0252 10/13/24  1649 10/13/24  0424  "10/12/24  0759 10/12/24  0532   SODIUM mmol/L 135* 137 134*  --  142   SODIUM, ARTERIAL    --   --   --    < >  --    POTASSIUM mmol/L 5.6* 5.2 5.9*  --  5.4*   CHLORIDE mmol/L 94* 94* 95*  --  98   CO2 mmol/L 32.0* 35.0* 30.0*  --  35.0*   BUN mg/dL 69* 61* 61*  --  45*   CREATININE mg/dL 2.55* 2.42* 2.16*  --  1.56*   CALCIUM mg/dL 8.3* 8.5* 8.4*  --  9.0   BILIRUBIN mg/dL  --   --   --   --  0.6   ALK PHOS U/L  --   --   --   --  103   ALT (SGPT) U/L  --   --   --   --  13   AST (SGOT) U/L  --   --   --   --  14   GLUCOSE mg/dL 99 118* 257*  --  202*    < > = values in this interval not displayed.         Culture Data:   No results found for: \"BLOODCX\", \"URINECX\", \"WOUNDCX\", \"MRSACX\", \"RESPCX\", \"STOOLCX\"     Radiology Data:   Imaging Results (Last 24 Hours)         ** No results found for the last 24 hours. **                I have reviewed the patient's current medications.      Assessment/Plan   Assessment       Active Hospital Problems     Diagnosis      **Acute on chronic respiratory failure with hypoxia and hypercapnia      Hyperkalemia      AMA (acute kidney injury)      Acute on chronic HFrEF (heart failure with reduced ejection fraction)      Colon adenocarcinoma      Type 2 myocardial infarction due to heart failure      Pulmonary HTN      Type 2 diabetes mellitus with hyperglycemia, with long-term current use of insulin      Hypertension           Treatment Plan  [facility] nephrology  [facility] consulting, appreciate.   Social service for discharge planning, SNF        Medical Decision Making  Number and Complexity of problems:   Acute on chronic respiratory failure with hypercapnia and hypoxemia high complexity  Hyperkalemia high complexity  AMA high complexity  Acute on chronic heart failure with reduced ejection fraction.  High complexity  Diabetes mellitus type 2 moderate complexity  Hypertension moderate complexity  Pulmonary hypertension high complexity        Differential Diagnosis:    "   Conditions and Status        Condition is improving.     [facility] Data  External documents reviewed: Reviewed  Cardiac tracing (EKG, telemetry) interpretation: Reviewed  Radiology interpretation: Reviewed  Labs reviewed: Reviewed  Any tests that were considered but not ordered: None     Decision rules/scores evaluated (example [id number], Wells, etc): None     Discussed with: Patient     Care Planning  Shared decision making: Patient  Code status and discussions: DNR/DNI     Disposition  Social Determinants of Health that impact treatment or disposition: None  I expect the patient to be discharged to SNF in 2-3 days.      Electronically signed by Patricia Arthur DO, 10/14/24, 13:45 CDT.                   716 -- 83 -- 107/70 -- -- -- -- 95   10/12/24 0613 -- 98 -- 130/81 -- -- -- -- --   10/12/24 0550 -- 92 14 -- -- NPPV/NIV -- 70 94   10/12/24 0538 -- 94 14 -- -- NPPV/NIV -- 70 96   10/12/24 0537 -- 92 14 -- -- NPPV/NIV -- 70 97   10/12/24 0529 -- 96 21 -- -- NPPV/NIV -- 70 100   10/12/24 0518 98.4 (36.9) -- -- -- -- -- -- -- --   10/12/24 0515 -- 100 26 128/93 Sitting nasal cannula 4 -- 89 Abnormal       Intake/O    Temp Pulse Resp BP Patient Position Device (Oxygen Therapy) Flow (L/min) (Oxygen Therapy) Oxygen Concentration (%) SpO2    10/14/24 1042 97.7 (36.5) 75 16 106/69 Lying nasal cannula 3 -- 91   10/14/24 0738 97 (36.1) 66 16 150/50 Lying NPPV/NIV -- -- 97   10/14/24 0645 -- 68 14 -- -- NPPV/NIV -- -- 97   10/14/24 0643 -- -- -- -- -- -- 3 0 --   10/14/24 0445 98 (36.7) 69 18 85/59 Abnormal   Lying NPPV/NIV -- -- 96   BP: RN           Current Facility-Administered Medications   Medication Dose Route Frequency Provider Last Rate Last Admin    acetaminophen (TYLENOL) tablet 650 mg  650 mg Oral Q6H PRN BRITT Stout DO   650 mg at 10/13/24 0851    albuterol (PROVENTIL) nebulizer solution 0.042% 1.25 mg/3mL  1.25 mg Nebulization Q6H PRN Jazmin Garrison APRN        aspirin EC tablet 81 mg  81 mg  Oral Daily Jazmin Garrison APRN   81 mg at 10/14/24 0942    [Held by provider] bumetanide (BUMEX) injection 1 mg  1 mg Intravenous Q12H Jazmin Garrison APRN   1 mg at 10/13/24 1225    calcium carbonate (TUMS) chewable tablet 500 mg (200 mg elemental)  2 tablet Oral TID PRN Karen Parekh DO        dextrose (D50W) (25 g/50 mL) IV injection 25 g  25 g Intravenous Q15 Min PRN Jazmin Garrison APRN        dextrose (GLUTOSE) oral gel 15 g  15 g Oral Q15 Min PRN Jazmin Garrison APRN        DULoxetine (CYMBALTA) DR capsule 30 mg  30 mg Oral Daily Jazmin Garrison APRN   30 mg at 10/14/24 0942    Enoxaparin Sodium (LOVENOX) syringe 40 mg  40 mg Subcutaneous Q12H Jazmin Garrison APRN   40 mg at 10/14/24 0942    gabapentin (NEURONTIN) capsule 600 mg  600 mg Oral Q12H BRITT Stout DO   600 mg at 10/14/24 0942    glucagon (GLUCAGEN) injection 1 mg  1 mg Intramuscular Q15 Min PRN Jazmin Garrison APRN        insulin glargine (LANTUS, SEMGLEE) injection 14 Units  14 Units Subcutaneous Q12H Jazmin Garrison APRN   14 Units at 10/14/24 0942    Insulin Lispro (humaLOG) injection 2-7 Units  2-7 Units Subcutaneous 4x Daily AC & at Bedtime Jazmin Garrison APRN   2 Units at 10/13/24 2106    metoprolol succinate XL (TOPROL-XL) 24 hr tablet 25 mg  25 mg Oral Q24H Jazmin Garrison APRN   25 mg at 10/14/24 0942    nitroglycerin (NITROSTAT) SL tablet 0.4 mg  0.4 mg Sublingual Q5 Min PRN Jazmin Garrison APRN        pravastatin (PRAVACHOL) tablet 40 mg  40 mg Oral Nightly Jazmin Garrison APRN   40 mg at 10/13/24 2106    QUEtiapine (SEROquel) tablet 25 mg  25 mg Oral Nightly Jazmin APRN   25 mg at 10/13/24 5077    [Held by provider] sacubitril-valsartan (ENTRESTO) 24-26 MG tablet 1 tablet  1 tablet Oral BID Jazmin Garrison APRN        sodium chloride 0.9 % flush 10 mL  10 mL Intravenous PRN Vijay Leary MD   10 mL at 10/12/24 6110    sodium chloride 0.9 % flush 10 mL  10 mL Intravenous PRN  Vijay Leary MD   10 mL at 10/12/24 0731    sodium zirconium cyclosilicate (LOKELMA) packet 10 g  10 g Oral Once Anton Colon, APRN          negative...

## 2024-10-14 NOTE — CONSULTS
[facility]  INPATIENT WOUND & OSTOMY CONSULTATION    Today's Date: 10/14/24    Patient Name: Tucker Knox  MRN: [id number]  [facility]: [id number]  PCP: Kt Alfredo MD  Referring Provider:   Consulting Provider (From admission, onward)      Start Ordered     Status Ordering Provider    10/12/24 1419 10/12/24 1419  Inpatient Wound Care MD Consult  Once        Specialty:  Wound Care  Provider:  Marielena Jernigan APRN Acknowledged HANCOCK, J CHRISTOPHER           Attending Provider: Patricia Arthur DO  Length of Stay: 2    SUBJECTIVE   Chief Complaint: Multiple wounds    HPI: Tucker Knox, a 64 y.o.male, presents with a past medical history of hypertension, type 2 diabetes mellitus, COPD, HLD, CHF, CAD, JOSE with noncompliance, colon cancer, and a significant history of medical noncompliance.  A full past medical history is listed below.  Inpatient wound care consulted due to multiple wounds.  Patient was recently cared for in this facility, and at that time was noted to have moisture associated skin damage to the perineum region.  According to the picture of the 10/1/2024 under the media tab, the MASD seems to be improving.  He has not been applying anything to the area.  He does live at home alone.  He is incontinent at times.    Visit Dx:    ICD-10-CM ICD-9-CM   1. Respiratory failure with hypoxia and hypercapnia, unspecified chronicity  J96.91 518.81    J96.92    2. Recurrent right pleural effusion  J90 511.9   3. Acute on chronic respiratory failure with hypoxia and hypercapnia  J96.21 518.84    J96.22 786.09     799.02   4. Acute on chronic congestive heart failure, unspecified heart failure type  I50.9 428.0   5. COPD exacerbation  J44.1 491.21   6. Impaired mobility [Z74.09]  Z74.09 799.89       Hospital Problem List:     Acute on chronic respiratory failure with hypoxia and hypercapnia    Type 2 diabetes mellitus with hyperglycemia, with long-term current use of  insulin    Hypertension    Pulmonary HTN    Type 2 myocardial infarction due to heart failure    Colon adenocarcinoma    Acute on chronic HFrEF (heart failure with reduced ejection fraction)    AMA (acute kidney injury)    Hyperkalemia      History:   Past Medical History:   Diagnosis Date    Cancer     CHF (congestive heart failure)     COPD (chronic obstructive pulmonary disease)     Coronary artery disease     stent x 1    Family history of colon cancer     Hyperlipidemia     Hypertension     Sleep apnea     does not wear machine, supposed to wear cpap with oxygen and does not wear it    Type 2 diabetes mellitus      Past Surgical History:   Procedure Laterality Date    BACK SURGERY      CARDIAC CATHETERIZATION Left 04/13/2022    Procedure: Cardiac Catheterization/Vascular Study;  Surgeon: Todd Avendano MD;  Location:  [facility] CATH INVASIVE LOCATION;  Service: Cardiology;  Laterality: Left;    CARDIAC CATHETERIZATION      with stent x1    CARDIAC CATHETERIZATION N/A 9/12/2024    Procedure: Left Heart Cath;  Surgeon: Ruben Adler MD;  Location:  [facility] CATH INVASIVE LOCATION;  Service: Cardiology;  Laterality: N/A;    COLON RESECTION N/A 12/5/2023    Procedure: COLON RESECTION LAPAROSCOPIC SIGMOID WITH DAVINCI ROBOT, INTRA-OPERATIVE FLEXIBLE SIGMOIDOSCOPY, SPLENIC FLEXURE MOBILIZATION, OPEN UMBILICAL HERNIA REPAIR;  Surgeon: Oma Velasquez MD;  Location:  [facility] OR;  Service: Robotics - DaVinci;  Laterality: N/A;    COLONOSCOPY N/A 10/09/2023    Diverticulosis; One 5mm polyp in ascending colon; One 5mm polyp in transverse colon; One 10mm polyp at 65cm proximal to anus; One 20mm polyp at 65cm proximal to anus-Tattooed; One 20mm polyp at 42cm proximal to anus-Clip (MR conditional) placed-Tattooed; Rule out malignancy-Partially obstructing tumor in recto-sigmoid colon-biopsied-Tattooed; One 10mm polyp in rectum    ELBOW PROCEDURE      KNEE SURGERY      LUMBAR DISC SURGERY       Social History     Socioeconomic  History    Marital status:    Tobacco Use    Smoking status: Former     Current packs/day: 1.00     Average packs/day: 1 pack/day for 45.0 years (45.0 ttl pk-yrs)     Types: Cigarettes    Smokeless tobacco: Never   Vaping Use    Vaping status: Some Days    Substances: Nicotine, Flavoring    Devices: Disposable   Substance and Sexual Activity    Alcohol use: Not Currently    Drug use: Yes     Types: Marijuana    Sexual activity: Defer     Family History   Problem Relation Age of Onset    Esophageal cancer Mother     Heart disease Mother     Colon cancer Father 80    Heart disease Father     No Known Problems Sister     COPD Brother     Alcohol abuse Brother     Colon polyps Neg Hx     Liver cancer Neg Hx     Rectal cancer Neg Hx     Stomach cancer Neg Hx     Liver disease Neg Hx        Allergies:  Allergies   Allergen Reactions    Penicillins Unknown - Low Severity     Pt states happened when child does not know        Medications:    Current Facility-Administered Medications:     acetaminophen (TYLENOL) tablet 650 mg, 650 mg, Oral, Q6H PRN, BRITT Stout DO, 650 mg at 10/13/24 0851    albuterol (PROVENTIL) nebulizer solution 0.042% 1.25 mg/3mL, 1.25 mg, Nebulization, Q6H PRN, Jabari Garrisonrie, APRN    aspirin EC tablet 81 mg, 81 mg, Oral, Daily, Emily chongJazmin, APRN, 81 mg at 10/14/24 0942    [Held by provider] bumetanide (BUMEX) injection 1 mg, 1 mg, Intravenous, Q12H, Jabari Garrisonrie, APRN, 1 mg at 10/13/24 1225    calcium carbonate (TUMS) chewable tablet 500 mg (200 mg elemental), 2 tablet, Oral, TID PRN, Karen Parekh DO    dextrose (D50W) (25 g/50 mL) IV injection 25 g, 25 g, Intravenous, Q15 Min PRN, Jazmin Garrison APRN    dextrose (GLUTOSE) oral gel 15 g, 15 g, Oral, Q15 Min PRN, Emily Garrisonemarie APRN    DULoxetine (CYMBALTA) DR capsule 30 mg, 30 mg, Oral, Daily, Emily chongJazmin, APRN, 30 mg at 10/14/24 0942    Enoxaparin Sodium (LOVENOX) syringe 40 mg, 40 mg, Subcutaneous,  Q12H, Jazmin Garrison APRN, 40 mg at 10/14/24 0942    gabapentin (NEURONTIN) capsule 600 mg, 600 mg, Oral, Q12H, BRITT Stout DO, 600 mg at 10/14/24 0942    glucagon (GLUCAGEN) injection 1 mg, 1 mg, Intramuscular, Q15 Min PRN, Jazmin Garrison APRCHUCKY    hydrocortisone-bacitracin-zinc oxide-nystatin (MAGIC BARRIER) ointment 1 Application, 1 Application, Topical, 4x Daily, Akanksha López APRN    insulin glargine (LANTUS, SEMGLEE) injection 14 Units, 14 Units, Subcutaneous, Q12H, Jazmin Garrison APRN, 14 Units at 10/14/24 0942    Insulin Lispro (humaLOG) injection 2-7 Units, 2-7 Units, Subcutaneous, 4x Daily AC & at Bedtime, Jazmin Garrison APRN, 2 Units at 10/13/24 2106    metoprolol succinate XL (TOPROL-XL) 24 hr tablet 25 mg, 25 mg, Oral, Q24H, Jazmin Garrison APRN, 25 mg at 10/14/24 0942    nitroglycerin (NITROSTAT) SL tablet 0.4 mg, 0.4 mg, Sublingual, Q5 Min PRN, Jazmin Garrison APRN    pravastatin (PRAVACHOL) tablet 40 mg, 40 mg, Oral, Nightly, Jazmin Garrison APRN, 40 mg at 10/13/24 2106    QUEtiapine (SEROquel) tablet 25 mg, 25 mg, Oral, Nightly, Jazmin Garrison APRN, 25 mg at 10/13/24 2107    [Held by provider] sacubitril-valsartan (ENTRESTO) 24-26 MG tablet 1 tablet, 1 tablet, Oral, BID, Jazmin Garrison APRN    sodium chloride 0.9 % flush 10 mL, 10 mL, Intravenous, PRN, Vijay Leary MD, 10 mL at 10/12/24 0731    [COMPLETED] Insert Peripheral IV, , , Once **AND** sodium chloride 0.9 % flush 10 mL, 10 mL, Intravenous, PRN, Vijay Leary MD, 10 mL at 10/12/24 0731    sodium zirconium cyclosilicate (LOKELMA) packet 10 g, 10 g, Oral, Once, Anton Colon, APRN    OBJECTIVE     Vitals:    10/14/24 1421   BP:    Pulse: 74   Resp:    Temp:    SpO2:        PHYSICAL EXAM: Moisture associated skin damage to the scrotum and the perineum region.  Erythema noted.  No purulence or malodor noted.  No drainage.  No signs of infection.  There were no open wounds noted on exam of lower  extremities, perineum, and sacral region.    Physical Exam       Results Review:  Lab Results (last 48 hours)       Procedure Component Value Units Date/Time    Calcitriol (1,25 di-OH Vitamin D) [[id number]] Collected: 10/14/24 1402    Specimen: Blood Updated: 10/14/24 1457    Vitamin D,25-Hydroxy [[id number]]  (Abnormal) Collected: 10/14/24 0252    Specimen: Blood Updated: 10/14/24 1306     25 Hydroxy, Vitamin D <6.0 ng/ml     Narrative:      Reference Range for Total Vitamin D 25(OH)     Deficiency <20.0 ng/mL   Insufficiency 21-29 ng/mL   Sufficiency  ng/mL  Toxicity >100 ng/ml      POC Glucose Once [[id number]]  (Normal) Collected: 10/14/24 1137    Specimen: Blood Updated: 10/14/24 1148     Glucose 119 mg/dL      Comment: : [email or web address] Barthel JustinMeter ID: [id number]       POC Glucose Once [[id number]]  (Normal) Collected: 10/14/24 0807    Specimen: Blood Updated: 10/14/24 0819     Glucose 74 mg/dL      Comment: : First Opinionsherwin Barthel JustinMeter ID: [id number]       Urinalysis, Microscopic Only - Urine, Clean Catch [[id number]] Collected: 10/14/24 0542    Specimen: Urine, Clean Catch Updated: 10/14/24 0607     RBC, UA 0-2 /HPF      WBC, UA 0-2 /HPF      Bacteria, UA None Seen /HPF      Squamous Epithelial Cells, UA None Seen /HPF      Hyaline Casts, UA 3-6 /LPF      Methodology Automated Microscopy    Urinalysis With Microscopic If Indicated (No Culture) - Urine, Clean Catch [[id number]]  (Abnormal) Collected: 10/14/24 0542    Specimen: Urine, Clean Catch Updated: 10/14/24 0607     Color, UA Dark Yellow     Appearance, UA Clear     pH, UA <=5.0     Specific Gravity, UA 1.017     Glucose,  mg/dL (1+)     Ketones, UA Trace     Bilirubin, UA Negative     Blood, UA Negative     Protein, UA Trace     Leuk Esterase, UA Trace     Nitrite, UA Negative     Urobilinogen, UA 1.0 E.U./dL    Basic Metabolic Panel [[id number]]  (Abnormal) Collected: 10/14/24 0252    Specimen: Blood Updated: 10/14/24 0401      Glucose 99 mg/dL      BUN 69 mg/dL      Creatinine 2.55 mg/dL      Sodium 135 mmol/L      Potassium 5.6 mmol/L      Chloride 94 mmol/L      CO2 32.0 mmol/L      Calcium 8.3 mg/dL      BUN/Creatinine Ratio 27.1     Anion Gap 9.0 mmol/L      eGFR 27.5 mL/min/1.73     Narrative:      GFR Normal >60  Chronic Kidney Disease <60  Kidney Failure <15      Magnesium [[id number]]  (Normal) Collected: 10/14/24 0252    Specimen: Blood Updated: 10/14/24 0403     Magnesium 2.4 mg/dL     Phosphorus [[id number]]  (Abnormal) Collected: 10/14/24 0252    Specimen: Blood Updated: 10/14/24 0403     Phosphorus 6.4 mg/dL     PTH, Intact [[id number]]  (Abnormal) Collected: 10/14/24 0252    Specimen: Blood Updated: 10/14/24 0359     PTH, Intact 106.2 pg/mL     Narrative:      Results may be falsely decreased if patient taking Biotin.      CBC (No Diff) [[id number]]  (Abnormal) Collected: 10/14/24 0252    Specimen: Blood Updated: 10/14/24 0335     WBC 6.68 10*3/mm3      RBC 3.81 10*6/mm3      Hemoglobin 10.3 g/dL      Hematocrit 36.7 %      MCV 96.3 fL      MCH 27.0 pg      MCHC 28.1 g/dL      RDW 15.2 %      RDW-SD 53.1 fl      MPV 10.6 fL      Platelets 239 10*3/mm3     Creatinine Urine Random (kidney function) GFR component - Urine, Clean Catch [[id number]] Collected: 10/13/24 1404    Specimen: Urine, Clean Catch Updated: 10/13/24 2051     Creatinine, Urine 59.5 mg/dL     Narrative:      Reference intervals for random urine have not been established.  Clinical usage is dependent upon physician's interpretation in combination with other laboratory tests.       Urea Nitrogen, Urine - Urine, Clean Catch [[id number]] Collected: 10/13/24 1404    Specimen: Urine, Clean Catch Updated: 10/13/24 2051     Urea Nitrogen, Urine 452 mg/dL     Narrative:      Reference intervals for random urine have not been established.  Clinical usage is dependent upon physician's interpretation in combination with other laboratory tests.       POC Glucose Once  [[id number]]  (Abnormal) Collected: 10/13/24 2034    Specimen: Blood Updated: 10/13/24 2045     Glucose 168 mg/dL      Comment: : [id number] Jorge Ramoser ID: [id number]       Basic Metabolic Panel [[id number]]  (Abnormal) Collected: 10/13/24 1649    Specimen: Blood Updated: 10/13/24 1725     Glucose 118 mg/dL      BUN 61 mg/dL      Creatinine 2.42 mg/dL      Sodium 137 mmol/L      Potassium 5.2 mmol/L      Chloride 94 mmol/L      CO2 35.0 mmol/L      Calcium 8.5 mg/dL      BUN/Creatinine Ratio 25.2     Anion Gap 8.0 mmol/L      eGFR 29.3 mL/min/1.73     Narrative:      GFR Normal >60  Chronic Kidney Disease <60  Kidney Failure <15      POC Glucose Once [[id number]]  (Abnormal) Collected: 10/13/24 1638    Specimen: Blood Updated: 10/13/24 1651     Glucose 131 mg/dL      Comment: : [id number] Nichelle Margaret ID: [id number]       Protein, Urine, Random - Urine, Clean Catch [[id number]] Collected: 10/13/24 1404    Specimen: Urine, Clean Catch Updated: 10/13/24 1634     Total Protein, Urine 8.8 mg/dL     Narrative:      Reference intervals for random urine have not been established.  Clinical usage is dependent upon physician's interpretation in combination with other laboratory tests.       Blood Gas, Arterial - [[id number]]  (Abnormal) Collected: 10/13/24 1519    Specimen: Arterial Blood Updated: 10/13/24 1518     Site Left Radial     Jorge's Test Positive     pH, Arterial 7.265 pH units      pCO2, Arterial 77.0 mm Hg      Comment: 86 Value above critical limit        pO2, Arterial 111.0 mm Hg      Comment: 83 Value above reference range        HCO3, Arterial 34.9 mmol/L      Comment: 83 Value above reference range        Base Excess, Arterial 5.7 mmol/L      Comment: 83 Value above reference range        O2 Saturation, Arterial 97.9 %      Temperature 37.0     Barometric Pressure for Blood Gas 751 mmHg      Modality BiPap     FIO2 50 %      Ventilator Mode BiPAP     Set Mech Resp Rate 12.0     IPAP 20      Comment: Meter: [id number]     :  [id number]        EPAP 8     Notified By [id number]     Collected by [id number]     pCO2, Temperature Corrected 77.0 mm Hg      pH, Temp Corrected 7.265 pH Units      pO2, Temperature Corrected 111 mm Hg      PO2/FIO2 222    Sodium, Urine, Random - Urine, Clean Catch [[id number]] Collected: 10/13/24 1404    Specimen: Urine, Clean Catch Updated: 10/13/24 1427     Sodium, Urine 52 mmol/L     Narrative:      Reference intervals for random urine have not been established.  Clinical usage is dependent upon physician's interpretation in combination with other laboratory tests.       POC Glucose Once [[id number]]  (Abnormal) Collected: 10/13/24 1218    Specimen: Blood Updated: 10/13/24 1230     Glucose 197 mg/dL      Comment: : [id number] Nichelle Rosamariater ID: [id number]       POC Glucose Once [[id number]]  (Abnormal) Collected: 10/13/24 0756    Specimen: Blood Updated: 10/13/24 0828     Glucose 197 mg/dL      Comment: : [id number] Nichelle CadeneMeter ID: [id number]       Basic Metabolic Panel [[id number]]  (Abnormal) Collected: 10/13/24 0424    Specimen: Blood Updated: 10/13/24 0503     Glucose 257 mg/dL      BUN 61 mg/dL      Creatinine 2.16 mg/dL      Sodium 134 mmol/L      Potassium 5.9 mmol/L      Comment: Slight hemolysis detected by analyzer. Result may be falsely elevated.        Chloride 95 mmol/L      CO2 30.0 mmol/L      Calcium 8.4 mg/dL      BUN/Creatinine Ratio 28.2     Anion Gap 9.0 mmol/L      eGFR 33.6 mL/min/1.73     Narrative:      GFR Normal >60  Chronic Kidney Disease <60  Kidney Failure <15      CBC (No Diff) [[id number]]  (Abnormal) Collected: 10/13/24 0424    Specimen: Blood Updated: 10/13/24 0458     WBC 8.04 10*3/mm3      RBC 4.06 10*6/mm3      Hemoglobin 11.1 g/dL      Hematocrit 38.5 %      MCV 94.8 fL      MCH 27.3 pg      MCHC 28.8 g/dL      RDW 15.4 %      RDW-SD 53.2 fl      MPV 10.8 fL      Platelets 251 10*3/mm3     Blood Gas, Arterial - [[id number]]   (Abnormal) Collected: 10/13/24 0329    Specimen: Arterial Blood Updated: 10/13/24 0335     Site Right Radial     Jorge's Test Positive     pH, Arterial 7.307 pH units      Comment: 84 Value below reference range        pCO2, Arterial 69.0 mm Hg      Comment: 86 Value above critical limit        pO2, Arterial 83.5 mm Hg      HCO3, Arterial 34.5 mmol/L      Comment: 83 Value above reference range        Base Excess, Arterial 6.3 mmol/L      Comment: 83 Value above reference range        O2 Saturation, Arterial 95.5 %      Temperature 37.0     Barometric Pressure for Blood Gas 750 mmHg      Modality Nasal Cannula     Flow Rate 3.0 lpm      Ventilator Mode NA     Notified By Sania Soto, RRT     Collected by [id number]     Comment: Meter: [id number]     :  Sania Soto, KIERA        pCO2, Temperature Corrected 69.0 mm Hg      pH, Temp Corrected 7.307 pH Units      pO2, Temperature Corrected 83.5 mm Hg     POC Glucose Once [[id number]]  (Abnormal) Collected: 10/12/24 1944    Specimen: Blood Updated: 10/12/24 1955     Glucose 400 mg/dL      Comment: : [id number] Jorge VickersbethMeter ID: [id number]       POC Glucose Once [[id number]]  (Abnormal) Collected: 10/12/24 1710    Specimen: Blood Updated: 10/12/24 1724     Glucose 366 mg/dL      Comment: : [id number] Nichelle Margaret ID: [id number]             Imaging Results (Last 72 Hours)       Procedure Component Value Units Date/Time    US Renal Bilateral [[id number]] Resulted: 10/14/24 1454     Updated: 10/14/24 1510    XR Chest 1 View [[id number]] Collected: 10/12/24 0650     Updated: 10/12/24 0654    Narrative:      EXAMINATION: XR CHEST 1 VW- 10/12/2024 6:50 AM     HISTORY: CHF/COPD Protocol.     REPORT: A frontal view of the chest was obtained.     COMPARISON: Chest x-ray 9/28/2024.     The heart is enlarged, there is central and basilar vascular congestion  and mild pulmonary edema. The right pleural effusion appears slightly  increased in size but  remains small. No pneumothorax is identified.  Moderate atelectasis in the lung bases greater on the right. No acute  osseous abnormality.       Impression:      Congestive heart failure with slight increase in the right  pleural effusion, moderate at basilar atelectasis greater on the right.     This report was signed and finalized on 10/12/2024 6:51 AM by Dr. Puneet Whitley MD.                  ASSESSMENT/PLAN       Examination and evaluation of wound(s) was performed.    DIAGNOSIS:   Moisture associated skin damage    PLAN:   Orders placed for wound care and pressure/moisture management as listed below.   See orders.    Discussed findings and treatment plan including risks, benefits, and treatment options with Tucker Knox in detail. Patient agreed with treatment plan.      This document has been electronically signed by VIV Haines on 10/14/2024 15:38 CDT

## 2024-10-14 NOTE — PROGRESS NOTES
[facility] Medicine Services  INPATIENT PROGRESS NOTE    Patient Name: Tucker Knox  Date of Admission: 10/12/2024  Today's Date: 10/14/24  Length of Stay: 2  Primary Care Physician: Kt Alfredo MD    Subjective   Chief Complaint: short of breath  HPI   Patient states he is not really doing much better.   Feels he has a long way to go.   No current nausea, chest pain.  Occasional cough  Does not like BiPAP        Review of Systems   All pertinent negatives and positives are as above. All other systems have been reviewed and are negative unless otherwise stated.     Objective    Temp:  [97 °F (36.1 °C)-98 °F (36.7 °C)] 97.7 °F (36.5 °C)  Heart Rate:  [65-75] 75  Resp:  [14-18] 16  BP: ()/(50-73) 106/69  Physical Exam  Vitals and nursing note reviewed.   Constitutional:       Appearance: He is obese.   HENT:      Head: Normocephalic and atraumatic.      Right Ear: External ear normal.      Left Ear: External ear normal.      Nose: Nose normal.      Mouth/Throat:      Mouth: Mucous membranes are moist.   Eyes:      Extraocular Movements: Extraocular movements intact.      Conjunctiva/sclera: Conjunctivae normal.      Pupils: Pupils are equal, round, and reactive to light.   Cardiovascular:      Rate and Rhythm: Normal rate and regular rhythm.      Pulses: Normal pulses.      Heart sounds: No murmur heard.     No friction rub. No gallop.   Pulmonary:      Effort: Pulmonary effort is normal.      Breath sounds: Wheezing and rhonchi present.   Abdominal:      General: Bowel sounds are normal.      Palpations: Abdomen is soft.   Musculoskeletal:         General: Normal range of motion.      Cervical back: Normal range of motion and neck supple.   Skin:     General: Skin is warm and dry.      Capillary Refill: Capillary refill takes less than 2 seconds.   Neurological:      General: No focal deficit present.      Mental Status: He is alert and oriented to  "person, place, and time.      Cranial Nerves: No cranial nerve deficit.   Psychiatric:         Mood and Affect: Mood normal.         Behavior: Behavior normal.             Results Review:  I have reviewed the labs, radiology results, and diagnostic studies.    Laboratory Data:   Results from last 7 days   Lab Units 10/14/24  0252 10/13/24  0424 10/12/24  0532   WBC 10*3/mm3 6.68 8.04 9.29   HEMOGLOBIN g/dL 10.3* 11.1* 11.5*   HEMATOCRIT % 36.7* 38.5 41.0   PLATELETS 10*3/mm3 239 251 286        Results from last 7 days   Lab Units 10/14/24  0252 10/13/24  1649 10/13/24  0424 10/12/24  0759 10/12/24  0532   SODIUM mmol/L 135* 137 134*  --  142   SODIUM, ARTERIAL   --   --   --    < >  --    POTASSIUM mmol/L 5.6* 5.2 5.9*  --  5.4*   CHLORIDE mmol/L 94* 94* 95*  --  98   CO2 mmol/L 32.0* 35.0* 30.0*  --  35.0*   BUN mg/dL 69* 61* 61*  --  45*   CREATININE mg/dL 2.55* 2.42* 2.16*  --  1.56*   CALCIUM mg/dL 8.3* 8.5* 8.4*  --  9.0   BILIRUBIN mg/dL  --   --   --   --  0.6   ALK PHOS U/L  --   --   --   --  103   ALT (SGPT) U/L  --   --   --   --  13   AST (SGOT) U/L  --   --   --   --  14   GLUCOSE mg/dL 99 118* 257*  --  202*    < > = values in this interval not displayed.       Culture Data:   No results found for: \"BLOODCX\", \"URINECX\", \"WOUNDCX\", \"MRSACX\", \"RESPCX\", \"STOOLCX\"    Radiology Data:   Imaging Results (Last 24 Hours)       ** No results found for the last 24 hours. **            I have reviewed the patient's current medications.     Assessment/Plan   Assessment  Active Hospital Problems    Diagnosis     **Acute on chronic respiratory failure with hypoxia and hypercapnia     Hyperkalemia     AMA (acute kidney injury)     Acute on chronic HFrEF (heart failure with reduced ejection fraction)     Colon adenocarcinoma     Type 2 myocardial infarction due to heart failure     Pulmonary HTN     Type 2 diabetes mellitus with hyperglycemia, with long-term current use of insulin     Hypertension        Treatment " Plan  [facility]  [facility], appreciate.   Social service for discharge planning, SNF      Medical Decision Making  Number and Complexity of problems:   Acute on chronic respiratory failure with hypercapnia and hypoxemia high complexity  Hyperkalemia high complexity  AMA high complexity  Acute on chronic heart failure with reduced ejection fraction.  High complexity  Diabetes mellitus type 2 moderate complexity  Hypertension moderate complexity  Pulmonary hypertension high complexity      Differential Diagnosis:     Conditions and Status        Condition is improving.     MDM Data  External documents reviewed: Reviewed  Cardiac tracing (EKG, telemetry) interpretation: Reviewed  Radiology interpretation: Reviewed  Labs reviewed: Reviewed  Any tests that were considered but not ordered: None     Decision rules/scores evaluated (example [id number], Wells, etc): None     Discussed with: Patient     Care Planning  Shared decision making: Patient  Code status and discussions: DNR/DNI    Disposition  Social Determinants of Health that impact treatment or disposition: None  I expect the patient to be discharged to SNF in 2-3 days.     Electronically signed by Patricia Arthur DO, 10/14/24, 13:45 CDT.

## 2024-10-14 NOTE — CONSULTS
[facility] Palliative Care Services  Initial Consult    Attending Physician: Patricia Arthur DO  Referring Provider: Melvin Stout DO     Patient Name: Tucker Knox  Date of Admission: 10/12/2024  Today's Date: 10/14/24     Reason for Referral: Goals of Care/Advance Care Planning    Code Status and Medical Interventions: No CPR (Do Not Attempt to Resuscitate); Limited Support; No intubation (DNI)   Ordered at: 10/12/24 1122     Medical Intervention Limits:    No intubation (DNI)     Code Status (Patient has no pulse and is not breathing):    No CPR (Do Not Attempt to Resuscitate)     Medical Interventions (Patient has pulse or is breathing):    Limited Support        Subjective     HPI: 64 y.o. male with past medical history including moderately differentiated rectosigmoid colon adenocarcinoma stage IIIa s/p colon resection, CHF, COPD, coronary artery disease, chronic respiratory failure requiring supplemental oxygen, hypertension, hyperlipidemia, sleep apnea, tobacco abuse, and type 2 diabetes mellitus.   Additional past medical history listed below.  Chart review notes he was last evaluated by oncology on 2/14/2024 where recommendations were made to include 12 cycles of systemic therapy and referred to radiation oncology.  Noted he declined treatment/follow-up.  According to chart review he has been hospitalized multiple times over the last 2 months.  He was hospitalized from 9/9/2024-9/18/2024 due to shortness of breath.  He underwent left heart catheterization on 9/12/2024 which revealed severe two-vessel disease of the LAD and right coronary artery.  Cardiology recommended CT surgery consult for evaluation of CABG.  CT surgery evaluated and recommended cardiac MRI for viability of areas and potential candidate for MIDCAB however did not believe he is a good candidate for sternotomy given poorly controlled diabetes mellitus, continued tobacco use, chronic respiratory failure, and  poor mobility.  Recommended following up outpatient.  Most recent hospitalization at this facility was from 9/28/2024-10/4/2024 due to shortness of breath due to acute on chronic systolic and diastolic CHF.  He was seen by colleague, VIV Ríos with palliative care during this hospitalization and changes were made to CODE STATUS to include no CPR and/or intubation and MOST form completed. He was treated and discharged to subacute rehabilitation per chart review.  Noted he was evaluated in ED on 10/5/2024 due to shortness of breath.  ED notes patient reported his room at rehab facility was dirty and cause COPD exacerbation.  Chart review notes he refused to return to rehab facility and ultimately left AGAINST MEDICAL ADVICE.  Patient presented to [facility] on 10/12/2024 related to respiratory distress.  ABGs collected while on 6 L of oxygen nasal cannula revealed pH 7.268, pCO2 78.1 and pO2 70.2.  According to chart review he was placed on BiPAP support.  Additional workup in ED revealed multiple abnormalities in labs including troponin T 143, proBNP 14,625, creatinine 1.56, BUN 45, GFR 49.6, potassium 5.4.  Chest x-ray revealed congestive heart failure with slight increase in right pleural effusion.  Respiratory panel negative.  He was admitted to the medical floor for further workup and treatment.  Noted to have worsening renal function and nephrology consulted.  Recommended holding IV diuretics pending further testing.  Noted to have hypotension overnight and received 500 mL LR bolus.  Labs collected this morning reveal further decline in renal function with creatinine 2.55, BUN 69, and GFR 27.5.  Potassium elevated at 5.6.  Palliative care has been consulted to discuss goals of care/advance care planning.  He is lying in bed, awake, and in no apparent distress.  Denies any pain.  Reports intermittent shortness of breath.  No visitors present.     Advance Care Planning   Advanced  "Directives:  Advance directive on file.    Advance Care Planning Discussion: Mr. Knox was agreeable to discussing goals of care.  He reflected on recent hospitalizations.  Shared he has not been at nursing facility since being seen in ED on 10/5/2024.  Discussed concerns with overall poor long-term prognosis due to acute on chronic combined CHF, colon cancer has been untreated, coronary artery disease in addition to other underlying comorbidities.  Also discussed concerns with decline in renal function.  Explored whether he had any further discussions regarding wishes/goals of care since previous hospitalization and he denied.  He stated \"What are you trying to ask?\"  Explained goal is to ensure his wishes are being followed and so he is aware of treatment options.  Reports he recalls discussing hospice in the past however stated \"They told me I didn't have enough wrong with me.\"  Was unable to clarify what he was referring to.  He became frustrated stating \"I don't know what I want.\"  Difficult to determine his understanding of disease, prognosis, and ability to make complex medical decisions at this time.  Call placed to his son/HCS, Aniceto, and discussed with him via telephone.  He provided additional history regarding events leading to hospitalization.  Reports he was discharged to rehab facility after last hospitalization here however they were apparently doing construction and the dust made it difficult for him to breathe therefore he left in return to his home.  Reports since then he has been hospitalized at OSH however was apparently told \"nothing was wrong with him and he needed to go home.\"  Shared this interaction has caused confusion for his father.  Reports he has been living at home however has concerns that his home is not the best environment for his respiratory status and feels he should find rehab facility at discharge.  Discussed concerns with Mr. Knox's overall condition and prognosis as well " as treatment options.  Also discussed hospice services.  He was appreciative of call however has requested to see if his aunt (patient's sister), Oxana, is in town to assist Mr. Knox and making decisions as well as she has medical background.  Call placed to Oxana per request.  She provided additional history.   Reports they have had multiple discussions regarding prognosis however feels he does not have good understanding of his prognosis or treatment.  Shared she feels he is scared to die.  Feels if he understood the concept of comfort measures and that his symptoms would be controlled he would be agreeable to this.  Reports he gets extremely anxious when he has episodes of shortness of breath.  She is hopeful to have additional discussions with him however unfortunately lives 2.5 hours away.  Reports she was hopeful to come visit this week however is unable to travel here until the weekend.  She is interested in completing video conference with Mr. Knox as she is hopeful this will give him some comfort and he will have better understanding with her support.  Will plan to have video conference at 11:30 AM tomorrow per request.  Oxana appears to have good prognostic awareness.  She was appreciative of discussion.     The patient receives support from his son and sibling. Patient's son is his healthcare surrogate.    Due to the palliative care topics discussed including goals of care, treatment options, discharge options, and medical priorities we will establish an advance care plan.      Review of Systems   Cardiovascular:  Positive for dyspnea on exertion.   Respiratory:  Positive for shortness of breath.    Neurological:  Positive for weakness.     Pain Assessment  Pain Location: extremity, chest  Pain Description: burning, other (see comments) (heartburn PRN tums)  Past Medical History:   Diagnosis Date    Cancer     CHF (congestive heart failure)     COPD (chronic obstructive pulmonary disease)      Coronary artery disease     stent x 1    Family history of colon cancer     Hyperlipidemia     Hypertension     Sleep apnea     does not wear machine, supposed to wear cpap with oxygen and does not wear it    Type 2 diabetes mellitus       Past Surgical History:   Procedure Laterality Date    BACK SURGERY      CARDIAC CATHETERIZATION Left 04/13/2022    Procedure: Cardiac Catheterization/Vascular Study;  Surgeon: Todd Avendano MD;  Location:  [facility] CATH INVASIVE LOCATION;  Service: Cardiology;  Laterality: Left;    CARDIAC CATHETERIZATION      with stent x1    CARDIAC CATHETERIZATION N/A 9/12/2024    Procedure: Left Heart Cath;  Surgeon: Ruben Adler MD;  Location:  [facility] CATH INVASIVE LOCATION;  Service: Cardiology;  Laterality: N/A;    COLON RESECTION N/A 12/5/2023    Procedure: COLON RESECTION LAPAROSCOPIC SIGMOID WITH DAVINCI ROBOT, INTRA-OPERATIVE FLEXIBLE SIGMOIDOSCOPY, SPLENIC FLEXURE MOBILIZATION, OPEN UMBILICAL HERNIA REPAIR;  Surgeon: Oma Velasquez MD;  Location:  [facility] OR;  Service: Robotics - DaVinci;  Laterality: N/A;    COLONOSCOPY N/A 10/09/2023    Diverticulosis; One 5mm polyp in ascending colon; One 5mm polyp in transverse colon; One 10mm polyp at 65cm proximal to anus; One 20mm polyp at 65cm proximal to anus-Tattooed; One 20mm polyp at 42cm proximal to anus-Clip (MR conditional) placed-Tattooed; Rule out malignancy-Partially obstructing tumor in recto-sigmoid colon-biopsied-Tattooed; One 10mm polyp in rectum    ELBOW PROCEDURE      KNEE SURGERY      LUMBAR DISC SURGERY        Social History     Socioeconomic History    Marital status:    Tobacco Use    Smoking status: Former     Current packs/day: 1.00     Average packs/day: 1 pack/day for 45.0 years (45.0 ttl pk-yrs)     Types: Cigarettes    Smokeless tobacco: Never   Vaping Use    Vaping status: Some Days    Substances: Nicotine, Flavoring    Devices: Disposable   Substance and Sexual Activity    Alcohol use: Not Currently     Drug use: Yes     Types: Marijuana    Sexual activity: Defer     Family History   Problem Relation Age of Onset    Esophageal cancer Mother     Heart disease Mother     Colon cancer Father 80    Heart disease Father     No Known Problems Sister     COPD Brother     Alcohol abuse Brother     Colon polyps Neg Hx     Liver cancer Neg Hx     Rectal cancer Neg Hx     Stomach cancer Neg Hx     Liver disease Neg Hx       Allergies   Allergen Reactions    Penicillins Unknown - Low Severity     Pt states happened when child does not know        Objective   Diagnostics: Reviewed    Intake/Output Summary (Last 24 hours) at 10/14/2024 0956  Last data filed at 10/14/2024 0900  Gross per 24 hour   Intake 800 ml   Output 525 ml   Net 275 ml       Current medications patient is presently taking including all prescriptions, over-the-counter, herbals and vitamin/mineral/dietary (nutritional) supplements with reviewed including route, type, dose and frequency and are current per MAR at time of dictation.  Current Facility-Administered Medications   Medication Dose Route Frequency Provider Last Rate Last Admin    acetaminophen (TYLENOL) tablet 650 mg  650 mg Oral Q6H PRN BRITT Stout DO   650 mg at 10/13/24 0851    albuterol (PROVENTIL) nebulizer solution 0.042% 1.25 mg/3mL  1.25 mg Nebulization Q6H PRN Jazmin Garrison APRN        aspirin EC tablet 81 mg  81 mg Oral Daily Jazmin Garrison APRN   81 mg at 10/14/24 0942    [Held by provider] bumetanide (BUMEX) injection 1 mg  1 mg Intravenous Q12H Jazmin Garrison APRN   1 mg at 10/13/24 1225    calcium carbonate (TUMS) chewable tablet 500 mg (200 mg elemental)  2 tablet Oral TID PRN Karen Parekh DO        dextrose (D50W) (25 g/50 mL) IV injection 25 g  25 g Intravenous Q15 Min PRN Jazmin Garrison APRN        dextrose (GLUTOSE) oral gel 15 g  15 g Oral Q15 Min PRN Jazmin Garrison APRN        DULoxetine (CYMBALTA) DR capsule 30 mg  30 mg Oral Daily Meli  "VIV uW   30 mg at 10/14/24 0942    Enoxaparin Sodium (LOVENOX) syringe 40 mg  40 mg Subcutaneous Q12H Jazmin Garrison APRN   40 mg at 10/14/24 0942    gabapentin (NEURONTIN) capsule 600 mg  600 mg Oral Q12H BRITT Stout DO   600 mg at 10/14/24 0942    glucagon (GLUCAGEN) injection 1 mg  1 mg Intramuscular Q15 Min PRN Jazmin Garrison APRN        insulin glargine (LANTUS, SEMGLEE) injection 14 Units  14 Units Subcutaneous Q12H Jazmin Garrison APRN   14 Units at 10/14/24 0942    Insulin Lispro (humaLOG) injection 2-7 Units  2-7 Units Subcutaneous 4x Daily AC & at Bedtime Jazmin Garrison APRN   2 Units at 10/13/24 2106    metoprolol succinate XL (TOPROL-XL) 24 hr tablet 25 mg  25 mg Oral Q24H Jazmin Garrison APRN   25 mg at 10/14/24 0942    nitroglycerin (NITROSTAT) SL tablet 0.4 mg  0.4 mg Sublingual Q5 Min PRN Jazmin Garrison APRN        pravastatin (PRAVACHOL) tablet 40 mg  40 mg Oral Nightly Jazmin Garrison APRN   40 mg at 10/13/24 2106    QUEtiapine (SEROquel) tablet 25 mg  25 mg Oral Nightly Jazmin Garrison APRN   25 mg at 10/13/24 2107    [Held by provider] sacubitril-valsartan (ENTRESTO) 24-26 MG tablet 1 tablet  1 tablet Oral BID Jazmin Garrison APRN        sodium chloride 0.9 % flush 10 mL  10 mL Intravenous PRN Vijay Leary MD   10 mL at 10/12/24 0731    sodium chloride 0.9 % flush 10 mL  10 mL Intravenous PRN Vijay Leary MD   10 mL at 10/12/24 0731    sodium zirconium cyclosilicate (LOKELMA) packet 10 g  10 g Oral Once Anton Colon APRN            acetaminophen    albuterol    calcium carbonate    dextrose    dextrose    glucagon (human recombinant)    nitroglycerin    sodium chloride    [COMPLETED] Insert Peripheral IV **AND** sodium chloride  Assessment   /50 (BP Location: Right arm, Patient Position: Lying)   Pulse 66   Temp 97 °F (36.1 °C) (Axillary)   Resp 16   Ht 175.3 cm (69\")   Wt 128 kg (283 lb 3.2 oz)   SpO2 97%   BMI 41.82 kg/m² "     Physical Exam  Vitals and nursing note reviewed.   Constitutional:       General: He is not in acute distress.     Appearance: He is ill-appearing.      Interventions: Nasal cannula in place.   HENT:      Head: Normocephalic and atraumatic.   Eyes:      General: Lids are normal.      Extraocular Movements: Extraocular movements intact.   Neck:      Vascular: No JVD.      Trachea: Trachea normal.   Cardiovascular:      Rate and Rhythm: Normal rate.   Pulmonary:      Effort: Pulmonary effort is normal.   Musculoskeletal:      Cervical back: Neck supple.   Skin:     General: Skin is warm and dry.   Neurological:      Mental Status: He is alert and oriented to person, place, and time.   Psychiatric:         Behavior: Behavior is cooperative.     Functional status: Palliative Performance Scale Score: Performance 50% based on the following measures: Ambulation: Mainly sit or lie down, Activity and Evidence of Disease: Unable to do any work, extensive evidence of disease, Self-Care: Considerable assistance required,  Intake: Normal or reduced, LOC: Full or confusion.  Nutritional status: Albumin 4.0. Body mass index is 41.82 kg/m²..  Patient status: Disease state: Controlled with current treatments.    Active Hospital Problems    Diagnosis     **Acute on chronic respiratory failure with hypoxia and hypercapnia     Hyperkalemia     AMA (acute kidney injury)     Acute on chronic HFrEF (heart failure with reduced ejection fraction)     Colon adenocarcinoma     Type 2 myocardial infarction due to heart failure     Pulmonary HTN     Type 2 diabetes mellitus with hyperglycemia, with long-term current use of insulin     Hypertension        Impression/Problem List:  Acute on chronic HFrEF  Acute on chronic respiratory failure with hypoxia and hypercapnia  Acute kidney injury  Colon adenocarcinoma - Declined systemic and radiation treatment   Pulmonary hypertension  Impaired mobility   Pleural effusion, right   Hyperkalemia    Hyperphosphatemia   Type 2 myocardial infarction due to heart failure  Type 2 diabetes mellitus  Hypertension      Coronary artery disease    Plan / Recommendations     Palliative Care Encounter   Prognosis poor long-term secondary to acute on chronic HFrEF, acute on chronic respiratory failure, AMA, colon adenocarcinoma previously declined systemic and radiation treatment, pulmonary hypertension, and other comorbidities listed above.  Mr. Knox was agreeable to discussion regarding goals of care.   Discussed prognosis and treatment options.  Appeared to have difficulty understanding prognosis, treatment options, and ability to make complex medical decisions at this time.  Details of discussion above.    Spoke with his son/HCS, Aniceto, and his sister, Oaxna, via telephone.  Details of discussion above.   Family appear to have good prognostic awareness.   His sister, Oxana, shared she feels her brother is scared to die and this makes it hard for him to make decisions.  Feels hospice would benefit him.   Plans to have video conference with Oxana and Mr. Knox tomorrow around 11:30 AM to discuss goals of care and treatment options further.       Thank you for allowing us to participate in patient's plan of care. Palliative Care Team will continue to follow patient.     Electronically signed by, VIV Portillo, 10/14/24.

## 2024-10-14 NOTE — PLAN OF CARE
Goal Outcome Evaluation:  Plan of Care Reviewed With: patient     Wore BiPap most of the night, took off to eat dinner and this am to have a popcicle break for 15 minutes. 125 urine out overnight 390 on bladder scan this am-I/O cath 400 (penis and scrotum very edematous, BP 85 systolic asymptomatic, 500cc LR bolus given. Urine labs sent. VTM    Progress: improving

## 2024-10-14 NOTE — THERAPY EVALUATION
Patient Name: Tucker Knox  : 1960    MRN: [id number]                              Today's Date: 10/14/2024       Admit Date: 10/12/2024    Visit Dx:     ICD-10-CM ICD-9-CM   1. Respiratory failure with hypoxia and hypercapnia, unspecified chronicity  J96.91 518.81    J96.92    2. Recurrent right pleural effusion  J90 511.9   3. Acute on chronic respiratory failure with hypoxia and hypercapnia  J96.21 518.84    J96.22 786.09     799.02   4. Acute on chronic congestive heart failure, unspecified heart failure type  I50.9 428.0   5. COPD exacerbation  J44.1 491.21   6. Impaired mobility [Z74.09]  Z74.09 799.89     Patient Active Problem List   Diagnosis    Type 2 diabetes mellitus with hyperglycemia, with long-term current use of insulin    Hypertension    Pulmonary HTN    COPD (chronic obstructive pulmonary disease)    Type 2 myocardial infarction due to heart failure    Malignant neoplasm of sigmoid colon    FH: colon cancer    Sleep apnea    History of adenomatous polyp of colon    Colon adenocarcinoma    Acute on chronic systolic CHF (congestive heart failure)    PVC's (premature ventricular contractions)    Presence of external cardiac defibrillator    Acute exacerbation of CHF (congestive heart failure)    Acute on chronic respiratory failure with hypoxia and hypercapnia    Delirium    Acute on chronic HFrEF (heart failure with reduced ejection fraction)    AMA (acute kidney injury)    Hyperkalemia     Past Medical History:   Diagnosis Date    Cancer     CHF (congestive heart failure)     COPD (chronic obstructive pulmonary disease)     Coronary artery disease     stent x 1    Family history of colon cancer     Hyperlipidemia     Hypertension     Sleep apnea     does not wear machine, supposed to wear cpap with oxygen and does not wear it    Type 2 diabetes mellitus      Past Surgical History:   Procedure Laterality Date    BACK SURGERY      CARDIAC CATHETERIZATION Left 2022    Procedure:  Cardiac Catheterization/Vascular Study;  Surgeon: Todd Avendano MD;  Location:  [facility];  Service: Cardiology;  Laterality: Left;    CARDIAC CATHETERIZATION      with stent x1    CARDIAC CATHETERIZATION N/A 9/12/2024    Procedure: Left Heart Cath;  Surgeon: Ruben Adler MD;  Location:  [facility]ATH [facility];  Service: Cardiology;  Laterality: N/A;    COLON RESECTION N/A 12/5/2023    Procedure: COLON RESECTION LAPAROSCOPIC SIGMOID WITH DAVINCI ROBOT, INTRA-OPERATIVE FLEXIBLE SIGMOIDOSCOPY, SPLENIC FLEXURE MOBILIZATION, OPEN UMBILICAL HERNIA REPAIR;  Surgeon: Oma Velasquez MD;  Location:  [facility] OR;  Service: Robotics - DaVinci;  Laterality: N/A;    COLONOSCOPY N/A 10/09/2023    Diverticulosis; One 5mm polyp in ascending colon; One 5mm polyp in transverse colon; One 10mm polyp at 65cm proximal to anus; One 20mm polyp at 65cm proximal to anus-Tattooed; One 20mm polyp at 42cm proximal to anus-Clip (MR conditional) placed-Tattooed; Rule out malignancy-Partially obstructing tumor in recto-sigmoid colon-biopsied-Tattooed; One 10mm polyp in rectum    ELBOW PROCEDURE      KNEE SURGERY      LUMBAR DISC SURGERY        General Information       Row Name 10/14/24 0834 10/14/24 0820       OT Time and Intention    Subjective Information complains of;pain  -KP complains of;pain  patient presented to ER with SOA and uncharacteristic aggressive behaviors. Dx: acute onset respiratoy failure with hypoxia and hypercapnia  -KP    Document Type evaluation  patient presented to ER with SOA and uncharacteristic aggressive behaviors. DX: acute onset respiratory failure w/ hypoxia and hypercapnia  -KP evaluation  -KP    Mode of Treatment occupational therapy  -KP occupational therapy  -KP    Patient Effort -- adequate  -KP    Symptoms Noted During/After Treatment -- increased pain  -KP    Comment -- while ambulating  -KP      Row Name 10/14/24 0820          General Information    Patient Profile Reviewed  yes  -     Prior Level of Function independent:;all household mobility;transfer;bed mobility;ADL's  patient lived at home with 8 steps to enter. patient completed adls and functional mobility with mod I, used either cane or FWW for ambulation.  -     Existing Precautions/Restrictions fall;oxygen therapy device and L/min  -     Barriers to Rehab medically complex  -       Row Name 10/14/24 0820          Living Environment    People in Home alone  -       Row Name 10/14/24 0820          Home Main Entrance    Number of Stairs, Main Entrance eight  -     Stair Railings, Main Entrance none  -       Row Name 10/14/24 0820          Stairs Within Home, Primary    Number of Stairs, Within Home, Primary none  -       Row Name 10/14/24 0820          Cognition    Orientation Status (Cognition) oriented x 3  -       Row Name 10/14/24 0820          Safety Issues/Impairments Affecting Functional Mobility    Safety Issues Affecting Function (Mobility) friction/shear risk  -     Impairments Affecting Function (Mobility) balance;endurance/activity tolerance;pain;strength;shortness of breath  -               User Key  (r) = Recorded By, (t) = Taken By, (c) = Cosigned By      Initials Name Provider Type     Meche Wu, OTR/L Occupational Therapist                     Mobility/ADL's       Row Name 10/14/24 0820          Bed Mobility    Bed Mobility sit-supine  -     Sit-Supine Davidson (Bed Mobility) supervision;verbal cues  -     Assistive Device (Bed Mobility) bed rails  -     Comment, (Bed Mobility) patient found sitting EOB  -       Row Name 10/14/24 0820          Transfers    Transfers sit-stand transfer;stand-sit transfer  -       Row Name 10/14/24 0820          Sit-Stand Transfer    Sit-Stand Davidson (Transfers) moderate assist (50% patient effort)  -     Assistive Device (Sit-Stand Transfers) cane, straight  -       Row Name 10/14/24 0820          Stand-Sit Transfer    Stand-Sit  McKenzie (Transfers) contact guard;verbal cues  -     Assistive Device (Stand-Sit Transfers) cane, straight  -       Row Name 10/14/24 0820          Functional Mobility    Functional Mobility- Ind. Level minimum assist (75% patient effort);contact guard assist  -     Functional Mobility- Device cane, straight  -     Functional Mobility- Safety Issues supplemental O2  -     Functional Mobility- Comment requires someone to assist w/ oxygen equipment  -       Row Name 10/14/24 0820          Activities of Daily Living    BADL Assessment/Intervention lower body dressing  -       Row Name 10/14/24 0820          Lower Body Dressing Assessment/Training    McKenzie Level (Lower Body Dressing) don;dependent (less than 25% patient effort)  -     Position (Lower Body Dressing) edge of bed sitting;unsupported sitting  -               User Key  (r) = Recorded By, (t) = Taken By, (c) = Cosigned By      Initials Name Provider Type     Meche Wu, АННАR/L Occupational Therapist                   Obj/Interventions       Row Name 10/14/24 0916          Sensory Assessment (Somatosensory)    Sensory Assessment (Somatosensory) UE sensation intact  -       Row Name 10/14/24 0916          Vision Assessment/Intervention    Visual Impairment/Limitations WNL  -       Row Name 10/14/24 0916          Range of Motion Comprehensive    General Range of Motion bilateral upper extremity ROM WFL  -       Row Name 10/14/24 0916          Strength Comprehensive (MMT)    General Manual Muscle Testing (MMT) Assessment upper extremity strength deficits identified  -     Comment, General Manual Muscle Testing (MMT) Assessment BUE grossly 4/5  -       Row Name 10/14/24 0916          Balance    Balance Assessment sitting static balance;sitting dynamic balance;standing static balance;standing dynamic balance  -     Static Sitting Balance standby assist  -     Dynamic Sitting Balance standby assist  -     Position,  Sitting Balance unsupported;sitting edge of bed  -KP     Static Standing Balance minimal assist  -KP     Dynamic Standing Balance minimal assist  -KP     Position/Device Used, Standing Balance cane, straight  -KP               User Key  (r) = Recorded By, (t) = Taken By, (c) = Cosigned By      Initials Name Provider Type    Meche Cabral, OTR/L Occupational Therapist                   Goals/Plan       Row Name 10/14/24 0820          Bed Mobility Goal 1 (OT)    Activity/Assistive Device (Bed Mobility Goal 1, OT) --  -KP     Greene Level/Cues Needed (Bed Mobility Goal 1, OT) --  -KP     Time Frame (Bed Mobility Goal 1, OT) --  -KP     Progress/Outcomes (Bed Mobility Goal 1, OT) --  -       Row Name 10/14/24 0820          Transfer Goal 1 (OT)    Activity/Assistive Device (Transfer Goal 1, OT) toilet  -KP     Greene Level/Cues Needed (Transfer Goal 1, OT) standby assist  -KP     Time Frame (Transfer Goal 1, OT) 10 days;long term goal (LTG)  -KP     Progress/Outcome (Transfer Goal 1, OT) new goal  -       Row Name 10/14/24 0820          Dressing Goal 1 (OT)    Activity/Device (Dressing Goal 1, OT) dressing skills, all  -KP     Greene/Cues Needed (Dressing Goal 1, OT) minimum assist (75% or more patient effort)  -KP     Time Frame (Dressing Goal 1, OT) long term goal (LTG);10 days  -KP     Progress/Outcome (Dressing Goal 1, OT) new goal  -       Row Name 10/14/24 0820          Toileting Goal 1 (OT)    Activity/Device (Toileting Goal 1, OT) toileting skills, all;commode, bedside without drop arms  -KP     Greene Level/Cues Needed (Toileting Goal 1, OT) minimum assist (75% or more patient effort)  -KP     Time Frame (Toileting Goal 1, OT) long term goal (LTG);10 days  -KP     Progress/Outcome (Toileting Goal 1, OT) new goal  -       Row Name 10/14/24 0820          Problem Specific Goal 1 (OT)    Problem Specific Goal 1 (OT) Pt will independently implement one pain management technique to  decrease pain and improve functional performance.  -KP     Time Frame (Problem Specific Goal 1, OT) long term goal (LTG);by discharge  -     Progress/Outcome (Problem Specific Goal 1, OT) new goal  -       Row Name 10/14/24 0820          Therapy Assessment/Plan (OT)    Planned Therapy Interventions (OT) activity tolerance training;adaptive equipment training;BADL retraining;functional balance retraining;occupation/activity based interventions;patient/caregiver education/training;strengthening exercise;transfer/mobility retraining  -               User Key  (r) = Recorded By, (t) = Taken By, (c) = Cosigned By      Initials Name Provider Type    Meche Cabral, OTR/L Occupational Therapist                   Clinical Impression       Row Name 10/14/24 0820          Pain Assessment    Pretreatment Pain Rating 6/10  -KP     Posttreatment Pain Rating 7/10  -     Pain Location back;extremity  -     Pain Side/Orientation right;lower  -KP     Pain Management Interventions exercise or physical activity utilized  -     Response to Pain Interventions activity participation with increased pain  -       Row Name 10/14/24 0820          Plan of Care Review    Plan of Care Reviewed With patient  -     Outcome Evaluation OT evaluation completed. Patient A&O x3. Patient with supplemental O2 ia nasal cannul;a 3L. Patient states pain is 6/10 through back and RLE. Patient found sitting EOB eating breakfast. Patient agreeable to participate. Patient BUE ROM WFL and strength found to be grossly 4/5. Patient completed sit>stand to SC with mod A. Patient completed functional mobility with min A and use of cane, assistance given for O2 tank. Patient completed bed<>bathroom ambulation. Patient completed stand>sit with min A. Patient completed sit>supine with CGA and VCs. Patient left R sidelying position with call light within reach and bed alarm on. HOB slgihtly raised. Continued OT recommended for ADL training, transfer  training and endurance training. D/C recommendation to SNF for continued therapy.  -       Row Name 10/14/24 0820          Therapy Assessment/Plan (OT)    Rehab Potential (OT) fair  -     Criteria for Skilled Therapeutic Interventions Met (OT) yes;meets criteria  -     Therapy Frequency (OT) 5 times/wk  -       Row Name 10/14/24 0820          Therapy Plan Review/Discharge Plan (OT)    Equipment Needs Upon Discharge (OT) tub bench  -     Anticipated Discharge Disposition (OT) skilled nursing facility  -       Row Name 10/14/24 0820          Vital Signs    Post SpO2 (%) 90  -     O2 Delivery Post Treatment nasal cannula  -     Post Patient Position Sitting  -       Row Name 10/14/24 0820          Positioning and Restraints    Pre-Treatment Position in bed  sitting EOB eating breakfast  -KP     Post Treatment Position bed  -KP     In Bed notified nsg;side lying right;call light within reach;encouraged to call for assist;exit alarm on;side rails up x3  -               User Key  (r) = Recorded By, (t) = Taken By, (c) = Cosigned By      Initials Name Provider Type     Meche Wu, OTR/L Occupational Therapist                   Outcome Measures       Row Name 10/14/24 0820          How much help from another is currently needed...    Putting on and taking off regular lower body clothing? 2  -KP     Bathing (including washing, rinsing, and drying) 2  -KP     Toileting (which includes using toilet bed pan or urinal) 2  -KP     Putting on and taking off regular upper body clothing 3  -KP     Taking care of personal grooming (such as brushing teeth) 4  -KP     Eating meals 4  -KP     AM-PAC 6 Clicks Score (OT) 17  -       Row Name 10/14/24 0900          How much help from another person do you currently need...    Turning from your back to your side while in flat bed without using bedrails? 4  -EE     Moving from lying on back to sitting on the side of a flat bed without bedrails? 3  -EE     Moving  to and from a bed to a chair (including a wheelchair)? 3  -EE     Standing up from a chair using your arms (e.g., wheelchair, bedside chair)? 2  -EE     Climbing 3-5 steps with a railing? 2  -EE     To walk in hospital room? 2  -EE     AM-PAC 6 Clicks Score (PT) 16  -EE     Highest Level of Mobility Goal 5 --> Static standing  -EE       Row Name 10/14/24 0820          Functional Assessment    Outcome Measure Options AM-PAC 6 Clicks Daily Activity (OT)  -               User Key  (r) = Recorded By, (t) = Taken By, (c) = Cosigned By      Initials Name Provider Type    Meche Cabral, OTR/L Occupational Therapist    Sarah Byrne RN Registered Nurse                    Occupational Therapy Education       Title: PT OT SLP Therapies (In Progress)       Topic: Occupational Therapy (Not Started)       Point: ADL training (Not Started)       Description:   Instruct learner(s) on proper safety adaptation and remediation techniques during self care or transfers.   Instruct in proper use of assistive devices.                  Learner Progress:  Not documented in this visit.              Point: Home exercise program (Not Started)       Description:   Instruct learner(s) on appropriate technique for monitoring, assisting and/or progressing therapeutic exercises/activities.                  Learner Progress:  Not documented in this visit.              Point: Precautions (Not Started)       Description:   Instruct learner(s) on prescribed precautions during self-care and functional transfers.                  Learner Progress:  Not documented in this visit.              Point: Body mechanics (Not Started)       Description:   Instruct learner(s) on proper positioning and spine alignment during self-care, functional mobility activities and/or exercises.                  Learner Progress:  Not documented in this visit.                                  OT Recommendation and Plan  Planned Therapy Interventions (OT): activity  tolerance training, adaptive equipment training, BADL retraining, functional balance retraining, occupation/activity based interventions, patient/caregiver education/training, strengthening exercise, transfer/mobility retraining  Therapy Frequency (OT): 5 times/wk  Plan of Care Review  Plan of Care Reviewed With: patient  Outcome Evaluation: OT evaluation completed. Patient A&O x3. Patient with supplemental O2 ia nasal cannul;a 3L. Patient states pain is 6/10 through back and RLE. Patient found sitting EOB eating breakfast. Patient agreeable to participate. Patient BUE ROM WFL and strength found to be grossly 4/5. Patient completed sit>stand to SC with mod A. Patient completed functional mobility with min A and use of cane, assistance given for O2 tank. Patient completed bed<>bathroom ambulation. Patient completed stand>sit with min A. Patient completed sit>supine with CGA and VCs. Patient left R sidelying position with call light within reach and bed alarm on. HOB slgihtly raised. Continued OT recommended for ADL training, transfer training and endurance training. D/C recommendation to SNF for continued therapy.     Time Calculation:         Time Calculation- OT       Row Name 10/14/24 0820             Time Calculation- OT    OT Start Time 0805  -KP      OT Stop Time 0850  -KP      OT Time Calculation (min) 45 min  -KP      OT Received On 10/14/24  -      OT Goal Re-Cert Due Date 10/24/24  -         Untimed Charges    OT Eval/Re-eval Minutes 45  -KP         Total Minutes    Untimed Charges Total Minutes 45  -KP       Total Minutes 45  -KP                User Key  (r) = Recorded By, (t) = Taken By, (c) = Cosigned By      Initials Name Provider Type    Meche Cabral OTR/L Occupational Therapist                  Therapy Charges for Today       Code Description Service Date Service Provider Modifiers Qty    [id number] HC OT EVAL MOD COMPLEXITY 3 10/14/2024 Meche Wu OTR/L GO 1                 Meche  Jazmin, OTR/L  10/14/2024

## 2024-10-14 NOTE — PROGRESS NOTES
RT EQUIPMENT DEVICE RELATED - SKIN ASSESSMENT    Jon Score:  Jon Score: 16     RT Medical Equipment/Device:     NIV Mask:  Under-the-nose   size:  B    Skin Assessment:      Cheek:  Intact  Nares:  Intact  Lips:  Intact  Mouth:  Intact    Device Skin Pressure Protection:  Skin-to-device areas padded:  None Required    Nurse Notification:  Christina Soto, RRT

## 2024-10-14 NOTE — PLAN OF CARE
Goal Outcome Evaluation:              Outcome Evaluation: Nutrition assessment complete. NPO sips with meds since this morning. Pt HOB 0 degrees and did not arouse to voice or gentle touch. Nephrology following; advised holding diuretics and Lokelma. K+, PO4, BUN, Cr, elevated. PTH drawn; no vitamin D at this time. Ordering for draw today. Glu ranges . Multiple areas of skin breakdown noted; see LDA. Will monitor diet progression, nutrition-related lab values, and provide patient-centered interventions as needed. When pt provided PO diet, % of three meals and 613 mL oral fluid/d. Prior encounters with nutrition services noted [facility] (9/12/24). Will monitor while inpatient.

## 2024-10-14 NOTE — PROGRESS NOTES
Nephrology ([facility]) Progress Note      Patient:  Tucker Knox  YOB: 1960  Date of Service: 10/14/2024  MRN: [id number]   Acct: [id number]   Primary Care Physician: Kt Alfredo MD  Advance Directive:   Code Status and Medical Interventions: No CPR (Do Not Attempt to Resuscitate); Limited Support; No intubation (DNI)   Ordered at: 10/12/24 1122     Medical Intervention Limits:    No intubation (DNI)     Code Status (Patient has no pulse and is not breathing):    No CPR (Do Not Attempt to Resuscitate)     Medical Interventions (Patient has pulse or is breathing):    Limited Support     Admit Date: 10/12/2024       Hospital Day: 2  Referring Provider: No ref. provider found      Patient personally seen and examined.  Complete chart including Consults, Notes, Operative Reports, Labs, Cardiology, and Radiology studies reviewed as able.        Subjective:  Tucker Knox is a 64 y.o. male for whom we were consulted for evaluation and treatment of acute kidney injury. No prior known renal issues. History of systolic/diastolic CHF, COPD, coronary artery disease, hypertension, type 2 diabetes.  Frequent admission for CHF exacerbations. Most recently was discharged from hospital on 10/04. Returned to ER on 10/12 with dyspnea, abdominal tightness. Creatinine 1.56 on admission. Treated with Bumex and renal function worsened. Nephrology consulted 10/13. Diuretics were held.    Today is awake and alert. Has been on BiPAP. Required in/out catheter overnight for urinary retention. Given 500 ml IV fluid bolus overnight.    Allergies:  Penicillins    Home Meds:  Medications Prior to Admission   Medication Sig Dispense Refill Last Dose/Taking    acetaminophen (TYLENOL) 325 MG tablet Take 2 tablets by mouth Every 6 (Six) Hours As Needed for Mild Pain. 60 tablet 0 Past Week    aspirin 81 MG EC tablet Take 1 tablet by mouth Daily. 100 tablet 2 10/11/2024 Morning     dapagliflozin Propanediol (Farxiga) 10 MG tablet Take 10 mg by mouth Daily. 30 tablet 0 Taking    DULoxetine (CYMBALTA) 30 MG capsule Take 1 capsule by mouth Daily. 30 capsule 0 10/11/2024 Morning    furosemide (LASIX) 40 MG tablet Take 0.5 tablets by mouth Daily. 30 tablet 0 Taking    gabapentin (NEURONTIN) 600 MG tablet Take 1 tablet by mouth 2 (Two) Times a Day. Patient states he takes 800 mg bid 60 tablet 0 10/11/2024    insulin detemir (LEVEMIR) 100 UNIT/ML injection Inject 14 Units under the skin into the appropriate area as directed 2 (Two) Times a Day.   Taking    metFORMIN (GLUCOPHAGE) 1000 MG tablet Take 1 tablet by mouth 2 (Two) Times a Day With Meals.   Taking    metoprolol succinate XL (TOPROL-XL) 25 MG 24 hr tablet Take 1 tablet by mouth Daily. 30 tablet 0 10/11/2024    nicotine (NICODERM CQ) 21 MG/24HR patch Place 1 patch on the skin as directed by provider Daily. 14 patch 0 Taking    polyethylene glycol (MIRALAX) 17 GM/SCOOP powder Take 17 g by mouth Daily.   Taking    pravastatin (PRAVACHOL) 40 MG tablet Take 1 tablet by mouth Every Night. 30 tablet 0 10/11/2024    QUEtiapine (SEROquel) 25 MG tablet Take 1 tablet by mouth Every Night. 30 tablet 0 Taking    sacubitril-valsartan (Entresto) 24-26 MG tablet Take 1 tablet by mouth 2 (Two) Times a Day. 60 tablet 0 Taking    Tradjenta 5 MG tablet tablet Take 1 tablet by mouth Daily.   Taking    albuterol (PROVENTIL) (2.5 MG/3ML) 0.083% nebulizer solution Take 2.5 mg by nebulization Every 6 (Six) Hours As Needed for Wheezing.       albuterol sulfate  (90 Base) MCG/ACT inhaler Inhale 2 puffs Every 4 (Four) Hours As Needed for Wheezing. 8 g 0     nitroglycerin (NITROSTAT) 0.4 MG SL tablet Place 1 tablet under the tongue Every 5 (Five) Minutes As Needed for Chest Pain. Take no more than 3 doses in 15 minutes.   Unknown       Medicines:  Current Facility-Administered Medications   Medication Dose Route Frequency Provider Last Rate Last Admin     acetaminophen (TYLENOL) tablet 650 mg  650 mg Oral Q6H PRN BRITT Stout DO   650 mg at 10/13/24 0851    albuterol (PROVENTIL) nebulizer solution 0.042% 1.25 mg/3mL  1.25 mg Nebulization Q6H PRN Jazmin Garrison APRN        aspirin EC tablet 81 mg  81 mg Oral Daily Jazmin Garrison APRN   81 mg at 10/14/24 0942    [Held by provider] bumetanide (BUMEX) injection 1 mg  1 mg Intravenous Q12H Jazmin Garrison APRN   1 mg at 10/13/24 1225    calcium carbonate (TUMS) chewable tablet 500 mg (200 mg elemental)  2 tablet Oral TID PRN Karen Parekh DO        dextrose (D50W) (25 g/50 mL) IV injection 25 g  25 g Intravenous Q15 Min PRN Jazmin Garrison APRN        dextrose (GLUTOSE) oral gel 15 g  15 g Oral Q15 Min PRN Jazmin Garrison APRN        DULoxetine (CYMBALTA) DR capsule 30 mg  30 mg Oral Daily Jazmin Garrison APRN   30 mg at 10/14/24 0942    Enoxaparin Sodium (LOVENOX) syringe 40 mg  40 mg Subcutaneous Q12H Jazmin Garrison APRN   40 mg at 10/14/24 0942    gabapentin (NEURONTIN) capsule 600 mg  600 mg Oral Q12H BRITT Stout DO   600 mg at 10/14/24 0942    glucagon (GLUCAGEN) injection 1 mg  1 mg Intramuscular Q15 Min PRN Jazmin Garrison APRN        insulin glargine (LANTUS, SEMGLEE) injection 14 Units  14 Units Subcutaneous Q12H Jazmin Garrison APRN   14 Units at 10/14/24 0942    Insulin Lispro (humaLOG) injection 2-7 Units  2-7 Units Subcutaneous 4x Daily AC & at Bedtime Jazmin Garrison APRN   2 Units at 10/13/24 2106    metoprolol succinate XL (TOPROL-XL) 24 hr tablet 25 mg  25 mg Oral Q24H Jazmin Garrison APRN   25 mg at 10/14/24 0942    nitroglycerin (NITROSTAT) SL tablet 0.4 mg  0.4 mg Sublingual Q5 Min PRN Jazmin Garrison APRN        pravastatin (PRAVACHOL) tablet 40 mg  40 mg Oral Nightly Jazmin Garrison APRN   40 mg at 10/13/24 2106    QUEtiapine (SEROquel) tablet 25 mg  25 mg Oral Nightly Jazmin APRN   25 mg at 10/13/24 2107    [Held by provider]  sacubitril-valsartan (ENTRESTO) 24-26 MG tablet 1 tablet  1 tablet Oral BID Jazmin APRN        sodium chloride 0.9 % flush 10 mL  10 mL Intravenous PRN Vijay Leary MD   10 mL at 10/12/24 0731    sodium chloride 0.9 % flush 10 mL  10 mL Intravenous PRN Vijay Leary MD   10 mL at 10/12/24 0731    sodium zirconium cyclosilicate (LOKELMA) packet 10 g  10 g Oral Once Anton Colon APRN           Past Medical History:  Past Medical History:   Diagnosis Date    Cancer     CHF (congestive heart failure)     COPD (chronic obstructive pulmonary disease)     Coronary artery disease     stent x 1    Family history of colon cancer     Hyperlipidemia     Hypertension     Sleep apnea     does not wear machine, supposed to wear cpap with oxygen and does not wear it    Type 2 diabetes mellitus        Past Surgical History:  Past Surgical History:   Procedure Laterality Date    BACK SURGERY      CARDIAC CATHETERIZATION Left 04/13/2022    Procedure: Cardiac Catheterization/Vascular Study;  Surgeon: Todd Avendano MD;  Location:  [facility] CATH [facility]AS[facility];  Service: Cardiology;  Laterality: Left;    CARDIAC CATHETERIZATION      with stent x1    CARDIAC CATHETERIZATION N/A 9/12/2024    Procedure: Left Heart Cath;  Surgeon: Ruben Adler MD;  Location:  [facility] CATH [facility]VAS[facility];  Service: Cardiology;  Laterality: N/A;    COLON RESECTION N/A 12/5/2023    Procedure: COLON RESECTION LAPAROSCOPIC SIGMOID WITH DAVINCI ROBOT, INTRA-OPERATIVE FLEXIBLE SIGMOIDOSCOPY, SPLENIC FLEXURE MOBILIZATION, OPEN UMBILICAL HERNIA REPAIR;  Surgeon: Oma Velasquez MD;  Location:  [facility] OR;  Service: Robotics - DaVinci;  Laterality: N/A;    COLONOSCOPY N/A 10/09/2023    Diverticulosis; One 5mm polyp in ascending colon; One 5mm polyp in transverse colon; One 10mm polyp at 65cm proximal to anus; One 20mm polyp at 65cm proximal to anus-Tattooed; One 20mm polyp at 42cm proximal to anus-Clip (MR conditional) placed-Tattooed;  Rule out malignancy-Partially obstructing tumor in recto-sigmoid colon-biopsied-Tattooed; One 10mm polyp in rectum    ELBOW PROCEDURE      KNEE SURGERY      LUMBAR DISC SURGERY         Family History  Family History   Problem Relation Age of Onset    Esophageal cancer Mother     Heart disease Mother     Colon cancer Father 80    Heart disease Father     No Known Problems Sister     COPD Brother     Alcohol abuse Brother     Colon polyps Neg Hx     Liver cancer Neg Hx     Rectal cancer Neg Hx     Stomach cancer Neg Hx     Liver disease Neg Hx        Social History  Social History     Socioeconomic History    Marital status:    Tobacco Use    Smoking status: Former     Current packs/day: 1.00     Average packs/day: 1 pack/day for 45.0 years (45.0 ttl pk-yrs)     Types: Cigarettes    Smokeless tobacco: Never   Vaping Use    Vaping status: Some Days    Substances: Nicotine, Flavoring    Devices: Disposable   Substance and Sexual Activity    Alcohol use: Not Currently    Drug use: Yes     Types: Marijuana    Sexual activity: Defer       Review of Systems:  History obtained from chart review and the patient  General ROS: No fever or chills  Respiratory ROS: No cough, shortness of breath, wheezing  Cardiovascular ROS: positive for - dyspnea on exertion  No chest pain or palpitations  Gastrointestinal ROS: No abdominal pain or melena  Genito-Urinary ROS: No dysuria or hematuria  Psych ROS: No anxiety and depression  14 point ROS reviewed with the patient and negative except as noted above and in the HPI unless unable to obtain.    Objective:  Patient Vitals for the past 24 hrs:   BP Temp Temp src Pulse Resp SpO2 Weight   10/14/24 0738 150/50 97 °F (36.1 °C) Axillary 66 16 97 % --   10/14/24 0645 -- -- -- 68 14 97 % --   10/14/24 0543 -- -- -- -- -- -- 128 kg (283 lb 3.2 oz)   10/14/24 0445 (!) 85/59 98 °F (36.7 °C) Oral 69 18 96 % --   10/13/24 2030 111/73 97.7 °F (36.5 °C) Oral 65 18 97 % --   10/13/24 1202 115/63  97.6 °F (36.4 °C) Oral 86 20 92 % --       Intake/Output Summary (Last 24 hours) at 10/14/2024 1029  Last data filed at 10/14/2024 0900  Gross per 24 hour   Intake 800 ml   Output 525 ml   Net 275 ml     General: awake/alert   Chest:  clear to auscultation bilaterally without respiratory distress  CVS: regular rate and rhythm  Abdominal: soft, nontender, positive bowel sounds  Extremities:  trace lower ext edema  Skin: warm and dry without rash      Labs:  Results from last 7 days   Lab Units 10/14/24  0252 10/13/24  0424 10/12/24  0532   WBC 10*3/mm3 6.68 8.04 9.29   HEMOGLOBIN g/dL 10.3* 11.1* 11.5*   HEMATOCRIT % 36.7* 38.5 41.0   PLATELETS 10*3/mm3 239 251 286         Results from last 7 days   Lab Units 10/14/24  0252 10/13/24  1649 10/13/24  0424 10/12/24  0759 10/12/24  0532   SODIUM mmol/L 135* 137 134*  --  142   SODIUM, ARTERIAL   --   --   --    < >  --    POTASSIUM mmol/L 5.6* 5.2 5.9*  --  5.4*   CHLORIDE mmol/L 94* 94* 95*  --  98   CO2 mmol/L 32.0* 35.0* 30.0*  --  35.0*   BUN mg/dL 69* 61* 61*  --  45*   CREATININE mg/dL 2.55* 2.42* 2.16*  --  1.56*   CALCIUM mg/dL 8.3* 8.5* 8.4*  --  9.0   EGFR mL/min/1.73 27.5* 29.3* 33.6*  --  49.6*   BILIRUBIN mg/dL  --   --   --   --  0.6   ALK PHOS U/L  --   --   --   --  103   ALT (SGPT) U/L  --   --   --   --  13   AST (SGOT) U/L  --   --   --   --  14   GLUCOSE mg/dL 99 118* 257*  --  202*    < > = values in this interval not displayed.       Radiology:   Imaging Results (Last 72 Hours)       Procedure Component Value Units Date/Time    XR Chest 1 View [[id number]] Collected: 10/12/24 0650     Updated: 10/12/24 0654    Narrative:      EXAMINATION: XR CHEST 1 VW- 10/12/2024 6:50 AM     HISTORY: CHF/COPD Protocol.     REPORT: A frontal view of the chest was obtained.     COMPARISON: Chest x-ray 9/28/2024.     The heart is enlarged, there is central and basilar vascular congestion  and mild pulmonary edema. The right pleural effusion appears slightly  increased  "in size but remains small. No pneumothorax is identified.  Moderate atelectasis in the lung bases greater on the right. No acute  osseous abnormality.       Impression:      Congestive heart failure with slight increase in the right  pleural effusion, moderate at basilar atelectasis greater on the right.     This report was signed and finalized on 10/12/2024 6:51 AM by Dr. Puneet Whitley MD.               Culture:  No results found for: \"BLOODCX\", \"URINECX\", \"WOUNDCX\", \"MRSACX\", \"RESPCX\", \"STOOLCX\"      Assessment    Acute kidney injury  Acute on chronic congestive heart failure  Type 2 diabetes  Hypertension  Hyperkalemia  COPD  Anemia     Plan:   Agree with IV fluid bolus that was given  Continue to hold diuretics  Repeat Lokelma  Monitor labs      Anton Colon, VIV  10/14/2024  10:29 CDT    "

## 2024-10-14 NOTE — CASE MANAGEMENT/SOCIAL WORK
Continued Stay Note  [facility]     Patient Name: Tucker Knox  MRN: [id number]  Today's Date: 10/14/2024    Admit Date: 10/12/2024    Plan: [facility]   Discharge Plan       Row Name 10/14/24 1223       Plan    Plan [facility]    Patient/Family in Agreement with Plan yes    Plan Comments SW spoke to admissions at [facility].  She stated they cannot accept pt at this building but pt had agreed to [facility].  She stated since pt got admitted to hospital she will have to work up as a new referral.  Pt will require precert.                   Discharge Codes    No documentation.                       GAGANDEEP Cespedes

## 2024-10-15 NOTE — PROGRESS NOTES
"    [facility]  INPATIENT PROGRESS NOTE    Patient Name: Tucker Knox  Date of Admission: 10/12/2024  Today's Date: 10/15/24  Length of Stay: 3  Primary Care Physician: Kt Alfredo MD    Subjective   Chief Complaint: short of breath  HPI   Continued shortness of breath.  Tired.  Hurts.    Palliative care has been in contact with patient and sister.  Patient wishes to transition to comfort measures.  He is tired of \"all of this\".         Review of Systems   All pertinent negatives and positives are as above. All other systems have been reviewed and are negative unless otherwise stated.     Objective    Temp:  [97.1 °F (36.2 °C)-97.5 °F (36.4 °C)] 97.4 °F (36.3 °C)  Heart Rate:  [] 82  Resp:  [18-20] 18  BP: (102-120)/(65-79) 120/71  Physical Exam  Vitals and nursing note reviewed.   Constitutional:       Appearance: He is obese.   HENT:      Head: Normocephalic and atraumatic.      Right Ear: External ear normal.      Left Ear: External ear normal.      Nose: Nose normal.      Mouth/Throat:      Mouth: Mucous membranes are moist.   Eyes:      Extraocular Movements: Extraocular movements intact.      Conjunctiva/sclera: Conjunctivae normal.      Pupils: Pupils are equal, round, and reactive to light.   Cardiovascular:      Rate and Rhythm: Normal rate and regular rhythm.      Pulses: Normal pulses.      Heart sounds: No murmur heard.     No friction rub. No gallop.   Pulmonary:      Effort: Pulmonary effort is normal.      Breath sounds: Wheezing and rhonchi present.   Abdominal:      General: Bowel sounds are normal.      Palpations: Abdomen is soft.   Musculoskeletal:         General: Normal range of motion.      Cervical back: Normal range of motion and neck supple.   Skin:     General: Skin is warm and dry.      Capillary Refill: Capillary refill takes less than 2 seconds.   Neurological:      General: No focal deficit present.      " "Mental Status: He is alert and oriented to person, place, and time.      Cranial Nerves: No cranial nerve deficit.   Psychiatric:         Mood and Affect: Mood normal.         Behavior: Behavior normal.             Results Review:  I have reviewed the labs, radiology results, and diagnostic studies.    Laboratory Data:   Results from last 7 days   Lab Units 10/14/24  0252 10/13/24  0424 10/12/24  0532   WBC 10*3/mm3 6.68 8.04 9.29   HEMOGLOBIN g/dL 10.3* 11.1* 11.5*   HEMATOCRIT % 36.7* 38.5 41.0   PLATELETS 10*3/mm3 239 251 286        Results from last 7 days   Lab Units 10/15/24  0417 10/14/24  0252 10/13/24  1649 10/12/24  0759 10/12/24  0532   SODIUM mmol/L 135* 135* 137   < > 142   SODIUM, ARTERIAL   --   --   --    < >  --    POTASSIUM mmol/L 5.6* 5.6* 5.2   < > 5.4*   CHLORIDE mmol/L 95* 94* 94*   < > 98   CO2 mmol/L 34.0* 32.0* 35.0*   < > 35.0*   BUN mg/dL 73* 69* 61*   < > 45*   CREATININE mg/dL 2.43* 2.55* 2.42*   < > 1.56*   CALCIUM mg/dL 8.3* 8.3* 8.5*   < > 9.0   BILIRUBIN mg/dL 0.3  --   --   --  0.6   ALK PHOS U/L 90  --   --   --  103   ALT (SGPT) U/L 22  --   --   --  13   AST (SGOT) U/L 15  --   --   --  14   GLUCOSE mg/dL 57* 99 118*   < > 202*    < > = values in this interval not displayed.       Culture Data:   No results found for: \"BLOODCX\", \"URINECX\", \"WOUNDCX\", \"MRSACX\", \"RESPCX\", \"STOOLCX\"    Radiology Data:   Imaging Results (Last 24 Hours)       Procedure Component Value Units Date/Time    US Renal Bilateral [[id number]] Collected: 10/15/24 1216     Updated: 10/15/24 1223    Narrative:      US RENAL BILATERAL- 10/14/2024 1:54 PM     HISTORY: ACUTE KIDNEY INJURY     COMPARISON: None available.       TECHNIQUE: Multiple longitudinal and transverse real-time sonographic  images of the kidneys and urinary bladder are obtained. Report and  images stored per institutional and state regulations.           FINDINGS:     Visualized proximal abdominal aorta and IVC are unremarkable.        RIGHT " KIDNEY: The right kidney measures 9.0 cm in length. Renal cortex  is unremarkable. There is no hydronephrosis. There is moderate ascites..     LEFT KIDNEY: Left kidney measures 9.9 cm in length. Renal cortex is  unremarkable. There is no left hydronephrosis.     PELVIS: Urinary bladder is grossly unremarkable.          Impression:         1. Unremarkable kidneys with no hydronephrosis.  2. Moderate ascites.           This report was signed and finalized on 10/15/2024 12:20 PM by Elvis Velasquez.       XR Chest 1 View [[id number]] Collected: 10/14/24 1749     Updated: 10/14/24 1754    Narrative:      EXAMINATION:  XR CHEST 1 VW-  10/14/2024 4:12 PM     HISTORY: Fluid; J96.91-Respiratory failure, unspecified with hypoxia;  J96.92-Respiratory failure, unspecified with hypercapnia; A04-Tkljfkw  effusion, not elsewhere classified; J96.21-Acute and chronic respiratory  failure with hypoxia; J96.22-Acute and chronic respiratory failure with  hypercapnia; I50.9-Heart failure, unspecified; J44.1-Chronic obstructive  pulmonary disease with (acute) exacerbation.     COMPARISON: 10/12/2024.     TECHNIQUE: Single view AP image.     FINDINGS: There is cardiomegaly. There is small to moderate pleural  effusion on the right. There is opacification of the mid to lower lung  zone on the right. The left lung is clear. There is some mediastinal  fullness.          Impression:      1. Cardiomegaly.  2. Small to moderate right pleural effusion. Right lung opacification  may be related to atelectasis or pneumonia.  3. Mediastinal fullness could be vascular. Mediastinal lymphadenopathy  is not excluded. Chest CT on 9/9/2024 did not demonstrate enlarged  mediastinal lymph nodes.           This report was signed and finalized on 10/14/2024 5:51 PM by Dr. Jhoan Carpenter MD.               I have reviewed the patient's current medications.     Assessment/Plan   Assessment  Active Hospital Problems    Diagnosis     **Acute on chronic  respiratory failure with hypoxia and hypercapnia     Hyperkalemia     AMA (acute kidney injury)     Acute on chronic HFrEF (heart failure with reduced ejection fraction)     Colon adenocarcinoma     Type 2 myocardial infarction due to heart failure     Pulmonary HTN     Type 2 diabetes mellitus with hyperglycemia, with long-term current use of insulin     Hypertension        Treatment Plan  Comfort Measures  Palliatiave care consulting, appreciate.   Social service for discharge planning, SNF      Medical Decision Making  Number and Complexity of problems:   Acute on chronic respiratory failure with hypercapnia and hypoxemia high complexity  Hyperkalemia high complexity  AMA high complexity  Acute on chronic heart failure with reduced ejection fraction.  High complexity  Diabetes mellitus type 2 moderate complexity  Hypertension moderate complexity  Pulmonary hypertension high complexity      Differential Diagnosis:     Conditions and Status        Condition is improving.     MDM Data  External documents reviewed: Reviewed  Cardiac tracing (EKG, telemetry) interpretation: Reviewed  Radiology interpretation: Reviewed  Labs reviewed: Reviewed  Any tests that were considered but not ordered: None     Decision rules/scores evaluated (example [id number], Wells, etc): None     Discussed with: Patient     Care Planning  Shared decision making: Patient  Code status and discussions: DNR/DNI    Disposition  Social Determinants of Health that impact treatment or disposition: None  I expect the patient to be discharged to SNF in 2-3 days.     Electronically signed by Patricia Arthur DO, 10/15/24, 15:25 CDT.

## 2024-10-15 NOTE — PAYOR COMM NOTE
"10/15 CLINICAL    Farhana Norton (64 y.o. Male)       Date of Birth   1960    Social Security Number       Address   [address] [place] [id number]    Home Phone   [phone]    MRN   [id number]       Religious   Other    Marital Status                               Admission Date   10/12/24    Admission Type   Emergency    Admitting Provider   Patricia Arthur DO    Attending Provider   Patricia Arthur DO    Department, Room/Bed   [facility] 4B, 449/1       Discharge Date       Discharge Disposition       Discharge Destination                                 Attending Provider: Patricia Arthur DO    Allergies: Penicillins    Isolation: None   Infection: None   Code Status: No CPR    Ht: 175.3 cm (69\")   Wt: 132 kg (291 lb 10.7 oz)    Admission Cmt: None   Principal Problem: Acute on chronic respiratory failure with hypoxia and hypercapnia [J96.21,J96.22]                   Active Insurance as of 10/12/2024       Primary Coverage       Payor Plan Insurance Group Employer/Plan Group    ANTH MEDICARE REPLACEMENT ANTH MEDICARE ADVANTAGE [id number]       Payor Plan Address Payor Plan Phone Number Payor Plan Fax Number Effective Dates    [address] [phone]  2/1/2024 - None Entered    [facility] [address]         Subscriber Name Subscriber Birth Date Member ID       FARHANA NORTON 1960 [id number]                     Emergency Contacts        (Rel.) Home Phone Work Phone Mobile Phone    alycia norton (Son) [phone] -- --    jose deng (Sister) [phone] -- --              Current Facility-Administered Medications   Medication Dose Route Frequency Provider Last Rate Last Admin    acetaminophen (TYLENOL) tablet 650 mg  650 mg Oral Q6H PRN BRITT Stout DO   650 mg at 10/15/24 1150    aspirin EC tablet 81 mg  81 mg Oral Daily Jazmin APRN   81 mg at 10/15/24 0929    atropine 1 % ophthalmic solution 2 " drop  2 drop Sublingual BID PRN Yanely Velasquez APRN        bisacodyl (DULCOLAX) suppository 10 mg  10 mg Rectal Daily PRN Yanely Velasquez APRN        [Held by provider] bumetanide (BUMEX) injection 1 mg  1 mg Intravenous Q12H Jazmin Garrison APRN   1 mg at 10/13/24 1225    calcium carbonate (TUMS) chewable tablet 500 mg (200 mg elemental)  2 tablet Oral TID PRN Karen Parekh DO        dextrose (D50W) (25 g/50 mL) IV injection 25 g  25 g Intravenous Q15 Min PRN Jazmin Garrison APRN        dextrose (GLUTOSE) oral gel 15 g  15 g Oral Q15 Min PRN Jazmin Garrison APRN        diphenoxylate-atropine (LOMOTIL) 2.5-0.025 MG per tablet 1 tablet  1 tablet Oral Q2H PRN Yanely Velasquez APRN        DULoxetine (CYMBALTA) DR capsule 30 mg  30 mg Oral Daily Jazmin Garrison APRN   30 mg at 10/15/24 0930    furosemide (LASIX) injection 20 mg  20 mg Intravenous Q6H PRN Yanely Velasquez APRN        Or    furosemide (LASIX) injection 20 mg  20 mg Subcutaneous Q6H PRN Yanely Velasquez APRN        gabapentin (NEURONTIN) capsule 600 mg  600 mg Oral Q12H BRITT Stout DO   600 mg at 10/15/24 0929    glucagon (GLUCAGEN) injection 1 mg  1 mg Intramuscular Q15 Min PRN Jazmin Garrison APRN        HYDROcodone-acetaminophen (NORCO) 5-325 MG per tablet 1 tablet  1 tablet Oral Q4H PRN Yanely Velasquez APRN        Or    HYDROcodone-acetaminophen (NORCO) 7.5-325 MG per tablet 1 tablet  1 tablet Oral Q4H PRN Yanely Velasquez APRN        Or    HYDROcodone-acetaminophen (NORCO)  MG per tablet 1 tablet  1 tablet Oral Q4H PRN Yanely Velasquez APRN        hydrocortisone-bacitracin-zinc oxide-nystatin (MAGIC BARRIER) ointment 1 Application  1 Application Topical 4x Daily Maribel, Akanksha E, APRN   1 Application at 10/15/24 1741    insulin glargine (LANTUS, SEMGLEE) injection 5 Units  5 Units Subcutaneous Q12H Patricia Arthur DO        Insulin Lispro (humaLOG) injection 2-7 Units  2-7 Units Subcutaneous  4x Daily AC & at Bedtime Jazmin Garrison APRN   3 Units at 10/15/24 1740    ipratropium-albuterol (DUO-NEB) nebulizer solution 3 mL  3 mL Nebulization Q4H PRN Yanely Velasquez APRN   3 mL at 10/15/24 1412    ipratropium-albuterol (DUO-NEB) nebulizer solution 3 mL  3 mL Nebulization 4x Daily - RT Sandhya, Patricia Barnett DO        LORazepam (ATIVAN) tablet 0.5 mg  0.5 mg Oral Q1H PRN Yanely Velasquez APRN        Or    LORazepam (ATIVAN) injection 0.5 mg  0.5 mg Intravenous Q1H PRN Yanely Velasquez APRN        Or    LORazepam (ATIVAN) 2 MG/ML concentrated solution 0.5 mg  0.5 mg Sublingual Q1H PRN Yanely Velasquez APRN        LORazepam (ATIVAN) tablet 1 mg  1 mg Oral Q1H PRN Yanely Velasquez APRN        Or    LORazepam (ATIVAN) injection 1 mg  1 mg Intravenous Q1H PRN Yanely Velasquez APRN        Or    LORazepam (ATIVAN) 2 MG/ML concentrated solution 1 mg  1 mg Sublingual Q1H PRN Yanely Velasquez APRN        LORazepam (ATIVAN) tablet 2 mg  2 mg Oral Q1H PRN Yanely Velasquez APRN        Or    LORazepam (ATIVAN) injection 2 mg  2 mg Intravenous Q1H PRN Yanely Velasquez APRN        Or    LORazepam (ATIVAN) 2 MG/ML concentrated solution 2 mg  2 mg Sublingual Q1H PRN Yanely Velasquez APRN        metoprolol succinate XL (TOPROL-XL) 24 hr tablet 25 mg  25 mg Oral Q24H Jazmin Garrison APRN   25 mg at 10/15/24 0930    nitroglycerin (NITROSTAT) SL tablet 0.4 mg  0.4 mg Sublingual Q5 Min PRN Jazmin Garrison APRN        Polyvinyl Alcohol-Povidone PF (ARTIFICIAL TEARS) 1.4-0.6 % ophthalmic solution 1 drop  1 drop Both Eyes Q30 Min PRN Yanely Velasquez APRN        pravastatin (PRAVACHOL) tablet 40 mg  40 mg Oral Nightly Jazmin Garrison APRN   40 mg at 10/14/24 2139    QUEtiapine (SEROquel) tablet 25 mg  25 mg Oral Nightly Jazmin APRN   25 mg at 10/14/24 2137    [Held by provider] sacubitril-valsartan (ENTRESTO) 24-26 MG tablet 1 tablet  1 tablet Oral BID Jazmin APRN        scopolamine  "patch 1 mg/72 hr  1 patch Transdermal Q72H PRN Yanely Velasquez K, APRN        sodium chloride 0.9 % flush 10 mL  10 mL Intravenous PRN Vijay Leary MD   10 mL at 10/12/24 0731    sodium chloride 0.9 % flush 10 mL  10 mL Intravenous PRN Vijay Leary MD   10 mL at 10/12/24 0731     Orders (last 24 hrs)        Start     Ordered    10/16/24 0600  Basic Metabolic Panel  Morning Draw,   Status:  Canceled         10/15/24 1356    10/15/24 2100  insulin glargine (LANTUS, SEMGLEE) injection 5 Units  Every 12 Hours Scheduled         10/15/24 1207    10/15/24 1930  ipratropium-albuterol (DUO-NEB) nebulizer solution 3 mL  4 Times Daily - RT         10/15/24 1723    10/15/24 1611  POC Glucose Once  PROCEDURE ONCE        Comments: Complete no more than 45 minutes prior to patient eating      10/15/24 1559    10/15/24 1445  sodium zirconium cyclosilicate (LOKELMA) packet 10 g  Once         10/15/24 1356    10/15/24 1357  Diet: Regular/House; Fluid Consistency: Thin (IDDSI 0)  Diet Effective Now        Comments: Low potassium diet.    10/15/24 1356    10/15/24 1159  HYDROcodone-acetaminophen (NORCO) 5-325 MG per tablet 1 tablet  Every 4 Hours PRN        Placed in \"Or\" Linked Group    10/15/24 1200    10/15/24 1159  HYDROcodone-acetaminophen (NORCO) 7.5-325 MG per tablet 1 tablet  Every 4 Hours PRN        Placed in \"Or\" Linked Group    10/15/24 1200    10/15/24 1159  HYDROcodone-acetaminophen (NORCO)  MG per tablet 1 tablet  Every 4 Hours PRN        Placed in \"Or\" Linked Group    10/15/24 1200    10/15/24 1158  LORazepam (ATIVAN) tablet 2 mg  Every 1 Hour PRN        Placed in \"Or\" Linked Group    10/15/24 1200    10/15/24 1158  LORazepam (ATIVAN) injection 2 mg  Every 1 Hour PRN        Placed in \"Or\" Linked Group    10/15/24 1200    10/15/24 1158  LORazepam (ATIVAN) 2 MG/ML concentrated solution 2 mg  Every 1 Hour PRN        Placed in \"Or\" Linked Group    10/15/24 1200    10/15/24 1158  Diet: Regular/House; Fluid " "Consistency: Thin (IDDSI 0)  Diet Effective Now,   Status:  Canceled         10/15/24 1200    10/15/24 1157  LORazepam (ATIVAN) tablet 1 mg  Every 1 Hour PRN        Placed in \"Or\" Linked Group    10/15/24 1200    10/15/24 1157  LORazepam (ATIVAN) injection 1 mg  Every 1 Hour PRN        Placed in \"Or\" Linked Group    10/15/24 1200    10/15/24 1157  LORazepam (ATIVAN) 2 MG/ML concentrated solution 1 mg  Every 1 Hour PRN        Placed in \"Or\" Linked Group    10/15/24 1200    10/15/24 1157  LORazepam (ATIVAN) tablet 0.5 mg  Every 1 Hour PRN        Placed in \"Or\" Linked Group    10/15/24 1200    10/15/24 1157  LORazepam (ATIVAN) injection 0.5 mg  Every 1 Hour PRN        Placed in \"Or\" Linked Group    10/15/24 1200    10/15/24 1157  LORazepam (ATIVAN) 2 MG/ML concentrated solution 0.5 mg  Every 1 Hour PRN        Placed in \"Or\" Linked Group    10/15/24 1200    10/15/24 1157  Maintain IV Access  Continuous         10/15/24 1200    10/15/24 1157  Continue Indwelling Urinary Catheter  Once         10/15/24 1200    10/15/24 1157  Vital Signs Per Unit Protocol  Per Hospital Policy         10/15/24 1200    10/15/24 1157  VTE Prophylaxis Not Indicated: >/= 4 (High Risk)  Once         10/15/24 1200    10/15/24 1157  Advance Diet As Tolerated -  Until Discontinued         10/15/24 1200    10/15/24 1156  diphenoxylate-atropine (LOMOTIL) 2.5-0.025 MG per tablet 1 tablet  Every 2 Hours PRN         10/15/24 1200    10/15/24 1156  Polyvinyl Alcohol-Povidone PF (ARTIFICIAL TEARS) 1.4-0.6 % ophthalmic solution 1 drop  Every 30 Minutes PRN         10/15/24 1200    10/15/24 1156  ipratropium-albuterol (DUO-NEB) nebulizer solution 3 mL  Every 4 Hours PRN         10/15/24 1200    10/15/24 1156  scopolamine patch 1 mg/72 hr  Every 72 Hours PRN         10/15/24 1200    10/15/24 1156  atropine 1 % ophthalmic solution 2 drop  2 Times Daily PRN         10/15/24 1200    10/15/24 1156  furosemide (LASIX) injection 20 mg  Every 6 Hours PRN      " "  Placed in \"Or\" Linked Group    10/15/24 1200    10/15/24 1156  furosemide (LASIX) injection 20 mg  Every 6 Hours PRN        Placed in \"Or\" Linked Group    10/15/24 1200    10/15/24 1156  bisacodyl (DULCOLAX) suppository 10 mg  Daily PRN         10/15/24 1200    10/15/24 1154  Code Status and Medical Interventions: No CPR (Do Not Attempt to Resuscitate); Comfort Measures  Continuous         10/15/24 1155    10/15/24 1140  POC Glucose Once  PROCEDURE ONCE        Comments: Complete no more than 45 minutes prior to patient eating      10/15/24 1128    10/15/24 0757  POC Glucose Once  PROCEDURE ONCE        Comments: Complete no more than 45 minutes prior to patient eating      10/15/24 0745    10/15/24 0705  POC Glucose Once  PROCEDURE ONCE        Comments: Complete no more than 45 minutes prior to patient eating      10/15/24 0652    10/15/24 0634  POC Glucose Once  PROCEDURE ONCE        Comments: Complete no more than 45 minutes prior to patient eating      10/15/24 0622    10/15/24 0600  Comprehensive Metabolic Panel  Daily,   Status:  Canceled       10/14/24 0943    10/15/24 0559  POC Glucose Once  PROCEDURE ONCE        Comments: Complete no more than 45 minutes prior to patient eating      10/15/24 0547    10/15/24 0000  Discharge Follow-up with Specialty: Cardiology; 1 Week         10/15/24 1602    10/14/24 2008  POC Glucose Once  PROCEDURE ONCE        Comments: Complete no more than 45 minutes prior to patient eating      10/14/24 1956    10/14/24 1800  hydrocortisone-bacitracin-zinc oxide-nystatin (MAGIC BARRIER) ointment 1 Application  4 Times Daily         10/14/24 1513    10/14/24 1557  Urinary Catheter Care  Every Shift      Placed in \"And\" Linked Group    10/14/24 1558    10/14/24 0600  Document Pulse Oximetry - On Room Air / Home O2 Level  Daily,   Status:  Canceled      Comments: Room Air Oxygen Levels Should Only Be Measured if Patient Oxygen Needs are <4L  Home O2 Oxygen Levels Should Only Be Measured " Once Patient Within 2L of Home O2 Baseline  Reapply Oxygen if O2 Sat Drops Below 88%    10/12/24 1314    10/13/24 1609  Strict Intake & Output  Every Shift,   Status:  Canceled       10/13/24 1608    10/13/24 0845  acetaminophen (TYLENOL) tablet 650 mg  Every 6 Hours PRN         10/13/24 0846    10/13/24 0600  Tobacco Cessation Education  Daily,   Status:  Canceled      Comments: Document in Education Activity    10/12/24 1314    10/13/24 0600  Respiratory Treatment Education (MDI / Spacer / Nebulizer)  Daily      Comments: Document in Education Activity    10/12/24 1314    10/13/24 0600  COPD Education  Daily,   Status:  Canceled      Comments: Document in Education Activity    10/12/24 1314    10/12/24 2130  Oscillating Positive Expiratory Pressure (OPEP)  2 Times Daily - RT       10/12/24 1314    10/12/24 2100  pravastatin (PRAVACHOL) tablet 40 mg  Nightly         10/12/24 1314    10/12/24 2100  QUEtiapine (SEROquel) tablet 25 mg  Nightly         10/12/24 1314    10/12/24 2100  [Held by provider]  sacubitril-valsartan (ENTRESTO) 24-26 MG tablet 1 tablet  2 Times Daily        (On hold since Sat 10/12/2024 at 1314 until manually unheld; held by , Benny Wu Reason: Abnormal Labs)    10/12/24 1314    10/12/24 2100  gabapentin (NEURONTIN) capsule 600 mg  Every 12 Hours Scheduled         10/12/24 1442    10/12/24 2014  calcium carbonate (TUMS) chewable tablet 500 mg (200 mg elemental)  3 Times Daily PRN         10/12/24 2015    10/12/24 1730  Insulin Lispro (humaLOG) injection 2-7 Units  4 Times Daily Before Meals & Nightly         10/12/24 1314    10/12/24 1700  POC Glucose 4x Daily Before Meals & at Bedtime  4 Times Daily Before Meals & at Bedtime      Comments: Complete no more than 45 minutes prior to patient eating      10/12/24 1314    10/12/24 1600  Cough / Deep Breathe  Every 4 Hours,   Status:  Canceled       10/12/24 1314    10/12/24 1400  aspirin EC tablet 81 mg  Daily         10/12/24 1813     10/12/24 1400  DULoxetine (CYMBALTA) DR capsule 30 mg  Daily         10/12/24 1314    10/12/24 1400  insulin glargine (LANTUS, SEMGLEE) injection 14 Units  Every 12 Hours Scheduled,   Status:  Discontinued         10/12/24 1314    10/12/24 1400  metoprolol succinate XL (TOPROL-XL) 24 hr tablet 25 mg  Every 24 Hours Scheduled         10/12/24 1314    10/12/24 1400  Incentive Spirometry  Every 4 Hours While Awake       10/12/24 1314    10/12/24 1314  Follow Hypoglycemia Standing Orders For Blood Glucose <70 & Notify Provider of Treatment  As Needed,   Status:  Canceled      Comments: Follow Hypoglycemia Orders As Outlined in Process Instructions (Open Order Report to View Full Instructions)  Notify Provider Any Time Hypoglycemia Treatment is Administered    10/12/24 1314    10/12/24 1314  dextrose (GLUTOSE) oral gel 15 g  Every 15 Minutes PRN         10/12/24 1314    10/12/24 1314  dextrose (D50W) (25 g/50 mL) IV injection 25 g  Every 15 Minutes PRN         10/12/24 1314    10/12/24 1314  glucagon (GLUCAGEN) injection 1 mg  Every 15 Minutes PRN         10/12/24 1314    10/12/24 1314  albuterol (PROVENTIL) nebulizer solution 0.042% 1.25 mg/3mL  Every 6 Hours PRN,   Status:  Discontinued         10/12/24 1314    10/12/24 1245  Enoxaparin Sodium (LOVENOX) syringe 40 mg  Every 12 Hours Scheduled,   Status:  Discontinued         10/12/24 1224    10/12/24 1202  Blood Gas, Arterial -  As Needed,   Status:  Canceled       10/12/24 1202    10/12/24 1200  Strict Intake & Output  Every 4 Hours,   Status:  Canceled       10/12/24 1122    10/12/24 1200  [Held by provider]  bumetanide (BUMEX) injection 1 mg  Every 12 Hours        (On hold since Sun 10/13/2024 at 1334 until manually unheld; held by BRITT Stout DOHold Reason: Abnormal Labs)    10/12/24 1122    10/12/24 1157  Bladder Scan  Every Shift,   Status:  Canceled       10/12/24 1156    10/12/24 1123  Daily Weights  Daily,   Status:  Canceled       10/12/24 1122     "10/12/24 1120  Oxygen Therapy- Nasal Cannula; Titrate 1-6 LPM Per SpO2; 90 - 95%  Continuous PRN,   Status:  Canceled       10/12/24 1122    10/12/24 1120  nitroglycerin (NITROSTAT) SL tablet 0.4 mg  Every 5 Minutes PRN         10/12/24 1122    10/12/24 0520  sodium chloride 0.9 % flush 10 mL  As Needed        Placed in \"And\" Linked Group    10/12/24 0520    10/12/24 0520  sodium chloride 0.9 % flush 10 mL  As Needed         10/12/24 0520    Unscheduled  Wound Care  As Needed       10/14/24 1513    Unscheduled  Oxygen Therapy- Nasal Cannula; Titrate 1-6 LPM Per SpO2; 90 - 95%  Continuous PRN       10/15/24 1155    Unscheduled  Up With Assistance  As Needed       10/15/24 1200    --  albuterol (PROVENTIL) (2.5 MG/3ML) 0.083% nebulizer solution  Every 6 Hours PRN         10/14/24 1011                     Physician Progress Notes (last 48 hours)        Patricia Arthur DO at 10/15/24 1525              [facility]  INPATIENT PROGRESS NOTE    Patient Name: Tucker Knox  Date of Admission: 10/12/2024  Today's Date: 10/15/24  Length of Stay: 3  Primary Care Physician: Kt Alfredo MD    Subjective   Chief Complaint: short of breath  HPI   Continued shortness of breath.  Tired.  Hurts.    Palliative care has been in contact with patient and sister.  Patient wishes to transition to comfort measures.  He is tired of \"all of this\".         Review of Systems   All pertinent negatives and positives are as above. All other systems have been reviewed and are negative unless otherwise stated.     Objective    Temp:  [97.1 °F (36.2 °C)-97.5 °F (36.4 °C)] 97.4 °F (36.3 °C)  Heart Rate:  [] 82  Resp:  [18-20] 18  BP: (102-120)/(65-79) 120/71  Physical Exam  Vitals and nursing note reviewed.   Constitutional:       Appearance: He is obese.   HENT:      Head: Normocephalic and atraumatic.      Right Ear: External ear normal.      Left Ear: External ear " normal.      Nose: Nose normal.      Mouth/Throat:      Mouth: Mucous membranes are moist.   Eyes:      Extraocular Movements: Extraocular movements intact.      Conjunctiva/sclera: Conjunctivae normal.      Pupils: Pupils are equal, round, and reactive to light.   Cardiovascular:      Rate and Rhythm: Normal rate and regular rhythm.      Pulses: Normal pulses.      Heart sounds: No murmur heard.     No friction rub. No gallop.   Pulmonary:      Effort: Pulmonary effort is normal.      Breath sounds: Wheezing and rhonchi present.   Abdominal:      General: Bowel sounds are normal.      Palpations: Abdomen is soft.   Musculoskeletal:         General: Normal range of motion.      Cervical back: Normal range of motion and neck supple.   Skin:     General: Skin is warm and dry.      Capillary Refill: Capillary refill takes less than 2 seconds.   Neurological:      General: No focal deficit present.      Mental Status: He is alert and oriented to person, place, and time.      Cranial Nerves: No cranial nerve deficit.   Psychiatric:         Mood and Affect: Mood normal.         Behavior: Behavior normal.             Results Review:  I have reviewed the labs, radiology results, and diagnostic studies.    Laboratory Data:   Results from last 7 days   Lab Units 10/14/24  0252 10/13/24  0424 10/12/24  0532   WBC 10*3/mm3 6.68 8.04 9.29   HEMOGLOBIN g/dL 10.3* 11.1* 11.5*   HEMATOCRIT % 36.7* 38.5 41.0   PLATELETS 10*3/mm3 239 251 286        Results from last 7 days   Lab Units 10/15/24  0417 10/14/24  0252 10/13/24  1649 10/12/24  0759 10/12/24  0532   SODIUM mmol/L 135* 135* 137   < > 142   SODIUM, ARTERIAL   --   --   --    < >  --    POTASSIUM mmol/L 5.6* 5.6* 5.2   < > 5.4*   CHLORIDE mmol/L 95* 94* 94*   < > 98   CO2 mmol/L 34.0* 32.0* 35.0*   < > 35.0*   BUN mg/dL 73* 69* 61*   < > 45*   CREATININE mg/dL 2.43* 2.55* 2.42*   < > 1.56*   CALCIUM mg/dL 8.3* 8.3* 8.5*   < > 9.0   BILIRUBIN mg/dL 0.3  --   --   --  0.6   ALK  "PHOS U/L 90  --   --   --  103   ALT (SGPT) U/L 22  --   --   --  13   AST (SGOT) U/L 15  --   --   --  14   GLUCOSE mg/dL 57* 99 118*   < > 202*    < > = values in this interval not displayed.       Culture Data:   No results found for: \"BLOODCX\", \"URINECX\", \"WOUNDCX\", \"MRSACX\", \"RESPCX\", \"STOOLCX\"    Radiology Data:   Imaging Results (Last 24 Hours)       Procedure Component Value Units Date/Time    US Renal Bilateral [[id number]] Collected: 10/15/24 1216     Updated: 10/15/24 1223    Narrative:      US RENAL BILATERAL- 10/14/2024 1:54 PM     HISTORY: ACUTE KIDNEY INJURY     COMPARISON: None available.       TECHNIQUE: Multiple longitudinal and transverse real-time sonographic  images of the kidneys and urinary bladder are obtained. Report and  images stored per institutional and state regulations.           FINDINGS:     Visualized proximal abdominal aorta and IVC are unremarkable.        RIGHT KIDNEY: The right kidney measures 9.0 cm in length. Renal cortex  is unremarkable. There is no hydronephrosis. There is moderate ascites..     LEFT KIDNEY: Left kidney measures 9.9 cm in length. Renal cortex is  unremarkable. There is no left hydronephrosis.     PELVIS: Urinary bladder is grossly unremarkable.          Impression:         1. Unremarkable kidneys with no hydronephrosis.  2. Moderate ascites.           This report was signed and finalized on 10/15/2024 12:20 PM by Elvis Velasquez.       XR Chest 1 View [[id number]] Collected: 10/14/24 1749     Updated: 10/14/24 1754    Narrative:      EXAMINATION:  XR CHEST 1 VW-  10/14/2024 4:12 PM     HISTORY: Fluid; J96.91-Respiratory failure, unspecified with hypoxia;  J96.92-Respiratory failure, unspecified with hypercapnia; E43-Bblhszk  effusion, not elsewhere classified; J96.21-Acute and chronic respiratory  failure with hypoxia; J96.22-Acute and chronic respiratory failure with  hypercapnia; I50.9-Heart failure, unspecified; J44.1-Chronic obstructive  pulmonary " disease with (acute) exacerbation.     COMPARISON: 10/12/2024.     TECHNIQUE: Single view AP image.     FINDINGS: There is cardiomegaly. There is small to moderate pleural  effusion on the right. There is opacification of the mid to lower lung  zone on the right. The left lung is clear. There is some mediastinal  fullness.          Impression:      1. Cardiomegaly.  2. Small to moderate right pleural effusion. Right lung opacification  may be related to atelectasis or pneumonia.  3. Mediastinal fullness could be vascular. Mediastinal lymphadenopathy  is not excluded. Chest CT on 9/9/2024 did not demonstrate enlarged  mediastinal lymph nodes.           This report was signed and finalized on 10/14/2024 5:51 PM by Dr. Jhoan Carpenter MD.               I have reviewed the patient's current medications.     Assessment/Plan   Assessment  Active Hospital Problems    Diagnosis     **Acute on chronic respiratory failure with hypoxia and hypercapnia     Hyperkalemia     AMA (acute kidney injury)     Acute on chronic HFrEF (heart failure with reduced ejection fraction)     Colon adenocarcinoma     Type 2 myocardial infarction due to heart failure     Pulmonary HTN     Type 2 diabetes mellitus with hyperglycemia, with long-term current use of insulin     Hypertension        Treatment Plan  Comfort Measures  Palliatiave care consulting, appreciate.   Social service for discharge planning, SNF      Medical Decision Making  Number and Complexity of problems:   Acute on chronic respiratory failure with hypercapnia and hypoxemia high complexity  Hyperkalemia high complexity  AMA high complexity  Acute on chronic heart failure with reduced ejection fraction.  High complexity  Diabetes mellitus type 2 moderate complexity  Hypertension moderate complexity  Pulmonary hypertension high complexity      Differential Diagnosis:     Conditions and Status        Condition is improving.     MDM Data  External documents reviewed:  Reviewed  Cardiac tracing (EKG, telemetry) interpretation: Reviewed  Radiology interpretation: Reviewed  Labs reviewed: Reviewed  Any tests that were considered but not ordered: None     Decision rules/scores evaluated (example [id number], [place], etc): None     Discussed with: Patient     Care Planning  Shared decision making: Patient  Code status and discussions: DNR/DNI    Disposition  Social Determinants of Health that impact treatment or disposition: None  I expect the patient to be discharged to SNF in 2-3 days.     Electronically signed by Patricia Arthur DO, 10/15/24, 15:25 CDT.      Electronically signed by Patricia Arthur DO at 10/15/24 1528       Hermann Jones MD at 10/15/24 1354          Nephrology ([facility]) Progress Note      Patient:  Tucker Knox  YOB: 1960  Date of Service: 10/15/2024  MRN: [id number]   Acct: [id number]   Primary Care Physician: Kt Alfredo MD  Advance Directive:   Code Status and Medical Interventions: No CPR (Do Not Attempt to Resuscitate); Comfort Measures   Ordered at: 10/15/24 1155     Code Status (Patient has no pulse and is not breathing):    No CPR (Do Not Attempt to Resuscitate)     Medical Interventions (Patient has pulse or is breathing):    Comfort Measures     Admit Date: 10/12/2024       Hospital Day: 3  Referring Provider: No ref. provider found      Patient personally seen and examined.  Complete chart including Consults, Notes, Operative Reports, Labs, Cardiology, and Radiology studies reviewed as able.        Subjective:  Tucker Knox is a 64 y.o. male for whom we were consulted for evaluation and treatment of acute kidney injury. No prior known renal issues. History of systolic/diastolic CHF, COPD, coronary artery disease, hypertension, type 2 diabetes.  Frequent admission for CHF exacerbations. Most recently was discharged from hospital on 10/04. Returned to ER on 10/12  with dyspnea, abdominal tightness. Creatinine 1.56 on admission. Treated with Bumex and renal function worsened. Nephrology consulted 10/13. Diuretics were held.    Today is awake and alert. Has been on BiPAP. Urine output was adequate. He was drinking orange juice.     Allergies:  Penicillins    Home Meds:  Medications Prior to Admission   Medication Sig Dispense Refill Last Dose/Taking    acetaminophen (TYLENOL) 325 MG tablet Take 2 tablets by mouth Every 6 (Six) Hours As Needed for Mild Pain. 60 tablet 0 Past Week    aspirin 81 MG EC tablet Take 1 tablet by mouth Daily. 100 tablet 2 10/11/2024 Morning    dapagliflozin Propanediol (Farxiga) 10 MG tablet Take 10 mg by mouth Daily. 30 tablet 0 Taking    DULoxetine (CYMBALTA) 30 MG capsule Take 1 capsule by mouth Daily. 30 capsule 0 10/11/2024 Morning    furosemide (LASIX) 40 MG tablet Take 0.5 tablets by mouth Daily. 30 tablet 0 Taking    gabapentin (NEURONTIN) 600 MG tablet Take 1 tablet by mouth 2 (Two) Times a Day. Patient states he takes 800 mg bid 60 tablet 0 10/11/2024    insulin detemir (LEVEMIR) 100 UNIT/ML injection Inject 14 Units under the skin into the appropriate area as directed 2 (Two) Times a Day.   Taking    metFORMIN (GLUCOPHAGE) 1000 MG tablet Take 1 tablet by mouth 2 (Two) Times a Day With Meals.   Taking    metoprolol succinate XL (TOPROL-XL) 25 MG 24 hr tablet Take 1 tablet by mouth Daily. 30 tablet 0 10/11/2024    nicotine (NICODERM CQ) 21 MG/24HR patch Place 1 patch on the skin as directed by provider Daily. 14 patch 0 Taking    polyethylene glycol (MIRALAX) 17 GM/SCOOP powder Take 17 g by mouth Daily.   Taking    pravastatin (PRAVACHOL) 40 MG tablet Take 1 tablet by mouth Every Night. 30 tablet 0 10/11/2024    QUEtiapine (SEROquel) 25 MG tablet Take 1 tablet by mouth Every Night. 30 tablet 0 Taking    sacubitril-valsartan (Entresto) 24-26 MG tablet Take 1 tablet by mouth 2 (Two) Times a Day. 60 tablet 0 Taking    Tradjenta 5 MG tablet tablet  Take 1 tablet by mouth Daily.   Taking    albuterol (PROVENTIL) (2.5 MG/3ML) 0.083% nebulizer solution Take 2.5 mg by nebulization Every 6 (Six) Hours As Needed for Wheezing.       albuterol sulfate  (90 Base) MCG/ACT inhaler Inhale 2 puffs Every 4 (Four) Hours As Needed for Wheezing. 8 g 0     nitroglycerin (NITROSTAT) 0.4 MG SL tablet Place 1 tablet under the tongue Every 5 (Five) Minutes As Needed for Chest Pain. Take no more than 3 doses in 15 minutes.   Unknown       Medicines:  Current Facility-Administered Medications   Medication Dose Route Frequency Provider Last Rate Last Admin    acetaminophen (TYLENOL) tablet 650 mg  650 mg Oral Q6H PRN BRITT Stout DO   650 mg at 10/15/24 1150    aspirin EC tablet 81 mg  81 mg Oral Daily Jazmin Garrison APRN   81 mg at 10/15/24 0929    atropine 1 % ophthalmic solution 2 drop  2 drop Sublingual BID PRN Yanely Velasquez APRN        bisacodyl (DULCOLAX) suppository 10 mg  10 mg Rectal Daily PRN Yanely Velasquez APRN        [Held by provider] bumetanide (BUMEX) injection 1 mg  1 mg Intravenous Q12H Jazmin Garrison APRN   1 mg at 10/13/24 1225    calcium carbonate (TUMS) chewable tablet 500 mg (200 mg elemental)  2 tablet Oral TID PRN Karen Parekh DO        dextrose (D50W) (25 g/50 mL) IV injection 25 g  25 g Intravenous Q15 Min PRN Jazmin Garrison APRN        dextrose (GLUTOSE) oral gel 15 g  15 g Oral Q15 Min PRN Jazmin Garrison APRN        diphenoxylate-atropine (LOMOTIL) 2.5-0.025 MG per tablet 1 tablet  1 tablet Oral Q2H PRN Yanely Velasquez APRN        DULoxetine (CYMBALTA) DR capsule 30 mg  30 mg Oral Daily Jazmin Garrison APRN   30 mg at 10/15/24 0930    furosemide (LASIX) injection 20 mg  20 mg Intravenous Q6H PRN Yanely Velasquez APRN        Or    furosemide (LASIX) injection 20 mg  20 mg Subcutaneous Q6H PRN Yanely Velasquez, VIV        gabapentin (NEURONTIN) capsule 600 mg  600 mg Oral Q12H BRITT Stout DO    600 mg at 10/15/24 0929    glucagon (GLUCAGEN) injection 1 mg  1 mg Intramuscular Q15 Min PRN Jazmin Garrison APRN        HYDROcodone-acetaminophen (NORCO) 5-325 MG per tablet 1 tablet  1 tablet Oral Q4H PRN Yanely Velasquez APRN        Or    HYDROcodone-acetaminophen (NORCO) 7.5-325 MG per tablet 1 tablet  1 tablet Oral Q4H PRN Yanely Velasquez APRN        Or    HYDROcodone-acetaminophen (NORCO)  MG per tablet 1 tablet  1 tablet Oral Q4H PRN Yanely Velasquez APRN        hydrocortisone-bacitracin-zinc oxide-nystatin (MAGIC BARRIER) ointment 1 Application  1 Application Topical 4x Daily Akanksha López APRN   1 Application at 10/15/24 1150    insulin glargine (LANTUS, SEMGLEE) injection 5 Units  5 Units Subcutaneous Q12H Patricia Arthur DO        Insulin Lispro (humaLOG) injection 2-7 Units  2-7 Units Subcutaneous 4x Daily AC & at Bedtime Jazmin Garrison APRN   2 Units at 10/15/24 1150    ipratropium-albuterol (DUO-NEB) nebulizer solution 3 mL  3 mL Nebulization Q4H PRN Yanely Velasquez APRN        LORazepam (ATIVAN) tablet 0.5 mg  0.5 mg Oral Q1H PRN Yanely Velasquez APRN        Or    LORazepam (ATIVAN) injection 0.5 mg  0.5 mg Intravenous Q1H PRN Yanely Velasquez APRN        Or    LORazepam (ATIVAN) 2 MG/ML concentrated solution 0.5 mg  0.5 mg Sublingual Q1H PRN Yanely Velasquez APRN        LORazepam (ATIVAN) tablet 1 mg  1 mg Oral Q1H PRN Yanely Velasquez APRN        Or    LORazepam (ATIVAN) injection 1 mg  1 mg Intravenous Q1H PRN Yanely Velasquez APRN        Or    LORazepam (ATIVAN) 2 MG/ML concentrated solution 1 mg  1 mg Sublingual Q1H PRN Yanely Velasquez APRN        LORazepam (ATIVAN) tablet 2 mg  2 mg Oral Q1H PRN Yanely Velasquez APRN        Or    LORazepam (ATIVAN) injection 2 mg  2 mg Intravenous Q1H PRN Yanely Velasquez APRN        Or    LORazepam (ATIVAN) 2 MG/ML concentrated solution 2 mg  2 mg Sublingual Q1H PRN Yanely Velasquez APRN        metoprolol  succinate XL (TOPROL-XL) 24 hr tablet 25 mg  25 mg Oral Q24H Jazmin Garrison APRN   25 mg at 10/15/24 0930    nitroglycerin (NITROSTAT) SL tablet 0.4 mg  0.4 mg Sublingual Q5 Min PRN Jazmin Garrison APRN        Polyvinyl Alcohol-Povidone PF (ARTIFICIAL TEARS) 1.4-0.6 % ophthalmic solution 1 drop  1 drop Both Eyes Q30 Min PRN Yanely Velasquez APRN        pravastatin (PRAVACHOL) tablet 40 mg  40 mg Oral Nightly Jazmin Garrison APRN   40 mg at 10/14/24 2139    QUEtiapine (SEROquel) tablet 25 mg  25 mg Oral Nightly Jazmin Garrison APRN   25 mg at 10/14/24 2137    [Held by provider] sacubitril-valsartan (ENTRESTO) 24-26 MG tablet 1 tablet  1 tablet Oral BID Jazmin Garrison APRN        scopolamine patch 1 mg/72 hr  1 patch Transdermal Q72H PRN Yanely Velasquez APRN        sodium chloride 0.9 % flush 10 mL  10 mL Intravenous PRN Vijay Leary MD   10 mL at 10/12/24 0731    sodium chloride 0.9 % flush 10 mL  10 mL Intravenous PRN Vijay Leary MD   10 mL at 10/12/24 0731       Past Medical History:  Past Medical History:   Diagnosis Date    Cancer     CHF (congestive heart failure)     COPD (chronic obstructive pulmonary disease)     Coronary artery disease     stent x 1    Family history of colon cancer     Hyperlipidemia     Hypertension     Sleep apnea     does not wear machine, supposed to wear cpap with oxygen and does not wear it    Type 2 diabetes mellitus        Past Surgical History:  Past Surgical History:   Procedure Laterality Date    BACK SURGERY      CARDIAC CATHETERIZATION Left 04/13/2022    Procedure: Cardiac Catheterization/Vascular Study;  Surgeon: Todd Avendano MD;  Location:  [facility]ATH [facility]AS[facility];  Service: Cardiology;  Laterality: Left;    CARDIAC CATHETERIZATION      with stent x1    CARDIAC CATHETERIZATION N/A 9/12/2024    Procedure: Left Heart Cath;  Surgeon: Ruben Adler MD;  Location:  [facility] CATH [facility];  Service: Cardiology;  Laterality: N/A;    COLON  RESECTION N/A 12/5/2023    Procedure: COLON RESECTION LAPAROSCOPIC SIGMOID WITH DAVINCI ROBOT, INTRA-OPERATIVE FLEXIBLE SIGMOIDOSCOPY, SPLENIC FLEXURE MOBILIZATION, OPEN UMBILICAL HERNIA REPAIR;  Surgeon: Oam Velasquez MD;  Location: [facility] OR;  Service: Robotics - DaVinci;  Laterality: N/A;    COLONOSCOPY N/A 10/09/2023    Diverticulosis; One 5mm polyp in ascending colon; One 5mm polyp in transverse colon; One 10mm polyp at 65cm proximal to anus; One 20mm polyp at 65cm proximal to anus-Tattooed; One 20mm polyp at 42cm proximal to anus-Clip (MR conditional) placed-Tattooed; Rule out malignancy-Partially obstructing tumor in recto-sigmoid colon-biopsied-Tattooed; One 10mm polyp in rectum    ELBOW PROCEDURE      KNEE SURGERY      LUMBAR DISC SURGERY         Family History  Family History   Problem Relation Age of Onset    Esophageal cancer Mother     Heart disease Mother     Colon cancer Father 80    Heart disease Father     No Known Problems Sister     COPD Brother     Alcohol abuse Brother     Colon polyps Neg Hx     Liver cancer Neg Hx     Rectal cancer Neg Hx     Stomach cancer Neg Hx     Liver disease Neg Hx        Social History  Social History     Socioeconomic History    Marital status:    Tobacco Use    Smoking status: Former     Current packs/day: 1.00     Average packs/day: 1 pack/day for 45.0 years (45.0 ttl pk-yrs)     Types: Cigarettes    Smokeless tobacco: Never   Vaping Use    Vaping status: Some Days    Substances: Nicotine, Flavoring    Devices: Disposable   Substance and Sexual Activity    Alcohol use: Not Currently    Drug use: Yes     Types: Marijuana    Sexual activity: Defer       Review of Systems:  History obtained from chart review and the patient  General ROS: No fever or chills  Respiratory ROS: No cough, shortness of breath, wheezing  Cardiovascular ROS: positive for - dyspnea on exertion  No chest pain or palpitations  Gastrointestinal ROS: No abdominal pain or  melena  Genito-Urinary ROS: No dysuria or hematuria  Psych ROS: No anxiety and depression  14 point ROS reviewed with the patient and negative except as noted above and in the HPI unless unable to obtain.    Objective:  Patient Vitals for the past 24 hrs:   BP Temp Temp src Pulse Resp SpO2 Weight   10/15/24 1116 120/71 97.4 °F (36.3 °C) Oral 70 18 91 % --   10/15/24 1005 -- -- -- 78 18 94 % --   10/15/24 0700 112/65 97.1 °F (36.2 °C) Oral 77 18 94 % --   10/15/24 0655 -- -- -- 77 18 94 % --   10/15/24 0645 -- -- -- 80 18 94 % --   10/15/24 0624 106/70 97.5 °F (36.4 °C) Oral 105 20 93 % 132 kg (291 lb 10.7 oz)   10/14/24 1945 102/78 97.3 °F (36.3 °C) Oral 78 18 91 % --   10/14/24 1545 110/79 -- Oral 73 20 93 % --   10/14/24 1421 -- -- -- 74 -- -- --   10/14/24 1419 (!) 88/42 -- -- -- -- -- --       Intake/Output Summary (Last 24 hours) at 10/15/2024 1354  Last data filed at 10/15/2024 0624  Gross per 24 hour   Intake 1080 ml   Output 850 ml   Net 230 ml     General: awake/alert   Chest:  clear to auscultation bilaterally without respiratory distress  CVS: regular rate and rhythm  Abdominal: soft, nontender, positive bowel sounds  Extremities:  trace lower ext edema  Skin: warm and dry without rash      Labs:  Results from last 7 days   Lab Units 10/14/24  0252 10/13/24  0424 10/12/24  0532   WBC 10*3/mm3 6.68 8.04 9.29   HEMOGLOBIN g/dL 10.3* 11.1* 11.5*   HEMATOCRIT % 36.7* 38.5 41.0   PLATELETS 10*3/mm3 239 251 286         Results from last 7 days   Lab Units 10/15/24  0417 10/14/24  0252 10/13/24  1649 10/12/24  0759 10/12/24  0532   SODIUM mmol/L 135* 135* 137   < > 142   SODIUM, ARTERIAL   --   --   --    < >  --    POTASSIUM mmol/L 5.6* 5.6* 5.2   < > 5.4*   CHLORIDE mmol/L 95* 94* 94*   < > 98   CO2 mmol/L 34.0* 32.0* 35.0*   < > 35.0*   BUN mg/dL 73* 69* 61*   < > 45*   CREATININE mg/dL 2.43* 2.55* 2.42*   < > 1.56*   CALCIUM mg/dL 8.3* 8.3* 8.5*   < > 9.0   EGFR mL/min/1.73 29.1* 27.5* 29.3*   < > 49.6*    BILIRUBIN mg/dL 0.3  --   --   --  0.6   ALK PHOS U/L 90  --   --   --  103   ALT (SGPT) U/L 22  --   --   --  13   AST (SGOT) U/L 15  --   --   --  14   GLUCOSE mg/dL 57* 99 118*   < > 202*    < > = values in this interval not displayed.       Radiology:   Imaging Results (Last 72 Hours)       Procedure Component Value Units Date/Time    US Renal Bilateral [[id number]] Collected: 10/15/24 1216     Updated: 10/15/24 1223    Narrative:      US RENAL BILATERAL- 10/14/2024 1:54 PM     HISTORY: ACUTE KIDNEY INJURY     COMPARISON: None available.       TECHNIQUE: Multiple longitudinal and transverse real-time sonographic  images of the kidneys and urinary bladder are obtained. Report and  images stored per institutional and state regulations.           FINDINGS:     Visualized proximal abdominal aorta and IVC are unremarkable.        RIGHT KIDNEY: The right kidney measures 9.0 cm in length. Renal cortex  is unremarkable. There is no hydronephrosis. There is moderate ascites..     LEFT KIDNEY: Left kidney measures 9.9 cm in length. Renal cortex is  unremarkable. There is no left hydronephrosis.     PELVIS: Urinary bladder is grossly unremarkable.          Impression:         1. Unremarkable kidneys with no hydronephrosis.  2. Moderate ascites.           This report was signed and finalized on 10/15/2024 12:20 PM by Elvis Velasquez.       XR Chest 1 View [[id number]] Collected: 10/14/24 1749     Updated: 10/14/24 1754    Narrative:      EXAMINATION:  XR CHEST 1 VW-  10/14/2024 4:12 PM     HISTORY: Fluid; J96.91-Respiratory failure, unspecified with hypoxia;  J96.92-Respiratory failure, unspecified with hypercapnia; B73-Lwmkvwl  effusion, not elsewhere classified; J96.21-Acute and chronic respiratory  failure with hypoxia; J96.22-Acute and chronic respiratory failure with  hypercapnia; I50.9-Heart failure, unspecified; J44.1-Chronic obstructive  pulmonary disease with (acute) exacerbation.     COMPARISON: 10/12/2024.    "  TECHNIQUE: Single view AP image.     FINDINGS: There is cardiomegaly. There is small to moderate pleural  effusion on the right. There is opacification of the mid to lower lung  zone on the right. The left lung is clear. There is some mediastinal  fullness.          Impression:      1. Cardiomegaly.  2. Small to moderate right pleural effusion. Right lung opacification  may be related to atelectasis or pneumonia.  3. Mediastinal fullness could be vascular. Mediastinal lymphadenopathy  is not excluded. Chest CT on 9/9/2024 did not demonstrate enlarged  mediastinal lymph nodes.           This report was signed and finalized on 10/14/2024 5:51 PM by Dr. Jhoan Carpenter MD.               Culture:  No results found for: \"BLOODCX\", \"URINECX\", \"WOUNDCX\", \"MRSACX\", \"RESPCX\", \"STOOLCX\"      Assessment    Acute kidney injury  Acute on chronic congestive heart failure  Type 2 diabetes  Hypertension  Hyperkalemia  COPD  Anemia     Plan:  Lokelma, and low K diet  Continue to hold diuretics  Monitor labs      Hermann Jones MD  10/15/2024  13:54 CDT      Electronically signed by Hermann Jones MD at 10/15/24 1356       Yanely Velasquez APRN at 10/15/24 1039              [facility] Palliative Care Services  Progress Note  Patient Name: Tucker Knox  Date of Admission: 10/12/2024  Today's Date: 10/15/24     Code Status and Medical Interventions: No CPR (Do Not Attempt to Resuscitate); Limited Support; No intubation (DNI)   Ordered at: 10/12/24 1122     Medical Intervention Limits:    No intubation (DNI)     Code Status (Patient has no pulse and is not breathing):    No CPR (Do Not Attempt to Resuscitate)     Medical Interventions (Patient has pulse or is breathing):    Limited Support     Subjective   Chief complaint/Reason for Referral/Visit: Follow up on Goals of Care/Advance Care Planning.    Medical record reviewed. Events noted.  Renal ultrasound completed on 10/14/2024 however " "results pending.  Chest x-ray completed on afternoon of 10/14/2024 revealed cardiomegaly, small to moderate right pleural effusion with right lung opacification.  Also noted mediastinal fullness could be vascular however mediastinal lymphadenopathy is not excluded.  Noted to have urinary retention and required Virgen catheter placement yesterday.  Labs collected this morning reveals slight improvement in renal function.  Potassium remains elevated at 5.6.  He is lying in bed, asleep, and in no apparent distress.  Arouses to voice and answers orientation questions correctly.  On 3 L oxygen nasal cannula.  Denies any pain.  Reports he is tired.  No visitors present.     Advance Care Planning   Advanced Directives:  Advance directive on file.   Advance Care Planning Discussion: Followed up with Mr. Knox who was agreeable to discussing goals of care further.  Video conference held with Mr. Knox and his sister, Oxana.  Explored Mr. Knox's understanding of condition/prognosis.  He initially appeared to have poor insight/prognostic awareness however after further discussion appeared to have better understanding.  Discussed concerns with comorbidities including heart failure, colon cancer, coronary artery disease, respiratory failure, etc.  Shared unfortunately long-term prognosis appears poor due to multiple comorbidities.   Oxana shared with him he is at high risk for rehospitalization and continued workup with essentially the same outcome.  Mr. Knox was tearful and demonstrated understanding.  He reflected on recent hospitalizations and stated \"I am tired of doing all of this.\"  Support provided.  Discussed treatment options at length including details and expectations.  After further discussion Mr. Knox expressed wishes to transition to comfort measures.  Oxana shared he will need SNF placement as he does not have 24/7 caregivers.   Briefly discussed SNF with hospice.  They are interested in determining if " his [place] could [place] interested in transferring to SNF closer to is family.  Will request assistance from SW.  Mr. Knox and family appreciative of discussion.  Denied any further questions at this time.  Encouraged if arise.  Will plan to see if Mr. Knox is agreeable to completing new MOST form to reflect wishes of comfort prior to discharge. Offered to call his son, Aniceto, as well however Mr. Knox reports he is at work and requested he not be bothered.  Oxana shared she will provide him with update.  Palliative care office [place] number provided if he has any questions/concerns as well.     The patient receives support from his son and sibling. Patient's son is his healthcare surrogate.    Due to the palliative care topics discussed including goals of care, treatment options, discharge options, medical priorities, and hospice services we will establish an advance care plan.    Goals of care: Ongoing.    Review of Systems   Constitutional: Positive for malaise/fatigue.   Cardiovascular:  Positive for dyspnea on exertion. Negative for chest pain.   Respiratory:  Positive for shortness of breath.      Pain Assessment  Preferred Pain Scale: number (Numeric Rating Pain Scale)  Pain Location: extremity, chest  Pain Description: burning, other (see comments) (heartburn PRN tums)  Objective   Diagnostics: Reviewed      Intake/Output Summary (Last 24 hours) at 10/15/2024 1039  Last data filed at 10/15/2024 0624  Gross per 24 hour   Intake 1080 ml   Output 850 ml   Net 230 ml     Current Facility-Administered Medications   Medication Dose Route Frequency Provider Last Rate Last Admin    acetaminophen (TYLENOL) tablet 650 mg  650 mg Oral Q6H PRN BRITT Stout DO   650 mg at 10/13/24 0851    albuterol (PROVENTIL) nebulizer solution 0.042% 1.25 mg/3mL  1.25 mg Nebulization Q6H PRN Jazmin Garrison APRN   1.25 mg at 10/15/24 0655    aspirin EC tablet 81 mg  81 mg Oral  Daily Jazmin Garrison APRN   81 mg at 10/15/24 0929    [Held by provider] bumetanide (BUMEX) injection 1 mg  1 mg Intravenous Q12H Jazmin Garrison APRN   1 mg at 10/13/24 1225    calcium carbonate (TUMS) chewable tablet 500 mg (200 mg elemental)  2 tablet Oral TID PRN Karen Parekh DO        dextrose (D50W) (25 g/50 mL) IV injection 25 g  25 g Intravenous Q15 Min PRN Jazmin Garrison APRN        dextrose (GLUTOSE) oral gel 15 g  15 g Oral Q15 Min PRN Jazmin Garrison APRN        DULoxetine (CYMBALTA) DR capsule 30 mg  30 mg Oral Daily Jazmin Garrison APRN   30 mg at 10/15/24 0930    Enoxaparin Sodium (LOVENOX) syringe 40 mg  40 mg Subcutaneous Q12H Jazmin Garrison APRN   40 mg at 10/15/24 0929    gabapentin (NEURONTIN) capsule 600 mg  600 mg Oral Q12H BRITT Stout DO   600 mg at 10/15/24 0929    glucagon (GLUCAGEN) injection 1 mg  1 mg Intramuscular Q15 Min PRN Jazmin Garrison APRN        hydrocortisone-bacitracin-zinc oxide-nystatin (MAGIC BARRIER) ointment 1 Application  1 Application Topical 4x Daily Akanksha López APRN   1 Application at 10/15/24 0931    insulin glargine (LANTUS, SEMGLEE) injection 14 Units  14 Units Subcutaneous Q12H Jazmin Garrison APRN   14 Units at 10/15/24 0930    Insulin Lispro (humaLOG) injection 2-7 Units  2-7 Units Subcutaneous 4x Daily AC & at Bedtime Jazmin Garrison APRN   5 Units at 10/15/24 0930    metoprolol succinate XL (TOPROL-XL) 24 hr tablet 25 mg  25 mg Oral Q24H Jazmin Garrison APRN   25 mg at 10/15/24 0930    nitroglycerin (NITROSTAT) SL tablet 0.4 mg  0.4 mg Sublingual Q5 Min PRN Jazmin Garrison APRN        pravastatin (PRAVACHOL) tablet 40 mg  40 mg Oral Nightly Jazmin Garrison APRN   40 mg at 10/14/24 2139    QUEtiapine (SEROquel) tablet 25 mg  25 mg Oral Nightly Jazmin Garrison APRN   25 mg at 10/14/24 2137    [Held by provider] sacubitril-valsartan (ENTRESTO) 24-26 MG tablet 1 tablet  1 tablet Oral BID Jazmin Garrison APRN  "       sodium chloride 0.9 % flush 10 mL  10 mL Intravenous PRN Vijay Leary MD   10 mL at 10/12/24 0731    sodium chloride 0.9 % flush 10 mL  10 mL Intravenous PRN Vijay Leary MD   10 mL at 10/12/24 0731          acetaminophen    albuterol    calcium carbonate    dextrose    dextrose    glucagon (human recombinant)    nitroglycerin    sodium chloride    [COMPLETED] Insert Peripheral IV **AND** sodium chloride  Current medications patient is presently taking including all prescriptions, over-the-counter, herbals and vitamin/mineral/dietary (nutritional) supplements with reviewed including route, type, dose and frequency and are current per MAR at time of dictation.    Assessment:  Vital Signs: /65 (BP Location: Right arm, Patient Position: Lying)   Pulse 78   Temp 97.1 °F (36.2 °C) (Oral)   Resp 18   Ht 175.3 cm (69\")   Wt 132 kg (291 lb 10.7 oz)   SpO2 94%   BMI 43.07 kg/m²     Physical Exam  Vitals and nursing note reviewed.   Constitutional:       General: He is sleeping. He is not in acute distress.     Appearance: He is ill-appearing.      Interventions: Nasal cannula in place.   HENT:      Head: Normocephalic and atraumatic.   Eyes:      General: Lids are normal.      Extraocular Movements: Extraocular movements intact.   Neck:      Vascular: No JVD.      Trachea: Trachea normal.   Cardiovascular:      Rate and Rhythm: Normal rate.   Pulmonary:      Effort: Pulmonary effort is normal.      Breath sounds: Decreased breath sounds present.   Musculoskeletal:      Cervical back: Neck supple.   Skin:     General: Skin is warm and dry.   Neurological:      Mental Status: He is alert, oriented to person, place, and time and easily aroused.     Functional status: Palliative Performance Scale Score: Performance 50% based on the following measures: Ambulation: Mainly sit or lie down, Activity and Evidence of Disease: Unable to do any work, extensive evidence of disease, Self-Care: Considerable " assistance required,  Intake: Normal or reduced, LOC: Full or confusion.  ECOG Status(3) Capable of limited self-care, confined to bed or chair > 50% of waking hours.  Nutritional status: Albumin 3.4. Body mass index is 43.07 kg/m².  Patient status: Disease state: No further treatment being pursued.    Active Hospital Problems    Diagnosis     **Acute on chronic respiratory failure with hypoxia and hypercapnia     Hyperkalemia     AMA (acute kidney injury)     Acute on chronic HFrEF (heart failure with reduced ejection fraction)     Colon adenocarcinoma     Type 2 myocardial infarction due to heart failure     Pulmonary HTN     Type 2 diabetes mellitus with hyperglycemia, with long-term current use of insulin     Hypertension      Impression/Problem List:  Acute on chronic HFrEF  Acute on chronic respiratory failure with hypoxia and hypercapnia  Acute kidney injury  Colon adenocarcinoma - Declined systemic and radiation treatment   Pulmonary hypertension  Impaired mobility   Pleural effusion, right   Hyperkalemia   Hyperphosphatemia   Type 2 myocardial infarction due to heart failure  Type 2 diabetes mellitus  Hypertension      Coronary artery disease    Plan / Recommendations   Palliative Care Encounter   Followed up with Mr. Knox to discuss goals of care further.  He was agreeable to discussion.   Sister, Oxana, also present through video conference.  Details of discussion above.   Discussed concerns with overall poor long-term prognosis secondary to comorbidities including heart failure, colon cancer, coronary artery disease, respiratory failure, etc.   Medical priorities and treatment options discussed.  After further discussion Mr. Knox expressed wishes to transition to comfort measures.    CODE STATUS changed to reflect wishes.  Updated nursing and attending.   Interested in SNF placement with hospice.    Inquired about seeing if his [place] could be [place] so [place] eventually  [place] to SNF closer to is family.   Discussed with SW.   Will plan to see if interested in completing new MOST form prior to discharge.       Comfort Measures  Discontinue orders not in line with comfort.   May continue maintenance medications if able to safely tolerate PO.   Palliative/comfort adjuvants ordered.     Thank you for allowing us to participate in patient's plan of care. Palliative Care Team will continue to follow patient.     I spent an additional 35 minutes on advance care planning, goals of care, and code status discussion.  Consent was obtained for ACP discussion.     Electronically signed by, VIV Portillo, 10/15/24.      Electronically signed by Yanely Velasquez APRN at 10/15/24 1418       Patricia Arthur DO at 10/14/24 1345              [facility] Medicine Services  INPATIENT PROGRESS NOTE    Patient Name: Tucker Knox  Date of Admission: 10/12/2024  Today's Date: 10/14/24  Length of Stay: 2  Primary Care Physician: Kt Alfredo MD    Subjective   Chief Complaint: short of breath  HPI   Patient states he is not really doing much better.   Feels he has a long way to go.   No current nausea, chest pain.  Occasional cough  Does not like BiPAP        Review of Systems   All pertinent negatives and positives are as above. All other systems have been reviewed and are negative unless otherwise stated.     Objective    Temp:  [97 °F (36.1 °C)-98 °F (36.7 °C)] 97.7 °F (36.5 °C)  Heart Rate:  [65-75] 75  Resp:  [14-18] 16  BP: ()/(50-73) 106/69  Physical Exam  Vitals and nursing note reviewed.   Constitutional:       Appearance: He is obese.   HENT:      Head: Normocephalic and atraumatic.      Right Ear: External ear normal.      Left Ear: External ear normal.      Nose: Nose normal.      Mouth/Throat:      Mouth: Mucous membranes are moist.   Eyes:      Extraocular Movements: Extraocular movements intact.       Conjunctiva/sclera: Conjunctivae normal.      Pupils: Pupils are equal, round, and reactive to light.   Cardiovascular:      Rate and Rhythm: Normal rate and regular rhythm.      Pulses: Normal pulses.      Heart sounds: No murmur heard.     No friction rub. No gallop.   Pulmonary:      Effort: Pulmonary effort is normal.      Breath sounds: Wheezing and rhonchi present.   Abdominal:      General: Bowel sounds are normal.      Palpations: Abdomen is soft.   Musculoskeletal:         General: Normal range of motion.      Cervical back: Normal range of motion and neck supple.   Skin:     General: Skin is warm and dry.      Capillary Refill: Capillary refill takes less than 2 seconds.   Neurological:      General: No focal deficit present.      Mental Status: He is alert and oriented to person, place, and time.      Cranial Nerves: No cranial nerve deficit.   Psychiatric:         Mood and Affect: Mood normal.         Behavior: Behavior normal.             Results Review:  I have reviewed the labs, radiology results, and diagnostic studies.    Laboratory Data:   Results from last 7 days   Lab Units 10/14/24  0252 10/13/24  0424 10/12/24  0532   WBC 10*3/mm3 6.68 8.04 9.29   HEMOGLOBIN g/dL 10.3* 11.1* 11.5*   HEMATOCRIT % 36.7* 38.5 41.0   PLATELETS 10*3/mm3 239 251 286        Results from last 7 days   Lab Units 10/14/24  0252 10/13/24  1649 10/13/24  0424 10/12/24  0759 10/12/24  0532   SODIUM mmol/L 135* 137 134*  --  142   SODIUM, ARTERIAL   --   --   --    < >  --    POTASSIUM mmol/L 5.6* 5.2 5.9*  --  5.4*   CHLORIDE mmol/L 94* 94* 95*  --  98   CO2 mmol/L 32.0* 35.0* 30.0*  --  35.0*   BUN mg/dL 69* 61* 61*  --  45*   CREATININE mg/dL 2.55* 2.42* 2.16*  --  1.56*   CALCIUM mg/dL 8.3* 8.5* 8.4*  --  9.0   BILIRUBIN mg/dL  --   --   --   --  0.6   ALK PHOS U/L  --   --   --   --  103   ALT (SGPT) U/L  --   --   --   --  13   AST (SGOT) U/L  --   --   --   --  14   GLUCOSE mg/dL 99 118* 257*  --  202*    < > = values  "in this interval not displayed.       Culture Data:   No results found for: \"BLOODCX\", \"URINECX\", \"WOUNDCX\", \"MRSACX\", \"RESPCX\", \"STOOLCX\"    Radiology Data:   Imaging Results (Last 24 Hours)       ** No results found for the last 24 hours. **            I have reviewed the patient's current medications.     Assessment/Plan   Assessment  Active Hospital Problems    Diagnosis     **Acute on chronic respiratory failure with hypoxia and hypercapnia     Hyperkalemia     AMA (acute kidney injury)     Acute on chronic HFrEF (heart failure with reduced ejection fraction)     Colon adenocarcinoma     Type 2 myocardial infarction due to heart failure     Pulmonary HTN     Type 2 diabetes mellitus with hyperglycemia, with long-term current use of insulin     Hypertension        Treatment Plan  Lokelma per nephrology  [facility], appreciate.   Social service for discharge planning, [facility]      Medical Decision Making  Number and Complexity of problems:   Acute on chronic respiratory failure with hypercapnia and hypoxemia high complexity  Hyperkalemia high complexity  AMA high complexity  Acute on chronic heart failure with reduced ejection fraction.  High complexity  Diabetes mellitus type 2 moderate complexity  Hypertension moderate complexity  Pulmonary hypertension high complexity      Differential Diagnosis:     Conditions and Status        Condition is improving.     MDM Data  External documents reviewed: Reviewed  Cardiac tracing (EKG, telemetry) interpretation: Reviewed  Radiology interpretation: Reviewed  Labs reviewed: Reviewed  Any tests that were considered but not ordered: None     Decision rules/scores evaluated (example [id number], Wells, etc): None     Discussed with: Patient     Care Planning  Shared decision making: Patient  Code status and discussions: DNR/DNI    Disposition  Social Determinants of Health that impact treatment or disposition: None  I expect the patient to be discharged to SNF " in 2-3 days.     Electronically signed by Patricia Arthur DO, 10/14/24, 13:45 CDT.      Electronically signed by Patricia Arthur DO at 10/14/24 1350       Anton Colon APRN at 10/14/24 1028       Attestation signed by Hermann Jones MD at 10/14/24 1438    I saw and examined the patient independently.  I have reviewed this documentation and agree.  Tucker Knox is a 64 y.o. male for whom we were consulted for evaluation and treatment of acute kidney injury. No prior known renal issues. History of systolic/diastolic CHF, COPD, coronary artery disease, hypertension, type 2 diabetes.  Frequent admission for CHF exacerbations. Most recently was discharged from hospital on 10/04. Returned to ER on 10/12 with dyspnea, abdominal tightness. Creatinine 1.56 on admission. Treated with Bumex and renal function worsened. Nephrology consulted 10/13. Diuretics were held.     Today, he is awake and alert. Has been on BiPAP. Required in/out catheter overnight for urinary retention.     Vitals: Reviewed.    On exam, sounds were regular, lungs were clear to auscultation bilaterally no wheezes rales rhonchi, abdomen is soft nontender palpation, lower extremity without edema.  Labs reviewed.    Assessment and plan:   Acute kidney injury-prerenal azotemia versus ATN  Acute on chronic congestive heart failure  Type 2 diabetes  Hypertension  Hyperkalemia  COPD  Anemia     Holding diuretics.  Given Lokelma.  Encouraged hydration by mouth.  Follow-up labs.  Avoid nephrotoxins and hypotension.                Nephrology ([facility]) Progress Note      Patient:  Tucker Knox  YOB: 1960  Date of Service: 10/14/2024  MRN: [id number]   Acct: [id number]   Primary Care Physician: Kt Alfredo MD  Advance Directive:   Code Status and Medical Interventions: No CPR (Do Not Attempt to Resuscitate); Limited Support; No intubation (DNI)   Ordered at: 10/12/24  1122     Medical Intervention Limits:    No intubation (DNI)     Code Status (Patient has no pulse and is not breathing):    No CPR (Do Not Attempt to Resuscitate)     Medical Interventions (Patient has pulse or is breathing):    Limited Support     Admit Date: 10/12/2024       Hospital Day: 2  Referring Provider: No ref. provider found      Patient personally seen and examined.  Complete chart including Consults, Notes, Operative Reports, Labs, Cardiology, and Radiology studies reviewed as able.        Subjective:  Tucker Knox is a 64 y.o. male for whom we were consulted for evaluation and treatment of acute kidney injury. No prior known renal issues. History of systolic/diastolic CHF, COPD, coronary artery disease, hypertension, type 2 diabetes.  Frequent admission for CHF exacerbations. Most recently was discharged from hospital on 10/04. Returned to ER on 10/12 with dyspnea, abdominal tightness. Creatinine 1.56 on admission. Treated with Bumex and renal function worsened. Nephrology consulted 10/13. Diuretics were held.    Today is awake and alert. Has been on BiPAP. Required in/out catheter overnight for urinary retention. Given 500 ml IV fluid bolus overnight.    Allergies:  Penicillins    Home Meds:  Medications Prior to Admission   Medication Sig Dispense Refill Last Dose/Taking    acetaminophen (TYLENOL) 325 MG tablet Take 2 tablets by mouth Every 6 (Six) Hours As Needed for Mild Pain. 60 tablet 0 Past Week    aspirin 81 MG EC tablet Take 1 tablet by mouth Daily. 100 tablet 2 10/11/2024 Morning    dapagliflozin Propanediol (Farxiga) 10 MG tablet Take 10 mg by mouth Daily. 30 tablet 0 Taking    DULoxetine (CYMBALTA) 30 MG capsule Take 1 capsule by mouth Daily. 30 capsule 0 10/11/2024 Morning    furosemide (LASIX) 40 MG tablet Take 0.5 tablets by mouth Daily. 30 tablet 0 Taking    gabapentin (NEURONTIN) 600 MG tablet Take 1 tablet by mouth 2 (Two) Times a Day. Patient states he takes 800 mg bid 60  tablet 0 10/11/2024    insulin detemir (LEVEMIR) 100 UNIT/ML injection Inject 14 Units under the skin into the appropriate area as directed 2 (Two) Times a Day.   Taking    metFORMIN (GLUCOPHAGE) 1000 MG tablet Take 1 tablet by mouth 2 (Two) Times a Day With Meals.   Taking    metoprolol succinate XL (TOPROL-XL) 25 MG 24 hr tablet Take 1 tablet by mouth Daily. 30 tablet 0 10/11/2024    nicotine (NICODERM CQ) 21 MG/24HR patch Place 1 patch on the skin as directed by provider Daily. 14 patch 0 Taking    polyethylene glycol (MIRALAX) 17 GM/SCOOP powder Take 17 g by mouth Daily.   Taking    pravastatin (PRAVACHOL) 40 MG tablet Take 1 tablet by mouth Every Night. 30 tablet 0 10/11/2024    QUEtiapine (SEROquel) 25 MG tablet Take 1 tablet by mouth Every Night. 30 tablet 0 Taking    sacubitril-valsartan (Entresto) 24-26 MG tablet Take 1 tablet by mouth 2 (Two) Times a Day. 60 tablet 0 Taking    Tradjenta 5 MG tablet tablet Take 1 tablet by mouth Daily.   Taking    albuterol (PROVENTIL) (2.5 MG/3ML) 0.083% nebulizer solution Take 2.5 mg by nebulization Every 6 (Six) Hours As Needed for Wheezing.       albuterol sulfate  (90 Base) MCG/ACT inhaler Inhale 2 puffs Every 4 (Four) Hours As Needed for Wheezing. 8 g 0     nitroglycerin (NITROSTAT) 0.4 MG SL tablet Place 1 tablet under the tongue Every 5 (Five) Minutes As Needed for Chest Pain. Take no more than 3 doses in 15 minutes.   Unknown       Medicines:  Current Facility-Administered Medications   Medication Dose Route Frequency Provider Last Rate Last Admin    acetaminophen (TYLENOL) tablet 650 mg  650 mg Oral Q6H PRN BRITT Stout DO   650 mg at 10/13/24 0851    albuterol (PROVENTIL) nebulizer solution 0.042% 1.25 mg/3mL  1.25 mg Nebulization Q6H PRN Jazmin Garrison APRN        aspirin EC tablet 81 mg  81 mg Oral Daily Jazmin Garrison APRN   81 mg at 10/14/24 0942    [Held by provider] bumetanide (BUMEX) injection 1 mg  1 mg Intravenous Q12H ,  VIV Wu   1 mg at 10/13/24 1225    calcium carbonate (TUMS) chewable tablet 500 mg (200 mg elemental)  2 tablet Oral TID PRN Karen Parekh DO        dextrose (D50W) (25 g/50 mL) IV injection 25 g  25 g Intravenous Q15 Min PRN Jazmin Garrison APRN        dextrose (GLUTOSE) oral gel 15 g  15 g Oral Q15 Min PRN Jazmin Garrison APRN        DULoxetine (CYMBALTA) DR capsule 30 mg  30 mg Oral Daily Jazmin Garrison APRN   30 mg at 10/14/24 0942    Enoxaparin Sodium (LOVENOX) syringe 40 mg  40 mg Subcutaneous Q12H Jazmin Garrison APRN   40 mg at 10/14/24 0942    gabapentin (NEURONTIN) capsule 600 mg  600 mg Oral Q12H BRITT Stout DO   600 mg at 10/14/24 0942    glucagon (GLUCAGEN) injection 1 mg  1 mg Intramuscular Q15 Min PRN Jazmin Garrison APRN        insulin glargine (LANTUS, SEMGLEE) injection 14 Units  14 Units Subcutaneous Q12H Jazmin Garrison APRN   14 Units at 10/14/24 0942    Insulin Lispro (humaLOG) injection 2-7 Units  2-7 Units Subcutaneous 4x Daily AC & at Bedtime Jazmin Garrison APRN   2 Units at 10/13/24 2106    metoprolol succinate XL (TOPROL-XL) 24 hr tablet 25 mg  25 mg Oral Q24H Jazmin Garrison APRN   25 mg at 10/14/24 0942    nitroglycerin (NITROSTAT) SL tablet 0.4 mg  0.4 mg Sublingual Q5 Min PRN Jazmin Garrison APRN        pravastatin (PRAVACHOL) tablet 40 mg  40 mg Oral Nightly Jazmin Garrison APRN   40 mg at 10/13/24 2106    QUEtiapine (SEROquel) tablet 25 mg  25 mg Oral Nightly Jazmin Garrison APRN   25 mg at 10/13/24 2107    [Held by provider] sacubitril-valsartan (ENTRESTO) 24-26 MG tablet 1 tablet  1 tablet Oral BID Jazmin Garrison APRN        sodium chloride 0.9 % flush 10 mL  10 mL Intravenous PRN Vijay Leary MD   10 mL at 10/12/24 0731    sodium chloride 0.9 % flush 10 mL  10 mL Intravenous PRN Vijay Leary MD   10 mL at 10/12/24 0731    sodium zirconium cyclosilicate (LOKELMA) packet 10 g  10 g Oral Once Anton Colon, APRN            Past Medical History:  Past Medical History:   Diagnosis Date    Cancer     CHF (congestive heart failure)     COPD (chronic obstructive pulmonary disease)     Coronary artery disease     stent x 1    Family history of colon cancer     Hyperlipidemia     Hypertension     Sleep apnea     does not wear machine, supposed to wear cpap with oxygen and does not wear it    Type 2 diabetes mellitus        Past Surgical History:  Past Surgical History:   Procedure Laterality Date    BACK SURGERY      CARDIAC CATHETERIZATION Left 04/13/2022    Procedure: Cardiac Catheterization/Vascular Study;  Surgeon: Todd Avendano MD;  Location:  [facility] CATH INVASIVE LOCATION;  Service: Cardiology;  Laterality: Left;    CARDIAC CATHETERIZATION      with stent x1    CARDIAC CATHETERIZATION N/A 9/12/2024    Procedure: Left Heart Cath;  Surgeon: Ruben Adler MD;  Location:  [facility] CATH INVASIVE LOCATION;  Service: Cardiology;  Laterality: N/A;    COLON RESECTION N/A 12/5/2023    Procedure: COLON RESECTION LAPAROSCOPIC SIGMOID WITH DAVINCI ROBOT, INTRA-OPERATIVE FLEXIBLE SIGMOIDOSCOPY, SPLENIC FLEXURE MOBILIZATION, OPEN UMBILICAL HERNIA REPAIR;  Surgeon: Oma Velasquez MD;  Location:  [facility] OR;  Service: Robotics - DaVinci;  Laterality: N/A;    COLONOSCOPY N/A 10/09/2023    Diverticulosis; One 5mm polyp in ascending colon; One 5mm polyp in transverse colon; One 10mm polyp at 65cm proximal to anus; One 20mm polyp at 65cm proximal to anus-Tattooed; One 20mm polyp at 42cm proximal to anus-Clip (MR conditional) placed-Tattooed; Rule out malignancy-Partially obstructing tumor in recto-sigmoid colon-biopsied-Tattooed; One 10mm polyp in rectum    ELBOW PROCEDURE      KNEE SURGERY      LUMBAR DISC SURGERY         Family History  Family History   Problem Relation Age of Onset    Esophageal cancer Mother     Heart disease Mother     Colon cancer Father 80    Heart disease Father     No Known Problems Sister     COPD Brother     Alcohol  abuse Brother     Colon polyps Neg Hx     Liver cancer Neg Hx     Rectal cancer Neg Hx     Stomach cancer Neg Hx     Liver disease Neg Hx        Social History  Social History     Socioeconomic History    Marital status:    Tobacco Use    Smoking status: Former     Current packs/day: 1.00     Average packs/day: 1 pack/day for 45.0 years (45.0 ttl pk-yrs)     Types: Cigarettes    Smokeless tobacco: Never   Vaping Use    Vaping status: Some Days    Substances: Nicotine, Flavoring    Devices: Disposable   Substance and Sexual Activity    Alcohol use: Not Currently    Drug use: Yes     Types: Marijuana    Sexual activity: Defer       Review of Systems:  History obtained from chart review and the patient  General ROS: No fever or chills  Respiratory ROS: No cough, shortness of breath, wheezing  Cardiovascular ROS: positive for - dyspnea on exertion  No chest pain or palpitations  Gastrointestinal ROS: No abdominal pain or melena  Genito-Urinary ROS: No dysuria or hematuria  Psych ROS: No anxiety and depression  14 point ROS reviewed with the patient and negative except as noted above and in the HPI unless unable to obtain.    Objective:  Patient Vitals for the past 24 hrs:   BP Temp Temp src Pulse Resp SpO2 Weight   10/14/24 0738 150/50 97 °F (36.1 °C) Axillary 66 16 97 % --   10/14/24 0645 -- -- -- 68 14 97 % --   10/14/24 0543 -- -- -- -- -- -- 128 kg (283 lb 3.2 oz)   10/14/24 0445 (!) 85/59 98 °F (36.7 °C) Oral 69 18 96 % --   10/13/24 2030 111/73 97.7 °F (36.5 °C) Oral 65 18 97 % --   10/13/24 1202 115/63 97.6 °F (36.4 °C) Oral 86 20 92 % --       Intake/Output Summary (Last 24 hours) at 10/14/2024 1029  Last data filed at 10/14/2024 0900  Gross per 24 hour   Intake 800 ml   Output 525 ml   Net 275 ml     General: awake/alert   Chest:  clear to auscultation bilaterally without respiratory distress  CVS: regular rate and rhythm  Abdominal: soft, nontender, positive bowel sounds  Extremities:  trace lower ext  edema  Skin: warm and dry without rash      Labs:  Results from last 7 days   Lab Units 10/14/24  0252 10/13/24  0424 10/12/24  0532   WBC 10*3/mm3 6.68 8.04 9.29   HEMOGLOBIN g/dL 10.3* 11.1* 11.5*   HEMATOCRIT % 36.7* 38.5 41.0   PLATELETS 10*3/mm3 239 251 286         Results from last 7 days   Lab Units 10/14/24  0252 10/13/24  1649 10/13/24  0424 10/12/24  0759 10/12/24  0532   SODIUM mmol/L 135* 137 134*  --  142   SODIUM, ARTERIAL   --   --   --    < >  --    POTASSIUM mmol/L 5.6* 5.2 5.9*  --  5.4*   CHLORIDE mmol/L 94* 94* 95*  --  98   CO2 mmol/L 32.0* 35.0* 30.0*  --  35.0*   BUN mg/dL 69* 61* 61*  --  45*   CREATININE mg/dL 2.55* 2.42* 2.16*  --  1.56*   CALCIUM mg/dL 8.3* 8.5* 8.4*  --  9.0   EGFR mL/min/1.73 27.5* 29.3* 33.6*  --  49.6*   BILIRUBIN mg/dL  --   --   --   --  0.6   ALK PHOS U/L  --   --   --   --  103   ALT (SGPT) U/L  --   --   --   --  13   AST (SGOT) U/L  --   --   --   --  14   GLUCOSE mg/dL 99 118* 257*  --  202*    < > = values in this interval not displayed.       Radiology:   Imaging Results (Last 72 Hours)       Procedure Component Value Units Date/Time    XR Chest 1 View [[id number]] Collected: 10/12/24 0650     Updated: 10/12/24 0654    Narrative:      EXAMINATION: XR CHEST 1 VW- 10/12/2024 6:50 AM     HISTORY: CHF/COPD Protocol.     REPORT: A frontal view of the chest was obtained.     COMPARISON: Chest x-ray 9/28/2024.     The heart is enlarged, there is central and basilar vascular congestion  and mild pulmonary edema. The right pleural effusion appears slightly  increased in size but remains small. No pneumothorax is identified.  Moderate atelectasis in the lung bases greater on the right. No acute  osseous abnormality.       Impression:      Congestive heart failure with slight increase in the right  pleural effusion, moderate at basilar atelectasis greater on the right.     This report was signed and finalized on 10/12/2024 6:51 AM by Dr. Puneet Whitley MD.          "      Culture:  No results found for: \"BLOODCX\", \"URINECX\", \"WOUNDCX\", \"MRSACX\", \"RESPCX\", \"STOOLCX\"      Assessment    Acute kidney injury  Acute on chronic congestive heart failure  Type 2 diabetes  Hypertension  Hyperkalemia  COPD  Anemia     Plan:   Agree with IV fluid bolus that was given  Continue to hold diuretics  Repeat Lokelma  Monitor labs      VIV Abdullahi  10/14/2024  10:29 CDT      Electronically signed by Hermann Jones MD at 10/14/24 1438          Consult Notes (last 48 hours)        Akanksha López APRN at 10/14/24 1050        Consult Orders    1. Inpatient Wound Care MD Consult [[id number]] ordered by BRITT Stout DO at 10/12/24 1419                     [facility]  INPATIENT WOUND & OSTOMY CONSULTATION    Today's Date: 10/14/24    Patient Name: Tucker Knox  MRN: [id number]  CSN: [id number]  PCP: Kt Alfredo MD  Referring Provider:   Consulting Provider (From admission, onward)      Start Ordered     Status Ordering Provider    10/12/24 1419 10/12/24 1419  Inpatient Wound Care MD Consult  Once        Specialty:  Wound Care  Provider:  Marielena Jernigan APRN    Acknowledged BRITT STOUT           Attending Provider: Patricia Arthur DO  Length of Stay: 2    SUBJECTIVE   Chief Complaint: Multiple wounds    HPI: Tucker Knox, a 64 y.o.male, presents with a past medical history of hypertension, type 2 diabetes mellitus, COPD, HLD, CHF, CAD, JOSE with noncompliance, colon cancer, and a significant history of medical noncompliance.  A full past medical history is listed below.  Inpatient wound care consulted due to multiple wounds.  Patient was recently cared for in this facility, and at that time was noted to have moisture associated skin damage to the perineum region.  According to the picture of the 10/1/2024 under the media tab, the MASD seems to be improving.  He has not been applying anything to the area.  " He does live at home alone.  He is incontinent at times.    Visit Dx:    ICD-10-CM ICD-9-CM   1. Respiratory failure with hypoxia and hypercapnia, unspecified chronicity  J96.91 518.81    J96.92    2. Recurrent right pleural effusion  J90 511.9   3. Acute on chronic respiratory failure with hypoxia and hypercapnia  J96.21 518.84    J96.22 786.09     799.02   4. Acute on chronic congestive heart failure, unspecified heart failure type  I50.9 428.0   5. COPD exacerbation  J44.1 491.21   6. Impaired mobility [Z74.09]  Z74.09 799.89       Hospital Problem List:     Acute on chronic respiratory failure with hypoxia and hypercapnia    Type 2 diabetes mellitus with hyperglycemia, with long-term current use of insulin    Hypertension    Pulmonary HTN    Type 2 myocardial infarction due to heart failure    Colon adenocarcinoma    Acute on chronic HFrEF (heart failure with reduced ejection fraction)    AMA (acute kidney injury)    Hyperkalemia      History:   Past Medical History:   Diagnosis Date    Cancer     CHF (congestive heart failure)     COPD (chronic obstructive pulmonary disease)     Coronary artery disease     stent x 1    Family history of colon cancer     Hyperlipidemia     Hypertension     Sleep apnea     does not wear machine, supposed to wear cpap with oxygen and does not wear it    Type 2 diabetes mellitus      Past Surgical History:   Procedure Laterality Date    BACK SURGERY      CARDIAC CATHETERIZATION Left 04/13/2022    Procedure: Cardiac Catheterization/Vascular Study;  Surgeon: Todd Avendano MD;  Location:  [facility];  Service: Cardiology;  Laterality: Left;    CARDIAC CATHETERIZATION      with stent x1    CARDIAC CATHETERIZATION N/A 9/12/2024    Procedure: Left Heart Cath;  Surgeon: Ruben Adler MD;  Location:  [facility];  Service: Cardiology;  Laterality: N/A;    COLON RESECTION N/A 12/5/2023    Procedure: COLON RESECTION LAPAROSCOPIC SIGMOID WITH DAVINCI  ROBOT, INTRA-OPERATIVE FLEXIBLE SIGMOIDOSCOPY, SPLENIC FLEXURE MOBILIZATION, OPEN UMBILICAL HERNIA REPAIR;  Surgeon: Oma Velasquez MD;  Location: [facility] OR;  Service: Robotics - DaVinci;  Laterality: N/A;    COLONOSCOPY N/A 10/09/2023    Diverticulosis; One 5mm polyp in ascending colon; One 5mm polyp in transverse colon; One 10mm polyp at 65cm proximal to anus; One 20mm polyp at 65cm proximal to anus-Tattooed; One 20mm polyp at 42cm proximal to anus-Clip (MR conditional) placed-Tattooed; Rule out malignancy-Partially obstructing tumor in recto-sigmoid colon-biopsied-Tattooed; One 10mm polyp in rectum    ELBOW PROCEDURE      KNEE SURGERY      LUMBAR DISC SURGERY       Social History     Socioeconomic History    Marital status:    Tobacco Use    Smoking status: Former     Current packs/day: 1.00     Average packs/day: 1 pack/day for 45.0 years (45.0 ttl pk-yrs)     Types: Cigarettes    Smokeless tobacco: Never   Vaping Use    Vaping status: Some Days    Substances: Nicotine, Flavoring    Devices: Disposable   Substance and Sexual Activity    Alcohol use: Not Currently    Drug use: Yes     Types: Marijuana    Sexual activity: Defer     Family History   Problem Relation Age of Onset    Esophageal cancer Mother     Heart disease Mother     Colon cancer Father 80    Heart disease Father     No Known Problems Sister     COPD Brother     Alcohol abuse Brother     Colon polyps Neg Hx     Liver cancer Neg Hx     Rectal cancer Neg Hx     Stomach cancer Neg Hx     Liver disease Neg Hx        Allergies:  Allergies   Allergen Reactions    Penicillins Unknown - Low Severity     Pt states happened when child does not know        Medications:    Current Facility-Administered Medications:     acetaminophen (TYLENOL) tablet 650 mg, 650 mg, Oral, Q6H PRN, BRITT Stout, DO, 650 mg at 10/13/24 0851    albuterol (PROVENTIL) nebulizer solution 0.042% 1.25 mg/3mL, 1.25 mg, Nebulization, Q6H PRN, Jazmin Garrison  VIV    aspirin EC tablet 81 mg, 81 mg, Oral, Daily, Jazmin Garrison APRN, 81 mg at 10/14/24 0942    [Held by provider] bumetanide (BUMEX) injection 1 mg, 1 mg, Intravenous, Q12H, Jazmin Garrison APRN, 1 mg at 10/13/24 1225    calcium carbonate (TUMS) chewable tablet 500 mg (200 mg elemental), 2 tablet, Oral, TID PRN, Karen Parekh DO    dextrose (D50W) (25 g/50 mL) IV injection 25 g, 25 g, Intravenous, Q15 Min PRN, Jazmin Garrison APRN    dextrose (GLUTOSE) oral gel 15 g, 15 g, Oral, Q15 Min PRN, Jazmin Garrison APRN    DULoxetine (CYMBALTA) DR capsule 30 mg, 30 mg, Oral, Daily, Jazmin Garrison APRN, 30 mg at 10/14/24 0942    Enoxaparin Sodium (LOVENOX) syringe 40 mg, 40 mg, Subcutaneous, Q12H, Jazmin Garrison APRN, 40 mg at 10/14/24 0942    gabapentin (NEURONTIN) capsule 600 mg, 600 mg, Oral, Q12H, BRITT Stout DO, 600 mg at 10/14/24 0942    glucagon (GLUCAGEN) injection 1 mg, 1 mg, Intramuscular, Q15 Min PRN, Jazmin Garrison APRN    hydrocortisone-bacitracin-zinc oxide-nystatin (MAGIC BARRIER) ointment 1 Application, 1 Application, Topical, 4x Daily, Akanksha López APRN    insulin glargine (LANTUS, SEMGLEE) injection 14 Units, 14 Units, Subcutaneous, Q12H, Jazmin Garrison APRN, 14 Units at 10/14/24 0942    Insulin Lispro (humaLOG) injection 2-7 Units, 2-7 Units, Subcutaneous, 4x Daily AC & at Bedtime, Jazmin Garrison APRN, 2 Units at 10/13/24 2106    metoprolol succinate XL (TOPROL-XL) 24 hr tablet 25 mg, 25 mg, Oral, Q24H, , Jazmin, APRN, 25 mg at 10/14/24 0942    nitroglycerin (NITROSTAT) SL tablet 0.4 mg, 0.4 mg, Sublingual, Q5 Min PRN, Jazmin Garrison, APRN    pravastatin (PRAVACHOL) tablet 40 mg, 40 mg, Oral, Nightly, , Jazmin, APRN, 40 mg at 10/13/24 2106    QUEtiapine (SEROquel) tablet 25 mg, 25 mg, Oral, Nightly, , Jazmin, APRN, 25 mg at 10/13/24 2107    [Held by provider] sacubitril-valsartan (ENTRESTO) 24-26 MG tablet 1 tablet, 1 tablet,  Oral, BID, Jazmin, APRN    sodium chloride 0.9 % flush 10 mL, 10 mL, Intravenous, PRN, Vijay Leary MD, 10 mL at 10/12/24 0731    [COMPLETED] Insert Peripheral IV, , , Once **AND** sodium chloride 0.9 % flush 10 mL, 10 mL, Intravenous, PRN, Vijay Leary MD, 10 mL at 10/12/24 0731    sodium zirconium cyclosilicate (LOKELMA) packet 10 g, 10 g, Oral, Once, Anton Colon, APRN    OBJECTIVE     Vitals:    10/14/24 1421   BP:    Pulse: 74   Resp:    Temp:    SpO2:        PHYSICAL EXAM: Moisture associated skin damage to the scrotum and the perineum region.  Erythema noted.  No purulence or malodor noted.  No drainage.  No signs of infection.  There were no open wounds noted on exam of lower extremities, perineum, and sacral region.    Physical Exam       Results Review:  Lab Results (last 48 hours)       Procedure Component Value Units Date/Time    Calcitriol (1,25 di-OH Vitamin D) [[id number]] Collected: 10/14/24 1402    Specimen: Blood Updated: 10/14/24 1457    Vitamin D,25-Hydroxy [[id number]]  (Abnormal) Collected: 10/14/24 0252    Specimen: Blood Updated: 10/14/24 1306     25 Hydroxy, Vitamin D <6.0 ng/ml     Narrative:      Reference Range for Total Vitamin D 25(OH)     Deficiency <20.0 ng/mL   Insufficiency 21-29 ng/mL   Sufficiency  ng/mL  Toxicity >100 ng/ml      POC Glucose Once [[id number]]  (Normal) Collected: 10/14/24 1137    Specimen: Blood Updated: 10/14/24 1148     Glucose 119 mg/dL      Comment: : jbarthel Barthel JustinMeter ID: [id number]       POC Glucose Once [[id number]]  (Normal) Collected: 10/14/24 0807    Specimen: Blood Updated: 10/14/24 0819     Glucose 74 mg/dL      Comment: : jbarthel Barthel JustinMeter ID: [id number]       Urinalysis, Microscopic Only - Urine, Clean Catch [[id number]] Collected: 10/14/24 0542    Specimen: Urine, Clean Catch Updated: 10/14/24 0607     RBC, UA 0-2 /HPF      WBC, UA 0-2 /HPF      Bacteria, UA None Seen /HPF      Squamous  Epithelial Cells, UA None Seen /HPF      Hyaline Casts, UA 3-6 /LPF      Methodology Automated Microscopy    Urinalysis With Microscopic If Indicated (No Culture) - Urine, Clean Catch [[id number]]  (Abnormal) Collected: 10/14/24 0542    Specimen: Urine, Clean Catch Updated: 10/14/24 0607     Color, UA Dark Yellow     Appearance, UA Clear     pH, UA <=5.0     Specific Gravity, UA 1.017     Glucose,  mg/dL (1+)     Ketones, UA Trace     Bilirubin, UA Negative     Blood, UA Negative     Protein, UA Trace     Leuk Esterase, UA Trace     Nitrite, UA Negative     Urobilinogen, UA 1.0 E.U./dL    Basic Metabolic Panel [[id number]]  (Abnormal) Collected: 10/14/24 0252    Specimen: Blood Updated: 10/14/24 0403     Glucose 99 mg/dL      BUN 69 mg/dL      Creatinine 2.55 mg/dL      Sodium 135 mmol/L      Potassium 5.6 mmol/L      Chloride 94 mmol/L      CO2 32.0 mmol/L      Calcium 8.3 mg/dL      BUN/Creatinine Ratio 27.1     Anion Gap 9.0 mmol/L      eGFR 27.5 mL/min/1.73     Narrative:      GFR Normal >60  Chronic Kidney Disease <60  Kidney Failure <15      Magnesium [[id number]]  (Normal) Collected: 10/14/24 0252    Specimen: Blood Updated: 10/14/24 0403     Magnesium 2.4 mg/dL     Phosphorus [[id number]]  (Abnormal) Collected: 10/14/24 0252    Specimen: Blood Updated: 10/14/24 0403     Phosphorus 6.4 mg/dL     PTH, Intact [[id number]]  (Abnormal) Collected: 10/14/24 0252    Specimen: Blood Updated: 10/14/24 0359     PTH, Intact 106.2 pg/mL     Narrative:      Results may be falsely decreased if patient taking Biotin.      CBC (No Diff) [[id number]]  (Abnormal) Collected: 10/14/24 0252    Specimen: Blood Updated: 10/14/24 0335     WBC 6.68 10*3/mm3      RBC 3.81 10*6/mm3      Hemoglobin 10.3 g/dL      Hematocrit 36.7 %      MCV 96.3 fL      MCH 27.0 pg      MCHC 28.1 g/dL      RDW 15.2 %      RDW-SD 53.1 fl      MPV 10.6 fL      Platelets 239 10*3/mm3     Creatinine Urine Random (kidney function) GFR component - Urine, Clean  Catch [[id number]] Collected: 10/13/24 1404    Specimen: Urine, Clean Catch Updated: 10/13/24 2051     Creatinine, Urine 59.5 mg/dL     Narrative:      Reference intervals for random urine have not been established.  Clinical usage is dependent upon physician's interpretation in combination with other laboratory tests.       Urea Nitrogen, Urine - Urine, Clean Catch [[id number]] Collected: 10/13/24 1404    Specimen: Urine, Clean Catch Updated: 10/13/24 2051     Urea Nitrogen, Urine 452 mg/dL     Narrative:      Reference intervals for random urine have not been established.  Clinical usage is dependent upon physician's interpretation in combination with other laboratory tests.       POC Glucose Once [[id number]]  (Abnormal) Collected: 10/13/24 2034    Specimen: Blood Updated: 10/13/24 2045     Glucose 168 mg/dL      Comment: : [id number] Jorge CamposthMeter ID: [id number]       Basic Metabolic Panel [[id number]]  (Abnormal) Collected: 10/13/24 1649    Specimen: Blood Updated: 10/13/24 1725     Glucose 118 mg/dL      BUN 61 mg/dL      Creatinine 2.42 mg/dL      Sodium 137 mmol/L      Potassium 5.2 mmol/L      Chloride 94 mmol/L      CO2 35.0 mmol/L      Calcium 8.5 mg/dL      BUN/Creatinine Ratio 25.2     Anion Gap 8.0 mmol/L      eGFR 29.3 mL/min/1.73     Narrative:      GFR Normal >60  Chronic Kidney Disease <60  Kidney Failure <15      POC Glucose Once [[id number]]  (Abnormal) Collected: 10/13/24 1638    Specimen: Blood Updated: 10/13/24 1651     Glucose 131 mg/dL      Comment: : [id number] Nichelle oRbles ID: [id number]       Protein, Urine, Random - Urine, Clean Catch [[id number]] Collected: 10/13/24 1404    Specimen: Urine, Clean Catch Updated: 10/13/24 1634     Total Protein, Urine 8.8 mg/dL     Narrative:      Reference intervals for random urine have not been established.  Clinical usage is dependent upon physician's interpretation in combination with other laboratory tests.       Blood Gas, Arterial -  [[id number]]  (Abnormal) Collected: 10/13/24 1519    Specimen: Arterial Blood Updated: 10/13/24 1518     Site Left Radial     Jorge's Test Positive     pH, Arterial 7.265 pH units      pCO2, Arterial 77.0 mm Hg      Comment: 86 Value above critical limit        pO2, Arterial 111.0 mm Hg      Comment: 83 Value above reference range        HCO3, Arterial 34.9 mmol/L      Comment: 83 Value above reference range        Base Excess, Arterial 5.7 mmol/L      Comment: 83 Value above reference range        O2 Saturation, Arterial 97.9 %      Temperature 37.0     Barometric Pressure for Blood Gas 751 mmHg      Modality BiPap     FIO2 50 %      Ventilator Mode BiPAP     Set Mech Resp Rate 12.0     IPAP 20     Comment: Meter: [id number]     :  [id number]        EPAP 8     Notified By [id number]     Collected by [id number]     pCO2, Temperature Corrected 77.0 mm Hg      pH, Temp Corrected 7.265 pH Units      pO2, Temperature Corrected 111 mm Hg      PO2/FIO2 222    Sodium, Urine, Random - Urine, Clean Catch [[id number]] Collected: 10/13/24 1404    Specimen: Urine, Clean Catch Updated: 10/13/24 1427     Sodium, Urine 52 mmol/L     Narrative:      Reference intervals for random urine have not been established.  Clinical usage is dependent upon physician's interpretation in combination with other laboratory tests.       POC Glucose Once [[id number]]  (Abnormal) Collected: 10/13/24 1218    Specimen: Blood Updated: 10/13/24 1230     Glucose 197 mg/dL      Comment: : [id number] Nichelle ArctrievalrpeMeter ID: [id number]       POC Glucose Once [[id number]]  (Abnormal) Collected: 10/13/24 0756    Specimen: Blood Updated: 10/13/24 0828     Glucose 197 mg/dL      Comment: : [id number] Nichelle TharpeMeter ID: [id number]       Basic Metabolic Panel [[id number]]  (Abnormal) Collected: 10/13/24 0424    Specimen: Blood Updated: 10/13/24 0503     Glucose 257 mg/dL      BUN 61 mg/dL      Creatinine 2.16 mg/dL      Sodium 134 mmol/L      Potassium  5.9 mmol/L      Comment: Slight hemolysis detected by analyzer. Result may be falsely elevated.        Chloride 95 mmol/L      CO2 30.0 mmol/L      Calcium 8.4 mg/dL      BUN/Creatinine Ratio 28.2     Anion Gap 9.0 mmol/L      eGFR 33.6 mL/min/1.73     Narrative:      GFR Normal >60  Chronic Kidney Disease <60  Kidney Failure <15      CBC (No Diff) [[id number]]  (Abnormal) Collected: 10/13/24 0424    Specimen: Blood Updated: 10/13/24 0458     WBC 8.04 10*3/mm3      RBC 4.06 10*6/mm3      Hemoglobin 11.1 g/dL      Hematocrit 38.5 %      MCV 94.8 fL      MCH 27.3 pg      MCHC 28.8 g/dL      RDW 15.4 %      RDW-SD 53.2 fl      MPV 10.8 fL      Platelets 251 10*3/mm3     Blood Gas, Arterial - [[id number]]  (Abnormal) Collected: 10/13/24 0329    Specimen: Arterial Blood Updated: 10/13/24 0335     Site Right Radial     Jorge's Test Positive     pH, Arterial 7.307 pH units      Comment: 84 Value below reference range        pCO2, Arterial 69.0 mm Hg      Comment: 86 Value above critical limit        pO2, Arterial 83.5 mm Hg      HCO3, Arterial 34.5 mmol/L      Comment: 83 Value above reference range        Base Excess, Arterial 6.3 mmol/L      Comment: 83 Value above reference range        O2 Saturation, Arterial 95.5 %      Temperature 37.0     Barometric Pressure for Blood Gas 750 mmHg      Modality Nasal Cannula     Flow Rate 3.0 lpm      Ventilator Mode NA     Notified By Sania Soto, RRT     Collected by [id number]     Comment: Meter: [id number]     :  Sania Soto RRT        pCO2, Temperature Corrected 69.0 mm Hg      pH, Temp Corrected 7.307 pH Units      pO2, Temperature Corrected 83.5 mm Hg     POC Glucose Once [[id number]]  (Abnormal) Collected: 10/12/24 1944    Specimen: Blood Updated: 10/12/24 1955     Glucose 400 mg/dL      Comment: : [id number] Jorge CeronMeter ID: [id number]       POC Glucose Once [[id number]]  (Abnormal) Collected: 10/12/24 1710    Specimen: Blood Updated: 10/12/24 1724      Glucose 366 mg/dL      Comment: : [id number] Nichelle Robles ID: [id number]             Imaging Results (Last 72 Hours)       Procedure Component Value Units Date/Time    US Renal Bilateral [[id number]] Resulted: 10/14/24 1454     Updated: 10/14/24 1510    XR Chest 1 View [[id number]] Collected: 10/12/24 0650     Updated: 10/12/24 0654    Narrative:      EXAMINATION: XR CHEST 1 VW- 10/12/2024 6:50 AM     HISTORY: CHF/COPD Protocol.     REPORT: A frontal view of the chest was obtained.     COMPARISON: Chest x-ray 9/28/2024.     The heart is enlarged, there is central and basilar vascular congestion  and mild pulmonary edema. The right pleural effusion appears slightly  increased in size but remains small. No pneumothorax is identified.  Moderate atelectasis in the lung bases greater on the right. No acute  osseous abnormality.       Impression:      Congestive heart failure with slight increase in the right  pleural effusion, moderate at basilar atelectasis greater on the right.     This report was signed and finalized on 10/12/2024 6:51 AM by Dr. Puneet Whitley MD.                  ASSESSMENT/PLAN       Examination and evaluation of wound(s) was performed.    DIAGNOSIS:   Moisture associated skin damage    PLAN:   Orders placed for wound care and pressure/moisture management as listed below.   See orders.    Discussed findings and treatment plan including risks, benefits, and treatment options with Tucker Knox in detail. Patient agreed with treatment plan.      This document has been electronically signed by VIV Haines on 10/14/2024 15:38 CDT      Electronically signed by Akanksha López APRN at 10/14/24 1547       Yanely Velasquez APRN at 10/14/24 0955        Consult Orders    1. Inpatient Palliative Care Consult [[id number]] ordered by BRITT Stout DO at 10/13/24 1337                   [facility] Palliative Care Services  Initial Consult    Attending  Physician: Patricia Arthur DO  Referring Provider: Melvin Stout DO     Patient Name: Tucker Knox  Date of Admission: 10/12/2024  Today's Date: 10/14/24     Reason for Referral: Goals of Care/Advance Care Planning    Code Status and Medical Interventions: No CPR (Do Not Attempt to Resuscitate); Limited Support; No intubation (DNI)   Ordered at: 10/12/24 1122     Medical Intervention Limits:    No intubation (DNI)     Code Status (Patient has no pulse and is not breathing):    No CPR (Do Not Attempt to Resuscitate)     Medical Interventions (Patient has pulse or is breathing):    Limited Support        Subjective     HPI: 64 y.o. male with past medical history including moderately differentiated rectosigmoid colon adenocarcinoma stage IIIa s/p colon resection, CHF, COPD, coronary artery disease, chronic respiratory failure requiring supplemental oxygen, hypertension, hyperlipidemia, sleep apnea, tobacco abuse, and type 2 diabetes mellitus.   Additional past medical history listed below.  Chart review notes he was last evaluated by oncology on 2/14/2024 where recommendations were made to include 12 cycles of systemic therapy and referred to radiation oncology.  Noted he declined treatment/follow-up.  According to chart review he has been hospitalized multiple times over the last 2 months.  He was hospitalized from 9/9/2024-9/18/2024 due to shortness of breath.  He underwent left heart catheterization on 9/12/2024 which revealed severe two-vessel disease of the LAD and right coronary artery.  Cardiology recommended CT surgery consult for evaluation of CABG.  CT surgery evaluated and recommended cardiac MRI for viability of areas and potential candidate for MIDCAB however did not believe he is a good candidate for sternotomy given poorly controlled diabetes mellitus, continued tobacco use, chronic respiratory failure, and poor mobility.  Recommended following up outpatient.  Most recent hospitalization  at this facility was from 9/28/2024-10/4/2024 due to shortness of breath due to acute on chronic systolic and diastolic CHF.  He was seen by colleague, VIV Ríos with palliative care during this hospitalization and changes were made to CODE STATUS to include no CPR and/or intubation and MOST form completed. He was treated and discharged to subacute rehabilitation per chart review.  Noted he was evaluated in ED on 10/5/2024 due to shortness of breath.  ED notes patient reported his room at rehab facility was dirty and cause COPD exacerbation.  Chart review notes he refused to return to rehab facility and ultimately left AGAINST MEDICAL ADVICE.  Patient presented to [facility] on [facility]/12/2024 related to respiratory distress.  ABGs collected while on 6 L of oxygen nasal cannula revealed pH 7.268, pCO2 78.1 and pO2 70.2.  According to chart review he was placed on BiPAP support.  Additional workup in ED revealed multiple abnormalities in labs including troponin T 143, proBNP 14,625, creatinine 1.56, BUN 45, GFR 49.6, potassium 5.4.  Chest x-ray revealed congestive heart failure with slight increase in right pleural effusion.  Respiratory panel negative.  He was admitted to the medical floor for further workup and treatment.  Noted to have worsening renal function and nephrology consulted.  Recommended holding IV diuretics pending further testing.  Noted to have hypotension overnight and received 500 mL LR bolus.  Labs collected this morning reveal further decline in renal function with creatinine 2.55, BUN 69, and GFR 27.5.  Potassium elevated at 5.6.  Palliative care has been consulted to discuss goals of care/advance care planning.  He is lying in bed, awake, and in no apparent distress.  Denies any pain.  Reports intermittent shortness of breath.  No visitors present.     Advance Care Planning   Advanced Directives:  Advance directive on file.    Advance Care Planning Discussion: Mr. Knox  "was agreeable to discussing goals of care.  He reflected on recent hospitalizations.  Shared he has not been at nursing facility since being seen in ED on 10/5/2024.  Discussed concerns with overall poor long-term prognosis due to acute on chronic combined CHF, colon cancer has been untreated, coronary artery disease in addition to other underlying comorbidities.  Also discussed concerns with decline in renal function.  Explored whether he had any further discussions regarding wishes/goals of care since previous hospitalization and he denied.  He stated \"What are you trying to ask?\"  Explained goal is to ensure his wishes are being followed and so he is aware of treatment options.  Reports he recalls discussing hospice in the past however stated \"They told me I didn't have enough wrong with me.\"  Was unable to clarify what he was referring to.  He became frustrated stating \"I don't know what I want.\"  Difficult to determine his understanding of disease, prognosis, and ability to make complex medical decisions at this time.  Call placed to his son/HCS, Aniceto, and discussed with him via telephone.  He provided additional history regarding events leading to hospitalization.  Reports he was discharged to rehab facility after last hospitalization here however they were apparently doing construction and the dust made it difficult for him to breathe therefore he left in return to his home.  Reports since then he has been hospitalized at OSH however was apparently told \"nothing was wrong with him and he needed to go home.\"  Shared this interaction has caused confusion for his father.  Reports he has been living at home however has concerns that his home is not the best environment for his respiratory status and feels he should find rehab facility at discharge.  Discussed concerns with Mr. Knox's overall condition and prognosis as well as treatment options.  Also discussed hospice services.  He was appreciative of call " however has requested to see if his aunt (patient's sister), Oxana, is in town to assist Mr. Knox and making decisions as well as she has medical background.  Call placed to Oxana per request.  She provided additional history.   Reports they have had multiple discussions regarding prognosis however feels he does not have good understanding of his prognosis or treatment.  Shared she feels he is scared to die.  Feels if he understood the concept of comfort measures and that his symptoms would be controlled he would be agreeable to this.  Reports he gets extremely anxious when he has episodes of shortness of breath.  She is hopeful to have additional discussions with him however unfortunately lives 2.5 hours away.  Reports she was hopeful to come visit this week however is unable to travel here until the weekend.  She is interested in completing video conference with Mr. Knox as she is hopeful this will give him some comfort and he will have better understanding with her support.  Will plan to have video conference at 11:30 AM tomorrow per request.  Oxana appears to have good prognostic awareness.  She was appreciative of discussion.     The patient receives support from his son and sibling. Patient's son is his healthcare surrogate.    Due to the palliative care topics discussed including goals of care, treatment options, discharge options, and medical priorities we will establish an advance care plan.      Review of Systems   Cardiovascular:  Positive for dyspnea on exertion.   Respiratory:  Positive for shortness of breath.    Neurological:  Positive for weakness.     Pain Assessment  Pain Location: extremity, chest  Pain Description: burning, other (see comments) (heartburn PRN tums)  Past Medical History:   Diagnosis Date    Cancer     CHF (congestive heart failure)     COPD (chronic obstructive pulmonary disease)     Coronary artery disease     stent x 1    Family history of colon cancer     Hyperlipidemia      Hypertension     Sleep apnea     does not wear machine, supposed to wear cpap with oxygen and does not wear it    Type 2 diabetes mellitus       Past Surgical History:   Procedure Laterality Date    BACK SURGERY      CARDIAC CATHETERIZATION Left 04/13/2022    Procedure: Cardiac Catheterization/Vascular Study;  Surgeon: Todd Avendano MD;  Location:  [facility];  Service: Cardiology;  Laterality: Left;    CARDIAC CATHETERIZATION      with stent x1    CARDIAC CATHETERIZATION N/A 9/12/2024    Procedure: Left Heart Cath;  Surgeon: Ruben Adler MD;  Location:  [facility]ATH [facility];  Service: Cardiology;  Laterality: N/A;    COLON RESECTION N/A 12/5/2023    Procedure: COLON RESECTION LAPAROSCOPIC SIGMOID WITH DAVINCI ROBOT, INTRA-OPERATIVE FLEXIBLE SIGMOIDOSCOPY, SPLENIC FLEXURE MOBILIZATION, OPEN UMBILICAL HERNIA REPAIR;  Surgeon: Oma Velasquez MD;  Location:  [facility] OR;  Service: Robotics - DaVinci;  Laterality: N/A;    COLONOSCOPY N/A 10/09/2023    Diverticulosis; One 5mm polyp in ascending colon; One 5mm polyp in transverse colon; One 10mm polyp at 65cm proximal to anus; One 20mm polyp at 65cm proximal to anus-Tattooed; One 20mm polyp at 42cm proximal to anus-Clip (MR conditional) placed-Tattooed; Rule out malignancy-Partially obstructing tumor in recto-sigmoid colon-biopsied-Tattooed; One 10mm polyp in rectum    ELBOW PROCEDURE      KNEE SURGERY      LUMBAR DISC SURGERY        Social History     Socioeconomic History    Marital status:    Tobacco Use    Smoking status: Former     Current packs/day: 1.00     Average packs/day: 1 pack/day for 45.0 years (45.0 ttl pk-yrs)     Types: Cigarettes    Smokeless tobacco: Never   Vaping Use    Vaping status: Some Days    Substances: Nicotine, Flavoring    Devices: Disposable   Substance and Sexual Activity    Alcohol use: Not Currently    Drug use: Yes     Types: Marijuana    Sexual activity: Defer     Family History   Problem  Relation Age of Onset    Esophageal cancer Mother     Heart disease Mother     Colon cancer Father 80    Heart disease Father     No Known Problems Sister     COPD Brother     Alcohol abuse Brother     Colon polyps Neg Hx     Liver cancer Neg Hx     Rectal cancer Neg Hx     Stomach cancer Neg Hx     Liver disease Neg Hx       Allergies   Allergen Reactions    Penicillins Unknown - Low Severity     Pt states happened when child does not know        Objective   Diagnostics: Reviewed    Intake/Output Summary (Last 24 hours) at 10/14/2024 0956  Last data filed at 10/14/2024 0900  Gross per 24 hour   Intake 800 ml   Output 525 ml   Net 275 ml       Current medications patient is presently taking including all prescriptions, over-the-counter, herbals and vitamin/mineral/dietary (nutritional) supplements with reviewed including route, type, dose and frequency and are current per MAR at time of dictation.  Current Facility-Administered Medications   Medication Dose Route Frequency Provider Last Rate Last Admin    acetaminophen (TYLENOL) tablet 650 mg  650 mg Oral Q6H PRN BRITT Stout DO   650 mg at 10/13/24 0851    albuterol (PROVENTIL) nebulizer solution 0.042% 1.25 mg/3mL  1.25 mg Nebulization Q6H PRN Jazmin Garrison APRN        aspirin EC tablet 81 mg  81 mg Oral Daily Jazmin Garrison APRN   81 mg at 10/14/24 0942    [Held by provider] bumetanide (BUMEX) injection 1 mg  1 mg Intravenous Q12H Jazmin Garrison APRN   1 mg at 10/13/24 1225    calcium carbonate (TUMS) chewable tablet 500 mg (200 mg elemental)  2 tablet Oral TID PRN Karen Parekh DO        dextrose (D50W) (25 g/50 mL) IV injection 25 g  25 g Intravenous Q15 Min PRN Jazmin Garrison APRN        dextrose (GLUTOSE) oral gel 15 g  15 g Oral Q15 Min PRN Jazmin Garrison APRN        DULoxetine (CYMBALTA) DR capsule 30 mg  30 mg Oral Daily Jazmin Garrison APRN   30 mg at 10/14/24 0942    Enoxaparin Sodium (LOVENOX) syringe 40 mg  40 mg  "Subcutaneous Q12H Jazmin Garrison APRN   40 mg at 10/14/24 0942    gabapentin (NEURONTIN) capsule 600 mg  600 mg Oral Q12H BRITT Stout DO   600 mg at 10/14/24 0942    glucagon (GLUCAGEN) injection 1 mg  1 mg Intramuscular Q15 Min PRN Jazmin Garrison APRN        insulin glargine (LANTUS, SEMGLEE) injection 14 Units  14 Units Subcutaneous Q12H Jazmin Garrison APRN   14 Units at 10/14/24 0942    Insulin Lispro (humaLOG) injection 2-7 Units  2-7 Units Subcutaneous 4x Daily AC & at Bedtime Jazmin Garrison APRN   2 Units at 10/13/24 2106    metoprolol succinate XL (TOPROL-XL) 24 hr tablet 25 mg  25 mg Oral Q24H Jazmin Garrison APRN   25 mg at 10/14/24 0942    nitroglycerin (NITROSTAT) SL tablet 0.4 mg  0.4 mg Sublingual Q5 Min PRN Jazmin Garrison APRN        pravastatin (PRAVACHOL) tablet 40 mg  40 mg Oral Nightly Jazmin Garrison APRN   40 mg at 10/13/24 2106    QUEtiapine (SEROquel) tablet 25 mg  25 mg Oral Nightly Jazmin Garrison APRN   25 mg at 10/13/24 2107    [Held by provider] sacubitril-valsartan (ENTRESTO) 24-26 MG tablet 1 tablet  1 tablet Oral BID Jazmin Garrison APRN        sodium chloride 0.9 % flush 10 mL  10 mL Intravenous PRN Vijay Leary MD   10 mL at 10/12/24 0731    sodium chloride 0.9 % flush 10 mL  10 mL Intravenous PRN Vijay Leary MD   10 mL at 10/12/24 0731    sodium zirconium cyclosilicate (LOKELMA) packet 10 g  10 g Oral Once Anton Colon APRN            acetaminophen    albuterol    calcium carbonate    dextrose    dextrose    glucagon (human recombinant)    nitroglycerin    sodium chloride    [COMPLETED] Insert Peripheral IV **AND** sodium chloride  Assessment   /50 (BP Location: Right arm, Patient Position: Lying)   Pulse 66   Temp 97 °F (36.1 °C) (Axillary)   Resp 16   Ht 175.3 cm (69\")   Wt 128 kg (283 lb 3.2 oz)   SpO2 97%   BMI 41.82 kg/m²     Physical Exam  Vitals and nursing note reviewed.   Constitutional:       General: He is not " in acute distress.     Appearance: He is ill-appearing.      Interventions: Nasal cannula in place.   HENT:      Head: Normocephalic and atraumatic.   Eyes:      General: Lids are normal.      Extraocular Movements: Extraocular movements intact.   Neck:      Vascular: No JVD.      Trachea: Trachea normal.   Cardiovascular:      Rate and Rhythm: Normal rate.   Pulmonary:      Effort: Pulmonary effort is normal.   Musculoskeletal:      Cervical back: Neck supple.   Skin:     General: Skin is warm and dry.   Neurological:      Mental Status: He is alert and oriented to person, place, and time.   Psychiatric:         Behavior: Behavior is cooperative.     Functional status: Palliative Performance Scale Score: Performance 50% based on the following measures: Ambulation: Mainly sit or lie down, Activity and Evidence of Disease: Unable to do any work, extensive evidence of disease, Self-Care: Considerable assistance required,  Intake: Normal or reduced, LOC: Full or confusion.  Nutritional status: Albumin 4.0. Body mass index is 41.82 kg/m²..  Patient status: Disease state: Controlled with current treatments.    Active Hospital Problems    Diagnosis     **Acute on chronic respiratory failure with hypoxia and hypercapnia     Hyperkalemia     AMA (acute kidney injury)     Acute on chronic HFrEF (heart failure with reduced ejection fraction)     Colon adenocarcinoma     Type 2 myocardial infarction due to heart failure     Pulmonary HTN     Type 2 diabetes mellitus with hyperglycemia, with long-term current use of insulin     Hypertension        Impression/Problem List:  Acute on chronic HFrEF  Acute on chronic respiratory failure with hypoxia and hypercapnia  Acute kidney injury  Colon adenocarcinoma - Declined systemic and radiation treatment   Pulmonary hypertension  Impaired mobility   Pleural effusion, right   Hyperkalemia   Hyperphosphatemia   Type 2 myocardial infarction due to heart failure  Type 2 diabetes  mellitus  Hypertension      Coronary artery disease    Plan / Recommendations     Palliative Care Encounter   Prognosis poor long-term secondary to acute on chronic HFrEF, acute on chronic respiratory failure, AMA, colon adenocarcinoma previously declined systemic and radiation treatment, pulmonary hypertension, and other comorbidities listed above.  Mr. Knox was agreeable to discussion regarding goals of care.   Discussed prognosis and treatment options.  Appeared to have difficulty understanding prognosis, treatment options, and ability to make complex medical decisions at this time.  Details of discussion above.    Spoke with his son/HCS, Aniceto, and his sister, Oxana, via telephone.  Details of discussion above.   Family appear to have good prognostic awareness.   His sister, Oxana, shared she feels her brother is scared to die and this makes it hard for him to make decisions.  Feels hospice would benefit him.   Plans to have video conference with Oxana and Mr. Knox tomorrow around 11:30 AM to discuss goals of care and treatment options further.       Thank you for allowing us to participate in patient's plan of care. Palliative Care Team will continue to follow patient.     Electronically signed by, VIV Portillo, 10/14/24.                 Electronically signed by Yanely Velasquez APRN at 10/14/24 7648

## 2024-10-15 NOTE — PROGRESS NOTES
Assessment tool to be used for patients with existing breathing treatments ordered by hospitalist                               Respiratory Therapist Driven Protocol - RT to Assess and Treat Algorithm    Item 0 Points 1 Point 2 Points 3 Points 4 Points Subtotal   Mental Status Alert, orientated, cooperative Lethargic, follows commands Confused, not following commands Obtunded or Somnolent Comatose 0   Respiratory Pattern Regular RR  8-16 breaths/minute Increased RR  18-25 breaths/minute Dyspnea on exertion, irregular RR  26-30/minute Shortness of breath,  RR 31-35 breaths/minute Accessory muscle use, severe SOB  RR > 35 breath/minute 1   Breath Sounds Clear Decreased unilaterally Decreased bilaterally Basilar crackles Wheezing and/or rhonchi 4   Cough Strong, spontaneous non-productive Strong productive Weak, non-productive Weak productive or weak with rhonchi Absent or may require suctioning 0   Pulmonary Status Nonsmoker or no previous history > 1 year quit < 1 PPD  < 1 year quit >  or = 1 PPD Diagnosed pulmonary disease (severe or chronic) Severe or chronic pulmonary disease with exacerbation 4   Surgical Status None General surgery (non-abdominal or non-thoracic) Lower abdominal Thoracic or upper abdominal Thoracic with pulmonary disease    Chest X-ray Clear Chronic changes Infiltrates, atelectasis or pleural effusion Infiltrates > 1 lobe Diffuse infiltrates and atelectasis and/or effusions 2   Activity level Ambulatory Ambulatory with assistance Non-ambulatory Paraplegic Quadriplegic 1                     Total Score 12   Score    Drug Therapy Frequency  20 or >    Q4 Duoneb & Q3 Albuterol PRN 15 - 19     Q6 Duoneb & Q4 Albuterol PRN 10 - 14    QID Duoneb & Q4 Albuterol PRN 5 - 9    TID Duoneb & Q6 Albuterol PRN 0 - 4    Q4 PRN Duoneb or Q4 PRN Albuterol    Incentive Spirometry - Initial RT instruct    Lung Expansion Therapy (PEP) Bronchopulmonary Hygiene (CPT)   Q4 & PRN - Severe atelectasis,  poor oxygenation Q4 - copious secretions, dyspnea, unable to sleep, mucus plugging   QID - High risk for persistent atelectasis, existence of atelectasis QID & Q4 PRN - Moderate secretion production   TID - At risk for developing atelectasis TID - small amounts of secretions with poor cough   BID - prevention of atelectasis BID - unable to breathe deeply and cough spontaneously   *RT Protocol patients will be re-assessed/re-evaluated every 48 hours.    *Patients who are home nebulizer treatments will be protocoled to no less than their home regimen and will remain     on their home regimen with re-evaluations as needed with changes in patient condition.    RT Comments/Recommendations: QID with Q4 PRN

## 2024-10-15 NOTE — PLAN OF CARE
Problem: Noninvasive Ventilation Acute  Goal: Effective Unassisted Ventilation and Oxygenation  Outcome: Progressing     Problem: Adult Inpatient Plan of Care  Goal: Plan of Care Review  Outcome: Progressing  Flowsheets (Taken 10/15/2024 0512)  Outcome Evaluation: A&OX4. Max assist x2 d/t weakness. VSS. Virgen catheter in place for retention. No c/o pain at this time. Bipap in use. Bed alarm in place. Safety maintained. Will continue to monitor. NSR 63-77 PVCs  Goal: Patient-Specific Goal (Individualized)  Outcome: Progressing  Goal: Absence of Hospital-Acquired Illness or Injury  Outcome: Progressing  Intervention: Identify and Manage Fall Risk  Recent Flowsheet Documentation  Taken 10/15/2024 0500 by Macie Terry RN  Safety Promotion/Fall Prevention: safety round/check completed  Taken 10/15/2024 0200 by Macie Terry RN  Safety Promotion/Fall Prevention: safety round/check completed  Taken 10/15/2024 0000 by Macie Terry RN  Safety Promotion/Fall Prevention: safety round/check completed  Taken 10/14/2024 2100 by Macie Terry RN  Safety Promotion/Fall Prevention: safety round/check completed  Taken 10/14/2024 2000 by Macie Terry RN  Safety Promotion/Fall Prevention: safety round/check completed  Intervention: Prevent Skin Injury  Recent Flowsheet Documentation  Taken 10/14/2024 2000 by Macie Terry RN  Body Position: position changed independently  Skin Protection:   incontinence pads utilized   protective footwear used   silicone foam dressing in place   skin sealant/moisture barrier applied   transparent dressing maintained  Intervention: Prevent and Manage VTE (Venous Thromboembolism) Risk  Recent Flowsheet Documentation  Taken 10/14/2024 2000 by Macie Terry RN  VTE Prevention/Management: (see mar) other (see comments)  Intervention: Prevent Infection  Recent Flowsheet Documentation  Taken 10/14/2024 2000 by Macie Terry RN  Infection Prevention:   single patient room provided   rest/sleep  promoted  Goal: Optimal Comfort and Wellbeing  Outcome: Progressing  Intervention: Provide Person-Centered Care  Recent Flowsheet Documentation  Taken 10/14/2024 2000 by Macie Terry RN  Trust Relationship/Rapport:   care explained   questions answered   questions encouraged  Goal: Readiness for Transition of Care  Outcome: Progressing     Problem: Heart Failure  Goal: Optimal Coping  Outcome: Progressing  Intervention: Support Psychosocial Response  Recent Flowsheet Documentation  Taken 10/14/2024 2000 by Macie Terry RN  Family/Support System Care: support provided  Goal: Optimal Cardiac Output and Blood Flow  Outcome: Progressing  Goal: Stable Heart Rate and Rhythm  Outcome: Progressing  Goal: Fluid and Electrolyte Balance  Outcome: Progressing  Intervention: Monitor and Manage Fluid and Electrolyte Balance  Recent Flowsheet Documentation  Taken 10/14/2024 2000 by Macie Terry RN  Fluid/Electrolyte Management: fluids provided  Goal: Optimal Functional Ability  Outcome: Progressing  Intervention: Optimize Functional Ability  Recent Flowsheet Documentation  Taken 10/14/2024 2000 by Macie Terry RN  Activity Management: activity encouraged  Goal: Improved Oral Intake  Outcome: Progressing  Goal: Effective Oxygenation and Ventilation  Outcome: Progressing  Intervention: Promote Airway Secretion Clearance  Recent Flowsheet Documentation  Taken 10/14/2024 2000 by Macie Terry RN  Activity Management: activity encouraged  Cough And Deep Breathing: done independently per patient  Intervention: Optimize Oxygenation and Ventilation  Recent Flowsheet Documentation  Taken 10/14/2024 2000 by Macie Terry RN  Head of Bed (HOB) Positioning:   HOB elevated   HOB at 30 degrees  Goal: Effective Breathing Pattern During Sleep  Outcome: Progressing  Intervention: Monitor and Manage Obstructive Sleep Apnea  Recent Flowsheet Documentation  Taken 10/14/2024 2000 by Macie Terry RN  Medication Review/Management:  medications reviewed     Problem: Comorbidity Management  Goal: Maintenance of COPD Symptom Control  Outcome: Progressing  Intervention: Maintain COPD (Chronic Obstructive Pulmonary Disease) Symptom Control  Recent Flowsheet Documentation  Taken 10/14/2024 2000 by Macie Terry RN  Medication Review/Management: medications reviewed  Goal: Blood Glucose Level Within Target Range  Outcome: Progressing  Intervention: Monitor and Manage Glycemia  Recent Flowsheet Documentation  Taken 10/14/2024 2000 by Macie Terry RN  Medication Review/Management: medications reviewed  Goal: Maintenance of Heart Failure Symptom Control  Outcome: Progressing  Intervention: Maintain Heart Failure Management  Recent Flowsheet Documentation  Taken 10/14/2024 2000 by Macie Terry RN  Medication Review/Management: medications reviewed  Goal: Blood Pressure in Desired Range  Outcome: Progressing  Intervention: Maintain Blood Pressure Management  Recent Flowsheet Documentation  Taken 10/14/2024 2000 by Macie Terry RN  Medication Review/Management: medications reviewed     Problem: Skin Injury Risk Increased  Goal: Skin Health and Integrity  Outcome: Progressing  Intervention: Optimize Skin Protection  Recent Flowsheet Documentation  Taken 10/14/2024 2000 by Macie Terry RN  Activity Management: activity encouraged  Pressure Reduction Techniques: frequent weight shift encouraged  Head of Bed (HOB) Positioning:   HOB elevated   HOB at 30 degrees  Pressure Reduction Devices:   foam padding utilized   positioning supports utilized  Skin Protection:   incontinence pads utilized   protective footwear used   silicone foam dressing in place   skin sealant/moisture barrier applied   transparent dressing maintained     Problem: Fall Injury Risk  Goal: Absence of Fall and Fall-Related Injury  Outcome: Progressing  Intervention: Identify and Manage Contributors  Recent Flowsheet Documentation  Taken 10/14/2024 2000 by Macie Terry  RN  Medication Review/Management: medications reviewed  Intervention: Promote Injury-Free Environment  Recent Flowsheet Documentation  Taken 10/15/2024 0500 by Macie Terry RN  Safety Promotion/Fall Prevention: safety round/check completed  Taken 10/15/2024 0200 by Macie Terry RN  Safety Promotion/Fall Prevention: safety round/check completed  Taken 10/15/2024 0000 by Macie Terry RN  Safety Promotion/Fall Prevention: safety round/check completed  Taken 10/14/2024 2100 by Macie Terry RN  Safety Promotion/Fall Prevention: safety round/check completed  Taken 10/14/2024 2000 by Macie Terry RN  Safety Promotion/Fall Prevention: safety round/check completed   Goal Outcome Evaluation:              Outcome Evaluation: A&OX4. Max assist x2 d/t weakness. VSS. Virgen catheter in place for retention. No c/o pain at this time. Bipap in use. Bed alarm in place. Safety maintained. Will continue to monitor. NSR 63-77 PVCs

## 2024-10-15 NOTE — PROGRESS NOTES
Nephrology ([facility]) Progress Note      Patient:  Tucker Knox  YOB: 1960  Date of Service: 10/15/2024  MRN: [id number]   Acct: [id number]   Primary Care Physician: Kt Alfredo MD  Advance Directive:   Code Status and Medical Interventions: No CPR (Do Not Attempt to Resuscitate); Comfort Measures   Ordered at: 10/15/24 1155     Code Status (Patient has no pulse and is not breathing):    No CPR (Do Not Attempt to Resuscitate)     Medical Interventions (Patient has pulse or is breathing):    Comfort Measures     Admit Date: 10/12/2024       Hospital Day: 3  Referring Provider: No ref. provider found      Patient personally seen and examined.  Complete chart including Consults, Notes, Operative Reports, Labs, Cardiology, and Radiology studies reviewed as able.        Subjective:  Tucker Knox is a 64 y.o. male for whom we were consulted for evaluation and treatment of acute kidney injury. No prior known renal issues. History of systolic/diastolic CHF, COPD, coronary artery disease, hypertension, type 2 diabetes.  Frequent admission for CHF exacerbations. Most recently was discharged from hospital on 10/04. Returned to ER on 10/12 with dyspnea, abdominal tightness. Creatinine 1.56 on admission. Treated with Bumex and renal function worsened. Nephrology consulted 10/13. Diuretics were held.    Today is awake and alert. Has been on BiPAP. Urine output was adequate. He was drinking orange juice.     Allergies:  Penicillins    Home Meds:  Medications Prior to Admission   Medication Sig Dispense Refill Last Dose/Taking    acetaminophen (TYLENOL) 325 MG tablet Take 2 tablets by mouth Every 6 (Six) Hours As Needed for Mild Pain. 60 tablet 0 Past Week    aspirin 81 MG EC tablet Take 1 tablet by mouth Daily. 100 tablet 2 10/11/2024 Morning    dapagliflozin Propanediol (Farxiga) 10 MG tablet Take 10 mg by mouth Daily. 30 tablet 0 Taking    DULoxetine  (CYMBALTA) 30 MG capsule Take 1 capsule by mouth Daily. 30 capsule 0 10/11/2024 Morning    furosemide (LASIX) 40 MG tablet Take 0.5 tablets by mouth Daily. 30 tablet 0 Taking    gabapentin (NEURONTIN) 600 MG tablet Take 1 tablet by mouth 2 (Two) Times a Day. Patient states he takes 800 mg bid 60 tablet 0 10/11/2024    insulin detemir (LEVEMIR) 100 UNIT/ML injection Inject 14 Units under the skin into the appropriate area as directed 2 (Two) Times a Day.   Taking    metFORMIN (GLUCOPHAGE) 1000 MG tablet Take 1 tablet by mouth 2 (Two) Times a Day With Meals.   Taking    metoprolol succinate XL (TOPROL-XL) 25 MG 24 hr tablet Take 1 tablet by mouth Daily. 30 tablet 0 10/11/2024    nicotine (NICODERM CQ) 21 MG/24HR patch Place 1 patch on the skin as directed by provider Daily. 14 patch 0 Taking    polyethylene glycol (MIRALAX) 17 GM/SCOOP powder Take 17 g by mouth Daily.   Taking    pravastatin (PRAVACHOL) 40 MG tablet Take 1 tablet by mouth Every Night. 30 tablet 0 10/11/2024    QUEtiapine (SEROquel) 25 MG tablet Take 1 tablet by mouth Every Night. 30 tablet 0 Taking    sacubitril-valsartan (Entresto) 24-26 MG tablet Take 1 tablet by mouth 2 (Two) Times a Day. 60 tablet 0 Taking    Tradjenta 5 MG tablet tablet Take 1 tablet by mouth Daily.   Taking    albuterol (PROVENTIL) (2.5 MG/3ML) 0.083% nebulizer solution Take 2.5 mg by nebulization Every 6 (Six) Hours As Needed for Wheezing.       albuterol sulfate  (90 Base) MCG/ACT inhaler Inhale 2 puffs Every 4 (Four) Hours As Needed for Wheezing. 8 g 0     nitroglycerin (NITROSTAT) 0.4 MG SL tablet Place 1 tablet under the tongue Every 5 (Five) Minutes As Needed for Chest Pain. Take no more than 3 doses in 15 minutes.   Unknown       Medicines:  Current Facility-Administered Medications   Medication Dose Route Frequency Provider Last Rate Last Admin    acetaminophen (TYLENOL) tablet 650 mg  650 mg Oral Q6H PRN BRITT Stout DO   650 mg at 10/15/24 1150     aspirin EC tablet 81 mg  81 mg Oral Daily Jazmin Garrison APRN   81 mg at 10/15/24 0929    atropine 1 % ophthalmic solution 2 drop  2 drop Sublingual BID PRN Yanely Velasquez APRN        bisacodyl (DULCOLAX) suppository 10 mg  10 mg Rectal Daily PRN Yanely Velasquez APRN        [Held by provider] bumetanide (BUMEX) injection 1 mg  1 mg Intravenous Q12H Jazmin Garrison APRN   1 mg at 10/13/24 1225    calcium carbonate (TUMS) chewable tablet 500 mg (200 mg elemental)  2 tablet Oral TID PRN Karen Parekh DO        dextrose (D50W) (25 g/50 mL) IV injection 25 g  25 g Intravenous Q15 Min PRN Jazmin Garrison APRN        dextrose (GLUTOSE) oral gel 15 g  15 g Oral Q15 Min PRN Jazmin Garrison APRN        diphenoxylate-atropine (LOMOTIL) 2.5-0.025 MG per tablet 1 tablet  1 tablet Oral Q2H PRN Yanely Velasquez APRN        DULoxetine (CYMBALTA) DR capsule 30 mg  30 mg Oral Daily Jazmin Garrison APRN   30 mg at 10/15/24 0930    furosemide (LASIX) injection 20 mg  20 mg Intravenous Q6H PRN Yanely Velasquez APRN        Or    furosemide (LASIX) injection 20 mg  20 mg Subcutaneous Q6H PRN Yanely Vleasquez APRN        gabapentin (NEURONTIN) capsule 600 mg  600 mg Oral Q12H BRITT Stout DO   600 mg at 10/15/24 0929    glucagon (GLUCAGEN) injection 1 mg  1 mg Intramuscular Q15 Min PRN Jazmin Garrison APRN        HYDROcodone-acetaminophen (NORCO) 5-325 MG per tablet 1 tablet  1 tablet Oral Q4H PRN Yanely Velasquez APRN        Or    HYDROcodone-acetaminophen (NORCO) 7.5-325 MG per tablet 1 tablet  1 tablet Oral Q4H PRN Yanely Velasquez APRN        Or    HYDROcodone-acetaminophen (NORCO)  MG per tablet 1 tablet  1 tablet Oral Q4H PRN Ron, Yanely K, APRN        hydrocortisone-bacitracin-zinc oxide-nystatin (MAGIC BARRIER) ointment 1 Application  1 Application Topical 4x Daily Akanksha López, APRN   1 Application at 10/15/24 1150    insulin glargine (LANTUS, SEMGLEE) injection 5  Units  5 Units Subcutaneous Q12H Patricia Arthur DO        Insulin Lispro (humaLOG) injection 2-7 Units  2-7 Units Subcutaneous 4x Daily AC & at Bedtime Jazmin Garrison APRN   2 Units at 10/15/24 1150    ipratropium-albuterol (DUO-NEB) nebulizer solution 3 mL  3 mL Nebulization Q4H PRN Yanely Velasquez APRN        LORazepam (ATIVAN) tablet 0.5 mg  0.5 mg Oral Q1H PRN Yanely Velasquez APRN        Or    LORazepam (ATIVAN) injection 0.5 mg  0.5 mg Intravenous Q1H PRN Yanely Velasquez APRN        Or    LORazepam (ATIVAN) 2 MG/ML concentrated solution 0.5 mg  0.5 mg Sublingual Q1H PRN Yanely Velasquez APRN        LORazepam (ATIVAN) tablet 1 mg  1 mg Oral Q1H PRN Yanely Velasquez APRN        Or    LORazepam (ATIVAN) injection 1 mg  1 mg Intravenous Q1H PRN Yanely Velasquez APRN        Or    LORazepam (ATIVAN) 2 MG/ML concentrated solution 1 mg  1 mg Sublingual Q1H PRN Yanely Velasquez APRN        LORazepam (ATIVAN) tablet 2 mg  2 mg Oral Q1H PRN Yanely Velasquez APRN        Or    LORazepam (ATIVAN) injection 2 mg  2 mg Intravenous Q1H PRN Yanely Velasquez APRN        Or    LORazepam (ATIVAN) 2 MG/ML concentrated solution 2 mg  2 mg Sublingual Q1H PRN Yanely Velasquez APRN        metoprolol succinate XL (TOPROL-XL) 24 hr tablet 25 mg  25 mg Oral Q24H Jazmin Garrison APRN   25 mg at 10/15/24 0930    nitroglycerin (NITROSTAT) SL tablet 0.4 mg  0.4 mg Sublingual Q5 Min PRN Jazmin Garrison APRN        Polyvinyl Alcohol-Povidone PF (ARTIFICIAL TEARS) 1.4-0.6 % ophthalmic solution 1 drop  1 drop Both Eyes Q30 Min PRN Yanely Velasquez APRN        pravastatin (PRAVACHOL) tablet 40 mg  40 mg Oral Nightly Jazmin Garrison APRN   40 mg at 10/14/24 2139    QUEtiapine (SEROquel) tablet 25 mg  25 mg Oral Nightly Jazmin APRN   25 mg at 10/14/24 2137    [Held by provider] sacubitril-valsartan (ENTRESTO) 24-26 MG tablet 1 tablet  1 tablet Oral BID Jazmin Garrison APRN        scopolamine patch  1 mg/72 hr  1 patch Transdermal Q72H PRN Yanely Velasquez, VIV        sodium chloride 0.9 % flush 10 mL  10 mL Intravenous PRN Vijay Leary MD   10 mL at 10/12/24 0731    sodium chloride 0.9 % flush 10 mL  10 mL Intravenous PRN Vijay Leary MD   10 mL at 10/12/24 0731       Past Medical History:  Past Medical History:   Diagnosis Date    Cancer     CHF (congestive heart failure)     COPD (chronic obstructive pulmonary disease)     Coronary artery disease     stent x 1    Family history of colon cancer     Hyperlipidemia     Hypertension     Sleep apnea     does not wear machine, supposed to wear cpap with oxygen and does not wear it    Type 2 diabetes mellitus        Past Surgical History:  Past Surgical History:   Procedure Laterality Date    BACK SURGERY      CARDIAC CATHETERIZATION Left 04/13/2022    Procedure: Cardiac Catheterization/Vascular Study;  Surgeon: Todd Avendano MD;  Location:  [facility] CATH [facility]AS[facility];  Service: Cardiology;  Laterality: Left;    CARDIAC CATHETERIZATION      with stent x1    CARDIAC CATHETERIZATION N/A 9/12/2024    Procedure: Left Heart Cath;  Surgeon: Ruben Adler MD;  Location:  [facility] CATH [facility];  Service: Cardiology;  Laterality: N/A;    COLON RESECTION N/A 12/5/2023    Procedure: COLON RESECTION LAPAROSCOPIC SIGMOID WITH DAVINCI ROBOT, INTRA-OPERATIVE FLEXIBLE SIGMOIDOSCOPY, SPLENIC FLEXURE MOBILIZATION, OPEN UMBILICAL HERNIA REPAIR;  Surgeon: Oma Velasquez MD;  Location: [facility] OR;  Service: Robotics - DaVinci;  Laterality: N/A;    COLONOSCOPY N/A 10/09/2023    Diverticulosis; One 5mm polyp in ascending colon; One 5mm polyp in transverse colon; One 10mm polyp at 65cm proximal to anus; One 20mm polyp at 65cm proximal to anus-Tattooed; One 20mm polyp at 42cm proximal to anus-Clip (MR conditional) placed-Tattooed; Rule out malignancy-Partially obstructing tumor in recto-sigmoid colon-biopsied-Tattooed; One 10mm polyp in rectum    ELBOW PROCEDURE       KNEE SURGERY      LUMBAR DISC SURGERY         Family History  Family History   Problem Relation Age of Onset    Esophageal cancer Mother     Heart disease Mother     Colon cancer Father 80    Heart disease Father     No Known Problems Sister     COPD Brother     Alcohol abuse Brother     Colon polyps Neg Hx     Liver cancer Neg Hx     Rectal cancer Neg Hx     Stomach cancer Neg Hx     Liver disease Neg Hx        Social History  Social History     Socioeconomic History    Marital status:    Tobacco Use    Smoking status: Former     Current packs/day: 1.00     Average packs/day: 1 pack/day for 45.0 years (45.0 ttl pk-yrs)     Types: Cigarettes    Smokeless tobacco: Never   Vaping Use    Vaping status: Some Days    Substances: Nicotine, Flavoring    Devices: Disposable   Substance and Sexual Activity    Alcohol use: Not Currently    Drug use: Yes     Types: Marijuana    Sexual activity: Defer       Review of Systems:  History obtained from chart review and the patient  General ROS: No fever or chills  Respiratory ROS: No cough, shortness of breath, wheezing  Cardiovascular ROS: positive for - dyspnea on exertion  No chest pain or palpitations  Gastrointestinal ROS: No abdominal pain or melena  Genito-Urinary ROS: No dysuria or hematuria  Psych ROS: No anxiety and depression  14 point ROS reviewed with the patient and negative except as noted above and in the HPI unless unable to obtain.    Objective:  Patient Vitals for the past 24 hrs:   BP Temp Temp src Pulse Resp SpO2 Weight   10/15/24 1116 120/71 97.4 °F (36.3 °C) Oral 70 18 91 % --   10/15/24 1005 -- -- -- 78 18 94 % --   10/15/24 0700 112/65 97.1 °F (36.2 °C) Oral 77 18 94 % --   10/15/24 0655 -- -- -- 77 18 94 % --   10/15/24 0645 -- -- -- 80 18 94 % --   10/15/24 0624 106/70 97.5 °F (36.4 °C) Oral 105 20 93 % 132 kg (291 lb 10.7 oz)   10/14/24 1945 102/78 97.3 °F (36.3 °C) Oral 78 18 91 % --   10/14/24 1545 110/79 -- Oral 73 20 93 % --   10/14/24  1421 -- -- -- 74 -- -- --   10/14/24 1419 (!) 88/42 -- -- -- -- -- --       Intake/Output Summary (Last 24 hours) at 10/15/2024 1354  Last data filed at 10/15/2024 0624  Gross per 24 hour   Intake 1080 ml   Output 850 ml   Net 230 ml     General: awake/alert   Chest:  clear to auscultation bilaterally without respiratory distress  CVS: regular rate and rhythm  Abdominal: soft, nontender, positive bowel sounds  Extremities:  trace lower ext edema  Skin: warm and dry without rash      Labs:  Results from last 7 days   Lab Units 10/14/24  0252 10/13/24  0424 10/12/24  0532   WBC 10*3/mm3 6.68 8.04 9.29   HEMOGLOBIN g/dL 10.3* 11.1* 11.5*   HEMATOCRIT % 36.7* 38.5 41.0   PLATELETS 10*3/mm3 239 251 286         Results from last 7 days   Lab Units 10/15/24  0417 10/14/24  0252 10/13/24  1649 10/12/24  0759 10/12/24  0532   SODIUM mmol/L 135* 135* 137   < > 142   SODIUM, ARTERIAL   --   --   --    < >  --    POTASSIUM mmol/L 5.6* 5.6* 5.2   < > 5.4*   CHLORIDE mmol/L 95* 94* 94*   < > 98   CO2 mmol/L 34.0* 32.0* 35.0*   < > 35.0*   BUN mg/dL 73* 69* 61*   < > 45*   CREATININE mg/dL 2.43* 2.55* 2.42*   < > 1.56*   CALCIUM mg/dL 8.3* 8.3* 8.5*   < > 9.0   EGFR mL/min/1.73 29.1* 27.5* 29.3*   < > 49.6*   BILIRUBIN mg/dL 0.3  --   --   --  0.6   ALK PHOS U/L 90  --   --   --  103   ALT (SGPT) U/L 22  --   --   --  13   AST (SGOT) U/L 15  --   --   --  14   GLUCOSE mg/dL 57* 99 118*   < > 202*    < > = values in this interval not displayed.       Radiology:   Imaging Results (Last 72 Hours)       Procedure Component Value Units Date/Time    US Renal Bilateral [[id number]] Collected: 10/15/24 1216     Updated: 10/15/24 1223    Narrative:      US RENAL BILATERAL- 10/14/2024 1:54 PM     HISTORY: ACUTE KIDNEY INJURY     COMPARISON: None available.       TECHNIQUE: Multiple longitudinal and transverse real-time sonographic  images of the kidneys and urinary bladder are obtained. Report and  images stored per institutional and state  "regulations.           FINDINGS:     Visualized proximal abdominal aorta and IVC are unremarkable.        RIGHT KIDNEY: The right kidney measures 9.0 cm in length. Renal cortex  is unremarkable. There is no hydronephrosis. There is moderate ascites..     LEFT KIDNEY: Left kidney measures 9.9 cm in length. Renal cortex is  unremarkable. There is no left hydronephrosis.     PELVIS: Urinary bladder is grossly unremarkable.          Impression:         1. Unremarkable kidneys with no hydronephrosis.  2. Moderate ascites.           This report was signed and finalized on 10/15/2024 12:20 PM by Elvis Velasquez.       XR Chest 1 View [[id number]] Collected: 10/14/24 1749     Updated: 10/14/24 1754    Narrative:      EXAMINATION:  XR CHEST 1 VW-  10/14/2024 4:12 PM     HISTORY: Fluid; J96.91-Respiratory failure, unspecified with hypoxia;  J96.92-Respiratory failure, unspecified with hypercapnia; W01-Zyzejks  effusion, not elsewhere classified; J96.21-Acute and chronic respiratory  failure with hypoxia; J96.22-Acute and chronic respiratory failure with  hypercapnia; I50.9-Heart failure, unspecified; J44.1-Chronic obstructive  pulmonary disease with (acute) exacerbation.     COMPARISON: 10/12/2024.     TECHNIQUE: Single view AP image.     FINDINGS: There is cardiomegaly. There is small to moderate pleural  effusion on the right. There is opacification of the mid to lower lung  zone on the right. The left lung is clear. There is some mediastinal  fullness.          Impression:      1. Cardiomegaly.  2. Small to moderate right pleural effusion. Right lung opacification  may be related to atelectasis or pneumonia.  3. Mediastinal fullness could be vascular. Mediastinal lymphadenopathy  is not excluded. Chest CT on 9/9/2024 did not demonstrate enlarged  mediastinal lymph nodes.           This report was signed and finalized on 10/14/2024 5:51 PM by Dr. Jhoan Carpenter MD.               Culture:  No results found for: \"BLOODCX\", " "\"URINECX\", \"WOUNDCX\", \"MRSACX\", \"RESPCX\", \"STOOLCX\"      Assessment    Acute kidney injury  Acute on chronic congestive heart failure  Type 2 diabetes  Hypertension  Hyperkalemia  COPD  Anemia     Plan:  Lokelma, and low K diet  Continue to hold diuretics  Monitor labs      Hermann Jones MD  10/15/2024  13:54 CDT    "

## 2024-10-15 NOTE — PLAN OF CARE
Goal Outcome Evaluation:  Plan of Care Reviewed With: patient        Progress: declining  Outcome Evaluation: Tests ordered and completed. Awaiting results. Virgen catheter placed for retention. CXR pending. Hypotension reported to MD.

## 2024-10-15 NOTE — PLAN OF CARE
Problem: Noninvasive Ventilation Acute  Goal: Effective Unassisted Ventilation and Oxygenation  Outcome: Progressing     Problem: Adult Inpatient Plan of Care  Goal: Plan of Care Review  Outcome: Progressing  Flowsheets (Taken 10/15/2024 1448)  Outcome Evaluation: A&Ox4. pt still on oxygen. assist x 1-2 to BSC. denise cath in place- cath care done. code status changed to comfort. d/c'd CT chest today. continue to monitor pt.  Plan of Care Reviewed With: patient  Goal: Patient-Specific Goal (Individualized)  Outcome: Progressing  Goal: Absence of Hospital-Acquired Illness or Injury  Outcome: Progressing  Intervention: Identify and Manage Fall Risk  Recent Flowsheet Documentation  Taken 10/15/2024 1448 by Karina Hidalgo, RN  Safety Promotion/Fall Prevention:   activity supervised   assistive device/personal items within reach   clutter free environment maintained   fall prevention program maintained   nonskid shoes/slippers when out of bed   room organization consistent   safety round/check completed  Taken 10/15/2024 1412 by Karina Hidalgo, RN  Safety Promotion/Fall Prevention:   activity supervised   assistive device/personal items within reach   clutter free environment maintained   fall prevention program maintained   nonskid shoes/slippers when out of bed   room organization consistent   safety round/check completed  Taken 10/15/2024 1300 by Karina Hidalgo, RN  Safety Promotion/Fall Prevention:   activity supervised   assistive device/personal items within reach   clutter free environment maintained   fall prevention program maintained   nonskid shoes/slippers when out of bed   room organization consistent   safety round/check completed  Taken 10/15/2024 1200 by Karina Hidalgo, RN  Safety Promotion/Fall Prevention:   activity supervised   assistive device/personal items within reach   clutter free environment maintained   fall prevention program maintained   nonskid shoes/slippers when out of bed    room organization consistent   safety round/check completed  Taken 10/15/2024 1100 by Karina Hidalgo RN  Safety Promotion/Fall Prevention:   activity supervised   assistive device/personal items within reach   clutter free environment maintained   fall prevention program maintained   nonskid shoes/slippers when out of bed   room organization consistent   safety round/check completed  Taken 10/15/2024 1005 by Karina Hidalgo, RN  Safety Promotion/Fall Prevention:   activity supervised   assistive device/personal items within reach   clutter free environment maintained   fall prevention program maintained   nonskid shoes/slippers when out of bed   room organization consistent   safety round/check completed  Taken 10/15/2024 0900 by Karina Hidalgo, RN  Safety Promotion/Fall Prevention:   activity supervised   assistive device/personal items within reach   clutter free environment maintained   fall prevention program maintained   nonskid shoes/slippers when out of bed   room organization consistent   safety round/check completed  Taken 10/15/2024 0800 by Karina Hidalgo RN  Safety Promotion/Fall Prevention:   activity supervised   assistive device/personal items within reach   clutter free environment maintained   fall prevention program maintained   nonskid shoes/slippers when out of bed   room organization consistent   safety round/check completed  Goal: Optimal Comfort and Wellbeing  Outcome: Progressing  Goal: Readiness for Transition of Care  Outcome: Progressing   Goal Outcome Evaluation:  Plan of Care Reviewed With: patient           Outcome Evaluation: A&Ox4. pt still on oxygen. assist x 1-2 to BSC. denise cath in place- cath care done. code status changed to comfort. d/c'd CT chest today. continue to monitor pt.

## 2024-10-15 NOTE — DISCHARGE PLACEMENT REQUEST
"Farhana Norton (64 y.o. Male)       Date of Birth   1960    Social Security Number       Address   [address] [place], [place] [address]    Home Phone   [phone]    MRN   [id number]       Jainism   Other    Marital Status                               Admission Date   10/12/24    Admission Type   Emergency    Admitting Provider   Patricia Arthur DO    Attending Provider   Patricia Arthur DO    Department, Room/Bed   [facility] 4B, 449/1       Discharge Date       Discharge Disposition       Discharge Destination                                 Attending Provider: Patricia Arthur DO    Allergies: Penicillins    Isolation: None   Infection: None   Code Status: No CPR    Ht: 175.3 cm (69\")   Wt: 132 kg (291 lb 10.7 oz)    Admission Cmt: None   Principal Problem: Acute on chronic respiratory failure with hypoxia and hypercapnia [J96.21,J96.22]                   Active Insurance as of 10/12/2024       Primary Coverage       Payor Plan Insurance Group Employer/Plan Group    ANTHEM MEDICARE REPLACEMENT ANTHEM MEDICARE ADVANTAGE [id number]       Payor Plan Address Payor Plan Phone Number Payor Plan Fax Number Effective Dates    [address] [phone]  2/1/2024 - None Entered    [facility] [id number]         Subscriber Name Subscriber Birth Date Member ID       FARHANA NORTON 1960 [id number]                     Emergency Contacts        (Rel.) Home Phone Work Phone Mobile Phone    alycia norton (Son) [phone] -- --    jose deng (Sister) [phone] -- --                 History & Physical        BRITT Stout DO at 10/12/24 1120              [facility] Medicine Services  HISTORY AND PHYSICAL    Date of Admission: 10/12/2024  Primary Care Physician: Kt Alfredo MD    Subjective   Primary Historian: Patient/EMS    Chief Complaint: Shortness of breath/AMS    History of Present " Illness  Tucker Knox is a 64-year-old male with a past medical history of hypertension, type 2 diabetes mellitus, COPD, hyperlipidemia, CHF most recent echocardiogram in/10/2024 revealed an EF of 31-35% with moderately decreased left ventricular systolic function and left ventricular diastolic consistent with grade 2 new pseudonormalization, coronary artery disease, JOSE with noncompliance, colon cancer and significant medical noncompliance history.  Patient presented to [facility] emergency department today per EMS from nursing facility, they were called due to patient's shortness of breath, uncharacteristic aggressive behavior and confusion.  En route to the hospital paramedics gave magnesium 2 g, Solu-Medrol 125,  and albuterol x 5.  Initial ABG revealed hypercapnic respiratory failure pH of 7.26, pCO2 of 78.  Patient was placed on BiPAP, chest x-ray revealed congestive heart failure and worsening right sided pleural effusion.  He received 20 mg of IV Lasix and additional nebulizer treatments.  Was monitored for several hours repeat ABGs revealed compensation with pCO2 of 59.7, pO2 of 70.2 and a bicarb of 33.9.  Upon assessment patient is on BiPAP however is now alert and oriented and able to participate in assessment and history.  Patient states he has been taking all his medications now that he is at the nursing facility, patient states he has had increasing shortness of breath, abdominal tightness, with no changes in urinary status.  Additionally has extremely alessio low perfused legs with Doppler pulses present without complaints of pain.    Initial troponin of 143 subsequent of 130 with a delta of -3, which is improved from his last admission on 9/28/2024 with a troponin of 548.  BNP of 14,625 which is improved from his discharge BNP of 18,381.  AMA stage I with baseline creatinine of 0.94, today 1.56.    Patient and his sister were made aware that he will remain on BiPAP until the a.m. when ABGs can  be redone, patient may remove and be placed on O2 for eating.  Due to patient's AMA and lack of sniffing and improvement with Lasix in the past patient will be given 1 mg of Bumex every 12, bladder scan to evaluate any urinary retention, and will increase to Bumex gtt. if needed.  Has had thoracentesis in the past for pleural effusions, at this time patient does not require, will continue to follow closely.  Patient and sisters questions were answered to the best my ability and they are in agreement for continued evaluation and treatment.    Patient was discharged on 10/4/2024 after similar episode, extensive discussion with palliative care regarding hospice, patient was unable to make decision at that time.  Discussed at length with patient and sister today who would like to proceed with additional discussions on Monday with palliative care when sister can be back in person from [place].    Review of Systems   Constitutional:  Positive for activity change and fatigue.   Respiratory:  Positive for shortness of breath.    Cardiovascular:  Positive for leg swelling. Negative for chest pain.   Gastrointestinal:  Positive for diarrhea.   Neurological:  Positive for weakness.      Otherwise complete ROS reviewed and negative except as mentioned in the HPI.    Past Medical History:   Past Medical History:   Diagnosis Date    Cancer     CHF (congestive heart failure)     COPD (chronic obstructive pulmonary disease)     Coronary artery disease     stent x 1    Family history of colon cancer     Hyperlipidemia     Hypertension     Sleep apnea     does not wear machine, supposed to wear cpap with oxygen and does not wear it    Type 2 diabetes mellitus      Past Surgical History:  Past Surgical History:   Procedure Laterality Date    BACK SURGERY      CARDIAC CATHETERIZATION Left 04/13/2022    Procedure: Cardiac Catheterization/Vascular Study;  Surgeon: Todd Avendano MD;  Location:  [facility] CATH INVASIVE LOCATION;  Service:  Cardiology;  Laterality: Left;    CARDIAC CATHETERIZATION      with stent x1    CARDIAC CATHETERIZATION N/A 9/12/2024    Procedure: Left Heart Cath;  Surgeon: Ruben Adler MD;  Location:  [facility] CATH INVASIVE LOCATION;  Service: Cardiology;  Laterality: N/A;    COLON RESECTION N/A 12/5/2023    Procedure: COLON RESECTION LAPAROSCOPIC SIGMOID WITH DAVINCI ROBOT, INTRA-OPERATIVE FLEXIBLE SIGMOIDOSCOPY, SPLENIC FLEXURE MOBILIZATION, OPEN UMBILICAL HERNIA REPAIR;  Surgeon: Oma Velasquez MD;  Location:  [facility] OR;  Service: Robotics - DaVinci;  Laterality: N/A;    COLONOSCOPY N/A 10/09/2023    Diverticulosis; One 5mm polyp in ascending colon; One 5mm polyp in transverse colon; One 10mm polyp at 65cm proximal to anus; One 20mm polyp at 65cm proximal to anus-Tattooed; One 20mm polyp at 42cm proximal to anus-Clip (MR conditional) placed-Tattooed; Rule out malignancy-Partially obstructing tumor in recto-sigmoid colon-biopsied-Tattooed; One 10mm polyp in rectum    ELBOW PROCEDURE      KNEE SURGERY      LUMBAR DISC SURGERY       Social History:  reports that he has quit smoking. His smoking use included cigarettes. He has a 45 pack-year smoking history. He has never used smokeless tobacco. He reports that he does not currently use alcohol. He reports current drug use. Drug: Marijuana.    Family History: family history includes Alcohol abuse in his brother; COPD in his brother; Colon cancer (age of onset: 80) in his father; Esophageal cancer in his mother; Heart disease in his father and mother; No Known Problems in his sister.       Allergies:  Allergies   Allergen Reactions    Penicillins Unknown - Low Severity     Pt states happened when child does not know        Medications:  Prior to Admission medications    Medication Sig Start Date End Date Taking? Authorizing Provider   acetaminophen (TYLENOL) 325 MG tablet Take 2 tablets by mouth Every 6 (Six) Hours As Needed for Mild Pain. 10/4/24   Sea Joiner,  MD   albuterol sulfate  (90 Base) MCG/ACT inhaler Inhale 2 puffs Every 4 (Four) Hours As Needed for Wheezing. 9/18/24   Zoey Schwartz MD   aspirin 81 MG EC tablet Take 1 tablet by mouth Daily. 12/27/21   David Cadena DO   dapagliflozin Propanediol (Farxiga) 10 MG tablet Take 10 mg by mouth Daily. 9/18/24   Zoey Schwartz MD   DULoxetine (CYMBALTA) 30 MG capsule Take 1 capsule by mouth Daily. 9/18/24   Zoey Schwartz MD   furosemide (LASIX) 40 MG tablet Take 0.5 tablets by mouth Daily. 10/4/24   Sea Joiner MD   gabapentin (NEURONTIN) 600 MG tablet Take 1 tablet by mouth 2 (Two) Times a Day. Patient states he takes 800 mg bid 9/18/24   Zoey Schwartz MD   insulin detemir (LEVEMIR) 100 UNIT/ML injection Inject 14 Units under the skin into the appropriate area as directed 2 (Two) Times a Day.    ProviderAmelia MD   metFORMIN (GLUCOPHAGE) 1000 MG tablet Take 1 tablet by mouth 2 (Two) Times a Day With Meals.    ProviderAmelia MD   metoprolol succinate XL (TOPROL-XL) 25 MG 24 hr tablet Take 1 tablet by mouth Daily. 9/18/24   Zoey Schwartz MD   nicotine (NICODERM CQ) 21 MG/24HR patch Place 1 patch on the skin as directed by provider Daily. 10/5/24   Sea Joiner MD   nitroglycerin (NITROSTAT) 0.4 MG SL tablet Place 1 tablet under the tongue Every 5 (Five) Minutes As Needed for Chest Pain. Take no more than 3 doses in 15 minutes.    Amelia Pavon MD   polyethylene glycol (MIRALAX) 17 GM/SCOOP powder Take 17 g by mouth Daily.    ProviderAmelia MD   pravastatin (PRAVACHOL) 40 MG tablet Take 1 tablet by mouth Every Night. 9/18/24   Zoey Schwartz MD   QUEtiapine (SEROquel) 25 MG tablet Take 1 tablet by mouth Every Night. 10/4/24   Sea Joiner MD   sacubitril-valsartan (Entresto) 24-26 MG tablet Take 1 tablet by mouth 2 (Two) Times a Day. 9/18/24   Zoey Schwartz MD   Tradjenta 5 MG tablet tablet Take 1 tablet  "by mouth Daily. 8/15/23   Provider, MD Amelia     I have utilized all available immediate resources to obtain, update, or review the patient's current medications (including all prescriptions, over-the-counter products, herbals, cannabis/cannabidiol products, and vitamin/mineral/dietary (nutritional) supplements).    Objective     Vital Signs: /96 (BP Location: Left arm, Patient Position: Lying)   Pulse 94   Temp 97.1 °F (36.2 °C) (Oral)   Resp 22   Ht 175.3 cm (69\")   Wt 125 kg (276 lb 1.6 oz)   SpO2 97%   BMI 40.77 kg/m²   Physical Exam  Vitals and nursing note reviewed.   Constitutional:       General: He is not in acute distress.     Appearance: He is morbidly obese. He is ill-appearing.      Comments: BiPAP in place   HENT:      Head: Normocephalic and atraumatic.      Nose: Nose normal. No congestion or rhinorrhea.      Mouth/Throat:      Mouth: Mucous membranes are moist.      Pharynx: Oropharynx is clear.   Eyes:      Pupils: Pupils are equal, round, and reactive to light.   Cardiovascular:      Rate and Rhythm: Normal rate.      Pulses: Normal pulses.           Dorsalis pedis pulses are detected w/ Doppler on the right side and detected w/ Doppler on the left side.        Posterior tibial pulses are detected w/ Doppler on the right side and detected w/ Doppler on the left side.      Heart sounds: Normal heart sounds.   Pulmonary:      Effort: Accessory muscle usage present. No tachypnea or respiratory distress.      Breath sounds: Examination of the right-upper field reveals decreased breath sounds. Examination of the left-upper field reveals decreased breath sounds. Examination of the right-middle field reveals rhonchi. Examination of the left-middle field reveals rhonchi. Examination of the right-lower field reveals rhonchi. Examination of the left-lower field reveals rhonchi. Decreased breath sounds present.   Abdominal:      General: Abdomen is protuberant. Bowel sounds are normal. " There is distension.      Tenderness: There is no abdominal tenderness.   Genitourinary:     Comments: Voiding per urinal  Musculoskeletal:      Cervical back: Normal range of motion and neck supple.      Right lower leg: Edema present.      Left lower leg: Edema present.      Comments: Generalized weakness   Skin:     General: Skin is warm and cool.      Capillary Refill: Capillary refill takes less than 2 seconds.      Coloration: Skin is pale.      Comments: Extremely ready BLE   Neurological:      Mental Status: He is alert and oriented to person, place, and time.   Psychiatric:         Attention and Perception: Attention normal.         Mood and Affect: Mood normal.         Speech: Speech is delayed.         Behavior: Behavior is cooperative.         Cognition and Memory: Cognition is impaired. Memory is impaired.        Results Reviewed:  Lab Results (last 24 hours)       Procedure Component Value Units Date/Time    Blood Gas, Arterial With Co-Ox [[id number]]  (Abnormal) Collected: 10/12/24 0759    Specimen: Arterial Blood Updated: 10/12/24 1100     Site Right Radial     Jorge's Test Positive     pH, Arterial 7.265 pH units      Comment: 84 Value below reference range        pCO2, Arterial 79.4 mm Hg      Comment: 86 Value above critical limit        pO2, Arterial 93.7 mm Hg      HCO3, Arterial 36.0 mmol/L      Comment: 83 Value above reference range        Base Excess, Arterial 6.7 mmol/L      Comment: 83 Value above reference range        O2 Saturation, Arterial 96.8 %      Hemoglobin, Blood Gas 11.6 g/dL      Comment: 84 Value below reference range        Hematocrit, Blood Gas 35.5 %      Comment: 84 Value below reference range        Oxyhemoglobin 95.0 %      Methemoglobin 0.60 %      Carboxyhemoglobin 1.3 %      Temperature 37.0     Sodium, Arterial 141 mmol/L      Potassium, Arterial 5.5 mmol/L      Comment: 83 Value above reference range        Barometric Pressure for Blood Gas 756 mmHg      Modality  BiPap     FIO2 70 %      Ventilator Mode NIV     Set [facility] Resp Rate 10     Comment: Corrected for Gwen Lund CRT   Corrected result. Previous result was 14.0 on 10/12/2024 at 0800 CDT.        IPAP 14     Comment: Entered for Gwen Lund CRT   Appended report. These results have been appended to a previously final verified report.        EPAP 7     Comment: Entered for Gwen Lund CRT   Appended report. These results have been appended to a previously final verified report.        Notified Who DR JUSTICE     Notified By Gwne Lund CRT     Notified Time 10/12/2024 08:00     Collected by [id number]     Comment: Meter: [id number]     :  Gwen Lund CRT         pH, Temp Corrected 7.265 pH Units      pCO2, Temperature Corrected 79.4 mm Hg      pO2, Temperature Corrected 93.7 mm Hg     Blood Gas, Arterial - [[id number]]  (Abnormal) Collected: 10/12/24 1048    Specimen: Arterial Blood Updated: 10/12/24 1048     Site Right Radial     Jorge's Test Positive     pH, Arterial 7.362 pH units      pCO2, Arterial 59.7 mm Hg      Comment: 83 Value above reference range        pO2, Arterial 70.2 mm Hg      Comment: 84 Value below reference range        HCO3, Arterial 33.9 mmol/L      Comment: 83 Value above reference range        Base Excess, Arterial 6.9 mmol/L      Comment: 83 Value above reference range        O2 Saturation, Arterial 94.5 %      Temperature 37.0     Barometric Pressure for Blood Gas 755 mmHg      Modality BiPap     FIO2 40 %      Ventilator Mode NIV     Set [facility] Resp Rate 22.0     IPAP 20     Comment: Meter: [id number]     :  Gwen Lund CRT         EPAP 8     Collected by [id number]     pCO2, Temperature Corrected 59.7 mm Hg      pH, Temp Corrected 7.362 pH Units      pO2, Temperature Corrected 70.2 mm Hg      PO2/FIO2 176    Respiratory Panel PCR w/COVID-19(SARS-CoV-2) [facility] In-House, NP Swab in UTM/VTM, 2 HR TAT - Swab, Nasopharynx  [[id number]]  (Normal) Collected: 10/12/24 0833    Specimen: Swab from Nasopharynx Updated: 10/12/24 0924     ADENOVIRUS, PCR Not Detected     Coronavirus 229E Not Detected     Coronavirus HKU1 Not Detected     Coronavirus NL63 Not Detected     Coronavirus OC43 Not Detected     COVID19 Not Detected     Human Metapneumovirus Not Detected     Human Rhinovirus/Enterovirus Not Detected     Influenza A PCR Not Detected     Influenza B PCR Not Detected     Parainfluenza Virus 1 Not Detected     Parainfluenza Virus 2 Not Detected     Parainfluenza Virus 3 Not Detected     Parainfluenza Virus 4 Not Detected     RSV, PCR Not Detected     Bordetella pertussis pcr Not Detected     Bordetella parapertussis PCR Not Detected     Chlamydophila pneumoniae PCR Not Detected     Mycoplasma pneumo by PCR Not Detected    Narrative:      In the setting of a positive respiratory panel with a viral infection PLUS a negative procalcitonin without other underlying concern for bacterial infection, consider observing off antibiotics or discontinuation of antibiotics and continue supportive care. If the respiratory panel is positive for atypical bacterial infection (Bordetella pertussis, Chlamydophila pneumoniae, or Mycoplasma pneumoniae), consider antibiotic de-escalation to target atypical bacterial infection.    High Sensitivity Troponin T 2Hr [[id number]]  (Abnormal) Collected: 10/12/24 0730    Specimen: Blood Updated: 10/12/24 0802     HS Troponin T 130 ng/L      Troponin T Delta -13 ng/L     Narrative:      High Sensitive Troponin T Reference Range:  <14.0 ng/L- Negative Female for AMI  <22.0 ng/L- Negative Male for AMI  >=14 - Abnormal Female indicating possible myocardial injury.  >=22 - Abnormal Male indicating possible myocardial injury.   Clinicians would have to utilize clinical acumen, EKG, Troponin, and serial changes to determine if it is an Acute Myocardial Infarction or myocardial injury due to an underlying chronic  condition.         Comprehensive Metabolic Panel [[id number]]  (Abnormal) Collected: 10/12/24 0532    Specimen: Blood from Arm, Left Updated: 10/12/24 0617     Glucose 202 mg/dL      BUN 45 mg/dL      Creatinine 1.56 mg/dL      Sodium 142 mmol/L      Potassium 5.4 mmol/L      Comment: Slight hemolysis detected by analyzer. Result may be falsely elevated.        Chloride 98 mmol/L      CO2 35.0 mmol/L      Calcium 9.0 mg/dL      Total Protein 6.8 g/dL      Albumin 4.0 g/dL      ALT (SGPT) 13 U/L      AST (SGOT) 14 U/L      Alkaline Phosphatase 103 U/L      Total Bilirubin 0.6 mg/dL      Globulin 2.8 gm/dL      A/G Ratio 1.4 g/dL      BUN/Creatinine Ratio 28.8     Anion Gap 9.0 mmol/L      eGFR 49.6 mL/min/1.73     Narrative:      GFR Normal >60  Chronic Kidney Disease <60  Kidney Failure <15      Single High Sensitivity Troponin T [[id number]]  (Abnormal) Collected: 10/12/24 0532    Specimen: Blood from Arm, Left Updated: 10/12/24 0615     HS Troponin T 143 ng/L     Narrative:      High Sensitive Troponin T Reference Range:  <14.0 ng/L- Negative Female for AMI  <22.0 ng/L- Negative Male for AMI  >=14 - Abnormal Female indicating possible myocardial injury.  >=22 - Abnormal Male indicating possible myocardial injury.   Clinicians would have to utilize clinical acumen, EKG, Troponin, and serial changes to determine if it is an Acute Myocardial Infarction or myocardial injury due to an underlying chronic condition.         BNP [[id number]]  (Abnormal) Collected: 10/12/24 0532    Specimen: Blood from Arm, Left Updated: 10/12/24 0615     proBNP 14,625.0 pg/mL     Narrative:      This assay is used as an aid in the diagnosis of individuals suspected of having heart failure. It can be used as an aid in the diagnosis of acute decompensated heart failure (ADHF) in patients presenting with signs and symptoms of ADHF to the emergency department (ED). In addition, NT-proBNP of <300 pg/mL indicates ADHF is not likely.    Age  Range Result Interpretation  NT-proBNP Concentration (pg/mL:      <50             Positive            >450                   Gray                 300-450                    Negative             <300    50-75           Positive            >900                  Gray                300-900                  Negative            <300      >75             Positive            >1800                  Gray                300-1800                  Negative            <300    Magnesium [[id number]]  (Abnormal) Collected: 10/12/24 0532    Specimen: Blood from Arm, Left Updated: 10/12/24 0612     Magnesium 2.6 mg/dL     CBC & Differential [[id number]]  (Abnormal) Collected: 10/12/24 0532    Specimen: Blood from Arm, Left Updated: 10/12/24 0550    Narrative:      The following orders were created for panel order CBC & Differential.  Procedure                               Abnormality         Status                     ---------                               -----------         ------                     CBC Auto Differential[[id number]]        Abnormal            Final result                 Please view results for these tests on the individual orders.    CBC Auto Differential [[id number]]  (Abnormal) Collected: 10/12/24 0532    Specimen: Blood from Arm, Left Updated: 10/12/24 0550     WBC 9.29 10*3/mm3      RBC 4.18 10*6/mm3      Hemoglobin 11.5 g/dL      Hematocrit 41.0 %      MCV 98.1 fL      MCH 27.5 pg      MCHC 28.0 g/dL      RDW 15.4 %      RDW-SD 55.4 fl      MPV 10.6 fL      Platelets 286 10*3/mm3      Neutrophil % 78.9 %      Lymphocyte % 10.2 %      Monocyte % 8.6 %      Eosinophil % 1.1 %      Basophil % 0.6 %      Immature Grans % 0.6 %      Neutrophils, Absolute 7.32 10*3/mm3      Lymphocytes, Absolute 0.95 10*3/mm3      Monocytes, Absolute 0.80 10*3/mm3      Eosinophils, Absolute 0.10 10*3/mm3      Basophils, Absolute 0.06 10*3/mm3      Immature Grans, Absolute 0.06 10*3/mm3      nRBC 0.0 /100 WBC     [place] Draw  [[id number]] Collected: 10/12/24 0532    Specimen: Blood from Arm, Left Updated: 10/12/24 0546    Narrative:      The following orders were created for panel order [place] Draw.  Procedure                               Abnormality         Status                     ---------                               -----------         ------                     Green Top (Gel)[[id number]]                                  Final result               Lavender Top[[id number]]                                     Final result               Red Top[[id number]]                                          Final result               Light Blue Top[[id number]]                                   Final result                 Please view results for these tests on the individual orders.    Green Top (Gel) [[id number]] Collected: 10/12/24 0532    Specimen: Blood from Arm, Left Updated: 10/12/24 0546     Extra Tube Hold for add-ons.     Comment: Auto resulted.       Lavender Top [[id number]] Collected: 10/12/24 0532    Specimen: Blood from Arm, Left Updated: 10/12/24 0546     Extra Tube hold for add-on     Comment: Auto resulted       Red Top [[id number]] Collected: 10/12/24 0532    Specimen: Blood from Arm, Left Updated: 10/12/24 0546     Extra Tube Hold for add-ons.     Comment: Auto resulted.       Light Blue Top [[id number]] Collected: 10/12/24 0532    Specimen: Blood from Arm, Left Updated: 10/12/24 0546     Extra Tube Hold for add-ons.     Comment: Auto resulted       Blood Gas, Arterial With Co-Ox [[id number]]  (Abnormal) Collected: 10/12/24 0522    Specimen: Arterial Blood Updated: 10/12/24 0522     Site Right Radial     Jorge's Test Positive     pH, Arterial 7.268 pH units      Comment: 84 Value below reference range        pCO2, Arterial 78.1 mm Hg      Comment: 86 Value above critical limit        pO2, Arterial 70.2 mm Hg      Comment: 84 Value below reference range        HCO3, Arterial 35.7 mmol/L      Comment: 83 Value above reference range         Base Excess, Arterial 6.5 mmol/L      Comment: 83 Value above reference range        O2 Saturation, Arterial 92.0 %      Comment: 84 Value below reference range        Hemoglobin, Blood Gas 11.5 g/dL      Comment: 84 Value below reference range        Hematocrit, Blood Gas 35.3 %      Comment: 84 Value below reference range        Oxyhemoglobin 91.4 %      Comment: 84 Value below reference range        Methemoglobin 0.00 %      Comment: 84 Value below reference range        Carboxyhemoglobin 1.3 %      Temperature 37.0     Sodium, Arterial 141 mmol/L      Potassium, Arterial 5.2 mmol/L      Comment: 83 Value above reference range        Barometric Pressure for Blood Gas 756 mmHg      Modality Nasal Cannula     FIO2 44 %      Flow Rate 6.0 lpm      Ventilator Mode NA     Notified Who MD BARBOSA     Notified By [email or web address]     Notified Time 10/12/2024 05:22     Collected by [id number]     Comment: Meter: [id number]     :  [email or web address]        pH, Temp Corrected 7.268 pH Units      pCO2, Temperature Corrected 78.1 mm Hg      pO2, Temperature Corrected 70.2 mm Hg           Imaging Results (Last 24 Hours)       Procedure Component Value Units Date/Time    XR Chest 1 View [[id number]] Collected: 10/12/24 0650     Updated: 10/12/24 0654    Narrative:      EXAMINATION: XR CHEST 1 VW- 10/12/2024 6:50 AM     HISTORY: CHF/COPD Protocol.     REPORT: A frontal view of the chest was obtained.     COMPARISON: Chest x-ray 9/28/2024.     The heart is enlarged, there is central and basilar vascular congestion  and mild pulmonary edema. The right pleural effusion appears slightly  increased in size but remains small. No pneumothorax is identified.  Moderate atelectasis in the lung bases greater on the right. No acute  osseous abnormality.       Impression:      Congestive heart failure with slight increase in the right  pleural effusion, moderate at basilar atelectasis greater on the right.     This report was signed and  finalized on 10/12/2024 6:51 AM by Dr. Puneet Whitley MD.                 Assessment / Plan   Assessment:   Active Hospital Problems    Diagnosis     **Acute on chronic respiratory failure with hypoxia and hypercapnia     Stage 1 acute kidney injury     Acute on chronic HFrEF (heart failure with reduced ejection fraction)     Colon adenocarcinoma     Pulmonary HTN     Diabetes mellitus     Hypertension        Treatment Plan  The patient will be admitted to Dr. Stout's service here at [facility].    1..  Acute on chronic respiratory failure with hypoxia and hypercapnia/pulmonary hypertension-patient initially presented with acute mental status changes and significant shortness of breath, initial ABGs revealed pH of 7.2, pCO2 of 78.1, pO2 of 70.2, and a bicarb of 35.7.  Patient was placed on BiPAP with resolution and compensation to a pCO2 of 59.7 and pO2 of 70.2.  Patient remains on BiPAP at this time will reevaluate in the a.m.  Continue nebulizers, incentive spirometry, will reevaluate oxygenation needs in the a.m., pulmonary hygiene and ABGs as needed.    2.  Stage I acute kidney injury-baseline creatinine of 0.96, currently at 1.56, will hold Entresto, follow with daily BMP.    3.  Acute on chronic HFrEF-continue heart failure pathway including strict intake and output, daily weights, Reds vest reading pending, initiate Bumex 1 mg every 12 hours may increase to gtt. depending on results.    4.  Colon cancer-patient currently being treated by Dr. Mondragon, unsure of completion of full cycles due to patient canceling multiple appointments.  Will discuss further with patient in the a.m. when he is more alert.    5.  Diabetes mellitus-A1c pending, initiate Accu-Cheks before meals and at bedtime with SSI.    6.  Hypertension- will continue to hold Entresto due to AMA and hypotension.  Continue metoprolol, every 4 vital signs.    Hyperkalemia at 5.4, Lokelma x 1 given.    VTE prophylaxis with  Lovenox  Labs in a.m.  Medical Decision Making  Acute on chronic respiratory failure, acute on chronic, high complexity, unchanged  Stage I acute kidney injury, acute on chronic, high complexity, unchanged  Acute on chronic HFrEF, chronic, high complexity, worsening  Colon cancer, chronic, moderate complexity, unchanged  Diabetes mellitus, chronic, moderate complexity, unchanged  Hypertension, chronic, moderate complexity, unchanged  Number and Complexity of problems: 7  Differential Diagnosis: None    Conditions and Status        Condition is unchanged.     [facility] Data  External documents reviewed: None  Cardiac tracing (EKG, telemetry) interpretation: 10/12/2024 EKG sinus rhythm with fusion complexes, right bundle brianna block, previous anteroseptal infarct, ventricular rate of 100, atrial rate of 100, QTc of 485  Radiology interpretation: 10/12/2024 chest x-ray per radiology reviewed  Labs reviewed: 10/12/2024 ABG, CBC, CMP, troponin, magnesium, BNP reviewed, repeat labs in a.m.  Any tests that were considered but not ordered: None     Decision rules/scores evaluated (example [id number], Wells, etc): None     Discussed with: Dr. Stout, his Sister Oxana and patient     Care Planning  Shared decision making: After Girish his Sister Oxana and patient  Code status and discussions: DNR/DNI per patient    Disposition  Social Determinants of Health that impact treatment or disposition: Patient will be returning to [facility] and rehab at discharge on palliative versus hospice care.  Estimated length of stay is determined.     I confirmed that the patient's advanced care plan is present, code status is documented, and a surrogate decision maker is listed in the patient's medical record.     The patient's surrogate decision maker is Sister Oxana.     The patient was seen and examined by me on 10/12/2024 at 1235.    Electronically signed by VIV Hoyos, 10/12/24, 12:35 CDT.    I performed a  substantive part of the MDM during the patient’s E/M visit. I personally evaluated and examined the patient. I personally made or approved the documented management plan.     Recently in the hospital from 9/28-10/4.  Discharged by Dr. Joiner.     There was some consideration into transitioning to hospice/palliative care on his previous admission.  This is going to be discussed again.    In the meantime, we will proceed with the above and tried to get him feeling better.     Electronically signed by BRITT Stout DO, 10/12/2024, 14:08 CDT.             Electronically signed by BRITT Stout DO at 10/12/24 1409       Operative/Procedure Notes (most recent note)    No notes of this type exist for this encounter.          Physician Progress Notes (most recent note)        Patricia Arthur DO at 10/14/24 1345              [facility] Medicine Services  INPATIENT PROGRESS NOTE    Patient Name: Tucker Knox  Date of Admission: 10/12/2024  Today's Date: 10/14/24  Length of Stay: 2  Primary Care Physician: Kt Alfredo MD    Subjective   Chief Complaint: short of breath  HPI   Patient states he is not really doing much better.   Feels he has a long way to go.   No current nausea, chest pain.  Occasional cough  Does not like BiPAP        Review of Systems   All pertinent negatives and positives are as above. All other systems have been reviewed and are negative unless otherwise stated.     Objective    Temp:  [97 °F (36.1 °C)-98 °F (36.7 °C)] 97.7 °F (36.5 °C)  Heart Rate:  [65-75] 75  Resp:  [14-18] 16  BP: ()/(50-73) 106/69  Physical Exam  Vitals and nursing note reviewed.   Constitutional:       Appearance: He is obese.   HENT:      Head: Normocephalic and atraumatic.      Right Ear: External ear normal.      Left Ear: External ear normal.      Nose: Nose normal.      Mouth/Throat:      Mouth: Mucous membranes are moist.   Eyes:       Extraocular Movements: Extraocular movements intact.      Conjunctiva/sclera: Conjunctivae normal.      Pupils: Pupils are equal, round, and reactive to light.   Cardiovascular:      Rate and Rhythm: Normal rate and regular rhythm.      Pulses: Normal pulses.      Heart sounds: No murmur heard.     No friction rub. No gallop.   Pulmonary:      Effort: Pulmonary effort is normal.      Breath sounds: Wheezing and rhonchi present.   Abdominal:      General: Bowel sounds are normal.      Palpations: Abdomen is soft.   Musculoskeletal:         General: Normal range of motion.      Cervical back: Normal range of motion and neck supple.   Skin:     General: Skin is warm and dry.      Capillary Refill: Capillary refill takes less than 2 seconds.   Neurological:      General: No focal deficit present.      Mental Status: He is alert and oriented to person, place, and time.      Cranial Nerves: No cranial nerve deficit.   Psychiatric:         Mood and Affect: Mood normal.         Behavior: Behavior normal.             Results Review:  I have reviewed the labs, radiology results, and diagnostic studies.    Laboratory Data:   Results from last 7 days   Lab Units 10/14/24  0252 10/13/24  0424 10/12/24  0532   WBC 10*3/mm3 6.68 8.04 9.29   HEMOGLOBIN g/dL 10.3* 11.1* 11.5*   HEMATOCRIT % 36.7* 38.5 41.0   PLATELETS 10*3/mm3 239 251 286        Results from last 7 days   Lab Units 10/14/24  0252 10/13/24  1649 10/13/24  0424 10/12/24  0759 10/12/24  0532   SODIUM mmol/L 135* 137 134*  --  142   SODIUM, ARTERIAL   --   --   --    < >  --    POTASSIUM mmol/L 5.6* 5.2 5.9*  --  5.4*   CHLORIDE mmol/L 94* 94* 95*  --  98   CO2 mmol/L 32.0* 35.0* 30.0*  --  35.0*   BUN mg/dL 69* 61* 61*  --  45*   CREATININE mg/dL 2.55* 2.42* 2.16*  --  1.56*   CALCIUM mg/dL 8.3* 8.5* 8.4*  --  9.0   BILIRUBIN mg/dL  --   --   --   --  0.6   ALK PHOS U/L  --   --   --   --  103   ALT (SGPT) U/L  --   --   --   --  13   AST (SGOT) U/L  --   --   --   --   "14   GLUCOSE mg/dL 99 118* 257*  --  202*    < > = values in this interval not displayed.       Culture Data:   No results found for: \"BLOODCX\", \"URINECX\", \"WOUNDCX\", \"MRSACX\", \"RESPCX\", \"STOOLCX\"    Radiology Data:   Imaging Results (Last 24 Hours)       ** No results found for the last 24 hours. **            I have reviewed the patient's current medications.     Assessment/Plan   Assessment  Active Hospital Problems    Diagnosis     **Acute on chronic respiratory failure with hypoxia and hypercapnia     Hyperkalemia     AMA (acute kidney injury)     Acute on chronic HFrEF (heart failure with reduced ejection fraction)     Colon adenocarcinoma     Type 2 myocardial infarction due to heart failure     Pulmonary HTN     Type 2 diabetes mellitus with hyperglycemia, with long-term current use of insulin     Hypertension        Treatment Plan  Lokelma per nephrology  Palliatiave care consulting, appreciate.   Social service for discharge planning, SNF      Medical Decision Making  Number and Complexity of problems:   Acute on chronic respiratory failure with hypercapnia and hypoxemia high complexity  Hyperkalemia high complexity  AMA high complexity  Acute on chronic heart failure with reduced ejection fraction.  High complexity  Diabetes mellitus type 2 moderate complexity  Hypertension moderate complexity  Pulmonary hypertension high complexity      Differential Diagnosis:     Conditions and Status        Condition is improving.     MDM Data  External documents reviewed: Reviewed  Cardiac tracing (EKG, telemetry) interpretation: Reviewed  Radiology interpretation: Reviewed  Labs reviewed: Reviewed  Any tests that were considered but not ordered: None     Decision rules/scores evaluated (example [id number], Wells, etc): None     Discussed with: Patient     Care Planning  Shared decision making: Patient  Code status and discussions: DNR/DNI    Disposition  Social Determinants of Health that impact treatment or " disposition: None  I expect the patient to be discharged to SNF in 2-3 days.     Electronically signed by Patricia Arthur DO, 10/14/24, 13:45 CDT.      Electronically signed by Patricia Arthur DO at 10/14/24 1350          Consult Notes (most recent note)        Akanksha López APRN at 10/14/24 1050        Consult Orders    1. Inpatient Wound Care MD Consult [[id number]] ordered by BRITT Stout DO at 10/12/24 1419                     [facility]  INPATIENT WOUND & OSTOMY CONSULTATION    Today's Date: 10/14/24    Patient Name: Tucker Knox  MRN: [id number]  CSN: [id number]  PCP: Kt Alfredo MD  Referring Provider:   Consulting Provider (From admission, onward)      Start Ordered     Status Ordering Provider    10/12/24 1419 10/12/24 1419  Inpatient Wound Care MD Consult  Once        Specialty:  Wound Care  Provider:  Marielena Jernigan APRN    Acknowledged BRITT STOUT           Attending Provider: Patricia Arthur DO  Length of Stay: 2    SUBJECTIVE   Chief Complaint: Multiple wounds    HPI: Tucker Knox, a 64 y.o.male, presents with a past medical history of hypertension, type 2 diabetes mellitus, COPD, HLD, CHF, CAD, JOSE with noncompliance, colon cancer, and a significant history of medical noncompliance.  A full past medical history is listed below.  Inpatient wound care consulted due to multiple wounds.  Patient was recently cared for in this facility, and at that time was noted to have moisture associated skin damage to the perineum region.  According to the picture of the 10/1/2024 under the media tab, the MASD seems to be improving.  He has not been applying anything to the area.  He does live at home alone.  He is incontinent at times.    Visit Dx:    ICD-10-CM ICD-9-CM   1. Respiratory failure with hypoxia and hypercapnia, unspecified chronicity  J96.91 518.81    J96.92    2. Recurrent right pleural effusion  J90 511.9   3. Acute  on chronic respiratory failure with hypoxia and hypercapnia  J96.21 518.84    J96.22 786.09     799.02   4. Acute on chronic congestive heart failure, unspecified heart failure type  I50.9 428.0   5. COPD exacerbation  J44.1 491.21   6. Impaired mobility [Z74.09]  Z74.09 799.89       Hospital Problem List:     Acute on chronic respiratory failure with hypoxia and hypercapnia    Type 2 diabetes mellitus with hyperglycemia, with long-term current use of insulin    Hypertension    Pulmonary HTN    Type 2 myocardial infarction due to heart failure    Colon adenocarcinoma    Acute on chronic HFrEF (heart failure with reduced ejection fraction)    AMA (acute kidney injury)    Hyperkalemia      History:   Past Medical History:   Diagnosis Date    Cancer     CHF (congestive heart failure)     COPD (chronic obstructive pulmonary disease)     Coronary artery disease     stent x 1    Family history of colon cancer     Hyperlipidemia     Hypertension     Sleep apnea     does not wear machine, supposed to wear cpap with oxygen and does not wear it    Type 2 diabetes mellitus      Past Surgical History:   Procedure Laterality Date    BACK SURGERY      CARDIAC CATHETERIZATION Left 04/13/2022    Procedure: Cardiac Catheterization/Vascular Study;  Surgeon: Todd Avendano MD;  Location:  [facility] CATH INVASIVE LOCATION;  Service: Cardiology;  Laterality: Left;    CARDIAC CATHETERIZATION      with stent x1    CARDIAC CATHETERIZATION N/A 9/12/2024    Procedure: Left Heart Cath;  Surgeon: Ruben Adler MD;  Location:  [facility] CATH INVASIVE LOCATION;  Service: Cardiology;  Laterality: N/A;    COLON RESECTION N/A 12/5/2023    Procedure: COLON RESECTION LAPAROSCOPIC SIGMOID WITH DAVINCI ROBOT, INTRA-OPERATIVE FLEXIBLE SIGMOIDOSCOPY, SPLENIC FLEXURE MOBILIZATION, OPEN UMBILICAL HERNIA REPAIR;  Surgeon: Oma Velasquez MD;  Location:  [facility] OR;  Service: Robotics - DaVinci;  Laterality: N/A;    COLONOSCOPY N/A 10/09/2023    Diverticulosis;  One 5mm polyp in ascending colon; One 5mm polyp in transverse colon; One 10mm polyp at 65cm proximal to anus; One 20mm polyp at 65cm proximal to anus-Tattooed; One 20mm polyp at 42cm proximal to anus-Clip (MR conditional) placed-Tattooed; Rule out malignancy-Partially obstructing tumor in recto-sigmoid colon-biopsied-Tattooed; One 10mm polyp in rectum    ELBOW PROCEDURE      KNEE SURGERY      LUMBAR DISC SURGERY       Social History     Socioeconomic History    Marital status:    Tobacco Use    Smoking status: Former     Current packs/day: 1.00     Average packs/day: 1 pack/day for 45.0 years (45.0 ttl pk-yrs)     Types: Cigarettes    Smokeless tobacco: Never   Vaping Use    Vaping status: Some Days    Substances: Nicotine, Flavoring    Devices: Disposable   Substance and Sexual Activity    Alcohol use: Not Currently    Drug use: Yes     Types: Marijuana    Sexual activity: Defer     Family History   Problem Relation Age of Onset    Esophageal cancer Mother     Heart disease Mother     Colon cancer Father 80    Heart disease Father     No Known Problems Sister     COPD Brother     Alcohol abuse Brother     Colon polyps Neg Hx     Liver cancer Neg Hx     Rectal cancer Neg Hx     Stomach cancer Neg Hx     Liver disease Neg Hx        Allergies:  Allergies   Allergen Reactions    Penicillins Unknown - Low Severity     Pt states happened when child does not know        Medications:    Current Facility-Administered Medications:     acetaminophen (TYLENOL) tablet 650 mg, 650 mg, Oral, Q6H PRN, BRITT Stout, DO, 650 mg at 10/13/24 0851    albuterol (PROVENTIL) nebulizer solution 0.042% 1.25 mg/3mL, 1.25 mg, Nebulization, Q6H PRN, Jabari Garrisonrie, APRN    aspirin EC tablet 81 mg, 81 mg, Oral, Daily, Jabari Garrisonrie, APRN, 81 mg at 10/14/24 0942    [Held by provider] bumetanide (BUMEX) injection 1 mg, 1 mg, Intravenous, Q12H, Jazmin Garrison, APRN, 1 mg at 10/13/24 1225    calcium carbonate (TUMS)  chewable tablet 500 mg (200 mg elemental), 2 tablet, Oral, TID PRN, Karen Parekh DO    dextrose (D50W) (25 g/50 mL) IV injection 25 g, 25 g, Intravenous, Q15 Min PRN, Jazmin Garrison APRN    dextrose (GLUTOSE) oral gel 15 g, 15 g, Oral, Q15 Min PRN, Jazmin Garrison APRN    DULoxetine (CYMBALTA) DR capsule 30 mg, 30 mg, Oral, Daily, Jazmin Garrison APRN, 30 mg at 10/14/24 0942    Enoxaparin Sodium (LOVENOX) syringe 40 mg, 40 mg, Subcutaneous, Q12H, Jazmin Garrison APRN, 40 mg at 10/14/24 0942    gabapentin (NEURONTIN) capsule 600 mg, 600 mg, Oral, Q12H, BRITT Stout DO, 600 mg at 10/14/24 0942    glucagon (GLUCAGEN) injection 1 mg, 1 mg, Intramuscular, Q15 Min PRN, Jazmin Garrison APRN    hydrocortisone-bacitracin-zinc oxide-nystatin (MAGIC BARRIER) ointment 1 Application, 1 Application, Topical, 4x Daily, Akanksha López APRN    insulin glargine (LANTUS, SEMGLEE) injection 14 Units, 14 Units, Subcutaneous, Q12H, Jazmin Garrison APRN, 14 Units at 10/14/24 0942    Insulin Lispro (humaLOG) injection 2-7 Units, 2-7 Units, Subcutaneous, 4x Daily AC & at Bedtime, Jazmin Garrison APRN, 2 Units at 10/13/24 2106    metoprolol succinate XL (TOPROL-XL) 24 hr tablet 25 mg, 25 mg, Oral, Q24H, Jazmin Garrison APRN, 25 mg at 10/14/24 0942    nitroglycerin (NITROSTAT) SL tablet 0.4 mg, 0.4 mg, Sublingual, Q5 Min PRN, Jazmin Garrison APRN    pravastatin (PRAVACHOL) tablet 40 mg, 40 mg, Oral, Nightly, Jazmin Garrison APRN, 40 mg at 10/13/24 2106    QUEtiapine (SEROquel) tablet 25 mg, 25 mg, Oral, Nightly, Jazmni Garrison APRN, 25 mg at 10/13/24 2107    [Held by provider] sacubitril-valsartan (ENTRESTO) 24-26 MG tablet 1 tablet, 1 tablet, Oral, BID, Jazmin Garrison APRN    sodium chloride 0.9 % flush 10 mL, 10 mL, Intravenous, PRN, Vijay Leary MD, 10 mL at 10/12/24 0731    [COMPLETED] Insert Peripheral IV, , , Once **AND** sodium chloride 0.9 % flush 10 mL, 10 mL, Intravenous, PRN,  Buffa, Peter M, MD, 10 mL at 10/12/24 0731    sodium zirconium cyclosilicate (LOKELMA) packet 10 g, 10 g, Oral, Once, Anton Colon, VIV    OBJECTIVE     Vitals:    10/14/24 1421   BP:    Pulse: 74   Resp:    Temp:    SpO2:        PHYSICAL EXAM: Moisture associated skin damage to the scrotum and the perineum region.  Erythema noted.  No purulence or malodor noted.  No drainage.  No signs of infection.  There were no open wounds noted on exam of lower extremities, perineum, and sacral region.    Physical Exam       Results Review:  Lab Results (last 48 hours)       Procedure Component Value Units Date/Time    Calcitriol (1,25 di-OH Vitamin D) [[id number]] Collected: 10/14/24 1402    Specimen: Blood Updated: 10/14/24 1457    Vitamin D,25-Hydroxy [[id number]]  (Abnormal) Collected: 10/14/24 0252    Specimen: Blood Updated: 10/14/24 1306     25 Hydroxy, Vitamin D <6.0 ng/ml     Narrative:      Reference Range for Total Vitamin D 25(OH)     Deficiency <20.0 ng/mL   Insufficiency 21-29 ng/mL   Sufficiency  ng/mL  Toxicity >100 ng/ml      POC Glucose Once [[id number]]  (Normal) Collected: 10/14/24 1137    Specimen: Blood Updated: 10/14/24 1148     Glucose 119 mg/dL      Comment: : jbarthel Barthel JustinMeter ID: [id number]       POC Glucose Once [[id number]]  (Normal) Collected: 10/14/24 0807    Specimen: Blood Updated: 10/14/24 0819     Glucose 74 mg/dL      Comment: : jbarthel Barthel JustinMeter ID: [id number]       Urinalysis, Microscopic Only - Urine, Clean Catch [[id number]] Collected: 10/14/24 0542    Specimen: Urine, Clean Catch Updated: 10/14/24 0607     RBC, UA 0-2 /HPF      WBC, UA 0-2 /HPF      Bacteria, UA None Seen /HPF      Squamous Epithelial Cells, UA None Seen /HPF      Hyaline Casts, UA 3-6 /LPF      Methodology Automated Microscopy    Urinalysis With Microscopic If Indicated (No Culture) - Urine, Clean Catch [[id number]]  (Abnormal) Collected: 10/14/24 0542    Specimen: Urine,  Clean Catch Updated: 10/14/24 0607     Color, UA Dark Yellow     Appearance, UA Clear     pH, UA <=5.0     Specific Gravity, UA 1.017     Glucose,  mg/dL (1+)     Ketones, UA Trace     Bilirubin, UA Negative     Blood, UA Negative     Protein, UA Trace     Leuk Esterase, UA Trace     Nitrite, UA Negative     Urobilinogen, UA 1.0 E.U./dL    Basic Metabolic Panel [[id number]]  (Abnormal) Collected: 10/14/24 0252    Specimen: Blood Updated: 10/14/24 0403     Glucose 99 mg/dL      BUN 69 mg/dL      Creatinine 2.55 mg/dL      Sodium 135 mmol/L      Potassium 5.6 mmol/L      Chloride 94 mmol/L      CO2 32.0 mmol/L      Calcium 8.3 mg/dL      BUN/Creatinine Ratio 27.1     Anion Gap 9.0 mmol/L      eGFR 27.5 mL/min/1.73     Narrative:      GFR Normal >60  Chronic Kidney Disease <60  Kidney Failure <15      Magnesium [[id number]]  (Normal) Collected: 10/14/24 0252    Specimen: Blood Updated: 10/14/24 0403     Magnesium 2.4 mg/dL     Phosphorus [[id number]]  (Abnormal) Collected: 10/14/24 0252    Specimen: Blood Updated: 10/14/24 0403     Phosphorus 6.4 mg/dL     PTH, Intact [[id number]]  (Abnormal) Collected: 10/14/24 0252    Specimen: Blood Updated: 10/14/24 0359     PTH, Intact 106.2 pg/mL     Narrative:      Results may be falsely decreased if patient taking Biotin.      CBC (No Diff) [[id number]]  (Abnormal) Collected: 10/14/24 0252    Specimen: Blood Updated: 10/14/24 0335     WBC 6.68 10*3/mm3      RBC 3.81 10*6/mm3      Hemoglobin 10.3 g/dL      Hematocrit 36.7 %      MCV 96.3 fL      MCH 27.0 pg      MCHC 28.1 g/dL      RDW 15.2 %      RDW-SD 53.1 fl      MPV 10.6 fL      Platelets 239 10*3/mm3     Creatinine Urine Random (kidney function) GFR component - Urine, Clean Catch [[id number]] Collected: 10/13/24 1404    Specimen: Urine, Clean Catch Updated: 10/13/24 2051     Creatinine, Urine 59.5 mg/dL     Narrative:      Reference intervals for random urine have not been established.  Clinical usage is dependent upon  physician's interpretation in combination with other laboratory tests.       Urea Nitrogen, Urine - Urine, Clean Catch [[id number]] Collected: 10/13/24 1404    Specimen: Urine, Clean Catch Updated: 10/13/24 2051     Urea Nitrogen, Urine 452 mg/dL     Narrative:      Reference intervals for random urine have not been established.  Clinical usage is dependent upon physician's interpretation in combination with other laboratory tests.       POC Glucose Once [[id number]]  (Abnormal) Collected: 10/13/24 2034    Specimen: Blood Updated: 10/13/24 2045     Glucose 168 mg/dL      Comment: : [id number] Jorge AleebethMeter ID: [id number]       Basic Metabolic Panel [[id number]]  (Abnormal) Collected: 10/13/24 1649    Specimen: Blood Updated: 10/13/24 1725     Glucose 118 mg/dL      BUN 61 mg/dL      Creatinine 2.42 mg/dL      Sodium 137 mmol/L      Potassium 5.2 mmol/L      Chloride 94 mmol/L      CO2 35.0 mmol/L      Calcium 8.5 mg/dL      BUN/Creatinine Ratio 25.2     Anion Gap 8.0 mmol/L      eGFR 29.3 mL/min/1.73     Narrative:      GFR Normal >60  Chronic Kidney Disease <60  Kidney Failure <15      POC Glucose Once [[id number]]  (Abnormal) Collected: 10/13/24 1638    Specimen: Blood Updated: 10/13/24 1651     Glucose 131 mg/dL      Comment: : [id number] Nichelle GironSheila ID: [id number]       Protein, Urine, Random - Urine, Clean Catch [[id number]] Collected: 10/13/24 1404    Specimen: Urine, Clean Catch Updated: 10/13/24 1634     Total Protein, Urine 8.8 mg/dL     Narrative:      Reference intervals for random urine have not been established.  Clinical usage is dependent upon physician's interpretation in combination with other laboratory tests.       Blood Gas, Arterial - [[id number]]  (Abnormal) Collected: 10/13/24 1519    Specimen: Arterial Blood Updated: 10/13/24 1518     Site Left Radial     Jorge's Test Positive     pH, Arterial 7.265 pH units      pCO2, Arterial 77.0 mm Hg      Comment: 86 Value above critical  limit        pO2, Arterial 111.0 mm Hg      Comment: 83 Value above reference range        HCO3, Arterial 34.9 mmol/L      Comment: 83 Value above reference range        Base Excess, Arterial 5.7 mmol/L      Comment: 83 Value above reference range        O2 Saturation, Arterial 97.9 %      Temperature 37.0     Barometric Pressure for Blood Gas 751 mmHg      Modality BiPap     FIO2 50 %      Ventilator Mode BiPAP     Set Mech Resp Rate 12.0     IPAP 20     Comment: Meter: [id number]     :  [id number]        EPAP 8     Notified By [id number]     Collected by [id number]     pCO2, Temperature Corrected 77.0 mm Hg      pH, Temp Corrected 7.265 pH Units      pO2, Temperature Corrected 111 mm Hg      PO2/FIO2 222    Sodium, Urine, Random - Urine, Clean Catch [[id number]] Collected: 10/13/24 1404    Specimen: Urine, Clean Catch Updated: 10/13/24 1427     Sodium, Urine 52 mmol/L     Narrative:      Reference intervals for random urine have not been established.  Clinical usage is dependent upon physician's interpretation in combination with other laboratory tests.       POC Glucose Once [[id number]]  (Abnormal) Collected: 10/13/24 1218    Specimen: Blood Updated: 10/13/24 1230     Glucose 197 mg/dL      Comment: : [id number] Nichelle TharpeMeter ID: [id number]       POC Glucose Once [[id number]]  (Abnormal) Collected: 10/13/24 0756    Specimen: Blood Updated: 10/13/24 0828     Glucose 197 mg/dL      Comment: : [id number] Nichelle TharpeMeter ID: [id number]       Basic Metabolic Panel [[id number]]  (Abnormal) Collected: 10/13/24 0424    Specimen: Blood Updated: 10/13/24 0503     Glucose 257 mg/dL      BUN 61 mg/dL      Creatinine 2.16 mg/dL      Sodium 134 mmol/L      Potassium 5.9 mmol/L      Comment: Slight hemolysis detected by analyzer. Result may be falsely elevated.        Chloride 95 mmol/L      CO2 30.0 mmol/L      Calcium 8.4 mg/dL      BUN/Creatinine Ratio 28.2     Anion Gap 9.0 mmol/L      eGFR 33.6 mL/min/1.73      Narrative:      GFR Normal >60  Chronic Kidney Disease <60  Kidney Failure <15      CBC (No Diff) [[id number]]  (Abnormal) Collected: 10/13/24 0424    Specimen: Blood Updated: 10/13/24 0458     WBC 8.04 10*3/mm3      RBC 4.06 10*6/mm3      Hemoglobin 11.1 g/dL      Hematocrit 38.5 %      MCV 94.8 fL      MCH 27.3 pg      MCHC 28.8 g/dL      RDW 15.4 %      RDW-SD 53.2 fl      MPV 10.8 fL      Platelets 251 10*3/mm3     Blood Gas, Arterial - [[id number]]  (Abnormal) Collected: 10/13/24 0329    Specimen: Arterial Blood Updated: 10/13/24 0335     Site Right Radial     Jorge's Test Positive     pH, Arterial 7.307 pH units      Comment: 84 Value below reference range        pCO2, Arterial 69.0 mm Hg      Comment: 86 Value above critical limit        pO2, Arterial 83.5 mm Hg      HCO3, Arterial 34.5 mmol/L      Comment: 83 Value above reference range        Base Excess, Arterial 6.3 mmol/L      Comment: 83 Value above reference range        O2 Saturation, Arterial 95.5 %      Temperature 37.0     Barometric Pressure for Blood Gas 750 mmHg      Modality Nasal Cannula     Flow Rate 3.0 lpm      Ventilator Mode NA     Notified By Sania Soto, RRT     Collected by [id number]     Comment: Meter: [id number]     :  Sania Soto RRT        pCO2, Temperature Corrected 69.0 mm Hg      pH, Temp Corrected 7.307 pH Units      pO2, Temperature Corrected 83.5 mm Hg     POC Glucose Once [[id number]]  (Abnormal) Collected: 10/12/24 1944    Specimen: Blood Updated: 10/12/24 1955     Glucose 400 mg/dL      Comment: : [id number] Jorge CeronMeter ID: [id number]       POC Glucose Once [[id number]]  (Abnormal) Collected: 10/12/24 1710    Specimen: Blood Updated: 10/12/24 1724     Glucose 366 mg/dL      Comment: : [id number] Nichelle Robles ID: [id number]             Imaging Results (Last 72 Hours)       Procedure Component Value Units Date/Time    US Renal Bilateral [[id number]] Resulted: 10/14/24 1454     Updated:  10/14/24 1510    XR Chest 1 View [[id number]] Collected: 10/12/24 0650     Updated: 10/12/24 0654    Narrative:      EXAMINATION: XR CHEST 1 VW- 10/12/2024 6:50 AM     HISTORY: CHF/COPD Protocol.     REPORT: A frontal view of the chest was obtained.     COMPARISON: Chest x-ray 2024.     The heart is enlarged, there is central and basilar vascular congestion  and mild pulmonary edema. The right pleural effusion appears slightly  increased in size but remains small. No pneumothorax is identified.  Moderate atelectasis in the lung bases greater on the right. No acute  osseous abnormality.       Impression:      Congestive heart failure with slight increase in the right  pleural effusion, moderate at basilar atelectasis greater on the right.     This report was signed and finalized on 10/12/2024 6:51 AM by Dr. Puneet Whitley MD.                  ASSESSMENT/PLAN       Examination and evaluation of wound(s) was performed.    DIAGNOSIS:   Moisture associated skin damage    PLAN:   Orders placed for wound care and pressure/moisture management as listed below.   See orders.    Discussed findings and treatment plan including risks, benefits, and treatment options with Tucker Knox in detail. Patient agreed with treatment plan.      This document has been electronically signed by VIV Haines on 10/14/2024 15:38 CDT      Electronically signed by Akanksha López APRN at 10/14/24 1547          Physical Therapy Notes (most recent note)        Sanjuana Melgar, PT Student at 10/13/24 1352  Version 1 of 1      Attestation signed by Jessica Jordan PT DPT at 10/13/24 1356    I reviewed the documentation and agree.                   Patient Name: Tucker Knox  : 1960    MRN: [id number]                              Today's Date: 10/13/2024       Admit Date: 10/12/2024    Visit Dx:     ICD-10-CM ICD-9-CM   1. Respiratory failure with hypoxia and hypercapnia, unspecified chronicity  J96.91 518.81     J96.92    2. Recurrent right pleural effusion  J90 511.9   3. Acute on chronic respiratory failure with hypoxia and hypercapnia  J96.21 518.84    J96.22 786.09     799.02   4. Acute on chronic congestive heart failure, unspecified heart failure type  I50.9 428.0   5. COPD exacerbation  J44.1 491.21   6. Impaired mobility [Z74.09]  Z74.09 799.89     Patient Active Problem List   Diagnosis    Type 2 diabetes mellitus with hyperglycemia, with long-term current use of insulin    Hypertension    Pulmonary HTN    COPD (chronic obstructive pulmonary disease)    Type 2 myocardial infarction due to heart failure    Malignant neoplasm of sigmoid colon    FH: colon cancer    Sleep apnea    History of adenomatous polyp of colon    Colon adenocarcinoma    Acute on chronic systolic CHF (congestive heart failure)    PVC's (premature ventricular contractions)    Presence of external cardiac defibrillator    Acute exacerbation of CHF (congestive heart failure)    Acute on chronic respiratory failure with hypoxia and hypercapnia    Delirium    Acute on chronic HFrEF (heart failure with reduced ejection fraction)    AMA (acute kidney injury)    Hyperkalemia     Past Medical History:   Diagnosis Date    Cancer     CHF (congestive heart failure)     COPD (chronic obstructive pulmonary disease)     Coronary artery disease     stent x 1    Family history of colon cancer     Hyperlipidemia     Hypertension     Sleep apnea     does not wear machine, supposed to wear cpap with oxygen and does not wear it    Type 2 diabetes mellitus      Past Surgical History:   Procedure Laterality Date    BACK SURGERY      CARDIAC CATHETERIZATION Left 04/13/2022    Procedure: Cardiac Catheterization/Vascular Study;  Surgeon: Todd Avendano MD;  Location:  [facility]ATH [facility];  Service: Cardiology;  Laterality: Left;    CARDIAC CATHETERIZATION      with stent x1    CARDIAC CATHETERIZATION N/A 9/12/2024    Procedure: Left Heart Cath;  Surgeon: Nayely  Ruben Isaac MD;  Location:  [facility] CATH [facility];  Service: Cardiology;  Laterality: N/A;    COLON RESECTION N/A 12/5/2023    Procedure: COLON RESECTION LAPAROSCOPIC SIGMOID WITH DAVINCI ROBOT, INTRA-OPERATIVE FLEXIBLE SIGMOIDOSCOPY, SPLENIC FLEXURE MOBILIZATION, OPEN UMBILICAL HERNIA REPAIR;  Surgeon: Oma Velasquez MD;  Location:  [facility] OR;  Service: Robotics - DaVinci;  Laterality: N/A;    COLONOSCOPY N/A 10/09/2023    Diverticulosis; One 5mm polyp in ascending colon; One 5mm polyp in transverse colon; One 10mm polyp at 65cm proximal to anus; One 20mm polyp at 65cm proximal to anus-Tattooed; One 20mm polyp at 42cm proximal to anus-Clip (MR conditional) placed-Tattooed; Rule out malignancy-Partially obstructing tumor in recto-sigmoid colon-biopsied-Tattooed; One 10mm polyp in rectum    ELBOW PROCEDURE      KNEE SURGERY      LUMBAR DISC SURGERY        General Information       Row Name 10/13/24 1110          Physical Therapy Time and Intention    Document Type evaluation  Dx: acute on chronic respiratory failure with hypoxia and hypercapnia. Pt was taken by EMS to ED with respiratory distress.  -SB (r) AT (t) SB (c)     Mode of Treatment physical therapy  PMH: Cancer CHF  COPD  Coronary artery disease stent x 1 Family history of colon cancer Hyperlipidemia Hypertension    Sleep apnea Type 2 diabetes mellitus  -SB (r) AT (t) SB (c)       Row Name 10/13/24 1114          General Information    Patient Profile Reviewed yes  -SB (r) AT (t) SB (c)     Prior Level of Function independent:;ADL's;cooking;cleaning;driving;shopping;min assist:;all household mobility;community mobility  He is using a SPC for ambulation. He is a tub shower and he stands when he showers.  -SB (r) AT (t) SB (c)     Existing Precautions/Restrictions fall;oxygen therapy device and L/min  3L  -SB (r) AT (t) SB (c)     Barriers to Rehab medically complex;previous functional deficit  -SB (r) AT (t) SB (c)       Row Name 10/13/24 5599           Living Environment    People in Home alone  -SB (r) AT (t) SB (c)       Row Name 10/13/24 1110          Home Main Entrance    Number of Stairs, Main Entrance eight  -SB (r) AT (t) SB (c)     Stair Railings, Main Entrance railing on right side (ascending)  -SB (r) AT (t) SB (c)       Row Name 10/13/24 1110          Stairs Within Home, Primary    Number of Stairs, Within Home, Primary none  -SB (r) AT (t) SB (c)       Row Name 10/13/24 1110          Cognition    Orientation Status (Cognition) oriented x 4  -SB (r) AT (t) SB (c)       Row Name 10/13/24 1110          Safety Issues/Impairments Affecting Functional Mobility    Safety Issues Affecting Function (Mobility) friction/shear risk  -SB (r) AT (t) SB (c)     Impairments Affecting Function (Mobility) balance;endurance/activity tolerance;strength;shortness of breath  -SB (r) AT (t) SB (c)               User Key  (r) = Recorded By, (t) = Taken By, (c) = Cosigned By      Initials Name Provider Type    SB Jessica Jordan, PT DPT Physical Therapist    AT [facility], PT Student PT Student                   Mobility       Row Name 10/13/24 1110          Bed Mobility    Bed Mobility sit-supine;scooting/bridging  -SB (r) AT (t) SB (c)     Scooting/Bridging Page (Bed Mobility) maximum assist (25% patient effort);1 person assist  -SB (r) AT (t) SB (c)     Sit-Supine Page (Bed Mobility) moderate assist (50% patient effort);1 person assist;verbal cues  -SB (r) AT (t) SB (c)     Assistive Device (Bed Mobility) bed rails;head of bed elevated  -SB (r) AT (t) SB (c)       Row Name 10/13/24 1110          Sit-Stand Transfer    Sit-Stand Page (Transfers) maximum assist (25% patient effort);1 person assist  -SB (r) AT (t) SB (c)     Assistive Device (Sit-Stand Transfers) cane, straight  -SB (r) AT (t) SB (c)       Row Name 10/13/24 1110          Gait/Stairs (Locomotion)    Page Level (Gait) 1 person assist;minimum assist (75% patient  effort);verbal cues;nonverbal cues (demo/gesture)  -SB (r) AT (t) SB (c)     Assistive Device (Gait) cane, straight  -SB (r) AT (t) SB (c)     Patient was able to Ambulate yes  -SB (r) AT (t) SB (c)     Distance in Feet (Gait) 5  -SB (r) AT (t) SB (c)     Deviations/Abnormal Patterns (Gait) left sided deviations;right sided deviations;base of support, narrow;jennifer decreased;festinating/shuffling;gait speed decreased;stride length decreased  -SB (r) AT (t) SB (c)               User Key  (r) = Recorded By, (t) = Taken By, (c) = Cosigned By      Initials Name Provider Type    SB Jessica Jordan, PT DPT Physical Therapist    [facility] PT Student                   Obj/Interventions       Row Name 10/13/24 1110          Range of Motion Comprehensive    General Range of Motion bilateral lower extremity ROM WFL  -SB (r) AT (t) SB (c)       Row Name 10/13/24 1110          Strength Comprehensive (MMT)    General Manual Muscle Testing (MMT) Assessment lower extremity strength deficits identified  -SB (r) AT (t) SB (c)     Comment, General Manual Muscle Testing (MMT) Assessment Yuri LE grossly 4-/5  -SB (r) AT (t) SB (c)       Row Name 10/13/24 1110          Balance    Balance Assessment sitting static balance;sitting dynamic balance;standing static balance;standing dynamic balance  -SB (r) AT (t) SB (c)     Static Sitting Balance contact guard  -SB (r) AT (t) SB (c)     Dynamic Sitting Balance contact guard  -SB (r) AT (t) SB (c)     Position, Sitting Balance unsupported;sitting edge of bed  -SB (r) AT (t) SB (c)     Static Standing Balance moderate assist;1-person assist  -SB (r) AT (t) SB (c)     Dynamic Standing Balance moderate assist;1-person assist  -SB (r) AT (t) SB (c)     Position/Device Used, Standing Balance supported;cane, straight  -SB (r) AT (t) SB (c)       Row Name 10/13/24 1110          Sensory Assessment (Somatosensory)    Sensory Assessment (Somatosensory) LE sensation intact  Pt can  "\"barely\" feel light touch sensation  -SB (r) AT (t) SB (c)               User Key  (r) = Recorded By, (t) = Taken By, (c) = Cosigned By      Initials Name Provider Type    Jessica Ch, PT DPT Physical Therapist    [facility] PT Student                   Goals/Plan       Row Name 10/13/24 1110          Bed Mobility Goal 1 (PT)    Activity/Assistive Device (Bed Mobility Goal 1, PT) bed mobility activities, all  -SB (r) AT (t) SB (c)     [place] Level/Cues Needed (Bed Mobility Goal 1, PT) modified independence  -SB (r) AT (t) SB (c)     Time Frame (Bed Mobility Goal 1, PT) long term goal (LTG)  -SB (r) AT (t) SB (c)     Progress/Outcomes (Bed Mobility Goal 1, PT) new goal  -SB (r) AT (t) SB (c)       Row Name 10/13/24 1110          Transfer Goal 1 (PT)    Activity/Assistive Device (Transfer Goal 1, PT) sit-to-stand/stand-to-sit;bed-to-chair/chair-to-bed  -SB (r) AT (t) SB (c)     [place] Level/Cues Needed (Transfer Goal 1, PT) standby assist  -SB (r) AT (t) SB (c)     Time Frame (Transfer Goal 1, PT) long term goal (LTG)  -SB (r) AT (t) SB (c)     Progress/Outcome (Transfer Goal 1, PT) new goal  -SB (r) AT (t) SB (c)       Row Name 10/13/24 1110          Gait Training Goal 1 (PT)    Activity/Assistive Device (Gait Training Goal 1, PT) gait (walking locomotion);assistive device use;backward stepping;decrease asymmetrical patterns;decrease fall risk;diminish gait deviation;forward stepping;improve balance and speed;increase endurance/gait distance;increase energy conservation;sidestepping;turning, left;turning, right;cane, straight  -SB (r) AT (t) SB (c)     S[place] Level (Gait Training Goal 1, PT) modified independence  -SB (r) AT (t) SB (c)     Distance (Gait Training Goal 1, PT) 40  -SB (r) AT (t) SB (c)     Time Frame (Gait Training Goal 1, PT) long term goal (LTG)  -SB (r) AT (t) SB (c)     Progress/Outcome (Gait Training Goal 1, PT) new goal  -SB (r) AT (t) SB (c)       Row " Name 10/13/24 1110          Balance Goal 1 (PT)    Activity/Assistive Device (Balance Goal) standing static balance;standing dynamic balance  -SB (r) AT (t) SB (c)     [place] Level/Cues Needed (Balance Goal 1, PT) contact guard required  -SB (r) AT (t) SB (c)     Time Frame (Balance Goal 1, PT) long-term goal (LTG)  -SB (r) AT (t) SB (c)     Progress/Outcomes (Balance Goal 1, PT) other (see comments)  New goal  -SB (r) AT (t) SB (c)       Row Name 10/13/24 1110          Stairs Goal 1 (PT)    Activity/Assistive Device (Stairs Goal 1, PT) --  -SB (r) AT (t) SB (c)     [place] Level/Cues Needed (Stairs Goal 1, PT) --  -SB (r) AT (t) SB (c)     Number of Stairs (Stairs Goal 1, PT) --  -SB (r) AT (t) SB (c)     Time Frame (Stairs Goal 1, PT) --  -SB (r) AT (t) SB (c)     Progress/Outcome (Stairs Goal 1, PT) --  -SB (r) AT (t) SB (c)       Row Name 10/13/24 1110          Therapy Assessment/Plan (PT)    Planned Therapy Interventions (PT) balance training;bed mobility training;gait training;patient/family education;strengthening;transfer training  -SB (r) AT (t) SB (c)               User Key  (r) = Recorded By, (t) = Taken By, (c) = Cosigned By      Initials Name Provider Type    Jessica Ch, PT DPT Physical Therapist    AT Sanjuana Melgar, PT Student PT Student                   Clinical Impression       Row Name 10/13/24 1110          Pain    Pretreatment Pain Rating 6/10  -SB (r) AT (t) SB (c)     Posttreatment Pain Rating 6/10  -SB (r) AT (t) SB (c)     Pain Location chest  stated it feels like something is sitting on his chest  -SB (r) AT (t) SB (c)     Pain Management Interventions exercise or physical activity utilized  -SB (r) AT (t) SB (c)       Row Name 10/13/24 1110          Plan of Care Review    Plan of Care Reviewed With patient  -SB (r) AT (t) SB (c)     Progress no change  -SB (r) AT (t) SB (c)     Outcome Evaluation PT eval complete. Pt was OxAx4. Pt reported that he had 6/10 pain in the  anterior chest and stated that if felt like something was laying on his chest. Pt reported that he was independent prior to being in the hospital. Pt stated he was independent with ADL's, cooking, cleaning, driving, and shopping. Pt stated that he uses a SPC for ambulation to help with balance. Pt was sitting EOB when therapist arrived. Pt was able to go sit to stand with max x 1 assistance. Once pt was standing he was able to maintain balance with mod x 1 assistance. Pt was able to ambulate 5 ft with SPC. Pt would benfit from training with a walker rather than continuing to use the SPC. Pt was unsteady and rocking side to side with gait. He has short, uneven steps. Pt also demonstrates diminished LE strength. Pt would benefit from skilled physical therapy to improve LE strength, endurance, and ambuilation. DC recommendation SNF.  -SB (r) AT (t) SB (c)       Row Name 10/13/24 1110          Therapy Assessment/Plan (PT)    Rehab Potential (PT) good  -SB (r) AT (t) SB (c)     Criteria for Skilled Interventions Met (PT) yes;meets criteria;skilled treatment is necessary  -SB (r) AT (t) SB (c)     Therapy Frequency (PT) 2 times/day  -SB (r) AT (t) SB (c)     Predicted Duration of Therapy Intervention (PT) until discharge or until goals are met  -SB (r) AT (t) SB (c)       Row Name 10/13/24 1110          Vital Signs    O2 Delivery Pre Treatment supplemental O2  3L  -SB (r) AT (t) SB (c)     O2 Delivery Post Treatment supplemental O2  3L  -SB (r) AT (t) SB (c)     Pre Patient Position Supine  -SB (r) AT (t) SB (c)     Post Patient Position Supine  -SB (r) AT (t) SB (c)       Row Name 10/13/24 1110          Positioning and Restraints    Pre-Treatment Position in bed  -SB (r) AT (t) SB (c)     Post Treatment Position bed  -SB (r) AT (t) SB (c)     In Bed fowlers;call light within reach;encouraged to call for assist;exit alarm on;side rails up x3  -SB (r) AT (t) SB (c)               User Key  (r) = Recorded By, (t) = Taken By,  (c) = Cosigned By      Initials Name Provider Type    Jessica Ch, PT DPT Physical Therapist    AT Sanjuana Melgar, PT Student PT Student                   Outcome Measures       Row Name 10/13/24 1110 10/13/24 0715       How much help from another person do you currently need...    Turning from your back to your side while in flat bed without using bedrails? 3  -SB (r) AT (t) SB (c) 4  -AR    Moving from lying on back to sitting on the side of a flat bed without bedrails? 2  -SB (r) AT (t) SB (c) 4  -AR    Moving to and from a bed to a chair (including a wheelchair)? 2  -SB (r) AT (t) SB (c) 3  -AR    Standing up from a chair using your arms (e.g., wheelchair, bedside chair)? 1  -SB (r) AT (t) SB (c) 3  -AR    Climbing 3-5 steps with a railing? 1  -SB (r) AT (t) SB (c) 3  -AR    To walk in hospital room? 2  -SB (r) AT (t) SB (c) 3  -AR    AM-PAC 6 Clicks Score (PT) 11  -SB (r) AT (t) 20  -AR    Highest Level of Mobility Goal 4 --> Transfer to chair/commode  -SB (r) AT (t) 6 --> Walk 10 steps or more  -AR      Row Name 10/13/24 1110          Functional Assessment    Outcome Measure Options AM-PAC 6 Clicks Basic Mobility (PT)  -SB (r) AT (t) SB (c)               User Key  (r) = Recorded By, (t) = Taken By, (c) = Cosigned By      Initials Name Provider Type    Jessica Ch, PT DPT Physical Therapist    Vianey Aguilera, RN Registered Nurse    AT Sanjuana Melgar, PT Student PT Student                                 Physical Therapy Education       Title: PT OT SLP Therapies (In Progress)       Topic: Physical Therapy (In Progress)       Point: Mobility training (Done)       Learning Progress Summary            Patient Acceptance, E, VU by AT at 10/13/2024 1201    Comment: Patient was educted on the benefits of physical activity, POC, and discharge recommendation.                      Point: Home exercise program (Not Started)       Learner Progress:  Not documented in this visit.              Point: Body  mechanics (Not Started)       Learner Progress:  Not documented in this visit.              Point: Precautions (Done)       Learning Progress Summary            Patient Acceptance, E, VU by AT at 10/13/2024 1201    Comment: Patient was educted on the benefits of physical activity, POC, and discharge recommendation.                                      User Key       Initials Effective Dates Name Provider Type Discipline    AT 08/22/24 -  Sanjuana Melgar, PT Student PT Student PT                  PT Recommendation and Plan  Planned Therapy Interventions (PT): balance training, bed mobility training, gait training, patient/family education, strengthening, transfer training  Progress: no change  Outcome Evaluation: PT eval complete. Pt was OxAx4. Pt reported that he had 6/10 pain in the anterior chest and stated that if felt like something was laying on his chest. Pt reported that he was independent prior to being in the hospital. Pt stated he was independent with ADL's, cooking, cleaning, driving, and shopping. Pt stated that he uses a SPC for ambulation to help with balance. Pt was sitting EOB when therapist arrived. Pt was able to go sit to stand with max x 1 assistance. Once pt was standing he was able to maintain balance with mod x 1 assistance. Pt was able to ambulate 5 ft with SPC. Pt would benfit from training with a walker rather than continuing to use the SPC. Pt was unsteady and rocking side to side with gait. He has short, uneven steps. Pt also demonstrates diminished LE strength. Pt would benefit from skilled physical therapy to improve LE strength, endurance, and ambuilation. DC recommendation SNF.     Time Calculation:         PT Charges       Row Name 10/13/24 1110             Time Calculation    Start Time 1110  10 min CR  -SB (r) AT (t) SB (c)      Stop Time 1139  -SB (r) AT (t) SB (c)      Time Calculation (min) 29 min  -SB (r) AT (t)      PT Received On 10/13/24  -SB (r) AT (t) SB (c)      PT Goal  Re-Cert Due Date 10/23/24  -SB (r) AT (t) SB (c)         Untimed Charges    PT Eval/Re-eval Minutes 39  -SB (r) AT (t) SB (c)         Total Minutes    Untimed Charges Total Minutes 39  -SB (r) AT (t)       Total Minutes 39  -SB (r) AT (t)                User Key  (r) = Recorded By, (t) = Taken By, (c) = Cosigned By      Initials Name Provider Type    SB Jessica Jordan, PT DPT Physical Therapist    AT [facility] Sanjuana, PT Student PT Student                      PT G-Codes  Outcome Measure Options: AM-PAC 6 Clicks Basic Mobility (PT)  AM-PAC 6 Clicks Score (PT): 11  PT Discharge Summary  Anticipated Discharge Disposition (PT): skilled nursing facility    [facility] Ram[facility] PT Student  10/13/2024      Electronically signed by Jessica Jordan PT DPT at 10/13/24 1353          Occupational Therapy Notes (most recent note)        Meche Wu OTR/L at 10/14/24 1152          Patient Name: Tucker Knox  : 1960    MRN: [id number]                              Today's Date: 10/14/2024       Admit Date: 10/12/2024    Visit Dx:     ICD-10-CM ICD-9-CM   1. Respiratory failure with hypoxia and hypercapnia, unspecified chronicity  J96.91 518.81    J96.92    2. Recurrent right pleural effusion  J90 511.9   3. Acute on chronic respiratory failure with hypoxia and hypercapnia  J96.21 518.84    J96.22 786.09     799.02   4. Acute on chronic congestive heart failure, unspecified heart failure type  I50.9 428.0   5. COPD exacerbation  J44.1 491.21   6. Impaired mobility [Z74.09]  Z74.09 799.89     Patient Active Problem List   Diagnosis    Type 2 diabetes mellitus with hyperglycemia, with long-term current use of insulin    Hypertension    Pulmonary HTN    COPD (chronic obstructive pulmonary disease)    Type 2 myocardial infarction due to heart failure    Malignant neoplasm of sigmoid colon    FH: colon cancer    Sleep apnea    History of adenomatous polyp of colon    Colon adenocarcinoma    Acute on chronic systolic CHF  (congestive heart failure)    PVC's (premature ventricular contractions)    Presence of external cardiac defibrillator    Acute exacerbation of CHF (congestive heart failure)    Acute on chronic respiratory failure with hypoxia and hypercapnia    Delirium    Acute on chronic HFrEF (heart failure with reduced ejection fraction)    AMA (acute kidney injury)    Hyperkalemia     Past Medical History:   Diagnosis Date    Cancer     CHF (congestive heart failure)     COPD (chronic obstructive pulmonary disease)     Coronary artery disease     stent x 1    Family history of colon cancer     Hyperlipidemia     Hypertension     Sleep apnea     does not wear machine, supposed to wear cpap with oxygen and does not wear it    Type 2 diabetes mellitus      Past Surgical History:   Procedure Laterality Date    BACK SURGERY      CARDIAC CATHETERIZATION Left 04/13/2022    Procedure: Cardiac Catheterization/Vascular Study;  Surgeon: Todd Avendano MD;  Location:  [facility] CATH [facility]VASIVE LOC[facility];  Service: Cardiology;  Laterality: Left;    CARDIAC CATHETERIZATION      with stent x1    CARDIAC CATHETERIZATION N/A 9/12/2024    Procedure: Left Heart Cath;  Surgeon: Ruben Adler MD;  Location:  [facility] CATH INVAS[facility] LOC[facility];  Service: Cardiology;  Laterality: N/A;    COLON RESECTION N/A 12/5/2023    Procedure: COLON RESECTION LAPAROSCOPIC SIGMOID WITH DAVINCI ROBOT, INTRA-OPERATIVE FLEXIBLE SIGMOIDOSCOPY, SPLENIC FLEXURE MOBILIZATION, OPEN UMBILICAL HERNIA REPAIR;  Surgeon: Oma Velasquez MD;  Location:  [facility] OR;  Service: Robotics - DaVinci;  Laterality: N/A;    COLONOSCOPY N/A 10/09/2023    Diverticulosis; One 5mm polyp in ascending colon; One 5mm polyp in transverse colon; One 10mm polyp at 65cm proximal to anus; One 20mm polyp at 65cm proximal to anus-Tattooed; One 20mm polyp at 42cm proximal to anus-Clip (MR conditional) placed-Tattooed; Rule out malignancy-Partially obstructing tumor in recto-sigmoid colon-biopsied-Tattooed;  One 10mm polyp in rectum    ELBOW PROCEDURE      KNEE SURGERY      LUMBAR DISC SURGERY        General Information       Row Name 10/14/24 0834 10/14/24 0820       OT Time and Intention    Subjective Information complains of;pain  - complains of;pain  patient presented to ER with SOA and uncharacteristic aggressive behaviors. Dx: acute onset respiratoy failure with hypoxia and hypercapnia  -    Document Type evaluation  patient presented to ER with SOA and uncharacteristic aggressive behaviors. DX: acute onset respiratory failure w/ hypoxia and hypercapnia  - evaluation  -    Mode of Treatment occupational therapy  -KP occupational therapy  -    Patient Effort -- adequate  -KP    Symptoms Noted During/After Treatment -- increased pain  -    Comment -- while ambulating  -      Row Name 10/14/24 0820          General Information    Patient Profile Reviewed yes  -     Prior Level of Function independent:;all household mobility;transfer;bed mobility;ADL's  patient lived at home with 8 steps to enter. patient completed adls and functional mobility with mod I, used either cane or FWW for ambulation.  -     Existing Precautions/Restrictions fall;oxygen therapy device and L/min  -     Barriers to Rehab medically complex  -       Row Name 10/14/24 0820          Living Environment    People in Home alone  -       Row Name 10/14/24 0820          Home Main Entrance    Number of Stairs, Main Entrance eight  -KP     Stair Railings, Main Entrance none  -       Row Name 10/14/24 0820          Stairs Within Home, Primary    Number of Stairs, Within Home, Primary none  -       Row Name 10/14/24 0820          Cognition    Orientation Status (Cognition) oriented x 3  -       Row Name 10/14/24 0820          Safety Issues/Impairments Affecting Functional Mobility    Safety Issues Affecting Function (Mobility) friction/shear risk  -     Impairments Affecting Function (Mobility) balance;endurance/activity  tolerance;pain;strength;shortness of breath  -               User Key  (r) = Recorded By, (t) = Taken By, (c) = Cosigned By      Initials Name Provider Type    Meche Cabral OTR/L Occupational Therapist                     Mobility/ADL's       Row Name 10/14/24 0820          Bed Mobility    Bed Mobility sit-supine  -     Sit-Supine [place] (Bed Mobility) supervision;verbal cues  -     Assistive Device (Bed Mobility) bed rails  -     Comment, (Bed Mobility) patient found sitting EOB  -       Row Name 10/14/24 0820          Transfers    Transfers sit-stand transfer;stand-sit transfer  -       Row Name 10/14/24 0820          Sit-Stand Transfer    Sit-Stand [place] (Transfers) moderate assist (50% patient effort)  -     Assistive Device (Sit-Stand Transfers) cane, straight  -       Row Name 10/14/24 0820          Stand-Sit Transfer    Stand-Sit [place] (Transfers) contact guard;verbal cues  -     Assistive Device (Stand-Sit Transfers) cane, straight  -       Row Name 10/14/24 0820          Functional Mobility    Functional Mobility- Ind. Level minimum assist (75% patient effort);contact guard assist  -     Functional Mobility- Device cane, straight  -     Functional Mobility- Safety Issues supplemental O2  -     Functional Mobility- Comment requires someone to assist w/ oxygen equipment  -       Row Name 10/14/24 0820          Activities of Daily Living    BADL Assessment/Intervention lower body dressing  -       Row Name 10/14/24 0820          Lower Body Dressing Assessment/Training    [place] Level (Lower Body Dressing) don;dependent (less than 25% patient effort)  -     Position (Lower Body Dressing) edge of bed sitting;unsupported sitting  -               User Key  (r) = Recorded By, (t) = Taken By, (c) = Cosigned By      Initials Name Provider Type    Meche Cabral OTR/L Occupational Therapist                   Obj/Interventions       Row Name 10/14/24  0916          Sensory Assessment (Somatosensory)    Sensory Assessment (Somatosensory) UE sensation intact  -       Row Name 10/14/24 0916          Vision Assessment/Intervention    Visual Impairment/Limitations WNL  -       Row Name 10/14/24 0916          Range of Motion Comprehensive    General Range of Motion bilateral upper extremity ROM WFL  -       Row Name 10/14/24 0916          Strength Comprehensive (MMT)    General Manual Muscle Testing (MMT) Assessment upper extremity strength deficits identified  -     Comment, General Manual Muscle Testing (MMT) Assessment BUE grossly 4/5  -       Row Name 10/14/24 0916          Balance    Balance Assessment sitting static balance;sitting dynamic balance;standing static balance;standing dynamic balance  -     Static Sitting Balance standby assist  -KP     Dynamic Sitting Balance standby assist  -KP     Position, Sitting Balance unsupported;sitting edge of bed  -     Static Standing Balance minimal assist  -KP     Dynamic Standing Balance minimal assist  -KP     Position/Device Used, Standing Balance cane, straight  -KP               User Key  (r) = Recorded By, (t) = Taken By, (c) = Cosigned By      Initials Name Provider Type     Meche Wu, OTR/L Occupational Therapist                   Goals/Plan       Row Name 10/14/24 0820          Bed Mobility Goal 1 (OT)    Activity/Assistive Device (Bed Mobility Goal 1, OT) --  -KP     [place] Level/Cues Needed (Bed Mobility Goal 1, OT) --  -KP     Time Frame (Bed Mobility Goal 1, OT) --  -KP     Progress/Outcomes (Bed Mobility Goal 1, OT) --  -       Row Name 10/14/24 0820          Transfer Goal 1 (OT)    Activity/Assistive Device (Transfer Goal 1, OT) toilet  -KP     [place] Level/Cues Needed (Transfer Goal 1, OT) standby assist  -KP     Time Frame (Transfer Goal 1, OT) 10 days;long term goal (LTG)  -KP     Progress/Outcome (Transfer Goal 1, OT) new goal  -       Row Name 10/14/24 0820           Dressing Goal 1 (OT)    Activity/Device (Dressing Goal 1, OT) dressing skills, all  -KP     [place]/Cues Needed (Dressing Goal 1, OT) minimum assist (75% or more patient effort)  -KP     Time Frame (Dressing Goal 1, OT) long term goal (LTG);10 days  -KP     Progress/Outcome (Dressing Goal 1, OT) new goal  -KP       Row Name 10/14/24 0820          Toileting Goal 1 (OT)    Activity/Device (Toileting Goal 1, OT) toileting skills, all;commode, bedside without drop arms  -KP     [place] Level/Cues Needed (Toileting Goal 1, OT) minimum assist (75% or more patient effort)  -KP     Time Frame (Toileting Goal 1, OT) long term goal (LTG);10 days  -KP     Progress/Outcome (Toileting Goal 1, OT) new goal  -KP       Row Name 10/14/24 0820          Problem Specific Goal 1 (OT)    Problem Specific Goal 1 (OT) Pt will independently implement one pain management technique to decrease pain and improve functional performance.  -KP     Time Frame (Problem Specific Goal 1, OT) long term goal (LTG);by discharge  -KP     Progress/Outcome (Problem Specific Goal 1, OT) new goal  -KP       Row Name 10/14/24 0820          Therapy Assessment/Plan (OT)    Planned Therapy Interventions (OT) activity tolerance training;adaptive equipment training;BADL retraining;functional balance retraining;occupation/activity based interventions;patient/caregiver education/training;strengthening exercise;transfer/mobility retraining  -               User Key  (r) = Recorded By, (t) = Taken By, (c) = Cosigned By      Initials Name Provider Type    Meche Cabral OTR/L Occupational Therapist                   Clinical Impression       Row Name 10/14/24 0820          Pain Assessment    Pretreatment Pain Rating 6/10  -KP     Posttreatment Pain Rating 7/10  -KP     Pain Location back;extremity  -KP     Pain Side/Orientation right;lower  -KP     Pain Management Interventions exercise or physical activity utilized  -KP     Response to Pain  Interventions activity participation with increased pain  -       Row Name 10/14/24 0820          Plan of Care Review    Plan of Care Reviewed With patient  -     Outcome Evaluation OT evaluation completed. Patient A&O x3. Patient with supplemental O2 ia nasal cannul;a 3L. Patient states pain is 6/10 through back and RLE. Patient found sitting EOB eating breakfast. Patient agreeable to participate. Patient BUE ROM WFL and strength found to be grossly 4/5. Patient completed sit>stand to SC with mod A. Patient completed functional mobility with min A and use of cane, assistance given for O2 tank. Patient completed bed<>bathroom ambulation. Patient completed stand>sit with min A. Patient completed sit>supine with CGA and VCs. Patient left R sidelying position with call light within reach and bed alarm on. HOB slgihtly raised. Continued OT recommended for ADL training, transfer training and endurance training. D/C recommendation to SNF for continued therapy.  -       Row Name 10/14/24 0820          Therapy Assessment/Plan (OT)    Rehab Potential (OT) fair  -     Criteria for Skilled Therapeutic Interventions Met (OT) yes;meets criteria  -     Therapy Frequency (OT) 5 times/wk  -       Row Name 10/14/24 0820          Therapy Plan Review/Discharge Plan (OT)    Equipment Needs Upon Discharge (OT) tub bench  -     Anticipated Discharge Disposition (OT) skilled nursing facility  -[facility] Name 10/14/24 0820          Vital Signs    Post SpO2 (%) 90  -     O2 Delivery Post Treatment nasal cannula  -     Post Patient Position Sitting  -[facility] Name 10/14/24 0820          Positioning and Restraints    Pre-Treatment Position in bed  sitting EOB eating breakfast  -     Post Treatment Position bed  -     In Bed notified nsg;side lying right;call light within reach;encouraged to call for assist;exit alarm on;side rails up x3  -               User Key  (r) = Recorded By, (t) = Taken By, (c) = Cosigned  By      Initials Name Provider Type    Meche Cabral OTR/L Occupational Therapist                   Outcome Measures       Row Name 10/14/24 0820          How much help from another is currently needed...    Putting on and taking off regular lower body clothing? 2  -KP     Bathing (including washing, rinsing, and drying) 2  -KP     Toileting (which includes using toilet bed pan or urinal) 2  -KP     Putting on and taking off regular upper body clothing 3  -KP     Taking care of personal grooming (such as brushing teeth) 4  -KP     Eating meals 4  -KP     AM-PAC 6 Clicks Score (OT) 17  -KP       Row Name 10/14/24 0900          How much help from another person do you currently need...    Turning from your back to your side while in flat bed without using bedrails? 4  -EE     Moving from lying on back to sitting on the side of a flat bed without bedrails? 3  -EE     Moving to and from a bed to a chair (including a wheelchair)? 3  -EE     Standing up from a chair using your arms (e.g., wheelchair, bedside chair)? 2  -EE     Climbing 3-5 steps with a railing? 2  -EE     To walk in hospital room? 2  -EE     AM-PAC 6 Clicks Score (PT) 16  -EE     Highest Level of Mobility Goal 5 --> Static standing  -EE       Row Name 10/14/24 0820          Functional Assessment    Outcome Measure Options AM-PAC 6 Clicks Daily Activity (OT)  -KP               User Key  (r) = Recorded By, (t) = Taken By, (c) = Cosigned By      Initials Name Provider Type    Meche Cabral OTR/L Occupational Therapist    Sarah Byrne RN Registered Nurse                    Occupational Therapy Education       Title: PT OT SLP Therapies (In Progress)       Topic: Occupational Therapy (Not Started)       Point: ADL training (Not Started)       Description:   Instruct learner(s) on proper safety adaptation and remediation techniques during self care or transfers.   Instruct in proper use of assistive devices.                  Learner Progress:   Not documented in this visit.              Point: Home exercise program (Not Started)       Description:   Instruct learner(s) on appropriate technique for monitoring, assisting and/or progressing therapeutic exercises/activities.                  Learner Progress:  Not documented in this visit.              Point: Precautions (Not Started)       Description:   Instruct learner(s) on prescribed precautions during self-care and functional transfers.                  Learner Progress:  Not documented in this visit.              Point: Body mechanics (Not Started)       Description:   Instruct learner(s) on proper positioning and spine alignment during self-care, functional mobility activities and/or exercises.                  Learner Progress:  Not documented in this visit.                                  OT Recommendation and Plan  Planned Therapy Interventions (OT): activity tolerance training, adaptive equipment training, BADL retraining, functional balance retraining, occupation/activity based interventions, patient/caregiver education/training, strengthening exercise, transfer/mobility retraining  Therapy Frequency (OT): 5 times/wk  Plan of Care Review  Plan of Care Reviewed With: patient  Outcome Evaluation: OT evaluation completed. Patient A&O x3. Patient with supplemental O2 ia nasal cannul;a 3L. Patient states pain is 6/10 through back and RLE. Patient found sitting EOB eating breakfast. Patient agreeable to participate. Patient BUE ROM WFL and strength found to be grossly 4/5. Patient completed sit>stand to SC with mod A. Patient completed functional mobility with min A and use of cane, assistance given for O2 tank. Patient completed bed<>bathroom ambulation. Patient completed stand>sit with min A. Patient completed sit>supine with CGA and VCs. Patient left R sidelying position with call light within reach and bed alarm on. HOB slgihtly raised. Continued OT recommended for ADL training, transfer training  and endurance training. D/C recommendation to SNF for continued therapy.     Time Calculation:         Time Calculation- OT       Row Name 10/14/24 0820             Time Calculation- OT    OT Start Time 0805  -KP      OT Stop Time 0850  -KP      OT Time Calculation (min) 45 min  -KP      OT Received On 10/14/24  -KP      OT Goal Re-Cert Due Date 10/24/24  -         Untimed Charges    OT Eval/Re-eval Minutes 45  -KP         Total Minutes    Untimed Charges Total Minutes 45  -KP       Total Minutes 45  -KP                User Key  (r) = Recorded By, (t) = Taken By, (c) = Cosigned By      Initials Name Provider Type     Meche Wu OTR/L Occupational Therapist                  Therapy Charges for Today       Code Description Service Date Service Provider Modifiers Qty    35488130120 HC OT EVAL MOD COMPLEXITY 3 10/14/2024 Meche Wu OTR/L GO 1                 Meceh Wu OTR/L  10/14/2024    Electronically signed by Meche Wu OTR/L at 10/14/24 1153       Speech Language Pathology Notes (most recent note)    No notes exist for this encounter.          Respiratory Therapy Notes (most recent note)        Myah Lock RRT at 10/14/24 0706          RT EQUIPMENT DEVICE RELATED - SKIN ASSESSMENT    Jon Score:  Jon Score: 16     RT Medical Equipment/Device:     NIV Mask:  Under-the-nose   size:  b    Skin Assessment:      Neck:  Intact    Device Skin Pressure Protection:  Skin-to skin areas padded    Nurse Notification:  No    Myah Lock RRT    Electronically signed by Myah Lock RRT at 10/14/24 0706

## 2024-10-15 NOTE — PROGRESS NOTES
[facility]  Progress Note  Patient Name: Tucker Knox  Date of Admission: 10/12/2024  Today's Date: 10/15/24     Code Status and Medical Interventions: No CPR (Do Not Attempt to Resuscitate); Limited Support; No intubation (DNI)   Ordered at: 10/12/24 1122     Medical Intervention Limits:    No intubation (DNI)     Code Status (Patient has no pulse and is not breathing):    No CPR (Do Not Attempt to Resuscitate)     Medical Interventions (Patient has pulse or is breathing):    Limited Support     Subjective   Chief complaint/Reason for Referral/Visit: Follow up on Goals of Care/Advance Care Planning.    Medical record reviewed. Events noted.  Renal ultrasound completed on 10/14/2024 however results pending.  Chest x-ray completed on afternoon of 10/14/2024 revealed cardiomegaly, small to moderate right pleural effusion with right lung opacification.  Also noted mediastinal fullness could be vascular however mediastinal lymphadenopathy is not excluded.  Noted to have urinary retention and required Virgen catheter placement yesterday.  Labs collected this morning reveals slight improvement in renal function.  Potassium remains elevated at 5.6.  He is lying in bed, asleep, and in no apparent distress.  Arouses to voice and answers orientation questions correctly.  On 3 L oxygen nasal cannula.  Denies any pain.  Reports he is tired.  No visitors present.     Advance Care Planning   Advanced Directives:  Advance directive on file.   Advance Care Planning Discussion: Followed up with Mr. Knox who was agreeable to discussing goals of care further.  Video conference held with Mr. Knox and his sister, Oxana.  Explored Mr. Knox's understanding of condition/prognosis.  He initially appeared to have poor insight/prognostic awareness however after further discussion appeared to have better understanding.  Discussed concerns with comorbidities including heart failure, colon  "cancer, coronary artery disease, respiratory failure, etc.  Shared unfortunately long-term prognosis appears poor due to multiple comorbidities.   Oxana shared with him he is at high risk for rehospitalization and continued workup with essentially the same outcome.  Mr. Knox was tearful and demonstrated understanding.  He reflected on recent hospitalizations and stated \"I am tired of doing all of this.\"  Support provided.  Discussed treatment options at length including details and expectations.  After further discussion Mr. Knox expressed wishes to transition to comfort measures.  Oxana shared he will need SNF placement as he does not have 24/7 caregivers.   Briefly discussed SNF with hospice.  They are interested in determining if his [place] could [place] interested in [place] to SNF closer to is family.  Will request assistance from SW.  Mr. Knox and family appreciative of discussion.  Denied any further questions at this time.  Encouraged if arise.  Will plan to see if Mr. Knox is agreeable to completing new MOST form to reflect wishes of comfort prior to discharge. Offered to call his son, Aniceto, as well however Mr. Knox reports he is at work and requested he not be bothered.  Oxana shared she will provide him with update.  Palliative care office [place] number provided if he has any questions/concerns as well.     The patient receives support from his son and sibling. Patient's son is his healthcare surrogate.    Due to the palliative care topics discussed including goals of care, treatment options, discharge options, medical priorities, and hospice services we will establish an advance care plan.    Goals of care: Ongoing.    Review of Systems   Constitutional: Positive for malaise/fatigue.   Cardiovascular:  Positive for dyspnea on exertion. Negative for chest pain.   Respiratory:  Positive for shortness of breath.      Pain Assessment  Preferred Pain " Scale: number (Numeric Rating Pain Scale)  Pain Location: extremity, chest  Pain Description: burning, other (see comments) (heartburn PRN tums)  Objective   Diagnostics: Reviewed      Intake/Output Summary (Last 24 hours) at 10/15/2024 1039  Last data filed at 10/15/2024 0624  Gross per 24 hour   Intake 1080 ml   Output 850 ml   Net 230 ml     Current Facility-Administered Medications   Medication Dose Route Frequency Provider Last Rate Last Admin    acetaminophen (TYLENOL) tablet 650 mg  650 mg Oral Q6H PRN BRITT Stout DO   650 mg at 10/13/24 0851    albuterol (PROVENTIL) nebulizer solution 0.042% 1.25 mg/3mL  1.25 mg Nebulization Q6H PRN Jazmin Garrison APRN   1.25 mg at 10/15/24 0655    aspirin EC tablet 81 mg  81 mg Oral Daily Jazmin Garrison APRN   81 mg at 10/15/24 0929    [Held by provider] bumetanide (BUMEX) injection 1 mg  1 mg Intravenous Q12H Jazmin Garrison APRN   1 mg at 10/13/24 1225    calcium carbonate (TUMS) chewable tablet 500 mg (200 mg elemental)  2 tablet Oral TID PRN Karen Parekh DO        dextrose (D50W) (25 g/50 mL) IV injection 25 g  25 g Intravenous Q15 Min PRN Jazmin Garrison APRN        dextrose (GLUTOSE) oral gel 15 g  15 g Oral Q15 Min PRN Jazmin Garrison APRN        DULoxetine (CYMBALTA) DR capsule 30 mg  30 mg Oral Daily Jazmin Garrison APRN   30 mg at 10/15/24 0930    Enoxaparin Sodium (LOVENOX) syringe 40 mg  40 mg Subcutaneous Q12H Jazmin Garrison APRN   40 mg at 10/15/24 0929    gabapentin (NEURONTIN) capsule 600 mg  600 mg Oral Q12H BRITT Stout DO   600 mg at 10/15/24 0929    glucagon (GLUCAGEN) injection 1 mg  1 mg Intramuscular Q15 Min PRN Jazmin Garrison APRN        hydrocortisone-bacitracin-zinc oxide-nystatin (MAGIC BARRIER) ointment 1 Application  1 Application Topical 4x Daily Akanksha López, APRN   1 Application at 10/15/24 0931    insulin glargine (LANTUS, SEMGLEE) injection 14 Units  14 Units Subcutaneous Q12H ,  "VIV Wu   14 Units at 10/15/24 0930    Insulin Lispro (humaLOG) injection 2-7 Units  2-7 Units Subcutaneous 4x Daily AC & at Bedtime Jazmin Garrison APRN   5 Units at 10/15/24 0930    metoprolol succinate XL (TOPROL-XL) 24 hr tablet 25 mg  25 mg Oral Q24H Jazmin Garrison APRN   25 mg at 10/15/24 0930    nitroglycerin (NITROSTAT) SL tablet 0.4 mg  0.4 mg Sublingual Q5 Min PRN Jazmin Garrison APRN        pravastatin (PRAVACHOL) tablet 40 mg  40 mg Oral Nightly Jazmin Garrison APRN   40 mg at 10/14/24 2139    QUEtiapine (SEROquel) tablet 25 mg  25 mg Oral Nightly Jazmin Garrison APRN   25 mg at 10/14/24 2137    [Held by provider] sacubitril-valsartan (ENTRESTO) 24-26 MG tablet 1 tablet  1 tablet Oral BID Jazmin Garrison APRN        sodium chloride 0.9 % flush 10 mL  10 mL Intravenous PRN Vijay Leary MD   10 mL at 10/12/24 0731    sodium chloride 0.9 % flush 10 mL  10 mL Intravenous PRN Vijay Leary MD   10 mL at 10/12/24 0731          acetaminophen    albuterol    calcium carbonate    dextrose    dextrose    glucagon (human recombinant)    nitroglycerin    sodium chloride    [COMPLETED] Insert Peripheral IV **AND** sodium chloride  Current medications patient is presently taking including all prescriptions, over-the-counter, herbals and vitamin/mineral/dietary (nutritional) supplements with reviewed including route, type, dose and frequency and are current per MAR at time of dictation.    Assessment:  Vital Signs: /65 (BP Location: Right arm, Patient Position: Lying)   Pulse 78   Temp 97.1 °F (36.2 °C) (Oral)   Resp 18   Ht 175.3 cm (69\")   Wt 132 kg (291 lb 10.7 oz)   SpO2 94%   BMI 43.07 kg/m²     Physical Exam  Vitals and nursing note reviewed.   Constitutional:       General: He is sleeping. He is not in acute distress.     Appearance: He is ill-appearing.      Interventions: Nasal cannula in place.   HENT:      Head: Normocephalic and atraumatic.   Eyes:      General: Lids " are normal.      Extraocular Movements: Extraocular movements intact.   Neck:      Vascular: No JVD.      Trachea: Trachea normal.   Cardiovascular:      Rate and Rhythm: Normal rate.   Pulmonary:      Effort: Pulmonary effort is normal.      Breath sounds: Decreased breath sounds present.   Musculoskeletal:      Cervical back: Neck supple.   Skin:     General: Skin is warm and dry.   Neurological:      Mental Status: He is alert, oriented to person, place, and time and easily aroused.     Functional status: Palliative Performance Scale Score: Performance 50% based on the following measures: Ambulation: Mainly sit or lie down, Activity and Evidence of Disease: Unable to do any work, extensive evidence of disease, Self-Care: Considerable assistance required,  Intake: Normal or reduced, LOC: Full or confusion.  ECOG Status(3) Capable of limited self-care, confined to bed or chair > 50% of waking hours.  Nutritional status: Albumin 3.4. Body mass index is 43.07 kg/m².  Patient status: Disease state: No further treatment being pursued.    Active Hospital Problems    Diagnosis     **Acute on chronic respiratory failure with hypoxia and hypercapnia     Hyperkalemia     AMA (acute kidney injury)     Acute on chronic HFrEF (heart failure with reduced ejection fraction)     Colon adenocarcinoma     Type 2 myocardial infarction due to heart failure     Pulmonary HTN     Type 2 diabetes mellitus with hyperglycemia, with long-term current use of insulin     Hypertension      Impression/Problem List:  Acute on chronic HFrEF  Acute on chronic respiratory failure with hypoxia and hypercapnia  Acute kidney injury  Colon adenocarcinoma - Declined systemic and radiation treatment   Pulmonary hypertension  Impaired mobility   Pleural effusion, right   Hyperkalemia   Hyperphosphatemia   Type 2 myocardial infarction due to heart failure  Type 2 diabetes mellitus  Hypertension      Coronary artery disease    Plan / Recommendations    Palliative Care Encounter   Followed up with Mr. Knox to discuss goals of care further.  He was agreeable to discussion.   Sister, Oxana, also present through video conference.  Details of discussion above.   Discussed concerns with overall poor long-term prognosis secondary to comorbidities including heart failure, colon cancer, coronary artery disease, respiratory failure, etc.   Medical priorities and treatment options discussed.  After further discussion Mr. Knox expressed wishes to transition to comfort measures.    CODE STATUS changed to reflect wishes.  Updated nursing and attending.   Interested in SNF placement with hospice.    Inquired about seeing if his [place] could [place] to eventually [place] to SNF closer to is family.   Discussed with SW.   Will plan to see if interested in completing new MOST form prior to discharge.       Comfort Measures  Discontinue orders not in line with comfort.   May continue maintenance medications if able to safely tolerate PO.   Palliative/comfort adjuvants ordered.     Thank you for allowing us to participate in patient's plan of care. Palliative Care Team will continue to follow patient.     I spent an additional 35 minutes on advance care planning, goals of care, and code status discussion.  Consent was obtained for ACP discussion.     Electronically signed by, VIV Portillo, 10/15/24.

## 2024-10-15 NOTE — PROGRESS NOTES
RT EQUIPMENT DEVICE RELATED - SKIN ASSESSMENT    Jon Score:  Jon Score: 17     RT Medical Equipment/Device:     NIV Mask:  Under-the-nose   size:  B    Skin Assessment:       :  Intact    Device Skin Pressure Protection:  Pressure points protected    Nurse Notification:  Christina Montoya, RRT

## 2024-10-16 PROBLEM — I50.9 HEART FAILURE, UNSPECIFIED: Status: ACTIVE | Noted: 2024-01-01

## 2024-10-16 PROBLEM — I21.A1 TYPE 2 MYOCARDIAL INFARCTION: Status: ACTIVE | Noted: 2024-01-01

## 2024-10-16 NOTE — THERAPY DISCHARGE NOTE
Acute Care - Occupational Therapy Discharge Summary  [facility]     Patient Name: Tucker Knox  : 1960  MRN: [id number]    Today's Date: 10/16/2024                 Admit Date: 10/12/2024        OT Recommendation and Plan    Visit Dx:    ICD-10-CM ICD-9-CM   1. Respiratory failure with hypoxia and hypercapnia, unspecified chronicity  J96.91 518.81    J96.92    2. Recurrent right pleural effusion  J90 511.9   3. Acute on chronic respiratory failure with hypoxia and hypercapnia  J96.21 518.84    J96.22 786.09     799.02   4. Acute on chronic congestive heart failure, unspecified heart failure type  I50.9 428.0   5. COPD exacerbation  J44.1 491.21   6. Impaired mobility [Z74.09]  Z74.09 799.89                OT Rehab Goals       Row Name 10/16/24 1200             Transfer Goal 1 (OT)    Activity/Assistive Device (Transfer Goal 1, OT) toilet  -KP      [place] Level/Cues Needed (Transfer Goal 1, OT) standby assist  -KP      Time Frame (Transfer Goal 1, OT) 10 days;long term goal (LTG)  -KP      Progress/Outcome (Transfer Goal 1, OT) goal not met;discharged from facility  -         Dressing Goal 1 (OT)    Activity/Device (Dressing Goal 1, OT) dressing skills, all  -KP      [place]/Cues Needed (Dressing Goal 1, OT) minimum assist (75% or more patient effort)  -KP      Time Frame (Dressing Goal 1, OT) long term goal (LTG);10 days  -KP      Progress/Outcome (Dressing Goal 1, OT) goal not met;discharged from facility  -         Toileting Goal 1 (OT)    Activity/Device (Toileting Goal 1, OT) toileting skills, all;commode, bedside without drop arms  -KP      [place] Level/Cues Needed (Toileting Goal 1, OT) minimum assist (75% or more patient effort)  -      Time Frame (Toileting Goal 1, OT) long term goal (LTG);10 days  -KP      Progress/Outcome (Toileting Goal 1, OT) goal not met;discharged from facility  -         Problem Specific Goal 1 (OT)    Problem Specific Goal 1 (OT) Pt will  independently implement one pain management technique to decrease pain and improve functional performance.  -KP      Time Frame (Problem Specific Goal 1, OT) long term goal (LTG);by discharge  -KP      Progress/Outcome (Problem Specific Goal 1, OT) goal partially met;discharged from facility  -                User Key  (r) = Recorded By, (t) = Taken By, (c) = Cosigned By      Initials Name Provider Type Discipline    Meche Cabral, OTR/L Occupational Therapist OT                                OT Discharge Summary  Anticipated Discharge Disposition (OT): skilled nursing facility  Reason for Discharge: Discharge from facility  Outcomes Achieved: Refer to plan of care for updates on goals achieved  Discharge Destination: SNF      VIRGIL Valdivia  10/16/2024

## 2024-10-16 NOTE — CASE MANAGEMENT/SOCIAL WORK
Continued Stay Note   [facility]     Patient Name: Tucker Knox  MRN: [id number]  Today's Date: 10/16/2024    Admit Date: 10/12/2024    Plan: [facility]   Discharge Plan       Row Name 10/16/24 1046       Plan    Plan Comments PT has been [facility] pending precert.                   Discharge Codes    No documentation.                 Expected Discharge Date and Time       Expected Discharge Date Expected Discharge Time    Oct 16, 2024               KARAN Rios

## 2024-10-16 NOTE — PROGRESS NOTES
RT EQUIPMENT DEVICE RELATED - SKIN ASSESSMENT    Jon Score:  Jon Score: 18     RT Medical Equipment/Device:     NIV Mask:  Under-the-nose   size:  B    Skin Assessment:       :  Intact    Device Skin Pressure Protection:  Pressure points protected    Nurse Notification:  Christina Montoya, RRT

## 2024-10-16 NOTE — CASE MANAGEMENT/SOCIAL WORK
Continued Stay Note  [facility]     Patient Name: Tucker Knox  MRN: [id number]  Today's Date: 10/16/2024    Admit Date: 10/12/2024    Plan: [facility]   Discharge Plan       Row Name 10/16/24 1106       Plan    Plan Comments PT has approval to go to [facility] and can go today if medically ready. [facility] Phone: [phone]  Fax: [phone]. SNF pharmacy ([facility] has been updated in epic.    Final Discharge Disposition Code 03 - skilled nursing facility (SNF)      Row Name 10/16/24 1046       Plan    Plan Comments PT has been accepted to [facility] pending precert.                   Discharge Codes    No documentation.                 Expected Discharge Date and Time       Expected Discharge Date Expected Discharge Time    Oct 16, 2024               KARAN Rios

## 2024-10-16 NOTE — PROGRESS NOTES
Nephrology ([facility]) Progress Note      Patient:  Tucker Knox  YOB: 1960  Date of Service: 10/16/2024  MRN: [id number]   Acct: [id number]   Primary Care Physician: Kt Alfredo MD  Advance Directive:   Code Status and Medical Interventions: No CPR (Do Not Attempt to Resuscitate); Comfort Measures   Ordered at: 10/15/24 1155     Code Status (Patient has no pulse and is not breathing):    No CPR (Do Not Attempt to Resuscitate)     Medical Interventions (Patient has pulse or is breathing):    Comfort Measures     Admit Date: 10/12/2024       Hospital Day: 4  Referring Provider: No ref. provider found      Patient personally seen and examined.  Complete chart including Consults, Notes, Operative Reports, Labs, Cardiology, and Radiology studies reviewed as able.      Subjective:  Tucker Knox is a 64 y.o. male for whom we were consulted for evaluation and treatment of acute kidney injury. No prior known renal issues. History of systolic/diastolic CHF, COPD, coronary artery disease, hypertension, type 2 diabetes.  Frequent admission for CHF exacerbations. Most recently was discharged from hospital on 10/04. Returned to ER on 10/12 with dyspnea, abdominal tightness. Creatinine 1.56 on admission. Treated with Bumex and renal function worsened. Nephrology consulted 10/13. Diuretics were held.    Today is awake and alert.  No labs were obtained today.  Patient is now wanting to switch to comfort care.      Allergies:  Penicillins    Home Meds:  Medications Prior to Admission   Medication Sig Dispense Refill Last Dose/Taking    acetaminophen (TYLENOL) 325 MG tablet Take 2 tablets by mouth Every 6 (Six) Hours As Needed for Mild Pain. 60 tablet 0 Past Week    aspirin 81 MG EC tablet Take 1 tablet by mouth Daily. 100 tablet 2 10/11/2024 Morning    dapagliflozin Propanediol (Farxiga) 10 MG tablet Take 10 mg by mouth Daily. 30 tablet 0 Taking    DULoxetine  (CYMBALTA) 30 MG capsule Take 1 capsule by mouth Daily. 30 capsule 0 10/11/2024 Morning    furosemide (LASIX) 40 MG tablet Take 0.5 tablets by mouth Daily. 30 tablet 0 Taking    gabapentin (NEURONTIN) 600 MG tablet Take 1 tablet by mouth 2 (Two) Times a Day. Patient states he takes 800 mg bid 60 tablet 0 10/11/2024    insulin detemir (LEVEMIR) 100 UNIT/ML injection Inject 14 Units under the skin into the appropriate area as directed 2 (Two) Times a Day.   Taking    metFORMIN (GLUCOPHAGE) 1000 MG tablet Take 1 tablet by mouth 2 (Two) Times a Day With Meals.   Taking    metoprolol succinate XL (TOPROL-XL) 25 MG 24 hr tablet Take 1 tablet by mouth Daily. 30 tablet 0 10/11/2024    nicotine (NICODERM CQ) 21 MG/24HR patch Place 1 patch on the skin as directed by provider Daily. 14 patch 0 Taking    polyethylene glycol (MIRALAX) 17 GM/SCOOP powder Take 17 g by mouth Daily.   Taking    pravastatin (PRAVACHOL) 40 MG tablet Take 1 tablet by mouth Every Night. 30 tablet 0 10/11/2024    QUEtiapine (SEROquel) 25 MG tablet Take 1 tablet by mouth Every Night. 30 tablet 0 Taking    sacubitril-valsartan (Entresto) 24-26 MG tablet Take 1 tablet by mouth 2 (Two) Times a Day. 60 tablet 0 Taking    Tradjenta 5 MG tablet tablet Take 1 tablet by mouth Daily.   Taking    albuterol (PROVENTIL) (2.5 MG/3ML) 0.083% nebulizer solution Take 2.5 mg by nebulization Every 6 (Six) Hours As Needed for Wheezing.       albuterol sulfate  (90 Base) MCG/ACT inhaler Inhale 2 puffs Every 4 (Four) Hours As Needed for Wheezing. 8 g 0     nitroglycerin (NITROSTAT) 0.4 MG SL tablet Place 1 tablet under the tongue Every 5 (Five) Minutes As Needed for Chest Pain. Take no more than 3 doses in 15 minutes.   Unknown       Medicines:  Current Facility-Administered Medications   Medication Dose Route Frequency Provider Last Rate Last Admin    acetaminophen (TYLENOL) tablet 650 mg  650 mg Oral Q6H PRN BRITT Stout DO   650 mg at 10/16/24 0609     aspirin EC tablet 81 mg  81 mg Oral Daily Jazmin Garrison APRN   81 mg at 10/16/24 0903    atropine 1 % ophthalmic solution 2 drop  2 drop Sublingual BID PRN Yanely Velasquez APRN        bisacodyl (DULCOLAX) suppository 10 mg  10 mg Rectal Daily PRN Yanely Velasquez APRN        [Held by provider] bumetanide (BUMEX) injection 1 mg  1 mg Intravenous Q12H Jazmin Garrison APRN   1 mg at 10/13/24 1225    calcium carbonate (TUMS) chewable tablet 500 mg (200 mg elemental)  2 tablet Oral TID PRN Karen Parekh DO        dextrose (D50W) (25 g/50 mL) IV injection 25 g  25 g Intravenous Q15 Min PRN Jazmin Garrison APRN        dextrose (GLUTOSE) oral gel 15 g  15 g Oral Q15 Min PRN Jazmin Garrison APRN        diphenoxylate-atropine (LOMOTIL) 2.5-0.025 MG per tablet 1 tablet  1 tablet Oral Q2H PRN Yanely Velasquez APRN        DULoxetine (CYMBALTA) DR capsule 30 mg  30 mg Oral Daily Jazmin Garrison APRN   30 mg at 10/16/24 0903    furosemide (LASIX) injection 20 mg  20 mg Intravenous Q6H PRN Yanely Velasquez APRN        Or    furosemide (LASIX) injection 20 mg  20 mg Subcutaneous Q6H PRN Yanely Velasquez APRN        gabapentin (NEURONTIN) capsule 600 mg  600 mg Oral Q12H BRITT Stout DO   600 mg at 10/16/24 0903    glucagon (GLUCAGEN) injection 1 mg  1 mg Intramuscular Q15 Min PRN Jazmin Garrison APRN        HYDROcodone-acetaminophen (NORCO) 5-325 MG per tablet 1 tablet  1 tablet Oral Q4H PRN Yanely Velasquez APRN        Or    HYDROcodone-acetaminophen (NORCO) 7.5-325 MG per tablet 1 tablet  1 tablet Oral Q4H PRN Yanely Velasquez APRN        Or    HYDROcodone-acetaminophen (NORCO)  MG per tablet 1 tablet  1 tablet Oral Q4H PRN Ron, Yanely K, APRN        hydrocortisone-bacitracin-zinc oxide-nystatin (MAGIC BARRIER) ointment 1 Application  1 Application Topical 4x Daily Akanksha López, APRN   1 Application at 10/16/24 1231    insulin glargine (LANTUS, SEMGLEE) injection 5  Units  5 Units Subcutaneous Q12H Patricia Arthur DO   5 Units at 10/16/24 0903    Insulin Lispro (humaLOG) injection 2-7 Units  2-7 Units Subcutaneous 4x Daily AC & at Bedtime Jazmin Garrison APRN   4 Units at 10/16/24 1231    ipratropium-albuterol (DUO-NEB) nebulizer solution 3 mL  3 mL Nebulization Q4H PRN Yanely Velasquez APRN   3 mL at 10/15/24 1412    ipratropium-albuterol (DUO-NEB) nebulizer solution 3 mL  3 mL Nebulization 4x Daily - RT Patricia Arthur DO   3 mL at 10/16/24 0955    LORazepam (ATIVAN) tablet 0.5 mg  0.5 mg Oral Q1H PRN Yanely Velasquez APRN        Or    LORazepam (ATIVAN) injection 0.5 mg  0.5 mg Intravenous Q1H PRN Yanely Velasquez APRN        Or    LORazepam (ATIVAN) 2 MG/ML concentrated solution 0.5 mg  0.5 mg Sublingual Q1H PRN Yanely Velasquez APRN        LORazepam (ATIVAN) tablet 1 mg  1 mg Oral Q1H PRN Yanely Velasquez APRN        Or    LORazepam (ATIVAN) injection 1 mg  1 mg Intravenous Q1H PRN Yanely Velasquez APRN        Or    LORazepam (ATIVAN) 2 MG/ML concentrated solution 1 mg  1 mg Sublingual Q1H PRN Yanely Velasquez APRN        LORazepam (ATIVAN) tablet 2 mg  2 mg Oral Q1H PRN Yanely Velasquez APRN        Or    LORazepam (ATIVAN) injection 2 mg  2 mg Intravenous Q1H PRN Yanely Velasquez APRN        Or    LORazepam (ATIVAN) 2 MG/ML concentrated solution 2 mg  2 mg Sublingual Q1H PRN Yanely Velasquez APRN        metoprolol succinate XL (TOPROL-XL) 24 hr tablet 25 mg  25 mg Oral Q24H Jazmin Garrison APRN   25 mg at 10/16/24 0903    nitroglycerin (NITROSTAT) SL tablet 0.4 mg  0.4 mg Sublingual Q5 Min PRN Jazmin Garrison APRN        Polyvinyl Alcohol-Povidone PF (ARTIFICIAL TEARS) 1.4-0.6 % ophthalmic solution 1 drop  1 drop Both Eyes Q30 Min PRN Yanely Velasquez APRN        pravastatin (PRAVACHOL) tablet 40 mg  40 mg Oral Nightly , VIV Wu   40 mg at 10/15/24 2035    QUEtiapine (SEROquel) tablet 25 mg  25 mg Oral Nightly ,  VIV Wu   25 mg at 10/15/24 2035    [Held by provider] sacubitril-valsartan (ENTRESTO) 24-26 MG tablet 1 tablet  1 tablet Oral BID Jazmin APRN        scopolamine patch 1 mg/72 hr  1 patch Transdermal Q72H PRN Yanely Velasquez APRN        sodium chloride 0.9 % flush 10 mL  10 mL Intravenous PRN Vijay Leary MD   10 mL at 10/12/24 0731    sodium chloride 0.9 % flush 10 mL  10 mL Intravenous PRN Vijay Leary MD   10 mL at 10/12/24 0731       Past Medical History:  Past Medical History:   Diagnosis Date    Cancer     CHF (congestive heart failure)     COPD (chronic obstructive pulmonary disease)     Coronary artery disease     stent x 1    Family history of colon cancer     Hyperlipidemia     Hypertension     Sleep apnea     does not wear machine, supposed to wear cpap with oxygen and does not wear it    Type 2 diabetes mellitus        Past Surgical History:  Past Surgical History:   Procedure Laterality Date    BACK SURGERY      CARDIAC CATHETERIZATION Left 04/13/2022    Procedure: Cardiac Catheterization/Vascular Study;  Surgeon: Todd Avendano MD;  Location:  [facility] CATH [facility]AS[facility];  Service: Cardiology;  Laterality: Left;    CARDIAC CATHETERIZATION      with stent x1    CARDIAC CATHETERIZATION N/A 9/12/2024    Procedure: Left Heart Cath;  Surgeon: Ruben Adler MD;  Location:  [facility] CATH [facility]VAS[facility];  Service: Cardiology;  Laterality: N/A;    COLON RESECTION N/A 12/5/2023    Procedure: COLON RESECTION LAPAROSCOPIC SIGMOID WITH DAVINCI ROBOT, INTRA-OPERATIVE FLEXIBLE SIGMOIDOSCOPY, SPLENIC FLEXURE MOBILIZATION, OPEN UMBILICAL HERNIA REPAIR;  Surgeon: Oma Velasquez MD;  Location: [facility] OR;  Service: Robotics - DaVinci;  Laterality: N/A;    COLONOSCOPY N/A 10/09/2023    Diverticulosis; One 5mm polyp in ascending colon; One 5mm polyp in transverse colon; One 10mm polyp at 65cm proximal to anus; One 20mm polyp at 65cm proximal to anus-Tattooed; One 20mm polyp at 42cm  proximal to anus-Clip (MR conditional) placed-Tattooed; Rule out malignancy-Partially obstructing tumor in recto-sigmoid colon-biopsied-Tattooed; One 10mm polyp in rectum    ELBOW PROCEDURE      KNEE SURGERY      LUMBAR DISC SURGERY         Family History  Family History   Problem Relation Age of Onset    Esophageal cancer Mother     Heart disease Mother     Colon cancer Father 80    Heart disease Father     No Known Problems Sister     COPD Brother     Alcohol abuse Brother     Colon polyps Neg Hx     Liver cancer Neg Hx     Rectal cancer Neg Hx     Stomach cancer Neg Hx     Liver disease Neg Hx        Social History  Social History     Socioeconomic History    Marital status:    Tobacco Use    Smoking status: Former     Current packs/day: 1.00     Average packs/day: 1 pack/day for 45.0 years (45.0 ttl pk-yrs)     Types: Cigarettes    Smokeless tobacco: Never   Vaping Use    Vaping status: Some Days    Substances: Nicotine, Flavoring    Devices: Disposable   Substance and Sexual Activity    Alcohol use: Not Currently    Drug use: Yes     Types: Marijuana    Sexual activity: Defer       Review of Systems:  History obtained from chart review and the patient  General ROS: No fever or chills  Respiratory ROS: No cough, shortness of breath, wheezing  Cardiovascular ROS: positive for - dyspnea on exertion  No chest pain or palpitations  Gastrointestinal ROS: No abdominal pain or melena  Genito-Urinary ROS: No dysuria or hematuria  Psych ROS: No anxiety and depression  14 point ROS reviewed with the patient and negative except as noted above and in the HPI unless unable to obtain.    Objective:  Patient Vitals for the past 24 hrs:   BP Temp Temp src Pulse Resp SpO2   10/16/24 1150 110/80 97.7 °F (36.5 °C) Oral 82 18 90 %   10/16/24 0955 -- -- -- 83 18 95 %   10/16/24 0816 118/67 97.2 °F (36.2 °C) Oral 87 18 98 %   10/16/24 0553 -- -- -- 86 16 94 %   10/15/24 2306 -- -- -- 74 12 95 %   10/15/24 2004 -- -- -- 86 19  98 %   10/15/24 1958 118/73 97.4 °F (36.3 °C) Axillary 86 20 95 %   10/15/24 1952 -- -- -- 86 19 94 %   10/15/24 1558 132/79 97.2 °F (36.2 °C) Oral 74 18 94 %   10/15/24 1412 -- -- -- 82 18 96 %       Intake/Output Summary (Last 24 hours) at 10/16/2024 1303  Last data filed at 10/16/2024 0800  Gross per 24 hour   Intake 480 ml   Output 1725 ml   Net -1245 ml     General: awake/alert   Chest:  clear to auscultation bilaterally without respiratory distress  CVS: regular rate and rhythm  Abdominal: soft, nontender, positive bowel sounds  Extremities:  trace lower ext edema  Skin: warm and dry without rash      Labs:  Results from last 7 days   Lab Units 10/14/24  0252 10/13/24  0424 10/12/24  0532   WBC 10*3/mm3 6.68 8.04 9.29   HEMOGLOBIN g/dL 10.3* 11.1* 11.5*   HEMATOCRIT % 36.7* 38.5 41.0   PLATELETS 10*3/mm3 239 251 286         Results from last 7 days   Lab Units 10/15/24  0417 10/14/24  0252 10/13/24  1649 10/12/24  0759 10/12/24  0532   SODIUM mmol/L 135* 135* 137   < > 142   SODIUM, ARTERIAL   --   --   --    < >  --    POTASSIUM mmol/L 5.6* 5.6* 5.2   < > 5.4*   CHLORIDE mmol/L 95* 94* 94*   < > 98   CO2 mmol/L 34.0* 32.0* 35.0*   < > 35.0*   BUN mg/dL 73* 69* 61*   < > 45*   CREATININE mg/dL 2.43* 2.55* 2.42*   < > 1.56*   CALCIUM mg/dL 8.3* 8.3* 8.5*   < > 9.0   EGFR mL/min/1.73 29.1* 27.5* 29.3*   < > 49.6*   BILIRUBIN mg/dL 0.3  --   --   --  0.6   ALK PHOS U/L 90  --   --   --  103   ALT (SGPT) U/L 22  --   --   --  13   AST (SGOT) U/L 15  --   --   --  14   GLUCOSE mg/dL 57* 99 118*   < > 202*    < > = values in this interval not displayed.       Radiology:   Imaging Results (Last 72 Hours)       Procedure Component Value Units Date/Time    US Renal Bilateral [[id number]] Collected: 10/15/24 1216     Updated: 10/15/24 1223    Narrative:      US RENAL BILATERAL- 10/14/2024 1:54 PM     HISTORY: ACUTE KIDNEY INJURY     COMPARISON: None available.       TECHNIQUE: Multiple longitudinal and transverse  real-time sonographic  images of the kidneys and urinary bladder are obtained. Report and  images stored per institutional and state regulations.           FINDINGS:     Visualized proximal abdominal aorta and IVC are unremarkable.        RIGHT KIDNEY: The right kidney measures 9.0 cm in length. Renal cortex  is unremarkable. There is no hydronephrosis. There is moderate ascites..     LEFT KIDNEY: Left kidney measures 9.9 cm in length. Renal cortex is  unremarkable. There is no left hydronephrosis.     PELVIS: Urinary bladder is grossly unremarkable.          Impression:         1. Unremarkable kidneys with no hydronephrosis.  2. Moderate ascites.           This report was signed and finalized on 10/15/2024 12:20 PM by Elvis Velasquez.       XR Chest 1 View [[id number]] Collected: 10/14/24 1749     Updated: 10/14/24 1754    Narrative:      EXAMINATION:  XR CHEST 1 VW-  10/14/2024 4:12 PM     HISTORY: Fluid; J96.91-Respiratory failure, unspecified with hypoxia;  J96.92-Respiratory failure, unspecified with hypercapnia; P31-Dmdybma  effusion, not elsewhere classified; J96.21-Acute and chronic respiratory  failure with hypoxia; J96.22-Acute and chronic respiratory failure with  hypercapnia; I50.9-Heart failure, unspecified; J44.1-Chronic obstructive  pulmonary disease with (acute) exacerbation.     COMPARISON: 10/12/2024.     TECHNIQUE: Single view AP image.     FINDINGS: There is cardiomegaly. There is small to moderate pleural  effusion on the right. There is opacification of the mid to lower lung  zone on the right. The left lung is clear. There is some mediastinal  fullness.          Impression:      1. Cardiomegaly.  2. Small to moderate right pleural effusion. Right lung opacification  may be related to atelectasis or pneumonia.  3. Mediastinal fullness could be vascular. Mediastinal lymphadenopathy  is not excluded. Chest CT on 9/9/2024 did not demonstrate enlarged  mediastinal lymph nodes.           This  "report was signed and finalized on 10/14/2024 5:51 PM by Dr. Jhoan Carpenter MD.               Culture:  No results found for: \"BLOODCX\", \"URINECX\", \"WOUNDCX\", \"MRSACX\", \"RESPCX\", \"STOOLCX\"      Assessment    Acute kidney injury  Acute on chronic congestive heart failure  Type 2 diabetes  Hypertension  Hyperkalemia  COPD  Anemia     Plan:  Low K diet  Continue to hold diuretics  He is under comfort care now.      Hermann Jones MD  10/16/2024  13:03 CDT    "

## 2024-10-16 NOTE — PROGRESS NOTES
RT EQUIPMENT DEVICE RELATED - SKIN ASSESSMENT    Jon Score:  Jon Score: 18     RT Medical Equipment/Device:     NIV Mask:  Under-the-nose   size:  B    Skin Assessment:      Nose:  Intact    Device Skin Pressure Protection:  Skin-to-device areas padded:  None Required    Nurse Notification:  Christina Garcia, RRT

## 2024-10-16 NOTE — PLAN OF CARE
Problem: Noninvasive Ventilation Acute  Goal: Effective Unassisted Ventilation and Oxygenation  Outcome: Progressing  Intervention: Monitor and Manage Noninvasive Ventilation  Recent Flowsheet Documentation  Taken 10/15/2024 2000 by Macie Terry RN  Airway/Ventilation Management: airway patency maintained     Problem: Adult Inpatient Plan of Care  Goal: Plan of Care Review  Outcome: Progressing  Flowsheets (Taken 10/16/2024 0356)  Outcome Evaluation: A&OX4. Assist x2 d/t weakness. VSS. Comfort measures. Virgen catheter in place for retention. No c/o pain at this time. Bipap in use. Bed alarm in place. Safety maintained. Will continue to monitor. NSR 76-85 1st degree AVB, PVCs  Goal: Patient-Specific Goal (Individualized)  Outcome: Progressing  Goal: Absence of Hospital-Acquired Illness or Injury  Outcome: Progressing  Intervention: Identify and Manage Fall Risk  Recent Flowsheet Documentation  Taken 10/16/2024 0350 by Macie Terry RN  Safety Promotion/Fall Prevention: safety round/check completed  Taken 10/16/2024 0200 by Macie Terry RN  Safety Promotion/Fall Prevention: safety round/check completed  Taken 10/16/2024 0000 by Macie Terry RN  Safety Promotion/Fall Prevention: safety round/check completed  Taken 10/15/2024 2200 by Macie Terry RN  Safety Promotion/Fall Prevention: safety round/check completed  Taken 10/15/2024 2100 by Macie Terry RN  Safety Promotion/Fall Prevention: safety round/check completed  Taken 10/15/2024 2000 by Macie Terry RN  Safety Promotion/Fall Prevention: safety round/check completed  Intervention: Prevent Skin Injury  Recent Flowsheet Documentation  Taken 10/15/2024 2000 by Macie Terry RN  Body Position: position changed independently  Intervention: Prevent and Manage VTE (Venous Thromboembolism) Risk  Recent Flowsheet Documentation  Taken 10/15/2024 2000 by Macie Terry RN  VTE Prevention/Management: (see mar) other (see comments)  Intervention: Prevent  Infection  Recent Flowsheet Documentation  Taken 10/15/2024 2000 by Macie Terry RN  Infection Prevention:   single patient room provided   rest/sleep promoted  Goal: Optimal Comfort and Wellbeing  Outcome: Progressing  Intervention: Provide Person-Centered Care  Recent Flowsheet Documentation  Taken 10/15/2024 2000 by Macie Terry RN  Trust Relationship/Rapport:   care explained   questions answered   questions encouraged  Goal: Readiness for Transition of Care  Outcome: Progressing     Problem: Heart Failure  Goal: Optimal Coping  Outcome: Progressing  Intervention: Support Psychosocial Response  Recent Flowsheet Documentation  Taken 10/15/2024 2000 by Macie Terry RN  Family/Support System Care: support provided  Goal: Optimal Cardiac Output and Blood Flow  Outcome: Progressing  Goal: Stable Heart Rate and Rhythm  Outcome: Progressing  Goal: Fluid and Electrolyte Balance  Outcome: Progressing  Goal: Optimal Functional Ability  Outcome: Progressing  Intervention: Optimize Functional Ability  Recent Flowsheet Documentation  Taken 10/15/2024 2000 by Macie Terry RN  Activity Management: activity encouraged  Goal: Improved Oral Intake  Outcome: Progressing  Goal: Effective Oxygenation and Ventilation  Outcome: Progressing  Intervention: Promote Airway Secretion Clearance  Recent Flowsheet Documentation  Taken 10/15/2024 2000 by Macie Terry RN  Activity Management: activity encouraged  Breathing Techniques/Airway Clearance: deep/controlled cough encouraged  Cough And Deep Breathing: done independently per patient  Intervention: Optimize Oxygenation and Ventilation  Recent Flowsheet Documentation  Taken 10/15/2024 2000 by Macie Terry RN  Head of Bed (HOB) Positioning:   HOB elevated   HOB at 30 degrees  Airway/Ventilation Management: airway patency maintained  Goal: Effective Breathing Pattern During Sleep  Outcome: Progressing  Intervention: Monitor and Manage Obstructive Sleep Apnea  Recent Flowsheet  Documentation  Taken 10/15/2024 2000 by Macie Terry RN  Medication Review/Management: medications reviewed     Problem: Comorbidity Management  Goal: Maintenance of COPD Symptom Control  Outcome: Progressing  Intervention: Maintain COPD (Chronic Obstructive Pulmonary Disease) Symptom Control  Recent Flowsheet Documentation  Taken 10/15/2024 2000 by Macie Terry RN  Breathing Techniques/Airway Clearance: deep/controlled cough encouraged  Medication Review/Management: medications reviewed  Goal: Blood Glucose Level Within Target Range  Outcome: Progressing  Intervention: Monitor and Manage Glycemia  Recent Flowsheet Documentation  Taken 10/15/2024 2000 by Macie Terry RN  Medication Review/Management: medications reviewed  Goal: Maintenance of Heart Failure Symptom Control  Outcome: Progressing  Intervention: Maintain Heart Failure Management  Recent Flowsheet Documentation  Taken 10/15/2024 2000 by Macie Terry RN  Medication Review/Management: medications reviewed  Goal: Blood Pressure in Desired Range  Outcome: Progressing  Intervention: Maintain Blood Pressure Management  Recent Flowsheet Documentation  Taken 10/15/2024 2000 by Macie Terry RN  Medication Review/Management: medications reviewed     Problem: Skin Injury Risk Increased  Goal: Skin Health and Integrity  Outcome: Progressing  Intervention: Optimize Skin Protection  Recent Flowsheet Documentation  Taken 10/15/2024 2000 by Macie Terry RN  Activity Management: activity encouraged  Head of Bed (HOB) Positioning:   HOB elevated   HOB at 30 degrees     Problem: Fall Injury Risk  Goal: Absence of Fall and Fall-Related Injury  Outcome: Progressing  Intervention: Identify and Manage Contributors  Recent Flowsheet Documentation  Taken 10/15/2024 2000 by Macie Terry RN  Medication Review/Management: medications reviewed  Intervention: Promote Injury-Free Environment  Recent Flowsheet Documentation  Taken 10/16/2024 0350 by Macie Terry  RN  Safety Promotion/Fall Prevention: safety round/check completed  Taken 10/16/2024 0200 by Macie Terry RN  Safety Promotion/Fall Prevention: safety round/check completed  Taken 10/16/2024 0000 by Macie Terry RN  Safety Promotion/Fall Prevention: safety round/check completed  Taken 10/15/2024 2200 by Macie Terry RN  Safety Promotion/Fall Prevention: safety round/check completed  Taken 10/15/2024 2100 by Macie Terry RN  Safety Promotion/Fall Prevention: safety round/check completed  Taken 10/15/2024 2000 by Macie Terry RN  Safety Promotion/Fall Prevention: safety round/check completed   Goal Outcome Evaluation:              Outcome Evaluation: A&OX4. Assist x2 d/t weakness. VSS. Comfort measures. Virgen catheter in place for retention. No c/o pain at this time. Bipap in use. Bed alarm in place. Safety maintained. Will continue to monitor. NSR 76-85 1st degree AVB, PVCs

## 2024-10-16 NOTE — DISCHARGE SUMMARY
[facility] Medicine Services  DISCHARGE SUMMARY       Date of Admission: 10/12/2024  Date of Discharge:  10/16/2024  Primary Care Physician: Kt Alfredo MD    Presenting Problem/History of Present Illness:  Shortness of breath  Altered mental status    Final Discharge Diagnoses:  Acute on chronic respiratory failure with hypoxia and hypercapnia  Hyperkalemia  Acute kidney injury  Acute on chronic heart failure with reduced ejection fraction  Adenocarcinoma of the colon  Type II myocardial infarction due to heart failure  Pulmonary hypertension  Diabetes mellitus type 2 with hyperglycemia with long-term current use of insulin  Hypertension      Consults:   Nephrology  Palliative care    Procedures Performed:   None    Pertinent Test Results:   Imaging Results (Last 7 Days)       Procedure Component Value Units Date/Time    US Renal Bilateral [[id number]] Collected: 10/15/24 1216     Updated: 10/15/24 1223    Narrative:      US RENAL BILATERAL- 10/14/2024 1:54 PM     HISTORY: ACUTE KIDNEY INJURY     COMPARISON: None available.       TECHNIQUE: Multiple longitudinal and transverse real-time sonographic  images of the kidneys and urinary bladder are obtained. Report and  images stored per institutional and state regulations.           FINDINGS:     Visualized proximal abdominal aorta and IVC are unremarkable.        RIGHT KIDNEY: The right kidney measures 9.0 cm in length. Renal cortex  is unremarkable. There is no hydronephrosis. There is moderate ascites..     LEFT KIDNEY: Left kidney measures 9.9 cm in length. Renal cortex is  unremarkable. There is no left hydronephrosis.     PELVIS: Urinary bladder is grossly unremarkable.          Impression:         1. Unremarkable kidneys with no hydronephrosis.  2. Moderate ascites.           This report was signed and finalized on 10/15/2024 12:20 PM by Elvis Velasquez.       XR Chest 1 View [[id number]] Collected: 10/14/24  1749     Updated: 10/14/24 1754    Narrative:      EXAMINATION:  XR CHEST 1 VW-  10/14/2024 4:12 PM     HISTORY: Fluid; J96.91-Respiratory failure, unspecified with hypoxia;  J96.92-Respiratory failure, unspecified with hypercapnia; N27-Hmyiyvq  effusion, not elsewhere classified; J96.21-Acute and chronic respiratory  failure with hypoxia; J96.22-Acute and chronic respiratory failure with  hypercapnia; I50.9-Heart failure, unspecified; J44.1-Chronic obstructive  pulmonary disease with (acute) exacerbation.     COMPARISON: 10/12/2024.     TECHNIQUE: Single view AP image.     FINDINGS: There is cardiomegaly. There is small to moderate pleural  effusion on the right. There is opacification of the mid to lower lung  zone on the right. The left lung is clear. There is some mediastinal  fullness.          Impression:      1. Cardiomegaly.  2. Small to moderate right pleural effusion. Right lung opacification  may be related to atelectasis or pneumonia.  3. Mediastinal fullness could be vascular. Mediastinal lymphadenopathy  is not excluded. Chest CT on 9/9/2024 did not demonstrate enlarged  mediastinal lymph nodes.           This report was signed and finalized on 10/14/2024 5:51 PM by Dr. Jhoan Carpenter MD.       XR Chest 1 View [[id number]] Collected: 10/12/24 0650     Updated: 10/12/24 0654    Narrative:      EXAMINATION: XR CHEST 1 VW- 10/12/2024 6:50 AM     HISTORY: CHF/COPD Protocol.     REPORT: A frontal view of the chest was obtained.     COMPARISON: Chest x-ray 9/28/2024.     The heart is enlarged, there is central and basilar vascular congestion  and mild pulmonary edema. The right pleural effusion appears slightly  increased in size but remains small. No pneumothorax is identified.  Moderate atelectasis in the lung bases greater on the right. No acute  osseous abnormality.       Impression:      Congestive heart failure with slight increase in the right  pleural effusion, moderate at basilar atelectasis greater  on the right.     This report was signed and finalized on 10/12/2024 6:51 AM by Dr. Puneet Whitley MD.             Lab Results (last 7 days)       Procedure Component Value Units Date/Time    Calcitriol (1,25 di-OH Vitamin D) [[id number]]  (Abnormal) Collected: 10/14/24 1402    Specimen: Blood Updated: 10/16/24 1113     1,25-Dihydroxy, Vitamin D 15.7 pg/mL     Narrative:      Performed at:  [address] [place] [address]  [id number]  : Ana Hackett MD, Phone:  [phone]    POC Glucose Once [[id number]]  (Normal) Collected: 10/16/24 0758    Specimen: Blood Updated: 10/16/24 0808     Glucose 126 mg/dL      Comment: : [id number] José Miguel You ID: [id number]       POC Glucose Once [[id number]]  (Abnormal) Collected: 10/15/24 1947    Specimen: Blood Updated: 10/15/24 1957     Glucose 194 mg/dL      Comment: : sonya Lagunas ID: [id number]       POC Glucose Once [[id number]]  (Abnormal) Collected: 10/15/24 1559    Specimen: Blood Updated: 10/15/24 1610     Glucose 202 mg/dL      Comment: : [id number] Tiffany IslasiaMeter ID: [id number]       POC Glucose Once [[id number]]  (Abnormal) Collected: 10/15/24 1128    Specimen: Blood Updated: 10/15/24 1139     Glucose 164 mg/dL      Comment: : [id number] Tiffany IslasiaMeter ID: [id number]       POC Glucose Once [[id number]]  (Abnormal) Collected: 10/15/24 0745    Specimen: Blood Updated: 10/15/24 0757     Glucose 337 mg/dL      Comment: : [id number] Tiffany IslasiaMeter ID: [id number]       POC Glucose Once [[id number]]  (Normal) Collected: 10/15/24 0652    Specimen: Blood Updated: 10/15/24 0704     Glucose 74 mg/dL      Comment: : sonya TijerinaaMebryn ID: [id number]       POC Glucose Once [[id number]]  (Abnormal) Collected: 10/15/24 0622    Specimen: Blood Updated: 10/15/24 0633     Glucose 69 mg/dL      Comment: : sonya Lagunas ID: [id number]       POC Glucose Once [[id number]]   (Abnormal) Collected: 10/15/24 0547    Specimen: Blood Updated: 10/15/24 0558     Glucose 61 mg/dL      Comment: : sonya Lagunas ID: [id number]       Comprehensive Metabolic Panel [[id number]]  (Abnormal) Collected: 10/15/24 0417    Specimen: Blood Updated: 10/15/24 0511     Glucose 57 mg/dL      BUN 73 mg/dL      Creatinine 2.43 mg/dL      Sodium 135 mmol/L      Potassium 5.6 mmol/L      Chloride 95 mmol/L      CO2 34.0 mmol/L      Calcium 8.3 mg/dL      Total Protein 5.8 g/dL      Albumin 3.4 g/dL      ALT (SGPT) 22 U/L      AST (SGOT) 15 U/L      Alkaline Phosphatase 90 U/L      Total Bilirubin 0.3 mg/dL      Globulin 2.4 gm/dL      A/G Ratio 1.4 g/dL      BUN/Creatinine Ratio 30.0     Anion Gap 6.0 mmol/L      eGFR 29.1 mL/min/1.73     Narrative:      GFR Normal >60  Chronic Kidney Disease <60  Kidney Failure <15      POC Glucose Once [[id number]]  (Abnormal) Collected: 10/14/24 1956    Specimen: Blood Updated: 10/14/24 2007     Glucose 160 mg/dL      Comment: : [id number] Geo Arroyoeter ID: [id number]       POC Glucose Once [[id number]]  (Abnormal) Collected: 10/14/24 1641    Specimen: Blood Updated: 10/14/24 1652     Glucose 62 mg/dL      Comment: : [id number] FRAN'Marilee RamosyMeter ID: [id number]       Vitamin D,25-Hydroxy [[id number]]  (Abnormal) Collected: 10/14/24 0252    Specimen: Blood Updated: 10/14/24 1306     25 Hydroxy, Vitamin D <6.0 ng/ml     Narrative:      Reference Range for Total Vitamin D 25(OH)     Deficiency <20.0 ng/mL   Insufficiency 21-29 ng/mL   Sufficiency  ng/mL  Toxicity >100 ng/ml      POC Glucose Once [[id number]]  (Normal) Collected: 10/14/24 1137    Specimen: Blood Updated: 10/14/24 1148     Glucose 119 mg/dL      Comment: : jbarthel Barthel JustinMeter ID: [id number]       POC Glucose Once [[id number]]  (Normal) Collected: 10/14/24 0807    Specimen: Blood Updated: 10/14/24 0819     Glucose 74 mg/dL      Comment: : jbarthel Barthel  JustinMeter ID: [id number]       Urinalysis, Microscopic Only - Urine, Clean Catch [[id number]] Collected: 10/14/24 0542    Specimen: Urine, Clean Catch Updated: 10/14/24 0607     RBC, UA 0-2 /HPF      WBC, UA 0-2 /HPF      Bacteria, UA None Seen /HPF      Squamous Epithelial Cells, UA None Seen /HPF      Hyaline Casts, UA 3-6 /LPF      Methodology Automated Microscopy    Urinalysis With Microscopic If Indicated (No Culture) - Urine, Clean Catch [[id number]]  (Abnormal) Collected: 10/14/24 0542    Specimen: Urine, Clean Catch Updated: 10/14/24 0607     Color, UA Dark Yellow     Appearance, UA Clear     pH, UA <=5.0     Specific Gravity, UA 1.017     Glucose,  mg/dL (1+)     Ketones, UA Trace     Bilirubin, UA Negative     Blood, UA Negative     Protein, UA Trace     Leuk Esterase, UA Trace     Nitrite, UA Negative     Urobilinogen, UA 1.0 E.U./dL    Basic Metabolic Panel [[id number]]  (Abnormal) Collected: 10/14/24 0252    Specimen: Blood Updated: 10/14/24 0403     Glucose 99 mg/dL      BUN 69 mg/dL      Creatinine 2.55 mg/dL      Sodium 135 mmol/L      Potassium 5.6 mmol/L      Chloride 94 mmol/L      CO2 32.0 mmol/L      Calcium 8.3 mg/dL      BUN/Creatinine Ratio 27.1     Anion Gap 9.0 mmol/L      eGFR 27.5 mL/min/1.73     Narrative:      GFR Normal >60  Chronic Kidney Disease <60  Kidney Failure <15      Magnesium [[id number]]  (Normal) Collected: 10/14/24 0252    Specimen: Blood Updated: 10/14/24 0403     Magnesium 2.4 mg/dL     Phosphorus [[id number]]  (Abnormal) Collected: 10/14/24 0252    Specimen: Blood Updated: 10/14/24 0403     Phosphorus 6.4 mg/dL     PTH, Intact [[id number]]  (Abnormal) Collected: 10/14/24 0252    Specimen: Blood Updated: 10/14/24 0359     PTH, Intact 106.2 pg/mL     Narrative:      Results may be falsely decreased if patient taking Biotin.      CBC (No Diff) [[id number]]  (Abnormal) Collected: 10/14/24 0252    Specimen: Blood Updated: 10/14/24 0335     WBC 6.68 10*3/mm3      RBC 3.81  10*6/mm3      Hemoglobin 10.3 g/dL      Hematocrit 36.7 %      MCV 96.3 fL      MCH 27.0 pg      MCHC 28.1 g/dL      RDW 15.2 %      RDW-SD 53.1 fl      MPV 10.6 fL      Platelets 239 10*3/mm3     Creatinine Urine Random (kidney function) GFR component - Urine, Clean Catch [[id number]] Collected: 10/13/24 1404    Specimen: Urine, Clean Catch Updated: 10/13/24 2051     Creatinine, Urine 59.5 mg/dL     Narrative:      Reference intervals for random urine have not been established.  Clinical usage is dependent upon physician's interpretation in combination with other laboratory tests.       Urea Nitrogen, Urine - Urine, Clean Catch [[id number]] Collected: 10/13/24 1404    Specimen: Urine, Clean Catch Updated: 10/13/24 2051     Urea Nitrogen, Urine 452 mg/dL     Narrative:      Reference intervals for random urine have not been established.  Clinical usage is dependent upon physician's interpretation in combination with other laboratory tests.       POC Glucose Once [[id number]]  (Abnormal) Collected: 10/13/24 2034    Specimen: Blood Updated: 10/13/24 2045     Glucose 168 mg/dL      Comment: : [id number] Jorge CeronMeter ID: [id number]       Basic Metabolic Panel [[id number]]  (Abnormal) Collected: 10/13/24 1649    Specimen: Blood Updated: 10/13/24 1725     Glucose 118 mg/dL      BUN 61 mg/dL      Creatinine 2.42 mg/dL      Sodium 137 mmol/L      Potassium 5.2 mmol/L      Chloride 94 mmol/L      CO2 35.0 mmol/L      Calcium 8.5 mg/dL      BUN/Creatinine Ratio 25.2     Anion Gap 8.0 mmol/L      eGFR 29.3 mL/min/1.73     Narrative:      GFR Normal >60  Chronic Kidney Disease <60  Kidney Failure <15      POC Glucose Once [[id number]]  (Abnormal) Collected: 10/13/24 1638    Specimen: Blood Updated: 10/13/24 1651     Glucose 131 mg/dL      Comment: : [id number] Nichelle Robles ID: [id number]       Protein, Urine, Random - Urine, Clean Catch [[id number]] Collected: 10/13/24 1404    Specimen: Urine, Clean Catch  Updated: 10/13/24 1634     Total Protein, Urine 8.8 mg/dL     Narrative:      Reference intervals for random urine have not been established.  Clinical usage is dependent upon physician's interpretation in combination with other laboratory tests.       Blood Gas, Arterial - [[id number]]  (Abnormal) Collected: 10/13/24 1519    Specimen: Arterial Blood Updated: 10/13/24 1518     Site Left Radial     Jorge's Test Positive     pH, Arterial 7.265 pH units      pCO2, Arterial 77.0 mm Hg      Comment: 86 Value above critical limit        pO2, Arterial 111.0 mm Hg      Comment: 83 Value above reference range        HCO3, Arterial 34.9 mmol/L      Comment: 83 Value above reference range        Base Excess, Arterial 5.7 mmol/L      Comment: 83 Value above reference range        O2 Saturation, Arterial 97.9 %      Temperature 37.0     Barometric Pressure for Blood Gas 751 mmHg      Modality BiPap     FIO2 50 %      Ventilator Mode BiPAP     Set Mech Resp Rate 12.0     IPAP 20     Comment: Meter: [id number]     :  [id number]        EPAP 8     Notified By [id number]     Collected by [id number]     pCO2, Temperature Corrected 77.0 mm Hg      pH, Temp Corrected 7.265 pH Units      pO2, Temperature Corrected 111 mm Hg      PO2/FIO2 222    Sodium, Urine, Random - Urine, Clean Catch [[id number]] Collected: 10/13/24 1404    Specimen: Urine, Clean Catch Updated: 10/13/24 1427     Sodium, Urine 52 mmol/L     Narrative:      Reference intervals for random urine have not been established.  Clinical usage is dependent upon physician's interpretation in combination with other laboratory tests.       POC Glucose Once [[id number]]  (Abnormal) Collected: 10/13/24 1218    Specimen: Blood Updated: 10/13/24 1230     Glucose 197 mg/dL      Comment: : [id number] Nichelle Margaret ID: [id number]       POC Glucose Once [[id number]]  (Abnormal) Collected: 10/13/24 0756    Specimen: Blood Updated: 10/13/24 0828     Glucose 197 mg/dL      Comment:  : [id number] Nichelle Robles ID: [id number]       Basic Metabolic Panel [[id number]]  (Abnormal) Collected: 10/13/24 0424    Specimen: Blood Updated: 10/13/24 0503     Glucose 257 mg/dL      BUN 61 mg/dL      Creatinine 2.16 mg/dL      Sodium 134 mmol/L      Potassium 5.9 mmol/L      Comment: Slight hemolysis detected by analyzer. Result may be falsely elevated.        Chloride 95 mmol/L      CO2 30.0 mmol/L      Calcium 8.4 mg/dL      BUN/Creatinine Ratio 28.2     Anion Gap 9.0 mmol/L      eGFR 33.6 mL/min/1.73     Narrative:      GFR Normal >60  Chronic Kidney Disease <60  Kidney Failure <15      CBC (No Diff) [[id number]]  (Abnormal) Collected: 10/13/24 0424    Specimen: Blood Updated: 10/13/24 0458     WBC 8.04 10*3/mm3      RBC 4.06 10*6/mm3      Hemoglobin 11.1 g/dL      Hematocrit 38.5 %      MCV 94.8 fL      MCH 27.3 pg      MCHC 28.8 g/dL      RDW 15.4 %      RDW-SD 53.2 fl      MPV 10.8 fL      Platelets 251 10*3/mm3     Blood Gas, Arterial - [[id number]]  (Abnormal) Collected: 10/13/24 0329    Specimen: Arterial Blood Updated: 10/13/24 0335     Site Right Radial     Jorge's Test Positive     pH, Arterial 7.307 pH units      Comment: 84 Value below reference range        pCO2, Arterial 69.0 mm Hg      Comment: 86 Value above critical limit        pO2, Arterial 83.5 mm Hg      HCO3, Arterial 34.5 mmol/L      Comment: 83 Value above reference range        Base Excess, Arterial 6.3 mmol/L      Comment: 83 Value above reference range        O2 Saturation, Arterial 95.5 %      Temperature 37.0     Barometric Pressure for Blood Gas 750 mmHg      Modality Nasal Cannula     Flow Rate 3.0 lpm      Ventilator Mode NA     Notified By Sania Soto, RRT     Collected by [id number]     Comment: Meter: [id number]     :  Sania Soto, KIERA        pCO2, Temperature Corrected 69.0 mm Hg      pH, Temp Corrected 7.307 pH Units      pO2, Temperature Corrected 83.5 mm Hg     POC Glucose Once [[id number]]   (Abnormal) Collected: 10/12/24 1944    Specimen: Blood Updated: 10/12/24 1955     Glucose 400 mg/dL      Comment: : [id number] Jorge CeronMeter ID: [id number]       POC Glucose Once [[id number]]  (Abnormal) Collected: 10/12/24 1710    Specimen: Blood Updated: 10/12/24 1724     Glucose 366 mg/dL      Comment: : [id number] Nichelle Robles ID: [id number]       Blood Gas, Arterial With Co-Ox [[id number]]  (Abnormal) Collected: 10/12/24 0759    Specimen: Arterial Blood Updated: 10/12/24 1100     Site Right Radial     Jorge's Test Positive     pH, Arterial 7.265 pH units      Comment: 84 Value below reference range        pCO2, Arterial 79.4 mm Hg      Comment: 86 Value above critical limit        pO2, Arterial 93.7 mm Hg      HCO3, Arterial 36.0 mmol/L      Comment: 83 Value above reference range        Base Excess, Arterial 6.7 mmol/L      Comment: 83 Value above reference range        O2 Saturation, Arterial 96.8 %      Hemoglobin, Blood Gas 11.6 g/dL      Comment: 84 Value below reference range        Hematocrit, Blood Gas 35.5 %      Comment: 84 Value below reference range        Oxyhemoglobin 95.0 %      Methemoglobin 0.60 %      Carboxyhemoglobin 1.3 %      Temperature 37.0     Sodium, Arterial 141 mmol/L      Potassium, Arterial 5.5 mmol/L      Comment: 83 Value above reference range        Barometric Pressure for Blood Gas 756 mmHg      Modality BiPap     FIO2 70 %      Ventilator Mode NIV     Set Mech Resp Rate 10     Comment: Corrected for Gwen Lund CRT   Corrected result. Previous result was 14.0 on 10/12/2024 at 0800 CDT.        IPAP 14     Comment: Entered for Gwen Lund CRT   Appended report. These results have been appended to a previously final verified report.        EPAP 7     Comment: Entered for Gwen Lund CRT   Appended report. These results have been appended to a previously final verified report.        Notified Who DR JUSTICE     Notified By Gwen Lund CRT      Notified Time 10/12/2024 08:00     Collected by [id number]     Comment: Meter: [id number]     :  Gwen Lund CRT         pH, Temp Corrected 7.265 pH Units      pCO2, Temperature Corrected 79.4 mm Hg      pO2, Temperature Corrected 93.7 mm Hg     Blood Gas, Arterial - [[id number]]  (Abnormal) Collected: 10/12/24 1048    Specimen: Arterial Blood Updated: 10/12/24 1048     Site Right Radial     Jorge's Test Positive     pH, Arterial 7.362 pH units      pCO2, Arterial 59.7 mm Hg      Comment: 83 Value above reference range        pO2, Arterial 70.2 mm Hg      Comment: 84 Value below reference range        HCO3, Arterial 33.9 mmol/L      Comment: 83 Value above reference range        Base Excess, Arterial 6.9 mmol/L      Comment: 83 Value above reference range        O2 Saturation, Arterial 94.5 %      Temperature 37.0     Barometric Pressure for Blood Gas 755 mmHg      Modality BiPap     FIO2 40 %      Ventilator Mode NIV     Set Mec Resp Rate 22.0     IPAP 20     Comment: Meter: [id number]     :  Gwen Lund CRT         EPAP 8     Collected by [id number]     pCO2, Temperature Corrected 59.7 mm Hg      pH, Temp Corrected 7.362 pH Units      pO2, Temperature Corrected 70.2 mm Hg      PO2/FIO2 176    Respiratory Panel PCR w/COVID-19(SARS-CoV-2) KADY/KIP/MILLI/PAD/COR/FREDY In-House, NP Swab in UTM/VTM, 2 HR TAT - Swab, Nasopharynx [[id number]]  (Normal) Collected: 10/12/24 0833    Specimen: Swab from Nasopharynx Updated: 10/12/24 0924     ADENOVIRUS, PCR Not Detected     Coronavirus 229E Not Detected     Coronavirus HKU1 Not Detected     Coronavirus NL63 Not Detected     Coronavirus OC43 Not Detected     COVID19 Not Detected     Human Metapneumovirus Not Detected     Human Rhinovirus/Enterovirus Not Detected     Influenza A PCR Not Detected     Influenza B PCR Not Detected     Parainfluenza Virus 1 Not Detected     Parainfluenza Virus 2 Not Detected     Parainfluenza Virus 3 Not Detected      Parainfluenza Virus 4 Not Detected     RSV, PCR Not Detected     Bordetella pertussis pcr Not Detected     Bordetella parapertussis PCR Not Detected     Chlamydophila pneumoniae PCR Not Detected     Mycoplasma pneumo by PCR Not Detected    Narrative:      In the setting of a positive respiratory panel with a viral infection PLUS a negative procalcitonin without other underlying concern for bacterial infection, consider observing off antibiotics or discontinuation of antibiotics and continue supportive care. If the respiratory panel is positive for atypical bacterial infection (Bordetella pertussis, Chlamydophila pneumoniae, or Mycoplasma pneumoniae), consider antibiotic de-escalation to target atypical bacterial infection.    High Sensitivity Troponin T 2Hr [[id number]]  (Abnormal) Collected: 10/12/24 0730    Specimen: Blood Updated: 10/12/24 0802     HS Troponin T 130 ng/L      Troponin T Delta -13 ng/L     Narrative:      High Sensitive Troponin T Reference Range:  <14.0 ng/L- Negative Female for AMI  <22.0 ng/L- Negative Male for AMI  >=14 - Abnormal Female indicating possible myocardial injury.  >=22 - Abnormal Male indicating possible myocardial injury.   Clinicians would have to utilize clinical acumen, EKG, Troponin, and serial changes to determine if it is an Acute Myocardial Infarction or myocardial injury due to an underlying chronic condition.         Comprehensive Metabolic Panel [[id number]]  (Abnormal) Collected: 10/12/24 0532    Specimen: Blood from Arm, Left Updated: 10/12/24 0617     Glucose 202 mg/dL      BUN 45 mg/dL      Creatinine 1.56 mg/dL      Sodium 142 mmol/L      Potassium 5.4 mmol/L      Comment: Slight hemolysis detected by analyzer. Result may be falsely elevated.        Chloride 98 mmol/L      CO2 35.0 mmol/L      Calcium 9.0 mg/dL      Total Protein 6.8 g/dL      Albumin 4.0 g/dL      ALT (SGPT) 13 U/L      AST (SGOT) 14 U/L      Alkaline Phosphatase 103 U/L      Total Bilirubin 0.6  mg/dL      Globulin 2.8 gm/dL      A/G Ratio 1.4 g/dL      BUN/Creatinine Ratio 28.8     Anion Gap 9.0 mmol/L      eGFR 49.6 mL/min/1.73     Narrative:      GFR Normal >60  Chronic Kidney Disease <60  Kidney Failure <15      Single High Sensitivity Troponin T [[id number]]  (Abnormal) Collected: 10/12/24 0532    Specimen: Blood from Arm, Left Updated: 10/12/24 0615     HS Troponin T 143 ng/L     Narrative:      High Sensitive Troponin T Reference Range:  <14.0 ng/L- Negative Female for AMI  <22.0 ng/L- Negative Male for AMI  >=14 - Abnormal Female indicating possible myocardial injury.  >=22 - Abnormal Male indicating possible myocardial injury.   Clinicians would have to utilize clinical acumen, EKG, Troponin, and serial changes to determine if it is an Acute Myocardial Infarction or myocardial injury due to an underlying chronic condition.         BNP [[id number]]  (Abnormal) Collected: 10/12/24 0532    Specimen: Blood from Arm, Left Updated: 10/12/24 0615     proBNP 14,625.0 pg/mL     Narrative:      This assay is used as an aid in the diagnosis of individuals suspected of having heart failure. It can be used as an aid in the diagnosis of acute decompensated heart failure (ADHF) in patients presenting with signs and symptoms of ADHF to the emergency department (ED). In addition, NT-proBNP of <300 pg/mL indicates ADHF is not likely.    Age Range Result Interpretation  NT-proBNP Concentration (pg/mL:      <50             Positive            >450                   Gray                 300-450                    Negative             <300    50-75           Positive            >900                  Gray                300-900                  Negative            <300      >75             Positive            >1800                  Gray                300-1800                  Negative            <300    Magnesium [[id number]]  (Abnormal) Collected: 10/12/24 0532    Specimen: Blood from Arm, Left Updated: 10/12/24 0612      Magnesium 2.6 mg/dL     CBC & Differential [[id number]]  (Abnormal) Collected: 10/12/24 0532    Specimen: Blood from Arm, Left Updated: 10/12/24 0550    Narrative:      The following orders were created for panel order CBC & Differential.  Procedure                               Abnormality         Status                     ---------                               -----------         ------                     CBC Auto Differential[[id number]]        Abnormal            Final result                 Please view results for these tests on the individual orders.    CBC Auto Differential [[id number]]  (Abnormal) Collected: 10/12/24 0532    Specimen: Blood from Arm, Left Updated: 10/12/24 0550     WBC 9.29 10*3/mm3      RBC 4.18 10*6/mm3      Hemoglobin 11.5 g/dL      Hematocrit 41.0 %      MCV 98.1 fL      MCH 27.5 pg      MCHC 28.0 g/dL      RDW 15.4 %      RDW-SD 55.4 fl      MPV 10.6 fL      Platelets 286 10*3/mm3      Neutrophil % 78.9 %      Lymphocyte % 10.2 %      Monocyte % 8.6 %      Eosinophil % 1.1 %      Basophil % 0.6 %      Immature Grans % 0.6 %      Neutrophils, Absolute 7.32 10*3/mm3      Lymphocytes, Absolute 0.95 10*3/mm3      Monocytes, Absolute 0.80 10*3/mm3      Eosinophils, Absolute 0.10 10*3/mm3      Basophils, Absolute 0.06 10*3/mm3      Immature Grans, Absolute 0.06 10*3/mm3      nRBC 0.0 /100 WBC     [place] Draw [[id number]] Collected: 10/12/24 0532    Specimen: Blood from Arm, Left Updated: 10/12/24 0546    Narrative:      The following orders were created for panel order [facility] Draw.  Procedure                               Abnormality         Status                     ---------                               -----------         ------                     Green Top (Gel)[[id number]]                                  Final result               Lavender Top[[id number]]                                     Final result               Red Top[[id number]]                                          Final  result               Light Blue Top[[id number]]                                   Final result                 Please view results for these tests on the individual orders.    Green Top (Gel) [[id number]] Collected: 10/12/24 0532    Specimen: Blood from Arm, Left Updated: 10/12/24 0546     Extra Tube Hold for add-ons.     Comment: Auto resulted.       Lavender Top [[id number]] Collected: 10/12/24 0532    Specimen: Blood from Arm, Left Updated: 10/12/24 0546     Extra Tube hold for add-on     Comment: Auto resulted       Red Top [[id number]] Collected: 10/12/24 0532    Specimen: Blood from Arm, Left Updated: 10/12/24 0546     Extra Tube Hold for add-ons.     Comment: Auto resulted.       Light Blue Top [[id number]] Collected: 10/12/24 0532    Specimen: Blood from Arm, Left Updated: 10/12/24 0546     Extra Tube Hold for add-ons.     Comment: Auto resulted       Blood Gas, Arterial With Co-Ox [[id number]]  (Abnormal) Collected: 10/12/24 0522    Specimen: Arterial Blood Updated: 10/12/24 0522     Site Right Radial     Jorge's Test Positive     pH, Arterial 7.268 pH units      Comment: 84 Value below reference range        pCO2, Arterial 78.1 mm Hg      Comment: 86 Value above critical limit        pO2, Arterial 70.2 mm Hg      Comment: 84 Value below reference range        HCO3, Arterial 35.7 mmol/L      Comment: 83 Value above reference range        Base Excess, Arterial 6.5 mmol/L      Comment: 83 Value above reference range        O2 Saturation, Arterial 92.0 %      Comment: 84 Value below reference range        Hemoglobin, Blood Gas 11.5 g/dL      Comment: 84 Value below reference range        Hematocrit, Blood Gas 35.3 %      Comment: 84 Value below reference range        Oxyhemoglobin 91.4 %      Comment: 84 Value below reference range        Methemoglobin 0.00 %      Comment: 84 Value below reference range        Carboxyhemoglobin 1.3 %      Temperature 37.0     Sodium, Arterial 141 mmol/L      Potassium, Arterial 5.2  "mmol/L      Comment: 83 Value above reference range        Barometric Pressure for Blood Gas 756 mmHg      Modality Nasal Cannula     FIO2 44 %      Flow Rate 6.0 lpm      Ventilator Mode NA     Notified Who MD BARBOSA     Notified By [email or web address]     Notified Time 10/12/2024 05:22     Collected by [email or web address]     Comment: Meter: [id number]     :  [email or web address]        pH, Temp Corrected 7.268 pH Units      pCO2, Temperature Corrected 78.1 mm Hg      pO2, Temperature Corrected 70.2 mm Hg           Hospital Course:  The patient is a 64 y.o. male who presented to [facility] with increasing shortness of breath.  This is also associated with altered mental status.  He was evaluated in the emergency room.  He was admitted to the telemetry floor.  BiPAP was utilized.  Patient's oxygen status and CO2 status improved and was transition to nasal cannula.  He was diuresed.  Nephrology was consulted secondary to his renal issue.  Palliative care was consulted.  Discussion was had with patient and his sister via video conferencing.  Patient has decided that he would like to transition to comfort measures.   was consulted for placement for this patient.  He is excepted at [place].  He will transition to [place] nursing facility with comfort measures today.  .      Physical Exam on Discharge:  /67 (BP Location: Right arm, Patient Position: Lying)   Pulse 83   Temp 97.2 °F (36.2 °C) (Oral)   Resp 18   Ht 175.3 cm (69\")   Wt 132 kg (291 lb 10.7 oz)   SpO2 95%   BMI 43.07 kg/m²   Physical Exam  Vitals and nursing note reviewed.   Constitutional:       Appearance: Normal appearance.   HENT:      Head: Normocephalic and atraumatic.      Right Ear: External ear normal.      Left Ear: External ear normal.      Nose: Nose normal.      Mouth/Throat:      Mouth: Mucous membranes are moist.   Eyes:      Extraocular Movements: Extraocular movements intact.      Conjunctiva/sclera: Conjunctivae " normal.      Pupils: Pupils are equal, round, and reactive to light.   Cardiovascular:      Rate and Rhythm: Normal rate and regular rhythm.      Pulses: Normal pulses.      Heart sounds: No murmur heard.     No friction rub. No gallop.   Pulmonary:      Effort: Pulmonary effort is normal.      Breath sounds: Rhonchi present.   Abdominal:      General: Bowel sounds are normal.      Palpations: Abdomen is soft.   Musculoskeletal:      Cervical back: Normal range of motion and neck supple.      Comments: Moves all extremities   Skin:     General: Skin is warm and dry.      Capillary Refill: Capillary refill takes less than 2 seconds.   Neurological:      General: No focal deficit present.      Mental Status: He is alert and oriented to person, place, and time.      Cranial Nerves: No cranial nerve deficit.   Psychiatric:         Mood and Affect: Mood normal.         Behavior: Behavior normal.           Condition on Discharge:   Stable    Discharge Disposition:  Skilled Nursing Facility ([place] - External)    Discharge Medications:     Discharge Medications        New Medications        Instructions Start Date   atropine 1 % ophthalmic solution   2 drops, Ophthalmic, 2 Times Daily PRN      bisacodyl 10 MG suppository  Commonly known as: DULCOLAX   10 mg, Rectal, Daily PRN      calcium carbonate 500 MG chewable tablet  Commonly known as: TUMS   2 tablets, Oral, 3 Times Daily PRN      HYDROcodone-acetaminophen 7.5-325 MG per tablet  Commonly known as: NORCO   1 tablet, Oral, Every 4 Hours PRN      hydrocortisone-bacitracin-zinc oxide-nystatin  Commonly known as: MAGIC BARRIER   1 Application, Topical, 4 Times Daily      ipratropium-albuterol 0.5-2.5 mg/3 ml nebulizer  Commonly known as: DUO-NEB   3 mL, Nebulization, Every 4 Hours PRN      ipratropium-albuterol 0.5-2.5 mg/3 ml nebulizer  Commonly known as: DUO-NEB   3 mL, Nebulization, 4 Times Daily - RT      LORazepam 0.5 MG tablet  Commonly known as: ATIVAN   0.5 mg, Oral,  Every 1 Hour PRN      Polyvinyl Alcohol-Povidone PF 1.4-0.6 % ophthalmic solution  Commonly known as: ARTIFICIAL TEARS   1 drop, Both Eyes, Every 30 Minutes PRN      Scopolamine 1 MG/3DAYS patch   1 patch, Transdermal, Every 72 Hours PRN             Continue These Medications        Instructions Start Date   acetaminophen 325 MG tablet  Commonly known as: TYLENOL   650 mg, Oral, Every 6 Hours PRN      albuterol sulfate  (90 Base) MCG/ACT inhaler  Commonly known as: PROVENTIL HFA;VENTOLIN HFA;PROAIR HFA   2 puffs, Inhalation, Every 4 Hours PRN      albuterol (2.5 MG/3ML) 0.083% nebulizer solution  Commonly known as: PROVENTIL   2.5 mg, Nebulization, Every 6 Hours PRN      aspirin 81 MG EC tablet   81 mg, Oral, Daily      DULoxetine 30 MG capsule  Commonly known as: CYMBALTA   30 mg, Oral, Daily      furosemide 40 MG tablet  Commonly known as: LASIX   20 mg, Oral, Daily      gabapentin 600 MG tablet  Commonly known as: NEURONTIN   600 mg, Oral, 2 Times Daily, Patient states he takes 800 mg bid      insulin detemir 100 UNIT/ML injection  Commonly known as: LEVEMIR   14 Units, 2 Times Daily      metoprolol succinate XL 25 MG 24 hr tablet  Commonly known as: TOPROL-XL   25 mg, Oral, Every 24 Hours Scheduled      nicotine 21 MG/24HR patch  Commonly known as: NICODERM CQ   1 patch, Transdermal, Every 24 Hours Scheduled      nitroglycerin 0.4 MG SL tablet  Commonly known as: NITROSTAT   0.4 mg, Sublingual, Every 5 Minutes PRN, Take no more than 3 doses in 15 minutes.      pravastatin 40 MG tablet  Commonly known as: PRAVACHOL   40 mg, Oral, Nightly      QUEtiapine 25 MG tablet  Commonly known as: SEROquel   25 mg, Oral, Nightly      Tradjenta 5 MG tablet tablet  Generic drug: linagliptin   5 mg, Daily             Stop These Medications      dapagliflozin Propanediol 10 MG tablet  Commonly known as: Farxiga     Entresto 24-26 MG tablet  Generic drug: sacubitril-valsartan     metFORMIN 1000 MG tablet  Commonly known  as: GLUCOPHAGE     polyethylene glycol 17 GM/SCOOP powder  Commonly known as: MIRALAX            Discharge Diet:   Diet Instructions       Diet: Regular/House Diet; Regular (IDDSI 7); Thin (IDDSI 0)      Discharge Diet: Regular/House Diet    Texture: Regular (IDDSI 7)    Fluid Consistency: Thin (IDDSI 0)          Activity at Discharge:   Activity Instructions       Activity as Tolerated              Follow-up Appointments:   Future Appointments   Date Time Provider Department Center   10/21/2024  2:15 PM Merchant, Agustin LOCKE MD [facility]  PAD PAD     COMFORT MEASURES ON DISCHARGE.     Test Results Pending at Discharge: None    Patricia Arthur DO  10/16/24  11:19 CDT    Time: 35 minutes.

## 2024-10-16 NOTE — PROGRESS NOTES
"    [facility] Palliative Care Services  Progress Note  Patient Name: Tucker Knox  Date of Admission: 10/12/2024  Today's Date: 10/16/24     Code Status and Medical Interventions: No CPR (Do Not Attempt to Resuscitate); Comfort Measures   Ordered at: 10/15/24 1155     Code Status (Patient has no pulse and is not breathing):    No CPR (Do Not Attempt to Resuscitate)     Medical Interventions (Patient has pulse or is breathing):    Comfort Measures     Subjective   Chief complaint/Reason for Referral/Visit: Follow up on Goals of Care/Advance Care Planning.    Medical record reviewed. Events noted.  Transitioned to comfort measures 10/15/2024.  Received 2 doses of as needed acetaminophen in the last 24 hours per MAR.  Has not required any additional as needed pain medication or anxiety medication per MAR.  He is lying in bed, awake, and in no apparent distress.  He appears uncomfortable how he is positioned on the bed however refused assistance with straightening up.  Denies any pain or shortness of breath at this time.  He stated \"I wish you would leave me alone.\"  Discussed with nursing.  SW assisting with discharge placement.     Advanced Directives:  Advance directive on file.   The patient receives support from his son and sibling. Patient's son is his healthcare surrogate.    Due to the palliative care topics discussed including goals of care, treatment options, discharge options, medical priorities, and hospice services we will establish an advance care plan.      Review of Systems   Constitutional: Positive for malaise/fatigue.   Cardiovascular:  Positive for dyspnea on exertion. Negative for chest pain.   Respiratory:  Negative for shortness of breath (denies at this time).      Pain Assessment  Preferred Pain Scale: number (Numeric Rating Pain Scale)  Pain Location: head  Pain Description: burning, other (see comments) (heartburn PRN tums)  Objective   Diagnostics: " Reviewed      Intake/Output Summary (Last 24 hours) at 10/16/2024 1010  Last data filed at 10/16/2024 0600  Gross per 24 hour   Intake 360 ml   Output 1725 ml   Net -1365 ml     Current Facility-Administered Medications   Medication Dose Route Frequency Provider Last Rate Last Admin    acetaminophen (TYLENOL) tablet 650 mg  650 mg Oral Q6H PRN BRITT Stout DO   650 mg at 10/16/24 0609    aspirin EC tablet 81 mg  81 mg Oral Daily Jazmin Garrison APRN   81 mg at 10/16/24 0903    atropine 1 % ophthalmic solution 2 drop  2 drop Sublingual BID PRN Yanely Velasquez APRN        bisacodyl (DULCOLAX) suppository 10 mg  10 mg Rectal Daily PRN Yanely Velasquez APRN        [Held by provider] bumetanide (BUMEX) injection 1 mg  1 mg Intravenous Q12H Jazmin Garrison APRN   1 mg at 10/13/24 1225    calcium carbonate (TUMS) chewable tablet 500 mg (200 mg elemental)  2 tablet Oral TID PRN Karen Parekh DO        dextrose (D50W) (25 g/50 mL) IV injection 25 g  25 g Intravenous Q15 Min PRN Jazmin Garrison APRN        dextrose (GLUTOSE) oral gel 15 g  15 g Oral Q15 Min PRN Jazmin Garrison APRN        diphenoxylate-atropine (LOMOTIL) 2.5-0.025 MG per tablet 1 tablet  1 tablet Oral Q2H PRN Yanely Velasquez APRN        DULoxetine (CYMBALTA) DR capsule 30 mg  30 mg Oral Daily Jazmin Garrison APRN   30 mg at 10/16/24 0903    furosemide (LASIX) injection 20 mg  20 mg Intravenous Q6H PRN Yanely Velasquez APRN        Or    furosemide (LASIX) injection 20 mg  20 mg Subcutaneous Q6H PRN Yanely Velasquez APRN        gabapentin (NEURONTIN) capsule 600 mg  600 mg Oral Q12H BRITT Stout DO   600 mg at 10/16/24 0903    glucagon (GLUCAGEN) injection 1 mg  1 mg Intramuscular Q15 Min PRN Jazmin Garrison APRN        HYDROcodone-acetaminophen (NORCO) 5-325 MG per tablet 1 tablet  1 tablet Oral Q4H PRN Yanely Velasquez APRN        Or    HYDROcodone-acetaminophen (NORCO) 7.5-325 MG per tablet 1 tablet  1  tablet Oral Q4H PRN Yanely Velasquez APRN        Or    HYDROcodone-acetaminophen (NORCO)  MG per tablet 1 tablet  1 tablet Oral Q4H PRN Yanely Velasquez APRN        hydrocortisone-bacitracin-zinc oxide-nystatin (MAGIC BARRIER) ointment 1 Application  1 Application Topical 4x Daily Akanksha López APRN   1 Application at 10/16/24 0904    insulin glargine (LANTUS, SEMGLEE) injection 5 Units  5 Units Subcutaneous Q12H Patricia Arthur DO   5 Units at 10/16/24 0903    Insulin Lispro (humaLOG) injection 2-7 Units  2-7 Units Subcutaneous 4x Daily AC & at Bedtime Jazmin Garrison APRN   2 Units at 10/15/24 2034    ipratropium-albuterol (DUO-NEB) nebulizer solution 3 mL  3 mL Nebulization Q4H PRN Yanely Velasquez APRN   3 mL at 10/15/24 1412    ipratropium-albuterol (DUO-NEB) nebulizer solution 3 mL  3 mL Nebulization 4x Daily - RT Patricia Arthur DO   3 mL at 10/16/24 0955    LORazepam (ATIVAN) tablet 0.5 mg  0.5 mg Oral Q1H PRN Yanely Velasquez APRN        Or    LORazepam (ATIVAN) injection 0.5 mg  0.5 mg Intravenous Q1H PRN Yanely Velasquez APRN        Or    LORazepam (ATIVAN) 2 MG/ML concentrated solution 0.5 mg  0.5 mg Sublingual Q1H PRN Yanely Velasquez APRN        LORazepam (ATIVAN) tablet 1 mg  1 mg Oral Q1H PRN Yanely Velasquez APRN        Or    LORazepam (ATIVAN) injection 1 mg  1 mg Intravenous Q1H PRN Yanely Velasquez APRN        Or    LORazepam (ATIVAN) 2 MG/ML concentrated solution 1 mg  1 mg Sublingual Q1H PRN Yanely Velasquez APRN        LORazepam (ATIVAN) tablet 2 mg  2 mg Oral Q1H PRN Yanely Velasquez APRN        Or    LORazepam (ATIVAN) injection 2 mg  2 mg Intravenous Q1H PRN Yanely Velasquez APRN        Or    LORazepam (ATIVAN) 2 MG/ML concentrated solution 2 mg  2 mg Sublingual Q1H PRN Yanely Velasquez APRN        metoprolol succinate XL (TOPROL-XL) 24 hr tablet 25 mg  25 mg Oral Q24H Jazmin Garrison APRN   25 mg at 10/16/24 0903    nitroglycerin  "(NITROSTAT) SL tablet 0.4 mg  0.4 mg Sublingual Q5 Min PRN Jazmin Garrison APRN        Polyvinyl Alcohol-Povidone PF (ARTIFICIAL TEARS) 1.4-0.6 % ophthalmic solution 1 drop  1 drop Both Eyes Q30 Min PRN Yanely Velasquez APRN        pravastatin (PRAVACHOL) tablet 40 mg  40 mg Oral Nightly Jazmin Garrison APRN   40 mg at 10/15/24 2035    QUEtiapine (SEROquel) tablet 25 mg  25 mg Oral Nightly Jazmin Garrison APRN   25 mg at 10/15/24 2035    [Held by provider] sacubitril-valsartan (ENTRESTO) 24-26 MG tablet 1 tablet  1 tablet Oral BID Jazmin Garrison APRN        scopolamine patch 1 mg/72 hr  1 patch Transdermal Q72H PRN Yanely Velasquez APRN        sodium chloride 0.9 % flush 10 mL  10 mL Intravenous PRN Vijay Leary MD   10 mL at 10/12/24 0731    sodium chloride 0.9 % flush 10 mL  10 mL Intravenous PRN Vijay Leary MD   10 mL at 10/12/24 0731          acetaminophen    atropine    bisacodyl    calcium carbonate    dextrose    dextrose    diphenoxylate-atropine    furosemide **OR** furosemide    glucagon (human recombinant)    HYDROcodone-acetaminophen **OR** HYDROcodone-acetaminophen **OR** HYDROcodone-acetaminophen    ipratropium-albuterol    LORazepam **OR** LORazepam **OR** LORazepam    LORazepam **OR** LORazepam **OR** LORazepam    LORazepam **OR** LORazepam **OR** LORazepam    nitroglycerin    Polyvinyl Alcohol-Povidone PF    Scopolamine    sodium chloride    [COMPLETED] Insert Peripheral IV **AND** sodium chloride  Current medications patient is presently taking including all prescriptions, over-the-counter, herbals and vitamin/mineral/dietary (nutritional) supplements with reviewed including route, type, dose and frequency and are current per MAR at time of dictation.    Assessment:  Vital Signs: /67 (BP Location: Right arm, Patient Position: Lying)   Pulse 83   Temp 97.2 °F (36.2 °C) (Oral)   Resp 18   Ht 175.3 cm (69\")   Wt 132 kg (291 lb 10.7 oz)   SpO2 95%   BMI 43.07 kg/m² "     Physical Exam  Vitals and nursing note reviewed.   Constitutional:       General: He is not in acute distress.     Appearance: He is ill-appearing.      Interventions: Nasal cannula in place.   HENT:      Head: Normocephalic and atraumatic.   Eyes:      General: Lids are normal.      Extraocular Movements: Extraocular movements intact.   Neck:      Vascular: No JVD.      Trachea: Trachea normal.   Cardiovascular:      Rate and Rhythm: Normal rate.   Pulmonary:      Effort: Pulmonary effort is normal.      Breath sounds: Decreased breath sounds present.   Musculoskeletal:      Cervical back: Neck supple.   Skin:     General: Skin is warm and dry.   Neurological:      Mental Status: He is alert, oriented to person, place, and time and easily aroused.     Functional status: Palliative Performance Scale Score: Performance 50% based on the following measures: Ambulation: Mainly sit or lie down, Activity and Evidence of Disease: Unable to do any work, extensive evidence of disease, Self-Care: Considerable assistance required,  Intake: Normal or reduced, LOC: Full or confusion.  ECOG Status(3) Capable of limited self-care, confined to bed or chair > 50% of waking hours.  Nutritional status: Albumin 3.4. Body mass index is 43.07 kg/m².  Patient status: Disease state: No further treatment being pursued.    Active Hospital Problems    Diagnosis     **Acute on chronic respiratory failure with hypoxia and hypercapnia     Hyperkalemia     AMA (acute kidney injury)     Acute on chronic HFrEF (heart failure with reduced ejection fraction)     Colon adenocarcinoma     Type 2 myocardial infarction due to heart failure     Pulmonary HTN     Type 2 diabetes mellitus with hyperglycemia, with long-term current use of insulin     Hypertension      Impression/Problem List:  Acute on chronic HFrEF -  - EF 31-35%, grade II diastolic dysfunction per echocardiogram 9/2024  Acute on chronic respiratory failure with hypoxia and  "hypercapnia  Colon adenocarcinoma - Declined systemic and radiation treatment   Acute kidney injury  Pulmonary hypertension  Impaired mobility   Pleural effusion, right   Hyperkalemia   Hyperphosphatemia   Type 2 myocardial infarction due to heart failure  Type 2 diabetes mellitus  Hypertension      Coronary artery disease  - Severe two-vessel disease of LAD and right coronary artery per cardiac cath 9/2024     Plan / Recommendations   Palliative Care Encounter   Transitioned to comfort measures 10/15/2024.  Followed up with Mr. Knox to determine if he had any questions and/or concerns.  He denied and stated \"I wish you would leave me alone.\"   Encouraged questions and/or concerns if they arise.   Prognosis poor long-term prognosis secondary to comorbidities including heart failure, colon cancer, coronary artery disease, respiratory failure, etc.   Previously expressed interest in SNF placement with hospice.    Family inquired about seeing if his [place] could be [place] closer to is family.   Discussed with SW on 10/15/2024.  Will plan to see if interested in completing new MOST form prior to discharge.       Comfort Measures  Discontinue orders not in line with comfort.   May continue maintenance medications if able to safely tolerate PO.   Palliative/comfort adjuvants available as needed.     Thank you for allowing us to participate in patient's plan of care. Palliative Care Team will continue to follow patient.     Electronically signed by, VIV Portillo, 10/16/24.    "

## 2024-10-17 NOTE — THERAPY DISCHARGE NOTE
Acute Care - Physical Therapy Discharge Summary  [facility]       Patient Name: Tucker Knox  : 1960  MRN: [id number]    Today's Date: 10/17/2024                 Admit Date: 10/12/2024      PT Recommendation and Plan    Visit Dx:    ICD-10-CM ICD-9-CM   1. Respiratory failure with hypoxia and hypercapnia, unspecified chronicity  J96.91 518.81    J96.92    2. Recurrent right pleural effusion  J90 511.9   3. Acute on chronic respiratory failure with hypoxia and hypercapnia  J96.21 518.84    J96.22 786.09     799.02   4. Acute on chronic congestive heart failure, unspecified heart failure type  I50.9 428.0   5. COPD exacerbation  J44.1 491.21   6. Impaired mobility [Z74.09]  Z74.09 799.89                PT Rehab Goals       Row Name 10/17/24 0800             Bed Mobility Goal 1 (PT)    Activity/Assistive Device (Bed Mobility Goal 1, PT) bed mobility activities, all  -AB      [place] Level/Cues Needed (Bed Mobility Goal 1, PT) modified independence  -AB      Time Frame (Bed Mobility Goal 1, PT) long term goal (LTG)  -AB      Progress/Outcomes (Bed Mobility Goal 1, PT) goal not met  -AB         Transfer Goal 1 (PT)    Activity/Assistive Device (Transfer Goal 1, PT) sit-to-stand/stand-to-sit;bed-to-chair/chair-to-bed  -AB      [place] Level/Cues Needed (Transfer Goal 1, PT) standby assist  -AB      Time Frame (Transfer Goal 1, PT) long term goal (LTG)  -AB      Progress/Outcome (Transfer Goal 1, PT) goal not met  -AB         Gait Training Goal 1 (PT)    Activity/Assistive Device (Gait Training Goal 1, PT) gait (walking locomotion);assistive device use;backward stepping;decrease asymmetrical patterns;decrease fall risk;diminish gait deviation;forward stepping;improve balance and speed;increase endurance/gait distance;increase energy conservation;sidestepping;turning, left;turning, right;cane, straight  -AB      [place] Level (Gait Training Goal 1, PT) modified independence  -AB      Distance  (Gait Training Goal 1, PT) 40  -AB      Time Frame (Gait Training Goal 1, PT) long term goal (LTG)  -AB      Progress/Outcome (Gait Training Goal 1, PT) goal not met  -AB                User Key  (r) = Recorded By, (t) = Taken By, (c) = Cosigned By      Initials Name Provider Type Discipline    Vianey Zhang, PTA Physical Therapist Assistant PT                        PT Discharge Summary  Anticipated Discharge Disposition (PT): skilled nursing facility  Reason for Discharge: Discharge from facility  Outcomes Achieved: Refer to plan of care for updates on goals achieved  Discharge Destination: SNF      Vianey Blount PTA   10/17/2024

## 2024-10-17 NOTE — PAYOR COMM NOTE
"DC TO SNF 10-16-24    Farhana Norton (64 y.o. Male)       Date of Birth   1960    Social Security Number       Address   [address] [place] [address]    Home Phone   [phone]    MRN   [id number]       Mosque   Other    Marital Status                               Admission Date   10/12/24    Admission Type   Emergency    Admitting Provider   Patricia Arthur DO    Attending Provider       Department, Room/Bed   Psychiatric 4B, 449/1       Discharge Date   10/16/2024    Discharge Disposition   Skilled Nursing Facility (DC - External)    Discharge Destination                                 Attending Provider: (none)   Allergies: Penicillins    Isolation: None   Infection: None   Code Status: Prior    Ht: 175.3 cm (69\")   Wt: 132 kg (291 lb 10.7 oz)    Admission Cmt: None   Principal Problem: Acute on chronic respiratory failure with hypoxia and hypercapnia [J96.21,J96.22]                   Active Insurance as of 10/12/2024       Primary Coverage       Payor Plan Insurance Group Employer/Plan Group    ANTH MEDICARE REPLACEMENT ANTH MEDICARE ADVANTAGE [id number]       Payor Plan Address Payor Plan Phone Number Payor Plan Fax Number Effective Dates    [address] [phone]  2/1/2024 - None Entered    [facility] [id number]         Subscriber Name Subscriber Birth Date Member ID       FARHANA NORTON 1960 [id number]                     Emergency Contacts        (Rel.) Home Phone Work Phone Mobile Phone    alycia norton (Son) [phone] -- --    jose deng (Sister) [phone] -- --                 Discharge Summary        Patricia Arthur DO at 10/16/24 1118              [facility] Medicine Services  DISCHARGE SUMMARY       Date of Admission: 10/12/2024  Date of Discharge:  10/16/2024  Primary Care Physician: Kt Alfredo MD    Presenting Problem/History of Present " Illness:  Shortness of breath  Altered mental status    Final Discharge Diagnoses:  Acute on chronic respiratory failure with hypoxia and hypercapnia  Hyperkalemia  Acute kidney injury  Acute on chronic heart failure with reduced ejection fraction  Adenocarcinoma of the colon  Type II myocardial infarction due to heart failure  Pulmonary hypertension  Diabetes mellitus type 2 with hyperglycemia with long-term current use of insulin  Hypertension      Consults:   Nephrology  Palliative care    Procedures Performed:   None    Pertinent Test Results:   Imaging Results (Last 7 Days)       Procedure Component Value Units Date/Time    US Renal Bilateral [[id number]] Collected: 10/15/24 1216     Updated: 10/15/24 1223    Narrative:      US RENAL BILATERAL- 10/14/2024 1:54 PM     HISTORY: ACUTE KIDNEY INJURY     COMPARISON: None available.       TECHNIQUE: Multiple longitudinal and transverse real-time sonographic  images of the kidneys and urinary bladder are obtained. Report and  images stored per institutional and state regulations.           FINDINGS:     Visualized proximal abdominal aorta and IVC are unremarkable.        RIGHT KIDNEY: The right kidney measures 9.0 cm in length. Renal cortex  is unremarkable. There is no hydronephrosis. There is moderate ascites..     LEFT KIDNEY: Left kidney measures 9.9 cm in length. Renal cortex is  unremarkable. There is no left hydronephrosis.     PELVIS: Urinary bladder is grossly unremarkable.          Impression:         1. Unremarkable kidneys with no hydronephrosis.  2. Moderate ascites.           This report was signed and finalized on 10/15/2024 12:20 PM by Elvis Velasquez.       XR Chest 1 View [[id number]] Collected: 10/14/24 1749     Updated: 10/14/24 1754    Narrative:      EXAMINATION:  XR CHEST 1 VW-  10/14/2024 4:12 PM     HISTORY: Fluid; J96.91-Respiratory failure, unspecified with hypoxia;  J96.92-Respiratory failure, unspecified with hypercapnia;  B55-Inmmxwb  effusion, not elsewhere classified; J96.21-Acute and chronic respiratory  failure with hypoxia; J96.22-Acute and chronic respiratory failure with  hypercapnia; I50.9-Heart failure, unspecified; J44.1-Chronic obstructive  pulmonary disease with (acute) exacerbation.     COMPARISON: 10/12/2024.     TECHNIQUE: Single view AP image.     FINDINGS: There is cardiomegaly. There is small to moderate pleural  effusion on the right. There is opacification of the mid to lower lung  zone on the right. The left lung is clear. There is some mediastinal  fullness.          Impression:      1. Cardiomegaly.  2. Small to moderate right pleural effusion. Right lung opacification  may be related to atelectasis or pneumonia.  3. Mediastinal fullness could be vascular. Mediastinal lymphadenopathy  is not excluded. Chest CT on 9/9/2024 did not demonstrate enlarged  mediastinal lymph nodes.           This report was signed and finalized on 10/14/2024 5:51 PM by Dr. Jhoan Carpenter MD.       XR Chest 1 View [[id number]] Collected: 10/12/24 0650     Updated: 10/12/24 0654    Narrative:      EXAMINATION: XR CHEST 1 VW- 10/12/2024 6:50 AM     HISTORY: CHF/COPD Protocol.     REPORT: A frontal view of the chest was obtained.     COMPARISON: Chest x-ray 9/28/2024.     The heart is enlarged, there is central and basilar vascular congestion  and mild pulmonary edema. The right pleural effusion appears slightly  increased in size but remains small. No pneumothorax is identified.  Moderate atelectasis in the lung bases greater on the right. No acute  osseous abnormality.       Impression:      Congestive heart failure with slight increase in the right  pleural effusion, moderate at basilar atelectasis greater on the right.     This report was signed and finalized on 10/12/2024 6:51 AM by Dr. Puneet Whitley MD.             Lab Results (last 7 days)       Procedure Component Value Units Date/Time    Calcitriol (1,25 di-OH Vitamin D)  [[id number]]  (Abnormal) Collected: 10/14/24 1402    Specimen: Blood Updated: 10/16/24 1113     1,25-Dihydroxy, Vitamin D 15.7 pg/mL     Narrative:      Performed at:  [address] [place] [address]  [id number]  : Ana Hackett MD, Phone:  [phone]    POC Glucose Once [[id number]]  (Normal) Collected: 10/16/24 0758    Specimen: Blood Updated: 10/16/24 0808     Glucose 126 mg/dL      Comment: : [id number] José Miguel You ID: [id number]       POC Glucose Once [[id number]]  (Abnormal) Collected: 10/15/24 1947    Specimen: Blood Updated: 10/15/24 1957     Glucose 194 mg/dL      Comment: : sonya Lagunas ID: [id number]       POC Glucose Once [[id number]]  (Abnormal) Collected: 10/15/24 1559    Specimen: Blood Updated: 10/15/24 1610     Glucose 202 mg/dL      Comment: : [id number] Tiffany IslasiaMeter ID: [id number]       POC Glucose Once [[id number]]  (Abnormal) Collected: 10/15/24 1128    Specimen: Blood Updated: 10/15/24 1139     Glucose 164 mg/dL      Comment: : [id number] Tiffany IslasiaMeter ID: [id number]       POC Glucose Once [[id number]]  (Abnormal) Collected: 10/15/24 0745    Specimen: Blood Updated: 10/15/24 0757     Glucose 337 mg/dL      Comment: : [id number] Tiffany IslasiaMeter ID: [id number]       POC Glucose Once [[id number]]  (Normal) Collected: 10/15/24 0652    Specimen: Blood Updated: 10/15/24 0704     Glucose 74 mg/dL      Comment: : sonya Lagunas ID: [id number]       POC Glucose Once [[id number]]  (Abnormal) Collected: 10/15/24 0622    Specimen: Blood Updated: 10/15/24 0633     Glucose 69 mg/dL      Comment: : sonya Lagunas ID: [id number]       POC Glucose Once [[id number]]  (Abnormal) Collected: 10/15/24 0547    Specimen: Blood Updated: 10/15/24 0558     Glucose 61 mg/dL      Comment: : sonya Lagunas ID: [id number]       Comprehensive Metabolic Panel [[id number]]  (Abnormal)  Collected: 10/15/24 0417    Specimen: Blood Updated: 10/15/24 0511     Glucose 57 mg/dL      BUN 73 mg/dL      Creatinine 2.43 mg/dL      Sodium 135 mmol/L      Potassium 5.6 mmol/L      Chloride 95 mmol/L      CO2 34.0 mmol/L      Calcium 8.3 mg/dL      Total Protein 5.8 g/dL      Albumin 3.4 g/dL      ALT (SGPT) 22 U/L      AST (SGOT) 15 U/L      Alkaline Phosphatase 90 U/L      Total Bilirubin 0.3 mg/dL      Globulin 2.4 gm/dL      A/G Ratio 1.4 g/dL      BUN/Creatinine Ratio 30.0     Anion Gap 6.0 mmol/L      eGFR 29.1 mL/min/1.73     Narrative:      GFR Normal >60  Chronic Kidney Disease <60  Kidney Failure <15      POC Glucose Once [[id number]]  (Abnormal) Collected: 10/14/24 1956    Specimen: Blood Updated: 10/14/24 2007     Glucose 160 mg/dL      Comment: : [id number] Geo VargasiahMeter ID: [id number]       POC Glucose Once [[id number]]  (Abnormal) Collected: 10/14/24 1641    Specimen: Blood Updated: 10/14/24 1652     Glucose 62 mg/dL      Comment: : [id number] FRAN'Marilee IvyMeter ID: [id number]       Vitamin D,25-Hydroxy [[id number]]  (Abnormal) Collected: 10/14/24 0252    Specimen: Blood Updated: 10/14/24 1306     25 Hydroxy, Vitamin D <6.0 ng/ml     Narrative:      Reference Range for Total Vitamin D 25(OH)     Deficiency <20.0 ng/mL   Insufficiency 21-29 ng/mL   Sufficiency  ng/mL  Toxicity >100 ng/ml      POC Glucose Once [[id number]]  (Normal) Collected: 10/14/24 1137    Specimen: Blood Updated: 10/14/24 1148     Glucose 119 mg/dL      Comment: : jbarthel Barthel JustinMetbubba ID: [id number]       POC Glucose Once [[id number]]  (Normal) Collected: 10/14/24 0807    Specimen: Blood Updated: 10/14/24 0819     Glucose 74 mg/dL      Comment: : jbarthel Barthel JustinMeter ID: [id number]       Urinalysis, Microscopic Only - Urine, Clean Catch [[id number]] Collected: 10/14/24 0542    Specimen: Urine, Clean Catch Updated: 10/14/24 0607     RBC, UA 0-2 /HPF      WBC, UA 0-2 /HPF       Bacteria, UA None Seen /HPF      Squamous Epithelial Cells, UA None Seen /HPF      Hyaline Casts, UA 3-6 /LPF      Methodology Automated Microscopy    Urinalysis With Microscopic If Indicated (No Culture) - Urine, Clean Catch [[id number]]  (Abnormal) Collected: 10/14/24 0542    Specimen: Urine, Clean Catch Updated: 10/14/24 0607     Color, UA Dark Yellow     Appearance, UA Clear     pH, UA <=5.0     Specific Gravity, UA 1.017     Glucose,  mg/dL (1+)     Ketones, UA Trace     Bilirubin, UA Negative     Blood, UA Negative     Protein, UA Trace     Leuk Esterase, UA Trace     Nitrite, UA Negative     Urobilinogen, UA 1.0 E.U./dL    Basic Metabolic Panel [[id number]]  (Abnormal) Collected: 10/14/24 0252    Specimen: Blood Updated: 10/14/24 0403     Glucose 99 mg/dL      BUN 69 mg/dL      Creatinine 2.55 mg/dL      Sodium 135 mmol/L      Potassium 5.6 mmol/L      Chloride 94 mmol/L      CO2 32.0 mmol/L      Calcium 8.3 mg/dL      BUN/Creatinine Ratio 27.1     Anion Gap 9.0 mmol/L      eGFR 27.5 mL/min/1.73     Narrative:      GFR Normal >60  Chronic Kidney Disease <60  Kidney Failure <15      Magnesium [[id number]]  (Normal) Collected: 10/14/24 0252    Specimen: Blood Updated: 10/14/24 0403     Magnesium 2.4 mg/dL     Phosphorus [[id number]]  (Abnormal) Collected: 10/14/24 0252    Specimen: Blood Updated: 10/14/24 0403     Phosphorus 6.4 mg/dL     PTH, Intact [[id number]]  (Abnormal) Collected: 10/14/24 0252    Specimen: Blood Updated: 10/14/24 0359     PTH, Intact 106.2 pg/mL     Narrative:      Results may be falsely decreased if patient taking Biotin.      CBC (No Diff) [[id number]]  (Abnormal) Collected: 10/14/24 0252    Specimen: Blood Updated: 10/14/24 0335     WBC 6.68 10*3/mm3      RBC 3.81 10*6/mm3      Hemoglobin 10.3 g/dL      Hematocrit 36.7 %      MCV 96.3 fL      MCH 27.0 pg      MCHC 28.1 g/dL      RDW 15.2 %      RDW-SD 53.1 fl      MPV 10.6 fL      Platelets 239 10*3/mm3     Creatinine Urine Random  (kidney function) GFR component - Urine, Clean Catch [[id number]] Collected: 10/13/24 1404    Specimen: Urine, Clean Catch Updated: 10/13/24 2051     Creatinine, Urine 59.5 mg/dL     Narrative:      Reference intervals for random urine have not been established.  Clinical usage is dependent upon physician's interpretation in combination with other laboratory tests.       Urea Nitrogen, Urine - Urine, Clean Catch [[id number]] Collected: 10/13/24 1404    Specimen: Urine, Clean Catch Updated: 10/13/24 2051     Urea Nitrogen, Urine 452 mg/dL     Narrative:      Reference intervals for random urine have not been established.  Clinical usage is dependent upon physician's interpretation in combination with other laboratory tests.       POC Glucose Once [[id number]]  (Abnormal) Collected: 10/13/24 2034    Specimen: Blood Updated: 10/13/24 2045     Glucose 168 mg/dL      Comment: : [id number] Jorge VickersbethMeter ID: [id number]       Basic Metabolic Panel [[id number]]  (Abnormal) Collected: 10/13/24 1649    Specimen: Blood Updated: 10/13/24 1725     Glucose 118 mg/dL      BUN 61 mg/dL      Creatinine 2.42 mg/dL      Sodium 137 mmol/L      Potassium 5.2 mmol/L      Chloride 94 mmol/L      CO2 35.0 mmol/L      Calcium 8.5 mg/dL      BUN/Creatinine Ratio 25.2     Anion Gap 8.0 mmol/L      eGFR 29.3 mL/min/1.73     Narrative:      GFR Normal >60  Chronic Kidney Disease <60  Kidney Failure <15      POC Glucose Once [[id number]]  (Abnormal) Collected: 10/13/24 1638    Specimen: Blood Updated: 10/13/24 1651     Glucose 131 mg/dL      Comment: : [id number] Nichelle Margaret ID: [id number]       Protein, Urine, Random - Urine, Clean Catch [[id number]] Collected: 10/13/24 1404    Specimen: Urine, Clean Catch Updated: 10/13/24 1634     Total Protein, Urine 8.8 mg/dL     Narrative:      Reference intervals for random urine have not been established.  Clinical usage is dependent upon physician's interpretation in combination with other  laboratory tests.       Blood Gas, Arterial - [[id number]]  (Abnormal) Collected: 10/13/24 1519    Specimen: Arterial Blood Updated: 10/13/24 1518     Site Left Radial     Jorge's Test Positive     pH, Arterial 7.265 pH units      pCO2, Arterial 77.0 mm Hg      Comment: 86 Value above critical limit        pO2, Arterial 111.0 mm Hg      Comment: 83 Value above reference range        HCO3, Arterial 34.9 mmol/L      Comment: 83 Value above reference range        Base Excess, Arterial 5.7 mmol/L      Comment: 83 Value above reference range        O2 Saturation, Arterial 97.9 %      Temperature 37.0     Barometric Pressure for Blood Gas 751 mmHg      Modality BiPap     FIO2 50 %      Ventilator Mode BiPAP     Set Mech Resp Rate 12.0     IPAP 20     Comment: Meter: [id number]     :  [id number]        EPAP 8     Notified By [id number]     Collected by [id number]     pCO2, Temperature Corrected 77.0 mm Hg      pH, Temp Corrected 7.265 pH Units      pO2, Temperature Corrected 111 mm Hg      PO2/FIO2 222    Sodium, Urine, Random - Urine, Clean Catch [[id number]] Collected: 10/13/24 1404    Specimen: Urine, Clean Catch Updated: 10/13/24 1427     Sodium, Urine 52 mmol/L     Narrative:      Reference intervals for random urine have not been established.  Clinical usage is dependent upon physician's interpretation in combination with other laboratory tests.       POC Glucose Once [[id number]]  (Abnormal) Collected: 10/13/24 1218    Specimen: Blood Updated: 10/13/24 1230     Glucose 197 mg/dL      Comment: : [id number] Nichelle TharpeMeter ID: [id number]       POC Glucose Once [[id number]]  (Abnormal) Collected: 10/13/24 0756    Specimen: Blood Updated: 10/13/24 0828     Glucose 197 mg/dL      Comment: : [id number] Nichelle TharpeMeter ID: [id number]       Basic Metabolic Panel [[id number]]  (Abnormal) Collected: 10/13/24 0424    Specimen: Blood Updated: 10/13/24 0503     Glucose 257 mg/dL      BUN 61 mg/dL      Creatinine 2.16  mg/dL      Sodium 134 mmol/L      Potassium 5.9 mmol/L      Comment: Slight hemolysis detected by analyzer. Result may be falsely elevated.        Chloride 95 mmol/L      CO2 30.0 mmol/L      Calcium 8.4 mg/dL      BUN/Creatinine Ratio 28.2     Anion Gap 9.0 mmol/L      eGFR 33.6 mL/min/1.73     Narrative:      GFR Normal >60  Chronic Kidney Disease <60  Kidney Failure <15      CBC (No Diff) [[id number]]  (Abnormal) Collected: 10/13/24 0424    Specimen: Blood Updated: 10/13/24 0458     WBC 8.04 10*3/mm3      RBC 4.06 10*6/mm3      Hemoglobin 11.1 g/dL      Hematocrit 38.5 %      MCV 94.8 fL      MCH 27.3 pg      MCHC 28.8 g/dL      RDW 15.4 %      RDW-SD 53.2 fl      MPV 10.8 fL      Platelets 251 10*3/mm3     Blood Gas, Arterial - [[id number]]  (Abnormal) Collected: 10/13/24 0329    Specimen: Arterial Blood Updated: 10/13/24 0335     Site Right Radial     Jorge's Test Positive     pH, Arterial 7.307 pH units      Comment: 84 Value below reference range        pCO2, Arterial 69.0 mm Hg      Comment: 86 Value above critical limit        pO2, Arterial 83.5 mm Hg      HCO3, Arterial 34.5 mmol/L      Comment: 83 Value above reference range        Base Excess, Arterial 6.3 mmol/L      Comment: 83 Value above reference range        O2 Saturation, Arterial 95.5 %      Temperature 37.0     Barometric Pressure for Blood Gas 750 mmHg      Modality Nasal Cannula     Flow Rate 3.0 lpm      Ventilator Mode NA     Notified By Sania Soto, RRT     Collected by [id number]     Comment: Meter: [id number]     :  Sania Soto RRT        pCO2, Temperature Corrected 69.0 mm Hg      pH, Temp Corrected 7.307 pH Units      pO2, Temperature Corrected 83.5 mm Hg     POC Glucose Once [[id number]]  (Abnormal) Collected: 10/12/24 1944    Specimen: Blood Updated: 10/12/24 1955     Glucose 400 mg/dL      Comment: : [id number] Jorge CeronMeter ID: [id number]       POC Glucose Once [[id number]]  (Abnormal) Collected: 10/12/24  1710    Specimen: Blood Updated: 10/12/24 1724     Glucose 366 mg/dL      Comment: : [id number] Nichelle Robles ID: [id number]       Blood Gas, Arterial With Co-Ox [[id number]]  (Abnormal) Collected: 10/12/24 0759    Specimen: Arterial Blood Updated: 10/12/24 1100     Site Right Radial     Jorge's Test Positive     pH, Arterial 7.265 pH units      Comment: 84 Value below reference range        pCO2, Arterial 79.4 mm Hg      Comment: 86 Value above critical limit        pO2, Arterial 93.7 mm Hg      HCO3, Arterial 36.0 mmol/L      Comment: 83 Value above reference range        Base Excess, Arterial 6.7 mmol/L      Comment: 83 Value above reference range        O2 Saturation, Arterial 96.8 %      Hemoglobin, Blood Gas 11.6 g/dL      Comment: 84 Value below reference range        Hematocrit, Blood Gas 35.5 %      Comment: 84 Value below reference range        Oxyhemoglobin 95.0 %      Methemoglobin 0.60 %      Carboxyhemoglobin 1.3 %      Temperature 37.0     Sodium, Arterial 141 mmol/L      Potassium, Arterial 5.5 mmol/L      Comment: 83 Value above reference range        Barometric Pressure for Blood Gas 756 mmHg      Modality BiPap     FIO2 70 %      Ventilator Mode NIV     Set Mech Resp Rate 10     Comment: Corrected for Gwen Lund CRT   Corrected result. Previous result was 14.0 on 10/12/2024 at 0800 CDT.        IPAP 14     Comment: Entered for Gwen Lund CRT   Appended report. These results have been appended to a previously final verified report.        EPAP 7     Comment: Entered for Gwen Lund CRT   Appended report. These results have been appended to a previously final verified report.        Notified Who DR JUSTICE     Notified By Gwen Lund CRT     Notified Time 10/12/2024 08:00     Collected by [id number]     Comment: Meter: [id number]     :  Gwen Lund CRT         pH, Temp Corrected 7.265 pH Units      pCO2, Temperature Corrected 79.4 mm Hg      pO2,  Temperature Corrected 93.7 mm Hg     Blood Gas, Arterial - [[id number]]  (Abnormal) Collected: 10/12/24 1048    Specimen: Arterial Blood Updated: 10/12/24 1048     Site Right Radial     Jorge's Test Positive     pH, Arterial 7.362 pH units      pCO2, Arterial 59.7 mm Hg      Comment: 83 Value above reference range        pO2, Arterial 70.2 mm Hg      Comment: 84 Value below reference range        HCO3, Arterial 33.9 mmol/L      Comment: 83 Value above reference range        Base Excess, Arterial 6.9 mmol/L      Comment: 83 Value above reference range        O2 Saturation, Arterial 94.5 %      Temperature 37.0     Barometric Pressure for Blood Gas 755 mmHg      Modality BiPap     FIO2 40 %      Ventilator Mode NIV     Set Mec Resp Rate 22.0     IPAP 20     Comment: Meter: [id number]     :  Gwen Lund, CRT         EPAP 8     Collected by [id number]     pCO2, Temperature Corrected 59.7 mm Hg      pH, Temp Corrected 7.362 pH Units      pO2, Temperature Corrected 70.2 mm Hg      PO2/FIO2 176    Respiratory Panel PCR w/COVID-19(SARS-CoV-2) KADY/KIP/MILLI/PAD/COR/FREDY In-House, NP Swab in UTM/VTM, 2 HR TAT - Swab, Nasopharynx [[id number]]  (Normal) Collected: 10/12/24 0833    Specimen: Swab from Nasopharynx Updated: 10/12/24 0924     ADENOVIRUS, PCR Not Detected     Coronavirus 229E Not Detected     Coronavirus HKU1 Not Detected     Coronavirus NL63 Not Detected     Coronavirus OC43 Not Detected     COVID19 Not Detected     Human Metapneumovirus Not Detected     Human Rhinovirus/Enterovirus Not Detected     Influenza A PCR Not Detected     Influenza B PCR Not Detected     Parainfluenza Virus 1 Not Detected     Parainfluenza Virus 2 Not Detected     Parainfluenza Virus 3 Not Detected     Parainfluenza Virus 4 Not Detected     RSV, PCR Not Detected     Bordetella pertussis pcr Not Detected     Bordetella parapertussis PCR Not Detected     Chlamydophila pneumoniae PCR Not Detected     Mycoplasma pneumo by PCR Not  Detected    Narrative:      In the setting of a positive respiratory panel with a viral infection PLUS a negative procalcitonin without other underlying concern for bacterial infection, consider observing off antibiotics or discontinuation of antibiotics and continue supportive care. If the respiratory panel is positive for atypical bacterial infection (Bordetella pertussis, Chlamydophila pneumoniae, or Mycoplasma pneumoniae), consider antibiotic de-escalation to target atypical bacterial infection.    High Sensitivity Troponin T 2Hr [[id number]]  (Abnormal) Collected: 10/12/24 0730    Specimen: Blood Updated: 10/12/24 0802     HS Troponin T 130 ng/L      Troponin T Delta -13 ng/L     Narrative:      High Sensitive Troponin T Reference Range:  <14.0 ng/L- Negative Female for AMI  <22.0 ng/L- Negative Male for AMI  >=14 - Abnormal Female indicating possible myocardial injury.  >=22 - Abnormal Male indicating possible myocardial injury.   Clinicians would have to utilize clinical acumen, EKG, Troponin, and serial changes to determine if it is an Acute Myocardial Infarction or myocardial injury due to an underlying chronic condition.         Comprehensive Metabolic Panel [[id number]]  (Abnormal) Collected: 10/12/24 0532    Specimen: Blood from Arm, Left Updated: 10/12/24 0617     Glucose 202 mg/dL      BUN 45 mg/dL      Creatinine 1.56 mg/dL      Sodium 142 mmol/L      Potassium 5.4 mmol/L      Comment: Slight hemolysis detected by analyzer. Result may be falsely elevated.        Chloride 98 mmol/L      CO2 35.0 mmol/L      Calcium 9.0 mg/dL      Total Protein 6.8 g/dL      Albumin 4.0 g/dL      ALT (SGPT) 13 U/L      AST (SGOT) 14 U/L      Alkaline Phosphatase 103 U/L      Total Bilirubin 0.6 mg/dL      Globulin 2.8 gm/dL      A/G Ratio 1.4 g/dL      BUN/Creatinine Ratio 28.8     Anion Gap 9.0 mmol/L      eGFR 49.6 mL/min/1.73     Narrative:      GFR Normal >60  Chronic Kidney Disease <60  Kidney Failure <15      Single  High Sensitivity Troponin T [[id number]]  (Abnormal) Collected: 10/12/24 0532    Specimen: Blood from Arm, Left Updated: 10/12/24 0615     HS Troponin T 143 ng/L     Narrative:      High Sensitive Troponin T Reference Range:  <14.0 ng/L- Negative Female for AMI  <22.0 ng/L- Negative Male for AMI  >=14 - Abnormal Female indicating possible myocardial injury.  >=22 - Abnormal Male indicating possible myocardial injury.   Clinicians would have to utilize clinical acumen, EKG, Troponin, and serial changes to determine if it is an Acute Myocardial Infarction or myocardial injury due to an underlying chronic condition.         BNP [[id number]]  (Abnormal) Collected: 10/12/24 0532    Specimen: Blood from Arm, Left Updated: 10/12/24 0615     proBNP 14,625.0 pg/mL     Narrative:      This assay is used as an aid in the diagnosis of individuals suspected of having heart failure. It can be used as an aid in the diagnosis of acute decompensated heart failure (ADHF) in patients presenting with signs and symptoms of ADHF to the emergency department (ED). In addition, NT-proBNP of <300 pg/mL indicates ADHF is not likely.    Age Range Result Interpretation  NT-proBNP Concentration (pg/mL:      <50             Positive            >450                   Gray                 300-450                    Negative             <300    50-75           Positive            >900                  Gray                300-900                  Negative            <300      >75             Positive            >1800                  Gray                300-1800                  Negative            <300    Magnesium [[id number]]  (Abnormal) Collected: 10/12/24 0532    Specimen: Blood from Arm, Left Updated: 10/12/24 0612     Magnesium 2.6 mg/dL     CBC & Differential [[id number]]  (Abnormal) Collected: 10/12/24 0532    Specimen: Blood from Arm, Left Updated: 10/12/24 0550    Narrative:      The following orders were created for panel order CBC &  Differential.  Procedure                               Abnormality         Status                     ---------                               -----------         ------                     CBC Auto Differential[[id number]]        Abnormal            Final result                 Please view results for these tests on the individual orders.    CBC Auto Differential [[id number]]  (Abnormal) Collected: 10/12/24 0532    Specimen: Blood from Arm, Left Updated: 10/12/24 0550     WBC 9.29 10*3/mm3      RBC 4.18 10*6/mm3      Hemoglobin 11.5 g/dL      Hematocrit 41.0 %      MCV 98.1 fL      MCH 27.5 pg      MCHC 28.0 g/dL      RDW 15.4 %      RDW-SD 55.4 fl      MPV 10.6 fL      Platelets 286 10*3/mm3      Neutrophil % 78.9 %      Lymphocyte % 10.2 %      Monocyte % 8.6 %      Eosinophil % 1.1 %      Basophil % 0.6 %      Immature Grans % 0.6 %      Neutrophils, Absolute 7.32 10*3/mm3      Lymphocytes, Absolute 0.95 10*3/mm3      Monocytes, Absolute 0.80 10*3/mm3      Eosinophils, Absolute 0.10 10*3/mm3      Basophils, Absolute 0.06 10*3/mm3      Immature Grans, Absolute 0.06 10*3/mm3      nRBC 0.0 /100 WBC     [place] Draw [[id number]] Collected: 10/12/24 0532    Specimen: Blood from Arm, Left Updated: 10/12/24 0546    Narrative:      The following orders were created for panel order [place] Draw.  Procedure                               Abnormality         Status                     ---------                               -----------         ------                     Green Top (Gel)[[id number]]                                  Final result               Lavender Top[[id number]]                                     Final result               Red Top[[id number]]                                          Final result               Light Blue Top[[id number]]                                   Final result                 Please view results for these tests on the individual orders.    Green Top (Gel) [[id number]] Collected: 10/12/24  0532    Specimen: Blood from Arm, Left Updated: 10/12/24 0546     Extra Tube Hold for add-ons.     Comment: Auto resulted.       Lavender Top [[id number]] Collected: 10/12/24 0532    Specimen: Blood from Arm, Left Updated: 10/12/24 0546     Extra Tube hold for add-on     Comment: Auto resulted       Red Top [[id number]] Collected: 10/12/24 0532    Specimen: Blood from Arm, Left Updated: 10/12/24 0546     Extra Tube Hold for add-ons.     Comment: Auto resulted.       Light Blue Top [[id number]] Collected: 10/12/24 0532    Specimen: Blood from Arm, Left Updated: 10/12/24 0546     Extra Tube Hold for add-ons.     Comment: Auto resulted       Blood Gas, Arterial With Co-Ox [[id number]]  (Abnormal) Collected: 10/12/24 0522    Specimen: Arterial Blood Updated: 10/12/24 0522     Site Right Radial     Jorge's Test Positive     pH, Arterial 7.268 pH units      Comment: 84 Value below reference range        pCO2, Arterial 78.1 mm Hg      Comment: 86 Value above critical limit        pO2, Arterial 70.2 mm Hg      Comment: 84 Value below reference range        HCO3, Arterial 35.7 mmol/L      Comment: 83 Value above reference range        Base Excess, Arterial 6.5 mmol/L      Comment: 83 Value above reference range        O2 Saturation, Arterial 92.0 %      Comment: 84 Value below reference range        Hemoglobin, Blood Gas 11.5 g/dL      Comment: 84 Value below reference range        Hematocrit, Blood Gas 35.3 %      Comment: 84 Value below reference range        Oxyhemoglobin 91.4 %      Comment: 84 Value below reference range        Methemoglobin 0.00 %      Comment: 84 Value below reference range        Carboxyhemoglobin 1.3 %      Temperature 37.0     Sodium, Arterial 141 mmol/L      Potassium, Arterial 5.2 mmol/L      Comment: 83 Value above reference range        Barometric Pressure for Blood Gas 756 mmHg      Modality Nasal Cannula     FIO2 44 %      Flow Rate 6.0 lpm      Ventilator Mode NA     Notified Who MD BARBOSA      "Notified By elizabethMicaela     Notified Time 10/12/2024 05:22     Collected by [id number]     Comment: Meter: [id number]     :  navin        pH, Temp Corrected 7.268 pH Units      pCO2, Temperature Corrected 78.1 mm Hg      pO2, Temperature Corrected 70.2 mm Hg           Hospital Course:  The patient is a 64 y.o. male who presented to [facility] with increasing shortness of breath.  This is also associated with altered mental status.  He was evaluated in the emergency room.  He was admitted to the telemetry floor.  BiPAP was utilized.  Patient's oxygen status and CO2 status improved and was transition to nasal cannula.  He was diuresed.  Nephrology was consulted secondary to his renal issue.  Palliative care was consulted.  Discussion was had with patient and his sister via video conferencing.  Patient has decided that he would like to transition to comfort measures.   was consulted for placement for this patient.  He is excepted at [facility].  He will transition to [facility] nursing facility with comfort measures today.  .      Physical Exam on Discharge:  /67 (BP Location: Right arm, Patient Position: Lying)   Pulse 83   Temp 97.2 °F (36.2 °C) (Oral)   Resp 18   Ht 175.3 cm (69\")   Wt 132 kg (291 lb 10.7 oz)   SpO2 95%   BMI 43.07 kg/m²   Physical Exam  Vitals and nursing note reviewed.   Constitutional:       Appearance: Normal appearance.   HENT:      Head: Normocephalic and atraumatic.      Right Ear: External ear normal.      Left Ear: External ear normal.      Nose: Nose normal.      Mouth/Throat:      Mouth: Mucous membranes are moist.   Eyes:      Extraocular Movements: Extraocular movements intact.      Conjunctiva/sclera: Conjunctivae normal.      Pupils: Pupils are equal, round, and reactive to light.   Cardiovascular:      Rate and Rhythm: Normal rate and regular rhythm.      Pulses: Normal pulses.      Heart sounds: No murmur heard.     No friction " rub. No gallop.   Pulmonary:      Effort: Pulmonary effort is normal.      Breath sounds: Rhonchi present.   Abdominal:      General: Bowel sounds are normal.      Palpations: Abdomen is soft.   Musculoskeletal:      Cervical back: Normal range of motion and neck supple.      Comments: Moves all extremities   Skin:     General: Skin is warm and dry.      Capillary Refill: Capillary refill takes less than 2 seconds.   Neurological:      General: No focal deficit present.      Mental Status: He is alert and oriented to person, place, and time.      Cranial Nerves: No cranial nerve deficit.   Psychiatric:         Mood and Affect: Mood normal.         Behavior: Behavior normal.           Condition on Discharge:   Stable    Discharge Disposition:  Skilled Nursing Facility ([place] - External)    Discharge Medications:     Discharge Medications        New Medications        Instructions Start Date   atropine 1 % ophthalmic solution   2 drops, Ophthalmic, 2 Times Daily PRN      bisacodyl 10 MG suppository  Commonly known as: DULCOLAX   10 mg, Rectal, Daily PRN      calcium carbonate 500 MG chewable tablet  Commonly known as: TUMS   2 tablets, Oral, 3 Times Daily PRN      HYDROcodone-acetaminophen 7.5-325 MG per tablet  Commonly known as: NORCO   1 tablet, Oral, Every 4 Hours PRN      hydrocortisone-bacitracin-zinc oxide-nystatin  Commonly known as: MAGIC BARRIER   1 Application, Topical, 4 Times Daily      ipratropium-albuterol 0.5-2.5 mg/3 ml nebulizer  Commonly known as: DUO-NEB   3 mL, Nebulization, Every 4 Hours PRN      ipratropium-albuterol 0.5-2.5 mg/3 ml nebulizer  Commonly known as: DUO-NEB   3 mL, Nebulization, 4 Times Daily - RT      LORazepam 0.5 MG tablet  Commonly known as: ATIVAN   0.5 mg, Oral, Every 1 Hour PRN      Polyvinyl Alcohol-Povidone PF 1.4-0.6 % ophthalmic solution  Commonly known as: ARTIFICIAL TEARS   1 drop, Both Eyes, Every 30 Minutes PRN      Scopolamine 1 MG/3DAYS patch   1 patch, Transdermal,  Every 72 Hours PRN             Continue These Medications        Instructions Start Date   acetaminophen 325 MG tablet  Commonly known as: TYLENOL   650 mg, Oral, Every 6 Hours PRN      albuterol sulfate  (90 Base) MCG/ACT inhaler  Commonly known as: PROVENTIL HFA;VENTOLIN HFA;PROAIR HFA   2 puffs, Inhalation, Every 4 Hours PRN      albuterol (2.5 MG/3ML) 0.083% nebulizer solution  Commonly known as: PROVENTIL   2.5 mg, Nebulization, Every 6 Hours PRN      aspirin 81 MG EC tablet   81 mg, Oral, Daily      DULoxetine 30 MG capsule  Commonly known as: CYMBALTA   30 mg, Oral, Daily      furosemide 40 MG tablet  Commonly known as: LASIX   20 mg, Oral, Daily      gabapentin 600 MG tablet  Commonly known as: NEURONTIN   600 mg, Oral, 2 Times Daily, Patient states he takes 800 mg bid      insulin detemir 100 UNIT/ML injection  Commonly known as: LEVEMIR   14 Units, 2 Times Daily      metoprolol succinate XL 25 MG 24 hr tablet  Commonly known as: TOPROL-XL   25 mg, Oral, Every 24 Hours Scheduled      nicotine 21 MG/24HR patch  Commonly known as: NICODERM CQ   1 patch, Transdermal, Every 24 Hours Scheduled      nitroglycerin 0.4 MG SL tablet  Commonly known as: NITROSTAT   0.4 mg, Sublingual, Every 5 Minutes PRN, Take no more than 3 doses in 15 minutes.      pravastatin 40 MG tablet  Commonly known as: PRAVACHOL   40 mg, Oral, Nightly      QUEtiapine 25 MG tablet  Commonly known as: SEROquel   25 mg, Oral, Nightly      Tradjenta 5 MG tablet tablet  Generic drug: linagliptin   5 mg, Daily             Stop These Medications      dapagliflozin Propanediol 10 MG tablet  Commonly known as: Farxiga     Entresto 24-26 MG tablet  Generic drug: sacubitril-valsartan     metFORMIN 1000 MG tablet  Commonly known as: GLUCOPHAGE     polyethylene glycol 17 GM/SCOOP powder  Commonly known as: MIRALAX            Discharge Diet:   Diet Instructions       Diet: Regular/House Diet; Regular (IDDSI 7); Thin (IDDSI 0)      Discharge Diet:  Regular/House Diet    Texture: Regular (IDDSI 7)    Fluid Consistency: Thin (IDDSI 0)          Activity at Discharge:   Activity Instructions       Activity as Tolerated              Follow-up Appointments:   Future Appointments   Date Time Provider Department Center   10/21/2024  2:15 PM Merchant, Agustin LOCKE MD [facility]  [facility]     COMFORT MEASURES ON DISCHARGE.     Test Results Pending at Discharge: None    Patricia Arthur DO  10/16/24  11:19 CDT    Time: 35 minutes.      Electronically signed by Patricia Arthur DO at 10/16/24 8748

## 2024-10-18 PROBLEM — R07.9 CHEST PAIN: Status: ACTIVE | Noted: 2024-01-01

## 2024-10-18 NOTE — ED PROVIDER NOTES
Subjective   History of Present Illness  Patient was sent from the rehab unit he was recently admitted to the hospital and was empirically of care his cardiac catheterization earlier part of this year was abnormal.  He comes to the ED with chest pain and shortness of breath family members available.    Chest Pain  Pain location:  Substernal area  Pain quality: aching    Pain radiates to:  Does not radiate  Pain severity:  Moderate  Onset quality:  Gradual  Timing:  Constant  Chronicity:  New  Context: breathing    Context: not drug use, not lifting, not movement, not at rest and not trauma    Relieved by:  Nothing  Worsened by:  Nothing  Ineffective treatments:  None tried  Associated symptoms: cough and shortness of breath    Associated symptoms: no abdominal pain, no altered mental status, no anorexia, no claudication, no dizziness, no dysphagia, no fatigue, no fever, no headache, no lower extremity edema, no nausea, no orthopnea, no palpitations and no vomiting    Risk factors: hypertension, male sex and obesity    Risk factors: no aortic disease, no birth control, no diabetes mellitus and no immobilization        Review of Systems   Constitutional: Negative.  Negative for chills, fatigue and fever.   HENT: Negative.  Negative for congestion and trouble swallowing.    Respiratory: Negative.  Positive for cough and shortness of breath. Negative for chest tightness and stridor.    Cardiovascular: Negative.  Positive for chest pain. Negative for palpitations, orthopnea and claudication.   Gastrointestinal: Negative.  Negative for abdominal distention, abdominal pain, anorexia, nausea and vomiting.   Endocrine: Negative.    Genitourinary: Negative.  Negative for difficulty urinating and flank pain.   Musculoskeletal: Negative.    Skin: Negative.  Negative for color change.   Neurological: Negative.  Negative for dizziness and headaches.   All other systems reviewed and are negative.      Past Medical History:    Diagnosis Date    Cancer     CHF (congestive heart failure)     COPD (chronic obstructive pulmonary disease)     Coronary artery disease     stent x 1    Family history of colon cancer     Hyperlipidemia     Hypertension     Sleep apnea     does not wear machine, supposed to wear cpap with oxygen and does not wear it    Type 2 diabetes mellitus        Allergies   Allergen Reactions    Penicillins Unknown - Low Severity     Pt states happened when child does not know        Past Surgical History:   Procedure Laterality Date    BACK SURGERY      CARDIAC CATHETERIZATION Left 04/13/2022    Procedure: Cardiac Catheterization/Vascular Study;  Surgeon: Todd Avendano MD;  Location:  [facility];  Service: Cardiology;  Laterality: Left;    CARDIAC CATHETERIZATION      with stent x1    CARDIAC CATHETERIZATION N/A 9/12/2024    Procedure: Left Heart Cath;  Surgeon: Ruben Adler MD;  Location:  [facility];  Service: Cardiology;  Laterality: N/A;    COLON RESECTION N/A 12/5/2023    Procedure: COLON RESECTION LAPAROSCOPIC SIGMOID WITH DAVINCI ROBOT, INTRA-OPERATIVE FLEXIBLE SIGMOIDOSCOPY, SPLENIC FLEXURE MOBILIZATION, OPEN UMBILICAL HERNIA REPAIR;  Surgeon: Oma Velasquez MD;  Location:  [facility] OR;  Service: Robotics - DaVinci;  Laterality: N/A;    COLONOSCOPY N/A 10/09/2023    Diverticulosis; One 5mm polyp in ascending colon; One 5mm polyp in transverse colon; One 10mm polyp at 65cm proximal to anus; One 20mm polyp at 65cm proximal to anus-Tattooed; One 20mm polyp at 42cm proximal to anus-Clip (MR conditional) placed-Tattooed; Rule out malignancy-Partially obstructing tumor in recto-sigmoid colon-biopsied-Tattooed; One 10mm polyp in rectum    ELBOW PROCEDURE      KNEE SURGERY      LUMBAR DISC SURGERY         Family History   Problem Relation Age of Onset    Esophageal cancer Mother     Heart disease Mother     Colon cancer Father 80    Heart disease Father     No Known Problems Sister      COPD Brother     Alcohol abuse Brother     Colon polyps Neg Hx     Liver cancer Neg Hx     Rectal cancer Neg Hx     Stomach cancer Neg Hx     Liver disease Neg Hx        Social History     Socioeconomic History    Marital status:    Tobacco Use    Smoking status: Former     Current packs/day: 1.00     Average packs/day: 1 pack/day for 45.0 years (45.0 ttl pk-yrs)     Types: Cigarettes    Smokeless tobacco: Never   Vaping Use    Vaping status: Some Days    Substances: Nicotine, Flavoring    Devices: Disposable   Substance and Sexual Activity    Alcohol use: Not Currently    Drug use: Yes     Types: Marijuana    Sexual activity: Defer           Objective   Physical Exam  Vitals and nursing note reviewed. Exam conducted with a chaperone present.   Constitutional:       General: He is not in acute distress.     Appearance: Normal appearance. He is well-developed. He is obese. He is ill-appearing. He is not toxic-appearing.   HENT:      Head: Normocephalic and atraumatic.      Nose: Nose normal.      Mouth/Throat:      Mouth: Mucous membranes are moist.      Pharynx: Uvula midline.   Eyes:      General: Lids are normal. Lids are everted, no foreign bodies appreciated.      Conjunctiva/sclera: Conjunctivae normal.      Pupils: Pupils are equal, round, and reactive to light.   Neck:      Vascular: Normal carotid pulses. No carotid bruit or JVD.      Trachea: Trachea and phonation normal. No tracheal deviation.   Cardiovascular:      Rate and Rhythm: Regular rhythm. Tachycardia present.      Chest Wall: PMI is not displaced.      Pulses: Normal pulses.      Heart sounds: Normal heart sounds.      No gallop.   Pulmonary:      Effort: Tachypnea and respiratory distress present. No accessory muscle usage.      Breath sounds: No stridor. Examination of the right-lower field reveals decreased breath sounds and wheezing. Examination of the left-lower field reveals decreased breath sounds and wheezing. Decreased breath  sounds and wheezing present. No rhonchi or rales.   Abdominal:      General: Bowel sounds are normal. There is no distension.      Palpations: Abdomen is soft.      Tenderness: There is no abdominal tenderness.   Musculoskeletal:         General: No swelling. Normal range of motion.      Cervical back: Full passive range of motion without pain, normal range of motion and neck supple. No rigidity.      Right lower leg: No tenderness. Edema present.      Left lower leg: No tenderness. Edema present.      Comments: Lower extremity exam bilaterally is unremarkable.  There is no right or left calf tenderness .  There is no palpable venous cord.  No obvious difference in the size of the legs.  No pitting edema.  The dorsalis pedis and posterior tibial femoral and popliteal pulses are palpable and +2 bilaterally.  Homans sign is negative   Skin:     General: Skin is warm and dry.      Capillary Refill: Capillary refill takes less than 2 seconds.      Coloration: Skin is not jaundiced or pale.      Nails: There is no clubbing.   Neurological:      General: No focal deficit present.      Mental Status: He is alert and oriented to person, place, and time.      GCS: GCS eye subscore is 4. GCS verbal subscore is 5. GCS motor subscore is 6.      Cranial Nerves: No cranial nerve deficit.      Motor: Motor function is intact.      Gait: Gait normal.      Deep Tendon Reflexes: Reflexes are normal and symmetric. Reflexes normal.   Psychiatric:         Mood and Affect: Mood normal.         Speech: Speech normal.         Behavior: Behavior normal.         Procedures           ED Course  ED Course as of 10/18/24 1445   Fri Oct 18, 2024   1109 Sinus with rbbb [TS]   1355 Normal sinus [TS]   1443 Patient with chest pain and shortness of breath cardiac markers elevated which are chronic elevated he is got baseline renal insufficiency elevated BNP was given Lasix and some steroids and neb treatments in the ED. [TS]   1443 CT of the chest  was not performed as dimer is mildly elevated because of chronic renal insufficiency. [TS]      ED Course User Index  [TS] Roderick Garza MD                                             Medical Decision Making  Differential Diagnosis:  I considered chest wall pain, muscle strain, costochondritis, pleurisy, rib fracture, herpes zoster, cardiovascular etiology, myocardial infarction, intermediate coronary syndrome, unstable angina, angina, aortic dissection, pericarditis, pulmonary etiology, pulmonary embolism, pneumonia, pneumothorax, lung cancer, gastroesophageal reflux disease, esophagitis, esophageal spasm and gastrointestinal etiology as a possible cause of chest pain in this patient. This is a partial list of diagnoses considered.        Problems Addressed:  Chest pain, unspecified type: chronic illness or injury with exacerbation, progression, or side effects of treatment     Details: Patient has a cardiac catheterization which was abnormal supposed to see CT surgery but during the last admission he was on palliative care.  Chronic kidney disease, unspecified CKD stage: chronic illness or injury  Congestive heart failure, unspecified HF chronicity, unspecified heart failure type: chronic illness or injury with exacerbation, progression, or side effects of treatment  Pleural effusion: chronic illness or injury    Amount and/or Complexity of Data Reviewed  Labs: ordered.     Details: Labs reviewed  Radiology: ordered.     Details: Reviewed  ECG/medicine tests: ordered.     Details: No STEMI  Discussion of management or test interpretation with external provider(s): Samira with the hospitalist MINGO    Risk  OTC drugs.  Prescription drug management.  Decision regarding hospitalization.  Risk Details: Patient given diuretics steroids and neb treatments of in the ED he is appearing comfortable at this time his cardiac markers are chronically elevated will be admitted to the medicine service.  Wells risk is low  risk        Final diagnoses:   Congestive heart failure, unspecified HF chronicity, unspecified heart failure type   Chest pain, unspecified type   Chronic kidney disease, unspecified CKD stage   Pleural effusion       ED Disposition  ED Disposition       ED Disposition   Intended Admit    Condition   --    Comment   --               No follow-up provider specified.       Medication List      No changes were made to your prescriptions during this visit.            Roderick Garza MD  10/18/24 1441       Roderick Garza MD  10/18/24 4558

## 2024-10-18 NOTE — ED NOTES
Nursing report ED to floor  Tucker Knox  64 y.o.  male    HPI:   Chief Complaint   Patient presents with    Chest Pain       Admitting doctor:   Patricia Arthur DO    Consulting provider(s):  Consults       Date and Time Order Name Status Description    10/13/2024  1:35 PM Inpatient Nephrology Consult Completed     10/12/2024  2:19 PM Inpatient Wound Care MD Consult Completed              Admitting diagnosis:   The primary encounter diagnosis was Congestive heart failure, unspecified HF chronicity, unspecified heart failure type. Diagnoses of Chest pain, unspecified type, Chronic kidney disease, unspecified CKD stage, and Pleural effusion were also pertinent to this visit.    Code status:   Current Code Status       Date Active Code Status Order ID Comments User Context       10/18/2024 1510 No CPR (Do Not Attempt to Resuscitate) [id number]  Patricia Arthur DO ED        Question Answer    Code Status (Patient has no pulse and is not breathing) No CPR (Do Not Attempt to Resuscitate)    Medical Interventions (Patient has pulse or is breathing) Limited Support    Medical Intervention Limits: No intubation (DNI)     No antiarrhythmic drugs     No antibiotics     No cardioversion     No dialysis     No NIPPV (Non-Invasive Positive Pressure Ventilation)     No blood products     No vasopressors     No artificial nutrition    Level Of Support Discussed With Patient                    Allergies:   Penicillins    Intake and Output  No intake or output data in the 24 hours ending 10/18/24 1531    Weight:       10/18/24  0959   Weight: (!) 136 kg (300 lb 14.4 oz)       Most recent vitals:   Vitals:    10/18/24 1431 10/18/24 1446 10/18/24 1501 10/18/24 1516   BP: 114/68 132/88 128/80 142/88   Pulse: 85 85 85 86   Resp:   18 18   Temp:       TempSrc:       SpO2: 92% (!) 88% 91% 95%   Weight:       Height:         Oxygen Therapy: .    Active LDAs/IV Access:   Lines, Drains & Airways       Active LDAs       Name  Placement date Placement time Site Days    Peripheral IV 10/12/24 0535 Anterior;Distal;Left;Upper Arm 10/12/24  0535  Arm  6    Urethral Catheter Non-latex 16 Fr. 10/14/24  1628  -- 3                    Labs (abnormal labs have a star):   Labs Reviewed   COMPREHENSIVE METABOLIC PANEL - Abnormal; Notable for the following components:       Result Value    Glucose 206 (*)     BUN 51 (*)     Creatinine 1.30 (*)     Chloride 96 (*)     CO2 37.0 (*)     Total Protein 5.7 (*)     Albumin 3.3 (*)     BUN/Creatinine Ratio 39.2 (*)     All other components within normal limits    Narrative:     GFR Normal >60  Chronic Kidney Disease <60  Kidney Failure <15     TROPONIN - Abnormal; Notable for the following components:    HS Troponin T 82 (*)     All other components within normal limits    Narrative:     High Sensitive Troponin T Reference Range:  <14.0 ng/L- Negative Female for AMI  <22.0 ng/L- Negative Male for AMI  >=14 - Abnormal Female indicating possible myocardial injury.  >=22 - Abnormal Male indicating possible myocardial injury.   Clinicians would have to utilize clinical acumen, EKG, Troponin, and serial changes to determine if it is an Acute Myocardial Infarction or myocardial injury due to an underlying chronic condition.        D-DIMER, QUANTITATIVE - Abnormal; Notable for the following components:    D-Dimer, Quantitative 1.03 (*)     All other components within normal limits    Narrative:     According to the assay 's published package insert, a normal (<0.50 MCGFEU/mL) D-dimer result in conjunction with a non-high clinical probability assessment, excludes deep vein thrombosis (DVT) and pulmonary embolism (PE) with high sensitivity.    D-dimer values increase with age and this can make VTE exclusion of an older population difficult. To address this, the [employer], based on best available evidence and recent guidelines, recommends that clinicians use age-adjusted D-dimer  "thresholds in patients greater than 50 years of age with: a) a low probability of PE who do not meet all Pulmonary Embolism Rule Out Criteria, or b) in those with intermediate probability of PE.   The formula for an age-adjusted D-dimer cut-off is \"age/100\".  For example, a 60 year old patient would have an age-adjusted cut-off of 0.60 MCGFEU/mL and an 80 year old 0.80 MCGFEU/mL.   BNP (IN-HOUSE) - Abnormal; Notable for the following components:    proBNP 9,439.0 (*)     All other components within normal limits    Narrative:     This assay is used as an aid in the diagnosis of individuals suspected of having heart failure. It can be used as an aid in the diagnosis of acute decompensated heart failure (ADHF) in patients presenting with signs and symptoms of ADHF to the emergency department (ED). In addition, NT-proBNP of <300 pg/mL indicates ADHF is not likely.    Age Range Result Interpretation  NT-proBNP Concentration (pg/mL:      <50             Positive            >450                   Gray                 300-450                    Negative             <300    50-75           Positive            >900                  Gray                300-900                  Negative            <300      >75             Positive            >1800                  Gray                300-1800                  Negative            <300   CBC WITH AUTO DIFFERENTIAL - Abnormal; Notable for the following components:    RBC 3.78 (*)     Hemoglobin 10.3 (*)     Hematocrit 35.9 (*)     MCHC 28.7 (*)     RDW 15.9 (*)     RDW-SD 54.7 (*)     Neutrophil % 83.4 (*)     Lymphocyte % 6.7 (*)     Immature Grans % 0.7 (*)     Lymphocytes, Absolute 0.56 (*)     Immature Grans, Absolute 0.06 (*)     All other components within normal limits   BLOOD GAS, ARTERIAL W/CO-OXIMETRY - Abnormal; Notable for the following components:    pCO2, Arterial 58.6 (*)     pO2, Arterial 60.6 (*)     HCO3, Arterial 38.6 (*)     Base Excess, Arterial 12.3 (*)     " "O2 Saturation, Arterial 91.7 (*)     Hemoglobin, Blood Gas 10.7 (*)     Hematocrit, Blood Gas 32.7 (*)     Oxyhemoglobin 89.7 (*)     pCO2, Temperature Corrected 58.6 (*)     pO2, Temperature Corrected 60.6 (*)     All other components within normal limits   HIGH SENSITIVITIY TROPONIN T 2HR - Abnormal; Notable for the following components:    HS Troponin T 91 (*)     Troponin T Delta 9 (*)     All other components within normal limits    Narrative:     High Sensitive Troponin T Reference Range:  <14.0 ng/L- Negative Female for AMI  <22.0 ng/L- Negative Male for AMI  >=14 - Abnormal Female indicating possible myocardial injury.  >=22 - Abnormal Male indicating possible myocardial injury.   Clinicians would have to utilize clinical acumen, EKG, Troponin, and serial changes to determine if it is an Acute Myocardial Infarction or myocardial injury due to an underlying chronic condition.        LACTIC ACID, PLASMA - Normal   PROCALCITONIN - Normal    Narrative:     As a Marker for Sepsis (Non-Neonates):    1. <0.5 ng/mL represents a low risk of severe sepsis and/or septic shock.  2. >2 ng/mL represents a high risk of severe sepsis and/or septic shock.    As a Marker for Lower Respiratory Tract Infections that require antibiotic therapy:    PCT on Admission    Antibiotic Therapy       6-12 Hrs later    >0.5                Strongly Recommended  >0.25 - <0.5        Recommended   0.1 - 0.25          Discouraged              Remeasure/reassess PCT  <0.1                Strongly Discouraged     Remeasure/reassess PCT    As 28 day mortality risk marker: \"Change in Procalcitonin Result\" (>80% or <=80%) if Day 0 (or Day 1) and Day 4 values are available. Refer to http://www.[employer]-pct-calculator.com    Change in PCT <=80%  A decrease of PCT levels below or equal to 80% defines a positive change in PCT test result representing a higher risk for 28-day all-cause mortality of patients diagnosed with severe sepsis for septic " shock.    Change in PCT >80%  A decrease of PCT levels of more than 80% defines a negative change in PCT result representing a lower risk for 28-day all-cause mortality of patients diagnosed with severe sepsis or septic shock.      BLOOD CULTURE   BLOOD CULTURE   RAINBOW DRAW    Narrative:     The following orders were created for panel order Glennie Draw.  Procedure                               Abnormality         Status                     ---------                               -----------         ------                     Green Top (Gel)[[id number]]                                  Final result               Lavender Top[[id number]]                                     Final result               Red Top[[id number]]                                          Final result               Light Blue Top[[id number]]                                   Final result                 Please view results for these tests on the individual orders.   BLOOD GAS, ARTERIAL W/CO-OXIMETRY   CBC AND DIFFERENTIAL    Narrative:     The following orders were created for panel order CBC & Differential.  Procedure                               Abnormality         Status                     ---------                               -----------         ------                     CBC Auto Differential[[id number]]        Abnormal            Final result                 Please view results for these tests on the individual orders.   GREEN TOP   LAVENDER TOP   RED TOP   LIGHT BLUE TOP       Meds given in ED:   Medications   sodium chloride 0.9 % flush 10 mL (has no administration in time range)   aspirin chewable tablet 324 mg (324 mg Oral Not Given 10/18/24 1043)   furosemide (LASIX) injection 40 mg (has no administration in time range)   LORazepam (ATIVAN) tablet 0.5 mg (has no administration in time range)     Or   LORazepam (ATIVAN) injection 0.5 mg (has no administration in time range)     Or   LORazepam (ATIVAN) injection 0.5 mg (has no  administration in time range)     Or   LORazepam (ATIVAN) injection 0.5 mg (has no administration in time range)     Or   LORazepam (ATIVAN) 2 MG/ML concentrated solution 0.5 mg (has no administration in time range)     Or   LORazepam (ATIVAN) 2 MG/ML concentrated solution 0.5 mg (has no administration in time range)   LORazepam (ATIVAN) tablet 1 mg (has no administration in time range)     Or   LORazepam (ATIVAN) injection 1 mg (has no administration in time range)     Or   LORazepam (ATIVAN) injection 1 mg (has no administration in time range)     Or   LORazepam (ATIVAN) injection 1 mg (has no administration in time range)     Or   LORazepam (ATIVAN) 2 MG/ML concentrated solution 1 mg (has no administration in time range)     Or   LORazepam (ATIVAN) 2 MG/ML concentrated solution 1 mg (has no administration in time range)   LORazepam (ATIVAN) tablet 2 mg (has no administration in time range)     Or   LORazepam (ATIVAN) injection 2 mg (has no administration in time range)     Or   LORazepam (ATIVAN) injection 2 mg (has no administration in time range)     Or   LORazepam (ATIVAN) injection 2 mg (has no administration in time range)     Or   LORazepam (ATIVAN) 2 MG/ML concentrated solution 2 mg (has no administration in time range)     Or   LORazepam (ATIVAN) 2 MG/ML concentrated solution 2 mg (has no administration in time range)   diphenoxylate-atropine (LOMOTIL) 2.5-0.025 MG per tablet 1 tablet (has no administration in time range)   Polyvinyl Alcohol-Povidone PF (ARTIFICIAL TEARS) 1.4-0.6 % ophthalmic solution 1 drop (has no administration in time range)   acetaminophen (TYLENOL) tablet 650 mg (has no administration in time range)     Or   acetaminophen (TYLENOL) 160 MG/5ML oral solution 650 mg (has no administration in time range)     Or   acetaminophen (TYLENOL) suppository 650 mg (has no administration in time range)   QUEtiapine (SEROquel) tablet 12.5 mg (has no administration in time range)    sennosides-docusate (PERICOLACE) 8.6-50 MG per tablet 2 tablet (has no administration in time range)     And   polyethylene glycol (MIRALAX) packet 17 g (has no administration in time range)     And   bisacodyl (DULCOLAX) EC tablet 5 mg (has no administration in time range)     And   bisacodyl (DULCOLAX) suppository 10 mg (has no administration in time range)   morphine injection 4 mg (has no administration in time range)     Or   morphine concentrated solution 10 mg (has no administration in time range)   methylPREDNISolone sodium succinate (SOLU-Medrol) injection 125 mg (125 mg Intravenous Given 10/18/24 1051)   ipratropium-albuterol (DUO-NEB) nebulizer solution 3 mL (3 mL Nebulization Given 10/18/24 1039)           NIH Stroke Scale:       Isolation/Infection(s):  No active isolations   No active infections     COVID Testing  Collected .  Resulted .    Nursing report ED to floor:  Mental status: A & O x 3  Ambulatory status: non    Precautions: Fall    ED nurse phone extentsion- ..

## 2024-10-18 NOTE — PLAN OF CARE
Goal Outcome Evaluation:  Plan of Care Reviewed With: patient        Progress: no change  Outcome Evaluation: Pt admitted this shift. VSS, SR, 4L O2. PRN meds given for pain. Pt resting now. FC in place. Call light in place

## 2024-10-18 NOTE — TELEPHONE ENCOUNTER
Pt no-showed for cardiac MRI on 10/08/2024. MRI will need to be rescheduled, then office visit at least a week after to give time to read results. Will call today to reschedule. Pt currently in ER.

## 2024-10-18 NOTE — H&P
[facility] Medicine Services  HISTORY AND PHYSICAL    Date of Admission: 10/18/2024  Primary Care Physician: Kt Alfredo MD    Subjective   Primary Historian: patient and son    Chief Complaint: chest pain and shortness of breath    History of Present Illness  Patient was discharged [facility] [place] on the 16th for comfort measures.  Patient has been complains that since he has been there he has have not received any pain medication.  He has received a dose of Ativan.  He notes that his chest hurts and he feels short of breath.  Son is at bedside and he states that this it is true to his knowledge.  He did even consider getting his dad some Gummies to help with comfort since the nursing home was not doing anything.  He is presented back to the emergency room sent by the nursing home because of his chest pain.        Review of Systems   Otherwise complete ROS reviewed and negative except as mentioned in the HPI.    Past Medical History:   Past Medical History:   Diagnosis Date    Cancer     CHF (congestive heart failure)     COPD (chronic obstructive pulmonary disease)     Coronary artery disease     stent x 1    Family history of colon cancer     Hyperlipidemia     Hypertension     Sleep apnea     does not wear machine, supposed to wear cpap with oxygen and does not wear it    Type 2 diabetes mellitus      Past Surgical History:  Past Surgical History:   Procedure Laterality Date    BACK SURGERY      CARDIAC CATHETERIZATION Left 04/13/2022    Procedure: Cardiac Catheterization/Vascular Study;  Surgeon: Todd Avendano MD;  Location:  [facility] CATH INVASIVE LOCATION;  Service: Cardiology;  Laterality: Left;    CARDIAC CATHETERIZATION      with stent x1    CARDIAC CATHETERIZATION N/A 9/12/2024    Procedure: Left Heart Cath;  Surgeon: Ruben Adler MD;  Location:  [facility] CATH INVASIVE LOCATION;  Service: Cardiology;  Laterality: N/A;    COLON RESECTION  N/A 12/5/2023    Procedure: COLON RESECTION LAPAROSCOPIC SIGMOID WITH DAVINCI ROBOT, INTRA-OPERATIVE FLEXIBLE SIGMOIDOSCOPY, SPLENIC FLEXURE MOBILIZATION, OPEN UMBILICAL HERNIA REPAIR;  Surgeon: Oma Velasquez MD;  Location: [facility] OR;  Service: Robotics - DaVinci;  Laterality: N/A;    COLONOSCOPY N/A 10/09/2023    Diverticulosis; One 5mm polyp in ascending colon; One 5mm polyp in transverse colon; One 10mm polyp at 65cm proximal to anus; One 20mm polyp at 65cm proximal to anus-Tattooed; One 20mm polyp at 42cm proximal to anus-Clip (MR conditional) placed-Tattooed; Rule out malignancy-Partially obstructing tumor in recto-sigmoid colon-biopsied-Tattooed; One 10mm polyp in rectum    ELBOW PROCEDURE      KNEE SURGERY      LUMBAR DISC SURGERY       Social History:  reports that he has quit smoking. His smoking use included cigarettes. He has a 45 pack-year smoking history. He has never used smokeless tobacco. He reports that he does not currently use alcohol. He reports current drug use. Drug: Marijuana.    Family History: family history includes Alcohol abuse in his brother; COPD in his brother; Colon cancer (age of onset: 80) in his father; Esophageal cancer in his mother; Heart disease in his father and mother; No Known Problems in his sister.       Allergies:  Allergies   Allergen Reactions    Penicillins Unknown - Low Severity     Pt states happened when child does not know        Medications:  Prior to Admission medications    Medication Sig Start Date End Date Taking? Authorizing Provider   acetaminophen (TYLENOL) 325 MG tablet Take 2 tablets by mouth Every 6 (Six) Hours As Needed for Mild Pain. 10/4/24   Sea Joiner MD   albuterol (PROVENTIL) (2.5 MG/3ML) 0.083% nebulizer solution Take 2.5 mg by nebulization Every 6 (Six) Hours As Needed for Wheezing.    Provider, MD Amelia   albuterol sulfate  (90 Base) MCG/ACT inhaler Inhale 2 puffs Every 4 (Four) Hours As Needed for Wheezing.  9/18/24   Zoey Schwartz MD   aspirin 81 MG EC tablet Take 1 tablet by mouth Daily. 12/27/21   David Cadena DO   atropine 1 % ophthalmic solution Apply 2 drops to eye(s) as directed by provider 2 (Two) Times a Day As Needed (secretions). 10/16/24   Patricia Arthur DO   bisacodyl (DULCOLAX) 10 MG suppository Insert 1 suppository into the rectum Daily As Needed for Constipation. 10/16/24   Patricia Arthur DO   calcium carbonate (TUMS) 500 MG chewable tablet Chew 2 tablets 3 (Three) Times a Day As Needed for Indigestion or Heartburn. 10/16/24   Patricia Arthur DO   DULoxetine (CYMBALTA) 30 MG capsule Take 1 capsule by mouth Daily. 9/18/24   Zoey Schwartz MD   furosemide (LASIX) 40 MG tablet Take 0.5 tablets by mouth Daily. 10/4/24   Sea Joiner MD   gabapentin (NEURONTIN) 600 MG tablet Take 1 tablet by mouth 2 (Two) Times a Day. Patient states he takes 800 mg bid 9/18/24   Zoey Schwartz MD   HYDROcodone-acetaminophen (NORCO) 7.5-325 MG per tablet Take 1 tablet by mouth Every 4 (Four) Hours As Needed for Moderate Pain for up to 4 days. 10/16/24 10/20/24  Patricia Arthur DO   hydrocortisone-bacitracin-zinc oxide-nystatin (MAGIC BARRIER) Apply 1 Application topically to the appropriate area as directed 4 (Four) Times a Day. 10/16/24   Patricia Arthur DO   insulin detemir (LEVEMIR) 100 UNIT/ML injection Inject 14 Units under the skin into the appropriate area as directed 2 (Two) Times a Day.    Provider, MD Amelia   ipratropium-albuterol (DUO-NEB) 0.5-2.5 mg/3 ml nebulizer Take 3 mL by nebulization Every 4 (Four) Hours As Needed for Shortness of Air (dyspnea, cough, or wheeze). 10/16/24   Patricia Arthur DO   ipratropium-albuterol (DUO-NEB) 0.5-2.5 mg/3 ml nebulizer Take 3 mL by nebulization 4 (Four) Times a Day. 10/16/24   Patricia Arthur DO   LORazepam (ATIVAN) 0.5 MG tablet Take 1 tablet by mouth Every 1 (One) Hour As Needed for Anxiety (Mild  "agitation) for up to 6 days. 10/16/24 10/22/24  Patricia Arthur DO   metoprolol succinate XL (TOPROL-XL) 25 MG 24 hr tablet Take 1 tablet by mouth Daily. 9/18/24   Zoey Schwartz MD   nicotine (NICODERM CQ) 21 MG/24HR patch Place 1 patch on the skin as directed by provider Daily. 10/5/24   Sea Joiner MD   nitroglycerin (NITROSTAT) 0.4 MG SL tablet Place 1 tablet under the tongue Every 5 (Five) Minutes As Needed for Chest Pain. Take no more than 3 doses in 15 minutes.    Provider, MD Amelia   Polyvinyl Alcohol-Povidone PF (ARTIFICIAL TEARS) 1.4-0.6 % ophthalmic solution Administer 1 drop to both eyes Every 30 (Thirty) Minutes As Needed for Wound Care. 10/16/24   Patricia Arthur DO   pravastatin (PRAVACHOL) 40 MG tablet Take 1 tablet by mouth Every Night. 9/18/24   Zoey Schwartz MD   QUEtiapine (SEROquel) 25 MG tablet Take 1 tablet by mouth Every Night. 10/4/24   Sea Joiner MD   Scopolamine 1 MG/3DAYS patch Place 1 patch on the skin as directed by provider Every 72 (Seventy-Two) Hours As Needed (secretions). 10/16/24   Patricia Arthur DO   Tradjenta 5 MG tablet tablet Take 1 tablet by mouth Daily. 8/15/23   Provider, MD Amelia     I have utilized all available immediate resources to obtain, update, or review the patient's current medications (including all prescriptions, over-the-counter products, herbals, cannabis/cannabidiol products, and vitamin/mineral/dietary (nutritional) supplements).    Objective     Vital Signs: /97   Pulse 87   Temp 98.5 °F (36.9 °C) (Oral)   Resp 20   Ht 175.3 cm (69\")   Wt (!) 136 kg (300 lb 14.4 oz)   SpO2 97%   BMI 44.44 kg/m²   Physical Exam  Vitals and nursing note reviewed.   Constitutional:       Appearance: He is obese.   HENT:      Head: Normocephalic and atraumatic.      Right Ear: External ear normal.      Left Ear: External ear normal.      Nose: Nose normal.      Mouth/Throat:      Mouth: Mucous " membranes are moist.      Pharynx: Oropharynx is clear.   Eyes:      Extraocular Movements: Extraocular movements intact.      Pupils: Pupils are equal, round, and reactive to light.   Cardiovascular:      Rate and Rhythm: Normal rate and regular rhythm.      Pulses: Normal pulses.      Heart sounds: Normal heart sounds.   Pulmonary:      Effort: Pulmonary effort is normal.      Breath sounds: Normal breath sounds.   Abdominal:      General: Bowel sounds are normal.      Palpations: Abdomen is soft.   Musculoskeletal:      Cervical back: Normal range of motion and neck supple.      Right lower leg: Edema present.      Left lower leg: Edema present.      Comments: Moves all extremities   Skin:     General: Skin is warm and dry.      Capillary Refill: Capillary refill takes less than 2 seconds.   Neurological:      General: No focal deficit present.      Mental Status: He is alert. Mental status is at baseline.   Psychiatric:         Mood and Affect: Mood normal.              Results Reviewed:  Lab Results (last 24 hours)       Procedure Component Value Units Date/Time    High Sensitivity Troponin T 2Hr [[id number]]  (Abnormal) Collected: 10/18/24 1227    Specimen: Blood Updated: 10/18/24 1301     HS Troponin T 91 ng/L      Troponin T Delta 9 ng/L     Narrative:      High Sensitive Troponin T Reference Range:  <14.0 ng/L- Negative Female for AMI  <22.0 ng/L- Negative Male for AMI  >=14 - Abnormal Female indicating possible myocardial injury.  >=22 - Abnormal Male indicating possible myocardial injury.   Clinicians would have to utilize clinical acumen, EKG, Troponin, and serial changes to determine if it is an Acute Myocardial Infarction or myocardial injury due to an underlying chronic condition.         Blood Culture - Blood, Arm, Right [[id number]] Collected: 10/18/24 1105    Specimen: Blood from Arm, Right Updated: 10/18/24 1112    Procalcitonin [[id number]]  (Normal) Collected: 10/18/24 1020    Specimen: Blood  "Updated: 10/18/24 1101     Procalcitonin 0.21 ng/mL     Narrative:      As a Marker for Sepsis (Non-Neonates):    1. <0.5 ng/mL represents a low risk of severe sepsis and/or septic shock.  2. >2 ng/mL represents a high risk of severe sepsis and/or septic shock.    As a Marker for Lower Respiratory Tract Infections that require antibiotic therapy:    PCT on Admission    Antibiotic Therapy       6-12 Hrs later    >0.5                Strongly Recommended  >0.25 - <0.5        Recommended   0.1 - 0.25          Discouraged              Remeasure/reassess PCT  <0.1                Strongly Discouraged     Remeasure/reassess PCT    As 28 day mortality risk marker: \"Change in Procalcitonin Result\" (>80% or <=80%) if Day 0 (or Day 1) and Day 4 values are available. Refer to http://www.[email or web address] [email or web address]-pct-[email or web address]    Change in PCT <=80%  A decrease of PCT levels below or equal to 80% defines a positive change in PCT test result representing a higher risk for 28-day all-cause mortality of patients diagnosed with severe sepsis for septic shock.    Change in PCT >80%  A decrease of PCT levels of more than 80% defines a negative change in PCT result representing a lower risk for 28-day all-cause mortality of patients diagnosed with severe sepsis or septic shock.       High Sensitivity Troponin T [[id number]]  (Abnormal) Collected: 10/18/24 1020    Specimen: Blood Updated: 10/18/24 1057     HS Troponin T 82 ng/L     Narrative:      High Sensitive Troponin T Reference Range:  <14.0 ng/L- Negative Female for AMI  <22.0 ng/L- Negative Male for AMI  >=14 - Abnormal Female indicating possible myocardial injury.  >=22 - Abnormal Male indicating possible myocardial injury.   Clinicians would have to utilize clinical acumen, EKG, Troponin, and serial changes to determine if it is an Acute Myocardial Infarction or myocardial injury due to an underlying chronic condition.         Comprehensive Metabolic Panel [[id number]]  (Abnormal) Collected: " 10/18/24 1020    Specimen: Blood Updated: 10/18/24 1056     Glucose 206 mg/dL      BUN 51 mg/dL      Creatinine 1.30 mg/dL      Sodium 139 mmol/L      Potassium 5.2 mmol/L      Chloride 96 mmol/L      CO2 37.0 mmol/L      Calcium 8.7 mg/dL      Total Protein 5.7 g/dL      Albumin 3.3 g/dL      ALT (SGPT) 12 U/L      AST (SGOT) 8 U/L      Alkaline Phosphatase 80 U/L      Total Bilirubin 0.5 mg/dL      Globulin 2.4 gm/dL      A/G Ratio 1.4 g/dL      BUN/Creatinine Ratio 39.2     Anion Gap 6.0 mmol/L      eGFR 61.7 mL/min/1.73     Narrative:      GFR Normal >60  Chronic Kidney Disease <60  Kidney Failure <15      Lactic Acid, Plasma [[id number]]  (Normal) Collected: 10/18/24 1020    Specimen: Blood Updated: 10/18/24 1055     Lactate 1.3 mmol/L     Blood Culture - Blood, Arm, Left [[id number]] Collected: 10/18/24 1034    Specimen: Blood from Arm, Left Updated: 10/18/24 1052    BNP [[id number]]  (Abnormal) Collected: 10/18/24 1020    Specimen: Blood Updated: 10/18/24 1051     proBNP 9,439.0 pg/mL     Narrative:      This assay is used as an aid in the diagnosis of individuals suspected of having heart failure. It can be used as an aid in the diagnosis of acute decompensated heart failure (ADHF) in patients presenting with signs and symptoms of ADHF to the emergency department (ED). In addition, NT-proBNP of <300 pg/mL indicates ADHF is not likely.    Age Range Result Interpretation  NT-proBNP Concentration (pg/mL:      <50             Positive            >450                   Gray                 300-450                    Negative             <300    50-75           Positive            >900                  Gray                300-900                  Negative            <300      >75             Positive            >1800                  Gray                300-1800                  Negative            <300    D-dimer, Quantitative [[id number]]  (Abnormal) Collected: 10/18/24 1020    Specimen: Blood Updated: 10/18/24 1044  "    D-Dimer, Quantitative 1.03 MCGFEU/mL     Narrative:      According to the assay 's published package insert, a normal (<0.50 MCGFEU/mL) D-dimer result in conjunction with a non-high clinical probability assessment, excludes deep vein thrombosis (DVT) and pulmonary embolism (PE) with high sensitivity.    D-dimer values increase with age and this can make VTE exclusion of an older population difficult. To address this, the American College of [employer], based on best available evidence and recent guidelines, recommends that clinicians use age-adjusted D-dimer thresholds in patients greater than 50 years of age with: a) a low probability of PE who do not meet all Pulmonary Embolism Rule Out Criteria, or b) in those with intermediate probability of PE.   The formula for an age-adjusted D-dimer cut-off is \"age/100\".  For example, a 60 year old patient would have an age-adjusted cut-off of 0.60 MCGFEU/mL and an 80 year old 0.80 MCGFEU/mL.    Blood Gas, Arterial With Co-Ox [[id number]]  (Abnormal) Collected: 10/18/24 1035    Specimen: Arterial Blood Updated: 10/18/24 1034     Site Left Radial     Jorge's Test Positive     pH, Arterial 7.428 pH units      pCO2, Arterial 58.6 mm Hg      Comment: 83 Value above reference range        pO2, Arterial 60.6 mm Hg      Comment: 84 Value below reference range        HCO3, Arterial 38.6 mmol/L      Comment: 83 Value above reference range        Base Excess, Arterial 12.3 mmol/L      Comment: 83 Value above reference range        O2 Saturation, Arterial 91.7 %      Comment: 84 Value below reference range        Hemoglobin, Blood Gas 10.7 g/dL      Comment: 84 Value below reference range        Hematocrit, Blood Gas 32.7 %      Comment: 84 Value below reference range        Oxyhemoglobin 89.7 %      Comment: 84 Value below reference range        Methemoglobin 0.40 %      Carboxyhemoglobin 1.8 %      Temperature 37.0     Sodium, Arterial 140 mmol/L      Potassium, " Arterial 4.9 mmol/L      Barometric Pressure for Blood Gas 765 mmHg      Modality Nasal Cannula     Flow Rate 4.0 lpm      Ventilator Mode NA     Collected by [id number]     Comment: Meter: [id number]     :  [id number]        pH, Temp Corrected 7.428 pH Units      pCO2, Temperature Corrected 58.6 mm Hg      pO2, Temperature Corrected 60.6 mm Hg     CBC & Differential [[id number]]  (Abnormal) Collected: 10/18/24 1020    Specimen: Blood Updated: 10/18/24 1033    Narrative:      The following orders were created for panel order CBC & Differential.  Procedure                               Abnormality         Status                     ---------                               -----------         ------                     CBC Auto Differential[[id number]]        Abnormal            Final result                 Please view results for these tests on the individual orders.    CBC Auto Differential [[id number]]  (Abnormal) Collected: 10/18/24 1020    Specimen: Blood Updated: 10/18/24 1033     WBC 8.36 10*3/mm3      RBC 3.78 10*6/mm3      Hemoglobin 10.3 g/dL      Hematocrit 35.9 %      MCV 95.0 fL      MCH 27.2 pg      MCHC 28.7 g/dL      RDW 15.9 %      RDW-SD 54.7 fl      MPV 10.3 fL      Platelets 204 10*3/mm3      Neutrophil % 83.4 %      Lymphocyte % 6.7 %      Monocyte % 8.0 %      Eosinophil % 0.7 %      Basophil % 0.5 %      Immature Grans % 0.7 %      Neutrophils, Absolute 6.97 10*3/mm3      Lymphocytes, Absolute 0.56 10*3/mm3      Monocytes, Absolute 0.67 10*3/mm3      Eosinophils, Absolute 0.06 10*3/mm3      Basophils, Absolute 0.04 10*3/mm3      Immature Grans, Absolute 0.06 10*3/mm3      nRBC 0.0 /100 WBC     [place] Draw [[id number]] Collected: 10/18/24 1020    Specimen: Blood Updated: 10/18/24 1031    Narrative:      The following orders were created for panel order [place] Draw.  Procedure                               Abnormality         Status                     ---------                                -----------         ------                     Green Top (Gel)[[id number]]                                  Final result               Lavender Top[[id number]]                                     Final result               Red Top[[id number]]                                          Final result               Light Blue Top[[id number]]                                   Final result                 Please view results for these tests on the individual orders.    Green Top (Gel) [[id number]] Collected: 10/18/24 1020    Specimen: Blood Updated: 10/18/24 1031     Extra Tube Hold for add-ons.     Comment: Auto resulted.       Lavender Top [[id number]] Collected: 10/18/24 1020    Specimen: Blood Updated: 10/18/24 1031     Extra Tube hold for add-on     Comment: Auto resulted       Red Top [[id number]] Collected: 10/18/24 1020    Specimen: Blood Updated: 10/18/24 1031     Extra Tube Hold for add-ons.     Comment: Auto resulted.       Light Blue Top [[id number]] Collected: 10/18/24 1020    Specimen: Blood Updated: 10/18/24 1031     Extra Tube Hold for add-ons.     Comment: Auto resulted             Imaging Results (Last 24 Hours)       Procedure Component Value Units Date/Time    XR Chest 1 View [[id number]] Collected: 10/18/24 1047     Updated: 10/18/24 1056    Narrative:      EXAM: XR CHEST 1 VW-      DATE: 10/18/2024 9:36 AM     HISTORY: Chest Pain Protocol       COMPARISON: 10/14/2024.     TECHNIQUE:  Frontal view(s) of the chest submitted.     FINDINGS:    Ill-defined opacity on the right is essentially unchanged. There is a  possible loculated right pleural effusion also unchanged. There is  enlargement of the cardiac silhouette. No pneumothorax is seen. Left  lung is grossly clear.          Impression:         1. Stable chest with probable loculated right pleural effusion and  stable ill-defined opacity.     This report was signed and finalized on 10/18/2024 10:53 AM by Elvis Velasquez.             I have personally  reviewed and interpreted the radiology studies and ECG obtained at time of admission.     Assessment / Plan   Assessment:   Active Hospital Problems    Diagnosis     **Chest pain      Acute on chronic respiratory failure with hypoxia and hypercapnia  Hyperkalemia  Acute kidney injury  Acute on chronic heart failure with reduced ejection fraction  Adenocarcinoma of the colon  Type II myocardial infarction due to heart failure  Pulmonary hypertension  Diabetes mellitus type 2 with hyperglycemia with long-term current use of insulin  Hypertension    Treatment Plan  The patient will be admitted to my service here at [facility].     Patient's son states they are looking for a nursing home in [place] for this gentleman.  We will readmit him to the hospital.  Continues comfort measures.  He is pain is under control again.  Hopefully by Monday they will have a another facility option for this gentleman for discharge and continuance of his comfort measures.    Medical Decision Making  Number and Complexity of problems:   Chest pain moderate complexity   chronic respiratory failure  Pulmonary hypertension  Adenocarcinoma of the colon  Hypertension  Diabetes mellitus type 2 with hyperglycemia   Chronic heart failure  Multiple complex problems  Differential Diagnosis:     Conditions and Status        Condition is unchanged.     MDM Data  External documents reviewed: Reviewed  Cardiac tracing (EKG, telemetry) interpretation: Reviewed  Radiology interpretation: Reviewed  Labs reviewed: Reviewed  Any tests that were considered but not ordered: None      Decision rules/scores evaluated (example [id number], Wells, etc): None      Discussed with: Patient and son at bedside     Care Planning  Shared decision making: Patient and son at bedside  Code status and discussions: DNR/DNI, comfort    Disposition  Social Determinants of Health that impact treatment or disposition: Patient is unable to care for self at home  request facility for and comfort measures.  Estimated length of stay is 2 days.     I confirmed that the patient's advanced care plan is present, code status is documented, and a surrogate decision maker is listed in the patient's medical record.     The patient's surrogate decision maker is son.     The patient was seen and examined by me on 10/18/2024 at 1450.    Electronically signed by Patricia Arthur DO, 10/18/24, 15:10 CDT.

## 2024-10-19 PROBLEM — R06.02 SHORTNESS OF BREATH: Status: ACTIVE | Noted: 2024-01-01

## 2024-10-19 NOTE — NURSING NOTE
Pt was heard in the room grunting, RN checked on pt & pt appeared to be in pain. PRN morphine given per order. While in the room pt began to vomit brown liquid. Suction was set up and pt was orally suctioned. MD called and notified. No new orders due to pt being comfort care. Pt sitting up in the bed with suction at bedside at this time.

## 2024-10-19 NOTE — CASE MANAGEMENT/SOCIAL WORK
Discharge Planning Assessment   Josiah     Patient Name: Tucker Knox  MRN: [id number]  Today's Date: 10/19/2024    Admit Date: 10/18/2024        Discharge Needs Assessment       Row Name 10/19/24 1406       Living Environment    People in Home facility resident    Name(s) of People in Home David Roe    Current Living Arrangements extended care facility    Potentially Unsafe Housing Conditions none    In the past 12 months has the electric, gas, oil, or water company threatened to shut off services in your home? No    Primary Care Provided by other (see comments)    Provides Primary Care For no one    Family Caregiver if Needed other (see comments);child(jose m), adult;sibling(s)    Quality of Family Relationships helpful;involved    Able to Return to Prior Arrangements other (see comments)    Living Arrangement Comments Son would like pt placed in a snf in [facility]       Resource/Environmental Concerns    Resource/Environmental Concerns none    Transportation Concerns none       Transportation Needs    In the past 12 months, has lack of transportation kept you from medical appointments or from getting medications? no    In the past 12 months, has lack of transportation kept you from meetings, work, or from getting things needed for daily living? No       Food Insecurity    Within the past 12 months, you worried that your food would run out before you got the money to buy more. Never true    Within the past 12 months, the food you bought just didn't last and you didn't have money to get more. Never true       Transition Planning    Patient/Family Anticipates Transition to long-term care facility    Patient/Family Anticipated Services at Transition hospice care;skilled nursing    Transportation Anticipated family or friend will provide       Discharge Needs Assessment    Readmission Within the Last 30 Days no previous admission in last 30 days    Current Outpatient/Agency/Support Group  skilled nursing facility    Equipment Currently Used at Home other (see comments)    Concerns to be Addressed adjustment to diagnosis/illness;discharge planning    Do you want help finding or keeping work or a job? I do not need or want help    Do you want help with school or training? For example, starting or completing job training or getting a high school diploma, GED or equivalent No    Anticipated Changes Related to Illness none    Equipment Needed After Discharge other (see comments)    Outpatient/Agency/Support Group Needs skilled nursing facility    Discharge Facility/Level of Care Needs nursing facility, skilled                   Discharge Plan       Row Name 10/19/24 9757       Plan    Plan Comments SW spoke to pt son (Aniceto) about dc plans. Pt is from [facility] but family does not want pt to return there at [place]. Son states pt was under comfort measures at [facility] and they do plan to continue that. SW informed the son that his Medicare replacement will not cover Snf placement but son states his Medicaid was covering [facility]. SW looked in epic and cannot find any info on Medicaid and son does not have the info. SW will call admissions at [facility] on Monday to see if they can provide the Medicaid info. Son states they would like for pt to be closer to them in [place], [place]. SW can follow up with [facility] on Monday to verify Medicaid coverage. If pt does have Medicaid SW will  need to check with [place] Sn[facility] facilities to find out process for switching pt to [place] Medicaid. Son requests follow up on Monday.                  Continued Care and Services - Admitted Since 10/18/2024    No active coordination exists for this encounter.       Selected Continued Care - Prior Encounters Includes continued care and service providers with selected services from prior encounters from 7/20/2024 to 10/19/2024      Discharged on 10/4/2024 Admission date: 9/28/2024 - Discharge disposition: Skilled Nursing  Facility (DC - External)      Destination       Service Provider Services Address Phone Fax Patient Preferred    [facility] [address] [place] [address] [id number] [phone] --                             Demographic Summary    No documentation.                  Functional Status    No documentation.                  Psychosocial    No documentation.                  Abuse/Neglect    No documentation.                  Legal    No documentation.                  Substance Abuse    No documentation.                  Patient Forms    No documentation.                     KAN Magdaleno

## 2024-10-19 NOTE — PROGRESS NOTES
[facility]  INPATIENT PROGRESS NOTE    Patient Name: Tucker Knox  Date of Admission: 10/18/2024  Today's Date: 10/19/24  Length of Stay: 0  Primary Care Physician: Kt Alfredo MD    Subjective   Chief Complaint: Patient is sleeping at time of exam.  HPI   Required Ativan during the night.  Currently sleeping.        Review of Systems   Unable to obtain sleeping    Objective    Temp:  [93 °F (33.9 °C)-98.5 °F (36.9 °C)] 93 °F (33.9 °C)  Heart Rate:  [80-93] 80  Resp:  [15-28] 15  BP: (101-142)/(62-97) 139/90  Physical Exam  Vitals and nursing note reviewed.   Constitutional:       Appearance: He is obese.      Comments: Sleep   HENT:      Head: Normocephalic and atraumatic.      Right Ear: External ear normal.      Left Ear: External ear normal.      Nose: Nose normal.   Neck:      Vascular: No carotid bruit.   Cardiovascular:      Rate and Rhythm: Normal rate and regular rhythm.      Pulses: Normal pulses.      Heart sounds: Normal heart sounds.   Pulmonary:      Effort: Pulmonary effort is normal.   Abdominal:      General: Bowel sounds are normal.      Palpations: Abdomen is soft.   Skin:     General: Skin is dry.      Comments: Skin cold to touch  Digits are cyanotic             Results Review:  I have reviewed the labs, radiology results, and diagnostic studies.    Laboratory Data:   Results from last 7 days   Lab Units 10/18/24  1020 10/14/24  0252 10/13/24  0424   WBC 10*3/mm3 8.36 6.68 8.04   HEMOGLOBIN g/dL 10.3* 10.3* 11.1*   HEMATOCRIT % 35.9* 36.7* 38.5   PLATELETS 10*3/mm3 204 239 251        Results from last 7 days   Lab Units 10/18/24  1035 10/18/24  1020 10/15/24  0417 10/14/24  0252   SODIUM mmol/L  --  139 135* 135*   SODIUM, ARTERIAL mmol/L 140  --   --   --    POTASSIUM mmol/L  --  5.2 5.6* 5.6*   CHLORIDE mmol/L  --  96* 95* 94*   CO2 mmol/L  --  37.0* 34.0* 32.0*   BUN mg/dL  --  51* 73* 69*   CREATININE mg/dL  --   "1.30* 2.43* 2.55*   CALCIUM mg/dL  --  8.7 8.3* 8.3*   BILIRUBIN mg/dL  --  0.5 0.3  --    ALK PHOS U/L  --  80 90  --    ALT (SGPT) U/L  --  12 22  --    AST (SGOT) U/L  --  8 15  --    GLUCOSE mg/dL  --  206* 57* 99       Culture Data:   No results found for: \"BLOODCX\", \"URINECX\", \"WOUNDCX\", \"MRSACX\", \"RESPCX\", \"STOOLCX\"    Radiology Data:   Imaging Results (Last 24 Hours)       Procedure Component Value Units Date/Time    XR Chest 1 View [[id number]] Collected: 10/18/24 1047     Updated: 10/18/24 1056    Narrative:      EXAM: XR CHEST 1 VW-      DATE: 10/18/2024 9:36 AM     HISTORY: Chest Pain Protocol       COMPARISON: 10/14/2024.     TECHNIQUE:  Frontal view(s) of the chest submitted.     FINDINGS:    Ill-defined opacity on the right is essentially unchanged. There is a  possible loculated right pleural effusion also unchanged. There is  enlargement of the cardiac silhouette. No pneumothorax is seen. Left  lung is grossly clear.          Impression:         1. Stable chest with probable loculated right pleural effusion and  stable ill-defined opacity.     This report was signed and finalized on 10/18/2024 10:53 AM by Elvis Velasquez.               I have reviewed the patient's current medications.     Assessment/Plan   Assessment  Active Hospital Problems    Diagnosis     **Chest pain     Shortness of breath     Colon adenocarcinoma        Treatment Plan  Continue comfort measures  Social service consult for dc planning    Medical Decision Making  Number and Complexity of problems:   Chest pain high complexity  Shortness of breath high complexity    Differential Diagnosis:     Conditions and Status        Condition is unchanged.     MDM Data  External documents reviewed: Reviewed  Cardiac tracing (EKG, telemetry) interpretation: Reviewed  Radiology interpretation: Reviewed  Labs reviewed: Reviewed  Any tests that were considered but not ordered: None     Decision rules/scores evaluated (example [id number], " [place], etc): None     Discussed with: Nursing     Care Planning  Shared decision making: Nursing  Code status and discussions: The patient is comfort measures    Disposition  Social Determinants of Health that impact treatment or disposition: Patient is unable to care for self family attempting to find a nursing home closer to them in [place]  I expect the patient to be discharged to SNF in 3 days.     Electronically signed by Patricia Arthur DO, 10/19/24, 09:55 CDT.

## 2024-10-19 NOTE — PLAN OF CARE
Goal Outcome Evaluation:  Plan of Care Reviewed With: patient        Progress: no change     Pt rested on and off during shift. Pt had several complaints of pain; see MAR. Virgen intact and in place. Pt stated he felt SOA and NC turned to 5L. Pt appeared to breathe easier after O2 was increased. Pt ran sinus 86-97 on tele. Pt had episode of moderate agitation, one dose of Ativan 1mg was given per order parameters. Pt showed much improvement after dose. Safety maintained during shift. Call light within reach.

## 2024-10-20 NOTE — DISCHARGE SUMMARY
[facility]  DEATH SUMMARY       Date of Admission: 10/18/2024  Date of Death:  10/19/2024 at approximately 1707  Primary Care Physician: Kt Alfredo MD    Presenting Problem/History of Present Illness:  Chest pain [R07.9]     Final Death Diagnoses:  Active Hospital Problems    Diagnosis     **Chest pain     Shortness of breath     Colon adenocarcinoma        Consults: NONE    Procedures Performed: None    Pertinent Test Results:   [x]  Laboratory  []  Microbiology  [x]  Radiology  [x]  EKG/Telemetry   []  Cardiology/Vascular   []  Pathology  []  Old records  []  Other:    Hospital Course:  The patient is a 64 y.o. male who presented to [facility] with complaints of shortness of breath and chest pain.  Patient had just been discharged to [facility] in [facility] earlier in the week.  He was on comfort measures.    They transferred him via ambulance back to hospital   Patient relates they did not do anything for his pain.   He would prefer to stay her until family finds a nursing facility closer to home in [place].     He was admitted to the medical floor.   Comfort measures orders instituted.   Patient  the day following admit at 1707.           Electronically signed by Patricia Arthur DO, 10/20/24, 12:25 CDT.    Time: 1707

## 2024-10-22 LAB
QT INTERVAL: 376 MS
QTC INTERVAL: 485 MS

## 2024-10-23 LAB
BACTERIA SPEC AEROBE CULT: NORMAL
BACTERIA SPEC AEROBE CULT: NORMAL

## (undated) DEVICE — TROC BLADLES ANCHORPORT/OPTI LP 5X120MM 1P/U

## (undated) DEVICE — SNAR POLYP CAPTIVATOR RND STFF 2.4 240CM 10MM 1P/U

## (undated) DEVICE — MODEL BT2000 P/N 700287-012KIT CONTENTS: MANIFOLD WITH SALINE AND CONTRAST PORTS, SALINE TUBING WITH SPIKE AND HAND SYRINGE, TRANSDUCER: Brand: BT2000 AUTOMATED MANIFOLD KIT

## (undated) DEVICE — DAVINCI: Brand: MEDLINE INDUSTRIES, INC.

## (undated) DEVICE — ARM DRAPE

## (undated) DEVICE — RADIFOCUS OPTITORQUE ANGIOGRAPHIC CATHETER: Brand: OPTITORQUE

## (undated) DEVICE — GW STARTER FXD CORE J .035 3X260CM 3MM

## (undated) DEVICE — TISSUE RETRIEVAL SYSTEM: Brand: INZII RETRIEVAL SYSTEM

## (undated) DEVICE — CUFF,BP,DISP,1 TUBE,ADULT,HP: Brand: MEDLINE

## (undated) DEVICE — TR BAND RADIAL ARTERY COMPRESSION DEVICE: Brand: TR BAND

## (undated) DEVICE — PK CATH CARD 30 CA/4

## (undated) DEVICE — Device: Brand: BLACK EYE

## (undated) DEVICE — SPNG GZ STRL 2S 4X4 12PLY

## (undated) DEVICE — THE CHANNEL CLEANING BRUSH IS A NYLON FLEXI BRUSH ATTACHED TO A FLEXIBLE PLASTIC SHEATH DESIGNED TO SAFELY REMOVE DEBRIS FROM FLEXIBLE ENDOSCOPES.

## (undated) DEVICE — SOL IRR NACL 0.9PCT BT 1000ML

## (undated) DEVICE — ANTIBACTERIAL UNDYED BRAIDED (POLYGLACTIN 910), SYNTHETIC ABSORBABLE SUTURE: Brand: COATED VICRYL

## (undated) DEVICE — MASK,OXYGEN,MED CONC,ADLT,7' TUB, UC: Brand: PENDING

## (undated) DEVICE — ST TBG AIRSEAL FLTR TRI LUM

## (undated) DEVICE — SENSR O2 OXIMAX FNGR A/ 18IN NONSTR

## (undated) DEVICE — 2, DISPOSABLE SUCTION/IRRIGATOR WITHOUT DISPOSABLE TIP: Brand: STRYKEFLOW

## (undated) DEVICE — Device: Brand: DEFENDO AIR/WATER/SUCTION AND BIOPSY VALVE

## (undated) DEVICE — SUCTION IRRIGATOR: Brand: ENDOWRIST

## (undated) DEVICE — HEARTRAIL III GUIDING CATHETER: Brand: HEARTRAIL

## (undated) DEVICE — KT CLN CLEANOR SCPE

## (undated) DEVICE — PTCA DILATATION CATHETER: Brand: EMERGE™

## (undated) DEVICE — SUREFORM 45: Brand: SUREFORM

## (undated) DEVICE — DRSNG SURESITE WNDW 4X4.5

## (undated) DEVICE — DRAPE,ANGIO,BRACH,STERILE,38X44: Brand: MEDLINE

## (undated) DEVICE — COPILOT BLEEDBACK CONTROL VALVE: Brand: COPILOT

## (undated) DEVICE — ADHS SKIN PREMIERPRO EXOFIN TOPICAL HI/VISC .5ML

## (undated) DEVICE — PAD, DEFIB, ADULT, RADIOTRANS, PHYSIO: Brand: MEDLINE

## (undated) DEVICE — YANKAUER,BULB TIP WITH VENT: Brand: ARGYLE

## (undated) DEVICE — GLIDESHEATH SLENDER STAINLESS STEEL KIT: Brand: GLIDESHEATH SLENDER

## (undated) DEVICE — SOLIDIFIER LIQUI LOC PLUS 2000CC

## (undated) DEVICE — DEV TORQ GW HOT/PINK

## (undated) DEVICE — GW STARTER FXD CORE J .035 3X150CM 3MM

## (undated) DEVICE — NDL HYPO PRECISIONGLIDE REG 22G 1 1/2

## (undated) DEVICE — SUP ARMBRD ART/LINE BLU

## (undated) DEVICE — NDL SCLEROTHRPY INTERJECT 25G 4 240 CLR

## (undated) DEVICE — MODEL AT P65, P/N 701554-001KIT CONTENTS: HAND CONTROLLER, 3-WAY HIGH-PRESSURE STOPCOCK WITH ROTATING END AND PREMIUM HIGH-PRESSURE TUBING: Brand: ANGIOTOUCH® KIT

## (undated) DEVICE — THE SINGLE USE ETRAP – POLYP TRAP IS USED FOR SUCTION RETRIEVAL OF ENDOSCOPICALLY REMOVED POLYPS.: Brand: ETRAP

## (undated) DEVICE — SYR LL TP 10ML STRL

## (undated) DEVICE — SEAL

## (undated) DEVICE — FRCP BX RADJAW4 NDL 2.8 240 STD OG

## (undated) DEVICE — NC TREK CORONARY DILATATION CATHETER 3.75 MM X 15 MM / RAPID-EXCHANGE: Brand: NC TREK

## (undated) DEVICE — SOLIDIFIER LIQ LIQUILOC/PLUS W/TREAT 2000CC

## (undated) DEVICE — TROC BLADLES ANCHORPORT/OPTI LP 8X120MM 1P/U

## (undated) DEVICE — BLADELESS OBTURATOR: Brand: WECK VISTA

## (undated) DEVICE — CANN NASL ETCO2 LO/FLO A/

## (undated) DEVICE — STRIP,CLOSURE,WOUND,MEDI-STRIP,1/2X4: Brand: MEDLINE

## (undated) DEVICE — BNDR ABD 4PANEL 12IN 46 TO 62IN

## (undated) DEVICE — THE DISPOSABLE ROTH NET FOREIGN BODY STANDARD RETRIEVAL DEVICE IS USED IN THE ENDOSCOPIC RETRIEVAL OF FOREIGN BODY, FOOD BOLUS AND EXCISED TISSUE SUCH AS POLYPS.: Brand: ROTH NET

## (undated) DEVICE — HI-TORQUE POWERTURN FLEX GUIDE WIRE W/HYDROPHILIC COATING .014" STRAIGHT TIP 190 CM: Brand: HI-TORQUE POWERTURN

## (undated) DEVICE — SUT VIC 0 UR6 27IN VCP603H

## (undated) DEVICE — SUT MNCRYL 4/0 PS2 27IN UD MCP426H

## (undated) DEVICE — SINGLE-USE POLYPECTOMY SNARE: Brand: CAPTIVATOR II

## (undated) DEVICE — ACCESS PLATFORM FOR MINIMALLY INVASIVE SURGERY: Brand: GELPOINT®  MINI ADVANCED ACCESS PLATFORM

## (undated) DEVICE — 4-PORT MANIFOLD: Brand: NEPTUNE 2

## (undated) DEVICE — REDUCER: Brand: ENDOWRIST

## (undated) DEVICE — TOOL INSRT GW MTL OR PLSTC

## (undated) DEVICE — ELECTRD BLD EZ CLN MOD XLNG 2.75IN